# Patient Record
Sex: FEMALE | Race: WHITE | NOT HISPANIC OR LATINO | Employment: PART TIME | URBAN - METROPOLITAN AREA
[De-identification: names, ages, dates, MRNs, and addresses within clinical notes are randomized per-mention and may not be internally consistent; named-entity substitution may affect disease eponyms.]

---

## 2019-01-03 ENCOUNTER — HOSPITAL ENCOUNTER (OUTPATIENT)
Dept: ORTHOPEDIC SURGERY | Facility: CLINIC | Age: 74
Discharge: HOME | End: 2019-01-03
Attending: ORTHOPAEDIC SURGERY | Admitting: ORTHOPAEDIC SURGERY

## 2019-02-06 ENCOUNTER — HOSPITAL ENCOUNTER (OUTPATIENT)
Dept: PREADMISSION TESTING | Facility: HOSPITAL | Age: 74
Discharge: HOME | End: 2019-02-06
Attending: ORTHOPAEDIC SURGERY | Admitting: ORTHOPAEDIC SURGERY

## 2019-02-06 LAB
ANION GAP SERPL CALC-SCNC: 15.1 MMOL/L (ref 10–20)
APTT BLD: 24.5 SEC (ref 24–31)
BACTERIA SPEC AEROBE CULT: NORMAL
BASOPHILS # BLD AUTO: 0.1 10*3/UL (ref 0–0.2)
BASOPHILS NFR BLD AUTO: 1 % (ref 0–2)
BUN SERPL-MCNC: 22 MG/DL (ref 8–20)
BUN/CREAT SERPL: 22 (ref 5.4–26.2)
CALCIUM SERPL-MCNC: 9.5 MG/DL (ref 8.9–10.3)
CHLORIDE SERPL-SCNC: 101 MMOL/L (ref 101–111)
CONV CO2: 26 MMOL/L (ref 22–32)
CREAT UR-MCNC: 1 MG/DL (ref 0.4–1)
DIFFERENTIAL METHOD BLD: (no result)
EOSINOPHIL # BLD AUTO: 0.6 10*3/UL (ref 0–0.3)
EOSINOPHIL # BLD AUTO: 5 % (ref 0–3)
ERYTHROCYTE [DISTWIDTH] IN BLOOD BY AUTOMATED COUNT: 15.3 % (ref 11.5–14.5)
GLUCOSE SERPL-MCNC: 88 MG/DL (ref 65–99)
HCT VFR BLD AUTO: 42.1 % (ref 35–49)
HGB BLD-MCNC: 13.8 G/DL (ref 12–15)
INR PPP: 0.9
LYMPHOCYTES # BLD AUTO: 3.3 10*3/UL (ref 0.8–4.8)
LYMPHOCYTES NFR BLD AUTO: 27 % (ref 18–42)
Lab: NORMAL
MCH RBC QN AUTO: 28.1 PG (ref 26–32)
MCHC RBC AUTO-ENTMCNC: 32.8 G/DL (ref 32–36)
MCV RBC AUTO: 85.9 FL (ref 80–94)
MICRO REPORT STATUS: NORMAL
MONOCYTES # BLD AUTO: 0.8 10*3/UL (ref 0.1–1.3)
MONOCYTES NFR BLD AUTO: 6 % (ref 2–11)
NEUTROPHILS # BLD AUTO: 7.3 10*3/UL (ref 2.3–8.6)
NEUTROPHILS NFR BLD AUTO: 61 % (ref 50–75)
NRBC BLD AUTO-RTO: 0 /100{WBCS}
NRBC/RBC NFR BLD MANUAL: 0 10*3/UL
PLATELET # BLD AUTO: 337 10*3/UL (ref 150–450)
PMV BLD AUTO: 9.3 FL (ref 7.4–10.4)
POTASSIUM SERPL-SCNC: 4.1 MMOL/L (ref 3.6–5.1)
PROTHROMBIN TIME: 9.6 SEC (ref 9.6–11.7)
RBC # BLD AUTO: 4.9 10*6/UL (ref 4–5.4)
SODIUM SERPL-SCNC: 138 MMOL/L (ref 136–144)
SPECIMEN SOURCE: NORMAL
WBC # BLD AUTO: 12.2 10*3/UL (ref 4.5–11.5)

## 2019-02-19 ENCOUNTER — HOSPITAL ENCOUNTER (OUTPATIENT)
Dept: PREOP | Facility: HOSPITAL | Age: 74
Setting detail: HOSPITAL OUTPATIENT SURGERY
Discharge: HOME | End: 2019-02-19
Attending: ORTHOPAEDIC SURGERY | Admitting: ORTHOPAEDIC SURGERY

## 2019-03-08 ENCOUNTER — HOSPITAL ENCOUNTER (OUTPATIENT)
Dept: PHYSICAL THERAPY | Facility: HOSPITAL | Age: 74
Setting detail: RECURRING SERIES
Discharge: HOME | End: 2019-06-14
Attending: ORTHOPAEDIC SURGERY | Admitting: ORTHOPAEDIC SURGERY

## 2019-05-20 ENCOUNTER — CONVERSION ENCOUNTER (OUTPATIENT)
Dept: ORTHOPEDIC SURGERY | Facility: CLINIC | Age: 74
End: 2019-05-20

## 2019-06-04 VITALS
HEART RATE: 82 BPM | WEIGHT: 131 LBS | SYSTOLIC BLOOD PRESSURE: 104 MMHG | DIASTOLIC BLOOD PRESSURE: 69 MMHG | HEIGHT: 61 IN | BODY MASS INDEX: 24.73 KG/M2

## 2019-06-17 ENCOUNTER — OFFICE VISIT (OUTPATIENT)
Dept: ORTHOPEDIC SURGERY | Facility: CLINIC | Age: 74
End: 2019-06-17

## 2019-06-17 VITALS
DIASTOLIC BLOOD PRESSURE: 76 MMHG | WEIGHT: 133 LBS | HEIGHT: 60 IN | HEART RATE: 72 BPM | SYSTOLIC BLOOD PRESSURE: 155 MMHG | BODY MASS INDEX: 26.11 KG/M2

## 2019-06-17 DIAGNOSIS — M75.121 COMPLETE TEAR OF RIGHT ROTATOR CUFF: Primary | ICD-10-CM

## 2019-06-17 PROCEDURE — 99213 OFFICE O/P EST LOW 20 MIN: CPT | Performed by: ORTHOPAEDIC SURGERY

## 2019-06-17 RX ORDER — ESTRADIOL 1 MG/1
1 TABLET ORAL DAILY
COMMUNITY
Start: 2019-05-06

## 2019-06-17 RX ORDER — ESOMEPRAZOLE MAGNESIUM 10 MG/1
10 GRANULE, FOR SUSPENSION, EXTENDED RELEASE ORAL DAILY
COMMUNITY
Start: 2019-01-03

## 2019-06-17 RX ORDER — TRIAMTERENE AND HYDROCHLOROTHIAZIDE 37.5; 25 MG/1; MG/1
TABLET ORAL
COMMUNITY
Start: 2019-01-03 | End: 2022-07-07

## 2019-06-17 RX ORDER — AZELASTINE 1 MG/ML
1 SPRAY, METERED NASAL
COMMUNITY

## 2019-06-17 RX ORDER — BIOTIN 5 MG
TABLET ORAL DAILY
COMMUNITY
Start: 2019-01-03 | End: 2022-08-30

## 2019-06-17 NOTE — PROGRESS NOTES
"     Patient ID: Pam Vidal is a 74 y.o. female.    Right shoulder pain  2/19/19 right shoulder arthroscopy with rotator cuff repair with soft bone  Pain mild, not at goal      Review of Systems:  Right shoulder pain  Denies chest pain      Objective:    /76   Pulse 72   Ht 152.4 cm (60\")   Wt 60.3 kg (133 lb)   BMI 25.97 kg/m²     Physical Examination:  She is a pleasant female in no distress. She is alert and oriented x3 and appears her stated age.  Right shoulder demonstrates healed incisions.  Active elevation is 160 degrees, abduction 130 degrees external rotation 40 degrees with intact repair.  Sensory and motor exam are intact all distributions. Radial pulse is palpable and capillary refill is less than two seconds to all digits          Imaging:           Assessment:    Doing well after cuff repair    Plan: Finish formal therapy, gradual activity as tolerated and see me as needed  "

## 2019-06-18 ENCOUNTER — TRANSCRIBE ORDERS (OUTPATIENT)
Dept: PHYSICAL THERAPY | Facility: CLINIC | Age: 74
End: 2019-06-18

## 2019-06-18 ENCOUNTER — OFFICE VISIT (OUTPATIENT)
Dept: PHYSICAL THERAPY | Facility: CLINIC | Age: 74
End: 2019-06-18

## 2019-06-18 DIAGNOSIS — M75.121 COMPLETE ROTATOR CUFF TEAR OR RUPTURE OF RIGHT SHOULDER, NOT SPECIFIED AS TRAUMATIC: Primary | ICD-10-CM

## 2019-06-18 DIAGNOSIS — M25.511 RIGHT SHOULDER PAIN, UNSPECIFIED CHRONICITY: ICD-10-CM

## 2019-06-18 PROCEDURE — 97140 MANUAL THERAPY 1/> REGIONS: CPT | Performed by: PHYSICAL THERAPIST

## 2019-06-18 PROCEDURE — 97110 THERAPEUTIC EXERCISES: CPT | Performed by: PHYSICAL THERAPIST

## 2019-06-18 NOTE — PROGRESS NOTES
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  Saw MD yesterday, released from his care, instructed pt. to continue current course of PT.Pain  Current pain ratin           Objective   See Exercise, Manual, and Modality Logs for complete treatment.       Assessment & Plan     Assessment  Impairments: impaired physical strength  Assessment details: Fatigued quickly with resisted exercise, frequent rest breaks needed.     Plan  Therapy options: will be seen for skilled physical therapy services  Planned modality interventions: cryotherapy  Planned therapy interventions: strengthening, stretching and therapeutic activities                     Manual Therapy:   12    mins  71620;  Therapeutic Exercise: 42  mins  84730;     Neuromuscular Rubina:        mins  98829;    Therapeutic Activity:          mins  48823;     Gait Training:           mins  55644;     Ultrasound:          mins  60345;    Work Hardening                 mins 79032  Iontophoresis                  mins 10856    Timed Treatment:   54   mins   Total Treatment:     54   mins    Delonte Alexander, PTA

## 2019-06-21 ENCOUNTER — OFFICE VISIT (OUTPATIENT)
Dept: PHYSICAL THERAPY | Facility: CLINIC | Age: 74
End: 2019-06-21

## 2019-06-21 DIAGNOSIS — M75.121 COMPLETE ROTATOR CUFF TEAR OR RUPTURE OF RIGHT SHOULDER, NOT SPECIFIED AS TRAUMATIC: Primary | ICD-10-CM

## 2019-06-21 DIAGNOSIS — M25.511 RIGHT SHOULDER PAIN, UNSPECIFIED CHRONICITY: ICD-10-CM

## 2019-06-21 PROCEDURE — 97110 THERAPEUTIC EXERCISES: CPT | Performed by: PHYSICAL THERAPIST

## 2019-06-21 PROCEDURE — 97140 MANUAL THERAPY 1/> REGIONS: CPT | Performed by: PHYSICAL THERAPIST

## 2019-06-21 NOTE — PROGRESS NOTES
Physical Therapy Daily Progress Note      Visit # 2      Subjective Evaluation    History of Present Illness    Subjective comment: Doing well at work, nothing significant to report since last visit.  Pain  Current pain ratin           Objective     See Exercise, Manual, and Modality Logs for complete treatment.       Assessment & Plan     Assessment  Impairments: abnormal or restricted ROM and impaired physical strength  Assessment details:   Fatigued quickly with resisted exercise but able to tolerate progression of reps and/or resistance with several exercises.     Plan  Therapy options: will be seen for skilled physical therapy services  Planned modality interventions: cryotherapy  Planned therapy interventions: strengthening, stretching and therapeutic activities  Frequency: 1x week  Duration in visits: 6                     Manual Therapy:   10    mins  15759;  Therapeutic Exercise: 34  mins  68816;     Neuromuscular Rubina:        mins  07903;    Therapeutic Activity:          mins  55376;     Gait Training:           mins  73707;     Ultrasound:          mins  19905;    Work Hardening                 mins 02842  Iontophoresis                  mins 64499    Timed Treatment:   44   mins   Total Treatment:     44   mins    Delonte Alexander PTA

## 2019-06-28 ENCOUNTER — OFFICE VISIT (OUTPATIENT)
Dept: PHYSICAL THERAPY | Facility: CLINIC | Age: 74
End: 2019-06-28

## 2019-06-28 DIAGNOSIS — M25.511 RIGHT SHOULDER PAIN, UNSPECIFIED CHRONICITY: ICD-10-CM

## 2019-06-28 DIAGNOSIS — M75.121 COMPLETE ROTATOR CUFF TEAR OR RUPTURE OF RIGHT SHOULDER, NOT SPECIFIED AS TRAUMATIC: Primary | ICD-10-CM

## 2019-06-28 PROCEDURE — 97140 MANUAL THERAPY 1/> REGIONS: CPT | Performed by: PHYSICAL THERAPIST

## 2019-06-28 PROCEDURE — 97110 THERAPEUTIC EXERCISES: CPT | Performed by: PHYSICAL THERAPIST

## 2019-06-28 NOTE — PROGRESS NOTES
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  Notes some mild tightness in R UT, otherwise no c/o.      Objective     See Exercise, Manual, and Modality Logs for complete treatment.     Progressed with additional resisted exercise using cable column resistance.       Assessment & Plan     Assessment  Impairments: abnormal or restricted ROM and impaired physical strength  Assessment details:   Improved tolerance to resisted exercise overall, strength and endurance of RUE improving.      Plan  Therapy options: will be seen for skilled physical therapy services  Planned modality interventions: cryotherapy  Planned therapy interventions: strengthening, stretching and therapeutic activities  Frequency: 1x week  Duration in visits: 6  Plan details:   Will follow up with evaluating PT in approx. 10 days, discuss continue PT vs DC at that time.                   Manual Therapy:  8     mins  58553;  Therapeutic Exercise: 32 mins  32203;     Neuromuscular Rubina:        mins  12357;    Therapeutic Activity:          mins  01034;     Gait Training:           mins  58803;     Ultrasound:          mins  24391;    Work Hardening                 mins 05135  Iontophoresis                  mins 56932    Timed Treatment:   40   mins   Total Treatment:     40   mins    Delonte Alexander PTA

## 2019-10-09 ENCOUNTER — DOCUMENTATION (OUTPATIENT)
Dept: PHYSICAL THERAPY | Facility: CLINIC | Age: 74
End: 2019-10-09

## 2019-10-09 NOTE — PROGRESS NOTES
Discharge Summary  Discharge Summary from Physical Therapy Report        Number of Visits: 25     Discharge Status of Patient: Pam made excellent progress with physical therapy. She was scheduled to be seen for final assessment and discharge but she cancelled last 2 appointments and did not return for PT.    Goals: Partially Met    Discharge Plan: Continue with current home exercise program as instructed  Patient to return to referring/providing physician as needed.      Date of Discharge 6/28/19        Izzy Mccarthy, PT  Physical Therapist

## 2019-11-05 ENCOUNTER — ON CAMPUS - OUTPATIENT (AMBULATORY)
Dept: URBAN - METROPOLITAN AREA HOSPITAL 2 | Facility: HOSPITAL | Age: 74
End: 2019-11-05
Payer: COMMERCIAL

## 2019-11-05 ENCOUNTER — LAB REQUISITION (OUTPATIENT)
Dept: LAB | Facility: HOSPITAL | Age: 74
End: 2019-11-05

## 2019-11-05 ENCOUNTER — OFFICE (AMBULATORY)
Dept: URBAN - METROPOLITAN AREA PATHOLOGY 4 | Facility: PATHOLOGY | Age: 74
End: 2019-11-05
Payer: COMMERCIAL

## 2019-11-05 VITALS
SYSTOLIC BLOOD PRESSURE: 146 MMHG | SYSTOLIC BLOOD PRESSURE: 128 MMHG | HEART RATE: 63 BPM | HEART RATE: 60 BPM | RESPIRATION RATE: 14 BRPM | DIASTOLIC BLOOD PRESSURE: 55 MMHG | DIASTOLIC BLOOD PRESSURE: 59 MMHG | OXYGEN SATURATION: 99 % | SYSTOLIC BLOOD PRESSURE: 119 MMHG | TEMPERATURE: 96.7 F | OXYGEN SATURATION: 100 % | SYSTOLIC BLOOD PRESSURE: 105 MMHG | HEART RATE: 71 BPM | HEART RATE: 74 BPM | DIASTOLIC BLOOD PRESSURE: 90 MMHG | SYSTOLIC BLOOD PRESSURE: 100 MMHG | WEIGHT: 131 LBS | OXYGEN SATURATION: 97 % | OXYGEN SATURATION: 98 % | SYSTOLIC BLOOD PRESSURE: 130 MMHG | RESPIRATION RATE: 16 BRPM | DIASTOLIC BLOOD PRESSURE: 65 MMHG | DIASTOLIC BLOOD PRESSURE: 64 MMHG | HEART RATE: 77 BPM | HEART RATE: 67 BPM | DIASTOLIC BLOOD PRESSURE: 60 MMHG | SYSTOLIC BLOOD PRESSURE: 135 MMHG | HEART RATE: 72 BPM | SYSTOLIC BLOOD PRESSURE: 134 MMHG | OXYGEN SATURATION: 96 % | HEART RATE: 73 BPM | RESPIRATION RATE: 18 BRPM | DIASTOLIC BLOOD PRESSURE: 63 MMHG | DIASTOLIC BLOOD PRESSURE: 68 MMHG | DIASTOLIC BLOOD PRESSURE: 86 MMHG

## 2019-11-05 DIAGNOSIS — D12.5 BENIGN NEOPLASM OF SIGMOID COLON: ICD-10-CM

## 2019-11-05 DIAGNOSIS — Z12.11 ENCOUNTER FOR SCREENING FOR MALIGNANT NEOPLASM OF COLON: ICD-10-CM

## 2019-11-05 DIAGNOSIS — K57.30 DIVERTICULOSIS OF LARGE INTESTINE WITHOUT PERFORATION OR ABS: ICD-10-CM

## 2019-11-05 DIAGNOSIS — D12.3 BENIGN NEOPLASM OF TRANSVERSE COLON: ICD-10-CM

## 2019-11-05 DIAGNOSIS — K63.5 POLYP OF COLON: ICD-10-CM

## 2019-11-05 DIAGNOSIS — K57.30 DIVERTICULOSIS OF LARGE INTESTINE WITHOUT PERFORATION OR ABSCESS WITHOUT BLEEDING: ICD-10-CM

## 2019-11-05 LAB
GI HISTOLOGY: A. UNSPECIFIED: (no result)
GI HISTOLOGY: B. UNSPECIFIED: (no result)
GI HISTOLOGY: PDF REPORT: (no result)

## 2019-11-05 PROCEDURE — 45385 COLONOSCOPY W/LESION REMOVAL: CPT | Performed by: INTERNAL MEDICINE

## 2019-11-05 PROCEDURE — 88305 TISSUE EXAM BY PATHOLOGIST: CPT | Mod: 26 | Performed by: INTERNAL MEDICINE

## 2019-11-05 PROCEDURE — 88305 TISSUE EXAM BY PATHOLOGIST: CPT | Performed by: INTERNAL MEDICINE

## 2019-11-06 LAB
LAB AP CASE REPORT: NORMAL
PATH REPORT.FINAL DX SPEC: NORMAL
PATH REPORT.GROSS SPEC: NORMAL

## 2022-02-09 ENCOUNTER — LAB (OUTPATIENT)
Dept: LAB | Facility: HOSPITAL | Age: 77
End: 2022-02-09

## 2022-02-09 ENCOUNTER — TRANSCRIBE ORDERS (OUTPATIENT)
Dept: ADMINISTRATIVE | Facility: HOSPITAL | Age: 77
End: 2022-02-09

## 2022-02-09 DIAGNOSIS — Z11.52 ENCOUNTER FOR SCREENING FOR SEVERE ACUTE RESPIRATORY SYNDROME CORONAVIRUS 2 (SARS-COV-2) INFECTION: ICD-10-CM

## 2022-02-09 DIAGNOSIS — Z11.52 ENCOUNTER FOR SCREENING FOR SEVERE ACUTE RESPIRATORY SYNDROME CORONAVIRUS 2 (SARS-COV-2) INFECTION: Primary | ICD-10-CM

## 2022-02-09 LAB — SARS-COV-2 ORF1AB RESP QL NAA+PROBE: DETECTED

## 2022-02-09 PROCEDURE — U0004 COV-19 TEST NON-CDC HGH THRU: HCPCS

## 2022-02-09 PROCEDURE — U0005 INFEC AGEN DETEC AMPLI PROBE: HCPCS

## 2022-02-09 PROCEDURE — C9803 HOPD COVID-19 SPEC COLLECT: HCPCS

## 2022-07-07 ENCOUNTER — OFFICE VISIT (OUTPATIENT)
Dept: SURGERY | Facility: CLINIC | Age: 77
End: 2022-07-07

## 2022-07-07 ENCOUNTER — NURSE NAVIGATOR (OUTPATIENT)
Dept: ONCOLOGY | Facility: CLINIC | Age: 77
End: 2022-07-07

## 2022-07-07 VITALS
TEMPERATURE: 98.4 F | DIASTOLIC BLOOD PRESSURE: 67 MMHG | WEIGHT: 127 LBS | BODY MASS INDEX: 24.94 KG/M2 | SYSTOLIC BLOOD PRESSURE: 181 MMHG | OXYGEN SATURATION: 96 % | HEIGHT: 60 IN | HEART RATE: 71 BPM

## 2022-07-07 DIAGNOSIS — Z01.818 PRE-OP TESTING: ICD-10-CM

## 2022-07-07 DIAGNOSIS — R91.8 LUNG MASS: Primary | ICD-10-CM

## 2022-07-07 PROCEDURE — 99204 OFFICE O/P NEW MOD 45 MIN: CPT | Performed by: THORACIC SURGERY (CARDIOTHORACIC VASCULAR SURGERY)

## 2022-07-07 RX ORDER — ASPIRIN 81 MG/1
TABLET ORAL
COMMUNITY
Start: 2019-01-03 | End: 2022-08-30

## 2022-07-08 ENCOUNTER — TELEPHONE (OUTPATIENT)
Dept: SURGERY | Facility: CLINIC | Age: 77
End: 2022-07-08

## 2022-07-08 NOTE — TELEPHONE ENCOUNTER
"I received a call from Muriel with [facility] # [phone]. She stated she was calling on the consult of patient pam matias. I was unsure of what she was needing so I asked if she wanted to know when she was being seen by us or if she wanted the office note. Muriel mocked me with a \" ugh and Oh my God\". I asked for her Doctors name and she didn't give me one just said she was with [facility]. I took down the fax # [phone]. I then contacted the patient. Pam said she is not going to see them and she did not want her records sent there that her appt had been cancelled. I called Muriel back to inform her of that and she hung up on me.  "

## 2022-07-08 NOTE — PROGRESS NOTES
Chief Complaint  Lung Nodule (New Patient referral Dr. Mckay  Right lung nodule hemoptysis)    Subjective        Pam KRISTIE Vidal presents to [facility]  History of Present Illness    Ms. Vidal is a pleasant 77-year-old lady with a right upper lobe lung mass. She is accompanied by an adult female.    The patient reports that she has been coughing up blood mixed with mucus for the past 4 weeks, which is gradually worsening and increasing in frequency to almost every time she cough. She denies loss of weight, but she has been trying to lose 10 pounds per her doctor's recommendation. Also, at times, she states that she has episodes of slight pain in her head lasting seconds.     The patient reports that she receives injections in her eyes to treat macular degenerations. She has rheumatoid arthritis, and irritable bowel due to gallbladder removal. Also, she had situational hypertension due to stress from her mother, , and son being involved in a car wreck, but she has not been on blood pressure medication for almost 2 years.     The patient reports that she is a former smoker. She smoked for 50 years, less than half a pack a day and around 3 puffs per cigarette, but she quit 5 years ago.     The patient has had surgery on her carotid, appendectomy, and spinal surgery. She has never had chest surgery. She denies any trouble with anesthesia.     The patient's mother  of liver cancer, and her father  of stomach cancer.     She has concerns that the mass in her right upper lobe may be due to a blood clot from a fall that she sustained in 2021. She states that she wants all testing, treatment, and surgeries to be done at [facility], [place].     The patient reports that she is due for a mammogram and colon cancer screening. Her last mammogram showed abnormalities.    Objective   Vital Signs:  BP (!) 181/67 (BP Location: Right arm, Patient Position: Sitting, Cuff Size: Adult)    "Pulse 71   Temp 98.4 °F (36.9 °C) (Infrared)   Ht 152.4 cm (60\")   Wt 57.6 kg (127 lb)   SpO2 96%   BMI 24.80 kg/m²   Estimated body mass index is 24.8 kg/m² as calculated from the following:    Height as of this encounter: 152.4 cm (60\").    Weight as of this encounter: 57.6 kg (127 lb).    BMI is within normal parameters. No other follow-up for BMI required.      Physical Exam  Vitals and nursing note reviewed.   Constitutional:       Appearance: She is well-developed.   HENT:      Head: Normocephalic and atraumatic.      Nose: Nose normal.   Eyes:      Conjunctiva/sclera: Conjunctivae normal.      Pupils: Pupils are equal, round, and reactive to light.   Cardiovascular:      Rate and Rhythm: Normal rate and regular rhythm.      Heart sounds: Normal heart sounds.   Pulmonary:      Effort: Pulmonary effort is normal.      Breath sounds: Normal breath sounds.   Abdominal:      General: Bowel sounds are normal.      Palpations: Abdomen is soft.   Musculoskeletal:         General: Normal range of motion.      Cervical back: Normal range of motion and neck supple.   Skin:     General: Skin is warm and dry.      Capillary Refill: Capillary refill takes less than 2 seconds.   Neurological:      Mental Status: She is alert and oriented to person, place, and time.   Psychiatric:         Behavior: Behavior normal.         Thought Content: Thought content normal.         Judgment: Judgment normal.        Result Review :             I have independently reviewed the CT of the chest performed on 06/27/2022 which demonstrates a 4 cm right upper lobe mass along the major fissure. There is an enlarged hilar lymph node as well as an enlarged pre-prandial lymph node concerning for metastatic disease. There is a right lower lobe 1 cm nodule and a 5 mm right upper lobe nodule.     Assessment and Plan   Diagnoses and all orders for this visit:    1. Lung mass (Primary)  -     CT Biopsy Lung Mediastinum Right; Future  -     NM " PET/CT Skull Base to Mid Thigh; Future  -     Full Pulmonary Function Test With Bronchodilator; Future  -     MRI Brain With & Without Contrast; Future    2. Pre-op testing  -     COVID PRE-OP / PRE-PROCEDURE SCREENING ORDER (NO ISOLATION) - Swab, Nasopharynx; Future    Ms. Vidal is a very pleasant 77-year-old lady with a newly diagnosed 4 cm lung mass with mediastinal lymphadenopathy concerning for bronchogenic malignancy. She will need a CT-guided lung biopsy, a PET CT scan, and pulmonary function studies, and a return visit with me to discuss the results and treatment strategies. We would also like for her to see radiation oncology and medical oncology for a multidisciplinary approach to her cancer care as I suspect she will have stage III disease based on her imaging studies.     I spent 45 minutes caring for Pam on this date of service. This time includes time spent by me in the following activities:preparing for the visit, reviewing tests, obtaining and/or reviewing a separately obtained history, performing a medically appropriate examination and/or evaluation , counseling and educating the patient/family/caregiver, ordering medications, tests, or procedures, referring and communicating with other health care professionals , documenting information in the medical record and independently interpreting results and communicating that information with the patient/family/caregiver  Follow Up   No follow-ups on file.  Patient was given instructions and counseling regarding her condition or for health maintenance advice. Please see specific information pulled into the AVS if appropriate.     Transcribed from ambient dictation for Remedios Rice MD by Quyen Taylor.  07/08/22   05:51 EDT    Patient verbalized consent to the visit recording.  I have personally performed the services described in this document as transcribed by the above individual, and it is both accurate and complete.  Remedios Rice MD   7/26/2022  16:14 EDT

## 2022-07-08 NOTE — PROGRESS NOTES
I accompanied patient to Dr. Rice's office visit.     Dr. Rice reviewed scans and plan for workup. Patient requested all of her appointments be scheduled in [place] at this point. Patient has an appointment again with Dr. Rice 7/28. Orders have been placed for CT biopsy, MRI brain, and PET before follow up appointments. All questions have been answered and patient has my information if needed.

## 2022-07-11 ENCOUNTER — PATIENT ROUNDING (BHMG ONLY) (OUTPATIENT)
Dept: SURGERY | Facility: CLINIC | Age: 77
End: 2022-07-11

## 2022-07-11 NOTE — PROGRESS NOTES
07/22/22 0001   Pre-Procedure Phone Call   Procedure Time Verified Yes   Arrival Time 0830   Procedure Location Verified Yes   Medical History Reviewed No   NPO Status Reinforced Yes   Ride and Caregiver Arranged Yes   Patient Knows to Bring Current Medications No   Bring Outside Films Requested No

## 2022-07-11 NOTE — PROGRESS NOTES
July 11, 2022    Hello, may I speak with Pam Vidal?    My name is Yasmin     I am  with [facility]  [address] [place] [address].    Before we get started may I verify your date of birth? 1945    I am calling to officially welcome you to our practice and ask about your recent visit. Is this a good time to talk? yes    Tell me about your visit with us. What things went well?  pt stated everything went great        We're always looking for ways to make our patients' experiences even better. Do you have recommendations on ways we may improve?  no    Overall were you satisfied with your first visit to our practice? yes       I appreciate you taking the time to speak with me today. Is there anything else I can do for you? no      Thank you, and have a great day.

## 2022-07-11 NOTE — PROGRESS NOTES
07/22/22 0001   Pre-Procedure Phone Call   Procedure Time Verified Yes   Arrival Time 0830   Procedure Location Verified Yes   Medical History Reviewed No   NPO Status Reinforced Yes   Ride and Caregiver Arranged Yes   Phone Number for Ride/Caregiver instructed pt to hold her aspirin x 5 days prior to procedure. Last dose to be 7/16/22. Pt voiced understanding.   Patient Knows to Bring Current Medications No   Bring Outside Films Requested No

## 2022-07-20 ENCOUNTER — LAB (OUTPATIENT)
Dept: LAB | Facility: HOSPITAL | Age: 77
End: 2022-07-20

## 2022-07-20 DIAGNOSIS — Z01.818 PRE-OP TESTING: ICD-10-CM

## 2022-07-20 LAB — SARS-COV-2 ORF1AB RESP QL NAA+PROBE: NOT DETECTED

## 2022-07-20 PROCEDURE — C9803 HOPD COVID-19 SPEC COLLECT: HCPCS

## 2022-07-20 PROCEDURE — U0005 INFEC AGEN DETEC AMPLI PROBE: HCPCS

## 2022-07-20 PROCEDURE — U0004 COV-19 TEST NON-CDC HGH THRU: HCPCS

## 2022-07-22 ENCOUNTER — HOSPITAL ENCOUNTER (OUTPATIENT)
Dept: GENERAL RADIOLOGY | Facility: HOSPITAL | Age: 77
Discharge: HOME | End: 2022-07-22

## 2022-07-22 ENCOUNTER — HOSPITAL ENCOUNTER (OUTPATIENT)
Dept: CT IMAGING | Facility: HOSPITAL | Age: 77
Discharge: HOME | End: 2022-07-22

## 2022-07-22 VITALS
HEART RATE: 70 BPM | TEMPERATURE: 98.2 F | SYSTOLIC BLOOD PRESSURE: 144 MMHG | RESPIRATION RATE: 16 BRPM | WEIGHT: 128 LBS | OXYGEN SATURATION: 96 % | BODY MASS INDEX: 25.13 KG/M2 | HEIGHT: 60 IN | DIASTOLIC BLOOD PRESSURE: 68 MMHG

## 2022-07-22 DIAGNOSIS — R91.8 LUNG MASS: ICD-10-CM

## 2022-07-22 LAB
INR PPP: 1.1 (ref 0.8–1.2)
PLATELET # BLD AUTO: 470 10*3/MM3 (ref 140–450)
PROTHROMBIN TIME: 13.4 SECONDS (ref 12.8–15.2)

## 2022-07-22 PROCEDURE — 85610 PROTHROMBIN TIME: CPT

## 2022-07-22 PROCEDURE — 99152 MOD SED SAME PHYS/QHP 5/>YRS: CPT

## 2022-07-22 PROCEDURE — 71045 X-RAY EXAM CHEST 1 VIEW: CPT

## 2022-07-22 PROCEDURE — 85049 AUTOMATED PLATELET COUNT: CPT | Performed by: RADIOLOGY

## 2022-07-22 PROCEDURE — 25010000002 MIDAZOLAM PER 1 MG: Performed by: RADIOLOGY

## 2022-07-22 PROCEDURE — 0 LIDOCAINE 1 % SOLUTION: Performed by: THORACIC SURGERY (CARDIOTHORACIC VASCULAR SURGERY)

## 2022-07-22 PROCEDURE — 88305 TISSUE EXAM BY PATHOLOGIST: CPT | Performed by: THORACIC SURGERY (CARDIOTHORACIC VASCULAR SURGERY)

## 2022-07-22 PROCEDURE — 25010000002 FENTANYL CITRATE (PF) 50 MCG/ML SOLUTION: Performed by: RADIOLOGY

## 2022-07-22 RX ORDER — SODIUM CHLORIDE 0.9 % (FLUSH) 0.9 %
3 SYRINGE (ML) INJECTION EVERY 12 HOURS SCHEDULED
Status: DISCONTINUED | OUTPATIENT
Start: 2022-07-22 | End: 2022-07-23 | Stop reason: HOSPADM

## 2022-07-22 RX ORDER — SODIUM CHLORIDE 0.9 % (FLUSH) 0.9 %
10 SYRINGE (ML) INJECTION AS NEEDED
Status: DISCONTINUED | OUTPATIENT
Start: 2022-07-22 | End: 2022-07-23 | Stop reason: HOSPADM

## 2022-07-22 RX ORDER — LIDOCAINE HYDROCHLORIDE 10 MG/ML
20 INJECTION, SOLUTION INFILTRATION; PERINEURAL ONCE
Status: COMPLETED | OUTPATIENT
Start: 2022-07-22 | End: 2022-07-22

## 2022-07-22 RX ORDER — MIDAZOLAM HYDROCHLORIDE 1 MG/ML
INJECTION INTRAMUSCULAR; INTRAVENOUS
Status: COMPLETED | OUTPATIENT
Start: 2022-07-22 | End: 2022-07-22

## 2022-07-22 RX ORDER — SODIUM CHLORIDE 9 MG/ML
25 INJECTION, SOLUTION INTRAVENOUS ONCE
Status: COMPLETED | OUTPATIENT
Start: 2022-07-22 | End: 2022-07-22

## 2022-07-22 RX ORDER — FENTANYL CITRATE 50 UG/ML
INJECTION, SOLUTION INTRAMUSCULAR; INTRAVENOUS
Status: COMPLETED | OUTPATIENT
Start: 2022-07-22 | End: 2022-07-22

## 2022-07-22 RX ADMIN — LIDOCAINE HYDROCHLORIDE 20 ML: 10 INJECTION, SOLUTION INFILTRATION; PERINEURAL at 10:12

## 2022-07-22 RX ADMIN — Medication 3 ML: at 10:00

## 2022-07-22 RX ADMIN — MIDAZOLAM 1 MG: 1 INJECTION INTRAMUSCULAR; INTRAVENOUS at 10:11

## 2022-07-22 RX ADMIN — FENTANYL CITRATE 50 MCG: 0.05 INJECTION, SOLUTION INTRAMUSCULAR; INTRAVENOUS at 10:11

## 2022-07-22 RX ADMIN — SODIUM CHLORIDE 25 ML/HR: 9 INJECTION, SOLUTION INTRAVENOUS at 10:00

## 2022-07-22 NOTE — POST-PROCEDURE NOTE
POST PROCEDURE NOTE    Procedure: right lung biopsy    Pre-Procedure Diagnosis: right lung mass    Post-procedure Diagnosis: same    Findings: technically successful ct guided right lung biopsy    Complications: no immediate    Blood loss: none    Specimen Removed: two 20 gauge cores    Disposition:   Expected discharge home

## 2022-07-22 NOTE — DISCHARGE INSTRUCTIONS
Discharge Instructions after receiving Moderate Sedation  These instructions provide you with information about caring for yourself after your procedure. Your health care provider may also give you more specific instructions. Your treatment has been planned according to current medical practices, but problems sometimes occur. Call your physican or the Radiology Nurses ([phone]) if you have any problems or questions after your procedure.    What can I expect after the procedure?  After your procedure, you may:  Feel sleepy or light headed for several hours.  Feel clumsy, dizzy or have poor balance for several hours.  Feel forgetful about what happened after the procedure.  Have poor judgment for several hours.  Feel nauseous or vomit.    For at least 24 hours after the procedure:  Have a responsible adult stay with you or frequently check on you until you are awake and alert.   Rest as needed.    Do not:  Participate in activities in which you could fall or become injured.  Drive or operate heavy machinery  Take sleeping pills or medicines that cause drowsiness.  Make important decisions or sign legal documents.  Take care of children on your own.    Eating and drinking: Clear liquids then progress slowly to a normal diet.  If you vomit, drink water, juice, or soup when you can drink without vomiting.  Make sure you have little or no nausea before eating solid foods.    General instructions: Take over-the-counter and prescription medicines only as told by your health care provider .If you have sleep apnea, surgery and certain medicines can increase your risk for breathing problems. Follow instructions from your health care provider about wearing your sleep device: If you smoke, do not smoke without supervision.  Keep all follow-up visits as told by your health care provider. This is important.    Contact your physician if:  You continue to keep feeling nauseous or you keep vomiting. You continue to feel  light-headed, develop a fever or a rash.  Get help right away if you have trouble breathing    I acknowledge the above instructions:    Patient/ Responsible person _________________________________Date ____________Time ____________    Witnessed by____________________________________________ Date_____________ Time____________       EDUCATION /DISCHARGE INSTRUCTIONS  CT/US guided biopsy:  A biopsy is a procedure done to remove tissue for further analysis.  Before images are taken to locate the target area.  Images can be obtained using ultrasound, CT or MRI.  A physician will clean your skin with antiseptic soap, place a sterile towel around the site and administer a local anesthetic to numb the area.  The physician will then insert a special needle.  Sometimes images are taken of the needle after it is inserted to ensure the needle is in the correct area to be biopsied.   A sample is obtained and sent to the laboratory for study.  Occasionally the laboratory is unable to make a diagnosis from the sample and the procedure may need to be repeated.  Within a week the radiologist will send a report to your physician.  A pathologist will also examine the tissue and send a report.    Risks of the procedure include but are not limited to:   *  Bleeding    *  Infection   *  Puncture of surrounding organs *  Death     *  Lung collapse if the biopsy is near the chest which may require insertion of a      chest tube to re-inflate the lung if severe.    Benefits of the procedure:  Using x-ray helps to locate the area that requires a biopsy. The procedure is less invasive than a surgical procedure, there are no large incisions and it does not require anesthesia.    Alternatives to the procedure:  A biopsy can be performed surgically.  Risks of a surgical biopsy include exposure to anesthesia, infection, excessive bleeding and injury to abdominal organs.  A benefit of surgical biopsy is the ability to see the area to be biopsied  and remove of a larger piece of tissue.    THIS EDUCATION INFORMATION WAS REVIEWED PRIOR TO PROCEDURE AND CONSENT. Patient initials__________________Time___________________    Post Procedure:    *  Expect the biopsy site may be tender up to one week.    *  Rest today (no pushing pulling or straining).   *  Slowly increase activity tomorrow.    *  If you received sedation do not drive for 24 hours.   *  Keep dressing clean and dry.   *  Leave dressing on puncture site for 24 hours.    *  You may shower when dressing removed.  Call your doctor if experiencing:   *  Signs of infection such as redness, swelling, excessive pain and / or foul        smelling drainage from the puncture site.   *  Chills or fever over 101 degrees (by mouth).   *  Unrelieved pain.   *  Any new or severe symptoms.   *  If experiencing sudden / severe shortness of breath or chest pain go to the       nearest emergency room.   Following the procedure:     Follow-up with the ordering physician as directed.    Continue to take other medications as directed by your physician unless    otherwise instructed.   If applicable, resume taking your blood thinners or Aspirin on __07/23/2022_______.    If you have any concerns please call the Radiology Nurses Desk at [phone].  You are the most important factor in your recovery.  Follow the above instructions carefully.

## 2022-07-22 NOTE — NURSING NOTE
Patient arrived to radiology triage for lung biopsy.    Protective goggles and mask in place with all patient interactions today

## 2022-07-22 NOTE — H&P
Name: Pam Vidal ADMIT: 2022   : 1945  PCP: Nik Mckay MD    MRN: [id number] LOS: 0 days   AGE/SEX: 77 y.o. female  ROOM: Room/bed info not found       Chief complaint   Patient is a 77 y.o. female presents with lung mass.     Past Surgical History:  Past Surgical History:   Procedure Laterality Date   • APPENDECTOMY      appendix removed   • CAROTID ENDARTERECTOMY Right    • CAROTID ENDARTERECTOMY Left    • CATARACT EXTRACTION     • CERVICAL FUSION     • CHOLECYSTECTOMY     • HYSTERECTOMY     • SHOULDER ARTHROSCOPY Right 2019    right should scope/cuff repair        Past Medical History:  Past Medical History:   Diagnosis Date   • COPD (chronic obstructive pulmonary disease) (HCC)    • Hyperlipidemia    • IBS (irritable bowel syndrome)    • Rheumatoid arthritis (HCC)        Home Medications:  (Not in a hospital admission)      Allergies:  Metronidazole, Ofloxacin, Adhesive tape, Aspirin, Codeine, Del-mycin [erythromycin], Gentamicin, Ibuprofen, Latex, Penicillins, and Tetracycline    Family History:  Family History   Problem Relation Age of Onset   • Cancer Mother    • Cancer Father        Social History:  Social History     Tobacco Use   • Smoking status: Former Smoker   • Smokeless tobacco: Never Used   Substance Use Topics   • Alcohol use: Yes     Alcohol/week: 1.0 standard drink     Types: 1 Glasses of wine per week     Comment: occasionally        Objective     Physical Exam:   R/r/r, ctab    Vital Signs  Temp:  [98.2 °F (36.8 °C)] 98.2 °F (36.8 °C)  Heart Rate:  [69-76] 69  Resp:  [16] 16  BP: (142-152)/(57-65) 142/57    Anticipated Surgical Procedure:  Right lung biopsy    The risks, benefits and alternatives of this procedure have been discussed with the patient or responsible party: Yes        Neo Kitchen MD  22  09:46 EDT

## 2022-07-25 LAB
LAB AP CASE REPORT: NORMAL
LAB AP CLINICAL INFORMATION: NORMAL
LAB AP DIAGNOSIS COMMENT: NORMAL
PATH REPORT.FINAL DX SPEC: NORMAL
PATH REPORT.GROSS SPEC: NORMAL

## 2022-08-06 ENCOUNTER — TRANSCRIBE ORDERS (OUTPATIENT)
Dept: ADMINISTRATIVE | Facility: HOSPITAL | Age: 77
End: 2022-08-06

## 2022-08-06 DIAGNOSIS — Z01.818 OTHER SPECIFIED PRE-OPERATIVE EXAMINATION: Primary | ICD-10-CM

## 2022-08-12 ENCOUNTER — HOSPITAL ENCOUNTER (OUTPATIENT)
Dept: PET IMAGING | Facility: HOSPITAL | Age: 77
Discharge: HOME | End: 2022-08-12

## 2022-08-12 ENCOUNTER — LAB (OUTPATIENT)
Dept: LAB | Facility: HOSPITAL | Age: 77
End: 2022-08-12

## 2022-08-12 DIAGNOSIS — R91.8 LUNG MASS: ICD-10-CM

## 2022-08-12 DIAGNOSIS — Z01.818 OTHER SPECIFIED PRE-OPERATIVE EXAMINATION: ICD-10-CM

## 2022-08-12 LAB
GLUCOSE BLDC GLUCOMTR-MCNC: 95 MG/DL (ref 70–130)
SARS-COV-2 ORF1AB RESP QL NAA+PROBE: NOT DETECTED

## 2022-08-12 PROCEDURE — 82962 GLUCOSE BLOOD TEST: CPT

## 2022-08-12 PROCEDURE — A9552 F18 FDG: HCPCS | Performed by: THORACIC SURGERY (CARDIOTHORACIC VASCULAR SURGERY)

## 2022-08-12 PROCEDURE — U0005 INFEC AGEN DETEC AMPLI PROBE: HCPCS

## 2022-08-12 PROCEDURE — U0004 COV-19 TEST NON-CDC HGH THRU: HCPCS

## 2022-08-12 PROCEDURE — 0 FLUDEOXYGLUCOSE F18 SOLUTION: Performed by: THORACIC SURGERY (CARDIOTHORACIC VASCULAR SURGERY)

## 2022-08-12 PROCEDURE — 78815 PET IMAGE W/CT SKULL-THIGH: CPT

## 2022-08-12 RX ADMIN — FLUDEOXYGLUCOSE F18 1 DOSE: 300 INJECTION INTRAVENOUS at 09:54

## 2022-08-15 ENCOUNTER — HOSPITAL ENCOUNTER (OUTPATIENT)
Dept: RESPIRATORY THERAPY | Facility: HOSPITAL | Age: 77
Discharge: HOME | End: 2022-08-15

## 2022-08-15 ENCOUNTER — APPOINTMENT (OUTPATIENT)
Dept: PET IMAGING | Facility: HOSPITAL | Age: 77
End: 2022-08-15

## 2022-08-15 ENCOUNTER — HOSPITAL ENCOUNTER (OUTPATIENT)
Dept: MRI IMAGING | Facility: HOSPITAL | Age: 77
Discharge: HOME | End: 2022-08-15

## 2022-08-15 DIAGNOSIS — R91.8 LUNG MASS: ICD-10-CM

## 2022-08-15 PROCEDURE — 70553 MRI BRAIN STEM W/O & W/DYE: CPT

## 2022-08-15 PROCEDURE — 94060 EVALUATION OF WHEEZING: CPT

## 2022-08-15 PROCEDURE — 94664 DEMO&/EVAL PT USE INHALER: CPT

## 2022-08-15 PROCEDURE — 82565 ASSAY OF CREATININE: CPT

## 2022-08-15 PROCEDURE — 94640 AIRWAY INHALATION TREATMENT: CPT

## 2022-08-15 PROCEDURE — 94060 EVALUATION OF WHEEZING: CPT | Performed by: INTERNAL MEDICINE

## 2022-08-15 PROCEDURE — 0 GADOBENATE DIMEGLUMINE 529 MG/ML SOLUTION: Performed by: THORACIC SURGERY (CARDIOTHORACIC VASCULAR SURGERY)

## 2022-08-15 PROCEDURE — A9577 INJ MULTIHANCE: HCPCS | Performed by: THORACIC SURGERY (CARDIOTHORACIC VASCULAR SURGERY)

## 2022-08-15 RX ORDER — ALBUTEROL SULFATE 2.5 MG/3ML
2.5 SOLUTION RESPIRATORY (INHALATION) ONCE
Status: COMPLETED | OUTPATIENT
Start: 2022-08-15 | End: 2022-08-15

## 2022-08-15 RX ADMIN — ALBUTEROL SULFATE 2.5 MG: 2.5 SOLUTION RESPIRATORY (INHALATION) at 09:19

## 2022-08-15 RX ADMIN — GADOBENATE DIMEGLUMINE 12 ML: 529 INJECTION, SOLUTION INTRAVENOUS at 08:07

## 2022-08-16 LAB — CREAT BLDA-MCNC: 1.1 MG/DL (ref 0.6–1.3)

## 2022-08-18 ENCOUNTER — OFFICE VISIT (OUTPATIENT)
Dept: SURGERY | Facility: CLINIC | Age: 77
End: 2022-08-18

## 2022-08-18 ENCOUNTER — NURSE NAVIGATOR (OUTPATIENT)
Dept: ONCOLOGY | Facility: CLINIC | Age: 77
End: 2022-08-18

## 2022-08-18 ENCOUNTER — TRANSCRIBE ORDERS (OUTPATIENT)
Dept: ADMINISTRATIVE | Facility: HOSPITAL | Age: 77
End: 2022-08-18

## 2022-08-18 VITALS
DIASTOLIC BLOOD PRESSURE: 69 MMHG | HEART RATE: 80 BPM | BODY MASS INDEX: 24.54 KG/M2 | OXYGEN SATURATION: 95 % | SYSTOLIC BLOOD PRESSURE: 164 MMHG | HEIGHT: 60 IN | WEIGHT: 125 LBS | TEMPERATURE: 99.1 F

## 2022-08-18 DIAGNOSIS — Z01.818 OTHER SPECIFIED PRE-OPERATIVE EXAMINATION: Primary | ICD-10-CM

## 2022-08-18 DIAGNOSIS — R91.8 LUNG MASS: Primary | ICD-10-CM

## 2022-08-18 PROCEDURE — 99215 OFFICE O/P EST HI 40 MIN: CPT | Performed by: THORACIC SURGERY (CARDIOTHORACIC VASCULAR SURGERY)

## 2022-08-18 NOTE — PROGRESS NOTES
"Chief Complaint  Lung Nodule (3 week follow up Pet scan, PFT, MRI Brain, and CT guided lung biopsy)    Subjective          Pam Vidal presents to [facility] THORACIC SURGERY  History of Present Illness    The patient is a pleasant 77-year-old female with a newly discovered, 4 cm right upper lobe lung mass abutting the mediastinum with mediastinal lymphadenopathy. She is accompanied today by an adult female    The patient reports that the right lung mass biopsy went well and that she underwent the skull base to mid-thigh PET CT scan and pulmonary function studies. She states that she is feeling well.    The patient's primary concern currently is that she has been experiencing hemoptysis. She states that the hemoptysis is not worsening but is worrisome. She reports that a bout of hemoptysis typically brings up 1 teaspoon of bloody sputum, and she denies hemoptysis bringing up blood only. The patient reports that when she initially began experiencing symptoms related to her right upper lobe lung mass, she sought treatment because she thought she was suffering an upper respiratory infection, but the antibiotics and steroids she was prescribed did not alleviate her symptoms. Her co-workers at that time then suggested she undergo a chest x-ray.    She reports that she experiences bladder leakage, which has worsened.    The adult female notes that the patient's blood pressure was elevated today, which she believes was caused by nervousness. She feels that overall the patient is coping well with her diagnosis.    The patient confirms that surgical intervention has been performed on her neck in the past.    Objective   Vital Signs:   /69 (BP Location: Left arm, Patient Position: Sitting, Cuff Size: Adult)   Pulse 80   Temp 99.1 °F (37.3 °C) (Infrared)   Ht 152.4 cm (60\")   Wt 56.7 kg (125 lb)   SpO2 95%   BMI 24.41 kg/m²     Physical Exam  Vitals and nursing note reviewed. "   Constitutional:       Appearance: She is well-developed.   HENT:      Head: Normocephalic and atraumatic.      Nose: Nose normal.   Eyes:      Conjunctiva/sclera: Conjunctivae normal.   Cardiovascular:      Rate and Rhythm: Normal rate.   Pulmonary:      Effort: Pulmonary effort is normal.   Abdominal:      Palpations: Abdomen is soft.   Musculoskeletal:      Cervical back: Neck supple.   Skin:     General: Skin is warm and dry.   Neurological:      Mental Status: She is alert and oriented to person, place, and time.   Psychiatric:         Behavior: Behavior normal.         Thought Content: Thought content normal.         Judgment: Judgment normal.          Result Review :                I have independently reviewed the skull base to mid-thigh PET CT scan performed on 08/12/2022 which demonstrates hypermetabolism in the known right upper lobe lung mass. There is also hypermetabolic right hilar and mediastinal lymphadenopathy consistent with malignancy. There are a few right upper lobe lung nodules as well as right lower lobe lung nodules that are less than 9 mm, that are not hypermetabolic. There is a minimally hypermetabolic portohepatic lymph node and a intermediate-density, 4.4 cm right renal mass.     I have reviewed the brain MRI performed on 08/15/2022 and read by Dr. Anders, which demonstrates no evidence of metastatic disease.    Pulmonary function studies demonstrate an FEV1 of 1.28 L or 70 percent of predicted and a DLCO of 9.8 or 59 percent of predicted.    CT-guided biopsy of the right lung mass was non-diagnostic.     Assessment and Plan    Diagnoses and all orders for this visit:    1. Lung mass (Primary)  -     Ambulatory Referral to Radiation Oncology  -     Ambulatory Referral to Oncology  -     Ambulatory Referral to Urology  -     Ambulatory Referral to Pulmonology    The patient is a very pleasant 77-year-old female with a T3N2 lesion in the mediastinum without tissue diagnosis at this time.  She will need a repeat right lung mass biopsy, and we will plan a bronchoscopy with navigation and endobronchial ultrasound with Dr. Gregor Vee at [facility]. She will also need referral to medical oncology, likely Dr. Cosme, and radiation oncology, Dr. Rodriguez, to discuss treatment, as given her lymphadenopathy, she is not a candidate for surgical resection, as she is likely a stage 3. The patient will be referred to a urologist for further evaluation and imaging of her intermediate-density, 4.4 cm right renal mass. The patient was informed that radiation treatment should alleviate her hemoptysis, and she was warned to go to the hospital if a bout of hemoptysis brings up more than 1 tablespoon of blood. The patient was informed that she may follow up with me if she requires port placement.    1. Newly discovered, 4 cm right upper lobe lung mass abutting the mediastinum with mediastinal lymphadenopathy  - She will be referred to Dr. Gregor Vee for repeat right lung mass biopsy via bronchoscopy with navigation and endobronchial ultrasound.  - She will be referred to medical oncology and radiation oncology.  - The patient will be referred to a urologist.  - The patient will follow up with me if she requires port placement.    I spent 40 minutes caring for Pam on this date of service. This time includes time spent by me in the following activities:preparing for the visit, reviewing tests, obtaining and/or reviewing a separately obtained history, performing a medically appropriate examination and/or evaluation , counseling and educating the patient/family/caregiver, ordering medications, tests, or procedures, documenting information in the medical record and independently interpreting results and communicating that information with the patient/family/caregiver  Follow Up   No follow-ups on file.  Patient was given instructions and counseling regarding her condition or for health maintenance advice. Please  see specific information pulled into the AVS if appropriate.       Transcribed from ambient dictation for Remedios Rice MD by DONNIE PENNY.  08/18/22   12:18 EDT    Patient verbalized consent to the visit recording.  I have personally performed the services described in this document as transcribed by the above individual, and it is both accurate and complete.  Remedios Rice MD  8/31/2022  11:37 EDT

## 2022-08-18 NOTE — PROGRESS NOTES
I accompanied patient and family to Dr. Rice's office visit.     Dr. Rice reviewed PET scan, PFT, MRI of brain and biopsy. Dr. Rice informed patient that she not a surgical candidate. But that another biopsy needs to be obtained for a treatment plan to be determined. Patient agrees to pulmonologist to complete biopsy. Scheduled for 8/23. Referrals placed for Dr. Rodriguez and Dr Cosme at [place] office per patient request. Referral also placed for urologist. Dr. Rice discussed with patient that she will place a port of needed. All questions answered. Informed Jannet ([place] [facility] navigator) of patient.

## 2022-08-20 ENCOUNTER — LAB (OUTPATIENT)
Dept: LAB | Facility: HOSPITAL | Age: 77
End: 2022-08-20

## 2022-08-20 DIAGNOSIS — Z01.818 OTHER SPECIFIED PRE-OPERATIVE EXAMINATION: ICD-10-CM

## 2022-08-20 LAB — SARS-COV-2 ORF1AB RESP QL NAA+PROBE: NOT DETECTED

## 2022-08-20 PROCEDURE — U0004 COV-19 TEST NON-CDC HGH THRU: HCPCS

## 2022-08-20 PROCEDURE — C9803 HOPD COVID-19 SPEC COLLECT: HCPCS

## 2022-08-20 PROCEDURE — U0005 INFEC AGEN DETEC AMPLI PROBE: HCPCS

## 2022-08-22 ENCOUNTER — NURSE NAVIGATOR (OUTPATIENT)
Dept: ONCOLOGY | Facility: CLINIC | Age: 77
End: 2022-08-22

## 2022-08-22 DIAGNOSIS — R91.8 LUNG MASS: Primary | ICD-10-CM

## 2022-08-23 ENCOUNTER — HOSPITAL ENCOUNTER (OUTPATIENT)
Facility: HOSPITAL | Age: 77
Setting detail: HOSPITAL OUTPATIENT SURGERY
Discharge: HOME | End: 2022-08-23
Attending: INTERNAL MEDICINE | Admitting: INTERNAL MEDICINE

## 2022-08-23 ENCOUNTER — ANESTHESIA (OUTPATIENT)
Dept: GASTROENTEROLOGY | Facility: HOSPITAL | Age: 77
End: 2022-08-23

## 2022-08-23 ENCOUNTER — ANESTHESIA EVENT (OUTPATIENT)
Dept: GASTROENTEROLOGY | Facility: HOSPITAL | Age: 77
End: 2022-08-23

## 2022-08-23 VITALS
HEIGHT: 60 IN | TEMPERATURE: 97.9 F | DIASTOLIC BLOOD PRESSURE: 50 MMHG | HEART RATE: 85 BPM | WEIGHT: 127 LBS | RESPIRATION RATE: 18 BRPM | SYSTOLIC BLOOD PRESSURE: 111 MMHG | OXYGEN SATURATION: 96 % | BODY MASS INDEX: 24.94 KG/M2

## 2022-08-23 DIAGNOSIS — R91.8 HILAR MASS: ICD-10-CM

## 2022-08-23 LAB — GIE STN SPEC: NORMAL

## 2022-08-23 PROCEDURE — 88341 IMHCHEM/IMCYTCHM EA ADD ANTB: CPT | Performed by: INTERNAL MEDICINE

## 2022-08-23 PROCEDURE — 88173 CYTOPATH EVAL FNA REPORT: CPT | Performed by: INTERNAL MEDICINE

## 2022-08-23 PROCEDURE — 88305 TISSUE EXAM BY PATHOLOGIST: CPT | Performed by: INTERNAL MEDICINE

## 2022-08-23 PROCEDURE — 88342 IMHCHEM/IMCYTCHM 1ST ANTB: CPT | Performed by: INTERNAL MEDICINE

## 2022-08-23 PROCEDURE — 87102 FUNGUS ISOLATION CULTURE: CPT | Performed by: INTERNAL MEDICINE

## 2022-08-23 PROCEDURE — 88312 SPECIAL STAINS GROUP 1: CPT | Performed by: INTERNAL MEDICINE

## 2022-08-23 PROCEDURE — 25010000002 PROPOFOL 10 MG/ML EMULSION: Performed by: NURSE ANESTHETIST, CERTIFIED REGISTERED

## 2022-08-23 PROCEDURE — 88112 CYTOPATH CELL ENHANCE TECH: CPT | Performed by: INTERNAL MEDICINE

## 2022-08-23 PROCEDURE — 87071 CULTURE AEROBIC QUANT OTHER: CPT | Performed by: INTERNAL MEDICINE

## 2022-08-23 PROCEDURE — C1726 CATH, BAL DIL, NON-VASCULAR: HCPCS | Performed by: INTERNAL MEDICINE

## 2022-08-23 PROCEDURE — 87206 SMEAR FLUORESCENT/ACID STAI: CPT | Performed by: INTERNAL MEDICINE

## 2022-08-23 PROCEDURE — 87205 SMEAR GRAM STAIN: CPT | Performed by: INTERNAL MEDICINE

## 2022-08-23 PROCEDURE — 87116 MYCOBACTERIA CULTURE: CPT | Performed by: INTERNAL MEDICINE

## 2022-08-23 PROCEDURE — 88360 TUMOR IMMUNOHISTOCHEM/MANUAL: CPT

## 2022-08-23 RX ORDER — SODIUM CHLORIDE 0.9 % (FLUSH) 0.9 %
10 SYRINGE (ML) INJECTION AS NEEDED
Status: DISCONTINUED | OUTPATIENT
Start: 2022-08-23 | End: 2022-08-23 | Stop reason: HOSPADM

## 2022-08-23 RX ORDER — LIDOCAINE HYDROCHLORIDE 20 MG/ML
INJECTION, SOLUTION INFILTRATION; PERINEURAL AS NEEDED
Status: DISCONTINUED | OUTPATIENT
Start: 2022-08-23 | End: 2022-08-23 | Stop reason: SURG

## 2022-08-23 RX ORDER — GLYCOPYRROLATE 0.2 MG/ML
INJECTION INTRAMUSCULAR; INTRAVENOUS AS NEEDED
Status: DISCONTINUED | OUTPATIENT
Start: 2022-08-23 | End: 2022-08-23 | Stop reason: SURG

## 2022-08-23 RX ORDER — SODIUM CHLORIDE, SODIUM LACTATE, POTASSIUM CHLORIDE, CALCIUM CHLORIDE 600; 310; 30; 20 MG/100ML; MG/100ML; MG/100ML; MG/100ML
1000 INJECTION, SOLUTION INTRAVENOUS CONTINUOUS
Status: DISCONTINUED | OUTPATIENT
Start: 2022-08-23 | End: 2022-08-23 | Stop reason: HOSPADM

## 2022-08-23 RX ORDER — LIDOCAINE HYDROCHLORIDE 10 MG/ML
0.5 INJECTION, SOLUTION INFILTRATION; PERINEURAL ONCE AS NEEDED
Status: DISCONTINUED | OUTPATIENT
Start: 2022-08-23 | End: 2022-08-23 | Stop reason: HOSPADM

## 2022-08-23 RX ORDER — LIDOCAINE HYDROCHLORIDE 10 MG/ML
INJECTION, SOLUTION EPIDURAL; INFILTRATION; INTRACAUDAL; PERINEURAL AS NEEDED
Status: DISCONTINUED | OUTPATIENT
Start: 2022-08-23 | End: 2022-08-23 | Stop reason: HOSPADM

## 2022-08-23 RX ORDER — PROPOFOL 10 MG/ML
VIAL (ML) INTRAVENOUS AS NEEDED
Status: DISCONTINUED | OUTPATIENT
Start: 2022-08-23 | End: 2022-08-23 | Stop reason: SURG

## 2022-08-23 RX ORDER — LIDOCAINE HYDROCHLORIDE 20 MG/ML
INJECTION, SOLUTION EPIDURAL; INFILTRATION; INTRACAUDAL; PERINEURAL AS NEEDED
Status: DISCONTINUED | OUTPATIENT
Start: 2022-08-23 | End: 2022-08-23 | Stop reason: HOSPADM

## 2022-08-23 RX ADMIN — PROPOFOL 200 MG: 10 INJECTION, EMULSION INTRAVENOUS at 08:04

## 2022-08-23 RX ADMIN — GLYCOPYRROLATE 0.2 MG: 0.2 INJECTION INTRAMUSCULAR; INTRAVENOUS at 08:02

## 2022-08-23 RX ADMIN — PROPOFOL 150 MCG/KG/MIN: 10 INJECTION, EMULSION INTRAVENOUS at 08:07

## 2022-08-23 RX ADMIN — SODIUM CHLORIDE, POTASSIUM CHLORIDE, SODIUM LACTATE AND CALCIUM CHLORIDE 1000 ML: 600; 310; 30; 20 INJECTION, SOLUTION INTRAVENOUS at 07:49

## 2022-08-23 RX ADMIN — LIDOCAINE HYDROCHLORIDE 100 MG: 20 INJECTION, SOLUTION INFILTRATION; PERINEURAL at 08:04

## 2022-08-23 NOTE — H&P
CC: lung  Mass    HPI:  Mrs. Vidal is a 78yo female with lung mass and lymphadenopathy    Past Medical History:   Diagnosis Date   • COPD (chronic obstructive pulmonary disease) (HCC)    • Coughing up blood     X2 MONTHS   • History of COVID-19 2022   • Hyperlipidemia    • IBS (irritable bowel syndrome)    • Rheumatoid arthritis (HCC)      Social History     Socioeconomic History   • Marital status:    Tobacco Use   • Smoking status: Former Smoker     Types: Cigarettes     Quit date:      Years since quittin.6   • Smokeless tobacco: Never Used   • Tobacco comment: ONLY ABOUT 5 CIGS PER DAY   Vaping Use   • Vaping Use: Never used   Substance and Sexual Activity   • Alcohol use: Not Currently     Alcohol/week: 1.0 standard drink     Types: 1 Glasses of wine per week     Comment: occasionally   • Drug use: Never   • Sexual activity: Defer     Family History   Problem Relation Age of Onset   • Cancer Mother    • Cancer Father    • Malig Hyperthermia Neg Hx      MEDS: reviewed as per chart  ALL: list of 11, reviewed as per chart  ROS: some cough    Vital Sign Min/Max for last 24 hours  Temp  Min: 98.6 °F (37 °C)  Max: 98.6 °F (37 °C)   BP  Min: 112/50  Max: 112/50   Pulse  Min: 73  Max: 73   Resp  Min: 16  Max: 16   SpO2  Min: 97 %  Max: 97 %   No data recorded   Weight  Min: 57.6 kg (127 lb)  Max: 57.6 kg (127 lb)     Nad, axox3,   perrl, eomi, no icterus,  mmm, no jvd, trachea midline, neck supple,  chest cta bilaterally, no crackles, no wheezes,   rrr,   soft, nt, nd +bs,  no c/c/ e    Ct chest: reviewed    A/P:  1. Lung mass -- bronchoscopy  2. Lad -- ebus

## 2022-08-23 NOTE — DISCHARGE INSTRUCTIONS
For the next 24 hours patient needs to be with a responsible adult.    For 24 hours DO NOT drive, operate machinery, appliances, drink alcohol, make important decisions or sign legal documents.    Start with a light or bland diet if you are feeling sick to your stomach otherwise advance to regular diet as tolerated.    Follow recommendations on procedure report if provided by your doctor.    Call Dr. Gregor Vee for problems [phone]    Problems may include but not limited to: large amounts of bleeding, trouble breathing, repeated vomiting, severe unrelieved pain, fever or chills.

## 2022-08-23 NOTE — ANESTHESIA POSTPROCEDURE EVALUATION
Patient: Pam Vidal    Procedure Summary     Date: 08/23/22 [facility]    Anesthesia Start: 0758 Anesthesia Stop: 0848    Procedure: BRONCHOSCOPY WITH BAL AND BIOPSIES WITH ENDOBRONCHIAL ULTRASOUND WITH FNA (N/A Bronchus) Diagnosis:     Surgeons: Gregor Vee MD Provider: Chacorta Harding MD    Anesthesia Type: general ASA Status: 3          Anesthesia Type: general    Vitals  Vitals Value Taken Time   /50 08/23/22 0849   Temp     Pulse 101 08/23/22 0849   Resp 18 08/23/22 0849   SpO2 100 % 08/23/22 0849           Post Anesthesia Care and Evaluation    Patient location during evaluation: PHASE II  Patient participation: complete - patient participated  Level of consciousness: awake and alert  Pain management: adequate    Airway patency: patent  Anesthetic complications: No anesthetic complications  PONV Status: none  Cardiovascular status: acceptable and hemodynamically stable  Respiratory status: acceptable, nonlabored ventilation and spontaneous ventilation  Hydration status: acceptable

## 2022-08-23 NOTE — ADDENDUM NOTE
Addendum  created 08/23/22 0913 by Chacorta Harding MD    Attestation recorded in Intraprocedure, Intraprocedure Attestations filed

## 2022-08-23 NOTE — ANESTHESIA PREPROCEDURE EVALUATION
Anesthesia Evaluation     Patient summary reviewed and Nursing notes reviewed   NPO Solid Status: > 8 hours  NPO Liquid Status: > 4 hours           Airway   Mallampati: II  TM distance: >3 FB  Neck ROM: limited  No difficulty expected  Comment: Slight limitation of neck extension after fusion  Dental    (+) edentulous, upper dentures and lower dentures    Pulmonary - normal exam   (+) a smoker Former, COPD,     ROS comment: Hx hemoptysis X 2 months  Cardiovascular - normal exam    Rhythm: regular  Rate: normal    (+) hyperlipidemia,  carotid artery disease      Neuro/Psych  GI/Hepatic/Renal/Endo      Musculoskeletal         ROS comment: Hx cervical fusion  Abdominal  - normal exam   Substance History      OB/GYN          Other   arthritis, autoimmune disease rheumatoid arthritis,                      Anesthesia Plan    ASA 3     general     (LMA)  intravenous induction     Anesthetic plan, risks, benefits, and alternatives have been provided, discussed and informed consent has been obtained with: patient.    Plan discussed with CRNA.        CODE STATUS:

## 2022-08-23 NOTE — ANESTHESIA PROCEDURE NOTES
Airway  Urgency: elective    Date/Time: 8/23/2022 8:06 AM  Airway not difficult    General Information and Staff    Patient location during procedure: OR  Anesthesiologist: Chacorta Harding MD  CRNA/CAA: Neo Prater CRNA    Indications and Patient Condition  Indications for airway management: airway protection    Preoxygenated: yes  Mask difficulty assessment: 1 - vent by mask    Final Airway Details  Final airway type: supraglottic airway      Successful airway: bronch  Size 3    Number of attempts at approach: 1  Assessment: lips, teeth, and gum same as pre-op and atraumatic intubation    Additional Comments  Pre 02 100%, SIVI,, atraumatic intubation, BLBS, Positive ETC02.

## 2022-08-24 ENCOUNTER — NURSE NAVIGATOR (OUTPATIENT)
Dept: ONCOLOGY | Facility: CLINIC | Age: 77
End: 2022-08-24

## 2022-08-24 LAB
CYTO UR: NORMAL
CYTO UR: NORMAL
LAB AP CASE REPORT: NORMAL
LAB AP CASE REPORT: NORMAL
LAB AP CLINICAL INFORMATION: NORMAL
LAB AP DIAGNOSIS COMMENT: NORMAL
LAB AP NON-GYN SPECIMEN ADEQUACY: NORMAL
LAB AP SPECIAL STAINS: NORMAL
PATH REPORT.FINAL DX SPEC: NORMAL
PATH REPORT.FINAL DX SPEC: NORMAL
PATH REPORT.GROSS SPEC: NORMAL
PATH REPORT.GROSS SPEC: NORMAL

## 2022-08-24 NOTE — PROGRESS NOTES
"Referral received from Dr. Rice.  I called Ms. Vidal and introduced myself and explained navigational services. She stated that the consult with Dr. Rice went well. She will only see Dr. Rice again if she needs a port placed.     Ms. Vidal had her bronchoscopy yesterday which she stated went well. She just had a sore throat, which has improved and a cough, which is not new and mainly bothers her at night.  She is back to work today and says she should kym her bronchoscopy results this Friday. I encouraged her to discuss her continued cough with pulmonology when she receives her bronch results, which she plans to do.  Ms. Vidal states the first biopsy \"didn't show anything\".  Ms. Vidal is aware that she has been referred to both medical and radiation oncology, although has not heard from either office.  I explained that I had trouble reaching her earlier in the week and was not able to send a voicemail.  She reported that she wasn't aware of any issues with her phone although her home phone forwards to her cell phone. If she doesn't answer, there isn't always the ability to leave a voicemail. I informed her that I see both referrals to medical and radiation oncology in the system and I will contact them regarding scheduling her appointments.  (I contacted both medical and radiation oncology after this call; both will be calling the patient to schedule appt).    Ms. Vidal reports that she lives with her niece and has an adequate support system. She is independent with activities of daily living and does not require oxygen.   Ms. Vidal states she has no financial or transportation concerns at this time. She has Medicare A, B and D as well as [employer].  She has no resource needs or ongoing concerns at this time although stated she may need assistance at some point if she requires high dollar medication as part of her treatment plan.  We discussed possible financial resources, which will be pursued as needed.  She " quit smoking 5 years ago.    Ms. Vidal denies anxiety or depression and is optimistic that her disease will be able to be treated.  She stated that she has no emotional or support needs at this time.    I asked if I could meet her at her medical or radiation oncology appointment and provide a navigator folder with my contact information as well as integrative therapies and other services at the [facility]; she agreed. Ms. Vidal verbalized appreciation for navigational services and she has my contact information and will call with any questions that arise. I will follow up via phone or in person if one of her oncology appointments is scheduled next week.    Acuity Level TBD (until cancer is confirmed, staging rec'd and treatment plan is identified)

## 2022-08-25 LAB
BACTERIA SPEC AEROBE CULT: NORMAL
GRAM STN SPEC: NORMAL

## 2022-08-26 LAB
LAB AP CASE REPORT: NORMAL
LAB AP DIAGNOSIS COMMENT: NORMAL
LAB AP SPECIAL STAINS: NORMAL
PATH REPORT.ADDENDUM SPEC: NORMAL
PATH REPORT.FINAL DX SPEC: NORMAL
PATH REPORT.GROSS SPEC: NORMAL

## 2022-08-30 ENCOUNTER — LAB (OUTPATIENT)
Dept: OTHER | Facility: HOSPITAL | Age: 77
End: 2022-08-30

## 2022-08-30 ENCOUNTER — TELEPHONE (OUTPATIENT)
Dept: SURGERY | Facility: CLINIC | Age: 77
End: 2022-08-30

## 2022-08-30 ENCOUNTER — CONSULT (OUTPATIENT)
Dept: ONCOLOGY | Facility: CLINIC | Age: 77
End: 2022-08-30

## 2022-08-30 VITALS
OXYGEN SATURATION: 94 % | HEIGHT: 60 IN | DIASTOLIC BLOOD PRESSURE: 69 MMHG | HEART RATE: 72 BPM | BODY MASS INDEX: 24.66 KG/M2 | WEIGHT: 125.6 LBS | SYSTOLIC BLOOD PRESSURE: 147 MMHG | RESPIRATION RATE: 16 BRPM | TEMPERATURE: 97.3 F

## 2022-08-30 DIAGNOSIS — R91.8 LUNG MASS: Primary | ICD-10-CM

## 2022-08-30 DIAGNOSIS — C34.91 PRIMARY LUNG ADENOCARCINOMA, RIGHT: Primary | ICD-10-CM

## 2022-08-30 DIAGNOSIS — R04.2 COUGH WITH HEMOPTYSIS: ICD-10-CM

## 2022-08-30 DIAGNOSIS — C34.91 PRIMARY LUNG ADENOCARCINOMA, RIGHT: ICD-10-CM

## 2022-08-30 LAB
BASOPHILS # BLD AUTO: 0.08 10*3/MM3 (ref 0–0.2)
BASOPHILS NFR BLD AUTO: 0.5 % (ref 0–1.5)
DEPRECATED RDW RBC AUTO: 45.9 FL (ref 37–54)
EOSINOPHIL # BLD AUTO: 1.05 10*3/MM3 (ref 0–0.4)
EOSINOPHIL NFR BLD AUTO: 7.1 % (ref 0.3–6.2)
ERYTHROCYTE [DISTWIDTH] IN BLOOD BY AUTOMATED COUNT: 16.3 % (ref 12.3–15.4)
HCT VFR BLD AUTO: 34.1 % (ref 34–46.6)
HGB BLD-MCNC: 11.1 G/DL (ref 12–15.9)
IMM GRANULOCYTES # BLD AUTO: 0.08 10*3/MM3 (ref 0–0.05)
IMM GRANULOCYTES NFR BLD AUTO: 0.5 % (ref 0–0.5)
LYMPHOCYTES # BLD AUTO: 2.28 10*3/MM3 (ref 0.7–3.1)
LYMPHOCYTES NFR BLD AUTO: 15.5 % (ref 19.6–45.3)
MCH RBC QN AUTO: 25.3 PG (ref 26.6–33)
MCHC RBC AUTO-ENTMCNC: 32.6 G/DL (ref 31.5–35.7)
MCV RBC AUTO: 77.9 FL (ref 79–97)
MONOCYTES # BLD AUTO: 1.12 10*3/MM3 (ref 0.1–0.9)
MONOCYTES NFR BLD AUTO: 7.6 % (ref 5–12)
NEUTROPHILS NFR BLD AUTO: 10.1 10*3/MM3 (ref 1.7–7)
NEUTROPHILS NFR BLD AUTO: 68.8 % (ref 42.7–76)
NRBC BLD AUTO-RTO: 0 /100 WBC (ref 0–0.2)
PLATELET # BLD AUTO: 437 10*3/MM3 (ref 140–450)
PMV BLD AUTO: 11.2 FL (ref 6–12)
RBC # BLD AUTO: 4.38 10*6/MM3 (ref 3.77–5.28)
WBC NRBC COR # BLD: 14.71 10*3/MM3 (ref 3.4–10.8)

## 2022-08-30 PROCEDURE — 85025 COMPLETE CBC W/AUTO DIFF WBC: CPT | Performed by: INTERNAL MEDICINE

## 2022-08-30 PROCEDURE — 99205 OFFICE O/P NEW HI 60 MIN: CPT | Performed by: INTERNAL MEDICINE

## 2022-08-30 PROCEDURE — 36415 COLL VENOUS BLD VENIPUNCTURE: CPT

## 2022-08-30 RX ORDER — PREDNISONE 10 MG/1
20 TABLET ORAL DAILY
Qty: 50 TABLET | Refills: 1 | Status: SHIPPED | OUTPATIENT
Start: 2022-08-30 | End: 2022-11-07

## 2022-08-30 NOTE — ADDENDUM NOTE
Addended by: TETO BURNS on: 8/30/2022 11:12 AM     Modules accepted: Orders, Level of Service, SmartSet

## 2022-08-30 NOTE — PROGRESS NOTES
Subjective     REASON FOR CONSULTATION:   1.  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  PD-L1 positive greater than 95%  3.  Hemoptysis at presentation  Provide an opinion on any further workup or treatment                             REQUESTING PHYSICIAN: Remedios Rice MD    RECORDS OBTAINED:  Records of the patients history including those obtained from the referring provider were reviewed and summarized in detail.    HISTORY OF PRESENT ILLNESS:  The patient is a 77 y.o. year old female who is here for an opinion about the above issue.  She is a former smoker referred to us now from thoracic surgery (Dr. Remedios Rice) for evaluation and treatment of clinical stage III adenocarcinoma of the lung.  She was having persistent cough and underwent chest x-ray initially in late June which showed possible right upper lobe mass.  This was followed by CT scan of the chest confirming the presence of a right upper lobe mass.  She initially underwent a CT-guided needle biopsy on 7/22/2022 which was nondiagnostic.    She was referred to Dr. Glass for bronchoscopy and biopsy which was performed on 8/23/2022 with pathology returning as poorly differentiated adenocarcinoma.  PD-L1 stain on the tissue was positive at greater than 95%.    She underwent further staging with PET scan and MRI of the brain.  PET scan was performed on 8/12/2022 showing hypermetabolic activity in the large mass in the right upper lobe as well as mediastinal lymph nodes.  She was noted to have a mass on the kidney as well but this was not hypermetabolic.  There was no obvious distant metastatic disease.    MRI of the brain from 8/15/2022 likewise showed no evidence of metastatic disease.  She is scheduled also to see Dr. Hector Rodriguez and radiation oncology on 9/7/2022.    She tells me that she is very adamant about taking a vacation with her family to [place] prior to starting treatment and she will be returning on Monday, 9/19/2022 and can start her  first cycle of treatment on 9/20/2022.    We have recommended weekly carboplatin and Taxol concurrent with radiation therapy.  Following completion of treatment she may be a candidate for maintenance immunotherapy and possibly 2 cycles of full dose carboplatin and Taxol.    She does have a diagnosis of rheumatoid arthritis and is currently taking only Celebrex.  This could become an issue regarding her ability to tolerate immunotherapy in the future.    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    History of Present Illness     Past Medical History:   Diagnosis Date   • COPD (chronic obstructive pulmonary disease) (HCC)    • Coughing up blood     X2 MONTHS   • History of COVID-19 02/2022   • Hyperlipidemia    • IBS (irritable bowel syndrome)    • Rheumatoid arthritis (HCC)         Past Surgical History:   Procedure Laterality Date   • APPENDECTOMY      appendix removed   • BRONCHOSCOPY N/A 8/23/2022    Procedure: BRONCHOSCOPY WITH BAL,  BIOPSIES, AND BRUSHINGS WITH ENDOBRONCHIAL ULTRASOUND WITH FNA;  Surgeon: Gregor Vee MD;  Location: [facility] ENDOSCOPY;  Service: Pulmonary;  Laterality: N/A;  PRE- HILAR MASS  POST- SAME   • CAROTID ENDARTERECTOMY Right    • CAROTID ENDARTERECTOMY Left    • CATARACT EXTRACTION     • CERVICAL FUSION     • CHOLECYSTECTOMY     • HYSTERECTOMY     • SHOULDER ARTHROSCOPY Right 02/19/2019    right should scope/cuff repair         Current Outpatient Medications on File Prior to Visit   Medication Sig Dispense Refill   • azelastine (ASTELIN) 0.1 % nasal spray 1 spray into the nostril(s) as directed by provider.     • B COMPLEX VITAMINS ER PO Take  by mouth Daily.     • Celecoxib (CELEBREX PO) CELEBREX CAPS     • Cholecalciferol (VITAMIN D3) 5000 units capsule capsule Take 5,000 Units by mouth Daily.     • Colestipol HCl (COLESTID PO) Daily.     • esomeprazole (NexIUM) 10 MG packet Take 10 mg by mouth Daily.     • estradiol (ESTRACE) 1 MG tablet Take 1 mg by mouth  Daily.     • Krill Oil 1000 MG capsule Daily.     • MULTIPLE VITAMINS PO Take 1 tablet by mouth Daily.     • Multiple Vitamins-Minerals (PRESERVISION AREDS 2+MULTI VIT PO) Take  by mouth.     • aspirin 81 MG EC tablet ASPIRIN 81 MG TBEC       No current facility-administered medications on file prior to visit.        ALLERGIES:    Allergies   Allergen Reactions   • Metronidazole Hives   • Ofloxacin Hives and Nausea Only   • Del-Mycin [Erythromycin] Hives   • Gentamicin Hives   • Ibuprofen Nausea And Vomiting   • Latex Dermatitis     GLOVES   • Penicillins Hives   • Tetracycline Hives   • Adhesive Tape Dermatitis     BANDAIDS   • Aspirin GI Intolerance     Vomiting can take EC   • Codeine Nausea And Vomiting        Social History     Socioeconomic History   • Marital status:    Tobacco Use   • Smoking status: Former Smoker     Types: Cigarettes     Quit date:      Years since quittin.6   • Smokeless tobacco: Never Used   • Tobacco comment: ONLY ABOUT 5 CIGS PER DAY   Vaping Use   • Vaping Use: Never used   Substance and Sexual Activity   • Alcohol use: Not Currently     Alcohol/week: 1.0 standard drink     Types: 1 Glasses of wine per week     Comment: occasionally   • Drug use: Never   • Sexual activity: Defer        Family History   Problem Relation Age of Onset   • Cancer Mother    • Cancer Father    • Malig Hyperthermia Neg Hx         Review of Systems   Constitutional: Negative for activity change, chills, fatigue and fever.   HENT: Negative for mouth sores, trouble swallowing and voice change.    Eyes: Negative for pain and visual disturbance.   Respiratory: Positive for cough and shortness of breath. Negative for wheezing.         Hemoptysis of bright red blood.   Cardiovascular: Negative for chest pain and palpitations.   Gastrointestinal: Negative for abdominal pain, constipation, diarrhea, nausea and vomiting.   Genitourinary: Negative for difficulty urinating, frequency and urgency.  "  Musculoskeletal: Negative for arthralgias and joint swelling.   Skin: Negative for rash.   Neurological: Negative for dizziness, seizures, weakness and headaches.   Hematological: Negative for adenopathy. Does not bruise/bleed easily.   Psychiatric/Behavioral: Negative for behavioral problems and confusion. The patient is not nervous/anxious.         Objective     Vitals:    08/30/22 0803   BP: 147/69   Pulse: 72   Resp: 16   Temp: 97.3 °F (36.3 °C)   TempSrc: Temporal   SpO2: 94%   Weight: 57 kg (125 lb 9.6 oz)   Height: 152 cm (59.84\")   PainSc: 0-No pain     Current Status 8/30/2022   ECOG score 0       Physical Exam  Constitutional:       General: She is not in acute distress.     Appearance: She is well-developed.   HENT:      Head: Normocephalic.   Eyes:      General: No scleral icterus.     Conjunctiva/sclera: Conjunctivae normal.      Pupils: Pupils are equal, round, and reactive to light.   Neck:      Thyroid: No thyromegaly.      Vascular: No JVD.      Comments: Bilateral scars from carotid endarterectomy surgeries  Cardiovascular:      Rate and Rhythm: Normal rate and regular rhythm.      Heart sounds: No murmur heard.    No friction rub. No gallop.   Pulmonary:      Effort: Pulmonary effort is normal.      Breath sounds: Wheezing and rhonchi present.      Comments: Decreased breath sounds on the right with occasional wheezes and rhonchi.  Abdominal:      General: There is no distension.      Palpations: Abdomen is soft. There is no mass.      Tenderness: There is no abdominal tenderness.   Musculoskeletal:         General: No deformity. Normal range of motion.      Cervical back: Normal range of motion and neck supple.   Lymphadenopathy:      Cervical: No cervical adenopathy.   Skin:     General: Skin is warm and dry.      Findings: No erythema or rash.   Neurological:      Mental Status: She is alert and oriented to person, place, and time.      Cranial Nerves: No cranial nerve deficit.      Deep " Tendon Reflexes: Reflexes are normal and symmetric.   Psychiatric:         Behavior: Behavior normal.         Judgment: Judgment normal.           RECENT LABS:  Hematology WBC   Date Value Ref Range Status   08/30/2022 14.71 (H) 3.40 - 10.80 10*3/mm3 Final     RBC   Date Value Ref Range Status   08/30/2022 4.38 3.77 - 5.28 10*6/mm3 Final     Hemoglobin   Date Value Ref Range Status   08/30/2022 11.1 (L) 12.0 - 15.9 g/dL Final     Hematocrit   Date Value Ref Range Status   08/30/2022 34.1 34.0 - 46.6 % Final     Platelets   Date Value Ref Range Status   08/30/2022 437 140 - 450 10*3/mm3 Final        BRONCHOSCOPY PATHOLOGY 8/23/2022  Final Diagnosis   1. Lung, Right Upper Lobe, Endobronchial Biopsies: INVASIVE POORLY DIFFERENTIATED ADENOCARCINOMA OF PULMONARY ORIGIN.   PDL-1 POSITIVE >95%    F-18 FDG PET FROM SKULL BASE TO MID THIGH WITH PET/CT FUSION 8/12/2022  IMPRESSION:  1.  Large mass centered within the right upper lobe representing  patient's known malignancy. Interlobular septal thickening and ground  glass opacification projecting from the periphery of the mass throughout  the right upper lobe is highly concerning for lymphangitic spread. Of  note, the mass abuts the pleura and mediastinum over a long segment and  underlying invasion cannot be excluded.  2.  FDG avid hilar and mediastinal adenopathy concerning for metastatic  disease.  3.  A few irregular subcentimeter pulmonary nodules are present within  the right lower and left upper lobes measuring up to 0.9 cm which while  nonspecific raises concern for metastatic disease. At least close  attention on followup is recommended.   4.  Minimally hypermetabolic arnoldo hepatic node and bilateral sub-6 mm  pulmonary nodules are indeterminate. Continued followup with chest and  abdominal CT in 3 months is recommended.  5.  Indeterminate density 4.4 cm mass arises off the right kidney.  Further evaluation with multiphase CT or MRI of the abdomen with and  without  contrast is recommended to exclude neoplasm.  6.  Focal uptake over the right shoulder in the area of prior rotator  cuff repair is favored be postsurgical with metastatic disease less  likely.  7.  Other findings as above.    MRI OF THE BRAIN WITH AND WITHOUT CONTRAST 08/15/2022   IMPRESSION:  1. There is mild-to-moderate small vessel disease in cerebral and  central pontine white matter. Otherwise, the MRI of the brain is normal  with no evidence of metastatic disease to the brain.   2. There are some nonspecific signal abnormality in the C4 cervical  vertebra with T1 low signal throughout the visualized portions C4  cervical vertebrae on the sagittal images. The patient has had prior  cervical spine surgery as demonstrated on prior PET scan and this argues  in favor that the T1 low signal and C4 vertebrae is related to sclerosis  from degenerative disc changes at C4-5 although an osseous metastatic  lesion in the C4 vertebra cannot be excluded on the basis of this exam.  Apparently the C4 vertebra was not hot on the recent PET scan which is  further evidence that this is degenerative in origin and correlation  with recent PET scan is suggested.      Assessment & Plan   1.  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  Patient is not felt to be a surgical candidate and would best be treated with combined chemotherapy and radiation.  She is to see Dr. Rodriguez of radiation oncology on 9/7/2022.  We discussed with her today the chemotherapy options which would be weekly CarboTaxol or carbo Alimta concurrent with radiation.  We have recommended weekly carboplatin and Taxol.  2.  Tumor is strongly PD-L1 positive greater than 95% although the patient may not be a great candidate for immunotherapy in the future due to her rheumatoid arthritis.  3.  Other comorbidities including vascular disease with previous carotid endarterectomy surgeries.  She has no known history of stroke or myocardial infarction.  Overall her  performance status is very good.  4.  Hemoptysis related to her central tumor.  She has stopped taking aspirin.  We recommended she also try to stop Celebrex and her krill oil supplement.    Recommendations  1.  We had a lengthy discussion in the office today regarding the diagnosis, stage, prognosis, and treatment options available for clinical stage III adenocarcinoma of the lung presenting as a large right upper lobe mass with hemoptysis.  2.  We have recommended combined radiation and chemotherapy as her initial treatment.  We have recommended weekly CarboTaxol as the chemotherapy portion of her treatment and she will be meeting with Dr. Rodriguez of radiation oncology on 9/7/2022 to discuss the radiation therapy.  3.  She will be referred back to Dr. Rice for port placement.  4.  We will schedule formal chemotherapy education with one of our nurse practitioners to discuss concurrent carboplatin and Taxol weekly during radiation  5.  We prescribed prednisone 20 mg p.o. daily to try to manage her rheumatoid arthritis off Celebrex.  6.  She was very adamant about going on vacation with her family in mid September and very much wants to start treatment after she returns.  We discussed starting her first chemotherapy treatment on Tuesday, 9/20/2022 with possible plan to move to treatment each Monday thereafter.  7.  We will request molecular profiling on the tumor tissue with Caris if possible.    8.  We also will draw mary 360 assay with her labs in the office today due to our concern that the remaining tissue from the bronchoscopic biopsy may not be adequate for full molecular profiling.  9.  We discussed reassessing her response to therapy after completion of combined chemotherapy and radiation and depending on her tolerance and response she may be a candidate for 2 cycles of full dose carboplatin and Taxol treatment followed by maintenance immunotherapy.    Thanks very much for allowing us to see this nice  patient in consultation.

## 2022-08-30 NOTE — TELEPHONE ENCOUNTER
Caller: Shana Vidal    Relationship to patient: Self    Best call back number: [phone]    Type of visit: PORT PLACEMENT    Requested date: NEXT WEEK    Additional notes: DR. EDOUARD MISTRY OFFICE CALLED TO REQUEST THAT SHANA VIDAL BE SCHEDULED FOR PORT PLACEMENT NEXT WEEK. PATIENT WOULD LIKE A CALL TO SCHEDULE.

## 2022-08-31 ENCOUNTER — PREP FOR SURGERY (OUTPATIENT)
Dept: OTHER | Facility: HOSPITAL | Age: 77
End: 2022-08-31

## 2022-08-31 DIAGNOSIS — R91.8 LUNG MASS: Primary | ICD-10-CM

## 2022-08-31 DIAGNOSIS — R79.1 ABNORMAL COAGULATION PROFILE: ICD-10-CM

## 2022-08-31 DIAGNOSIS — C34.91 PRIMARY LUNG ADENOCARCINOMA, RIGHT: ICD-10-CM

## 2022-08-31 RX ORDER — CLINDAMYCIN PHOSPHATE 900 MG/50ML
900 INJECTION INTRAVENOUS ONCE
Status: CANCELLED | OUTPATIENT
Start: 2022-09-06 | End: 2022-08-31

## 2022-08-31 RX ORDER — SODIUM CHLORIDE 0.9 % (FLUSH) 0.9 %
3 SYRINGE (ML) INJECTION EVERY 12 HOURS SCHEDULED
Status: CANCELLED | OUTPATIENT
Start: 2022-09-06

## 2022-08-31 RX ORDER — HEPARIN SODIUM 5000 [USP'U]/ML
5000 INJECTION, SOLUTION INTRAVENOUS; SUBCUTANEOUS ONCE
Status: CANCELLED | OUTPATIENT
Start: 2022-09-06 | End: 2022-08-31

## 2022-08-31 RX ORDER — SODIUM CHLORIDE 0.9 % (FLUSH) 0.9 %
3-10 SYRINGE (ML) INJECTION AS NEEDED
Status: CANCELLED | OUTPATIENT
Start: 2022-09-06

## 2022-09-02 ENCOUNTER — NURSE NAVIGATOR (OUTPATIENT)
Dept: ONCOLOGY | Facility: CLINIC | Age: 77
End: 2022-09-02

## 2022-09-02 NOTE — PROGRESS NOTES
Called patient. She is traveling to [place] with family right now and can't talk long. Says she will be back Monday and is having a port placed Tuesday morning. She says she is doing fine and has no questions right now. She agreed that I can call her next week to go over her treatment plan and address any questions she may have

## 2022-09-06 ENCOUNTER — HOSPITAL ENCOUNTER (OUTPATIENT)
Facility: HOSPITAL | Age: 77
Setting detail: HOSPITAL OUTPATIENT SURGERY
Discharge: HOME | End: 2022-09-06
Attending: THORACIC SURGERY (CARDIOTHORACIC VASCULAR SURGERY) | Admitting: THORACIC SURGERY (CARDIOTHORACIC VASCULAR SURGERY)

## 2022-09-06 ENCOUNTER — ANESTHESIA EVENT (OUTPATIENT)
Dept: PERIOP | Facility: HOSPITAL | Age: 77
End: 2022-09-06

## 2022-09-06 ENCOUNTER — APPOINTMENT (OUTPATIENT)
Dept: GENERAL RADIOLOGY | Facility: HOSPITAL | Age: 77
End: 2022-09-06

## 2022-09-06 ENCOUNTER — ANESTHESIA (OUTPATIENT)
Dept: PERIOP | Facility: HOSPITAL | Age: 77
End: 2022-09-06

## 2022-09-06 VITALS
TEMPERATURE: 98.3 F | OXYGEN SATURATION: 96 % | RESPIRATION RATE: 16 BRPM | BODY MASS INDEX: 24.67 KG/M2 | HEART RATE: 58 BPM | DIASTOLIC BLOOD PRESSURE: 54 MMHG | WEIGHT: 125.66 LBS | SYSTOLIC BLOOD PRESSURE: 145 MMHG | HEIGHT: 60 IN

## 2022-09-06 DIAGNOSIS — R91.8 LUNG MASS: ICD-10-CM

## 2022-09-06 DIAGNOSIS — C34.91 PRIMARY LUNG ADENOCARCINOMA, RIGHT: ICD-10-CM

## 2022-09-06 PROCEDURE — 77001 FLUOROGUIDE FOR VEIN DEVICE: CPT | Performed by: THORACIC SURGERY (CARDIOTHORACIC VASCULAR SURGERY)

## 2022-09-06 PROCEDURE — 25010000002 PROPOFOL 10 MG/ML EMULSION: Performed by: NURSE ANESTHETIST, CERTIFIED REGISTERED

## 2022-09-06 PROCEDURE — 0 LIDOCAINE 1 % SOLUTION 20 ML VIAL: Performed by: THORACIC SURGERY (CARDIOTHORACIC VASCULAR SURGERY)

## 2022-09-06 PROCEDURE — C1788 PORT, INDWELLING, IMP: HCPCS | Performed by: THORACIC SURGERY (CARDIOTHORACIC VASCULAR SURGERY)

## 2022-09-06 PROCEDURE — 25010000002 ONDANSETRON PER 1 MG: Performed by: NURSE ANESTHETIST, CERTIFIED REGISTERED

## 2022-09-06 PROCEDURE — 76000 FLUOROSCOPY <1 HR PHYS/QHP: CPT | Performed by: THORACIC SURGERY (CARDIOTHORACIC VASCULAR SURGERY)

## 2022-09-06 PROCEDURE — 25010000002 MIDAZOLAM PER 1 MG: Performed by: ANESTHESIOLOGY

## 2022-09-06 PROCEDURE — 25010000002 HEPARIN (PORCINE) PER 1000 UNITS: Performed by: THORACIC SURGERY (CARDIOTHORACIC VASCULAR SURGERY)

## 2022-09-06 PROCEDURE — 25010000002 FENTANYL CITRATE (PF) 50 MCG/ML SOLUTION: Performed by: NURSE ANESTHETIST, CERTIFIED REGISTERED

## 2022-09-06 PROCEDURE — 25010000002 HEPARIN (PORCINE) PER 1000 UNITS: Performed by: NURSE PRACTITIONER

## 2022-09-06 PROCEDURE — 25010000002 DEXAMETHASONE PER 1 MG: Performed by: NURSE ANESTHETIST, CERTIFIED REGISTERED

## 2022-09-06 PROCEDURE — 36561 INSERT TUNNELED CV CATH: CPT | Performed by: THORACIC SURGERY (CARDIOTHORACIC VASCULAR SURGERY)

## 2022-09-06 DEVICE — PRT INTRO VASC/INTERV VORTEX FILL/HL DETACH/POLYURET/CATH 6F: Type: IMPLANTABLE DEVICE | Site: CHEST | Status: FUNCTIONAL

## 2022-09-06 RX ORDER — HEPARIN SODIUM 5000 [USP'U]/ML
5000 INJECTION, SOLUTION INTRAVENOUS; SUBCUTANEOUS ONCE
Status: COMPLETED | OUTPATIENT
Start: 2022-09-06 | End: 2022-09-06

## 2022-09-06 RX ORDER — OXYCODONE HYDROCHLORIDE 5 MG/1
5 TABLET ORAL EVERY 4 HOURS PRN
Qty: 5 TABLET | Refills: 0 | Status: SHIPPED | OUTPATIENT
Start: 2022-09-06 | End: 2022-09-20

## 2022-09-06 RX ORDER — SODIUM CHLORIDE 0.9 % (FLUSH) 0.9 %
3-10 SYRINGE (ML) INJECTION AS NEEDED
Status: DISCONTINUED | OUTPATIENT
Start: 2022-09-06 | End: 2022-09-06 | Stop reason: HOSPADM

## 2022-09-06 RX ORDER — GLYCOPYRROLATE 0.2 MG/ML
INJECTION INTRAMUSCULAR; INTRAVENOUS AS NEEDED
Status: DISCONTINUED | OUTPATIENT
Start: 2022-09-06 | End: 2022-09-06 | Stop reason: SURG

## 2022-09-06 RX ORDER — DEXAMETHASONE SODIUM PHOSPHATE 10 MG/ML
INJECTION INTRAMUSCULAR; INTRAVENOUS AS NEEDED
Status: DISCONTINUED | OUTPATIENT
Start: 2022-09-06 | End: 2022-09-06 | Stop reason: SURG

## 2022-09-06 RX ORDER — PROMETHAZINE HYDROCHLORIDE 25 MG/1
25 SUPPOSITORY RECTAL ONCE AS NEEDED
Status: DISCONTINUED | OUTPATIENT
Start: 2022-09-06 | End: 2022-09-06 | Stop reason: HOSPADM

## 2022-09-06 RX ORDER — EPHEDRINE SULFATE 50 MG/ML
INJECTION, SOLUTION INTRAVENOUS AS NEEDED
Status: DISCONTINUED | OUTPATIENT
Start: 2022-09-06 | End: 2022-09-06 | Stop reason: SURG

## 2022-09-06 RX ORDER — NALOXONE HCL 0.4 MG/ML
0.2 VIAL (ML) INJECTION AS NEEDED
Status: DISCONTINUED | OUTPATIENT
Start: 2022-09-06 | End: 2022-09-06 | Stop reason: HOSPADM

## 2022-09-06 RX ORDER — PROPOFOL 10 MG/ML
VIAL (ML) INTRAVENOUS AS NEEDED
Status: DISCONTINUED | OUTPATIENT
Start: 2022-09-06 | End: 2022-09-06 | Stop reason: SURG

## 2022-09-06 RX ORDER — ONDANSETRON 2 MG/ML
INJECTION INTRAMUSCULAR; INTRAVENOUS AS NEEDED
Status: DISCONTINUED | OUTPATIENT
Start: 2022-09-06 | End: 2022-09-06 | Stop reason: SURG

## 2022-09-06 RX ORDER — CLINDAMYCIN PHOSPHATE 900 MG/50ML
900 INJECTION INTRAVENOUS ONCE
Status: COMPLETED | OUTPATIENT
Start: 2022-09-06 | End: 2022-09-06

## 2022-09-06 RX ORDER — SODIUM CHLORIDE, SODIUM LACTATE, POTASSIUM CHLORIDE, CALCIUM CHLORIDE 600; 310; 30; 20 MG/100ML; MG/100ML; MG/100ML; MG/100ML
9 INJECTION, SOLUTION INTRAVENOUS CONTINUOUS
Status: DISCONTINUED | OUTPATIENT
Start: 2022-09-06 | End: 2022-09-06 | Stop reason: HOSPADM

## 2022-09-06 RX ORDER — SODIUM CHLORIDE 0.9 % (FLUSH) 0.9 %
3 SYRINGE (ML) INJECTION EVERY 12 HOURS SCHEDULED
Status: DISCONTINUED | OUTPATIENT
Start: 2022-09-06 | End: 2022-09-06 | Stop reason: HOSPADM

## 2022-09-06 RX ORDER — LIDOCAINE HYDROCHLORIDE 10 MG/ML
0.5 INJECTION, SOLUTION EPIDURAL; INFILTRATION; INTRACAUDAL; PERINEURAL ONCE AS NEEDED
Status: DISCONTINUED | OUTPATIENT
Start: 2022-09-06 | End: 2022-09-06 | Stop reason: HOSPADM

## 2022-09-06 RX ORDER — ONDANSETRON 2 MG/ML
4 INJECTION INTRAMUSCULAR; INTRAVENOUS ONCE AS NEEDED
Status: DISCONTINUED | OUTPATIENT
Start: 2022-09-06 | End: 2022-09-06 | Stop reason: HOSPADM

## 2022-09-06 RX ORDER — HYDROMORPHONE HYDROCHLORIDE 1 MG/ML
0.25 INJECTION, SOLUTION INTRAMUSCULAR; INTRAVENOUS; SUBCUTANEOUS
Status: DISCONTINUED | OUTPATIENT
Start: 2022-09-06 | End: 2022-09-06 | Stop reason: HOSPADM

## 2022-09-06 RX ORDER — MAGNESIUM HYDROXIDE 1200 MG/15ML
LIQUID ORAL AS NEEDED
Status: DISCONTINUED | OUTPATIENT
Start: 2022-09-06 | End: 2022-09-06 | Stop reason: HOSPADM

## 2022-09-06 RX ORDER — MIDAZOLAM HYDROCHLORIDE 1 MG/ML
0.5 INJECTION INTRAMUSCULAR; INTRAVENOUS ONCE
Status: COMPLETED | OUTPATIENT
Start: 2022-09-06 | End: 2022-09-06

## 2022-09-06 RX ORDER — FENTANYL CITRATE 50 UG/ML
INJECTION, SOLUTION INTRAMUSCULAR; INTRAVENOUS AS NEEDED
Status: DISCONTINUED | OUTPATIENT
Start: 2022-09-06 | End: 2022-09-06 | Stop reason: SURG

## 2022-09-06 RX ORDER — LABETALOL HYDROCHLORIDE 5 MG/ML
5 INJECTION, SOLUTION INTRAVENOUS
Status: DISCONTINUED | OUTPATIENT
Start: 2022-09-06 | End: 2022-09-06 | Stop reason: HOSPADM

## 2022-09-06 RX ORDER — FAMOTIDINE 10 MG/ML
20 INJECTION, SOLUTION INTRAVENOUS ONCE
Status: COMPLETED | OUTPATIENT
Start: 2022-09-06 | End: 2022-09-06

## 2022-09-06 RX ORDER — HYDRALAZINE HYDROCHLORIDE 20 MG/ML
5 INJECTION INTRAMUSCULAR; INTRAVENOUS
Status: DISCONTINUED | OUTPATIENT
Start: 2022-09-06 | End: 2022-09-06 | Stop reason: HOSPADM

## 2022-09-06 RX ORDER — LIDOCAINE HYDROCHLORIDE 20 MG/ML
INJECTION, SOLUTION INFILTRATION; PERINEURAL AS NEEDED
Status: DISCONTINUED | OUTPATIENT
Start: 2022-09-06 | End: 2022-09-06 | Stop reason: SURG

## 2022-09-06 RX ORDER — EPHEDRINE SULFATE 50 MG/ML
5 INJECTION, SOLUTION INTRAVENOUS ONCE AS NEEDED
Status: DISCONTINUED | OUTPATIENT
Start: 2022-09-06 | End: 2022-09-06 | Stop reason: HOSPADM

## 2022-09-06 RX ORDER — FAMOTIDINE 10 MG/ML
20 INJECTION, SOLUTION INTRAVENOUS ONCE
Status: DISCONTINUED | OUTPATIENT
Start: 2022-09-06 | End: 2022-09-06 | Stop reason: HOSPADM

## 2022-09-06 RX ORDER — PROMETHAZINE HYDROCHLORIDE 25 MG/1
25 TABLET ORAL ONCE AS NEEDED
Status: DISCONTINUED | OUTPATIENT
Start: 2022-09-06 | End: 2022-09-06 | Stop reason: HOSPADM

## 2022-09-06 RX ORDER — DIPHENHYDRAMINE HCL 25 MG
25 CAPSULE ORAL
Status: DISCONTINUED | OUTPATIENT
Start: 2022-09-06 | End: 2022-09-06 | Stop reason: HOSPADM

## 2022-09-06 RX ORDER — DIPHENHYDRAMINE HYDROCHLORIDE 50 MG/ML
12.5 INJECTION INTRAMUSCULAR; INTRAVENOUS
Status: DISCONTINUED | OUTPATIENT
Start: 2022-09-06 | End: 2022-09-06 | Stop reason: HOSPADM

## 2022-09-06 RX ORDER — FLUMAZENIL 0.1 MG/ML
0.2 INJECTION INTRAVENOUS AS NEEDED
Status: DISCONTINUED | OUTPATIENT
Start: 2022-09-06 | End: 2022-09-06 | Stop reason: HOSPADM

## 2022-09-06 RX ORDER — FENTANYL CITRATE 50 UG/ML
25 INJECTION, SOLUTION INTRAMUSCULAR; INTRAVENOUS
Status: DISCONTINUED | OUTPATIENT
Start: 2022-09-06 | End: 2022-09-06 | Stop reason: HOSPADM

## 2022-09-06 RX ADMIN — FENTANYL CITRATE 25 MCG: 50 INJECTION INTRAMUSCULAR; INTRAVENOUS at 13:41

## 2022-09-06 RX ADMIN — HEPARIN SODIUM 5000 UNITS: 5000 INJECTION INTRAVENOUS; SUBCUTANEOUS at 12:48

## 2022-09-06 RX ADMIN — GLYCOPYRROLATE 0.1 MG: 0.2 INJECTION INTRAMUSCULAR; INTRAVENOUS at 13:41

## 2022-09-06 RX ADMIN — SODIUM CHLORIDE, POTASSIUM CHLORIDE, SODIUM LACTATE AND CALCIUM CHLORIDE: 600; 310; 30; 20 INJECTION, SOLUTION INTRAVENOUS at 13:41

## 2022-09-06 RX ADMIN — MIDAZOLAM HYDROCHLORIDE 0.5 MG: 1 INJECTION, SOLUTION INTRAMUSCULAR; INTRAVENOUS at 12:48

## 2022-09-06 RX ADMIN — CLINDAMYCIN PHOSPHATE 900 MG: 900 INJECTION, SOLUTION INTRAVENOUS at 13:30

## 2022-09-06 RX ADMIN — EPHEDRINE SULFATE 10 MG: 50 INJECTION INTRAVENOUS at 14:00

## 2022-09-06 RX ADMIN — FAMOTIDINE 20 MG: 10 INJECTION, SOLUTION INTRAVENOUS at 12:48

## 2022-09-06 RX ADMIN — DEXAMETHASONE SODIUM PHOSPHATE 4 MG: 10 INJECTION INTRAMUSCULAR; INTRAVENOUS at 13:41

## 2022-09-06 RX ADMIN — PROPOFOL 120 MG: 10 INJECTION, EMULSION INTRAVENOUS at 13:41

## 2022-09-06 RX ADMIN — PROPOFOL 50 MG: 10 INJECTION, EMULSION INTRAVENOUS at 14:12

## 2022-09-06 RX ADMIN — LIDOCAINE HYDROCHLORIDE 60 MG: 20 INJECTION, SOLUTION INFILTRATION; PERINEURAL at 13:41

## 2022-09-06 RX ADMIN — FENTANYL CITRATE 25 MCG: 50 INJECTION INTRAMUSCULAR; INTRAVENOUS at 13:47

## 2022-09-06 RX ADMIN — ONDANSETRON 4 MG: 2 INJECTION INTRAMUSCULAR; INTRAVENOUS at 14:09

## 2022-09-06 NOTE — OP NOTE
INSERTION VENOUS ACCESS DEVICE  Procedure Report    Patient Name:  Pam Vidal  YOB: 1945    Date of Surgery:  9/6/2022     Indications:  Stage IIIA NSCLC    Pre-op Diagnosis:   Lung mass [R91.8]  Primary lung adenocarcinoma, right (HCC) [C34.91]       Post-Op Diagnosis Codes:     * Lung mass [R91.8]     * Primary lung adenocarcinoma, right (HCC) [C34.91]    Procedure/CPT® Codes:      Procedure(s):  POWERPORT INSERTION    Staff:  Surgeon(s):  Remedios Rice MD         Anesthesia: General    Estimated Blood Loss: minimal    Implants:    Implant Name Type Inv. Item Serial No.  Lot No. LRB No. Used Action   PRT INTRO VASC/INTERV VORTEX FILL/HL DETACH/POLYURET/CATH 6F - [id number] Implant PRT INTRO VASC/INTERV VORTEX FILL/HL DETACH/POLYURET/CATH 6F  [employer] [id number] Right 1 Implanted       Specimen:          None      Findings: Normal venous anatomy    Complications: None apparent    Description of Procedure: Pam Vidal was identified in the preoperative holding area and again her consent for the procedure was verified.  She was transported to the operating room and placed on the operating room table in supine position.  A general anesthetic was successfully administered and an LMA was placed without difficulty.  A timeout was performed to verify correct patient, correct procedure, and correct administration of IV antibiotics per [facility] protocol.  The patient was prepped and draped in standard sterile fashion.  An ultrasound probe was utilized to access the right IJ vein with an 18-gauge needle and a wire was placed into the vein.  Fluoroscopy was used to confirm placement.  Lidocaine was instilled into the incision site.  An approximately 2 cm incision was made 2 finger breaths below the right clavicle and dissection was carried down to the fascia.  A pocket was constructed using blunt dissection to be big enough for the PowerPort.  A small puncture was  made at theIJ insertion site.  The tunneler was used to connect the infraclavicular port site to the right internal jugular puncture site.  The catheter and port were connected and flushed.  The port was secured to the underlying fascia with a silk suture.  The catheter was measured using fluoroscopy.  The introducer trocar was inserted over the wire via Seldinger technique.  The catheter was placed into the trocar under fluoroscopic guidance and the outer sheath of the trocar was removed.  The positioning was confirmed with fluoroscopy and the catheter tip was at the SVC/right atrial junction.  The port was flushed with heparinized saline and the incisions were closed with Vicryl and Monocryl in standard fashion.  Dermabond was applied as dressing and the counts were correct at the end of the case.  The patient tolerated this procedure well, was extubated and transported to the recovery room in stable condition.      Remedios Rice MD

## 2022-09-06 NOTE — ANESTHESIA PREPROCEDURE EVALUATION
Anesthesia Evaluation     Patient summary reviewed and Nursing notes reviewed   NPO Solid Status: > 8 hours             Airway   Mallampati: II  TM distance: >3 FB  Neck ROM: full  no difficulty expected  Dental - normal exam     Pulmonary - normal exam   (+) a smoker Former, lung cancer, COPD,   Cardiovascular - normal exam    (+) hyperlipidemia,       Neuro/Psych  GI/Hepatic/Renal/Endo      Musculoskeletal     Abdominal  - normal exam   Substance History      OB/GYN          Other   arthritis,    history of cancer active                    Anesthesia Plan    ASA 3     general     intravenous induction     Anesthetic plan, risks, benefits, and alternatives have been provided, discussed and informed consent has been obtained with: patient.        CODE STATUS:

## 2022-09-06 NOTE — PERIOPERATIVE NURSING NOTE
Patient states she does not want script for oxycodone and will take tylenol she currently has at home and declined call to physician for any other PO pain medicine. Called pharmacy and advised of patient decision so pharmacy tech will  script and credit card that was charged.

## 2022-09-06 NOTE — ANESTHESIA POSTPROCEDURE EVALUATION
Patient: Pam Vidal    Procedure Summary     Date: 09/06/22 Room / Location: [facility] OR 02 / [facility] MAIN OR    Anesthesia Start: 1335 Anesthesia Stop: 1431    Procedure: POWERPORT INSERTION (Right ) Diagnosis:       Lung mass      Primary lung adenocarcinoma, right (HCC)      (Lung mass [R91.8])      (Primary lung adenocarcinoma, right (HCC) [C34.91])    Surgeons: Remedios Rice MD Provider: Tahir Botello MD    Anesthesia Type: general ASA Status: 3          Anesthesia Type: general    Vitals  Vitals Value Taken Time   /58 09/06/22 1516   Temp 36.8 °C (98.3 °F) 09/06/22 1430   Pulse 62 09/06/22 1518   Resp 14 09/06/22 1500   SpO2 95 % 09/06/22 1519   Vitals shown include unvalidated device data.        Anesthesia Post Evaluation

## 2022-09-06 NOTE — ANESTHESIA PROCEDURE NOTES
Airway  Urgency: elective    Date/Time: 9/6/2022 1:43 PM  Airway not difficult    General Information and Staff    Patient location during procedure: OR  Anesthesiologist: Tahir Botello MD  CRNA/CAA: Kristen Puentes CRNA    Indications and Patient Condition  Indications for airway management: airway protection    Preoxygenated: yes  Mask difficulty assessment: 0 - not attempted    Final Airway Details  Final airway type: supraglottic airway      Successful airway: unique  Size 4    Number of attempts at approach: 1  Assessment: lips, teeth, and gum same as pre-op

## 2022-09-07 ENCOUNTER — TELEPHONE (OUTPATIENT)
Dept: SURGERY | Facility: CLINIC | Age: 77
End: 2022-09-07

## 2022-09-07 ENCOUNTER — OFFICE VISIT (OUTPATIENT)
Dept: RADIATION ONCOLOGY | Facility: HOSPITAL | Age: 77
End: 2022-09-07

## 2022-09-07 ENCOUNTER — CONSULT (OUTPATIENT)
Dept: RADIATION ONCOLOGY | Facility: HOSPITAL | Age: 77
End: 2022-09-07

## 2022-09-07 VITALS
HEART RATE: 75 BPM | SYSTOLIC BLOOD PRESSURE: 170 MMHG | DIASTOLIC BLOOD PRESSURE: 65 MMHG | OXYGEN SATURATION: 96 % | RESPIRATION RATE: 20 BRPM | BODY MASS INDEX: 24.76 KG/M2 | WEIGHT: 126.8 LBS

## 2022-09-07 DIAGNOSIS — C34.91 PRIMARY LUNG ADENOCARCINOMA, RIGHT: Primary | ICD-10-CM

## 2022-09-07 DIAGNOSIS — R91.8 LUNG MASS: ICD-10-CM

## 2022-09-07 LAB — REF LAB TEST METHOD: NORMAL

## 2022-09-07 PROCEDURE — 99205 OFFICE O/P NEW HI 60 MIN: CPT | Performed by: RADIOLOGY

## 2022-09-07 PROCEDURE — G0463 HOSPITAL OUTPT CLINIC VISIT: HCPCS | Performed by: RADIOLOGY

## 2022-09-07 NOTE — PROGRESS NOTES
"Radiation Oncology Consult Note    Name: Pam Vidal  YOB: 1945  MRN #: [id number]  Date of Service: 9/7/2022  Referring Provider: Remedios Rice MD  [address] [place] [address]  Primary Care Provider: Nik Mckay MD    DIAGNOSIS: Stage IIIA [id number] Primary lung adenocarcinoma  Encounter Diagnoses   Name Primary?   • Lung mass    • Primary lung adenocarcinoma, right (HCC) Yes       REASON FOR CONSULTATION/CHIEF COMPLAINT:  \"R lung cancer\"  I was asked to see the patient at the request of the referring provider noted below for advice and recommendations regarding this diagnosis and the role of radiation therapy.                              REQUESTING PHYSICIAN:  Remedios Rice Md  [address] [place] [address]    RECORDS OBTAINED:  Records of the patients history including those obtained from the referring provider were reviewed and summarized in detail.    HISTORY OF PRESENT ILLNESS:  Pam Vidal is a 77 y.o. female who presented to medical attention with new onset lung symptoms and bleeding; Clinically, she thought she had bronchitis as she was having heavy cough and then progressed to hemoptysis.  She had a CXR and work-up with PCP.  She states that the cough is less frequent since starting steroids.  States more clear/mucous and will still have an occasional blood tinge.  She denies any chest pain.    She denies any unintentional weight loss or bone pain.  She has been having a recent diet.    CXR 6/21/22--Pneumonia vs RUL mass present; CT needed.    CT chest 6/27/22--Mass in the right upper lobe, posterior/medially, in contact with the mediastinum and major fissure; this measures 3.4 x 4.0 cm.  The mass also contacts the right hilum.  Hilar lymph node measures 1.4 x 0.9 cm.  An enlarged precarinal lymph node measuring 1.8 x 1.2 cm.    CT guided RUL mass biopsy, 07/22/22: Predominantly necrosis with surrounding fibrosis and atypical " pneumocyte hyperplasia.  No viable tumor present.    Right upper lobe, endobronchial biopsies: Invasive poorly differentiated adenocarcinoma of pulmonary origin.    The following portions of the patient's history were reviewed and updated as appropriate: allergies, current medications, past family history, past medical history, past social history, past surgical history and problem list. Reviewed with the patient and remain unchanged.    PAST MEDICAL HISTORY:  she  has a past medical history of COPD (chronic obstructive pulmonary disease) ([facility]), Coughing up blood, History of COVID-19 (02/2022), Hyperlipidemia, IBS (irritable bowel syndrome), Primary lung adenocarcinoma, right (HCC), and Rheumatoid arthritis (HCC).  MEDICATIONS:   Current Outpatient Medications:   •  azelastine (ASTELIN) 0.1 % nasal spray, 1 spray into the nostril(s) as directed by provider., Disp: , Rfl:   •  B COMPLEX VITAMINS ER PO, Take  by mouth Daily., Disp: , Rfl:   •  Cholecalciferol (VITAMIN D3) 5000 units capsule capsule, Take 5,000 Units by mouth Daily., Disp: , Rfl:   •  Colestipol HCl (COLESTID PO), Take 1 tablet by mouth Daily., Disp: , Rfl:   •  esomeprazole (NexIUM) 10 MG packet, Take 10 mg by mouth Daily., Disp: , Rfl:   •  estradiol (ESTRACE) 1 MG tablet, Take 1 mg by mouth Daily., Disp: , Rfl:   •  Multiple Vitamins-Minerals (PRESERVISION AREDS 2+MULTI VIT PO), Take  by mouth., Disp: , Rfl:   •  oxyCODONE (Roxicodone) 5 MG immediate release tablet, Take 1 tablet by mouth Every 4 (Four) Hours As Needed for Moderate Pain., Disp: 5 tablet, Rfl: 0  •  predniSONE (DELTASONE) 10 MG tablet, Take 2 tablets by mouth Daily., Disp: 50 tablet, Rfl: 1  •  lidocaine-prilocaine (EMLA) 2.5-2.5 % cream, Apply 1 application topically to the appropriate area as directed Every 2 (Two) Hours As Needed for Mild Pain., Disp: 5 g, Rfl: 3  •  ondansetron (ZOFRAN) 8 MG tablet, Take 1 tablet by mouth 3 (Three) Times a Day As Needed for Nausea or Vomiting.,  Disp: 30 tablet, Rfl: 5  ALLERGIES:   Allergies   Allergen Reactions   • Metronidazole Hives   • Ofloxacin Hives and Nausea Only   • Del-Mycin [Erythromycin] Hives   • Gentamicin Hives   • Ibuprofen Nausea And Vomiting   • Latex Dermatitis     GLOVES   • Penicillins Hives   • Tetracycline Hives   • Adhesive Tape Dermatitis     BANDAIDS   • Aspirin GI Intolerance     Vomiting can take EC   • Codeine Nausea And Vomiting     PAST SURGICAL HISTORY: she has a past surgical history that includes Cataract extraction; Cholecystectomy; Carotid endarterectomy (Right); Carotid endarterectomy (Left); Cervical fusion; Hysterectomy; Appendectomy; Shoulder arthroscopy (Right, 2019); Bronchoscopy (N/A, 2022); and Venous Access Device (Port) (Right, 2022).  PREVIOUS RADIOTHERAPY OR CHEMOTHERAPY: No  FAMILY HISTORY: Father  of stomach cancer (age 84); mother  (age 83) from thyroid/liver cancer.  SOCIAL HISTORY: she  reports that she quit smoking about 7 years ago. Her smoking use included cigarettes. She has never used smokeless tobacco. She reports current alcohol use of about 1.0 standard drink of alcohol per week. She reports that she does not use drugs.   50 years smoking 1/4 to 1/2 ppd.      PAIN AND PAIN MANAGEMENT: no pain noted today  Vitals:    22 1108   BP: 170/65   Pulse: 75   Resp: 20   SpO2: 96%   Weight: 57.5 kg (126 lb 12.8 oz)   PainSc: 0-No pain     NUTRITIONAL STATUS:  no issues  KPS: 90  PHQ-9 Total Score: distress tool completed.    Review of Systems:   General: No fevers, chills, weight change, or drenching night sweats. Skin: No rashes or jaundice.  HEENT: No change in vision or hearing, no headaches.  Neck: No dysphagia or masses.  Heme/Lymph: No easy bruising or bleeding.  Respiratory System: Improving, no recent hemoptysis.  Cardiovascular: No chest pain, palpitations, or dyspnea on exertion.  - Pacemaker. GI: No nausea, vomiting, diarrhea, melena, or hematochezia.  : No dysuria  or hematuria.  Endocrine: No heat or cold intolerance. Musculoskeletal: No myalgias or arthralgias.  Neuro: No weakness, numbness, syncope, or seizures. Psych: No mood changes or depression. Ext: Denies swelling.        Objective     Vitals:  Vitals:    09/07/22 1108   BP: 170/65   Pulse: 75   Resp: 20   SpO2: 96%   Weight: 57.5 kg (126 lb 12.8 oz)   PainSc: 0-No pain         PHYSICAL EXAM:  GENERAL: in no apparent distress, sitting comfortably in room.    HEENT: normocephalic, atraumatic. Pupils are equal, round, reactive to light. Sclera anicteric. Conjunctiva not injected. Oropharynx without erythema, ulcerations or thrush.   NECK: Supple with no masses.  LYMPHATIC: no cervical, supraclavicular or axillary adenopathy appreciated bilaterally.   CARDIOVASCULAR: S1 & S2 detected; no murmurs, rubs or gallops.  CHEST: clear to auscultation on L; decreased RUL; no wheezes, crackles or rubs. Work of breathing normal.  ABDOMEN: bowel sounds present. Abdomen is soft, nontender, nondistended.   MUSCULOSKELETAL: no tenderness to palpation along the spine or scapulae. Normal range of motion.  EXTREMITIES: no clubbing, cyanosis, edema.  SKIN: no erythema, rashes, ulcerations noted.   NEUROLOGIC: cranial nerves II-XII grossly intact bilaterally. No focal neurologic deficits.  PSYCHIATRIC:  alert, aware, and appropriate.      PERTINENT IMAGING/PATHOLOGY/LABS (Medical Decision Making):     COORDINATION OF CARE: A copy of this note is sent to the referring provider.    PATHOLOGY (Reviewed): as noted above    IMAGING (Reviewed): as noted above    LABS (Reviewed):  Hematology WBC   Date Value Ref Range Status   08/30/2022 14.71 (H) 3.40 - 10.80 10*3/mm3 Final     RBC   Date Value Ref Range Status   08/30/2022 4.38 3.77 - 5.28 10*6/mm3 Final     Hemoglobin   Date Value Ref Range Status   08/30/2022 11.1 (L) 12.0 - 15.9 g/dL Final     Hematocrit   Date Value Ref Range Status   08/30/2022 34.1 34.0 - 46.6 % Final     Platelets   Date  Value Ref Range Status   08/30/2022 437 140 - 450 10*3/mm3 Final          Assessment & Plan     ASSESSMENT AND PLAN:  1. Primary lung adenocarcinoma, right (HCC)    2. Lung mass     [id number], Adenocarcinoma of the lung  Candidate for definitive--not surgical candidate  Recommending chemoXRT to be followed by immunotherapy.    Discussed opportunity to start CT sim and treat asap but patient has [place], [place] trip 9/14/2022-9/19/2022 and is wanting to start therapy on 09/20/22    Has previously had discussion with Dr. Lagunas discussing timeline.     I will order CT simulation and start working on a IMRT/IGRT plan for curative intent.  Dosing will be 60 Gy in 30 fractions.      This assessment comes from my review of the imaging, pathology, physician notes and other pertinent information as mentioned.    DISPOSITION: 4D CT sim needed.      TIME SPENT WITH PATIENT:   I spent 60 minutes caring for Pam on this date of service. This time includes time spent by me in the following activities: preparing for the visit, reviewing tests, obtaining and/or reviewing a separately obtained history, performing a medically appropriate examination and/or evaluation, counseling and educating the patient/family/caregiver, documenting information in the medical record and care coordination           CC: Remedios Rice MD Nale, Stephen W, MD Charles Webb, MD      Approved by:     Hector Rodriguez MD

## 2022-09-07 NOTE — TELEPHONE ENCOUNTER
I called Pam Vidal to check on them post operatively.she is doing well with no questions or complaints. She will call when she is ready to have it removed

## 2022-09-08 PROCEDURE — 77263 THER RADIOLOGY TX PLNG CPLX: CPT | Performed by: RADIOLOGY

## 2022-09-08 NOTE — PROGRESS NOTES
[facility] Hematology/Oncology Treatment Plan Summary    Name: Pam Vidal  [facility]# [id number]  MD: Dr. Lagunas    Diagnosis:     ICD-10-CM ICD-9-CM   1. Primary lung adenocarcinoma, right (HCC)  C34.91 162.9     Stage: III      Goal of treatment: adjuvant    Treatment Medication(s):   1. Carboplatin  2. Taxol    Frequency: weekly    Number of cycles: 7    Starting on: 9/20/2022    Repeat after completion of radiation: CT Scan and PET Scan    Items for home use: Senokot-S (for constipation), Imodium AD (for diarrhea) and Thermometer    Rx written for: [x] Nausea    [x] Pre-Treatment   EMLA cream to port site 30 minutes prior to access and ondansetron 4 mg by mouth every 8 hours as needed for nausea    Notes:     Next Steps: PORT and Obtain Hand and Feet Cooling Mitts and Gloves     Completing Provider: BARBARA Coronado           Date/time: 09/09/2022      Please note: You will be seen by a provider frequently with your treatment plan. This plan may change depending on many factors, if so, this will be discussed with you by your physician.  Last update 03/2022.

## 2022-09-09 ENCOUNTER — APPOINTMENT (OUTPATIENT)
Dept: LAB | Facility: HOSPITAL | Age: 77
End: 2022-09-09

## 2022-09-09 ENCOUNTER — LAB (OUTPATIENT)
Dept: LAB | Facility: HOSPITAL | Age: 77
End: 2022-09-09

## 2022-09-09 ENCOUNTER — NURSE NAVIGATOR (OUTPATIENT)
Dept: ONCOLOGY | Facility: CLINIC | Age: 77
End: 2022-09-09

## 2022-09-09 ENCOUNTER — OFFICE VISIT (OUTPATIENT)
Dept: ONCOLOGY | Facility: CLINIC | Age: 77
End: 2022-09-09

## 2022-09-09 VITALS
SYSTOLIC BLOOD PRESSURE: 152 MMHG | OXYGEN SATURATION: 97 % | WEIGHT: 128.7 LBS | DIASTOLIC BLOOD PRESSURE: 58 MMHG | HEART RATE: 70 BPM | TEMPERATURE: 97.5 F | RESPIRATION RATE: 16 BRPM | HEIGHT: 60 IN | BODY MASS INDEX: 25.27 KG/M2

## 2022-09-09 DIAGNOSIS — R04.2 COUGH WITH HEMOPTYSIS: ICD-10-CM

## 2022-09-09 DIAGNOSIS — C34.91 PRIMARY LUNG ADENOCARCINOMA, RIGHT: Primary | ICD-10-CM

## 2022-09-09 DIAGNOSIS — C34.91 PRIMARY LUNG ADENOCARCINOMA, RIGHT: ICD-10-CM

## 2022-09-09 LAB
ALBUMIN SERPL-MCNC: 3.7 G/DL (ref 3.5–5.2)
ALBUMIN/GLOB SERPL: 1 G/DL (ref 1.1–2.4)
ALP SERPL-CCNC: 102 U/L (ref 38–116)
ALT SERPL W P-5'-P-CCNC: 28 U/L (ref 0–33)
ANION GAP SERPL CALCULATED.3IONS-SCNC: 11.7 MMOL/L (ref 5–15)
AST SERPL-CCNC: 17 U/L (ref 0–32)
BILIRUB SERPL-MCNC: 0.2 MG/DL (ref 0.2–1.2)
BUN SERPL-MCNC: 29 MG/DL (ref 6–20)
BUN/CREAT SERPL: 29.6 (ref 7.3–30)
CALCIUM SPEC-SCNC: 9.8 MG/DL (ref 8.5–10.2)
CHLORIDE SERPL-SCNC: 101 MMOL/L (ref 98–107)
CO2 SERPL-SCNC: 24.3 MMOL/L (ref 22–29)
CREAT SERPL-MCNC: 0.98 MG/DL (ref 0.6–1.1)
EGFRCR SERPLBLD CKD-EPI 2021: 60.3 ML/MIN/1.73
FERRITIN SERPL-MCNC: 147.1 NG/ML (ref 13–150)
GLOBULIN UR ELPH-MCNC: 3.6 GM/DL (ref 1.8–3.5)
GLUCOSE SERPL-MCNC: 138 MG/DL (ref 74–124)
IRON 24H UR-MRATE: 19 MCG/DL (ref 37–145)
IRON SATN MFR SERPL: 7 % (ref 14–48)
POTASSIUM SERPL-SCNC: 4.2 MMOL/L (ref 3.5–4.7)
PROT SERPL-MCNC: 7.3 G/DL (ref 6.3–8)
SODIUM SERPL-SCNC: 137 MMOL/L (ref 134–145)
TIBC SERPL-MCNC: 266 MCG/DL (ref 249–505)
TRANSFERRIN SERPL-MCNC: 190 MG/DL (ref 200–360)

## 2022-09-09 PROCEDURE — 84466 ASSAY OF TRANSFERRIN: CPT | Performed by: INTERNAL MEDICINE

## 2022-09-09 PROCEDURE — 77334 RADIATION TREATMENT AID(S): CPT | Performed by: RADIOLOGY

## 2022-09-09 PROCEDURE — 99215 OFFICE O/P EST HI 40 MIN: CPT | Performed by: NURSE PRACTITIONER

## 2022-09-09 PROCEDURE — 77470 SPECIAL RADIATION TREATMENT: CPT | Performed by: RADIOLOGY

## 2022-09-09 PROCEDURE — 80053 COMPREHEN METABOLIC PANEL: CPT | Performed by: INTERNAL MEDICINE

## 2022-09-09 PROCEDURE — 82728 ASSAY OF FERRITIN: CPT | Performed by: INTERNAL MEDICINE

## 2022-09-09 PROCEDURE — 83540 ASSAY OF IRON: CPT | Performed by: INTERNAL MEDICINE

## 2022-09-09 PROCEDURE — 36415 COLL VENOUS BLD VENIPUNCTURE: CPT | Performed by: INTERNAL MEDICINE

## 2022-09-09 RX ORDER — LIDOCAINE AND PRILOCAINE 25; 25 MG/G; MG/G
1 CREAM TOPICAL
Qty: 5 G | Refills: 3 | Status: SHIPPED | OUTPATIENT
Start: 2022-09-09

## 2022-09-09 RX ORDER — ONDANSETRON HYDROCHLORIDE 8 MG/1
8 TABLET, FILM COATED ORAL 3 TIMES DAILY PRN
Qty: 30 TABLET | Refills: 5 | Status: SHIPPED | OUTPATIENT
Start: 2022-09-09 | End: 2022-10-31

## 2022-09-09 NOTE — PROGRESS NOTES
Called Ms. Vidal. She is doing well. She had no issues with her port placement and had chemotherapy teaching with the NP today. She has no questions about her chemo regime or treatment plan. Ms. Vidal denies any resource or supportive care needs.  She will be going out of town with her family and will start treatment on 9/20.    I will follow up in 1-2 months. She call with any questions or concerns that arise

## 2022-09-09 NOTE — PROGRESS NOTES
TREATMENT  PREPARATION    Pam Vidal  [id number]  1945    Chief Complaint: Treatment preparation and needs assessment    History of present illness:  Pam Vidal is a 77 y.o. year old female who is here today for treatment preparation and needs assessment.  The patient has been diagnosed with   Encounter Diagnosis   Name Primary?   • Primary lung adenocarcinoma, right (HCC) Yes    and is scheduled to begin treatment with:     Oncology History:    Oncology/Hematology History   Primary lung adenocarcinoma, right (HCC)   8/30/2022 Initial Diagnosis    Primary lung adenocarcinoma, right (HCC)     8/30/2022 Cancer Staged    Staging form: Lung, AJCC 8th Edition  - Clinical stage from 8/30/2022: Stage IIIA (cT2b, cN2, cM0) - Signed by Cruzito Lagunas MD on 8/30/2022 9/20/2022 -  Chemotherapy    OP LUNG PACLitaxel / CARBOplatin AUC=2 (Weekly) + XRT          The current medication list and allergy list were reviewed and reconciled.     Past Medical History, Past Surgical History, Social History, Family History have been reviewed and are without significant changes except as mentioned.    Physical Exam:    Vitals:    09/09/22 1225   BP: 152/58   Pulse: 70   Resp: 16   Temp: 97.5 °F (36.4 °C)   SpO2: 97%     Vitals:    09/09/22 1225   PainSc: 0-No pain        ECOG score: 0         Physical Exam  Vitals reviewed.   HENT:      Head: Normocephalic.      Nose: Nose normal.      Mouth/Throat:      Mouth: Mucous membranes are moist.      Pharynx: Oropharynx is clear.   Eyes:      Conjunctiva/sclera: Conjunctivae normal.      Pupils: Pupils are equal, round, and reactive to light.   Cardiovascular:      Rate and Rhythm: Normal rate.      Heart sounds: Normal heart sounds.   Pulmonary:      Effort: Pulmonary effort is normal.   Abdominal:      General: Bowel sounds are normal.   Musculoskeletal:         General: Normal range of motion.      Cervical back: Normal range of motion.   Skin:     General: Skin is warm  and dry.      Findings: No rash.   Neurological:      General: No focal deficit present.      Mental Status: She is alert and oriented to person, place, and time. Mental status is at baseline.      Motor: No weakness.   Psychiatric:         Mood and Affect: Mood normal.         Behavior: Behavior normal.       NEEDS ASSESSMENTS    Genetics  The patient's new diagnosis and family history have been reviewed for genetic counseling needs. A genetic referral is not recommended.     Psychosocial and Barriers to care  The patient has completed a PHQ-9 Depression Screening and the Distress Thermometer (DT) today.  PHQ-9 results show PHQ-2 Total Score: 0 PHQ-9 Total Score: PHQ-9 Total Score: 0     The patient scored their distress today as Distress Level: 0-No distress on a scale of 0-10 with 0 being no distress and 10 being extreme distress. Problems marked by the patient as being an issue for them within the last week include   .      Results were reviewed along with psychosocial resources offered by our cancer center.  Our Supportive Oncology team will be flagged for a score of 4 or above, and a same day call will be made for a score of 9 or 10.  A mental health referral is offered at that time. Patients who score less than 4 have been educated on our support services and can be referred to our  upon request.  The patient will not be referred to our .       Nutrition  The patient has completed the malnutrition screening today. They scored Malnutrition Screening Tool  Have you recently lost weight without trying?  If yes, how much weight have you lost?: 0--> No  Have you been eating poorly because of a decreased appetite?: 0--> No  MST score: 0   with a score of 0-1 meaning not at risk in a score of 2 or greater meaning at risk.  Patients with a score of 3 or higher will be referred to our oncology dietitian for support. Patients beginning at risk treatment regimens or who have dietary concerns  will also be referred to our oncology dietitian. The patient will be referred.    Functional Assessment  Persons who are age 70 or greater will be screened for qualification of a comprehensive geriatric assessment by our survivorship nurse practitioner.  Older adults with cancer face unique challenges. These may include an increased risk of drug reactions, financial burdens, and caregiver stress. The patient scored G8 Questionnaire  Has food intake declined over the past 3 months due to loss of appetite, digestive problems, chewing or swallowing difficulties?: No decrease in food intake  Weight loss during the last 3 months: No weight loss  Mobility: Goes out  Neuropsychological Problems: No psychological problems  Body Mass Index (BMI (weight in kg) / (height in m2)): BMI 23 and > 23  Takes more than 3 medications a day: Yes, takes more than 3 prescription drugs per day  In comparison with other people of the same age, how does the patient consider his/her health status?: Better  Age: > 85  Total Score: 14 . Patients scoring 14 or lower will referred for an older adult functional assessment with the survivorship advanced practice registered nurse to ensure all needed support is provided as patients plan for their treatments. The patient will be referred.    Intravenous Access Assessment  The patient and I discussed planned intravenous chemo/biotherapy as well as other IV treatments that are often needed throughout the course of treatment. These may include, but are not limited to blood transfusions, antibiotics, and IV hydration. Discussed that depending on selected treatment and vein assessment, patient may require venous access device (VAD) which could include but not limited to a Mediport or PICC line. Risks and benefits of VADs reviewed. The patient will be treated via Port.    Reproductive/Sexual Activity   People should avoid becoming pregnant and should not get a partner pregnant while undergoing  "chemo/biotherapy.  People of childbearing age should use effective contraception during active therapy. The best recommendation for all people is to use a barrier method for a minimum of 1 week after the last infusion of chemo/biotherapy to prevent your partner being exposed to byproducts from treatment medications in bodily fluids. Effective contraception should be discussed with your oncology team to make sure it is safe to take based on your diagnosis. Possible options include oral contraceptives, barrier methods. Chemo/biotherapy can change your ability to reproduce children in the future.  There are options for fertility preservation. The patient will not be referred.    Advanced Care Planning  Advance Care Planning   The patient and I discussed advanced care planning, \"Conversations that Matter\".   This service is offered for development of advance directives with a certified ACP facilitator.  The patient does not have an up-to-date advanced directive. This document is not on file with our office. The patient is not interested in an appointment with one of our facilitators to create or update their advanced directives.     Smoking cessation  Tobacco Use: Medium Risk   • Smoking Tobacco Use: Former Smoker   • Smokeless Tobacco Use: Never Used       Patient and I discussed their tobacco use history. Referral will not be made for smoking cessation.      Palliative Care  When appropriate, the patient and I discussed the availability palliative care services and when appropriate Hospice care. Palliative care is not the same as Hospice care which was explained to the patient.  The patient is not interested in additional information from our  on these services.     Survivorship   When appropriate, we discussed that we will refer the patient to survivorship clinic to discuss next steps following completion of planned treatment.  Reviewed this visit will include assessment of your physical, psychological, " functional, and spiritual needs as a survivor and the need at attend this visit when scheduled.    TREATMENT EDUCATION    Today I met with the patient to discuss the chemo/biotherapy regimen recommended for treatment of Primary lung adenocarcinoma, right (HCC)  .  The patient was given explanation of treatment premed side effects including office policy that prohibits patients to drive if sedating medications are administered, MD explanation given regarding benefits, side effects, toxicities and goals of treatment.  The patient received a Chemotherapy/Biotherapy Plan Summary including diagnosis and explanation of specific treatment plan.    SIDE EFFECTS:  Common side effects were discussed with the patient and/or significant other.  Discussion included where applicable hair loss/discoloration, anemia/fatigue, infection/chills/fever, appetite, bleeding risk/precautions, constipation, diarrhea, mouth sores, taste alteration, loss of appetite, nausea/vomiting, peripheral neuropathy, skin/nail changes, rash, muscle aches/weakness, photosensitivity, weight gain/loss, hearing loss, dizziness, menopausal symptoms, menstrual irregularity, sterility, high blood pressure, heart damage, liver damage, lung damage, kidney damage, DVT/PE risk, fluid retention, pleural/pericardial effusion, somnolence, electrolyte/LFT imbalance, vein exercises and/or the possible need for vascular access/port placement.  The patient was advised that although uncommon, leakage of an infused medication from the vein or venous access device may lead to skin breakdown and/or other tissue damage.  The patient was advised that he/she may have pain, bleeding, and/or bruising from the insertion of a needle in their vein or venous access device (port).  The patient was further advised that, in spite of proper technique, infection with redness and irritation may rarely occur at the site where the needle was inserted.  The patient was advised that if  complications occur, additional medical treatment is available.  Finally, where applicable we have reviewed rare but potential immune mediated side effects including shortness of breath, cough, chest pain (pneumonitis), abdominal pain, diarrhea (colitis), thyroiditis (hypothyroid or hyperthyroid), hepatitis and liver dysfunction, nephritis and renal dysfunction.    Discussion also included side effects specific to drugs in the treatment plan, specifically:    Treatment Plans     Name Type Hold Status Plan Dates Plan Provider       Active    OP LUNG PACLitaxel / CARBOplatin AUC=2 (Weekly) + XRT  ONCOLOGY TREATMENT On Automatic Hold  9/19/2022 - Present Cruzito Lagunas MD                   Questions answered and additional information discussed on topics including:  Anemia, Thrombocytopenia, Neutropenia, Nutrition and appetite changes, Constipation, Diarrhea, Nausea & vomiting, Mouth sores, Alopecia, Skin & nail changes and Hand/Foot Cooling       Assessment and Plan:    Diagnoses and all orders for this visit:    1. Primary lung adenocarcinoma, right (HCC) (Primary)      No orders of the defined types were placed in this encounter.        1. The patient and I have reviewed their diagnosis and scheduled treatment plan. Needs assessment was completed where applicable including genetics, psychosocial needs, barriers to care, VAD evaluation, advanced care planning, survivorship, and palliative care services where indicated. Referrals have been ordered as appropriate based upon evaluation today and patient desires.   2. Chemo/biotherapy teaching was completed today and consent obtained. See separate documentation for further details.  3. Adequate time was given to answer questions.  Patient made aware of their care team members and contact information if they have questions or problems throughout the treatment course.  4. Discussion held and written information provided describing frequency of office visits and ongoing  monitoring throughout the treatment plan.     5. Reviewed with patient any prescribed medication sent to pharmacy.  Education provided regarding proper storage, safe handling, and proper disposal of unused medication.  6. Proper handling of body fluids and waste discussed and written information provided.  7. If appropriate, patient had pretreatment labs drawn today.    Learning assessment completed at initial patient encounter. See separate flowsheet. Chemo/biotherapy education comprehension assessed at today's visit.    I spent 55 minutes caring for Pam on this date of service. This time includes time spent by me in the following activities: preparing for the visit, reviewing tests, obtaining and/or reviewing a separately obtained history, performing a medically appropriate examination and/or evaluation, counseling and educating the patient/family/caregiver, referring and communicating with other health care professionals, documenting information in the medical record and care coordination.     Anay France, BARBARA   09/09/22

## 2022-09-14 PROCEDURE — 77338 DESIGN MLC DEVICE FOR IMRT: CPT | Performed by: RADIOLOGY

## 2022-09-14 PROCEDURE — 77300 RADIATION THERAPY DOSE PLAN: CPT | Performed by: RADIOLOGY

## 2022-09-14 PROCEDURE — 77301 RADIOTHERAPY DOSE PLAN IMRT: CPT | Performed by: RADIOLOGY

## 2022-09-14 PROCEDURE — 77293 RESPIRATOR MOTION MGMT SIMUL: CPT | Performed by: RADIOLOGY

## 2022-09-14 PROCEDURE — 77334 RADIATION TREATMENT AID(S): CPT | Performed by: RADIOLOGY

## 2022-09-15 ENCOUNTER — TELEPHONE (OUTPATIENT)
Dept: ONCOLOGY | Facility: CLINIC | Age: 77
End: 2022-09-15

## 2022-09-15 PROCEDURE — 77300 RADIATION THERAPY DOSE PLAN: CPT | Performed by: RADIOLOGY

## 2022-09-15 PROCEDURE — 77338 DESIGN MLC DEVICE FOR IMRT: CPT | Performed by: RADIOLOGY

## 2022-09-15 PROCEDURE — 77293 RESPIRATOR MOTION MGMT SIMUL: CPT | Performed by: RADIOLOGY

## 2022-09-15 PROCEDURE — 77301 RADIOTHERAPY DOSE PLAN IMRT: CPT | Performed by: RADIOLOGY

## 2022-09-16 NOTE — TELEPHONE ENCOUNTER
Patient made aware regarding changes in her infusion schedule needing to be on a Tuesday 27th, after the 20th (first infusion) as per pre-cert patient risks getting billed if second infusion falls on a Monday (1 day early). Patient v/u. Patient informed that she will see in her Epic chart her appointments. Patient asked if it would be okay to have her infusion on the same day she is receiving her Eylea eye injection for her macular degeneration. Will coordinate with [facility]. per Jacindamp, there are no issues or drug-drug interactions. Patient made aware and v/u.

## 2022-09-19 NOTE — PROGRESS NOTES
Subjective     REASON FOR CONSULTATION:   1.  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  PD-L1 positive greater than 95%  3.  Hemoptysis at presentation  Provide an opinion on any further workup or treatment                             REQUESTING PHYSICIAN: Remedios Rice MD    RECORDS OBTAINED:  Records of the patients history including those obtained from the referring provider were reviewed and summarized in detail.    HISTORY OF PRESENT ILLNESS:  The patient is a 77 y.o. year old female who is here for an opinion about the above issue.  She is a former smoker referred to us now from thoracic surgery (Dr. Remedios Rice) for evaluation and treatment of clinical stage III adenocarcinoma of the lung.  She was having persistent cough and underwent chest x-ray initially in late June which showed possible right upper lobe mass.  This was followed by CT scan of the chest confirming the presence of a right upper lobe mass.  She initially underwent a CT-guided needle biopsy on 7/22/2022 which was nondiagnostic.    She was referred to Dr. Glass for bronchoscopy and biopsy which was performed on 8/23/2022 with pathology returning as poorly differentiated adenocarcinoma.  PD-L1 stain on the tissue was positive at greater than 95%.    She underwent further staging with PET scan and MRI of the brain.  PET scan was performed on 8/12/2022 showing hypermetabolic activity in the large mass in the right upper lobe as well as mediastinal lymph nodes.  She was noted to have a mass on the kidney as well but this was not hypermetabolic.  There was no obvious distant metastatic disease.    MRI of the brain from 8/15/2022 likewise showed no evidence of metastatic disease.  She is scheduled also to see Dr. Hector Rodriguez and radiation oncology on 9/7/2022.    She tells me that she is very adamant about taking a vacation with her family to [place] prior to starting treatment and she will be returning on Monday, 9/19/2022 and can start her  first cycle of treatment on 9/20/2022.    We have recommended weekly carboplatin and Taxol concurrent with radiation therapy.  Following completion of treatment she may be a candidate for maintenance immunotherapy and possibly 2 cycles of full dose carboplatin and Taxol.    She does have a diagnosis of rheumatoid arthritis and is currently taking only Celebrex.  This could become an issue regarding her ability to tolerate immunotherapy in the future.    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    Patient returns today 9/20/2022 to initiate treatment with weekly carboplatin and Taxol with concurrent radiation.  Her blood pressure is little bit elevated today initially and she reports that she is nervous to start treatment.  She continues on prednisone 20 mg daily to help with her rheumatoid arthritis and reports that it is helping.  Her appetite has been increased but she is trying to watch what she eats.  She is sleeping okay.  She has noticed her hemoptysis has been less lately.    History of Present Illness     Past Medical History:   Diagnosis Date   • COPD (chronic obstructive pulmonary disease) (HCC)    • Coughing up blood     X2 MONTHS   • History of COVID-19 02/2022   • Hyperlipidemia    • IBS (irritable bowel syndrome)    • Primary lung adenocarcinoma, right (HCC)    • Rheumatoid arthritis (HCC)         Past Surgical History:   Procedure Laterality Date   • APPENDECTOMY      appendix removed   • BRONCHOSCOPY N/A 8/23/2022    Procedure: BRONCHOSCOPY WITH BAL,  BIOPSIES, AND BRUSHINGS WITH ENDOBRONCHIAL ULTRASOUND WITH FNA;  Surgeon: Gregor Vee MD;  Location: [facility] ENDOSCOPY;  Service: Pulmonary;  Laterality: N/A;  PRE- HILAR MASS  POST- SAME   • CAROTID ENDARTERECTOMY Right    • CAROTID ENDARTERECTOMY Left    • CATARACT EXTRACTION     • CERVICAL FUSION     • CHOLECYSTECTOMY     • HYSTERECTOMY     • SHOULDER ARTHROSCOPY Right 02/19/2019    right should scope/cuff repair    •  VENOUS ACCESS DEVICE (PORT) INSERTION Right 9/6/2022    Procedure: POWERPORT INSERTION;  Surgeon: Remedios Rice MD;  Location: [facility] OR;  Service: Thoracic;  Laterality: Right;        Current Outpatient Medications on File Prior to Visit   Medication Sig Dispense Refill   • azelastine (ASTELIN) 0.1 % nasal spray 1 spray into the nostril(s) as directed by provider.     • B COMPLEX VITAMINS ER PO Take  by mouth Daily.     • Cholecalciferol (VITAMIN D3) 5000 units capsule capsule Take 5,000 Units by mouth Daily.     • Colestipol HCl (COLESTID PO) Take 1 tablet by mouth Daily.     • esomeprazole (NexIUM) 10 MG packet Take 10 mg by mouth Daily.     • estradiol (ESTRACE) 1 MG tablet Take 1 mg by mouth Daily.     • lidocaine-prilocaine (EMLA) 2.5-2.5 % cream Apply 1 application topically to the appropriate area as directed Every 2 (Two) Hours As Needed for Mild Pain. 5 g 3   • Multiple Vitamins-Minerals (PRESERVISION AREDS 2+MULTI VIT PO) Take  by mouth.     • ondansetron (ZOFRAN) 8 MG tablet Take 1 tablet by mouth 3 (Three) Times a Day As Needed for Nausea or Vomiting. 30 tablet 5   • predniSONE (DELTASONE) 10 MG tablet Take 2 tablets by mouth Daily. 50 tablet 1   • oxyCODONE (Roxicodone) 5 MG immediate release tablet Take 1 tablet by mouth Every 4 (Four) Hours As Needed for Moderate Pain. 5 tablet 0     Current Facility-Administered Medications on File Prior to Visit   Medication Dose Route Frequency Provider Last Rate Last Admin   • CARBOplatin (PARAPLATIN) 120 mg in sodium chloride 0.9 % 112 mL chemo IVPB  120 mg Intravenous Once Cruzito Lagunas MD       • [COMPLETED] dexamethasone (DECADRON) IVPB 12 mg  12 mg Intravenous Once Che Khan APRN   Stopped at 09/20/22 1029   • [COMPLETED] diphenhydrAMINE (BENADRYL) IVPB 50 mg  50 mg Intravenous Once Che Khan APRN   Stopped at 09/20/22 1013   • [COMPLETED] famotidine (PEPCID) injection 20 mg  20 mg Intravenous Once Che Khan APRN    20 mg at 22 0953   • PACLitaxel (TAXOL) 75 mg in sodium chloride 0.9 % 112.5 mL chemo IVPB  50 mg/m2 (Treatment Plan Recorded) Intravenous Once Cruzito Lagunas MD       • [COMPLETED] palonosetron (ALOXI) injection 0.25 mg  0.25 mg Intravenous Once Che Khan APRN   0.25 mg at 22 0955   • [COMPLETED] sodium chloride 0.9 % infusion 250 mL  250 mL Intravenous Once Che Khan APRN 20 mL/hr at 22 0956 250 mL at 22 0956   • [COMPLETED] sodium chloride 0.9 % infusion  500 mL/hr Intravenous Once Che Khan APRN 500 mL/hr at 22 1042 500 mL/hr at 22 1042        ALLERGIES:    Allergies   Allergen Reactions   • Metronidazole Hives   • Ofloxacin Hives and Nausea Only   • Del-Mycin [Erythromycin] Hives   • Gentamicin Hives   • Ibuprofen Nausea And Vomiting   • Latex Dermatitis     GLOVES   • Penicillins Hives   • Tetracycline Hives   • Adhesive Tape Dermatitis     BANDAIDS   • Aspirin GI Intolerance     Vomiting can take EC   • Codeine Nausea And Vomiting        Social History     Socioeconomic History   • Marital status:    Tobacco Use   • Smoking status: Former Smoker     Types: Cigarettes     Quit date:      Years since quittin.7   • Smokeless tobacco: Never Used   • Tobacco comment: ONLY ABOUT 5 CIGS PER DAY   Vaping Use   • Vaping Use: Never used   Substance and Sexual Activity   • Alcohol use: Yes     Alcohol/week: 1.0 standard drink     Types: 1 Glasses of wine per week     Comment: occasionally   • Drug use: Never   • Sexual activity: Defer        Family History   Problem Relation Age of Onset   • Cancer Mother    • Cancer Father    • Malig Hyperthermia Neg Hx         Review of Systems   Constitutional: Negative for activity change, chills, fatigue and fever.   HENT: Negative for mouth sores, trouble swallowing and voice change.    Eyes: Negative for pain and visual disturbance.   Respiratory: Positive for cough and shortness of breath.  "Negative for wheezing.         Hemoptysis of bright red blood.   Cardiovascular: Negative for chest pain and palpitations.   Gastrointestinal: Negative for abdominal pain, constipation, diarrhea, nausea and vomiting.   Genitourinary: Negative for difficulty urinating, frequency and urgency.   Musculoskeletal: Negative for arthralgias and joint swelling.   Skin: Negative for rash.   Neurological: Negative for dizziness, seizures, weakness and headaches.   Hematological: Negative for adenopathy. Does not bruise/bleed easily.   Psychiatric/Behavioral: Negative for behavioral problems and confusion. The patient is not nervous/anxious.     09/20/2022 unchanged from above     Objective     Vitals:    09/20/22 0900   BP: (!) 184/67   Pulse: 76   Resp: 16   Temp: 96.9 °F (36.1 °C)   TempSrc: Temporal   SpO2: 97%   Weight: 56.6 kg (124 lb 11.2 oz)   Height: 152.4 cm (60\")   PainSc: 0-No pain     Current Status 9/20/2022   ECOG score 0       Physical Exam  Vitals reviewed.   Constitutional:       General: She is not in acute distress.     Appearance: Normal appearance. She is well-developed.   HENT:      Head: Normocephalic and atraumatic.      Mouth/Throat:      Pharynx: No oropharyngeal exudate.   Eyes:      Pupils: Pupils are equal, round, and reactive to light.   Cardiovascular:      Rate and Rhythm: Normal rate and regular rhythm.      Heart sounds: Normal heart sounds. No murmur heard.  Pulmonary:      Effort: Pulmonary effort is normal. No respiratory distress.      Breath sounds: Decreased breath sounds present. No wheezing, rhonchi or rales.   Abdominal:      General: Bowel sounds are normal. There is no distension.      Palpations: Abdomen is soft.   Musculoskeletal:         General: Normal range of motion.      Cervical back: Normal range of motion.   Skin:     General: Skin is warm and dry.      Findings: No rash.   Neurological:      Mental Status: She is alert and oriented to person, place, and time.       "     RECENT LABS:  Hematology WBC   Date Value Ref Range Status   09/20/2022 23.06 (H) 3.40 - 10.80 10*3/mm3 Final     RBC   Date Value Ref Range Status   09/20/2022 4.17 3.77 - 5.28 10*6/mm3 Final     Hemoglobin   Date Value Ref Range Status   09/20/2022 10.5 (L) 12.0 - 15.9 g/dL Final     Hematocrit   Date Value Ref Range Status   09/20/2022 33.3 (L) 34.0 - 46.6 % Final     Platelets   Date Value Ref Range Status   09/20/2022 422 140 - 450 10*3/mm3 Final        BRONCHOSCOPY PATHOLOGY 8/23/2022  Final Diagnosis   1. Lung, Right Upper Lobe, Endobronchial Biopsies: INVASIVE POORLY DIFFERENTIATED ADENOCARCINOMA OF PULMONARY ORIGIN.   PDL-1 POSITIVE >95%    F-18 FDG PET FROM SKULL BASE TO MID THIGH WITH PET/CT FUSION 8/12/2022  IMPRESSION:  1.  Large mass centered within the right upper lobe representing  patient's known malignancy. Interlobular septal thickening and ground  glass opacification projecting from the periphery of the mass throughout  the right upper lobe is highly concerning for lymphangitic spread. Of  note, the mass abuts the pleura and mediastinum over a long segment and  underlying invasion cannot be excluded.  2.  FDG avid hilar and mediastinal adenopathy concerning for metastatic  disease.  3.  A few irregular subcentimeter pulmonary nodules are present within  the right lower and left upper lobes measuring up to 0.9 cm which while  nonspecific raises concern for metastatic disease. At least close  attention on followup is recommended.   4.  Minimally hypermetabolic arnoldo hepatic node and bilateral sub-6 mm  pulmonary nodules are indeterminate. Continued followup with chest and  abdominal CT in 3 months is recommended.  5.  Indeterminate density 4.4 cm mass arises off the right kidney.  Further evaluation with multiphase CT or MRI of the abdomen with and  without contrast is recommended to exclude neoplasm.  6.  Focal uptake over the right shoulder in the area of prior rotator  cuff repair is favored  be postsurgical with metastatic disease less  likely.  7.  Other findings as above.    MRI OF THE BRAIN WITH AND WITHOUT CONTRAST 08/15/2022   IMPRESSION:  1. There is mild-to-moderate small vessel disease in cerebral and  central pontine white matter. Otherwise, the MRI of the brain is normal  with no evidence of metastatic disease to the brain.   2. There are some nonspecific signal abnormality in the C4 cervical  vertebra with T1 low signal throughout the visualized portions C4  cervical vertebrae on the sagittal images. The patient has had prior  cervical spine surgery as demonstrated on prior PET scan and this argues  in favor that the T1 low signal and C4 vertebrae is related to sclerosis  from degenerative disc changes at C4-5 although an osseous metastatic  lesion in the C4 vertebra cannot be excluded on the basis of this exam.  Apparently the C4 vertebra was not hot on the recent PET scan which is  further evidence that this is degenerative in origin and correlation  with recent PET scan is suggested.      Assessment & Plan   1.  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  Patient is not felt to be a surgical candidate and would best be treated with combined chemotherapy and radiation.  She is to see Dr. Rodriguez of radiation oncology on 9/7/2022.  We discussed with her the chemotherapy options which would be weekly CarboTaxol or carbo Alimta concurrent with radiation.  We have recommended weekly carboplatin and Taxol.  · Will request molecular profiling on the tumor tissue with Carjennie if possible.  Will also draw guardant 360 assay with her labs due to our concern that the remaining tissue from the bronchoscopic biopsy may not be adequate for full molecular profiling  · 9/20/2022 1st dose of weekly carboplatin/taxol  2.  Tumor is strongly PD-L1 positive greater than 95% although the patient may not be a great candidate for immunotherapy in the future due to her rheumatoid arthritis.  3.  Other  comorbidities including vascular disease with previous carotid endarterectomy surgeries.  She has no known history of stroke or myocardial infarction.  Overall her performance status is very good.  4.  Hemoptysis related to her central tumor.  She has stopped taking aspirin.  We recommended she also try to stop Celebrex and her krill oil supplement.  5.  Rheumatoid arthritis: Patient previously on Celebrex, but discontinued due to hemoptysis.  Patient started on prednisone 20 mg daily prescribed to manage her arthritis pain.  6.  Elevated creatinine: 9/20/2022 creatinine  1.32, BUN 31- patient was advised to make sure she is drinking plenty of fluids.  We give her 500 cc IV normal saline today.  We will continue to monitor.    Recommendations  1. Proceed with cycle 1 weekly carboplatin/Taxol.  2. Continue radiation therapy under the care of Dr. Rodriguez.  3. Return in 1 week for follow-up with Dr. Lagunas with repeat labs, reevaluation due for week 2 carboplatin/Taxol.  4. Continue prednisone 20 mg daily for rheumatoid arthritis.    High risk medication requiring close monitoring for toxicity.     Che Khan, APRN   09/20/2022

## 2022-09-20 ENCOUNTER — OFFICE VISIT (OUTPATIENT)
Dept: ONCOLOGY | Facility: CLINIC | Age: 77
End: 2022-09-20

## 2022-09-20 ENCOUNTER — INFUSION (OUTPATIENT)
Dept: ONCOLOGY | Facility: HOSPITAL | Age: 77
End: 2022-09-20

## 2022-09-20 ENCOUNTER — TREATMENT (OUTPATIENT)
Dept: RADIATION ONCOLOGY | Facility: HOSPITAL | Age: 77
End: 2022-09-20

## 2022-09-20 VITALS — HEART RATE: 72 BPM | SYSTOLIC BLOOD PRESSURE: 129 MMHG | DIASTOLIC BLOOD PRESSURE: 55 MMHG

## 2022-09-20 VITALS
BODY MASS INDEX: 24.48 KG/M2 | TEMPERATURE: 96.9 F | DIASTOLIC BLOOD PRESSURE: 67 MMHG | HEIGHT: 60 IN | SYSTOLIC BLOOD PRESSURE: 184 MMHG | HEART RATE: 76 BPM | WEIGHT: 124.7 LBS | OXYGEN SATURATION: 97 % | RESPIRATION RATE: 16 BRPM

## 2022-09-20 DIAGNOSIS — C34.91 PRIMARY LUNG ADENOCARCINOMA, RIGHT: Primary | ICD-10-CM

## 2022-09-20 DIAGNOSIS — R79.89 ELEVATED SERUM CREATININE: ICD-10-CM

## 2022-09-20 LAB
ALBUMIN SERPL-MCNC: 3.6 G/DL (ref 3.5–5.2)
ALBUMIN/GLOB SERPL: 1.1 G/DL (ref 1.1–2.4)
ALP SERPL-CCNC: 79 U/L (ref 38–116)
ALT SERPL W P-5'-P-CCNC: 18 U/L (ref 0–33)
ANION GAP SERPL CALCULATED.3IONS-SCNC: 11.4 MMOL/L (ref 5–15)
AST SERPL-CCNC: 17 U/L (ref 0–32)
BASOPHILS # BLD AUTO: 0.1 10*3/MM3 (ref 0–0.2)
BASOPHILS NFR BLD AUTO: 0.4 % (ref 0–1.5)
BILIRUB SERPL-MCNC: 0.3 MG/DL (ref 0.2–1.2)
BUN SERPL-MCNC: 31 MG/DL (ref 6–20)
BUN/CREAT SERPL: 23.5 (ref 7.3–30)
CALCIUM SPEC-SCNC: 10 MG/DL (ref 8.5–10.2)
CHLORIDE SERPL-SCNC: 101 MMOL/L (ref 98–107)
CO2 SERPL-SCNC: 25.6 MMOL/L (ref 22–29)
CREAT SERPL-MCNC: 1.32 MG/DL (ref 0.6–1.1)
DEPRECATED RDW RBC AUTO: 50 FL (ref 37–54)
EGFRCR SERPLBLD CKD-EPI 2021: 42.2 ML/MIN/1.73
EOSINOPHIL # BLD AUTO: 1.84 10*3/MM3 (ref 0–0.4)
EOSINOPHIL NFR BLD AUTO: 8 % (ref 0.3–6.2)
ERYTHROCYTE [DISTWIDTH] IN BLOOD BY AUTOMATED COUNT: 17.3 % (ref 12.3–15.4)
FUNGUS WND CULT: NORMAL
GLOBULIN UR ELPH-MCNC: 3.4 GM/DL (ref 1.8–3.5)
GLUCOSE SERPL-MCNC: 98 MG/DL (ref 74–124)
HCT VFR BLD AUTO: 33.3 % (ref 34–46.6)
HGB BLD-MCNC: 10.5 G/DL (ref 12–15.9)
IMM GRANULOCYTES # BLD AUTO: 0.16 10*3/MM3 (ref 0–0.05)
IMM GRANULOCYTES NFR BLD AUTO: 0.7 % (ref 0–0.5)
LYMPHOCYTES # BLD AUTO: 2.31 10*3/MM3 (ref 0.7–3.1)
LYMPHOCYTES NFR BLD AUTO: 10 % (ref 19.6–45.3)
MCH RBC QN AUTO: 25.2 PG (ref 26.6–33)
MCHC RBC AUTO-ENTMCNC: 31.5 G/DL (ref 31.5–35.7)
MCV RBC AUTO: 79.9 FL (ref 79–97)
MONOCYTES # BLD AUTO: 1.71 10*3/MM3 (ref 0.1–0.9)
MONOCYTES NFR BLD AUTO: 7.4 % (ref 5–12)
NEUTROPHILS NFR BLD AUTO: 16.94 10*3/MM3 (ref 1.7–7)
NEUTROPHILS NFR BLD AUTO: 73.5 % (ref 42.7–76)
NRBC BLD AUTO-RTO: 0 /100 WBC (ref 0–0.2)
PLATELET # BLD AUTO: 422 10*3/MM3 (ref 140–450)
PMV BLD AUTO: 9.9 FL (ref 6–12)
POTASSIUM SERPL-SCNC: 4.1 MMOL/L (ref 3.5–4.7)
PROT SERPL-MCNC: 7 G/DL (ref 6.3–8)
RAD ONC ARIA COURSE ID: NORMAL
RAD ONC ARIA COURSE INTENT: NORMAL
RAD ONC ARIA COURSE LAST TREATMENT DATE: NORMAL
RAD ONC ARIA COURSE START DATE: NORMAL
RAD ONC ARIA COURSE TREATMENT ELAPSED DAYS: 0
RAD ONC ARIA FIRST TREATMENT DATE: NORMAL
RAD ONC ARIA PLAN FRACTIONS TREATED TO DATE: 1
RAD ONC ARIA PLAN ID: NORMAL
RAD ONC ARIA PLAN PRESCRIBED DOSE PER FRACTION: 2 GY
RAD ONC ARIA PLAN PRIMARY REFERENCE POINT: NORMAL
RAD ONC ARIA PLAN TOTAL FRACTIONS PRESCRIBED: 30
RAD ONC ARIA PLAN TOTAL PRESCRIBED DOSE: 6000 CGY
RAD ONC ARIA REFERENCE POINT DOSAGE GIVEN TO DATE: 2 GY
RAD ONC ARIA REFERENCE POINT DOSAGE GIVEN TO DATE: 2.07 GY
RAD ONC ARIA REFERENCE POINT ID: NORMAL
RAD ONC ARIA REFERENCE POINT ID: NORMAL
RAD ONC ARIA REFERENCE POINT SESSION DOSAGE GIVEN: 2 GY
RAD ONC ARIA REFERENCE POINT SESSION DOSAGE GIVEN: 2.07 GY
RBC # BLD AUTO: 4.17 10*6/MM3 (ref 3.77–5.28)
SODIUM SERPL-SCNC: 138 MMOL/L (ref 134–145)
WBC NRBC COR # BLD: 23.06 10*3/MM3 (ref 3.4–10.8)

## 2022-09-20 PROCEDURE — 96367 TX/PROPH/DG ADDL SEQ IV INF: CPT

## 2022-09-20 PROCEDURE — 77386 CHG INTENSITY MODULATED RADIATION TX DLVR COMPLEX: CPT | Performed by: STUDENT IN AN ORGANIZED HEALTH CARE EDUCATION/TRAINING PROGRAM

## 2022-09-20 PROCEDURE — 25010000002 PALONOSETRON PER 25 MCG: Performed by: NURSE PRACTITIONER

## 2022-09-20 PROCEDURE — 96375 TX/PRO/DX INJ NEW DRUG ADDON: CPT

## 2022-09-20 PROCEDURE — 99214 OFFICE O/P EST MOD 30 MIN: CPT | Performed by: NURSE PRACTITIONER

## 2022-09-20 PROCEDURE — 80053 COMPREHEN METABOLIC PANEL: CPT

## 2022-09-20 PROCEDURE — 96413 CHEMO IV INFUSION 1 HR: CPT

## 2022-09-20 PROCEDURE — 85025 COMPLETE CBC W/AUTO DIFF WBC: CPT

## 2022-09-20 PROCEDURE — 25010000002 PACLITAXEL PER 1 MG: Performed by: INTERNAL MEDICINE

## 2022-09-20 PROCEDURE — 77427 RADIATION TX MANAGEMENT X5: CPT | Performed by: STUDENT IN AN ORGANIZED HEALTH CARE EDUCATION/TRAINING PROGRAM

## 2022-09-20 PROCEDURE — 96361 HYDRATE IV INFUSION ADD-ON: CPT

## 2022-09-20 PROCEDURE — 25010000002 DIPHENHYDRAMINE PER 50 MG: Performed by: NURSE PRACTITIONER

## 2022-09-20 PROCEDURE — 96417 CHEMO IV INFUS EACH ADDL SEQ: CPT

## 2022-09-20 PROCEDURE — 77386: CPT | Performed by: STUDENT IN AN ORGANIZED HEALTH CARE EDUCATION/TRAINING PROGRAM

## 2022-09-20 PROCEDURE — 25010000002 CARBOPLATIN PER 50 MG: Performed by: INTERNAL MEDICINE

## 2022-09-20 PROCEDURE — 25010000002 DEXAMETHASONE SODIUM PHOSPHATE 100 MG/10ML SOLUTION: Performed by: NURSE PRACTITIONER

## 2022-09-20 PROCEDURE — 77014 CHG CT GUIDANCE RADIATION THERAPY FLDS PLACEMENT: CPT | Performed by: RADIOLOGY

## 2022-09-20 RX ORDER — PALONOSETRON 0.05 MG/ML
0.25 INJECTION, SOLUTION INTRAVENOUS ONCE
Status: CANCELLED | OUTPATIENT
Start: 2022-09-27

## 2022-09-20 RX ORDER — FAMOTIDINE 10 MG/ML
20 INJECTION, SOLUTION INTRAVENOUS ONCE
Status: CANCELLED | OUTPATIENT
Start: 2022-09-20

## 2022-09-20 RX ORDER — DIPHENHYDRAMINE HYDROCHLORIDE 50 MG/ML
50 INJECTION INTRAMUSCULAR; INTRAVENOUS AS NEEDED
Status: CANCELLED | OUTPATIENT
Start: 2022-09-27

## 2022-09-20 RX ORDER — PALONOSETRON 0.05 MG/ML
0.25 INJECTION, SOLUTION INTRAVENOUS ONCE
Status: CANCELLED | OUTPATIENT
Start: 2022-10-04

## 2022-09-20 RX ORDER — SODIUM CHLORIDE 9 MG/ML
250 INJECTION, SOLUTION INTRAVENOUS ONCE
Status: CANCELLED | OUTPATIENT
Start: 2022-10-04

## 2022-09-20 RX ORDER — FAMOTIDINE 10 MG/ML
20 INJECTION, SOLUTION INTRAVENOUS AS NEEDED
Status: CANCELLED | OUTPATIENT
Start: 2022-10-04

## 2022-09-20 RX ORDER — DIPHENHYDRAMINE HYDROCHLORIDE 50 MG/ML
50 INJECTION INTRAMUSCULAR; INTRAVENOUS AS NEEDED
Status: CANCELLED | OUTPATIENT
Start: 2022-10-04

## 2022-09-20 RX ORDER — SODIUM CHLORIDE 9 MG/ML
500 INJECTION, SOLUTION INTRAVENOUS ONCE
Status: COMPLETED | OUTPATIENT
Start: 2022-09-20 | End: 2022-09-20

## 2022-09-20 RX ORDER — DIPHENHYDRAMINE HYDROCHLORIDE 50 MG/ML
50 INJECTION INTRAMUSCULAR; INTRAVENOUS AS NEEDED
Status: CANCELLED | OUTPATIENT
Start: 2022-09-20

## 2022-09-20 RX ORDER — FAMOTIDINE 10 MG/ML
20 INJECTION, SOLUTION INTRAVENOUS ONCE
Status: COMPLETED | OUTPATIENT
Start: 2022-09-20 | End: 2022-09-20

## 2022-09-20 RX ORDER — FAMOTIDINE 10 MG/ML
20 INJECTION, SOLUTION INTRAVENOUS ONCE
Status: CANCELLED | OUTPATIENT
Start: 2022-10-04

## 2022-09-20 RX ORDER — FAMOTIDINE 10 MG/ML
20 INJECTION, SOLUTION INTRAVENOUS ONCE
Status: CANCELLED | OUTPATIENT
Start: 2022-09-27

## 2022-09-20 RX ORDER — SODIUM CHLORIDE 9 MG/ML
250 INJECTION, SOLUTION INTRAVENOUS ONCE
Status: COMPLETED | OUTPATIENT
Start: 2022-09-20 | End: 2022-09-20

## 2022-09-20 RX ORDER — FAMOTIDINE 10 MG/ML
20 INJECTION, SOLUTION INTRAVENOUS AS NEEDED
Status: CANCELLED | OUTPATIENT
Start: 2022-09-20

## 2022-09-20 RX ORDER — SODIUM CHLORIDE 9 MG/ML
250 INJECTION, SOLUTION INTRAVENOUS ONCE
Status: CANCELLED | OUTPATIENT
Start: 2022-09-20

## 2022-09-20 RX ORDER — SODIUM CHLORIDE 9 MG/ML
250 INJECTION, SOLUTION INTRAVENOUS ONCE
Status: CANCELLED | OUTPATIENT
Start: 2022-09-27

## 2022-09-20 RX ORDER — PALONOSETRON 0.05 MG/ML
0.25 INJECTION, SOLUTION INTRAVENOUS ONCE
Status: COMPLETED | OUTPATIENT
Start: 2022-09-20 | End: 2022-09-20

## 2022-09-20 RX ORDER — FAMOTIDINE 10 MG/ML
20 INJECTION, SOLUTION INTRAVENOUS AS NEEDED
Status: CANCELLED | OUTPATIENT
Start: 2022-09-27

## 2022-09-20 RX ORDER — PALONOSETRON 0.05 MG/ML
0.25 INJECTION, SOLUTION INTRAVENOUS ONCE
Status: CANCELLED | OUTPATIENT
Start: 2022-09-20

## 2022-09-20 RX ADMIN — PALONOSETRON 0.25 MG: 0.25 INJECTION, SOLUTION INTRAVENOUS at 09:55

## 2022-09-20 RX ADMIN — PACLITAXEL 75 MG: 6 INJECTION, SOLUTION INTRAVENOUS at 11:05

## 2022-09-20 RX ADMIN — DIPHENHYDRAMINE HYDROCHLORIDE 50 MG: 50 INJECTION, SOLUTION INTRAMUSCULAR; INTRAVENOUS at 09:57

## 2022-09-20 RX ADMIN — SODIUM CHLORIDE 250 ML: 9 INJECTION, SOLUTION INTRAVENOUS at 09:56

## 2022-09-20 RX ADMIN — FAMOTIDINE 20 MG: 10 INJECTION, SOLUTION INTRAVENOUS at 09:53

## 2022-09-20 RX ADMIN — SODIUM CHLORIDE 500 ML/HR: 9 INJECTION, SOLUTION INTRAVENOUS at 10:42

## 2022-09-20 RX ADMIN — CARBOPLATIN 120 MG: 10 INJECTION INTRAVENOUS at 12:08

## 2022-09-20 RX ADMIN — DEXAMETHASONE SODIUM PHOSPHATE 12 MG: 10 INJECTION, SOLUTION INTRAMUSCULAR; INTRAVENOUS at 10:14

## 2022-09-20 NOTE — NURSING NOTE
Pt here for C1D1 Carboplatin + Taxol tx. Consent signed. Labs reviewed with Che JIMENEZ. Per APRN give additional 500cc NS IVFs with tx r/t elevated creatinine.   Pt's blood pressure initially elevated. Rechecked BP before fluids: 129/75. HR 72.   Okay to treat per APRN.   Pt tolerated tx without complications/complaints. Pt wore cold mitts to bilateral hands and feet during Taxol administration.

## 2022-09-21 ENCOUNTER — TREATMENT (OUTPATIENT)
Dept: RADIATION ONCOLOGY | Facility: HOSPITAL | Age: 77
End: 2022-09-21

## 2022-09-21 LAB
RAD ONC ARIA COURSE ID: NORMAL
RAD ONC ARIA COURSE INTENT: NORMAL
RAD ONC ARIA COURSE LAST TREATMENT DATE: NORMAL
RAD ONC ARIA COURSE START DATE: NORMAL
RAD ONC ARIA COURSE TREATMENT ELAPSED DAYS: 1
RAD ONC ARIA FIRST TREATMENT DATE: NORMAL
RAD ONC ARIA PLAN FRACTIONS TREATED TO DATE: 2
RAD ONC ARIA PLAN ID: NORMAL
RAD ONC ARIA PLAN PRESCRIBED DOSE PER FRACTION: 2 GY
RAD ONC ARIA PLAN PRIMARY REFERENCE POINT: NORMAL
RAD ONC ARIA PLAN TOTAL FRACTIONS PRESCRIBED: 30
RAD ONC ARIA PLAN TOTAL PRESCRIBED DOSE: 6000 CGY
RAD ONC ARIA REFERENCE POINT DOSAGE GIVEN TO DATE: 4 GY
RAD ONC ARIA REFERENCE POINT DOSAGE GIVEN TO DATE: 4.15 GY
RAD ONC ARIA REFERENCE POINT ID: NORMAL
RAD ONC ARIA REFERENCE POINT ID: NORMAL
RAD ONC ARIA REFERENCE POINT SESSION DOSAGE GIVEN: 2 GY
RAD ONC ARIA REFERENCE POINT SESSION DOSAGE GIVEN: 2.07 GY

## 2022-09-21 PROCEDURE — 77386: CPT | Performed by: RADIOLOGY

## 2022-09-21 PROCEDURE — 77014 CHG CT GUIDANCE RADIATION THERAPY FLDS PLACEMENT: CPT | Performed by: RADIOLOGY

## 2022-09-21 PROCEDURE — 77386 CHG INTENSITY MODULATED RADIATION TX DLVR COMPLEX: CPT | Performed by: RADIOLOGY

## 2022-09-22 ENCOUNTER — TREATMENT (OUTPATIENT)
Dept: RADIATION ONCOLOGY | Facility: HOSPITAL | Age: 77
End: 2022-09-22

## 2022-09-22 ENCOUNTER — RADIATION ONCOLOGY WEEKLY ASSESSMENT (OUTPATIENT)
Dept: RADIATION ONCOLOGY | Facility: HOSPITAL | Age: 77
End: 2022-09-22

## 2022-09-22 VITALS
SYSTOLIC BLOOD PRESSURE: 149 MMHG | OXYGEN SATURATION: 99 % | HEART RATE: 82 BPM | BODY MASS INDEX: 24.72 KG/M2 | DIASTOLIC BLOOD PRESSURE: 64 MMHG | WEIGHT: 126.6 LBS

## 2022-09-22 DIAGNOSIS — C34.91 PRIMARY LUNG ADENOCARCINOMA, RIGHT: Primary | ICD-10-CM

## 2022-09-22 LAB
RAD ONC ARIA COURSE ID: NORMAL
RAD ONC ARIA COURSE INTENT: NORMAL
RAD ONC ARIA COURSE LAST TREATMENT DATE: NORMAL
RAD ONC ARIA COURSE START DATE: NORMAL
RAD ONC ARIA COURSE TREATMENT ELAPSED DAYS: 2
RAD ONC ARIA FIRST TREATMENT DATE: NORMAL
RAD ONC ARIA PLAN FRACTIONS TREATED TO DATE: 3
RAD ONC ARIA PLAN ID: NORMAL
RAD ONC ARIA PLAN PRESCRIBED DOSE PER FRACTION: 2 GY
RAD ONC ARIA PLAN PRIMARY REFERENCE POINT: NORMAL
RAD ONC ARIA PLAN TOTAL FRACTIONS PRESCRIBED: 30
RAD ONC ARIA PLAN TOTAL PRESCRIBED DOSE: 6000 CGY
RAD ONC ARIA REFERENCE POINT DOSAGE GIVEN TO DATE: 6 GY
RAD ONC ARIA REFERENCE POINT DOSAGE GIVEN TO DATE: 6.22 GY
RAD ONC ARIA REFERENCE POINT ID: NORMAL
RAD ONC ARIA REFERENCE POINT ID: NORMAL
RAD ONC ARIA REFERENCE POINT SESSION DOSAGE GIVEN: 2 GY
RAD ONC ARIA REFERENCE POINT SESSION DOSAGE GIVEN: 2.07 GY

## 2022-09-22 PROCEDURE — 77336 RADIATION PHYSICS CONSULT: CPT | Performed by: RADIOLOGY

## 2022-09-22 PROCEDURE — 77014 CHG CT GUIDANCE RADIATION THERAPY FLDS PLACEMENT: CPT | Performed by: RADIOLOGY

## 2022-09-22 PROCEDURE — 77386: CPT | Performed by: RADIOLOGY

## 2022-09-22 PROCEDURE — FACE2FACE: Performed by: RADIOLOGY

## 2022-09-22 PROCEDURE — 77386 CHG INTENSITY MODULATED RADIATION TX DLVR COMPLEX: CPT | Performed by: RADIOLOGY

## 2022-09-22 NOTE — PROGRESS NOTES
Patient Name: Pam Vidal Date: 2022       : 1945        MRN #: [id number] Diagnosis:   Encounter Diagnosis   Name Primary?   • Primary lung adenocarcinoma, right (HCC) Yes                     RADIATION ON TREATMENT VISIT NOTE - CHEST    Treatment Summary    [x] Concurrent Chemo   Regimen: carbo/taxol      Treatment Site Ref. ID Energy Dose/Fx (cGy) #Fx Dose Correction (cGy) Total Dose (cGy) Start Date End Date Elapsed Days   RA [id number] 6X 200 3 / 30 0 600 2022 2     Intent:curative  General:           Review of Systems    [x] No new complaints [] Fever/chills  Night sweats  [] Decreased energy level [] Decreased appetite [] Oxygen   [] Dry cough [] Productive cough [] SOB  [] Hemoptysis [] Dysphagia      [x] Fatigue,  severity: 0 None [x] Pain,  severity: 0, location:     Pain medication regimen: NONE      Esophagitis regimen: NONE      Skin regimen: Aquaphor     Comments/Notes:  She states breathing and cough are better  Supportive meds/cough meds have helped.    KPS: 90%       Vital Signs: /64   Pulse 82   Wt 57.4 kg (126 lb 9.6 oz)   SpO2 99%   BMI 24.72 kg/m²     Weight:   Wt Readings from Last 3 Encounters:   22 57.4 kg (126 lb 9.6 oz)   22 56.6 kg (124 lb 11.2 oz)   22 58.4 kg (128 lb 11.2 oz)       Medication:   Current Outpatient Medications:   •  azelastine (ASTELIN) 0.1 % nasal spray, 1 spray into the nostril(s) as directed by provider., Disp: , Rfl:   •  B COMPLEX VITAMINS ER PO, Take  by mouth Daily., Disp: , Rfl:   •  Cholecalciferol (VITAMIN D3) 5000 units capsule capsule, Take 5,000 Units by mouth Daily., Disp: , Rfl:   •  Colestipol HCl (COLESTID PO), Take 1 tablet by mouth Daily., Disp: , Rfl:   •  esomeprazole (NexIUM) 10 MG packet, Take 10 mg by mouth Daily., Disp: , Rfl:   •  estradiol (ESTRACE) 1 MG tablet, Take 1 mg by mouth Daily., Disp: , Rfl:   •  lidocaine-prilocaine (EMLA) 2.5-2.5 % cream, Apply 1 application  topically to the appropriate area as directed Every 2 (Two) Hours As Needed for Mild Pain., Disp: 5 g, Rfl: 3  •  Multiple Vitamins-Minerals (PRESERVISION AREDS 2+MULTI VIT PO), Take  by mouth., Disp: , Rfl:   •  ondansetron (ZOFRAN) 8 MG tablet, Take 1 tablet by mouth 3 (Three) Times a Day As Needed for Nausea or Vomiting., Disp: 30 tablet, Rfl: 5  •  predniSONE (DELTASONE) 10 MG tablet, Take 2 tablets by mouth Daily., Disp: 50 tablet, Rfl: 1       LABS (Reviewed):  Hematology WBC   Date Value Ref Range Status   09/20/2022 23.06 (H) 3.40 - 10.80 10*3/mm3 Final     RBC   Date Value Ref Range Status   09/20/2022 4.17 3.77 - 5.28 10*6/mm3 Final     Hemoglobin   Date Value Ref Range Status   09/20/2022 10.5 (L) 12.0 - 15.9 g/dL Final     Hematocrit   Date Value Ref Range Status   09/20/2022 33.3 (L) 34.0 - 46.6 % Final     Platelets   Date Value Ref Range Status   09/20/2022 422 140 - 450 10*3/mm3 Final      Chemistry   Lab Results   Component Value Date    GLUCOSE 98 09/20/2022    BUN 31 (H) 09/20/2022    CREATININE 1.32 (H) 09/20/2022    BCR 23.5 09/20/2022    K 4.1 09/20/2022    CO2 25.6 09/20/2022    CALCIUM 10.0 09/20/2022    ALBUMIN 3.60 09/20/2022    AST 17 09/20/2022    ALT 18 09/20/2022         Physical Exam:         Pulmonary: [] Cough:     [] Dyspnea:  Neck:  [] Lymphadenopathy  Lungs:  [x] Clear to auscultation  CVS:  [x] Regular rate & rhythm  Skin:  stGstrstastdstest:st st1st GI:  [] Dysphagia:     [x] Esophagitis:None; discussed these symptoms to keep us updated to help here    Comments/Notes:     [x] Review of labs, images, dosimetry, dose delivered, & treatment parameters.    Comments:     [x] Patient treatment setup reviewed.    Comments:     Recommendations: continue XRT and supportive measures  Patient education today.    [x] Continue RT  [] Change RT Plan [] Hold RT, length:        Approved Electronically By:  Hector Rodriguez MD, 9/22/2022, 13:55 EDT          Confidentiality of this medical record shall be maintained  except when use or disclosure is required or permitted by law, regulation or written authorization by the patient.

## 2022-09-23 ENCOUNTER — TREATMENT (OUTPATIENT)
Dept: RADIATION ONCOLOGY | Facility: HOSPITAL | Age: 77
End: 2022-09-23

## 2022-09-23 LAB
RAD ONC ARIA COURSE ID: NORMAL
RAD ONC ARIA COURSE INTENT: NORMAL
RAD ONC ARIA COURSE LAST TREATMENT DATE: NORMAL
RAD ONC ARIA COURSE START DATE: NORMAL
RAD ONC ARIA COURSE TREATMENT ELAPSED DAYS: 3
RAD ONC ARIA FIRST TREATMENT DATE: NORMAL
RAD ONC ARIA PLAN FRACTIONS TREATED TO DATE: 4
RAD ONC ARIA PLAN ID: NORMAL
RAD ONC ARIA PLAN PRESCRIBED DOSE PER FRACTION: 2 GY
RAD ONC ARIA PLAN PRIMARY REFERENCE POINT: NORMAL
RAD ONC ARIA PLAN TOTAL FRACTIONS PRESCRIBED: 30
RAD ONC ARIA PLAN TOTAL PRESCRIBED DOSE: 6000 CGY
RAD ONC ARIA REFERENCE POINT DOSAGE GIVEN TO DATE: 8 GY
RAD ONC ARIA REFERENCE POINT DOSAGE GIVEN TO DATE: 8.29 GY
RAD ONC ARIA REFERENCE POINT ID: NORMAL
RAD ONC ARIA REFERENCE POINT ID: NORMAL
RAD ONC ARIA REFERENCE POINT SESSION DOSAGE GIVEN: 2 GY
RAD ONC ARIA REFERENCE POINT SESSION DOSAGE GIVEN: 2.07 GY

## 2022-09-23 PROCEDURE — 77386 CHG INTENSITY MODULATED RADIATION TX DLVR COMPLEX: CPT | Performed by: STUDENT IN AN ORGANIZED HEALTH CARE EDUCATION/TRAINING PROGRAM

## 2022-09-23 PROCEDURE — 77014 CHG CT GUIDANCE RADIATION THERAPY FLDS PLACEMENT: CPT | Performed by: RADIOLOGY

## 2022-09-23 PROCEDURE — 77386: CPT | Performed by: STUDENT IN AN ORGANIZED HEALTH CARE EDUCATION/TRAINING PROGRAM

## 2022-09-26 ENCOUNTER — OFFICE VISIT (OUTPATIENT)
Dept: ONCOLOGY | Facility: CLINIC | Age: 77
End: 2022-09-26

## 2022-09-26 ENCOUNTER — APPOINTMENT (OUTPATIENT)
Dept: ONCOLOGY | Facility: HOSPITAL | Age: 77
End: 2022-09-26

## 2022-09-26 ENCOUNTER — INFUSION (OUTPATIENT)
Dept: ONCOLOGY | Facility: HOSPITAL | Age: 77
End: 2022-09-26

## 2022-09-26 ENCOUNTER — APPOINTMENT (OUTPATIENT)
Dept: OTHER | Facility: HOSPITAL | Age: 77
End: 2022-09-26

## 2022-09-26 ENCOUNTER — TREATMENT (OUTPATIENT)
Dept: RADIATION ONCOLOGY | Facility: HOSPITAL | Age: 77
End: 2022-09-26

## 2022-09-26 VITALS
HEART RATE: 87 BPM | OXYGEN SATURATION: 95 % | RESPIRATION RATE: 24 BRPM | SYSTOLIC BLOOD PRESSURE: 162 MMHG | HEIGHT: 60 IN | WEIGHT: 126 LBS | DIASTOLIC BLOOD PRESSURE: 61 MMHG | TEMPERATURE: 97.1 F | BODY MASS INDEX: 24.74 KG/M2

## 2022-09-26 DIAGNOSIS — C34.91 PRIMARY LUNG ADENOCARCINOMA, RIGHT: Primary | ICD-10-CM

## 2022-09-26 DIAGNOSIS — C34.91 PRIMARY LUNG ADENOCARCINOMA, RIGHT: ICD-10-CM

## 2022-09-26 DIAGNOSIS — D50.9 IRON DEFICIENCY ANEMIA, UNSPECIFIED IRON DEFICIENCY ANEMIA TYPE: ICD-10-CM

## 2022-09-26 LAB
ALBUMIN SERPL-MCNC: 3.5 G/DL (ref 3.5–5.2)
ALBUMIN/GLOB SERPL: 1 G/DL (ref 1.1–2.4)
ALP SERPL-CCNC: 83 U/L (ref 38–116)
ALT SERPL W P-5'-P-CCNC: 21 U/L (ref 0–33)
ANION GAP SERPL CALCULATED.3IONS-SCNC: 11 MMOL/L (ref 5–15)
AST SERPL-CCNC: 16 U/L (ref 0–32)
BASOPHILS # BLD AUTO: 0.1 10*3/MM3 (ref 0–0.2)
BASOPHILS NFR BLD AUTO: 0.6 % (ref 0–1.5)
BILIRUB SERPL-MCNC: 0.3 MG/DL (ref 0.2–1.2)
BUN SERPL-MCNC: 23 MG/DL (ref 6–20)
BUN/CREAT SERPL: 24.5 (ref 7.3–30)
CALCIUM SPEC-SCNC: 10 MG/DL (ref 8.5–10.2)
CHLORIDE SERPL-SCNC: 99 MMOL/L (ref 98–107)
CO2 SERPL-SCNC: 25 MMOL/L (ref 22–29)
CREAT SERPL-MCNC: 0.94 MG/DL (ref 0.6–1.1)
DEPRECATED RDW RBC AUTO: 50.3 FL (ref 37–54)
EGFRCR SERPLBLD CKD-EPI 2021: 63.4 ML/MIN/1.73
EOSINOPHIL # BLD AUTO: 1 10*3/MM3 (ref 0–0.4)
EOSINOPHIL NFR BLD AUTO: 6.2 % (ref 0.3–6.2)
ERYTHROCYTE [DISTWIDTH] IN BLOOD BY AUTOMATED COUNT: 17.4 % (ref 12.3–15.4)
GLOBULIN UR ELPH-MCNC: 3.4 GM/DL (ref 1.8–3.5)
GLUCOSE SERPL-MCNC: 113 MG/DL (ref 74–124)
HCT VFR BLD AUTO: 32.8 % (ref 34–46.6)
HGB BLD-MCNC: 10.4 G/DL (ref 12–15.9)
IMM GRANULOCYTES # BLD AUTO: 0.24 10*3/MM3 (ref 0–0.05)
IMM GRANULOCYTES NFR BLD AUTO: 1.5 % (ref 0–0.5)
LYMPHOCYTES # BLD AUTO: 0.72 10*3/MM3 (ref 0.7–3.1)
LYMPHOCYTES NFR BLD AUTO: 4.4 % (ref 19.6–45.3)
MCH RBC QN AUTO: 25.6 PG (ref 26.6–33)
MCHC RBC AUTO-ENTMCNC: 31.7 G/DL (ref 31.5–35.7)
MCV RBC AUTO: 80.6 FL (ref 79–97)
MONOCYTES # BLD AUTO: 0.81 10*3/MM3 (ref 0.1–0.9)
MONOCYTES NFR BLD AUTO: 5 % (ref 5–12)
NEUTROPHILS NFR BLD AUTO: 13.33 10*3/MM3 (ref 1.7–7)
NEUTROPHILS NFR BLD AUTO: 82.3 % (ref 42.7–76)
NRBC BLD AUTO-RTO: 0 /100 WBC (ref 0–0.2)
PLATELET # BLD AUTO: 406 10*3/MM3 (ref 140–450)
PMV BLD AUTO: 9.9 FL (ref 6–12)
POTASSIUM SERPL-SCNC: 4.2 MMOL/L (ref 3.5–4.7)
PROT SERPL-MCNC: 6.9 G/DL (ref 6.3–8)
RAD ONC ARIA COURSE ID: NORMAL
RAD ONC ARIA COURSE INTENT: NORMAL
RAD ONC ARIA COURSE LAST TREATMENT DATE: NORMAL
RAD ONC ARIA COURSE START DATE: NORMAL
RAD ONC ARIA COURSE TREATMENT ELAPSED DAYS: 6
RAD ONC ARIA FIRST TREATMENT DATE: NORMAL
RAD ONC ARIA PLAN FRACTIONS TREATED TO DATE: 5
RAD ONC ARIA PLAN ID: NORMAL
RAD ONC ARIA PLAN PRESCRIBED DOSE PER FRACTION: 2 GY
RAD ONC ARIA PLAN PRIMARY REFERENCE POINT: NORMAL
RAD ONC ARIA PLAN TOTAL FRACTIONS PRESCRIBED: 30
RAD ONC ARIA PLAN TOTAL PRESCRIBED DOSE: 6000 CGY
RAD ONC ARIA REFERENCE POINT DOSAGE GIVEN TO DATE: 10 GY
RAD ONC ARIA REFERENCE POINT DOSAGE GIVEN TO DATE: 10.37 GY
RAD ONC ARIA REFERENCE POINT ID: NORMAL
RAD ONC ARIA REFERENCE POINT ID: NORMAL
RAD ONC ARIA REFERENCE POINT SESSION DOSAGE GIVEN: 2 GY
RAD ONC ARIA REFERENCE POINT SESSION DOSAGE GIVEN: 2.07 GY
RBC # BLD AUTO: 4.07 10*6/MM3 (ref 3.77–5.28)
SODIUM SERPL-SCNC: 135 MMOL/L (ref 134–145)
WBC NRBC COR # BLD: 16.2 10*3/MM3 (ref 3.4–10.8)

## 2022-09-26 PROCEDURE — 77386 CHG INTENSITY MODULATED RADIATION TX DLVR COMPLEX: CPT | Performed by: RADIOLOGY

## 2022-09-26 PROCEDURE — 99214 OFFICE O/P EST MOD 30 MIN: CPT | Performed by: INTERNAL MEDICINE

## 2022-09-26 PROCEDURE — 77386: CPT | Performed by: RADIOLOGY

## 2022-09-26 PROCEDURE — 36591 DRAW BLOOD OFF VENOUS DEVICE: CPT

## 2022-09-26 PROCEDURE — 77014 CHG CT GUIDANCE RADIATION THERAPY FLDS PLACEMENT: CPT | Performed by: RADIOLOGY

## 2022-09-26 PROCEDURE — 85025 COMPLETE CBC W/AUTO DIFF WBC: CPT

## 2022-09-26 PROCEDURE — 80053 COMPREHEN METABOLIC PANEL: CPT

## 2022-09-26 RX ORDER — HYDROCODONE BITARTRATE AND HOMATROPINE METHYLBROMIDE ORAL SOLUTION 5; 1.5 MG/5ML; MG/5ML
5 LIQUID ORAL EVERY 6 HOURS PRN
Qty: 473 ML | Refills: 0 | Status: SHIPPED | OUTPATIENT
Start: 2022-09-26 | End: 2022-10-12 | Stop reason: SDUPTHER

## 2022-09-26 RX ORDER — FAMOTIDINE 10 MG/ML
20 INJECTION, SOLUTION INTRAVENOUS AS NEEDED
Status: CANCELLED | OUTPATIENT
Start: 2022-10-11

## 2022-09-26 RX ORDER — PALONOSETRON 0.05 MG/ML
0.25 INJECTION, SOLUTION INTRAVENOUS ONCE
Status: CANCELLED | OUTPATIENT
Start: 2022-10-11

## 2022-09-26 RX ORDER — DIPHENHYDRAMINE HYDROCHLORIDE 50 MG/ML
50 INJECTION INTRAMUSCULAR; INTRAVENOUS AS NEEDED
Status: CANCELLED | OUTPATIENT
Start: 2022-10-11

## 2022-09-26 RX ORDER — SODIUM CHLORIDE 9 MG/ML
250 INJECTION, SOLUTION INTRAVENOUS ONCE
Status: CANCELLED | OUTPATIENT
Start: 2022-10-11

## 2022-09-26 RX ORDER — FAMOTIDINE 10 MG/ML
20 INJECTION, SOLUTION INTRAVENOUS ONCE
Status: CANCELLED | OUTPATIENT
Start: 2022-10-11

## 2022-09-26 RX ORDER — FERROUS SULFATE 325(65) MG
325 TABLET ORAL
Qty: 30 TABLET | Refills: 5 | Status: SHIPPED | OUTPATIENT
Start: 2022-09-26 | End: 2023-04-03

## 2022-09-26 NOTE — PROGRESS NOTES
Subjective     REASON FOR FOLLOW UP:   1.  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  PD-L1 positive greater than 95%  3.  Plan for primary chemoradiation with weekly carboplatin and Taxol to be followed by maintenance immunotherapy for 1 year.    HISTORY OF PRESENT ILLNESS:  The patient is a 77 y.o. year old female who is here for an opinion about the above issue.  She is a former smoker referred to us now from thoracic surgery (Dr. Remedios Rice) for evaluation and treatment of clinical stage III adenocarcinoma of the lung.  She was having persistent cough and underwent chest x-ray initially in late June which showed possible right upper lobe mass.  This was followed by CT scan of the chest confirming the presence of a right upper lobe mass.  She initially underwent a CT-guided needle biopsy on 7/22/2022 which was nondiagnostic.    She was referred to Dr. Glass for bronchoscopy and biopsy which was performed on 8/23/2022 with pathology returning as poorly differentiated adenocarcinoma.  PD-L1 stain on the tissue was positive at greater than 95%.    She underwent further staging with PET scan and MRI of the brain.  PET scan was performed on 8/12/2022 showing hypermetabolic activity in the large mass in the right upper lobe as well as mediastinal lymph nodes.  She was noted to have a mass on the kidney as well but this was not hypermetabolic.  There was no obvious distant metastatic disease.    MRI of the brain from 8/15/2022 likewise showed no evidence of metastatic disease.  She is scheduled also to see Dr. Hector Rodriguez and radiation oncology on 9/7/2022.    We recommended weekly carboplatin and Taxol concurrent with radiation therapy.  Following completion of treatment she will receive immunotherapy for maintenance   (She does have a diagnosis of rheumatoid arthritis and is currently taking only Celebrex.  This could become an issue regarding her ability to tolerate immunotherapy in the future).    INTERVAL  HISTORY:  She returns today for week #2 carboplatin and Taxol with concurrent radiation delivered by Dr. Rodriguez.  She is mildly anemic today with a hemoglobin of 10.4.  Her platelets remain normal at 406,000.    Her previous iron studies from 9/9/2022 are somewhat ambiguous.  She does have a very low iron saturation with low TIBC.  Her ferritin is 147 therefore it is suspected this is primarily anemia of chronic disease with a component of iron deficiency and we will try to place her on some iron supplementation throughout treatment.    History of Present Illness     Past Medical History:   Diagnosis Date   • COPD (chronic obstructive pulmonary disease) (HCC)    • Coughing up blood     X2 MONTHS   • History of COVID-19 02/2022   • Hyperlipidemia    • IBS (irritable bowel syndrome)    • Primary lung adenocarcinoma, right (HCC)    • Rheumatoid arthritis (HCC)         Past Surgical History:   Procedure Laterality Date   • APPENDECTOMY      appendix removed   • BRONCHOSCOPY N/A 8/23/2022    Procedure: BRONCHOSCOPY WITH BAL,  BIOPSIES, AND BRUSHINGS WITH ENDOBRONCHIAL ULTRASOUND WITH FNA;  Surgeon: Gregor Vee MD;  Location: [facility] ENDOSCOPY;  Service: Pulmonary;  Laterality: N/A;  PRE- HILAR MASS  POST- SAME   • CAROTID ENDARTERECTOMY Right    • CAROTID ENDARTERECTOMY Left    • CATARACT EXTRACTION     • CERVICAL FUSION     • CHOLECYSTECTOMY     • HYSTERECTOMY     • SHOULDER ARTHROSCOPY Right 02/19/2019    right should scope/cuff repair    • VENOUS ACCESS DEVICE (PORT) INSERTION Right 9/6/2022    Procedure: POWERPORT INSERTION;  Surgeon: Remedios Rice MD;  Location: [facility] OR;  Service: Thoracic;  Laterality: Right;        Current Outpatient Medications on File Prior to Visit   Medication Sig Dispense Refill   • azelastine (ASTELIN) 0.1 % nasal spray 1 spray into the nostril(s) as directed by provider.     • B COMPLEX VITAMINS ER PO Take  by mouth Daily.     • Cholecalciferol (VITAMIN D3) 5000 units capsule  capsule Take 5,000 Units by mouth Daily.     • Colestipol HCl (COLESTID PO) Take 1 tablet by mouth Daily.     • esomeprazole (NexIUM) 10 MG packet Take 10 mg by mouth Daily.     • estradiol (ESTRACE) 1 MG tablet Take 1 mg by mouth Daily.     • lidocaine-prilocaine (EMLA) 2.5-2.5 % cream Apply 1 application topically to the appropriate area as directed Every 2 (Two) Hours As Needed for Mild Pain. 5 g 3   • Multiple Vitamins-Minerals (PRESERVISION AREDS 2+MULTI VIT PO) Take  by mouth.     • ondansetron (ZOFRAN) 8 MG tablet Take 1 tablet by mouth 3 (Three) Times a Day As Needed for Nausea or Vomiting. 30 tablet 5   • predniSONE (DELTASONE) 10 MG tablet Take 2 tablets by mouth Daily. 50 tablet 1     No current facility-administered medications on file prior to visit.        ALLERGIES:    Allergies   Allergen Reactions   • Metronidazole Hives   • Ofloxacin Hives and Nausea Only   • Del-Mycin [Erythromycin] Hives   • Gentamicin Hives   • Ibuprofen Nausea And Vomiting   • Latex Dermatitis     GLOVES   • Penicillins Hives   • Tetracycline Hives   • Adhesive Tape Dermatitis     BANDAIDS   • Aspirin GI Intolerance     Vomiting can take EC   • Codeine Nausea And Vomiting        Social History     Socioeconomic History   • Marital status:    Tobacco Use   • Smoking status: Former Smoker     Types: Cigarettes     Quit date:      Years since quittin.7   • Smokeless tobacco: Never Used   • Tobacco comment: ONLY ABOUT 5 CIGS PER DAY   Vaping Use   • Vaping Use: Never used   Substance and Sexual Activity   • Alcohol use: Yes     Alcohol/week: 1.0 standard drink     Types: 1 Glasses of wine per week     Comment: occasionally   • Drug use: Never   • Sexual activity: Defer        Family History   Problem Relation Age of Onset   • Cancer Mother    • Cancer Father    • Malig Hyperthermia Neg Hx         Review of Systems   Constitutional: Negative for activity change, chills, fatigue and fever.   HENT: Negative for mouth  "sores, trouble swallowing and voice change.    Eyes: Negative for pain and visual disturbance.   Respiratory: Positive for cough and shortness of breath. Negative for wheezing.         Hemoptysis of bright red blood.   Cardiovascular: Negative for chest pain and palpitations.   Gastrointestinal: Negative for abdominal pain, constipation, diarrhea, nausea and vomiting.   Genitourinary: Negative for difficulty urinating, frequency and urgency.   Musculoskeletal: Negative for arthralgias and joint swelling.   Skin: Negative for rash.   Neurological: Negative for dizziness, seizures, weakness and headaches.   Hematological: Negative for adenopathy. Does not bruise/bleed easily.   Psychiatric/Behavioral: Negative for behavioral problems and confusion. The patient is not nervous/anxious.     09/26/2022 unchanged from above     Objective     Vitals:    09/26/22 0857   BP: 162/61   Pulse: 87   Resp: 24   Temp: 97.1 °F (36.2 °C)   SpO2: 95%   Weight: 57.2 kg (126 lb)   Height: 152.4 cm (60\")   PainSc: 0-No pain     Current Status 9/26/2022   ECOG score 0       Physical Exam  Vitals reviewed.   Constitutional:       General: She is not in acute distress.     Appearance: Normal appearance. She is well-developed.   HENT:      Head: Normocephalic and atraumatic.      Mouth/Throat:      Pharynx: No oropharyngeal exudate.   Eyes:      Pupils: Pupils are equal, round, and reactive to light.   Cardiovascular:      Rate and Rhythm: Normal rate and regular rhythm.      Heart sounds: Normal heart sounds. No murmur heard.  Pulmonary:      Effort: Pulmonary effort is normal. No respiratory distress.      Breath sounds: Decreased breath sounds present. No wheezing, rhonchi or rales.   Abdominal:      General: Bowel sounds are normal. There is no distension.      Palpations: Abdomen is soft.   Musculoskeletal:         General: Normal range of motion.      Cervical back: Normal range of motion.   Skin:     General: Skin is warm and dry.     "  Findings: No rash.   Neurological:      Mental Status: She is alert and oriented to person, place, and time.      I have reexamined the patient and the results are consistent with the previously documented exam. Cruzito Lagunas MD        RECENT LABS:  Hematology WBC   Date Value Ref Range Status   09/26/2022 16.20 (H) 3.40 - 10.80 10*3/mm3 Final     RBC   Date Value Ref Range Status   09/26/2022 4.07 3.77 - 5.28 10*6/mm3 Final     Hemoglobin   Date Value Ref Range Status   09/26/2022 10.4 (L) 12.0 - 15.9 g/dL Final     Hematocrit   Date Value Ref Range Status   09/26/2022 32.8 (L) 34.0 - 46.6 % Final     Platelets   Date Value Ref Range Status   09/26/2022 406 140 - 450 10*3/mm3 Final        Lab Results   Component Value Date    GLUCOSE 98 09/20/2022    BUN 31 (H) 09/20/2022    CREATININE 1.32 (H) 09/20/2022    BCR 23.5 09/20/2022    K 4.1 09/20/2022    CO2 25.6 09/20/2022    CALCIUM 10.0 09/20/2022    ALBUMIN 3.60 09/20/2022    AST 17 09/20/2022    ALT 18 09/20/2022     Lab Results   Component Value Date    IRON 19 (L) 09/09/2022    TIBC 266 09/09/2022    FERRITIN 147.10 09/09/2022   SAT 7%      BRONCHOSCOPY PATHOLOGY 8/23/2022  Final Diagnosis   1. Lung, Right Upper Lobe, Endobronchial Biopsies: INVASIVE POORLY DIFFERENTIATED ADENOCARCINOMA OF PULMONARY ORIGIN.   PDL-1 POSITIVE >95%    F-18 FDG PET FROM SKULL BASE TO MID THIGH WITH PET/CT FUSION 8/12/2022  IMPRESSION:  1.  Large mass centered within the right upper lobe representing  patient's known malignancy. Interlobular septal thickening and ground  glass opacification projecting from the periphery of the mass throughout  the right upper lobe is highly concerning for lymphangitic spread. Of  note, the mass abuts the pleura and mediastinum over a long segment and  underlying invasion cannot be excluded.  2.  FDG avid hilar and mediastinal adenopathy concerning for metastatic  disease.  3.  A few irregular subcentimeter pulmonary nodules are present  within  the right lower and left upper lobes measuring up to 0.9 cm which while  nonspecific raises concern for metastatic disease. At least close  attention on followup is recommended.   4.  Minimally hypermetabolic arnoldo hepatic node and bilateral sub-6 mm  pulmonary nodules are indeterminate. Continued followup with chest and  abdominal CT in 3 months is recommended.  5.  Indeterminate density 4.4 cm mass arises off the right kidney.  Further evaluation with multiphase CT or MRI of the abdomen with and  without contrast is recommended to exclude neoplasm.  6.  Focal uptake over the right shoulder in the area of prior rotator  cuff repair is favored be postsurgical with metastatic disease less  likely.  7.  Other findings as above.    MRI OF THE BRAIN WITH AND WITHOUT CONTRAST 08/15/2022   IMPRESSION:  1. There is mild-to-moderate small vessel disease in cerebral and  central pontine white matter. Otherwise, the MRI of the brain is normal  with no evidence of metastatic disease to the brain.   2. There are some nonspecific signal abnormality in the C4 cervical  vertebra with T1 low signal throughout the visualized portions C4  cervical vertebrae on the sagittal images. The patient has had prior  cervical spine surgery as demonstrated on prior PET scan and this argues  in favor that the T1 low signal and C4 vertebrae is related to sclerosis  from degenerative disc changes at C4-5 although an osseous metastatic  lesion in the C4 vertebra cannot be excluded on the basis of this exam.  Apparently the C4 vertebra was not hot on the recent PET scan which is  further evidence that this is degenerative in origin and correlation  with recent PET scan is suggested.      Assessment & Plan   · 1.  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  Patient is not felt to be a surgical candidate and would best be treated with combined chemotherapy and radiation.  She is to see Dr. Rodriguez of radiation oncology on 9/7/2022.  We  discussed with her the chemotherapy with weekly CarboTaxol concurrent with radiation.    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  · 9/20/2022 1st dose of weekly carboplatin/taxol  2.  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  3.  Other comorbidities including vascular disease with previous carotid endarterectomy surgeries.  She has no known history of stroke or myocardial infarction.  Overall her performance status is very good.  4.  Hemoptysis related to her central tumor.  Improved with initiation of therapy.  5.  Rheumatoid arthritis: Patient previously on Celebrex, but discontinued due to hemoptysis.  Patient started on prednisone 20 mg daily prescribed to manage her arthritis pain.      Recommendations  1. Proceed with cycle 2 weekly carboplatin/Taxol tomorrow on 9/27/2022.  2. Continue radiation therapy under the care of Dr. Rodriguez.  3. Return in 1 week and 2 weeks for cycles 3 and 4  4. Decrease prednisone to 10 mg daily for rheumatoid arthritis.  5. MD follow-up in 3 weeks in conjunction with her fifth week of carboplatin and Taxol treatment.  6. We have started her on iron supplementation with ferrous sulfate 325 mg p.o. once daily with food.  7. We will refill her cough medication prescription.           Cruzito Lagunas MD   09/26/2022

## 2022-09-27 ENCOUNTER — INFUSION (OUTPATIENT)
Dept: ONCOLOGY | Facility: HOSPITAL | Age: 77
End: 2022-09-27

## 2022-09-27 ENCOUNTER — TREATMENT (OUTPATIENT)
Dept: RADIATION ONCOLOGY | Facility: HOSPITAL | Age: 77
End: 2022-09-27

## 2022-09-27 VITALS
HEART RATE: 78 BPM | TEMPERATURE: 96.8 F | DIASTOLIC BLOOD PRESSURE: 64 MMHG | WEIGHT: 128 LBS | OXYGEN SATURATION: 95 % | RESPIRATION RATE: 16 BRPM | BODY MASS INDEX: 25 KG/M2 | SYSTOLIC BLOOD PRESSURE: 153 MMHG

## 2022-09-27 DIAGNOSIS — C34.91 PRIMARY LUNG ADENOCARCINOMA, RIGHT: Primary | ICD-10-CM

## 2022-09-27 LAB
RAD ONC ARIA COURSE ID: NORMAL
RAD ONC ARIA COURSE INTENT: NORMAL
RAD ONC ARIA COURSE LAST TREATMENT DATE: NORMAL
RAD ONC ARIA COURSE START DATE: NORMAL
RAD ONC ARIA COURSE TREATMENT ELAPSED DAYS: 7
RAD ONC ARIA FIRST TREATMENT DATE: NORMAL
RAD ONC ARIA PLAN FRACTIONS TREATED TO DATE: 6
RAD ONC ARIA PLAN ID: NORMAL
RAD ONC ARIA PLAN PRESCRIBED DOSE PER FRACTION: 2 GY
RAD ONC ARIA PLAN PRIMARY REFERENCE POINT: NORMAL
RAD ONC ARIA PLAN TOTAL FRACTIONS PRESCRIBED: 30
RAD ONC ARIA PLAN TOTAL PRESCRIBED DOSE: 6000 CGY
RAD ONC ARIA REFERENCE POINT DOSAGE GIVEN TO DATE: 12 GY
RAD ONC ARIA REFERENCE POINT DOSAGE GIVEN TO DATE: 12.44 GY
RAD ONC ARIA REFERENCE POINT ID: NORMAL
RAD ONC ARIA REFERENCE POINT ID: NORMAL
RAD ONC ARIA REFERENCE POINT SESSION DOSAGE GIVEN: 2 GY
RAD ONC ARIA REFERENCE POINT SESSION DOSAGE GIVEN: 2.07 GY

## 2022-09-27 PROCEDURE — 25010000002 PALONOSETRON PER 25 MCG: Performed by: INTERNAL MEDICINE

## 2022-09-27 PROCEDURE — 77386 CHG INTENSITY MODULATED RADIATION TX DLVR COMPLEX: CPT | Performed by: STUDENT IN AN ORGANIZED HEALTH CARE EDUCATION/TRAINING PROGRAM

## 2022-09-27 PROCEDURE — 77386: CPT | Performed by: STUDENT IN AN ORGANIZED HEALTH CARE EDUCATION/TRAINING PROGRAM

## 2022-09-27 PROCEDURE — 25010000002 CARBOPLATIN PER 50 MG: Performed by: INTERNAL MEDICINE

## 2022-09-27 PROCEDURE — 96417 CHEMO IV INFUS EACH ADDL SEQ: CPT

## 2022-09-27 PROCEDURE — 25010000002 DEXAMETHASONE SODIUM PHOSPHATE 100 MG/10ML SOLUTION: Performed by: INTERNAL MEDICINE

## 2022-09-27 PROCEDURE — 77427 RADIATION TX MANAGEMENT X5: CPT | Performed by: STUDENT IN AN ORGANIZED HEALTH CARE EDUCATION/TRAINING PROGRAM

## 2022-09-27 PROCEDURE — 96375 TX/PRO/DX INJ NEW DRUG ADDON: CPT

## 2022-09-27 PROCEDURE — 77014 CHG CT GUIDANCE RADIATION THERAPY FLDS PLACEMENT: CPT | Performed by: RADIOLOGY

## 2022-09-27 PROCEDURE — 96413 CHEMO IV INFUSION 1 HR: CPT

## 2022-09-27 PROCEDURE — 25010000002 PACLITAXEL PER 1 MG: Performed by: INTERNAL MEDICINE

## 2022-09-27 PROCEDURE — 25010000002 DIPHENHYDRAMINE PER 50 MG: Performed by: INTERNAL MEDICINE

## 2022-09-27 RX ORDER — SODIUM CHLORIDE 0.9 % (FLUSH) 0.9 %
10 SYRINGE (ML) INJECTION AS NEEDED
Status: CANCELLED | OUTPATIENT
Start: 2022-09-27

## 2022-09-27 RX ORDER — FAMOTIDINE 10 MG/ML
20 INJECTION, SOLUTION INTRAVENOUS ONCE
Status: COMPLETED | OUTPATIENT
Start: 2022-09-27 | End: 2022-09-27

## 2022-09-27 RX ORDER — HEPARIN SODIUM (PORCINE) LOCK FLUSH IV SOLN 100 UNIT/ML 100 UNIT/ML
500 SOLUTION INTRAVENOUS AS NEEDED
Status: CANCELLED | OUTPATIENT
Start: 2022-09-27

## 2022-09-27 RX ORDER — SODIUM CHLORIDE 9 MG/ML
250 INJECTION, SOLUTION INTRAVENOUS ONCE
Status: COMPLETED | OUTPATIENT
Start: 2022-09-27 | End: 2022-09-27

## 2022-09-27 RX ORDER — PALONOSETRON 0.05 MG/ML
0.25 INJECTION, SOLUTION INTRAVENOUS ONCE
Status: COMPLETED | OUTPATIENT
Start: 2022-09-27 | End: 2022-09-27

## 2022-09-27 RX ADMIN — DEXAMETHASONE SODIUM PHOSPHATE 12 MG: 10 INJECTION, SOLUTION INTRAMUSCULAR; INTRAVENOUS at 09:46

## 2022-09-27 RX ADMIN — SODIUM CHLORIDE 250 ML: 9 INJECTION, SOLUTION INTRAVENOUS at 09:24

## 2022-09-27 RX ADMIN — DIPHENHYDRAMINE HYDROCHLORIDE 25 MG: 50 INJECTION, SOLUTION INTRAMUSCULAR; INTRAVENOUS at 09:30

## 2022-09-27 RX ADMIN — PACLITAXEL 75 MG: 6 INJECTION, SOLUTION INTRAVENOUS at 10:29

## 2022-09-27 RX ADMIN — PALONOSETRON 0.25 MG: 0.05 INJECTION, SOLUTION INTRAVENOUS at 09:29

## 2022-09-27 RX ADMIN — CARBOPLATIN 140 MG: 10 INJECTION INTRAVENOUS at 11:35

## 2022-09-27 RX ADMIN — FAMOTIDINE 20 MG: 10 INJECTION, SOLUTION INTRAVENOUS at 09:24

## 2022-09-27 NOTE — PATIENT INSTRUCTIONS
Call this office at [phone] for any fever of 100.5 or any signs of infection, or any other problems or concerns.

## 2022-09-28 ENCOUNTER — TREATMENT (OUTPATIENT)
Dept: RADIATION ONCOLOGY | Facility: HOSPITAL | Age: 77
End: 2022-09-28

## 2022-09-28 LAB
RAD ONC ARIA COURSE ID: NORMAL
RAD ONC ARIA COURSE INTENT: NORMAL
RAD ONC ARIA COURSE LAST TREATMENT DATE: NORMAL
RAD ONC ARIA COURSE START DATE: NORMAL
RAD ONC ARIA COURSE TREATMENT ELAPSED DAYS: 8
RAD ONC ARIA FIRST TREATMENT DATE: NORMAL
RAD ONC ARIA PLAN FRACTIONS TREATED TO DATE: 7
RAD ONC ARIA PLAN ID: NORMAL
RAD ONC ARIA PLAN PRESCRIBED DOSE PER FRACTION: 2 GY
RAD ONC ARIA PLAN PRIMARY REFERENCE POINT: NORMAL
RAD ONC ARIA PLAN TOTAL FRACTIONS PRESCRIBED: 30
RAD ONC ARIA PLAN TOTAL PRESCRIBED DOSE: 6000 CGY
RAD ONC ARIA REFERENCE POINT DOSAGE GIVEN TO DATE: 14 GY
RAD ONC ARIA REFERENCE POINT DOSAGE GIVEN TO DATE: 14.51 GY
RAD ONC ARIA REFERENCE POINT ID: NORMAL
RAD ONC ARIA REFERENCE POINT ID: NORMAL
RAD ONC ARIA REFERENCE POINT SESSION DOSAGE GIVEN: 2 GY
RAD ONC ARIA REFERENCE POINT SESSION DOSAGE GIVEN: 2.07 GY

## 2022-09-28 PROCEDURE — 77386: CPT | Performed by: RADIOLOGY

## 2022-09-28 PROCEDURE — 77386 CHG INTENSITY MODULATED RADIATION TX DLVR COMPLEX: CPT | Performed by: RADIOLOGY

## 2022-09-28 PROCEDURE — 77014 CHG CT GUIDANCE RADIATION THERAPY FLDS PLACEMENT: CPT | Performed by: RADIOLOGY

## 2022-09-29 ENCOUNTER — RADIATION ONCOLOGY WEEKLY ASSESSMENT (OUTPATIENT)
Dept: RADIATION ONCOLOGY | Facility: HOSPITAL | Age: 77
End: 2022-09-29

## 2022-09-29 ENCOUNTER — TREATMENT (OUTPATIENT)
Dept: RADIATION ONCOLOGY | Facility: HOSPITAL | Age: 77
End: 2022-09-29

## 2022-09-29 VITALS
RESPIRATION RATE: 20 BRPM | OXYGEN SATURATION: 98 % | BODY MASS INDEX: 24.69 KG/M2 | SYSTOLIC BLOOD PRESSURE: 158 MMHG | WEIGHT: 126.4 LBS | HEART RATE: 89 BPM | DIASTOLIC BLOOD PRESSURE: 66 MMHG

## 2022-09-29 DIAGNOSIS — C34.91 PRIMARY LUNG ADENOCARCINOMA, RIGHT: Primary | ICD-10-CM

## 2022-09-29 LAB
RAD ONC ARIA COURSE ID: NORMAL
RAD ONC ARIA COURSE INTENT: NORMAL
RAD ONC ARIA COURSE LAST TREATMENT DATE: NORMAL
RAD ONC ARIA COURSE START DATE: NORMAL
RAD ONC ARIA COURSE TREATMENT ELAPSED DAYS: 9
RAD ONC ARIA FIRST TREATMENT DATE: NORMAL
RAD ONC ARIA PLAN FRACTIONS TREATED TO DATE: 8
RAD ONC ARIA PLAN ID: NORMAL
RAD ONC ARIA PLAN PRESCRIBED DOSE PER FRACTION: 2 GY
RAD ONC ARIA PLAN PRIMARY REFERENCE POINT: NORMAL
RAD ONC ARIA PLAN TOTAL FRACTIONS PRESCRIBED: 30
RAD ONC ARIA PLAN TOTAL PRESCRIBED DOSE: 6000 CGY
RAD ONC ARIA REFERENCE POINT DOSAGE GIVEN TO DATE: 16 GY
RAD ONC ARIA REFERENCE POINT DOSAGE GIVEN TO DATE: 16.59 GY
RAD ONC ARIA REFERENCE POINT ID: NORMAL
RAD ONC ARIA REFERENCE POINT ID: NORMAL
RAD ONC ARIA REFERENCE POINT SESSION DOSAGE GIVEN: 2 GY
RAD ONC ARIA REFERENCE POINT SESSION DOSAGE GIVEN: 2.07 GY

## 2022-09-29 PROCEDURE — 77336 RADIATION PHYSICS CONSULT: CPT | Performed by: RADIOLOGY

## 2022-09-29 PROCEDURE — 77014 CHG CT GUIDANCE RADIATION THERAPY FLDS PLACEMENT: CPT | Performed by: RADIOLOGY

## 2022-09-29 PROCEDURE — 77386 CHG INTENSITY MODULATED RADIATION TX DLVR COMPLEX: CPT | Performed by: STUDENT IN AN ORGANIZED HEALTH CARE EDUCATION/TRAINING PROGRAM

## 2022-09-29 PROCEDURE — 77386: CPT | Performed by: STUDENT IN AN ORGANIZED HEALTH CARE EDUCATION/TRAINING PROGRAM

## 2022-09-29 NOTE — PROGRESS NOTES
Radiation Oncology  On-Treatment Note      Patient: Pam Vidal    MRN: [id number]    Attending Physician: Hector Rodriguez MD     Diagnosis:     ICD-10-CM ICD-9-CM   1. Primary lung adenocarcinoma, right (HCC)  C34.91 162.9       Radiation Therapy Visit:  Continue radiation therapy, Dosimetry plan remains acceptable, Films reviewed and remains acceptable, Pain assessed, Pain management planned, Radiation dose schedule reviewed and remains acceptable, Radiation technique remains acceptable and Symptoms within expected range    Radiation Treatments     Active   Plans   RA [id number]   Most recent treatment: Dose planned: 200 cGy (fraction 8 on 9/29/2022)   Total: Dose planned: 6,000 cGy (30 fractions)   Elapsed Days: 9      Reference Points   [id number]   Most recent treatment: Dose given: 200 cGy (on 9/29/2022)   Total: Dose given: 1,600 cGy   Elapsed Days: 9      RtLung calc   Most recent treatment: Dose given: 207 cGy (on 9/29/2022)   Total: Dose given: 1,659 cGy   Elapsed Days: 9                    Physical Examination:  Vitals: Blood pressure 158/66, pulse 89, resp. rate 20, weight 57.3 kg (126 lb 6.4 oz), SpO2 98 %.  Vitals:    09/29/22 0957   BP: 158/66   Pulse: 89   Resp: 20   SpO2: 98%   Weight: 57.3 kg (126 lb 6.4 oz)     Pain Score    09/29/22 0957   PainSc: 0-No pain       Fully active, able to carry on all pre-disease performance without restriction = 0    We examined the relevant areas: yes  Findings are within the expected range for this stage of treatment: yes  -------------------------------------------------------------------------------------------------------------------    ACTION ITEMS:  Patient tolerating treatment well and as expected for this stage in their treatment and Continue radiation therapy as planned    Estimated Completion Date: 10/31/2022      Christo Fitzgerald MD  Radiation Oncology

## 2022-09-30 ENCOUNTER — TREATMENT (OUTPATIENT)
Dept: RADIATION ONCOLOGY | Facility: HOSPITAL | Age: 77
End: 2022-09-30

## 2022-09-30 LAB
RAD ONC ARIA COURSE ID: NORMAL
RAD ONC ARIA COURSE INTENT: NORMAL
RAD ONC ARIA COURSE LAST TREATMENT DATE: NORMAL
RAD ONC ARIA COURSE START DATE: NORMAL
RAD ONC ARIA COURSE TREATMENT ELAPSED DAYS: 10
RAD ONC ARIA FIRST TREATMENT DATE: NORMAL
RAD ONC ARIA PLAN FRACTIONS TREATED TO DATE: 9
RAD ONC ARIA PLAN ID: NORMAL
RAD ONC ARIA PLAN PRESCRIBED DOSE PER FRACTION: 2 GY
RAD ONC ARIA PLAN PRIMARY REFERENCE POINT: NORMAL
RAD ONC ARIA PLAN TOTAL FRACTIONS PRESCRIBED: 30
RAD ONC ARIA PLAN TOTAL PRESCRIBED DOSE: 6000 CGY
RAD ONC ARIA REFERENCE POINT DOSAGE GIVEN TO DATE: 18 GY
RAD ONC ARIA REFERENCE POINT DOSAGE GIVEN TO DATE: 18.66 GY
RAD ONC ARIA REFERENCE POINT ID: NORMAL
RAD ONC ARIA REFERENCE POINT ID: NORMAL
RAD ONC ARIA REFERENCE POINT SESSION DOSAGE GIVEN: 2 GY
RAD ONC ARIA REFERENCE POINT SESSION DOSAGE GIVEN: 2.07 GY

## 2022-09-30 PROCEDURE — 77386 CHG INTENSITY MODULATED RADIATION TX DLVR COMPLEX: CPT | Performed by: STUDENT IN AN ORGANIZED HEALTH CARE EDUCATION/TRAINING PROGRAM

## 2022-09-30 PROCEDURE — 77014 CHG CT GUIDANCE RADIATION THERAPY FLDS PLACEMENT: CPT | Performed by: RADIOLOGY

## 2022-09-30 PROCEDURE — 77386: CPT | Performed by: STUDENT IN AN ORGANIZED HEALTH CARE EDUCATION/TRAINING PROGRAM

## 2022-10-01 ENCOUNTER — APPOINTMENT (OUTPATIENT)
Dept: RADIATION ONCOLOGY | Facility: HOSPITAL | Age: 77
End: 2022-10-01

## 2022-10-03 ENCOUNTER — TREATMENT (OUTPATIENT)
Dept: RADIATION ONCOLOGY | Facility: HOSPITAL | Age: 77
End: 2022-10-03

## 2022-10-03 ENCOUNTER — OFFICE VISIT (OUTPATIENT)
Dept: OTHER | Facility: HOSPITAL | Age: 77
End: 2022-10-03

## 2022-10-03 VITALS
HEART RATE: 91 BPM | SYSTOLIC BLOOD PRESSURE: 138 MMHG | BODY MASS INDEX: 25.02 KG/M2 | WEIGHT: 128.1 LBS | OXYGEN SATURATION: 97 % | RESPIRATION RATE: 20 BRPM | DIASTOLIC BLOOD PRESSURE: 62 MMHG | TEMPERATURE: 98.4 F

## 2022-10-03 DIAGNOSIS — C34.91 PRIMARY LUNG ADENOCARCINOMA, RIGHT: Primary | ICD-10-CM

## 2022-10-03 DIAGNOSIS — Z71.89 ADVANCED CARE PLANNING/COUNSELING DISCUSSION: ICD-10-CM

## 2022-10-03 LAB
RAD ONC ARIA COURSE ID: NORMAL
RAD ONC ARIA COURSE INTENT: NORMAL
RAD ONC ARIA COURSE LAST TREATMENT DATE: NORMAL
RAD ONC ARIA COURSE START DATE: NORMAL
RAD ONC ARIA COURSE TREATMENT ELAPSED DAYS: 13
RAD ONC ARIA FIRST TREATMENT DATE: NORMAL
RAD ONC ARIA PLAN FRACTIONS TREATED TO DATE: 10
RAD ONC ARIA PLAN ID: NORMAL
RAD ONC ARIA PLAN PRESCRIBED DOSE PER FRACTION: 2 GY
RAD ONC ARIA PLAN PRIMARY REFERENCE POINT: NORMAL
RAD ONC ARIA PLAN TOTAL FRACTIONS PRESCRIBED: 30
RAD ONC ARIA PLAN TOTAL PRESCRIBED DOSE: 6000 CGY
RAD ONC ARIA REFERENCE POINT DOSAGE GIVEN TO DATE: 20 GY
RAD ONC ARIA REFERENCE POINT DOSAGE GIVEN TO DATE: 20.73 GY
RAD ONC ARIA REFERENCE POINT ID: NORMAL
RAD ONC ARIA REFERENCE POINT ID: NORMAL
RAD ONC ARIA REFERENCE POINT SESSION DOSAGE GIVEN: 2 GY
RAD ONC ARIA REFERENCE POINT SESSION DOSAGE GIVEN: 2.07 GY

## 2022-10-03 PROCEDURE — 77386 CHG INTENSITY MODULATED RADIATION TX DLVR COMPLEX: CPT | Performed by: RADIOLOGY

## 2022-10-03 PROCEDURE — 99215 OFFICE O/P EST HI 40 MIN: CPT | Performed by: NURSE PRACTITIONER

## 2022-10-03 PROCEDURE — 77386: CPT | Performed by: RADIOLOGY

## 2022-10-03 PROCEDURE — 77014 CHG CT GUIDANCE RADIATION THERAPY FLDS PLACEMENT: CPT | Performed by: RADIOLOGY

## 2022-10-03 PROCEDURE — 99497 ADVNCD CARE PLAN 30 MIN: CPT | Performed by: NURSE PRACTITIONER

## 2022-10-03 NOTE — PROGRESS NOTES
[facility] MULTIDISCIPLINARY CLINIC  SURVIVORSHIP VISIT: IN CLINIC  Older Adult Functional Assessment Initial Visit        Pam Vidal is a pleasant 77 y.o. 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 Reviewed today in Multidisciplinary Clinic, for initial older adult functional assessment visit.     HPI  This is a 77-year-old with an minimal prior health history inclusive of rheumatoid arthritis affecting the low back chronically, who was recently seen by Dr. Glass for bronchoscopy and biopsy performed 8/23/2022 with pathology returning as poorly differentiated adenocarcinoma with a PD-L1 stain on the tissue positive at greater than 95%.  She has had further staging with a PET and MRI of the brain.  The PET on 8/12/2022 showed hypermetabolic activity in the large mass in the right upper lobe as well as mediastinal lymph nodes.  She was also noted to have a mass on her kidney as well that was not hypermetabolic and this is currently being followed by Dr. Carlos Lin at [facility] urology.  MRI of the brain 8/15/2022 showed no evidence of metastatic disease.  She has begun weekly carboplatin and Taxol concurrent with radiation therapy with plans for maintenance immunotherapy after completion.    She does have rheumatoid arthritis and is currently only taking Celebrex for this and this will be considered moving forward with immunotherapy as it may affect her ability to tolerate.    She is tolerating treatments extremely well.  She has absolutely no complaints today.  She gets a little short of breath with exertion but it resolves quickly with rest.  As noted above she does follow with Dr. Lin and will see him in follow-up this month regarding monitoring of the renal cyst as well as ongoing issues with urge incontinence.  The patient states they may be considering a sling down the road.    The patient lives with her dear friend and niece Rosio and they  have shared a home for the last 4 years.  Rosio is here with her today and very involved in the patient's care.  The patient has 5 children all adults, 3 live in town and the other 2 are located in [place].  She has 21 grandchildren and 36 great-grandchildren.    Older Adult Functional Assessment:  Patient presents today with her friend Rosio whom she has given consent to participate in the conversation today    The patient's G8 score is 16 today, indicating LOW risk of functional decline related to cancer treatment.     Self-reported symptoms per ESAS listed below    Patient is able to perform all instrumental activities of daily living independently.     The patient reports NO falls within the last 6 months.       How many hospitalizations have you had in the last year? NONE   Because of a health problem, do you have difficulty pushing or pulling objects like a living room chair? no   Do you use any vision aids? Yes, glasses   Do you use any hearing aids? no   Do you use any mobility aids? no   Do you feel unsteady when standing or walking? no   Do you worry about falling? no   Have you had any falls in the last 6 months? no       History of autoimmune disorders?   Rheumatoid arthritis   History of organ/stem cell transplant? no     Detailed Symptom Assessment  Symptom Distress  Pain Score: no pain   ESAS Tiredness Score: No tiredness  ESAS Nausea Score: No nausea  ESAS Depression Score: No depression  ESAS Anxiety Score: No anxiety  ESAS Drowsiness Score: No drowsiness  ESAS Lack of Appetite Score: No lack of appetite  ESAS Wellbeing Score: Best wellbeing  ESAS Dyspnea Score: No shortness of breath  ESAS Other Problem Score: Best possible response  ESAS Source of Information: patient  Palliative Performance Scale Score: 100%  Last Bowel Movement: 10/03/22  Bowel movement in last 24-48 hrs?: yes      TREATMENT TOXICITY PREDICTIVE ASSESSMENT:    RAW SCORE REPORTING:  ECOG 0   G8 Questionnaire 16/17   Mini  Nutritional Assessment (MNA) Screening score: 14/14  Assessment (if screen <11): 15/16  Total assessment: 29/30   Mini-Mental State Examination (MMS) 28/30   Timed Up and Go (TUG) 3 meters (Goal <12 seconds): 8.3 seconds       PRETREATMENT HISTORY:     Oncology/Hematology History   Primary lung adenocarcinoma, right (HCC)   8/30/2022 Initial Diagnosis    Primary lung adenocarcinoma, right (HCC)     8/30/2022 Cancer Staged    Staging form: Lung, AJCC 8th Edition  - Clinical stage from 8/30/2022: Stage IIIA (cT2b, cN2, cM0) - Signed by Cruzito Lagunas MD on 8/30/2022 9/20/2022 -  Chemotherapy    OP LUNG PACLitaxel / CARBOplatin AUC=2 (Weekly) + XRT      9/22/2022 -  Chemotherapy    OP CENTRAL VENOUS ACCESS DEVICE ACCESS, CARE, AND MAINTENANCE (CVAD)         Past Medical History:   Diagnosis Date   • COPD (chronic obstructive pulmonary disease) (HCC)    • Coughing up blood     X2 MONTHS   • History of COVID-19 02/2022   • Hyperlipidemia    • IBS (irritable bowel syndrome)    • Primary lung adenocarcinoma, right (HCC)    • Rheumatoid arthritis (HCC)        Past Surgical History:   Procedure Laterality Date   • APPENDECTOMY      appendix removed   • BRONCHOSCOPY N/A 8/23/2022    Procedure: BRONCHOSCOPY WITH BAL,  BIOPSIES, AND BRUSHINGS WITH ENDOBRONCHIAL ULTRASOUND WITH FNA;  Surgeon: Gregor Vee MD;  Location: [facility] END[facility];  Service: Pulmonary;  Laterality: N/A;  PRE- HILAR MASS  POST- SAME   • CAROTID ENDARTERECTOMY Right    • CAROTID ENDARTERECTOMY Left    • CATARACT EXTRACTION     • CERVICAL FUSION     • CHOLECYSTECTOMY     • HYSTERECTOMY     • SHOULDER ARTHROSCOPY Right 02/19/2019    right should scope/cuff repair    • VENOUS ACCESS DEVICE (PORT) INSERTION Right 9/6/2022    Procedure: POWERPORT INSERTION;  Surgeon: Remedios Rice MD;  Location: [facility] MAIN OR;  Service: Thoracic;  Laterality: Right;       Social History     Socioeconomic History   • Marital status:    Tobacco Use   • Smoking  status: Former Smoker     Types: Cigarettes     Quit date: 2022     Years since quittin.4   • Smokeless tobacco: Never Used   • Tobacco comment: Occasionally smoking 1-2 cigs   Vaping Use   • Vaping Use: Never used   Substance and Sexual Activity   • Alcohol use: Yes     Alcohol/week: 1.0 standard drink     Types: 1 Glasses of wine per week     Comment: occasionally   • Drug use: Never   • Sexual activity: Defer         No results found for: LDH, URICACID    Lab Results   Component Value Date    GLUCOSE 113 2022    BUN 23 (H) 2022    CREATININE 0.94 2022    BCR 24.5 2022    K 4.2 2022    CO2 25.0 2022    CALCIUM 10.0 2022    ALBUMIN 3.50 2022    AST 16 2022    ALT 21 2022       CBC w/diff    CBC w/Diff 22   WBC 14.71 (A) 23.06 (A) 16.20 (A)   RBC 4.38 4.17 4.07   Hemoglobin 11.1 (A) 10.5 (A) 10.4 (A)   Hematocrit 34.1 33.3 (A) 32.8 (A)   MCV 77.9 (A) 79.9 80.6   MCH 25.3 (A) 25.2 (A) 25.6 (A)   MCHC 32.6 31.5 31.7   RDW 16.3 (A) 17.3 (A) 17.4 (A)   Platelets 437 422 406   Neutrophil Rel % 68.8 73.5 82.3 (A)   Immature Granulocyte Rel % 0.5 0.7 (A) 1.5 (A)   Lymphocyte Rel % 15.5 (A) 10.0 (A) 4.4 (A)   Monocyte Rel % 7.6 7.4 5.0   Eosinophil Rel % 7.1 (A) 8.0 (A) 6.2   Basophil Rel % 0.5 0.4 0.6   (A) Abnormal value              Allergies as of 10/03/2022 - Reviewed 10/03/2022   Allergen Reaction Noted   • Metronidazole Hives 11/10/2016   • Ofloxacin Hives and Nausea Only 2016   • Del-mycin [erythromycin] Hives 2022   • Gentamicin Hives 2016   • Ibuprofen Nausea And Vomiting 2016   • Latex Dermatitis 2016   • Penicillins Hives 2016   • Tetracycline Hives 2016   • Adhesive tape Dermatitis 2016   • Aspirin GI Intolerance 2022   • Codeine Nausea And Vomiting 2016       MEDICATIONS:  Medication list reviewed today and Most recent H&P dated 22 on record from Dr Lagunas  reviewed today    Review of Systems   Constitutional: Negative for activity change, appetite change, fatigue (minimal) and unexpected weight change.   Respiratory: Positive for cough (coughing up blood - unchanged) and shortness of breath (with exertion, resolves with rest). Negative for chest tightness.    Cardiovascular: Negative for chest pain and leg swelling.   Gastrointestinal: Negative for blood in stool, constipation and diarrhea.   Genitourinary: Positive for urgency (urge incontinence). Negative for dysuria and hematuria.   Musculoskeletal: Positive for back pain (chronic low backk pain).   Skin: Negative for rash and wound.   Neurological: Negative for weakness and numbness.   Psychiatric/Behavioral: Negative for decreased concentration, dysphoric mood, self-injury, sleep disturbance and suicidal ideas. The patient is not nervous/anxious.        /62   Pulse 91   Temp 98.4 °F (36.9 °C) (Oral)   Resp 20   Wt 58.1 kg (128 lb 1.6 oz)   SpO2 97%   BMI 25.02 kg/m²     Wt Readings from Last 3 Encounters:   10/03/22 58.1 kg (128 lb 1.6 oz)   09/29/22 57.3 kg (126 lb 6.4 oz)   09/27/22 58.1 kg (128 lb)       Pain Score    10/03/22 1131   PainSc: 0-No pain       PHQ-9 Total Score: 0   GAD7 Total Score: 0  Distress Score: N/A  Fatigue Severity Scale: N/A      Physical Exam  Constitutional:       Appearance: Normal appearance. She is well-groomed.   HENT:      Head: Normocephalic and atraumatic.   Cardiovascular:      Rate and Rhythm: Normal rate and regular rhythm.   Pulmonary:      Effort: Pulmonary effort is normal.   Abdominal:      General: Abdomen is flat.   Musculoskeletal:      Right lower leg: No edema.      Left lower leg: No edema.   Skin:     General: Skin is warm and dry.      Nails: There is no clubbing.   Neurological:      Mental Status: She is alert and oriented to person, place, and time.      Gait: Gait is intact.   Psychiatric:         Attention and Perception: Attention and perception  normal.         Mood and Affect: Mood and affect normal.         Speech: Speech normal.         Behavior: Behavior normal. Behavior is cooperative.         Thought Content: Thought content normal.         Cognition and Memory: Cognition and memory normal.         Judgment: Judgment normal.           Advance Care Planning     Patient's friend Rosio Garrett is here and participating in the conversation today.    Patient does not have advance care planning complete, we do not have a copy on file    Patient has not designated a healthcare surrogate: Upon consideration, Pam would consider her friend Rosio Garrett as her primary health care surrogate and one of her grandchildren as her secondary healthcare surrogate. I've encouraged Pam to discuss her wishes for life prolonging treatments with her proposed healthcare surrogates as well as the rest of her family.    She has five adult children, 21 grandchildren and 36 great grandchildren.    Wishes for life prolonging treatment explored today. Pam continues to consider her wishes, although she does feel if there is no reasonable chance of her recovering the ability to know who she is and who she is with, her preference is towards avoiding life prolonging treatments.    We reviewed each section of a blank [place] living will form today. Explained the document can be witnesses by two persons unrelated to her or it can be notarized. At this point she is encouraged to share a copy of her completed document with her health care providers and family.    Provided Conversations that Matter booklet today.    Living will not completed during this visit. She would like to review the materials and discuss further with family.    Information provided on upcoming Advance Care Planning classes offered virtually each month through [facility]. Advised arrangements can be made to schedule an appointment with myself or another advance care planning facilitator at their  "convenience.          DISCUSSION HELD TODAY:   GOALS OF CARE:  1. Cure the cancer    Problems identified:  1. Mobility: Timed up and go within normal limits. No history of falls, no subjective concerns about falling, does not feel unsteady when ambulating.  Discussed importance of regular physical activity throughout treatment to maintain current level of function.  2. Nutrition: Normal nutrition status on MNA today. Has picked up a few pounds since starting treatment.  She tries to eat a varied diet with lots of fruits and vegetables.  Currently managing any troubles with constipation with increasing dietary fiber with prunes, apricots, and she also includes apple juice.  She does have a new patient visit scheduled with the dietitian during her next chemoinfusion.  3. Cognition: MMSE 28/30, missing one point for taking paper in left hand instead of right, last calculation in serial sevens (\"64\" instead of \"65\").  Reviewed strategies for cognitive preservation and including optimization of nutrition, hydration, sleep, physical activity, maintaining cognitively stimulating activities and avoiding alcoholic beverages.  Currently minimal alcohol intake of maybe 1 or 2 drinks a month.  4. Mood: Patient screens negative for symptoms of depression or anxiety and self-reports both of these 0/10.  Describes herself as very positive.  No prior history of depression.  Excellent family support with large extended family.  Did provide some information today regarding peer based support including the [employer] foundation, friend for life and [employer]  5. Rheumatoid arthritis: The patient reports she has been asked to hold the Celebrex and pain has been controlled with steroids administered around treatments.  Currently steroids are being tapered and she is noticing an increase in pain and discomfort.  This makes it very difficult for her to sit for long periods of time.  She will follow-up with the APRN at [facility] on 11/17/22 and I " have asked her to check in and discuss her needs further at that time, as I can see this is an issue that is on Dr. Lagunas's radar already    Plan and recommendations:  1. Overall picture of a very fit 77-year-old with no history of falls, normal nutrition status, baseline cognitive function within normal limits with no subjective complaints and no symptoms of depression or anxiety receiving curative chemoradiation for stage III non-small cell lung cancer who I expect to do extremely well through the course of her treatment and recovery  2. Continue to communicate needs related to rheumatoid arthritis pain with the treatment team  3. [employer] NSCLC and lung cancer stigma guides provided  4. Peer based support - [employer], FFL, [employer]  5. Continues follow up with Urology   6. Nutrition is scheduled to see  7. Massage/Reiki therapy scheduling info provided.  8. Follow up 6 months, will plan treatment summary visit at that time  9. Call my office as needed at [phone] for additional information, resources or support.        ICD-10-CM ICD-9-CM   1. Primary lung adenocarcinoma, right (HCC)  C34.91 162.9   2. Advanced care planning/counseling discussion  Z71.89 V65.49       No orders of the defined types were placed in this encounter.      I spent 60 minutes caring for this patient on this date of service by face-to-face counseling. This time includes time spent by me in the following activities: preparing for the visit, reviewing tests, obtaining and/or reviewing a separately obtained history, performing a medically appropriate examination and/or evaluation, counseling and educating the patient/family/caregiver, ordering medications, tests, or procedures, documenting information in the medical record and care coordination     I spent 16 minutes on the separately reported service of advance care planning including identification of a healthcare surrogate, discussion of what matters most, exploring wishes for  life-prolonging treatment in the event of a sudden illness or accident, review of each section of [facility] will document, goals of care. This time is not included in the time used to support the E/M service also reported today.

## 2022-10-04 ENCOUNTER — INFUSION (OUTPATIENT)
Dept: ONCOLOGY | Facility: HOSPITAL | Age: 77
End: 2022-10-04

## 2022-10-04 ENCOUNTER — CLINICAL SUPPORT (OUTPATIENT)
Dept: OTHER | Facility: HOSPITAL | Age: 77
End: 2022-10-04

## 2022-10-04 ENCOUNTER — TREATMENT (OUTPATIENT)
Dept: RADIATION ONCOLOGY | Facility: HOSPITAL | Age: 77
End: 2022-10-04

## 2022-10-04 VITALS
HEART RATE: 96 BPM | SYSTOLIC BLOOD PRESSURE: 149 MMHG | OXYGEN SATURATION: 96 % | RESPIRATION RATE: 16 BRPM | DIASTOLIC BLOOD PRESSURE: 65 MMHG | WEIGHT: 129.4 LBS | BODY MASS INDEX: 25.27 KG/M2 | TEMPERATURE: 98.6 F

## 2022-10-04 VITALS — HEIGHT: 60 IN | BODY MASS INDEX: 25.27 KG/M2

## 2022-10-04 DIAGNOSIS — C34.91 PRIMARY LUNG ADENOCARCINOMA, RIGHT: Primary | ICD-10-CM

## 2022-10-04 LAB
ALBUMIN SERPL-MCNC: 3.4 G/DL (ref 3.5–5.2)
ALBUMIN/GLOB SERPL: 1.1 G/DL (ref 1.1–2.4)
ALP SERPL-CCNC: 82 U/L (ref 38–116)
ALT SERPL W P-5'-P-CCNC: 16 U/L (ref 0–33)
ANION GAP SERPL CALCULATED.3IONS-SCNC: 11 MMOL/L (ref 5–15)
AST SERPL-CCNC: 12 U/L (ref 0–32)
BASOPHILS # BLD AUTO: 0.05 10*3/MM3 (ref 0–0.2)
BASOPHILS NFR BLD AUTO: 0.6 % (ref 0–1.5)
BILIRUB SERPL-MCNC: 0.2 MG/DL (ref 0.2–1.2)
BUN SERPL-MCNC: 23 MG/DL (ref 6–20)
BUN/CREAT SERPL: 24.2 (ref 7.3–30)
CALCIUM SPEC-SCNC: 10.1 MG/DL (ref 8.5–10.2)
CHLORIDE SERPL-SCNC: 98 MMOL/L (ref 98–107)
CO2 SERPL-SCNC: 24 MMOL/L (ref 22–29)
CREAT SERPL-MCNC: 0.95 MG/DL (ref 0.6–1.1)
DEPRECATED RDW RBC AUTO: 51.7 FL (ref 37–54)
EGFRCR SERPLBLD CKD-EPI 2021: 62.6 ML/MIN/1.73
EOSINOPHIL # BLD AUTO: 0.27 10*3/MM3 (ref 0–0.4)
EOSINOPHIL NFR BLD AUTO: 3.1 % (ref 0.3–6.2)
ERYTHROCYTE [DISTWIDTH] IN BLOOD BY AUTOMATED COUNT: 18.4 % (ref 12.3–15.4)
GLOBULIN UR ELPH-MCNC: 3.2 GM/DL (ref 1.8–3.5)
GLUCOSE SERPL-MCNC: 110 MG/DL (ref 74–124)
HCT VFR BLD AUTO: 31 % (ref 34–46.6)
HGB BLD-MCNC: 9.6 G/DL (ref 12–15.9)
IMM GRANULOCYTES # BLD AUTO: 0.09 10*3/MM3 (ref 0–0.05)
IMM GRANULOCYTES NFR BLD AUTO: 1 % (ref 0–0.5)
LYMPHOCYTES # BLD AUTO: 0.56 10*3/MM3 (ref 0.7–3.1)
LYMPHOCYTES NFR BLD AUTO: 6.4 % (ref 19.6–45.3)
MCH RBC QN AUTO: 25.1 PG (ref 26.6–33)
MCHC RBC AUTO-ENTMCNC: 31 G/DL (ref 31.5–35.7)
MCV RBC AUTO: 80.9 FL (ref 79–97)
MONOCYTES # BLD AUTO: 0.78 10*3/MM3 (ref 0.1–0.9)
MONOCYTES NFR BLD AUTO: 8.9 % (ref 5–12)
MYCOBACTERIUM SPEC CULT: NORMAL
NEUTROPHILS NFR BLD AUTO: 7.05 10*3/MM3 (ref 1.7–7)
NEUTROPHILS NFR BLD AUTO: 80 % (ref 42.7–76)
NIGHT BLUE STAIN TISS: NORMAL
NRBC BLD AUTO-RTO: 0 /100 WBC (ref 0–0.2)
PLATELET # BLD AUTO: 338 10*3/MM3 (ref 140–450)
PMV BLD AUTO: 9.2 FL (ref 6–12)
POTASSIUM SERPL-SCNC: 4.3 MMOL/L (ref 3.5–4.7)
PROT SERPL-MCNC: 6.6 G/DL (ref 6.3–8)
RAD ONC ARIA COURSE ID: NORMAL
RAD ONC ARIA COURSE INTENT: NORMAL
RAD ONC ARIA COURSE LAST TREATMENT DATE: NORMAL
RAD ONC ARIA COURSE START DATE: NORMAL
RAD ONC ARIA COURSE TREATMENT ELAPSED DAYS: 14
RAD ONC ARIA FIRST TREATMENT DATE: NORMAL
RAD ONC ARIA PLAN FRACTIONS TREATED TO DATE: 11
RAD ONC ARIA PLAN ID: NORMAL
RAD ONC ARIA PLAN PRESCRIBED DOSE PER FRACTION: 2 GY
RAD ONC ARIA PLAN PRIMARY REFERENCE POINT: NORMAL
RAD ONC ARIA PLAN TOTAL FRACTIONS PRESCRIBED: 30
RAD ONC ARIA PLAN TOTAL PRESCRIBED DOSE: 6000 CGY
RAD ONC ARIA REFERENCE POINT DOSAGE GIVEN TO DATE: 22 GY
RAD ONC ARIA REFERENCE POINT DOSAGE GIVEN TO DATE: 22.81 GY
RAD ONC ARIA REFERENCE POINT ID: NORMAL
RAD ONC ARIA REFERENCE POINT ID: NORMAL
RAD ONC ARIA REFERENCE POINT SESSION DOSAGE GIVEN: 2 GY
RAD ONC ARIA REFERENCE POINT SESSION DOSAGE GIVEN: 2.07 GY
RBC # BLD AUTO: 3.83 10*6/MM3 (ref 3.77–5.28)
SODIUM SERPL-SCNC: 133 MMOL/L (ref 134–145)
WBC NRBC COR # BLD: 8.8 10*3/MM3 (ref 3.4–10.8)

## 2022-10-04 PROCEDURE — 85025 COMPLETE CBC W/AUTO DIFF WBC: CPT

## 2022-10-04 PROCEDURE — 80053 COMPREHEN METABOLIC PANEL: CPT

## 2022-10-04 PROCEDURE — 25010000002 CARBOPLATIN PER 50 MG: Performed by: INTERNAL MEDICINE

## 2022-10-04 PROCEDURE — 77014 CHG CT GUIDANCE RADIATION THERAPY FLDS PLACEMENT: CPT | Performed by: RADIOLOGY

## 2022-10-04 PROCEDURE — 77386: CPT | Performed by: STUDENT IN AN ORGANIZED HEALTH CARE EDUCATION/TRAINING PROGRAM

## 2022-10-04 PROCEDURE — 25010000002 PALONOSETRON PER 25 MCG: Performed by: INTERNAL MEDICINE

## 2022-10-04 PROCEDURE — 25010000002 DIPHENHYDRAMINE PER 50 MG: Performed by: INTERNAL MEDICINE

## 2022-10-04 PROCEDURE — 25010000002 DEXAMETHASONE SODIUM PHOSPHATE 100 MG/10ML SOLUTION: Performed by: INTERNAL MEDICINE

## 2022-10-04 PROCEDURE — 96413 CHEMO IV INFUSION 1 HR: CPT

## 2022-10-04 PROCEDURE — 96417 CHEMO IV INFUS EACH ADDL SEQ: CPT

## 2022-10-04 PROCEDURE — 96375 TX/PRO/DX INJ NEW DRUG ADDON: CPT

## 2022-10-04 PROCEDURE — 25010000002 PACLITAXEL PER 1 MG: Performed by: NURSE PRACTITIONER

## 2022-10-04 PROCEDURE — 25010000002 HEPARIN LOCK FLUSH PER 10 UNITS: Performed by: INTERNAL MEDICINE

## 2022-10-04 PROCEDURE — 77427 RADIATION TX MANAGEMENT X5: CPT | Performed by: STUDENT IN AN ORGANIZED HEALTH CARE EDUCATION/TRAINING PROGRAM

## 2022-10-04 PROCEDURE — 77386 CHG INTENSITY MODULATED RADIATION TX DLVR COMPLEX: CPT | Performed by: STUDENT IN AN ORGANIZED HEALTH CARE EDUCATION/TRAINING PROGRAM

## 2022-10-04 RX ORDER — SODIUM CHLORIDE 9 MG/ML
250 INJECTION, SOLUTION INTRAVENOUS ONCE
Status: COMPLETED | OUTPATIENT
Start: 2022-10-04 | End: 2022-10-04

## 2022-10-04 RX ORDER — HEPARIN SODIUM (PORCINE) LOCK FLUSH IV SOLN 100 UNIT/ML 100 UNIT/ML
500 SOLUTION INTRAVENOUS AS NEEDED
Status: DISCONTINUED | OUTPATIENT
Start: 2022-10-04 | End: 2022-10-04 | Stop reason: HOSPADM

## 2022-10-04 RX ORDER — HEPARIN SODIUM (PORCINE) LOCK FLUSH IV SOLN 100 UNIT/ML 100 UNIT/ML
500 SOLUTION INTRAVENOUS AS NEEDED
Status: CANCELLED | OUTPATIENT
Start: 2022-10-04

## 2022-10-04 RX ORDER — SODIUM CHLORIDE 0.9 % (FLUSH) 0.9 %
10 SYRINGE (ML) INJECTION AS NEEDED
Status: CANCELLED | OUTPATIENT
Start: 2022-10-04

## 2022-10-04 RX ORDER — FAMOTIDINE 10 MG/ML
20 INJECTION, SOLUTION INTRAVENOUS ONCE
Status: COMPLETED | OUTPATIENT
Start: 2022-10-04 | End: 2022-10-04

## 2022-10-04 RX ORDER — PALONOSETRON 0.05 MG/ML
0.25 INJECTION, SOLUTION INTRAVENOUS ONCE
Status: COMPLETED | OUTPATIENT
Start: 2022-10-04 | End: 2022-10-04

## 2022-10-04 RX ORDER — SODIUM CHLORIDE 0.9 % (FLUSH) 0.9 %
10 SYRINGE (ML) INJECTION AS NEEDED
Status: DISCONTINUED | OUTPATIENT
Start: 2022-10-04 | End: 2022-10-04 | Stop reason: HOSPADM

## 2022-10-04 RX ADMIN — Medication 10 ML: at 14:12

## 2022-10-04 RX ADMIN — PALONOSETRON 0.25 MG: 0.05 INJECTION, SOLUTION INTRAVENOUS at 11:31

## 2022-10-04 RX ADMIN — DEXAMETHASONE SODIUM PHOSPHATE 12 MG: 100 INJECTION INTRAMUSCULAR; INTRAVENOUS at 11:32

## 2022-10-04 RX ADMIN — DIPHENHYDRAMINE HYDROCHLORIDE 25 MG: 50 INJECTION, SOLUTION INTRAMUSCULAR; INTRAVENOUS at 11:50

## 2022-10-04 RX ADMIN — FAMOTIDINE 20 MG: 10 INJECTION, SOLUTION INTRAVENOUS at 11:31

## 2022-10-04 RX ADMIN — CARBOPLATIN 140 MG: 10 INJECTION INTRAVENOUS at 13:39

## 2022-10-04 RX ADMIN — SODIUM CHLORIDE 250 ML: 9 INJECTION, SOLUTION INTRAVENOUS at 11:31

## 2022-10-04 RX ADMIN — PACLITAXEL 75 MG: 6 INJECTION, SOLUTION INTRAVENOUS at 12:42

## 2022-10-04 RX ADMIN — Medication 500 UNITS: at 14:12

## 2022-10-04 NOTE — PROGRESS NOTES
"Outpatient Oncology Nutrition  Assessment/PES    Patient Name:  Pam Vidal  YOB: 1945  MRN: [id number]    Assessment Date:  10/4/2022    Comments: Spoke with family/friend in infusion while patient slept during treatment today.   Reports patient is eating well, appetite is increased with prednisone.   Having a little bit of reflux pain and has learned what foods to avoid. Will continue to be available as needed.        General Info     Row Name 10/04/22 1614       Today's Session    Person(s) attending today's session Patient;Family       General Information    Oncology patient? yes  stage III adenocarcinoma RUL, rheumatoid arthritis       Oncology Specific Assessment    Type of treatment Chemotherapy;Radiation    Frequency of treatment carbotaxol and radiation                     Estimated/Assessed Needs - Anthropometrics     Row Name 10/04/22 1614          Anthropometrics    Height 152.4 cm (60\")     Weight for Calculation 58.5 kg (129 lb)            Estimated/Assessed Needs    Additional Documentation KCAL/KG (Group);Protein Requirements (Group);Fluid Requirements (Group)            KCAL/KG    KCAL/KG 30 Kcal/Kg (kcal)     30 Kcal/Kg (kcal) 1755.42            Protein Requirements    Weight Used For Protein Calculations 58.5 kg (129 lb)     Est Protein Requirement Amount (gms/kg) 1.2 gm protein     Estimated Protein Requirements (gms/day) 70.22            Fluid Requirements    Fluid Requirements (mL/day) 1800     Estimated Fluid Requirement Method RDA Method     RDA Method (mL) 1800                Labs/Tests/Procedures/Meds     Row Name 10/04/22 1615          Labs/Procedures/Meds    Lab Results Reviewed reviewed            Diagnostic Tests/Procedures    Diagnostic Test/Procedure Reviewed reviewed            Medications    Pertinent Medications Reviewed reviewed     Pertinent Medications Comments b complex, vit D, nexium, MVI, zofran, prednisone                   Problem/Interventions:   " Problem 1     Row Name 10/04/22 1616          Nutrition Diagnoses Problem 1    Problem 1 Predicted Suboptimal Intake     Etiology (related to) Medical Diagnosis     Oncology Lung cancer     Signs/Symptoms (evidenced by) Report/Observation     Reported/Observed By Family     Appetite Fair;Good                        Intervention Goal     Row Name 10/04/22 1616          Intervention Goal    General Maintain nutrition;Disease management/therapy;Meet nutritional needs for age/condition     PO Tolerate PO;Meet estimated needs     Weight No significant weight loss                    Education/Evaluation     Row Name 10/04/22 1616          Education    Education Education topics;Provided education regarding     Education Topics --  nutrition during cancer treatment            Monitor/Evaluation    Education Follow-up Reinforce PRN                 Electronically signed by:  Daisha Mcnamara RD, LD  10/04/22 16:17 EDT

## 2022-10-05 ENCOUNTER — TREATMENT (OUTPATIENT)
Dept: RADIATION ONCOLOGY | Facility: HOSPITAL | Age: 77
End: 2022-10-05

## 2022-10-05 LAB
RAD ONC ARIA COURSE ID: NORMAL
RAD ONC ARIA COURSE INTENT: NORMAL
RAD ONC ARIA COURSE LAST TREATMENT DATE: NORMAL
RAD ONC ARIA COURSE START DATE: NORMAL
RAD ONC ARIA COURSE TREATMENT ELAPSED DAYS: 15
RAD ONC ARIA FIRST TREATMENT DATE: NORMAL
RAD ONC ARIA PLAN FRACTIONS TREATED TO DATE: 12
RAD ONC ARIA PLAN ID: NORMAL
RAD ONC ARIA PLAN PRESCRIBED DOSE PER FRACTION: 2 GY
RAD ONC ARIA PLAN PRIMARY REFERENCE POINT: NORMAL
RAD ONC ARIA PLAN TOTAL FRACTIONS PRESCRIBED: 30
RAD ONC ARIA PLAN TOTAL PRESCRIBED DOSE: 6000 CGY
RAD ONC ARIA REFERENCE POINT DOSAGE GIVEN TO DATE: 24 GY
RAD ONC ARIA REFERENCE POINT DOSAGE GIVEN TO DATE: 24.88 GY
RAD ONC ARIA REFERENCE POINT ID: NORMAL
RAD ONC ARIA REFERENCE POINT ID: NORMAL
RAD ONC ARIA REFERENCE POINT SESSION DOSAGE GIVEN: 2 GY
RAD ONC ARIA REFERENCE POINT SESSION DOSAGE GIVEN: 2.07 GY

## 2022-10-05 PROCEDURE — 77386: CPT | Performed by: RADIOLOGY

## 2022-10-05 PROCEDURE — 77014 CHG CT GUIDANCE RADIATION THERAPY FLDS PLACEMENT: CPT | Performed by: RADIOLOGY

## 2022-10-05 PROCEDURE — 77386 CHG INTENSITY MODULATED RADIATION TX DLVR COMPLEX: CPT | Performed by: RADIOLOGY

## 2022-10-06 ENCOUNTER — TREATMENT (OUTPATIENT)
Dept: RADIATION ONCOLOGY | Facility: HOSPITAL | Age: 77
End: 2022-10-06

## 2022-10-06 ENCOUNTER — RADIATION ONCOLOGY WEEKLY ASSESSMENT (OUTPATIENT)
Dept: RADIATION ONCOLOGY | Facility: HOSPITAL | Age: 77
End: 2022-10-06

## 2022-10-06 VITALS
WEIGHT: 127.6 LBS | SYSTOLIC BLOOD PRESSURE: 132 MMHG | RESPIRATION RATE: 20 BRPM | OXYGEN SATURATION: 97 % | DIASTOLIC BLOOD PRESSURE: 67 MMHG | HEART RATE: 67 BPM | BODY MASS INDEX: 24.92 KG/M2

## 2022-10-06 DIAGNOSIS — C34.91 PRIMARY LUNG ADENOCARCINOMA, RIGHT: Primary | ICD-10-CM

## 2022-10-06 LAB
RAD ONC ARIA COURSE ID: NORMAL
RAD ONC ARIA COURSE INTENT: NORMAL
RAD ONC ARIA COURSE LAST TREATMENT DATE: NORMAL
RAD ONC ARIA COURSE START DATE: NORMAL
RAD ONC ARIA COURSE TREATMENT ELAPSED DAYS: 16
RAD ONC ARIA FIRST TREATMENT DATE: NORMAL
RAD ONC ARIA PLAN FRACTIONS TREATED TO DATE: 13
RAD ONC ARIA PLAN ID: NORMAL
RAD ONC ARIA PLAN PRESCRIBED DOSE PER FRACTION: 2 GY
RAD ONC ARIA PLAN PRIMARY REFERENCE POINT: NORMAL
RAD ONC ARIA PLAN TOTAL FRACTIONS PRESCRIBED: 30
RAD ONC ARIA PLAN TOTAL PRESCRIBED DOSE: 6000 CGY
RAD ONC ARIA REFERENCE POINT DOSAGE GIVEN TO DATE: 26 GY
RAD ONC ARIA REFERENCE POINT DOSAGE GIVEN TO DATE: 26.95 GY
RAD ONC ARIA REFERENCE POINT ID: NORMAL
RAD ONC ARIA REFERENCE POINT ID: NORMAL
RAD ONC ARIA REFERENCE POINT SESSION DOSAGE GIVEN: 2 GY
RAD ONC ARIA REFERENCE POINT SESSION DOSAGE GIVEN: 2.07 GY

## 2022-10-06 PROCEDURE — 77014 CHG CT GUIDANCE RADIATION THERAPY FLDS PLACEMENT: CPT | Performed by: RADIOLOGY

## 2022-10-06 PROCEDURE — 77386: CPT | Performed by: RADIOLOGY

## 2022-10-06 PROCEDURE — 77386 CHG INTENSITY MODULATED RADIATION TX DLVR COMPLEX: CPT | Performed by: RADIOLOGY

## 2022-10-06 PROCEDURE — FACE2FACE: Performed by: RADIOLOGY

## 2022-10-06 PROCEDURE — 77336 RADIATION PHYSICS CONSULT: CPT | Performed by: RADIOLOGY

## 2022-10-06 NOTE — PROGRESS NOTES
Patient Name: Pam Vidal Date: 10/6/2022       : 1945        MRN #: [id number] Diagnosis: Right lung NSCLC, adeno, stage III                  RADIATION ON TREATMENT VISIT NOTE - CHEST    Treatment Summary    [x] Concurrent Chemo   Regimen: carbo/taxol      Treatment Site Ref. ID Energy Dose/Fx (cGy) #Fx Dose Correction (cGy) Total Dose (cGy) Start Date End Date Elapsed Days   RA [id number] 6X 200  0 2,600 2022         General:           Review of Systems    [x] No new complaints [] Fever/chills  Night sweats  [] Decreased energy level [] Decreased appetite [] Oxygen   [x] Dry cough [] Productive cough [] SOB  [] Hemoptysis [] Dysphagia      [x] Fatigue,  severity: 0 None [] Pain,  severity: ----------------, location:     Pain medication regimen: NONE      Esophagitis regimen: NONE      Skin regimen: NONE     Comments/Notes:  Tolerating therapy  No new complaints  Weight stable    KPS: 90%       Vital Signs: /67   Pulse 67   Resp 20   Wt 57.9 kg (127 lb 9.6 oz)   SpO2 97%   BMI 24.92 kg/m²     Weight:   Wt Readings from Last 3 Encounters:   10/06/22 57.9 kg (127 lb 9.6 oz)   10/04/22 58.7 kg (129 lb 6.4 oz)   10/03/22 58.1 kg (128 lb 1.6 oz)       Medication:   Current Outpatient Medications:   •  azelastine (ASTELIN) 0.1 % nasal spray, 1 spray into the nostril(s) as directed by provider., Disp: , Rfl:   •  B COMPLEX VITAMINS ER PO, Take  by mouth Daily., Disp: , Rfl:   •  Cholecalciferol (VITAMIN D3) 5000 units capsule capsule, Take 5,000 Units by mouth Daily., Disp: , Rfl:   •  Colestipol HCl (COLESTID PO), Take 1 tablet by mouth Daily., Disp: , Rfl:   •  esomeprazole (NexIUM) 10 MG packet, Take 10 mg by mouth Daily., Disp: , Rfl:   •  estradiol (ESTRACE) 1 MG tablet, Take 1 mg by mouth Daily., Disp: , Rfl:   •  ferrous sulfate 325 (65 FE) MG tablet, Take 1 tablet by mouth Daily With Breakfast., Disp: 30 tablet, Rfl: 5  •  HYDROcodone Bit-Homatrop Petra (HYCODAN)  5-1.5 MG/5ML solution, Take 5 mL by mouth Every 6 (Six) Hours As Needed for Cough., Disp: 473 mL, Rfl: 0  •  lidocaine-prilocaine (EMLA) 2.5-2.5 % cream, Apply 1 application topically to the appropriate area as directed Every 2 (Two) Hours As Needed for Mild Pain., Disp: 5 g, Rfl: 3  •  Multiple Vitamins-Minerals (PRESERVISION AREDS 2+MULTI VIT PO), Take  by mouth., Disp: , Rfl:   •  ondansetron (ZOFRAN) 8 MG tablet, Take 1 tablet by mouth 3 (Three) Times a Day As Needed for Nausea or Vomiting., Disp: 30 tablet, Rfl: 5  •  predniSONE (DELTASONE) 10 MG tablet, Take 2 tablets by mouth Daily., Disp: 50 tablet, Rfl: 1       LABS (Reviewed):  Hematology WBC   Date Value Ref Range Status   10/04/2022 8.80 3.40 - 10.80 10*3/mm3 Final     RBC   Date Value Ref Range Status   10/04/2022 3.83 3.77 - 5.28 10*6/mm3 Final     Hemoglobin   Date Value Ref Range Status   10/04/2022 9.6 (L) 12.0 - 15.9 g/dL Final     Hematocrit   Date Value Ref Range Status   10/04/2022 31.0 (L) 34.0 - 46.6 % Final     Platelets   Date Value Ref Range Status   10/04/2022 338 140 - 450 10*3/mm3 Final      Chemistry   Lab Results   Component Value Date    GLUCOSE 110 10/04/2022    BUN 23 (H) 10/04/2022    CREATININE 0.95 10/04/2022    BCR 24.2 10/04/2022    K 4.3 10/04/2022    CO2 24.0 10/04/2022    CALCIUM 10.1 10/04/2022    ALBUMIN 3.40 (L) 10/04/2022    AST 12 10/04/2022    ALT 16 10/04/2022         Physical Exam:         Pulmonary: [] Cough:     [] Dyspnea:  Neck:  [] Lymphadenopathy  Lungs:  [x] Clear to auscultation  CVS:  [x] Regular rate & rhythm  Skin:  stGstrstastdstest:st st1st GI:  [] Dysphagia:     [x] Esophagitis: denies  Comments/Notes:     [x] Review of labs, images, dosimetry, dose delivered, & treatment parameters.    Comments:     [x] Patient treatment setup reviewed.    Comments:     Recommendations: continue XRT and supportive measures    [x] Continue RT  [] Change RT Plan [] Hold RT, length:        Approved Electronically By:  Hector Rodriguez MD,  10/6/2022, 11:14 EDT          Confidentiality of this medical record shall be maintained except when use or disclosure is required or permitted by law, regulation or written authorization by the patient.

## 2022-10-07 ENCOUNTER — TREATMENT (OUTPATIENT)
Dept: RADIATION ONCOLOGY | Facility: HOSPITAL | Age: 77
End: 2022-10-07

## 2022-10-07 LAB
RAD ONC ARIA COURSE ID: NORMAL
RAD ONC ARIA COURSE INTENT: NORMAL
RAD ONC ARIA COURSE LAST TREATMENT DATE: NORMAL
RAD ONC ARIA COURSE START DATE: NORMAL
RAD ONC ARIA COURSE TREATMENT ELAPSED DAYS: 17
RAD ONC ARIA FIRST TREATMENT DATE: NORMAL
RAD ONC ARIA PLAN FRACTIONS TREATED TO DATE: 14
RAD ONC ARIA PLAN ID: NORMAL
RAD ONC ARIA PLAN PRESCRIBED DOSE PER FRACTION: 2 GY
RAD ONC ARIA PLAN PRIMARY REFERENCE POINT: NORMAL
RAD ONC ARIA PLAN TOTAL FRACTIONS PRESCRIBED: 30
RAD ONC ARIA PLAN TOTAL PRESCRIBED DOSE: 6000 CGY
RAD ONC ARIA REFERENCE POINT DOSAGE GIVEN TO DATE: 28 GY
RAD ONC ARIA REFERENCE POINT DOSAGE GIVEN TO DATE: 29.03 GY
RAD ONC ARIA REFERENCE POINT ID: NORMAL
RAD ONC ARIA REFERENCE POINT ID: NORMAL
RAD ONC ARIA REFERENCE POINT SESSION DOSAGE GIVEN: 2 GY
RAD ONC ARIA REFERENCE POINT SESSION DOSAGE GIVEN: 2.07 GY

## 2022-10-07 PROCEDURE — 77014 CHG CT GUIDANCE RADIATION THERAPY FLDS PLACEMENT: CPT | Performed by: RADIOLOGY

## 2022-10-07 PROCEDURE — 77386 CHG INTENSITY MODULATED RADIATION TX DLVR COMPLEX: CPT | Performed by: RADIOLOGY

## 2022-10-07 PROCEDURE — 77386: CPT | Performed by: RADIOLOGY

## 2022-10-10 ENCOUNTER — TREATMENT (OUTPATIENT)
Dept: RADIATION ONCOLOGY | Facility: HOSPITAL | Age: 77
End: 2022-10-10

## 2022-10-10 DIAGNOSIS — R04.2 COUGH WITH HEMOPTYSIS: ICD-10-CM

## 2022-10-10 DIAGNOSIS — C34.91 PRIMARY LUNG ADENOCARCINOMA, RIGHT: Primary | ICD-10-CM

## 2022-10-10 LAB
RAD ONC ARIA COURSE ID: NORMAL
RAD ONC ARIA COURSE INTENT: NORMAL
RAD ONC ARIA COURSE LAST TREATMENT DATE: NORMAL
RAD ONC ARIA COURSE START DATE: NORMAL
RAD ONC ARIA COURSE TREATMENT ELAPSED DAYS: 20
RAD ONC ARIA FIRST TREATMENT DATE: NORMAL
RAD ONC ARIA PLAN FRACTIONS TREATED TO DATE: 15
RAD ONC ARIA PLAN ID: NORMAL
RAD ONC ARIA PLAN PRESCRIBED DOSE PER FRACTION: 2 GY
RAD ONC ARIA PLAN PRIMARY REFERENCE POINT: NORMAL
RAD ONC ARIA PLAN TOTAL FRACTIONS PRESCRIBED: 30
RAD ONC ARIA PLAN TOTAL PRESCRIBED DOSE: 6000 CGY
RAD ONC ARIA REFERENCE POINT DOSAGE GIVEN TO DATE: 30 GY
RAD ONC ARIA REFERENCE POINT DOSAGE GIVEN TO DATE: 31.1 GY
RAD ONC ARIA REFERENCE POINT ID: NORMAL
RAD ONC ARIA REFERENCE POINT ID: NORMAL
RAD ONC ARIA REFERENCE POINT SESSION DOSAGE GIVEN: 2 GY
RAD ONC ARIA REFERENCE POINT SESSION DOSAGE GIVEN: 2.07 GY

## 2022-10-10 PROCEDURE — 77014 CHG CT GUIDANCE RADIATION THERAPY FLDS PLACEMENT: CPT | Performed by: RADIOLOGY

## 2022-10-10 PROCEDURE — 77386: CPT | Performed by: RADIOLOGY

## 2022-10-10 PROCEDURE — 77386 CHG INTENSITY MODULATED RADIATION TX DLVR COMPLEX: CPT | Performed by: RADIOLOGY

## 2022-10-10 NOTE — TELEPHONE ENCOUNTER
Caller: Pam Vidal    Relationship: Self    Best call back number: [phone]    Requested Prescriptions:   COUGH MEDICINE PRESCRIBED LAST WEEK  PATIENT UNSURE OF NAME    Pharmacy where request should be sent:    Pharmacy    [facility] [id number] - [facility] [place] - [address] - [phone]  - [phone] FX   [address], [place] [address]   Phone:  [phone]  Fax:  [phone]       Additional details provided by patient:  PATIENT WAS PRESCRIBED A LARGER BOTTLE LAST WEEK.    PATIENT WAS UNABLE TO COVER OUT OF POCKET COST AND PORESCRIBED A SMALLER BOTTLE    Does the patient have less than a 3 day supply:  [x] Yes  [] No    Lizzette Florence Rep   10/10/22 16:41 EDT

## 2022-10-11 ENCOUNTER — TREATMENT (OUTPATIENT)
Dept: RADIATION ONCOLOGY | Facility: HOSPITAL | Age: 77
End: 2022-10-11

## 2022-10-11 ENCOUNTER — INFUSION (OUTPATIENT)
Dept: ONCOLOGY | Facility: HOSPITAL | Age: 77
End: 2022-10-11

## 2022-10-11 VITALS
RESPIRATION RATE: 16 BRPM | TEMPERATURE: 98.4 F | OXYGEN SATURATION: 95 % | BODY MASS INDEX: 24.88 KG/M2 | WEIGHT: 127.4 LBS | DIASTOLIC BLOOD PRESSURE: 63 MMHG | SYSTOLIC BLOOD PRESSURE: 136 MMHG | HEART RATE: 95 BPM

## 2022-10-11 DIAGNOSIS — C34.91 PRIMARY LUNG ADENOCARCINOMA, RIGHT: Primary | ICD-10-CM

## 2022-10-11 LAB
ALBUMIN SERPL-MCNC: 3.3 G/DL (ref 3.5–5.2)
ALBUMIN/GLOB SERPL: 1 G/DL (ref 1.1–2.4)
ALP SERPL-CCNC: 88 U/L (ref 38–116)
ALT SERPL W P-5'-P-CCNC: 19 U/L (ref 0–33)
ANION GAP SERPL CALCULATED.3IONS-SCNC: 12.1 MMOL/L (ref 5–15)
AST SERPL-CCNC: 16 U/L (ref 0–32)
BASOPHILS # BLD AUTO: 0.05 10*3/MM3 (ref 0–0.2)
BASOPHILS NFR BLD AUTO: 0.6 % (ref 0–1.5)
BILIRUB SERPL-MCNC: 0.2 MG/DL (ref 0.2–1.2)
BUN SERPL-MCNC: 19 MG/DL (ref 6–20)
BUN/CREAT SERPL: 21.8 (ref 7.3–30)
CALCIUM SPEC-SCNC: 9.6 MG/DL (ref 8.5–10.2)
CHLORIDE SERPL-SCNC: 100 MMOL/L (ref 98–107)
CO2 SERPL-SCNC: 23.9 MMOL/L (ref 22–29)
CREAT SERPL-MCNC: 0.87 MG/DL (ref 0.6–1.1)
DEPRECATED RDW RBC AUTO: 53.1 FL (ref 37–54)
EGFRCR SERPLBLD CKD-EPI 2021: 69.6 ML/MIN/1.73
EOSINOPHIL # BLD AUTO: 0.11 10*3/MM3 (ref 0–0.4)
EOSINOPHIL NFR BLD AUTO: 1.4 % (ref 0.3–6.2)
ERYTHROCYTE [DISTWIDTH] IN BLOOD BY AUTOMATED COUNT: 19.4 % (ref 12.3–15.4)
GLOBULIN UR ELPH-MCNC: 3.2 GM/DL (ref 1.8–3.5)
GLUCOSE SERPL-MCNC: 108 MG/DL (ref 74–124)
HCT VFR BLD AUTO: 30.1 % (ref 34–46.6)
HGB BLD-MCNC: 9.5 G/DL (ref 12–15.9)
IMM GRANULOCYTES # BLD AUTO: 0.07 10*3/MM3 (ref 0–0.05)
IMM GRANULOCYTES NFR BLD AUTO: 0.9 % (ref 0–0.5)
LYMPHOCYTES # BLD AUTO: 0.32 10*3/MM3 (ref 0.7–3.1)
LYMPHOCYTES NFR BLD AUTO: 4 % (ref 19.6–45.3)
MCH RBC QN AUTO: 25.6 PG (ref 26.6–33)
MCHC RBC AUTO-ENTMCNC: 31.6 G/DL (ref 31.5–35.7)
MCV RBC AUTO: 81.1 FL (ref 79–97)
MONOCYTES # BLD AUTO: 0.51 10*3/MM3 (ref 0.1–0.9)
MONOCYTES NFR BLD AUTO: 6.3 % (ref 5–12)
NEUTROPHILS NFR BLD AUTO: 6.99 10*3/MM3 (ref 1.7–7)
NEUTROPHILS NFR BLD AUTO: 86.8 % (ref 42.7–76)
NRBC BLD AUTO-RTO: 0 /100 WBC (ref 0–0.2)
PLATELET # BLD AUTO: 266 10*3/MM3 (ref 140–450)
PMV BLD AUTO: 8.7 FL (ref 6–12)
POTASSIUM SERPL-SCNC: 4.2 MMOL/L (ref 3.5–4.7)
PROT SERPL-MCNC: 6.5 G/DL (ref 6.3–8)
RAD ONC ARIA COURSE ID: NORMAL
RAD ONC ARIA COURSE INTENT: NORMAL
RAD ONC ARIA COURSE LAST TREATMENT DATE: NORMAL
RAD ONC ARIA COURSE START DATE: NORMAL
RAD ONC ARIA COURSE TREATMENT ELAPSED DAYS: 21
RAD ONC ARIA FIRST TREATMENT DATE: NORMAL
RAD ONC ARIA PLAN FRACTIONS TREATED TO DATE: 16
RAD ONC ARIA PLAN ID: NORMAL
RAD ONC ARIA PLAN PRESCRIBED DOSE PER FRACTION: 2 GY
RAD ONC ARIA PLAN PRIMARY REFERENCE POINT: NORMAL
RAD ONC ARIA PLAN TOTAL FRACTIONS PRESCRIBED: 30
RAD ONC ARIA PLAN TOTAL PRESCRIBED DOSE: 6000 CGY
RAD ONC ARIA REFERENCE POINT DOSAGE GIVEN TO DATE: 32 GY
RAD ONC ARIA REFERENCE POINT DOSAGE GIVEN TO DATE: 33.17 GY
RAD ONC ARIA REFERENCE POINT ID: NORMAL
RAD ONC ARIA REFERENCE POINT ID: NORMAL
RAD ONC ARIA REFERENCE POINT SESSION DOSAGE GIVEN: 2 GY
RAD ONC ARIA REFERENCE POINT SESSION DOSAGE GIVEN: 2.07 GY
RBC # BLD AUTO: 3.71 10*6/MM3 (ref 3.77–5.28)
SODIUM SERPL-SCNC: 136 MMOL/L (ref 134–145)
WBC NRBC COR # BLD: 8.05 10*3/MM3 (ref 3.4–10.8)

## 2022-10-11 PROCEDURE — 96417 CHEMO IV INFUS EACH ADDL SEQ: CPT

## 2022-10-11 PROCEDURE — 96375 TX/PRO/DX INJ NEW DRUG ADDON: CPT

## 2022-10-11 PROCEDURE — 80053 COMPREHEN METABOLIC PANEL: CPT

## 2022-10-11 PROCEDURE — 25010000002 PALONOSETRON PER 25 MCG: Performed by: INTERNAL MEDICINE

## 2022-10-11 PROCEDURE — 96413 CHEMO IV INFUSION 1 HR: CPT

## 2022-10-11 PROCEDURE — 77427 RADIATION TX MANAGEMENT X5: CPT | Performed by: STUDENT IN AN ORGANIZED HEALTH CARE EDUCATION/TRAINING PROGRAM

## 2022-10-11 PROCEDURE — 85025 COMPLETE CBC W/AUTO DIFF WBC: CPT

## 2022-10-11 PROCEDURE — 25010000002 DIPHENHYDRAMINE PER 50 MG: Performed by: INTERNAL MEDICINE

## 2022-10-11 PROCEDURE — 25010000002 HEPARIN LOCK FLUSH PER 10 UNITS: Performed by: INTERNAL MEDICINE

## 2022-10-11 PROCEDURE — 25010000002 PACLITAXEL PER 1 MG: Performed by: NURSE PRACTITIONER

## 2022-10-11 PROCEDURE — 77386 CHG INTENSITY MODULATED RADIATION TX DLVR COMPLEX: CPT | Performed by: STUDENT IN AN ORGANIZED HEALTH CARE EDUCATION/TRAINING PROGRAM

## 2022-10-11 PROCEDURE — 77386: CPT | Performed by: STUDENT IN AN ORGANIZED HEALTH CARE EDUCATION/TRAINING PROGRAM

## 2022-10-11 PROCEDURE — 25010000002 DEXAMETHASONE SODIUM PHOSPHATE 100 MG/10ML SOLUTION: Performed by: INTERNAL MEDICINE

## 2022-10-11 PROCEDURE — 77014 CHG CT GUIDANCE RADIATION THERAPY FLDS PLACEMENT: CPT | Performed by: RADIOLOGY

## 2022-10-11 PROCEDURE — 25010000002 CARBOPLATIN PER 50 MG: Performed by: INTERNAL MEDICINE

## 2022-10-11 RX ORDER — HEPARIN SODIUM (PORCINE) LOCK FLUSH IV SOLN 100 UNIT/ML 100 UNIT/ML
500 SOLUTION INTRAVENOUS AS NEEDED
Status: CANCELLED | OUTPATIENT
Start: 2022-10-11

## 2022-10-11 RX ORDER — HEPARIN SODIUM (PORCINE) LOCK FLUSH IV SOLN 100 UNIT/ML 100 UNIT/ML
500 SOLUTION INTRAVENOUS AS NEEDED
Status: DISCONTINUED | OUTPATIENT
Start: 2022-10-11 | End: 2022-10-11 | Stop reason: HOSPADM

## 2022-10-11 RX ORDER — SODIUM CHLORIDE 0.9 % (FLUSH) 0.9 %
10 SYRINGE (ML) INJECTION AS NEEDED
Status: CANCELLED | OUTPATIENT
Start: 2022-10-11

## 2022-10-11 RX ORDER — SODIUM CHLORIDE 9 MG/ML
250 INJECTION, SOLUTION INTRAVENOUS ONCE
Status: COMPLETED | OUTPATIENT
Start: 2022-10-11 | End: 2022-10-11

## 2022-10-11 RX ORDER — FAMOTIDINE 10 MG/ML
20 INJECTION, SOLUTION INTRAVENOUS ONCE
Status: COMPLETED | OUTPATIENT
Start: 2022-10-11 | End: 2022-10-11

## 2022-10-11 RX ORDER — HYDROCODONE BITARTRATE AND HOMATROPINE METHYLBROMIDE ORAL SOLUTION 5; 1.5 MG/5ML; MG/5ML
5 LIQUID ORAL EVERY 6 HOURS PRN
Qty: 240 ML | Refills: 0 | Status: CANCELLED | OUTPATIENT
Start: 2022-10-11

## 2022-10-11 RX ORDER — PALONOSETRON 0.05 MG/ML
0.25 INJECTION, SOLUTION INTRAVENOUS ONCE
Status: COMPLETED | OUTPATIENT
Start: 2022-10-11 | End: 2022-10-11

## 2022-10-11 RX ORDER — SODIUM CHLORIDE 0.9 % (FLUSH) 0.9 %
10 SYRINGE (ML) INJECTION AS NEEDED
Status: DISCONTINUED | OUTPATIENT
Start: 2022-10-11 | End: 2022-10-11 | Stop reason: HOSPADM

## 2022-10-11 RX ADMIN — FAMOTIDINE 20 MG: 10 INJECTION, SOLUTION INTRAVENOUS at 11:48

## 2022-10-11 RX ADMIN — Medication 10 ML: at 14:10

## 2022-10-11 RX ADMIN — PALONOSETRON 0.25 MG: 0.05 INJECTION, SOLUTION INTRAVENOUS at 11:48

## 2022-10-11 RX ADMIN — CARBOPLATIN 150 MG: 10 INJECTION INTRAVENOUS at 13:38

## 2022-10-11 RX ADMIN — SODIUM CHLORIDE 250 ML: 9 INJECTION, SOLUTION INTRAVENOUS at 11:31

## 2022-10-11 RX ADMIN — DEXAMETHASONE SODIUM PHOSPHATE 12 MG: 100 INJECTION INTRAMUSCULAR; INTRAVENOUS at 11:47

## 2022-10-11 RX ADMIN — DIPHENHYDRAMINE HYDROCHLORIDE 25 MG: 50 INJECTION, SOLUTION INTRAMUSCULAR; INTRAVENOUS at 11:31

## 2022-10-11 RX ADMIN — Medication 500 UNITS: at 14:11

## 2022-10-11 RX ADMIN — PACLITAXEL 75 MG: 6 INJECTION, SOLUTION INTRAVENOUS at 12:44

## 2022-10-11 NOTE — TELEPHONE ENCOUNTER
Called back patient, no answer, cannot LVM. Patient was prescribed for HYCODAN last 9/26  Dose: 5 mL Route: Oral Frequency: Every 6 Hours PRN for Cough   Dispense Quantity: 473 mL Refills: 0          Sig: Take 5 mL by mouth Every 6 (Six) Hours As Needed for Cough.     Called [facility], spoke with Dorys, she confirmed that patient only had 150ml due to out of pocket cost. She picked it up 9/27. Will try to call patient again to confirm if she would need more.    5321 Patient confirmed needing more of this medication, if we can send a little bigger than 150ml. She confirmed not having allergies to this medicine and can take it and would need more since she didn't took the whole 473ml that was previously dispensed/ordered.

## 2022-10-12 ENCOUNTER — TREATMENT (OUTPATIENT)
Dept: RADIATION ONCOLOGY | Facility: HOSPITAL | Age: 77
End: 2022-10-12

## 2022-10-12 DIAGNOSIS — R04.2 COUGH WITH HEMOPTYSIS: ICD-10-CM

## 2022-10-12 DIAGNOSIS — C34.91 PRIMARY LUNG ADENOCARCINOMA, RIGHT: Primary | ICD-10-CM

## 2022-10-12 LAB
RAD ONC ARIA COURSE ID: NORMAL
RAD ONC ARIA COURSE INTENT: NORMAL
RAD ONC ARIA COURSE LAST TREATMENT DATE: NORMAL
RAD ONC ARIA COURSE START DATE: NORMAL
RAD ONC ARIA COURSE TREATMENT ELAPSED DAYS: 22
RAD ONC ARIA FIRST TREATMENT DATE: NORMAL
RAD ONC ARIA PLAN FRACTIONS TREATED TO DATE: 17
RAD ONC ARIA PLAN ID: NORMAL
RAD ONC ARIA PLAN PRESCRIBED DOSE PER FRACTION: 2 GY
RAD ONC ARIA PLAN PRIMARY REFERENCE POINT: NORMAL
RAD ONC ARIA PLAN TOTAL FRACTIONS PRESCRIBED: 30
RAD ONC ARIA PLAN TOTAL PRESCRIBED DOSE: 6000 CGY
RAD ONC ARIA REFERENCE POINT DOSAGE GIVEN TO DATE: 34 GY
RAD ONC ARIA REFERENCE POINT DOSAGE GIVEN TO DATE: 35.25 GY
RAD ONC ARIA REFERENCE POINT ID: NORMAL
RAD ONC ARIA REFERENCE POINT ID: NORMAL
RAD ONC ARIA REFERENCE POINT SESSION DOSAGE GIVEN: 2 GY
RAD ONC ARIA REFERENCE POINT SESSION DOSAGE GIVEN: 2.07 GY

## 2022-10-12 PROCEDURE — 77386: CPT | Performed by: RADIOLOGY

## 2022-10-12 PROCEDURE — 77014 CHG CT GUIDANCE RADIATION THERAPY FLDS PLACEMENT: CPT | Performed by: RADIOLOGY

## 2022-10-12 PROCEDURE — 77386 CHG INTENSITY MODULATED RADIATION TX DLVR COMPLEX: CPT | Performed by: RADIOLOGY

## 2022-10-12 RX ORDER — HYDROCODONE BITARTRATE AND HOMATROPINE METHYLBROMIDE ORAL SOLUTION 5; 1.5 MG/5ML; MG/5ML
5 LIQUID ORAL EVERY 6 HOURS PRN
Qty: 473 ML | Refills: 0 | Status: SHIPPED | OUTPATIENT
Start: 2022-10-12 | End: 2022-11-18 | Stop reason: SDUPTHER

## 2022-10-12 RX ORDER — HYDROCODONE BITARTRATE AND HOMATROPINE METHYLBROMIDE ORAL SOLUTION 5; 1.5 MG/5ML; MG/5ML
5 LIQUID ORAL EVERY 6 HOURS PRN
Qty: 240 ML | Refills: 0 | Status: CANCELLED | OUTPATIENT
Start: 2022-10-12

## 2022-10-12 NOTE — TELEPHONE ENCOUNTER
Caller: SHANA ZAZUETA    Relationship: SELF    Best call back number: [phone]    What is the best time to reach you: ANYTIME    Who are you requesting to speak with (clinical staff, provider,  specific staff member): TETO BURNS RN     What was the call regarding: PT CALLING AGAIN REGARDING HER COUGH MEDICINE. SHE HAS BEEN OUT FOR SEVERAL DAYS NOW AND NEEDS THIS FOR HER COUGH.    PLEASE CALL ASAP TO ADVISE.     Do you require a callback: YES

## 2022-10-13 ENCOUNTER — RADIATION ONCOLOGY WEEKLY ASSESSMENT (OUTPATIENT)
Dept: RADIATION ONCOLOGY | Facility: HOSPITAL | Age: 77
End: 2022-10-13

## 2022-10-13 ENCOUNTER — TREATMENT (OUTPATIENT)
Dept: RADIATION ONCOLOGY | Facility: HOSPITAL | Age: 77
End: 2022-10-13

## 2022-10-13 VITALS
BODY MASS INDEX: 24.45 KG/M2 | OXYGEN SATURATION: 98 % | SYSTOLIC BLOOD PRESSURE: 136 MMHG | HEART RATE: 93 BPM | WEIGHT: 125.2 LBS | DIASTOLIC BLOOD PRESSURE: 73 MMHG

## 2022-10-13 DIAGNOSIS — C34.91 PRIMARY LUNG ADENOCARCINOMA, RIGHT: Primary | ICD-10-CM

## 2022-10-13 DIAGNOSIS — C34.91 PRIMARY LUNG ADENOCARCINOMA, RIGHT: ICD-10-CM

## 2022-10-13 LAB
RAD ONC ARIA COURSE ID: NORMAL
RAD ONC ARIA COURSE INTENT: NORMAL
RAD ONC ARIA COURSE LAST TREATMENT DATE: NORMAL
RAD ONC ARIA COURSE START DATE: NORMAL
RAD ONC ARIA COURSE TREATMENT ELAPSED DAYS: 23
RAD ONC ARIA FIRST TREATMENT DATE: NORMAL
RAD ONC ARIA PLAN FRACTIONS TREATED TO DATE: 18
RAD ONC ARIA PLAN ID: NORMAL
RAD ONC ARIA PLAN PRESCRIBED DOSE PER FRACTION: 2 GY
RAD ONC ARIA PLAN PRIMARY REFERENCE POINT: NORMAL
RAD ONC ARIA PLAN TOTAL FRACTIONS PRESCRIBED: 30
RAD ONC ARIA PLAN TOTAL PRESCRIBED DOSE: 6000 CGY
RAD ONC ARIA REFERENCE POINT DOSAGE GIVEN TO DATE: 36 GY
RAD ONC ARIA REFERENCE POINT DOSAGE GIVEN TO DATE: 37.32 GY
RAD ONC ARIA REFERENCE POINT ID: NORMAL
RAD ONC ARIA REFERENCE POINT ID: NORMAL
RAD ONC ARIA REFERENCE POINT SESSION DOSAGE GIVEN: 2 GY
RAD ONC ARIA REFERENCE POINT SESSION DOSAGE GIVEN: 2.07 GY

## 2022-10-13 PROCEDURE — 77014 CHG CT GUIDANCE RADIATION THERAPY FLDS PLACEMENT: CPT | Performed by: RADIOLOGY

## 2022-10-13 PROCEDURE — FACE2FACE: Performed by: RADIOLOGY

## 2022-10-13 PROCEDURE — 77386: CPT | Performed by: RADIOLOGY

## 2022-10-13 PROCEDURE — 77386 CHG INTENSITY MODULATED RADIATION TX DLVR COMPLEX: CPT | Performed by: RADIOLOGY

## 2022-10-13 PROCEDURE — 77336 RADIATION PHYSICS CONSULT: CPT | Performed by: RADIOLOGY

## 2022-10-13 RX ORDER — HYDROCODONE BITARTRATE AND HOMATROPINE METHYLBROMIDE ORAL SOLUTION 5; 1.5 MG/5ML; MG/5ML
5 LIQUID ORAL EVERY 6 HOURS PRN
Qty: 473 ML | Refills: 0 | Status: CANCELLED | OUTPATIENT
Start: 2022-10-13

## 2022-10-13 NOTE — PROGRESS NOTES
Patient Name: Pam Vidal Date: 10/13/2022       : 1945        MRN #: [id number] Diagnosis:   Encounter Diagnosis   Name Primary?   • Primary lung adenocarcinoma, right (HCC) Yes                  RADIATION ON TREATMENT VISIT NOTE - CHEST    Treatment Summary    [x] Concurrent Chemo         Treatment Site Ref. ID Energy Dose/Fx (cGy) #Fx Dose Correction (cGy) Total Dose (cGy) Start Date End Date Elapsed Days   RA [id number] 6X 200  0 3,600 2022 10/13/2022 23     Intent: palliative  Hycodan for cough  General:           Review of Systems    [x] No new complaints [] Fever/chills  Night sweats  [] Decreased energy level [] Decreased appetite [] Oxygen   [] Dry cough [] Productive cough [] SOB  [] Hemoptysis [] Dysphagia      [x] Fatigue,  severity: 1 Relief w/ Rest [x] Pain,  severity: 0, location:     Pain medication regimen: NONE      Esophagitis regimen: NONE      Skin regimen: Aquaphor     Comments/Notes:  No pain or issues  Cough--hycodan discussed  No esophagitis    KPS: 90%       Vital Signs: /73   Pulse 93   Wt 56.8 kg (125 lb 3.2 oz)   SpO2 98%   BMI 24.45 kg/m²     Weight:   Wt Readings from Last 3 Encounters:   10/13/22 56.8 kg (125 lb 3.2 oz)   10/11/22 57.8 kg (127 lb 6.4 oz)   10/06/22 57.9 kg (127 lb 9.6 oz)       Medication:   Current Outpatient Medications:   •  azelastine (ASTELIN) 0.1 % nasal spray, 1 spray into the nostril(s) as directed by provider., Disp: , Rfl:   •  B COMPLEX VITAMINS ER PO, Take  by mouth Daily., Disp: , Rfl:   •  Cholecalciferol (VITAMIN D3) 5000 units capsule capsule, Take 5,000 Units by mouth Daily., Disp: , Rfl:   •  Colestipol HCl (COLESTID PO), Take 1 tablet by mouth Daily., Disp: , Rfl:   •  esomeprazole (NexIUM) 10 MG packet, Take 10 mg by mouth Daily., Disp: , Rfl:   •  estradiol (ESTRACE) 1 MG tablet, Take 1 mg by mouth Daily., Disp: , Rfl:   •  ferrous sulfate 325 (65 FE) MG tablet, Take 1 tablet by mouth Daily With  Breakfast., Disp: 30 tablet, Rfl: 5  •  HYDROcodone Bit-Homatrop MBr (HYCODAN) 5-1.5 MG/5ML solution, Take 5 mL by mouth Every 6 (Six) Hours As Needed for Cough., Disp: 473 mL, Rfl: 0  •  lidocaine-prilocaine (EMLA) 2.5-2.5 % cream, Apply 1 application topically to the appropriate area as directed Every 2 (Two) Hours As Needed for Mild Pain., Disp: 5 g, Rfl: 3  •  Multiple Vitamins-Minerals (PRESERVISION AREDS 2+MULTI VIT PO), Take  by mouth., Disp: , Rfl:   •  ondansetron (ZOFRAN) 8 MG tablet, Take 1 tablet by mouth 3 (Three) Times a Day As Needed for Nausea or Vomiting., Disp: 30 tablet, Rfl: 5  •  predniSONE (DELTASONE) 10 MG tablet, Take 2 tablets by mouth Daily., Disp: 50 tablet, Rfl: 1       LABS (Reviewed):  Hematology WBC   Date Value Ref Range Status   10/11/2022 8.05 3.40 - 10.80 10*3/mm3 Final     RBC   Date Value Ref Range Status   10/11/2022 3.71 (L) 3.77 - 5.28 10*6/mm3 Final     Hemoglobin   Date Value Ref Range Status   10/11/2022 9.5 (L) 12.0 - 15.9 g/dL Final     Hematocrit   Date Value Ref Range Status   10/11/2022 30.1 (L) 34.0 - 46.6 % Final     Platelets   Date Value Ref Range Status   10/11/2022 266 140 - 450 10*3/mm3 Final      Chemistry   Lab Results   Component Value Date    GLUCOSE 108 10/11/2022    BUN 19 10/11/2022    CREATININE 0.87 10/11/2022    BCR 21.8 10/11/2022    K 4.2 10/11/2022    CO2 23.9 10/11/2022    CALCIUM 9.6 10/11/2022    ALBUMIN 3.30 (L) 10/11/2022    AST 16 10/11/2022    ALT 19 10/11/2022         Physical Exam:         Pulmonary: [] Cough:     [] Dyspnea:  Neck:  [] Lymphadenopathy  Lungs:  [x] Clear to auscultation  CVS:  [x] Regular rate & rhythm  Skin:  stGstrstastdstest:st st1st GI:  [] Dysphagia:     [x] Esophagitis: none    Comments/Notes:     [x] Review of labs, images, dosimetry, dose delivered, & treatment parameters.    Comments:     [x] Patient treatment setup reviewed.    Comments:     Recommendations: continue XRT and supportive measures  Doing well.    [x] Continue RT  []  Change RT Plan [] Hold RT, length:        Approved Electronically By:  Hector Rodriguez MD, 10/13/2022, 11:22 EDT          Confidentiality of this medical record shall be maintained except when use or disclosure is required or permitted by law, regulation or written authorization by the patient.

## 2022-10-13 NOTE — TELEPHONE ENCOUNTER
Caller: Pam Vidal    Relationship: Self    Best call back number: [phone]    Requested Prescriptions:   Requested Prescriptions     Pending Prescriptions Disp Refills   • HYDROcodone Bit-Homatrop MBr (HYCODAN) 5-1.5 MG/5ML solution 473 mL 0     Sig: Take 5 mL by mouth Every 6 (Six) Hours As Needed for Cough.        Pharmacy where request should be sent: [facility] [id number] [facility], [place] - [address] [place] [address] - [phone] [facility] [phone] FX     PLEASE CALL PT TO LET HER KNOW ITS BEEN SENT.      Does the patient have less than a 3 day supply:  [x] Yes  [] No

## 2022-10-13 NOTE — TELEPHONE ENCOUNTER
Patient made aware that her medication script has been received by pharmacy and it would cost her $35, she doesn't need a lesser dose, advised to call the pharmacy to know the time for  of the Hycodan.

## 2022-10-13 NOTE — TELEPHONE ENCOUNTER
Called Patient's Pharmacy, per Fahad (Pharmacist) they have received script and they will be filling the medication for 240ml $35, if patient would need lesser dose patient has to call her pharmacy. Tried to call patient but no answer and cannot LVM.

## 2022-10-14 ENCOUNTER — TREATMENT (OUTPATIENT)
Dept: RADIATION ONCOLOGY | Facility: HOSPITAL | Age: 77
End: 2022-10-14

## 2022-10-14 LAB
RAD ONC ARIA COURSE ID: NORMAL
RAD ONC ARIA COURSE INTENT: NORMAL
RAD ONC ARIA COURSE LAST TREATMENT DATE: NORMAL
RAD ONC ARIA COURSE START DATE: NORMAL
RAD ONC ARIA COURSE TREATMENT ELAPSED DAYS: 24
RAD ONC ARIA FIRST TREATMENT DATE: NORMAL
RAD ONC ARIA PLAN FRACTIONS TREATED TO DATE: 19
RAD ONC ARIA PLAN ID: NORMAL
RAD ONC ARIA PLAN PRESCRIBED DOSE PER FRACTION: 2 GY
RAD ONC ARIA PLAN PRIMARY REFERENCE POINT: NORMAL
RAD ONC ARIA PLAN TOTAL FRACTIONS PRESCRIBED: 30
RAD ONC ARIA PLAN TOTAL PRESCRIBED DOSE: 6000 CGY
RAD ONC ARIA REFERENCE POINT DOSAGE GIVEN TO DATE: 38 GY
RAD ONC ARIA REFERENCE POINT DOSAGE GIVEN TO DATE: 39.39 GY
RAD ONC ARIA REFERENCE POINT ID: NORMAL
RAD ONC ARIA REFERENCE POINT ID: NORMAL
RAD ONC ARIA REFERENCE POINT SESSION DOSAGE GIVEN: 2 GY
RAD ONC ARIA REFERENCE POINT SESSION DOSAGE GIVEN: 2.07 GY

## 2022-10-14 PROCEDURE — 77014 CHG CT GUIDANCE RADIATION THERAPY FLDS PLACEMENT: CPT | Performed by: RADIOLOGY

## 2022-10-14 PROCEDURE — 77386: CPT | Performed by: STUDENT IN AN ORGANIZED HEALTH CARE EDUCATION/TRAINING PROGRAM

## 2022-10-14 PROCEDURE — 77386 CHG INTENSITY MODULATED RADIATION TX DLVR COMPLEX: CPT | Performed by: STUDENT IN AN ORGANIZED HEALTH CARE EDUCATION/TRAINING PROGRAM

## 2022-10-17 ENCOUNTER — LAB (OUTPATIENT)
Dept: LAB | Facility: HOSPITAL | Age: 77
End: 2022-10-17

## 2022-10-17 ENCOUNTER — TREATMENT (OUTPATIENT)
Dept: RADIATION ONCOLOGY | Facility: HOSPITAL | Age: 77
End: 2022-10-17

## 2022-10-17 ENCOUNTER — OFFICE VISIT (OUTPATIENT)
Dept: ONCOLOGY | Facility: CLINIC | Age: 77
End: 2022-10-17

## 2022-10-17 VITALS
SYSTOLIC BLOOD PRESSURE: 150 MMHG | BODY MASS INDEX: 24.84 KG/M2 | HEART RATE: 98 BPM | OXYGEN SATURATION: 99 % | DIASTOLIC BLOOD PRESSURE: 70 MMHG | TEMPERATURE: 97.1 F | WEIGHT: 126.5 LBS | HEIGHT: 60 IN

## 2022-10-17 DIAGNOSIS — C34.91 PRIMARY LUNG ADENOCARCINOMA, RIGHT: ICD-10-CM

## 2022-10-17 DIAGNOSIS — C34.91 PRIMARY LUNG ADENOCARCINOMA, RIGHT: Primary | ICD-10-CM

## 2022-10-17 LAB
ALBUMIN SERPL-MCNC: 3.5 G/DL (ref 3.5–5.2)
ALBUMIN/GLOB SERPL: 1.1 G/DL (ref 1.1–2.4)
ALP SERPL-CCNC: 92 U/L (ref 38–116)
ALT SERPL W P-5'-P-CCNC: 29 U/L (ref 0–33)
ANION GAP SERPL CALCULATED.3IONS-SCNC: 12.6 MMOL/L (ref 5–15)
AST SERPL-CCNC: 16 U/L (ref 0–32)
BASOPHILS # BLD AUTO: 0.04 10*3/MM3 (ref 0–0.2)
BASOPHILS NFR BLD AUTO: 0.5 % (ref 0–1.5)
BILIRUB SERPL-MCNC: 0.2 MG/DL (ref 0.2–1.2)
BUN SERPL-MCNC: 18 MG/DL (ref 6–20)
BUN/CREAT SERPL: 16.2 (ref 7.3–30)
CALCIUM SPEC-SCNC: 9.9 MG/DL (ref 8.5–10.2)
CHLORIDE SERPL-SCNC: 100 MMOL/L (ref 98–107)
CO2 SERPL-SCNC: 25.4 MMOL/L (ref 22–29)
CREAT SERPL-MCNC: 1.11 MG/DL (ref 0.6–1.1)
DEPRECATED RDW RBC AUTO: 55.8 FL (ref 37–54)
EGFRCR SERPLBLD CKD-EPI 2021: 51.9 ML/MIN/1.73
EOSINOPHIL # BLD AUTO: 0.11 10*3/MM3 (ref 0–0.4)
EOSINOPHIL NFR BLD AUTO: 1.4 % (ref 0.3–6.2)
ERYTHROCYTE [DISTWIDTH] IN BLOOD BY AUTOMATED COUNT: 20.6 % (ref 12.3–15.4)
GLOBULIN UR ELPH-MCNC: 3.3 GM/DL (ref 1.8–3.5)
GLUCOSE SERPL-MCNC: 113 MG/DL (ref 74–124)
HCT VFR BLD AUTO: 31.1 % (ref 34–46.6)
HGB BLD-MCNC: 9.8 G/DL (ref 12–15.9)
IMM GRANULOCYTES # BLD AUTO: 0.06 10*3/MM3 (ref 0–0.05)
IMM GRANULOCYTES NFR BLD AUTO: 0.8 % (ref 0–0.5)
LYMPHOCYTES # BLD AUTO: 0.27 10*3/MM3 (ref 0.7–3.1)
LYMPHOCYTES NFR BLD AUTO: 3.4 % (ref 19.6–45.3)
MCH RBC QN AUTO: 25.9 PG (ref 26.6–33)
MCHC RBC AUTO-ENTMCNC: 31.5 G/DL (ref 31.5–35.7)
MCV RBC AUTO: 82.3 FL (ref 79–97)
MONOCYTES # BLD AUTO: 0.42 10*3/MM3 (ref 0.1–0.9)
MONOCYTES NFR BLD AUTO: 5.3 % (ref 5–12)
NEUTROPHILS NFR BLD AUTO: 7.08 10*3/MM3 (ref 1.7–7)
NEUTROPHILS NFR BLD AUTO: 88.6 % (ref 42.7–76)
NRBC BLD AUTO-RTO: 0 /100 WBC (ref 0–0.2)
PLATELET # BLD AUTO: 227 10*3/MM3 (ref 140–450)
PMV BLD AUTO: 8.8 FL (ref 6–12)
POTASSIUM SERPL-SCNC: 4.4 MMOL/L (ref 3.5–4.7)
PROT SERPL-MCNC: 6.8 G/DL (ref 6.3–8)
RAD ONC ARIA COURSE ID: NORMAL
RAD ONC ARIA COURSE INTENT: NORMAL
RAD ONC ARIA COURSE LAST TREATMENT DATE: NORMAL
RAD ONC ARIA COURSE START DATE: NORMAL
RAD ONC ARIA COURSE TREATMENT ELAPSED DAYS: 27
RAD ONC ARIA FIRST TREATMENT DATE: NORMAL
RAD ONC ARIA PLAN FRACTIONS TREATED TO DATE: 20
RAD ONC ARIA PLAN ID: NORMAL
RAD ONC ARIA PLAN PRESCRIBED DOSE PER FRACTION: 2 GY
RAD ONC ARIA PLAN PRIMARY REFERENCE POINT: NORMAL
RAD ONC ARIA PLAN TOTAL FRACTIONS PRESCRIBED: 30
RAD ONC ARIA PLAN TOTAL PRESCRIBED DOSE: 6000 CGY
RAD ONC ARIA REFERENCE POINT DOSAGE GIVEN TO DATE: 40 GY
RAD ONC ARIA REFERENCE POINT DOSAGE GIVEN TO DATE: 41.47 GY
RAD ONC ARIA REFERENCE POINT ID: NORMAL
RAD ONC ARIA REFERENCE POINT ID: NORMAL
RAD ONC ARIA REFERENCE POINT SESSION DOSAGE GIVEN: 2 GY
RAD ONC ARIA REFERENCE POINT SESSION DOSAGE GIVEN: 2.07 GY
RBC # BLD AUTO: 3.78 10*6/MM3 (ref 3.77–5.28)
SODIUM SERPL-SCNC: 138 MMOL/L (ref 134–145)
WBC NRBC COR # BLD: 7.98 10*3/MM3 (ref 3.4–10.8)

## 2022-10-17 PROCEDURE — 99214 OFFICE O/P EST MOD 30 MIN: CPT | Performed by: INTERNAL MEDICINE

## 2022-10-17 PROCEDURE — 77014 CHG CT GUIDANCE RADIATION THERAPY FLDS PLACEMENT: CPT | Performed by: RADIOLOGY

## 2022-10-17 PROCEDURE — 85025 COMPLETE CBC W/AUTO DIFF WBC: CPT

## 2022-10-17 PROCEDURE — 77386: CPT | Performed by: RADIOLOGY

## 2022-10-17 PROCEDURE — 36415 COLL VENOUS BLD VENIPUNCTURE: CPT

## 2022-10-17 PROCEDURE — 77386 CHG INTENSITY MODULATED RADIATION TX DLVR COMPLEX: CPT | Performed by: RADIOLOGY

## 2022-10-17 PROCEDURE — 80053 COMPREHEN METABOLIC PANEL: CPT

## 2022-10-17 RX ORDER — DIPHENHYDRAMINE HYDROCHLORIDE 50 MG/ML
50 INJECTION INTRAMUSCULAR; INTRAVENOUS AS NEEDED
Status: CANCELLED | OUTPATIENT
Start: 2022-10-18

## 2022-10-17 RX ORDER — DIPHENHYDRAMINE HYDROCHLORIDE 50 MG/ML
50 INJECTION INTRAMUSCULAR; INTRAVENOUS AS NEEDED
Status: CANCELLED | OUTPATIENT
Start: 2022-10-25

## 2022-10-17 RX ORDER — SODIUM CHLORIDE 9 MG/ML
250 INJECTION, SOLUTION INTRAVENOUS ONCE
Status: CANCELLED | OUTPATIENT
Start: 2022-10-25

## 2022-10-17 RX ORDER — FAMOTIDINE 10 MG/ML
20 INJECTION, SOLUTION INTRAVENOUS AS NEEDED
Status: CANCELLED | OUTPATIENT
Start: 2022-10-25

## 2022-10-17 RX ORDER — FAMOTIDINE 10 MG/ML
20 INJECTION, SOLUTION INTRAVENOUS ONCE
Status: CANCELLED | OUTPATIENT
Start: 2022-10-18

## 2022-10-17 RX ORDER — FAMOTIDINE 10 MG/ML
20 INJECTION, SOLUTION INTRAVENOUS ONCE
Status: CANCELLED | OUTPATIENT
Start: 2022-10-25

## 2022-10-17 RX ORDER — PALONOSETRON 0.05 MG/ML
0.25 INJECTION, SOLUTION INTRAVENOUS ONCE
Status: CANCELLED | OUTPATIENT
Start: 2022-10-25

## 2022-10-17 RX ORDER — SODIUM CHLORIDE 9 MG/ML
250 INJECTION, SOLUTION INTRAVENOUS ONCE
Status: CANCELLED | OUTPATIENT
Start: 2022-10-18

## 2022-10-17 RX ORDER — PALONOSETRON 0.05 MG/ML
0.25 INJECTION, SOLUTION INTRAVENOUS ONCE
Status: CANCELLED | OUTPATIENT
Start: 2022-10-18

## 2022-10-17 RX ORDER — FAMOTIDINE 10 MG/ML
20 INJECTION, SOLUTION INTRAVENOUS AS NEEDED
Status: CANCELLED | OUTPATIENT
Start: 2022-10-18

## 2022-10-17 NOTE — PROGRESS NOTES
Subjective     REASON FOR FOLLOW UP:   1.  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  PD-L1 positive greater than 95%  3.  Plan for primary chemoradiation with weekly carboplatin and Taxol to be followed by maintenance immunotherapy for 1 year.    HISTORY OF PRESENT ILLNESS:  The patient is a 77 y.o. year old female who is here for an opinion about the above issue.  She is a former smoker referred to us now from thoracic surgery (Dr. Remedios Rice) for evaluation and treatment of clinical stage III adenocarcinoma of the lung.  She was having persistent cough and underwent chest x-ray initially in late June which showed possible right upper lobe mass.  This was followed by CT scan of the chest confirming the presence of a right upper lobe mass.  She initially underwent a CT-guided needle biopsy on 7/22/2022 which was nondiagnostic.    She was referred to Dr. Glass for bronchoscopy and biopsy which was performed on 8/23/2022 with pathology returning as poorly differentiated adenocarcinoma.  PD-L1 stain on the tissue was positive at greater than 95%.    She underwent further staging with PET scan and MRI of the brain.  PET scan was performed on 8/12/2022 showing hypermetabolic activity in the large mass in the right upper lobe as well as mediastinal lymph nodes.  She was noted to have a mass on the kidney as well but this was not hypermetabolic.  There was no obvious distant metastatic disease.    MRI of the brain from 8/15/2022 likewise showed no evidence of metastatic disease.  She is scheduled also to see Dr. Hector Rodriguez and radiation oncology on 9/7/2022.    We recommended weekly carboplatin and Taxol concurrent with radiation therapy.  Following completion of treatment she will receive immunotherapy for maintenance   (She does have a diagnosis of rheumatoid arthritis and is currently taking only Celebrex.  This could become an issue regarding her ability to tolerate immunotherapy in the future).    INTERVAL  HISTORY:  She returns today scheduled for week #5 carboplatin and Taxol with concurrent radiation delivered by Dr. Rodriguez.  She has received 20 of a planned 30 radiation fractions thus far.  On her last visit to the office she was started on some oral iron to see if her anemia could improve.  Her hemoglobin today was essentially stable at 9.8 g/dL.  Her white count and platelet count are within normal limits.    History of Present Illness     Past Medical History:   Diagnosis Date   • COPD (chronic obstructive pulmonary disease) (HCC)    • Coughing up blood     X2 MONTHS   • History of COVID-19 02/2022   • Hyperlipidemia    • IBS (irritable bowel syndrome)    • Primary lung adenocarcinoma, right (HCC)    • Rheumatoid arthritis (HCC)         Past Surgical History:   Procedure Laterality Date   • APPENDECTOMY      appendix removed   • BRONCHOSCOPY N/A 8/23/2022    Procedure: BRONCHOSCOPY WITH BAL,  BIOPSIES, AND BRUSHINGS WITH ENDOBRONCHIAL ULTRASOUND WITH FNA;  Surgeon: Gregor Vee MD;  Location: [facility] ENDOSCOPY;  Service: Pulmonary;  Laterality: N/A;  PRE- HILAR MASS  POST- SAME   • CAROTID ENDARTERECTOMY Right    • CAROTID ENDARTERECTOMY Left    • CATARACT EXTRACTION     • CERVICAL FUSION     • CHOLECYSTECTOMY     • HYSTERECTOMY     • SHOULDER ARTHROSCOPY Right 02/19/2019    right should scope/cuff repair    • VENOUS ACCESS DEVICE (PORT) INSERTION Right 9/6/2022    Procedure: POWERPORT INSERTION;  Surgeon: Remedios Rice MD;  Location: [facility] OR;  Service: Thoracic;  Laterality: Right;        Current Outpatient Medications on File Prior to Visit   Medication Sig Dispense Refill   • azelastine (ASTELIN) 0.1 % nasal spray 1 spray into the nostril(s) as directed by provider.     • B COMPLEX VITAMINS ER PO Take  by mouth Daily.     • Cholecalciferol (VITAMIN D3) 5000 units capsule capsule Take 5,000 Units by mouth Daily.     • Colestipol HCl (COLESTID PO) Take 1 tablet by mouth Daily.     • esomeprazole  (NexIUM) 10 MG packet Take 10 mg by mouth Daily.     • estradiol (ESTRACE) 1 MG tablet Take 1 mg by mouth Daily.     • ferrous sulfate 325 (65 FE) MG tablet Take 1 tablet by mouth Daily With Breakfast. 30 tablet 5   • HYDROcodone Bit-Homatrop MBr (HYCODAN) 5-1.5 MG/5ML solution Take 5 mL by mouth Every 6 (Six) Hours As Needed for Cough. 473 mL 0   • lidocaine-prilocaine (EMLA) 2.5-2.5 % cream Apply 1 application topically to the appropriate area as directed Every 2 (Two) Hours As Needed for Mild Pain. 5 g 3   • Multiple Vitamins-Minerals (PRESERVISION AREDS 2+MULTI VIT PO) Take  by mouth.     • ondansetron (ZOFRAN) 8 MG tablet Take 1 tablet by mouth 3 (Three) Times a Day As Needed for Nausea or Vomiting. 30 tablet 5   • predniSONE (DELTASONE) 10 MG tablet Take 2 tablets by mouth Daily. 50 tablet 1     No current facility-administered medications on file prior to visit.        ALLERGIES:    Allergies   Allergen Reactions   • Metronidazole Hives   • Ofloxacin Hives and Nausea Only   • Del-Mycin [Erythromycin] Hives   • Gentamicin Hives   • Ibuprofen Nausea And Vomiting   • Latex Dermatitis     GLOVES   • Penicillins Hives   • Tetracycline Hives   • Adhesive Tape Dermatitis     BANDAIDS   • Aspirin GI Intolerance     Vomiting can take EC   • Codeine Nausea And Vomiting        Social History     Socioeconomic History   • Marital status:    Tobacco Use   • Smoking status: Former     Types: Cigarettes     Quit date: 2022     Years since quittin.4   • Smokeless tobacco: Never   • Tobacco comments:     Occasionally smoking 1-2 cigs   Vaping Use   • Vaping Use: Never used   Substance and Sexual Activity   • Alcohol use: Yes     Alcohol/week: 1.0 standard drink     Types: 1 Glasses of wine per week     Comment: occasionally   • Drug use: Never   • Sexual activity: Defer        Family History   Problem Relation Age of Onset   • Cancer Mother    • Cancer Father    • Malig Hyperthermia Neg Hx         Review of  "Systems   Constitutional: Negative for activity change, chills, fatigue and fever.   HENT: Negative for mouth sores, trouble swallowing and voice change.    Eyes: Negative for pain and visual disturbance.   Respiratory: Positive for cough and shortness of breath. Negative for wheezing.         Hemoptysis of bright red blood.   Cardiovascular: Negative for chest pain and palpitations.   Gastrointestinal: Negative for abdominal pain, constipation, diarrhea, nausea and vomiting.   Genitourinary: Negative for difficulty urinating, frequency and urgency.   Musculoskeletal: Negative for arthralgias and joint swelling.   Skin: Negative for rash.   Neurological: Negative for dizziness, seizures, weakness and headaches.   Hematological: Negative for adenopathy. Does not bruise/bleed easily.   Psychiatric/Behavioral: Negative for behavioral problems and confusion. The patient is not nervous/anxious.     10/17/2022 unchanged from above     Objective     Vitals:    10/17/22 0808   Temp: 97.1 °F (36.2 °C)   TempSrc: Temporal   Weight: 57.4 kg (126 lb 8 oz)   Height: 152.4 cm (60\")     Current Status 10/11/2022   ECOG score 0       Physical Exam  Vitals reviewed.   Constitutional:       General: She is not in acute distress.     Appearance: Normal appearance. She is well-developed.   HENT:      Head: Normocephalic and atraumatic.      Mouth/Throat:      Pharynx: No oropharyngeal exudate.   Eyes:      Pupils: Pupils are equal, round, and reactive to light.   Cardiovascular:      Rate and Rhythm: Normal rate and regular rhythm.      Heart sounds: Normal heart sounds. No murmur heard.  Pulmonary:      Effort: Pulmonary effort is normal. No respiratory distress.      Breath sounds: Decreased breath sounds present. No wheezing, rhonchi or rales.   Abdominal:      General: Bowel sounds are normal. There is no distension.      Palpations: Abdomen is soft.   Musculoskeletal:         General: Normal range of motion.      Cervical back: " Normal range of motion.   Skin:     General: Skin is warm and dry.      Findings: No rash.   Neurological:      Mental Status: She is alert and oriented to person, place, and time.      I have reexamined the patient and the results are consistent with the previously documented exam. Cruzito Lagunas MD        RECENT LABS:  Hematology WBC   Date Value Ref Range Status   10/17/2022 7.98 3.40 - 10.80 10*3/mm3 Final     RBC   Date Value Ref Range Status   10/17/2022 3.78 3.77 - 5.28 10*6/mm3 Final     Hemoglobin   Date Value Ref Range Status   10/17/2022 9.8 (L) 12.0 - 15.9 g/dL Final     Hematocrit   Date Value Ref Range Status   10/17/2022 31.1 (L) 34.0 - 46.6 % Final     Platelets   Date Value Ref Range Status   10/17/2022 227 140 - 450 10*3/mm3 Final        Lab Results   Component Value Date    GLUCOSE 108 10/11/2022    BUN 19 10/11/2022    CREATININE 0.87 10/11/2022    BCR 21.8 10/11/2022    K 4.2 10/11/2022    CO2 23.9 10/11/2022    CALCIUM 9.6 10/11/2022    ALBUMIN 3.30 (L) 10/11/2022    AST 16 10/11/2022    ALT 19 10/11/2022     Lab Results   Component Value Date    IRON 19 (L) 09/09/2022    TIBC 266 09/09/2022    FERRITIN 147.10 09/09/2022   SAT 7%      BRONCHOSCOPY PATHOLOGY 8/23/2022  Final Diagnosis   1. Lung, Right Upper Lobe, Endobronchial Biopsies: INVASIVE POORLY DIFFERENTIATED ADENOCARCINOMA OF PULMONARY ORIGIN.   PDL-1 POSITIVE >95%    F-18 FDG PET FROM SKULL BASE TO MID THIGH WITH PET/CT FUSION 8/12/2022  IMPRESSION:  1.  Large mass centered within the right upper lobe representing  patient's known malignancy. Interlobular septal thickening and ground  glass opacification projecting from the periphery of the mass throughout  the right upper lobe is highly concerning for lymphangitic spread. Of  note, the mass abuts the pleura and mediastinum over a long segment and  underlying invasion cannot be excluded.  2.  FDG avid hilar and mediastinal adenopathy concerning for metastatic  disease.  3.  A few  irregular subcentimeter pulmonary nodules are present within  the right lower and left upper lobes measuring up to 0.9 cm which while  nonspecific raises concern for metastatic disease. At least close  attention on followup is recommended.   4.  Minimally hypermetabolic arnoldo hepatic node and bilateral sub-6 mm  pulmonary nodules are indeterminate. Continued followup with chest and  abdominal CT in 3 months is recommended.  5.  Indeterminate density 4.4 cm mass arises off the right kidney.  Further evaluation with multiphase CT or MRI of the abdomen with and  without contrast is recommended to exclude neoplasm.  6.  Focal uptake over the right shoulder in the area of prior rotator  cuff repair is favored be postsurgical with metastatic disease less  likely.  7.  Other findings as above.    MRI OF THE BRAIN WITH AND WITHOUT CONTRAST 08/15/2022   IMPRESSION:  1. There is mild-to-moderate small vessel disease in cerebral and  central pontine white matter. Otherwise, the MRI of the brain is normal  with no evidence of metastatic disease to the brain.   2. There are some nonspecific signal abnormality in the C4 cervical  vertebra with T1 low signal throughout the visualized portions C4  cervical vertebrae on the sagittal images. The patient has had prior  cervical spine surgery as demonstrated on prior PET scan and this argues  in favor that the T1 low signal and C4 vertebrae is related to sclerosis  from degenerative disc changes at C4-5 although an osseous metastatic  lesion in the C4 vertebra cannot be excluded on the basis of this exam.  Apparently the C4 vertebra was not hot on the recent PET scan which is  further evidence that this is degenerative in origin and correlation  with recent PET scan is suggested.      Assessment & Plan   · 1.  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  Patient is not felt to be a surgical candidate and would best be treated with combined chemotherapy and radiation.  She is to  see Dr. Rodriguez of radiation oncology on 9/7/2022.  We discussed with her the chemotherapy with weekly CarboTaxol concurrent with radiation.  She received her first cycle of treatment in the office on 9/20/2022.  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  · 9/20/2022 1st dose of weekly carboplatin/taxol  2.  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  3.  Other comorbidities including vascular disease with previous carotid endarterectomy surgeries.  She has no known history of stroke or myocardial infarction.  Overall her performance status is very good.  4.  Hemoptysis related to her central tumor.  Improved with initiation of therapy.  5.  Rheumatoid arthritis: Patient previously on Celebrex, but discontinued due to hemoptysis.  Patient started on prednisone 20 mg daily prescribed to manage her arthritis pain.      Recommendations  1. Proceed with cycle 5 weekly carboplatin/Taxol tomorrow on 10/18/2022.  2. Continue radiation therapy under the care of Dr. Rodriguez.  3. Return in 1 week and 2 weeks for cycles 6 and possibly cycle #7  4. Continue prednisone to 10 mg daily for rheumatoid arthritis.  5. Nurse practitioner appointment with her lab and treatment in 1 week.  6. MD appointment with her labs and possible treatment in 2 weeks.  We will decide at that point going to pursue posttreatment imaging.  7. For now we will continue iron supplementation with ferrous sulfate 325 mg p.o. once daily with food.             Cruzito Lagunas MD   10/17/2022

## 2022-10-18 ENCOUNTER — TREATMENT (OUTPATIENT)
Dept: RADIATION ONCOLOGY | Facility: HOSPITAL | Age: 77
End: 2022-10-18

## 2022-10-18 ENCOUNTER — INFUSION (OUTPATIENT)
Dept: ONCOLOGY | Facility: HOSPITAL | Age: 77
End: 2022-10-18

## 2022-10-18 ENCOUNTER — APPOINTMENT (OUTPATIENT)
Dept: ONCOLOGY | Facility: HOSPITAL | Age: 77
End: 2022-10-18

## 2022-10-18 VITALS
OXYGEN SATURATION: 96 % | WEIGHT: 125.8 LBS | SYSTOLIC BLOOD PRESSURE: 133 MMHG | TEMPERATURE: 98.4 F | DIASTOLIC BLOOD PRESSURE: 81 MMHG | BODY MASS INDEX: 24.57 KG/M2 | RESPIRATION RATE: 16 BRPM | HEART RATE: 101 BPM

## 2022-10-18 DIAGNOSIS — C34.91 PRIMARY LUNG ADENOCARCINOMA, RIGHT: Primary | ICD-10-CM

## 2022-10-18 LAB
RAD ONC ARIA COURSE ID: NORMAL
RAD ONC ARIA COURSE INTENT: NORMAL
RAD ONC ARIA COURSE LAST TREATMENT DATE: NORMAL
RAD ONC ARIA COURSE START DATE: NORMAL
RAD ONC ARIA COURSE TREATMENT ELAPSED DAYS: 28
RAD ONC ARIA FIRST TREATMENT DATE: NORMAL
RAD ONC ARIA PLAN FRACTIONS TREATED TO DATE: 21
RAD ONC ARIA PLAN ID: NORMAL
RAD ONC ARIA PLAN PRESCRIBED DOSE PER FRACTION: 2 GY
RAD ONC ARIA PLAN PRIMARY REFERENCE POINT: NORMAL
RAD ONC ARIA PLAN TOTAL FRACTIONS PRESCRIBED: 30
RAD ONC ARIA PLAN TOTAL PRESCRIBED DOSE: 6000 CGY
RAD ONC ARIA REFERENCE POINT DOSAGE GIVEN TO DATE: 42 GY
RAD ONC ARIA REFERENCE POINT DOSAGE GIVEN TO DATE: 43.54 GY
RAD ONC ARIA REFERENCE POINT ID: NORMAL
RAD ONC ARIA REFERENCE POINT ID: NORMAL
RAD ONC ARIA REFERENCE POINT SESSION DOSAGE GIVEN: 2 GY
RAD ONC ARIA REFERENCE POINT SESSION DOSAGE GIVEN: 2.07 GY

## 2022-10-18 PROCEDURE — 25010000002 DIPHENHYDRAMINE PER 50 MG: Performed by: INTERNAL MEDICINE

## 2022-10-18 PROCEDURE — 77427 RADIATION TX MANAGEMENT X5: CPT | Performed by: STUDENT IN AN ORGANIZED HEALTH CARE EDUCATION/TRAINING PROGRAM

## 2022-10-18 PROCEDURE — 96417 CHEMO IV INFUS EACH ADDL SEQ: CPT

## 2022-10-18 PROCEDURE — 96375 TX/PRO/DX INJ NEW DRUG ADDON: CPT

## 2022-10-18 PROCEDURE — 77014 CHG CT GUIDANCE RADIATION THERAPY FLDS PLACEMENT: CPT | Performed by: RADIOLOGY

## 2022-10-18 PROCEDURE — 25010000002 DEXAMETHASONE SODIUM PHOSPHATE 100 MG/10ML SOLUTION: Performed by: INTERNAL MEDICINE

## 2022-10-18 PROCEDURE — 96413 CHEMO IV INFUSION 1 HR: CPT

## 2022-10-18 PROCEDURE — 25010000002 HEPARIN LOCK FLUSH PER 10 UNITS: Performed by: INTERNAL MEDICINE

## 2022-10-18 PROCEDURE — 77386 CHG INTENSITY MODULATED RADIATION TX DLVR COMPLEX: CPT | Performed by: STUDENT IN AN ORGANIZED HEALTH CARE EDUCATION/TRAINING PROGRAM

## 2022-10-18 PROCEDURE — 25010000002 CARBOPLATIN PER 50 MG: Performed by: INTERNAL MEDICINE

## 2022-10-18 PROCEDURE — 77014 CHG CT GUIDANCE RADIATION THERAPY FLDS PLACEMENT: CPT | Performed by: STUDENT IN AN ORGANIZED HEALTH CARE EDUCATION/TRAINING PROGRAM

## 2022-10-18 PROCEDURE — 25010000002 PACLITAXEL PER 1 MG: Performed by: INTERNAL MEDICINE

## 2022-10-18 PROCEDURE — 25010000002 PALONOSETRON PER 25 MCG: Performed by: INTERNAL MEDICINE

## 2022-10-18 PROCEDURE — 77386: CPT | Performed by: STUDENT IN AN ORGANIZED HEALTH CARE EDUCATION/TRAINING PROGRAM

## 2022-10-18 RX ORDER — SODIUM CHLORIDE 0.9 % (FLUSH) 0.9 %
10 SYRINGE (ML) INJECTION AS NEEDED
Status: CANCELLED | OUTPATIENT
Start: 2022-10-18

## 2022-10-18 RX ORDER — SODIUM CHLORIDE 9 MG/ML
250 INJECTION, SOLUTION INTRAVENOUS ONCE
Status: COMPLETED | OUTPATIENT
Start: 2022-10-18 | End: 2022-10-18

## 2022-10-18 RX ORDER — HEPARIN SODIUM (PORCINE) LOCK FLUSH IV SOLN 100 UNIT/ML 100 UNIT/ML
500 SOLUTION INTRAVENOUS AS NEEDED
Status: DISCONTINUED | OUTPATIENT
Start: 2022-10-18 | End: 2022-10-18 | Stop reason: HOSPADM

## 2022-10-18 RX ORDER — FAMOTIDINE 10 MG/ML
20 INJECTION, SOLUTION INTRAVENOUS ONCE
Status: COMPLETED | OUTPATIENT
Start: 2022-10-18 | End: 2022-10-18

## 2022-10-18 RX ORDER — PALONOSETRON 0.05 MG/ML
0.25 INJECTION, SOLUTION INTRAVENOUS ONCE
Status: COMPLETED | OUTPATIENT
Start: 2022-10-18 | End: 2022-10-18

## 2022-10-18 RX ORDER — HEPARIN SODIUM (PORCINE) LOCK FLUSH IV SOLN 100 UNIT/ML 100 UNIT/ML
500 SOLUTION INTRAVENOUS AS NEEDED
Status: CANCELLED | OUTPATIENT
Start: 2022-10-18

## 2022-10-18 RX ORDER — SODIUM CHLORIDE 0.9 % (FLUSH) 0.9 %
10 SYRINGE (ML) INJECTION AS NEEDED
Status: DISCONTINUED | OUTPATIENT
Start: 2022-10-18 | End: 2022-10-18 | Stop reason: HOSPADM

## 2022-10-18 RX ADMIN — FAMOTIDINE 20 MG: 10 INJECTION INTRAVENOUS at 11:24

## 2022-10-18 RX ADMIN — PACLITAXEL 75 MG: 6 INJECTION, SOLUTION INTRAVENOUS at 12:27

## 2022-10-18 RX ADMIN — CARBOPLATIN 130 MG: 10 INJECTION INTRAVENOUS at 13:34

## 2022-10-18 RX ADMIN — DIPHENHYDRAMINE HYDROCHLORIDE 25 MG: 50 INJECTION, SOLUTION INTRAMUSCULAR; INTRAVENOUS at 11:39

## 2022-10-18 RX ADMIN — Medication 500 UNITS: at 14:06

## 2022-10-18 RX ADMIN — PALONOSETRON 0.25 MG: 0.05 INJECTION, SOLUTION INTRAVENOUS at 11:23

## 2022-10-18 RX ADMIN — DEXAMETHASONE SODIUM PHOSPHATE 12 MG: 10 INJECTION, SOLUTION INTRAMUSCULAR; INTRAVENOUS at 11:23

## 2022-10-18 RX ADMIN — SODIUM CHLORIDE 250 ML: 9 INJECTION, SOLUTION INTRAVENOUS at 11:23

## 2022-10-18 RX ADMIN — Medication 10 ML: at 14:06

## 2022-10-19 ENCOUNTER — TREATMENT (OUTPATIENT)
Dept: RADIATION ONCOLOGY | Facility: HOSPITAL | Age: 77
End: 2022-10-19

## 2022-10-19 LAB
RAD ONC ARIA COURSE ID: NORMAL
RAD ONC ARIA COURSE INTENT: NORMAL
RAD ONC ARIA COURSE LAST TREATMENT DATE: NORMAL
RAD ONC ARIA COURSE START DATE: NORMAL
RAD ONC ARIA COURSE TREATMENT ELAPSED DAYS: 29
RAD ONC ARIA FIRST TREATMENT DATE: NORMAL
RAD ONC ARIA PLAN FRACTIONS TREATED TO DATE: 22
RAD ONC ARIA PLAN ID: NORMAL
RAD ONC ARIA PLAN PRESCRIBED DOSE PER FRACTION: 2 GY
RAD ONC ARIA PLAN PRIMARY REFERENCE POINT: NORMAL
RAD ONC ARIA PLAN TOTAL FRACTIONS PRESCRIBED: 30
RAD ONC ARIA PLAN TOTAL PRESCRIBED DOSE: 6000 CGY
RAD ONC ARIA REFERENCE POINT DOSAGE GIVEN TO DATE: 44 GY
RAD ONC ARIA REFERENCE POINT DOSAGE GIVEN TO DATE: 45.61 GY
RAD ONC ARIA REFERENCE POINT ID: NORMAL
RAD ONC ARIA REFERENCE POINT ID: NORMAL
RAD ONC ARIA REFERENCE POINT SESSION DOSAGE GIVEN: 2 GY
RAD ONC ARIA REFERENCE POINT SESSION DOSAGE GIVEN: 2.07 GY

## 2022-10-19 PROCEDURE — 77386 CHG INTENSITY MODULATED RADIATION TX DLVR COMPLEX: CPT | Performed by: RADIOLOGY

## 2022-10-19 PROCEDURE — 77014 CHG CT GUIDANCE RADIATION THERAPY FLDS PLACEMENT: CPT | Performed by: RADIOLOGY

## 2022-10-19 PROCEDURE — 77386: CPT | Performed by: RADIOLOGY

## 2022-10-20 ENCOUNTER — TREATMENT (OUTPATIENT)
Dept: RADIATION ONCOLOGY | Facility: HOSPITAL | Age: 77
End: 2022-10-20

## 2022-10-20 ENCOUNTER — RADIATION ONCOLOGY WEEKLY ASSESSMENT (OUTPATIENT)
Dept: RADIATION ONCOLOGY | Facility: HOSPITAL | Age: 77
End: 2022-10-20

## 2022-10-20 VITALS
BODY MASS INDEX: 24.72 KG/M2 | SYSTOLIC BLOOD PRESSURE: 147 MMHG | OXYGEN SATURATION: 98 % | HEART RATE: 98 BPM | WEIGHT: 126.6 LBS | DIASTOLIC BLOOD PRESSURE: 53 MMHG

## 2022-10-20 DIAGNOSIS — C34.91 PRIMARY LUNG ADENOCARCINOMA, RIGHT: Primary | ICD-10-CM

## 2022-10-20 LAB
RAD ONC ARIA COURSE ID: NORMAL
RAD ONC ARIA COURSE INTENT: NORMAL
RAD ONC ARIA COURSE LAST TREATMENT DATE: NORMAL
RAD ONC ARIA COURSE START DATE: NORMAL
RAD ONC ARIA COURSE TREATMENT ELAPSED DAYS: 30
RAD ONC ARIA FIRST TREATMENT DATE: NORMAL
RAD ONC ARIA PLAN FRACTIONS TREATED TO DATE: 23
RAD ONC ARIA PLAN ID: NORMAL
RAD ONC ARIA PLAN PRESCRIBED DOSE PER FRACTION: 2 GY
RAD ONC ARIA PLAN PRIMARY REFERENCE POINT: NORMAL
RAD ONC ARIA PLAN TOTAL FRACTIONS PRESCRIBED: 30
RAD ONC ARIA PLAN TOTAL PRESCRIBED DOSE: 6000 CGY
RAD ONC ARIA REFERENCE POINT DOSAGE GIVEN TO DATE: 46 GY
RAD ONC ARIA REFERENCE POINT DOSAGE GIVEN TO DATE: 47.69 GY
RAD ONC ARIA REFERENCE POINT ID: NORMAL
RAD ONC ARIA REFERENCE POINT ID: NORMAL
RAD ONC ARIA REFERENCE POINT SESSION DOSAGE GIVEN: 2 GY
RAD ONC ARIA REFERENCE POINT SESSION DOSAGE GIVEN: 2.07 GY

## 2022-10-20 PROCEDURE — 77014 CHG CT GUIDANCE RADIATION THERAPY FLDS PLACEMENT: CPT | Performed by: RADIOLOGY

## 2022-10-20 PROCEDURE — 77386: CPT | Performed by: RADIOLOGY

## 2022-10-20 PROCEDURE — 77386 CHG INTENSITY MODULATED RADIATION TX DLVR COMPLEX: CPT | Performed by: RADIOLOGY

## 2022-10-20 PROCEDURE — FACE2FACE: Performed by: RADIOLOGY

## 2022-10-21 ENCOUNTER — TREATMENT (OUTPATIENT)
Dept: RADIATION ONCOLOGY | Facility: HOSPITAL | Age: 77
End: 2022-10-21

## 2022-10-21 ENCOUNTER — NURSE NAVIGATOR (OUTPATIENT)
Dept: OTHER | Facility: HOSPITAL | Age: 77
End: 2022-10-21

## 2022-10-21 LAB
RAD ONC ARIA COURSE ID: NORMAL
RAD ONC ARIA COURSE INTENT: NORMAL
RAD ONC ARIA COURSE LAST TREATMENT DATE: NORMAL
RAD ONC ARIA COURSE START DATE: NORMAL
RAD ONC ARIA COURSE TREATMENT ELAPSED DAYS: 31
RAD ONC ARIA FIRST TREATMENT DATE: NORMAL
RAD ONC ARIA PLAN FRACTIONS TREATED TO DATE: 24
RAD ONC ARIA PLAN ID: NORMAL
RAD ONC ARIA PLAN PRESCRIBED DOSE PER FRACTION: 2 GY
RAD ONC ARIA PLAN PRIMARY REFERENCE POINT: NORMAL
RAD ONC ARIA PLAN TOTAL FRACTIONS PRESCRIBED: 30
RAD ONC ARIA PLAN TOTAL PRESCRIBED DOSE: 6000 CGY
RAD ONC ARIA REFERENCE POINT DOSAGE GIVEN TO DATE: 48 GY
RAD ONC ARIA REFERENCE POINT DOSAGE GIVEN TO DATE: 49.76 GY
RAD ONC ARIA REFERENCE POINT ID: NORMAL
RAD ONC ARIA REFERENCE POINT ID: NORMAL
RAD ONC ARIA REFERENCE POINT SESSION DOSAGE GIVEN: 2 GY
RAD ONC ARIA REFERENCE POINT SESSION DOSAGE GIVEN: 2.07 GY

## 2022-10-21 PROCEDURE — 77386: CPT | Performed by: RADIOLOGY

## 2022-10-21 PROCEDURE — 77386 CHG INTENSITY MODULATED RADIATION TX DLVR COMPLEX: CPT | Performed by: RADIOLOGY

## 2022-10-21 PROCEDURE — 77014 CHG CT GUIDANCE RADIATION THERAPY FLDS PLACEMENT: CPT | Performed by: RADIOLOGY

## 2022-10-21 PROCEDURE — 77336 RADIATION PHYSICS CONSULT: CPT | Performed by: RADIOLOGY

## 2022-10-21 NOTE — PROGRESS NOTES
"Called patient. She is doing well. Stated that her treatment is \"going good\" and that Dr. Rodriguez said \"the mass is shrinking\". She has her last chemo on Tuesday and will finish radiation 10/31.  She complains of fatigue. She reported that she has always been an early riser and just gets things done in the morning before she has any appointments. She states her weight is stable between 125-129 lbs. She has met with Daisha and tried Boost but it was constipating. Stated she eats healthy and has been eating prunes, apricots and has been drinking apple or white grape juice and her bowels have been regular. She denies any other side effects to radiation or chemotherapy. She will be attending her son and daughter in law's Halloween party. She discussed with Dr. Rodriguez and will be wearing a mask. She is going as a physician.     Discussed supportive care services and resources through the cancer center again. She denies any transportation, financial or supportive care needs. She has been driving, although her family prefers to take her to/from her appointments.  She may schedule her massages through the cancer center after she completes treatment.  She plans to return to work soon.      I will f/u in 3-4 weeks and encouraged her to call as needed    Acuity 1  "

## 2022-10-24 ENCOUNTER — TREATMENT (OUTPATIENT)
Dept: RADIATION ONCOLOGY | Facility: HOSPITAL | Age: 77
End: 2022-10-24

## 2022-10-24 LAB
RAD ONC ARIA COURSE ID: NORMAL
RAD ONC ARIA COURSE INTENT: NORMAL
RAD ONC ARIA COURSE LAST TREATMENT DATE: NORMAL
RAD ONC ARIA COURSE START DATE: NORMAL
RAD ONC ARIA COURSE TREATMENT ELAPSED DAYS: 34
RAD ONC ARIA FIRST TREATMENT DATE: NORMAL
RAD ONC ARIA PLAN FRACTIONS TREATED TO DATE: 25
RAD ONC ARIA PLAN ID: NORMAL
RAD ONC ARIA PLAN PRESCRIBED DOSE PER FRACTION: 2 GY
RAD ONC ARIA PLAN PRIMARY REFERENCE POINT: NORMAL
RAD ONC ARIA PLAN TOTAL FRACTIONS PRESCRIBED: 30
RAD ONC ARIA PLAN TOTAL PRESCRIBED DOSE: 6000 CGY
RAD ONC ARIA REFERENCE POINT DOSAGE GIVEN TO DATE: 50 GY
RAD ONC ARIA REFERENCE POINT DOSAGE GIVEN TO DATE: 51.83 GY
RAD ONC ARIA REFERENCE POINT ID: NORMAL
RAD ONC ARIA REFERENCE POINT ID: NORMAL
RAD ONC ARIA REFERENCE POINT SESSION DOSAGE GIVEN: 2 GY
RAD ONC ARIA REFERENCE POINT SESSION DOSAGE GIVEN: 2.07 GY

## 2022-10-24 PROCEDURE — 77386 CHG INTENSITY MODULATED RADIATION TX DLVR COMPLEX: CPT | Performed by: RADIOLOGY

## 2022-10-24 PROCEDURE — 77386: CPT | Performed by: RADIOLOGY

## 2022-10-24 PROCEDURE — 77014 CHG CT GUIDANCE RADIATION THERAPY FLDS PLACEMENT: CPT | Performed by: RADIOLOGY

## 2022-10-24 NOTE — PROGRESS NOTES
Subjective     REASON FOR FOLLOW UP:   1.  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  PD-L1 positive greater than 95%  3.  Plan for primary chemoradiation with weekly carboplatin and Taxol to be followed by maintenance immunotherapy for 1 year.    HISTORY OF PRESENT ILLNESS:  The patient is a 77 y.o. year old female who is here for an opinion about the above issue.  She is a former smoker referred to us now from thoracic surgery (Dr. Remedios Rice) for evaluation and treatment of clinical stage III adenocarcinoma of the lung.  She was having persistent cough and underwent chest x-ray initially in late June which showed possible right upper lobe mass.  This was followed by CT scan of the chest confirming the presence of a right upper lobe mass.  She initially underwent a CT-guided needle biopsy on 7/22/2022 which was nondiagnostic.    She was referred to Dr. Glass for bronchoscopy and biopsy which was performed on 8/23/2022 with pathology returning as poorly differentiated adenocarcinoma.  PD-L1 stain on the tissue was positive at greater than 95%.    She underwent further staging with PET scan and MRI of the brain.  PET scan was performed on 8/12/2022 showing hypermetabolic activity in the large mass in the right upper lobe as well as mediastinal lymph nodes.  She was noted to have a mass on the kidney as well but this was not hypermetabolic.  There was no obvious distant metastatic disease.    MRI of the brain from 8/15/2022 likewise showed no evidence of metastatic disease.  She is scheduled also to see Dr. Hector Rodriguez and radiation oncology on 9/7/2022.    We recommended weekly carboplatin and Taxol concurrent with radiation therapy.  Following completion of treatment she will receive immunotherapy for maintenance   (She does have a diagnosis of rheumatoid arthritis and is currently taking only Celebrex.  This could become an issue regarding her ability to tolerate immunotherapy in the future).    INTERVAL  HISTORY:  She is seen back today for week 6 of carboplatin/Taxol which she is receiving concurrent with radiation therapy.  She is on day 25 of 30 radiation fractions planned as of today.  Thankfully she continues to tolerate treatment very well overall.  She denies any pain.  Denies any difficulty swallowing.  Cough is better overall. Denies any neuropathy symptoms.    She was recently started on oral iron for worsening anemia with iron sat of 7%.  States she is tolerating the iron well without constipation.  Hemoglobin today is only stable at 9.6.    She denies other concerns this time.      History of Present Illness     Past Medical History:   Diagnosis Date   • COPD (chronic obstructive pulmonary disease) (HCC)    • Coughing up blood     X2 MONTHS   • History of COVID-19 02/2022   • Hyperlipidemia    • IBS (irritable bowel syndrome)    • Primary lung adenocarcinoma, right (HCC)    • Rheumatoid arthritis (HCC)         Past Surgical History:   Procedure Laterality Date   • APPENDECTOMY      appendix removed   • BRONCHOSCOPY N/A 8/23/2022    Procedure: BRONCHOSCOPY WITH BAL,  BIOPSIES, AND BRUSHINGS WITH ENDOBRONCHIAL ULTRASOUND WITH FNA;  Surgeon: Gregor Vee MD;  Location: [facility] ENDOSCOPY;  Service: Pulmonary;  Laterality: N/A;  PRE- HILAR MASS  POST- SAME   • CAROTID ENDARTERECTOMY Right    • CAROTID ENDARTERECTOMY Left    • CATARACT EXTRACTION     • CERVICAL FUSION     • CHOLECYSTECTOMY     • HYSTERECTOMY     • SHOULDER ARTHROSCOPY Right 02/19/2019    right should scope/cuff repair    • VENOUS ACCESS DEVICE (PORT) INSERTION Right 9/6/2022    Procedure: POWERPORT INSERTION;  Surgeon: Remedios Rice MD;  Location: [facility] OR;  Service: Thoracic;  Laterality: Right;        Current Outpatient Medications on File Prior to Visit   Medication Sig Dispense Refill   • azelastine (ASTELIN) 0.1 % nasal spray 1 spray into the nostril(s) as directed by provider.     • B COMPLEX VITAMINS ER PO Take  by mouth  Daily.     • Cholecalciferol (VITAMIN D3) 5000 units capsule capsule Take 5,000 Units by mouth Daily.     • Colestipol HCl (COLESTID PO) Take 1 tablet by mouth Daily.     • esomeprazole (NexIUM) 10 MG packet Take 10 mg by mouth Daily.     • estradiol (ESTRACE) 1 MG tablet Take 1 mg by mouth Daily.     • ferrous sulfate 325 (65 FE) MG tablet Take 1 tablet by mouth Daily With Breakfast. 30 tablet 5   • HYDROcodone Bit-Homatrop MBr (HYCODAN) 5-1.5 MG/5ML solution Take 5 mL by mouth Every 6 (Six) Hours As Needed for Cough. 473 mL 0   • lidocaine-prilocaine (EMLA) 2.5-2.5 % cream Apply 1 application topically to the appropriate area as directed Every 2 (Two) Hours As Needed for Mild Pain. 5 g 3   • Multiple Vitamins-Minerals (PRESERVISION AREDS 2+MULTI VIT PO) Take  by mouth.     • ondansetron (ZOFRAN) 8 MG tablet Take 1 tablet by mouth 3 (Three) Times a Day As Needed for Nausea or Vomiting. 30 tablet 5   • predniSONE (DELTASONE) 10 MG tablet Take 2 tablets by mouth Daily. 50 tablet 1     No current facility-administered medications on file prior to visit.        ALLERGIES:    Allergies   Allergen Reactions   • Metronidazole Hives   • Ofloxacin Hives and Nausea Only   • Del-Mycin [Erythromycin] Hives   • Gentamicin Hives   • Ibuprofen Nausea And Vomiting   • Latex Dermatitis     GLOVES   • Penicillins Hives   • Tetracycline Hives   • Adhesive Tape Dermatitis     BANDAIDS   • Aspirin GI Intolerance     Vomiting can take EC   • Codeine Nausea And Vomiting        Social History     Socioeconomic History   • Marital status:    Tobacco Use   • Smoking status: Former     Types: Cigarettes     Quit date: 2022     Years since quittin.4   • Smokeless tobacco: Never   • Tobacco comments:     Occasionally smoking 1-2 cigs   Vaping Use   • Vaping Use: Never used   Substance and Sexual Activity   • Alcohol use: Yes     Alcohol/week: 1.0 standard drink     Types: 1 Glasses of wine per week     Comment: occasionally   •  "Drug use: Never   • Sexual activity: Defer        Family History   Problem Relation Age of Onset   • Cancer Mother    • Cancer Father    • Malig Hyperthermia Neg Hx         Review of Systems   Constitutional: Negative for activity change, chills, fatigue and fever.   HENT: Negative for mouth sores, trouble swallowing and voice change.    Eyes: Negative for pain and visual disturbance.   Respiratory: Positive for cough. Negative for wheezing.    Cardiovascular: Negative for chest pain and palpitations.   Gastrointestinal: Negative for abdominal pain, constipation, diarrhea, nausea and vomiting.   Genitourinary: Negative for difficulty urinating, frequency and urgency.   Musculoskeletal: Negative for arthralgias and joint swelling.   Skin: Negative for rash.   Neurological: Negative for dizziness, seizures, weakness and headaches.   Hematological: Negative for adenopathy. Does not bruise/bleed easily.   Psychiatric/Behavioral: Negative for behavioral problems and confusion. The patient is not nervous/anxious.     10/25/2022 ROS updated     Objective     Vitals:    10/25/22 0915   BP: 150/73   Pulse: 92   Resp: 18   Temp: 97.9 °F (36.6 °C)   TempSrc: Temporal   SpO2: 96%   Weight: 57 kg (125 lb 9.6 oz)   Height: 152.4 cm (60\")   PainSc: 0-No pain     Current Status 10/25/2022   ECOG score 0       Physical Exam  Vitals reviewed.   Constitutional:       General: She is not in acute distress.     Appearance: Normal appearance. She is well-developed.   HENT:      Head: Normocephalic and atraumatic.      Mouth/Throat:      Pharynx: No oropharyngeal exudate.   Eyes:      Pupils: Pupils are equal, round, and reactive to light.   Cardiovascular:      Rate and Rhythm: Normal rate and regular rhythm.      Heart sounds: Normal heart sounds. No murmur heard.  Pulmonary:      Effort: Pulmonary effort is normal. No respiratory distress.      Breath sounds: Decreased breath sounds present. No wheezing, rhonchi or rales.   Abdominal: "      General: Bowel sounds are normal. There is no distension.      Palpations: Abdomen is soft.   Musculoskeletal:         General: Normal range of motion.      Cervical back: Normal range of motion.   Skin:     General: Skin is warm and dry.      Findings: No rash.   Neurological:      Mental Status: She is alert and oriented to person, place, and time.      I have reexamined the patient and the results are consistent with the previously documented exam. Rowena Aragon, BARBARA        RECENT LABS:  Results from last 7 days   Lab Units 10/25/22  0906   WBC 10*3/mm3 6.04   NEUTROS ABS 10*3/mm3 5.22   HEMOGLOBIN g/dL 9.6*   HEMATOCRIT % 31.6*   PLATELETS 10*3/mm3 195                   BRONCHOSCOPY PATHOLOGY 8/23/2022  Final Diagnosis   1. Lung, Right Upper Lobe, Endobronchial Biopsies: INVASIVE POORLY DIFFERENTIATED ADENOCARCINOMA OF PULMONARY ORIGIN.   PDL-1 POSITIVE >95%    F-18 FDG PET FROM SKULL BASE TO MID THIGH WITH PET/CT FUSION 8/12/2022  IMPRESSION:  1.  Large mass centered within the right upper lobe representing  patient's known malignancy. Interlobular septal thickening and ground  glass opacification projecting from the periphery of the mass throughout  the right upper lobe is highly concerning for lymphangitic spread. Of  note, the mass abuts the pleura and mediastinum over a long segment and  underlying invasion cannot be excluded.  2.  FDG avid hilar and mediastinal adenopathy concerning for metastatic  disease.  3.  A few irregular subcentimeter pulmonary nodules are present within  the right lower and left upper lobes measuring up to 0.9 cm which while  nonspecific raises concern for metastatic disease. At least close  attention on followup is recommended.   4.  Minimally hypermetabolic arnoldo hepatic node and bilateral sub-6 mm  pulmonary nodules are indeterminate. Continued followup with chest and  abdominal CT in 3 months is recommended.  5.  Indeterminate density 4.4 cm mass arises off the  right kidney.  Further evaluation with multiphase CT or MRI of the abdomen with and  without contrast is recommended to exclude neoplasm.  6.  Focal uptake over the right shoulder in the area of prior rotator  cuff repair is favored be postsurgical with metastatic disease less  likely.  7.  Other findings as above.    MRI OF THE BRAIN WITH AND WITHOUT CONTRAST 08/15/2022   IMPRESSION:  1. There is mild-to-moderate small vessel disease in cerebral and  central pontine white matter. Otherwise, the MRI of the brain is normal  with no evidence of metastatic disease to the brain.   2. There are some nonspecific signal abnormality in the C4 cervical  vertebra with T1 low signal throughout the visualized portions C4  cervical vertebrae on the sagittal images. The patient has had prior  cervical spine surgery as demonstrated on prior PET scan and this argues  in favor that the T1 low signal and C4 vertebrae is related to sclerosis  from degenerative disc changes at C4-5 although an osseous metastatic  lesion in the C4 vertebra cannot be excluded on the basis of this exam.  Apparently the C4 vertebra was not hot on the recent PET scan which is  further evidence that this is degenerative in origin and correlation  with recent PET scan is suggested.      Assessment & Plan   · 1.  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  Patient is not felt to be a surgical candidate and would best be treated with combined chemotherapy and radiation.  She is to see Dr. Rodriguez of radiation oncology on 9/7/2022.  We discussed with her the chemotherapy with weekly CarboTaxol concurrent with radiation.  She received her first cycle of treatment in the office on 9/20/2022.  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  · 9/20/2022 1st dose of weekly carboplatin/taxol    2.  Tumor is strongly PD-L1 positive greater than 95% (although the patient may not be a great candidate for immunotherapy in the future due to her  rheumatoid arthritis).  3.  Other comorbidities including vascular disease with previous carotid endarterectomy surgeries.  She has no known history of stroke or myocardial infarction.  Overall her performance status is very good.  4.  Hemoptysis related to her central tumor.  Improved with initiation of therapy.  5.  Rheumatoid arthritis: Patient previously on Celebrex, but discontinued due to hemoptysis.  Patient started on prednisone 20 mg daily prescribed to manage her arthritis pain.      Recommendations  1. Proceed with cycle 6 weekly carboplatin/Taxol tomorrow on 10/18/2022.  2. Continue radiation therapy under the care of Dr. Rodriguez.  She is on track to complete radiation as of Tuesday, 11/1/2022.  3. Return in 1 week for follow-up with Dr. Lagunas and possible cycle 7 of therapy.  We will decide at that visit timing of posttreatment imaging.  4. Continue prednisone to 10 mg daily for rheumatoid arthritis.  5. For now we will continue iron supplementation with ferrous sulfate 325 mg p.o. once daily with food. Tolerating fine.    This patient is on high risk drug therapy requiring intensive monitoring for toxicity.             Rowena Aragon, APRN   10/25/2022

## 2022-10-25 ENCOUNTER — INFUSION (OUTPATIENT)
Dept: ONCOLOGY | Facility: HOSPITAL | Age: 77
End: 2022-10-25

## 2022-10-25 ENCOUNTER — OFFICE VISIT (OUTPATIENT)
Dept: ONCOLOGY | Facility: CLINIC | Age: 77
End: 2022-10-25

## 2022-10-25 ENCOUNTER — TREATMENT (OUTPATIENT)
Dept: RADIATION ONCOLOGY | Facility: HOSPITAL | Age: 77
End: 2022-10-25

## 2022-10-25 VITALS
BODY MASS INDEX: 24.66 KG/M2 | HEART RATE: 92 BPM | WEIGHT: 125.6 LBS | DIASTOLIC BLOOD PRESSURE: 73 MMHG | HEIGHT: 60 IN | SYSTOLIC BLOOD PRESSURE: 150 MMHG | RESPIRATION RATE: 18 BRPM | OXYGEN SATURATION: 96 % | TEMPERATURE: 97.9 F

## 2022-10-25 VITALS — DIASTOLIC BLOOD PRESSURE: 68 MMHG | HEART RATE: 92 BPM | SYSTOLIC BLOOD PRESSURE: 118 MMHG

## 2022-10-25 DIAGNOSIS — Z79.899 HIGH RISK MEDICATION USE: ICD-10-CM

## 2022-10-25 DIAGNOSIS — C34.91 PRIMARY LUNG ADENOCARCINOMA, RIGHT: ICD-10-CM

## 2022-10-25 DIAGNOSIS — C34.91 PRIMARY LUNG ADENOCARCINOMA, RIGHT: Primary | ICD-10-CM

## 2022-10-25 LAB
ALBUMIN SERPL-MCNC: 3.6 G/DL (ref 3.5–5.2)
ALBUMIN/GLOB SERPL: 1.1 G/DL (ref 1.1–2.4)
ALP SERPL-CCNC: 88 U/L (ref 38–116)
ALT SERPL W P-5'-P-CCNC: 20 U/L (ref 0–33)
ANION GAP SERPL CALCULATED.3IONS-SCNC: 12.7 MMOL/L (ref 5–15)
AST SERPL-CCNC: 18 U/L (ref 0–32)
BASOPHILS # BLD AUTO: 0.02 10*3/MM3 (ref 0–0.2)
BASOPHILS NFR BLD AUTO: 0.3 % (ref 0–1.5)
BILIRUB SERPL-MCNC: 0.2 MG/DL (ref 0.2–1.2)
BUN SERPL-MCNC: 17 MG/DL (ref 6–20)
BUN/CREAT SERPL: 17.5 (ref 7.3–30)
CALCIUM SPEC-SCNC: 9.9 MG/DL (ref 8.5–10.2)
CHLORIDE SERPL-SCNC: 98 MMOL/L (ref 98–107)
CO2 SERPL-SCNC: 25.3 MMOL/L (ref 22–29)
CREAT SERPL-MCNC: 0.97 MG/DL (ref 0.6–1.1)
DEPRECATED RDW RBC AUTO: 61.2 FL (ref 37–54)
EGFRCR SERPLBLD CKD-EPI 2021: 61.1 ML/MIN/1.73
EOSINOPHIL # BLD AUTO: 0.08 10*3/MM3 (ref 0–0.4)
EOSINOPHIL NFR BLD AUTO: 1.3 % (ref 0.3–6.2)
ERYTHROCYTE [DISTWIDTH] IN BLOOD BY AUTOMATED COUNT: 22.6 % (ref 12.3–15.4)
GLOBULIN UR ELPH-MCNC: 3.2 GM/DL (ref 1.8–3.5)
GLUCOSE SERPL-MCNC: 109 MG/DL (ref 74–124)
HCT VFR BLD AUTO: 31.6 % (ref 34–46.6)
HGB BLD-MCNC: 9.6 G/DL (ref 12–15.9)
IMM GRANULOCYTES # BLD AUTO: 0.07 10*3/MM3 (ref 0–0.05)
IMM GRANULOCYTES NFR BLD AUTO: 1.2 % (ref 0–0.5)
LYMPHOCYTES # BLD AUTO: 0.36 10*3/MM3 (ref 0.7–3.1)
LYMPHOCYTES NFR BLD AUTO: 6 % (ref 19.6–45.3)
MCH RBC QN AUTO: 25.5 PG (ref 26.6–33)
MCHC RBC AUTO-ENTMCNC: 30.4 G/DL (ref 31.5–35.7)
MCV RBC AUTO: 84 FL (ref 79–97)
MONOCYTES # BLD AUTO: 0.29 10*3/MM3 (ref 0.1–0.9)
MONOCYTES NFR BLD AUTO: 4.8 % (ref 5–12)
NEUTROPHILS NFR BLD AUTO: 5.22 10*3/MM3 (ref 1.7–7)
NEUTROPHILS NFR BLD AUTO: 86.4 % (ref 42.7–76)
NRBC BLD AUTO-RTO: 0 /100 WBC (ref 0–0.2)
PLATELET # BLD AUTO: 195 10*3/MM3 (ref 140–450)
PMV BLD AUTO: 9.1 FL (ref 6–12)
POTASSIUM SERPL-SCNC: 4 MMOL/L (ref 3.5–4.7)
PROT SERPL-MCNC: 6.8 G/DL (ref 6.3–8)
RAD ONC ARIA COURSE ID: NORMAL
RAD ONC ARIA COURSE INTENT: NORMAL
RAD ONC ARIA COURSE LAST TREATMENT DATE: NORMAL
RAD ONC ARIA COURSE START DATE: NORMAL
RAD ONC ARIA COURSE TREATMENT ELAPSED DAYS: 35
RAD ONC ARIA FIRST TREATMENT DATE: NORMAL
RAD ONC ARIA PLAN FRACTIONS TREATED TO DATE: 26
RAD ONC ARIA PLAN ID: NORMAL
RAD ONC ARIA PLAN PRESCRIBED DOSE PER FRACTION: 2 GY
RAD ONC ARIA PLAN PRIMARY REFERENCE POINT: NORMAL
RAD ONC ARIA PLAN TOTAL FRACTIONS PRESCRIBED: 30
RAD ONC ARIA PLAN TOTAL PRESCRIBED DOSE: 6000 CGY
RAD ONC ARIA REFERENCE POINT DOSAGE GIVEN TO DATE: 52 GY
RAD ONC ARIA REFERENCE POINT DOSAGE GIVEN TO DATE: 53.91 GY
RAD ONC ARIA REFERENCE POINT ID: NORMAL
RAD ONC ARIA REFERENCE POINT ID: NORMAL
RAD ONC ARIA REFERENCE POINT SESSION DOSAGE GIVEN: 2 GY
RAD ONC ARIA REFERENCE POINT SESSION DOSAGE GIVEN: 2.07 GY
RBC # BLD AUTO: 3.76 10*6/MM3 (ref 3.77–5.28)
SODIUM SERPL-SCNC: 136 MMOL/L (ref 134–145)
WBC NRBC COR # BLD: 6.04 10*3/MM3 (ref 3.4–10.8)

## 2022-10-25 PROCEDURE — 25010000002 PALONOSETRON PER 25 MCG: Performed by: INTERNAL MEDICINE

## 2022-10-25 PROCEDURE — 85025 COMPLETE CBC W/AUTO DIFF WBC: CPT

## 2022-10-25 PROCEDURE — 77386 CHG INTENSITY MODULATED RADIATION TX DLVR COMPLEX: CPT | Performed by: STUDENT IN AN ORGANIZED HEALTH CARE EDUCATION/TRAINING PROGRAM

## 2022-10-25 PROCEDURE — 25010000002 CARBOPLATIN PER 50 MG: Performed by: INTERNAL MEDICINE

## 2022-10-25 PROCEDURE — 99214 OFFICE O/P EST MOD 30 MIN: CPT | Performed by: NURSE PRACTITIONER

## 2022-10-25 PROCEDURE — 25010000002 PACLITAXEL PER 1 MG: Performed by: INTERNAL MEDICINE

## 2022-10-25 PROCEDURE — 77386: CPT | Performed by: STUDENT IN AN ORGANIZED HEALTH CARE EDUCATION/TRAINING PROGRAM

## 2022-10-25 PROCEDURE — 80053 COMPREHEN METABOLIC PANEL: CPT

## 2022-10-25 PROCEDURE — 25010000002 DIPHENHYDRAMINE PER 50 MG: Performed by: INTERNAL MEDICINE

## 2022-10-25 PROCEDURE — 77014 CHG CT GUIDANCE RADIATION THERAPY FLDS PLACEMENT: CPT | Performed by: STUDENT IN AN ORGANIZED HEALTH CARE EDUCATION/TRAINING PROGRAM

## 2022-10-25 PROCEDURE — 96375 TX/PRO/DX INJ NEW DRUG ADDON: CPT

## 2022-10-25 PROCEDURE — 96413 CHEMO IV INFUSION 1 HR: CPT

## 2022-10-25 PROCEDURE — 25010000002 DEXAMETHASONE SODIUM PHOSPHATE 100 MG/10ML SOLUTION: Performed by: INTERNAL MEDICINE

## 2022-10-25 PROCEDURE — 96417 CHEMO IV INFUS EACH ADDL SEQ: CPT

## 2022-10-25 PROCEDURE — 77427 RADIATION TX MANAGEMENT X5: CPT | Performed by: STUDENT IN AN ORGANIZED HEALTH CARE EDUCATION/TRAINING PROGRAM

## 2022-10-25 PROCEDURE — 25010000002 HEPARIN LOCK FLUSH PER 10 UNITS: Performed by: INTERNAL MEDICINE

## 2022-10-25 RX ORDER — PALONOSETRON 0.05 MG/ML
0.25 INJECTION, SOLUTION INTRAVENOUS ONCE
Status: COMPLETED | OUTPATIENT
Start: 2022-10-25 | End: 2022-10-25

## 2022-10-25 RX ORDER — SODIUM CHLORIDE 9 MG/ML
250 INJECTION, SOLUTION INTRAVENOUS ONCE
Status: COMPLETED | OUTPATIENT
Start: 2022-10-25 | End: 2022-10-25

## 2022-10-25 RX ORDER — HEPARIN SODIUM (PORCINE) LOCK FLUSH IV SOLN 100 UNIT/ML 100 UNIT/ML
500 SOLUTION INTRAVENOUS AS NEEDED
Status: CANCELLED | OUTPATIENT
Start: 2022-10-25

## 2022-10-25 RX ORDER — SODIUM CHLORIDE 0.9 % (FLUSH) 0.9 %
10 SYRINGE (ML) INJECTION AS NEEDED
Status: DISCONTINUED | OUTPATIENT
Start: 2022-10-25 | End: 2022-10-25 | Stop reason: HOSPADM

## 2022-10-25 RX ORDER — SODIUM CHLORIDE 0.9 % (FLUSH) 0.9 %
10 SYRINGE (ML) INJECTION AS NEEDED
Status: CANCELLED | OUTPATIENT
Start: 2022-10-25

## 2022-10-25 RX ORDER — HEPARIN SODIUM (PORCINE) LOCK FLUSH IV SOLN 100 UNIT/ML 100 UNIT/ML
500 SOLUTION INTRAVENOUS AS NEEDED
Status: DISCONTINUED | OUTPATIENT
Start: 2022-10-25 | End: 2022-10-25 | Stop reason: HOSPADM

## 2022-10-25 RX ORDER — FAMOTIDINE 10 MG/ML
20 INJECTION, SOLUTION INTRAVENOUS ONCE
Status: COMPLETED | OUTPATIENT
Start: 2022-10-25 | End: 2022-10-25

## 2022-10-25 RX ADMIN — Medication 10 ML: at 13:04

## 2022-10-25 RX ADMIN — Medication 500 UNITS: at 13:04

## 2022-10-25 RX ADMIN — PALONOSETRON 0.25 MG: 0.05 INJECTION, SOLUTION INTRAVENOUS at 09:56

## 2022-10-25 RX ADMIN — PACLITAXEL 75 MG: 6 INJECTION, SOLUTION INTRAVENOUS at 11:02

## 2022-10-25 RX ADMIN — CARBOPLATIN 140 MG: 10 INJECTION INTRAVENOUS at 11:59

## 2022-10-25 RX ADMIN — SODIUM CHLORIDE 250 ML: 9 INJECTION, SOLUTION INTRAVENOUS at 09:52

## 2022-10-25 RX ADMIN — DIPHENHYDRAMINE HYDROCHLORIDE 25 MG: 50 INJECTION, SOLUTION INTRAMUSCULAR; INTRAVENOUS at 09:56

## 2022-10-25 RX ADMIN — FAMOTIDINE 20 MG: 10 INJECTION INTRAVENOUS at 09:52

## 2022-10-25 RX ADMIN — DEXAMETHASONE SODIUM PHOSPHATE 12 MG: 10 INJECTION, SOLUTION INTRAMUSCULAR; INTRAVENOUS at 10:13

## 2022-10-26 ENCOUNTER — TREATMENT (OUTPATIENT)
Dept: RADIATION ONCOLOGY | Facility: HOSPITAL | Age: 77
End: 2022-10-26

## 2022-10-26 LAB
RAD ONC ARIA COURSE ID: NORMAL
RAD ONC ARIA COURSE INTENT: NORMAL
RAD ONC ARIA COURSE LAST TREATMENT DATE: NORMAL
RAD ONC ARIA COURSE START DATE: NORMAL
RAD ONC ARIA COURSE TREATMENT ELAPSED DAYS: 36
RAD ONC ARIA FIRST TREATMENT DATE: NORMAL
RAD ONC ARIA PLAN FRACTIONS TREATED TO DATE: 27
RAD ONC ARIA PLAN ID: NORMAL
RAD ONC ARIA PLAN PRESCRIBED DOSE PER FRACTION: 2 GY
RAD ONC ARIA PLAN PRIMARY REFERENCE POINT: NORMAL
RAD ONC ARIA PLAN TOTAL FRACTIONS PRESCRIBED: 30
RAD ONC ARIA PLAN TOTAL PRESCRIBED DOSE: 6000 CGY
RAD ONC ARIA REFERENCE POINT DOSAGE GIVEN TO DATE: 54 GY
RAD ONC ARIA REFERENCE POINT DOSAGE GIVEN TO DATE: 55.98 GY
RAD ONC ARIA REFERENCE POINT ID: NORMAL
RAD ONC ARIA REFERENCE POINT ID: NORMAL
RAD ONC ARIA REFERENCE POINT SESSION DOSAGE GIVEN: 2 GY
RAD ONC ARIA REFERENCE POINT SESSION DOSAGE GIVEN: 2.07 GY

## 2022-10-26 PROCEDURE — 77386 CHG INTENSITY MODULATED RADIATION TX DLVR COMPLEX: CPT | Performed by: RADIOLOGY

## 2022-10-26 PROCEDURE — 77014 CHG CT GUIDANCE RADIATION THERAPY FLDS PLACEMENT: CPT | Performed by: RADIOLOGY

## 2022-10-26 PROCEDURE — 77386: CPT | Performed by: RADIOLOGY

## 2022-10-27 ENCOUNTER — TREATMENT (OUTPATIENT)
Dept: RADIATION ONCOLOGY | Facility: HOSPITAL | Age: 77
End: 2022-10-27

## 2022-10-27 ENCOUNTER — RADIATION ONCOLOGY WEEKLY ASSESSMENT (OUTPATIENT)
Dept: RADIATION ONCOLOGY | Facility: HOSPITAL | Age: 77
End: 2022-10-27

## 2022-10-27 VITALS
WEIGHT: 126.4 LBS | DIASTOLIC BLOOD PRESSURE: 66 MMHG | BODY MASS INDEX: 24.69 KG/M2 | SYSTOLIC BLOOD PRESSURE: 142 MMHG | HEART RATE: 92 BPM | OXYGEN SATURATION: 98 %

## 2022-10-27 DIAGNOSIS — C34.91 PRIMARY LUNG ADENOCARCINOMA, RIGHT: Primary | ICD-10-CM

## 2022-10-27 LAB
RAD ONC ARIA COURSE ID: NORMAL
RAD ONC ARIA COURSE INTENT: NORMAL
RAD ONC ARIA COURSE LAST TREATMENT DATE: NORMAL
RAD ONC ARIA COURSE START DATE: NORMAL
RAD ONC ARIA COURSE TREATMENT ELAPSED DAYS: 37
RAD ONC ARIA FIRST TREATMENT DATE: NORMAL
RAD ONC ARIA PLAN FRACTIONS TREATED TO DATE: 28
RAD ONC ARIA PLAN ID: NORMAL
RAD ONC ARIA PLAN PRESCRIBED DOSE PER FRACTION: 2 GY
RAD ONC ARIA PLAN PRIMARY REFERENCE POINT: NORMAL
RAD ONC ARIA PLAN TOTAL FRACTIONS PRESCRIBED: 30
RAD ONC ARIA PLAN TOTAL PRESCRIBED DOSE: 6000 CGY
RAD ONC ARIA REFERENCE POINT DOSAGE GIVEN TO DATE: 56 GY
RAD ONC ARIA REFERENCE POINT DOSAGE GIVEN TO DATE: 58.05 GY
RAD ONC ARIA REFERENCE POINT ID: NORMAL
RAD ONC ARIA REFERENCE POINT ID: NORMAL
RAD ONC ARIA REFERENCE POINT SESSION DOSAGE GIVEN: 2 GY
RAD ONC ARIA REFERENCE POINT SESSION DOSAGE GIVEN: 2.07 GY

## 2022-10-27 PROCEDURE — 77386 CHG INTENSITY MODULATED RADIATION TX DLVR COMPLEX: CPT | Performed by: RADIOLOGY

## 2022-10-27 PROCEDURE — FACE2FACE: Performed by: RADIOLOGY

## 2022-10-27 PROCEDURE — 77014 CHG CT GUIDANCE RADIATION THERAPY FLDS PLACEMENT: CPT | Performed by: RADIOLOGY

## 2022-10-27 PROCEDURE — 77386: CPT | Performed by: RADIOLOGY

## 2022-10-27 NOTE — PROGRESS NOTES
Patient Name: Pam Vidal Date: 10/27/2022       : 1945        MRN #: [id number] Diagnosis:   Encounter Diagnosis   Name Primary?   • Primary lung adenocarcinoma, right (HCC) Yes                     RADIATION ON TREATMENT VISIT NOTE - CHEST    Treatment Summary    [x] Concurrent Chemo   Regimen:  Paclitaxel/carbo      Treatment Site Ref. ID Energy Dose/Fx (cGy) #Fx Dose Correction (cGy) Total Dose (cGy) Start Date End Date Elapsed Days   RA [id number] 6X 200  0 5,600 2022 10/27/2022 37       General:           Review of Systems    [] No new complaints [] Fever/chills  Night sweats  [] Decreased energy level [] Decreased appetite [] Oxygen   [x] Dry cough [] Productive cough [] SOB  [] Hemoptysis [] Dysphagia      [x] Fatigue,  severity: 1 Relief w/ Rest [] Pain,  severity: ----------------, location:     Pain medication regimen: NONE      Esophagitis regimen: NONE      Skin regimen: Aquaphor     Comments/Notes:  No cp or increasing SOB    KPS: 90%       Vital Signs: /66   Pulse 92   Wt 57.3 kg (126 lb 6.4 oz)   SpO2 98%   BMI 24.69 kg/m²     Weight:   Wt Readings from Last 3 Encounters:   10/27/22 57.3 kg (126 lb 6.4 oz)   10/25/22 57 kg (125 lb 9.6 oz)   10/20/22 57.4 kg (126 lb 9.6 oz)       Medication:   Current Outpatient Medications:   •  azelastine (ASTELIN) 0.1 % nasal spray, 1 spray into the nostril(s) as directed by provider., Disp: , Rfl:   •  B COMPLEX VITAMINS ER PO, Take  by mouth Daily., Disp: , Rfl:   •  Cholecalciferol (VITAMIN D3) 5000 units capsule capsule, Take 5,000 Units by mouth Daily., Disp: , Rfl:   •  Colestipol HCl (COLESTID PO), Take 1 tablet by mouth Daily., Disp: , Rfl:   •  esomeprazole (NexIUM) 10 MG packet, Take 10 mg by mouth Daily., Disp: , Rfl:   •  estradiol (ESTRACE) 1 MG tablet, Take 1 mg by mouth Daily., Disp: , Rfl:   •  ferrous sulfate 325 (65 FE) MG tablet, Take 1 tablet by mouth Daily With Breakfast., Disp: 30 tablet, Rfl:  5  •  HYDROcodone Bit-Homatrop MBr (HYCODAN) 5-1.5 MG/5ML solution, Take 5 mL by mouth Every 6 (Six) Hours As Needed for Cough., Disp: 473 mL, Rfl: 0  •  lidocaine-prilocaine (EMLA) 2.5-2.5 % cream, Apply 1 application topically to the appropriate area as directed Every 2 (Two) Hours As Needed for Mild Pain., Disp: 5 g, Rfl: 3  •  Multiple Vitamins-Minerals (PRESERVISION AREDS 2+MULTI VIT PO), Take  by mouth., Disp: , Rfl:   •  ondansetron (ZOFRAN) 8 MG tablet, Take 1 tablet by mouth 3 (Three) Times a Day As Needed for Nausea or Vomiting., Disp: 30 tablet, Rfl: 5  •  predniSONE (DELTASONE) 10 MG tablet, Take 2 tablets by mouth Daily., Disp: 50 tablet, Rfl: 1       LABS (Reviewed):  Hematology WBC   Date Value Ref Range Status   10/25/2022 6.04 3.40 - 10.80 10*3/mm3 Final     RBC   Date Value Ref Range Status   10/25/2022 3.76 (L) 3.77 - 5.28 10*6/mm3 Final     Hemoglobin   Date Value Ref Range Status   10/25/2022 9.6 (L) 12.0 - 15.9 g/dL Final     Hematocrit   Date Value Ref Range Status   10/25/2022 31.6 (L) 34.0 - 46.6 % Final     Platelets   Date Value Ref Range Status   10/25/2022 195 140 - 450 10*3/mm3 Final      Chemistry   Lab Results   Component Value Date    GLUCOSE 109 10/25/2022    BUN 17 10/25/2022    CREATININE 0.97 10/25/2022    BCR 17.5 10/25/2022    K 4.0 10/25/2022    CO2 25.3 10/25/2022    CALCIUM 9.9 10/25/2022    ALBUMIN 3.60 10/25/2022    AST 18 10/25/2022    ALT 20 10/25/2022       Physical Exam:         Pulmonary: [] Cough:     [] Dyspnea:  Neck:  [] Lymphadenopathy  Lungs:  [x] Clear to auscultation  CVS:  [x] Regular rate & rhythm  Skin:  ndGndrndanddndend:nd nd2nd GI:  [] Dysphagia:     [] Esophagitis:     Comments/Notes:     [x] Review of labs, images, dosimetry, dose delivered, & treatment parameters.    Comments:     [x] Patient treatment setup reviewed.    Comments:     Recommendations: continue XRT and supportive measures   meeting with med/onc next Monday-- will discuss if Immunotherapy as  noted.    [x] Continue RT  [] Change RT Plan [] Hold RT, length:        Approved Electronically By:  Hector Rodriguez MD, 10/27/2022, 17:13 EDT          Confidentiality of this medical record shall be maintained except when use or disclosure is required or permitted by law, regulation or written authorization by the patient.

## 2022-10-27 NOTE — PROGRESS NOTES
Patient Name: Pam Vidal Date: 10/20/2022       : 1945        MRN #: [id number] Diagnosis: Rt NSCLC, [id number]               RADIATION ON TREATMENT VISIT NOTE - CHEST    Treatment Summary    [x] Concurrent Chemo         Treatment Site Ref. ID Energy Dose/Fx (cGy) #Fx Dose Correction (cGy) Total Dose (cGy) Start Date End Date Elapsed Days   RA [id number] 6X 200  /  0 4,600 2022 10/20/2022      Addendum: -Stage IIIA, [id number] primary lung adenocarcinoma. Intent of therapy is curative.  General:           Review of Systems    [x] No new complaints [] Fever/chills  Night sweats  [] Decreased energy level [] Decreased appetite [] Oxygen   [] Dry cough [] Productive cough [] SOB  [] Hemoptysis [] Dysphagia      [x] Fatigue,  severity: 0 None [x] Pain,  severity: 0, location:     Pain medication regimen: NONE      Esophagitis regimen: NONE      Skin regimen: NONE     Comments/Notes:  Cough is stable    KPS: 90%       Vital Signs: /53   Pulse 98   Wt 57.4 kg (126 lb 9.6 oz)   SpO2 98%   BMI 24.72 kg/m²     Weight: 126 lbs    Medication:   Current Outpatient Medications:   •  azelastine (ASTELIN) 0.1 % nasal spray, 1 spray into the nostril(s) as directed by provider., Disp: , Rfl:   •  B COMPLEX VITAMINS ER PO, Take  by mouth Daily., Disp: , Rfl:   •  Cholecalciferol (VITAMIN D3) 5000 units capsule capsule, Take 5,000 Units by mouth Daily., Disp: , Rfl:   •  Colestipol HCl (COLESTID PO), Take 1 tablet by mouth Daily., Disp: , Rfl:   •  esomeprazole (NexIUM) 10 MG packet, Take 10 mg by mouth Daily., Disp: , Rfl:   •  estradiol (ESTRACE) 1 MG tablet, Take 1 mg by mouth Daily., Disp: , Rfl:   •  ferrous sulfate 325 (65 FE) MG tablet, Take 1 tablet by mouth Daily With Breakfast., Disp: 30 tablet, Rfl: 5  •  HYDROcodone Bit-Homatrop MBr (HYCODAN) 5-1.5 MG/5ML solution, Take 5 mL by mouth Every 6 (Six) Hours As Needed for Cough., Disp: 473 mL, Rfl: 0  •  lidocaine-prilocaine (EMLA)  2.5-2.5 % cream, Apply 1 application topically to the appropriate area as directed Every 2 (Two) Hours As Needed for Mild Pain., Disp: 5 g, Rfl: 3  •  Multiple Vitamins-Minerals (PRESERVISION AREDS 2+MULTI VIT PO), Take  by mouth., Disp: , Rfl:   •  ondansetron (ZOFRAN) 8 MG tablet, Take 1 tablet by mouth 3 (Three) Times a Day As Needed for Nausea or Vomiting., Disp: 30 tablet, Rfl: 5  •  predniSONE (DELTASONE) 10 MG tablet, Take 2 tablets by mouth Daily., Disp: 50 tablet, Rfl: 1       LABS (Reviewed):  CBC 10/17/2022-- CBC 7.98, Hgb 9.8, PPlt 227  CMP--Cr 0.97      Physical Exam:         Pulmonary: [] Cough:     [] Dyspnea:  Neck:  [] Lymphadenopathy  Lungs:  [x] Clear to auscultation  CVS:  [x] Regular rate & rhythm  Skin:  ndGndrndanddndend:nd nd2nd GI:  [] Dysphagia:     [x] Esophagitis: None    Comments/Notes:     [x] Review of labs, images, dosimetry, dose delivered, & treatment parameters.    Comments:     [x] Patient treatment setup reviewed.    Comments:     Recommendations: Continue XRT  Showed CBCTs to her with response; supportive care to continue    [x] Continue RT  [] Change RT Plan [] Hold RT, length:        Approved by:     Hector Rodriguez MD

## 2022-10-28 ENCOUNTER — TREATMENT (OUTPATIENT)
Dept: RADIATION ONCOLOGY | Facility: HOSPITAL | Age: 77
End: 2022-10-28

## 2022-10-28 LAB
RAD ONC ARIA COURSE ID: NORMAL
RAD ONC ARIA COURSE INTENT: NORMAL
RAD ONC ARIA COURSE LAST TREATMENT DATE: NORMAL
RAD ONC ARIA COURSE START DATE: NORMAL
RAD ONC ARIA COURSE TREATMENT ELAPSED DAYS: 38
RAD ONC ARIA FIRST TREATMENT DATE: NORMAL
RAD ONC ARIA PLAN FRACTIONS TREATED TO DATE: 29
RAD ONC ARIA PLAN ID: NORMAL
RAD ONC ARIA PLAN PRESCRIBED DOSE PER FRACTION: 2 GY
RAD ONC ARIA PLAN PRIMARY REFERENCE POINT: NORMAL
RAD ONC ARIA PLAN TOTAL FRACTIONS PRESCRIBED: 30
RAD ONC ARIA PLAN TOTAL PRESCRIBED DOSE: 6000 CGY
RAD ONC ARIA REFERENCE POINT DOSAGE GIVEN TO DATE: 58 GY
RAD ONC ARIA REFERENCE POINT DOSAGE GIVEN TO DATE: 60.13 GY
RAD ONC ARIA REFERENCE POINT ID: NORMAL
RAD ONC ARIA REFERENCE POINT ID: NORMAL
RAD ONC ARIA REFERENCE POINT SESSION DOSAGE GIVEN: 2 GY
RAD ONC ARIA REFERENCE POINT SESSION DOSAGE GIVEN: 2.07 GY

## 2022-10-28 PROCEDURE — 77336 RADIATION PHYSICS CONSULT: CPT | Performed by: RADIOLOGY

## 2022-10-28 PROCEDURE — 77386 CHG INTENSITY MODULATED RADIATION TX DLVR COMPLEX: CPT | Performed by: RADIOLOGY

## 2022-10-28 PROCEDURE — 77386: CPT | Performed by: RADIOLOGY

## 2022-10-28 PROCEDURE — 77014 CHG CT GUIDANCE RADIATION THERAPY FLDS PLACEMENT: CPT | Performed by: RADIOLOGY

## 2022-10-31 ENCOUNTER — TREATMENT (OUTPATIENT)
Dept: RADIATION ONCOLOGY | Facility: HOSPITAL | Age: 77
End: 2022-10-31

## 2022-10-31 ENCOUNTER — OFFICE VISIT (OUTPATIENT)
Dept: ONCOLOGY | Facility: CLINIC | Age: 77
End: 2022-10-31

## 2022-10-31 ENCOUNTER — LAB (OUTPATIENT)
Dept: LAB | Facility: HOSPITAL | Age: 77
End: 2022-10-31

## 2022-10-31 VITALS
TEMPERATURE: 97.3 F | SYSTOLIC BLOOD PRESSURE: 157 MMHG | DIASTOLIC BLOOD PRESSURE: 73 MMHG | OXYGEN SATURATION: 98 % | HEIGHT: 60 IN | BODY MASS INDEX: 24.66 KG/M2 | WEIGHT: 125.6 LBS | RESPIRATION RATE: 16 BRPM | HEART RATE: 105 BPM

## 2022-10-31 DIAGNOSIS — C34.91 PRIMARY LUNG ADENOCARCINOMA, RIGHT: Primary | ICD-10-CM

## 2022-10-31 PROBLEM — Z79.899 HIGH RISK MEDICATION USE: Status: ACTIVE | Noted: 2022-10-31

## 2022-10-31 LAB
ALBUMIN SERPL-MCNC: 3.6 G/DL (ref 3.5–5.2)
ALBUMIN/GLOB SERPL: 1.1 G/DL (ref 1.1–2.4)
ALP SERPL-CCNC: 97 U/L (ref 38–116)
ALT SERPL W P-5'-P-CCNC: 17 U/L (ref 0–33)
ANION GAP SERPL CALCULATED.3IONS-SCNC: 12.8 MMOL/L (ref 5–15)
AST SERPL-CCNC: 18 U/L (ref 0–32)
BASOPHILS # BLD AUTO: 0.02 10*3/MM3 (ref 0–0.2)
BASOPHILS NFR BLD AUTO: 0.6 % (ref 0–1.5)
BILIRUB SERPL-MCNC: 0.3 MG/DL (ref 0.2–1.2)
BUN SERPL-MCNC: 22 MG/DL (ref 6–20)
BUN/CREAT SERPL: 21 (ref 7.3–30)
CALCIUM SPEC-SCNC: 9.7 MG/DL (ref 8.5–10.2)
CHLORIDE SERPL-SCNC: 103 MMOL/L (ref 98–107)
CO2 SERPL-SCNC: 25.2 MMOL/L (ref 22–29)
CREAT SERPL-MCNC: 1.05 MG/DL (ref 0.6–1.1)
DEPRECATED RDW RBC AUTO: 69 FL (ref 37–54)
EGFRCR SERPLBLD CKD-EPI 2021: 55.5 ML/MIN/1.73
EOSINOPHIL # BLD AUTO: 0.1 10*3/MM3 (ref 0–0.4)
EOSINOPHIL NFR BLD AUTO: 2.8 % (ref 0.3–6.2)
ERYTHROCYTE [DISTWIDTH] IN BLOOD BY AUTOMATED COUNT: 23.8 % (ref 12.3–15.4)
GLOBULIN UR ELPH-MCNC: 3.3 GM/DL (ref 1.8–3.5)
GLUCOSE SERPL-MCNC: 128 MG/DL (ref 74–124)
HCT VFR BLD AUTO: 30 % (ref 34–46.6)
HGB BLD-MCNC: 9.5 G/DL (ref 12–15.9)
IMM GRANULOCYTES # BLD AUTO: 0.04 10*3/MM3 (ref 0–0.05)
IMM GRANULOCYTES NFR BLD AUTO: 1.1 % (ref 0–0.5)
LYMPHOCYTES # BLD AUTO: 0.51 10*3/MM3 (ref 0.7–3.1)
LYMPHOCYTES NFR BLD AUTO: 14.3 % (ref 19.6–45.3)
MCH RBC QN AUTO: 27.1 PG (ref 26.6–33)
MCHC RBC AUTO-ENTMCNC: 31.7 G/DL (ref 31.5–35.7)
MCV RBC AUTO: 85.5 FL (ref 79–97)
MONOCYTES # BLD AUTO: 0.23 10*3/MM3 (ref 0.1–0.9)
MONOCYTES NFR BLD AUTO: 6.5 % (ref 5–12)
NEUTROPHILS NFR BLD AUTO: 2.66 10*3/MM3 (ref 1.7–7)
NEUTROPHILS NFR BLD AUTO: 74.7 % (ref 42.7–76)
NRBC BLD AUTO-RTO: 0 /100 WBC (ref 0–0.2)
PLATELET # BLD AUTO: 221 10*3/MM3 (ref 140–450)
PMV BLD AUTO: 9 FL (ref 6–12)
POTASSIUM SERPL-SCNC: 4.5 MMOL/L (ref 3.5–4.7)
PROT SERPL-MCNC: 6.9 G/DL (ref 6.3–8)
RAD ONC ARIA COURSE ID: NORMAL
RAD ONC ARIA COURSE INTENT: NORMAL
RAD ONC ARIA COURSE LAST TREATMENT DATE: NORMAL
RAD ONC ARIA COURSE START DATE: NORMAL
RAD ONC ARIA COURSE TREATMENT ELAPSED DAYS: 41
RAD ONC ARIA FIRST TREATMENT DATE: NORMAL
RAD ONC ARIA PLAN FRACTIONS TREATED TO DATE: 30
RAD ONC ARIA PLAN ID: NORMAL
RAD ONC ARIA PLAN PRESCRIBED DOSE PER FRACTION: 2 GY
RAD ONC ARIA PLAN PRIMARY REFERENCE POINT: NORMAL
RAD ONC ARIA PLAN TOTAL FRACTIONS PRESCRIBED: 30
RAD ONC ARIA PLAN TOTAL PRESCRIBED DOSE: 6000 CGY
RAD ONC ARIA REFERENCE POINT DOSAGE GIVEN TO DATE: 60 GY
RAD ONC ARIA REFERENCE POINT DOSAGE GIVEN TO DATE: 62.2 GY
RAD ONC ARIA REFERENCE POINT ID: NORMAL
RAD ONC ARIA REFERENCE POINT ID: NORMAL
RAD ONC ARIA REFERENCE POINT SESSION DOSAGE GIVEN: 2 GY
RAD ONC ARIA REFERENCE POINT SESSION DOSAGE GIVEN: 2.07 GY
RBC # BLD AUTO: 3.51 10*6/MM3 (ref 3.77–5.28)
SODIUM SERPL-SCNC: 141 MMOL/L (ref 134–145)
WBC NRBC COR # BLD: 3.56 10*3/MM3 (ref 3.4–10.8)

## 2022-10-31 PROCEDURE — 36415 COLL VENOUS BLD VENIPUNCTURE: CPT

## 2022-10-31 PROCEDURE — 80053 COMPREHEN METABOLIC PANEL: CPT

## 2022-10-31 PROCEDURE — 99214 OFFICE O/P EST MOD 30 MIN: CPT | Performed by: INTERNAL MEDICINE

## 2022-10-31 PROCEDURE — 77014 CHG CT GUIDANCE RADIATION THERAPY FLDS PLACEMENT: CPT | Performed by: RADIOLOGY

## 2022-10-31 PROCEDURE — 77386 CHG INTENSITY MODULATED RADIATION TX DLVR COMPLEX: CPT | Performed by: RADIOLOGY

## 2022-10-31 PROCEDURE — 85025 COMPLETE CBC W/AUTO DIFF WBC: CPT

## 2022-10-31 PROCEDURE — 77386: CPT | Performed by: RADIOLOGY

## 2022-10-31 NOTE — PROGRESS NOTES
Subjective     REASON FOR FOLLOW UP:   1.  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  PD-L1 positive greater than 95%  3.  Plan for primary chemoradiation with weekly carboplatin and Taxol to be followed by maintenance immunotherapy for 1 year.    HISTORY OF PRESENT ILLNESS:  The patient is a 77 y.o. year old female who is here for an opinion about the above issue.  She is a former smoker referred to us now from thoracic surgery (Dr. Remedios Rice) for evaluation and treatment of clinical stage III adenocarcinoma of the lung.  She was having persistent cough and underwent chest x-ray initially in late June which showed possible right upper lobe mass.  This was followed by CT scan of the chest confirming the presence of a right upper lobe mass.  She initially underwent a CT-guided needle biopsy on 7/22/2022 which was nondiagnostic.    She was referred to Dr. Glass for bronchoscopy and biopsy which was performed on 8/23/2022 with pathology returning as poorly differentiated adenocarcinoma.  PD-L1 stain on the tissue was positive at greater than 95%.    She underwent further staging with PET scan and MRI of the brain.  PET scan was performed on 8/12/2022 showing hypermetabolic activity in the large mass in the right upper lobe as well as mediastinal lymph nodes.  She was noted to have a mass on the kidney as well but this was not hypermetabolic.  There was no obvious distant metastatic disease.    MRI of the brain from 8/15/2022 likewise showed no evidence of metastatic disease.  She is scheduled also to see Dr. Hector Rodriguez and radiation oncology on 9/7/2022.    We recommended weekly carboplatin and Taxol concurrent with radiation therapy.  Following completion of treatment she will receive immunotherapy for maintenance   (She does have a diagnosis of rheumatoid arthritis and is currently taking only Celebrex.  This could become an issue regarding her ability to tolerate immunotherapy in the future).    INTERVAL  HISTORY:  She is due for her final fraction of radiation today.  She has completed 6 weeks of carboplatin and Taxol and tolerated it well.  At this point we plan to discontinue chemotherapy and review scans.    If she has good response on scans with no evidence of metastatic disease we likely will initiate 12 months of immunotherapy with durvalumab.    She denies other concerns this time.      History of Present Illness     Past Medical History:   Diagnosis Date   • COPD (chronic obstructive pulmonary disease) (HCC)    • Coughing up blood     X2 MONTHS   • History of COVID-19 02/2022   • Hyperlipidemia    • IBS (irritable bowel syndrome)    • Primary lung adenocarcinoma, right (HCC)    • Rheumatoid arthritis (HCC)         Past Surgical History:   Procedure Laterality Date   • APPENDECTOMY      appendix removed   • BRONCHOSCOPY N/A 8/23/2022    Procedure: BRONCHOSCOPY WITH BAL,  BIOPSIES, AND BRUSHINGS WITH ENDOBRONCHIAL ULTRASOUND WITH FNA;  Surgeon: Gregor Vee MD;  Location: [facility] ENDOSCOPY;  Service: Pulmonary;  Laterality: N/A;  PRE- HILAR MASS  POST- SAME   • CAROTID ENDARTERECTOMY Right    • CAROTID ENDARTERECTOMY Left    • CATARACT EXTRACTION     • CERVICAL FUSION     • CHOLECYSTECTOMY     • HYSTERECTOMY     • SHOULDER ARTHROSCOPY Right 02/19/2019    right should scope/cuff repair    • VENOUS ACCESS DEVICE (PORT) INSERTION Right 9/6/2022    Procedure: POWERPORT INSERTION;  Surgeon: Remedios Rice MD;  Location: [facility] OR;  Service: Thoracic;  Laterality: Right;        Current Outpatient Medications on File Prior to Visit   Medication Sig Dispense Refill   • azelastine (ASTELIN) 0.1 % nasal spray 1 spray into the nostril(s) as directed by provider.     • B COMPLEX VITAMINS ER PO Take  by mouth Daily.     • Cholecalciferol (VITAMIN D3) 5000 units capsule capsule Take 2,000 Units by mouth Daily.     • Colestipol HCl (COLESTID PO) Take 1 tablet by mouth Daily.     • esomeprazole (NexIUM) 10 MG packet  Take 10 mg by mouth Daily.     • estradiol (ESTRACE) 1 MG tablet Take 1 mg by mouth Daily.     • ferrous sulfate 325 (65 FE) MG tablet Take 1 tablet by mouth Daily With Breakfast. 30 tablet 5   • HYDROcodone Bit-Homatrop MBr (HYCODAN) 5-1.5 MG/5ML solution Take 5 mL by mouth Every 6 (Six) Hours As Needed for Cough. 473 mL 0   • lidocaine-prilocaine (EMLA) 2.5-2.5 % cream Apply 1 application topically to the appropriate area as directed Every 2 (Two) Hours As Needed for Mild Pain. 5 g 3   • Multiple Vitamins-Minerals (PRESERVISION AREDS 2+MULTI VIT PO) Take  by mouth.     • ondansetron (ZOFRAN) 8 MG tablet Take 1 tablet by mouth 3 (Three) Times a Day As Needed for Nausea or Vomiting. 30 tablet 5   • predniSONE (DELTASONE) 10 MG tablet Take 2 tablets by mouth Daily. (Patient taking differently: Take 1 tablet by mouth Daily.) 50 tablet 1     No current facility-administered medications on file prior to visit.        ALLERGIES:    Allergies   Allergen Reactions   • Metronidazole Hives   • Ofloxacin Hives and Nausea Only   • Del-Mycin [Erythromycin] Hives   • Gentamicin Hives   • Ibuprofen Nausea And Vomiting   • Latex Dermatitis     GLOVES   • Penicillins Hives   • Tetracycline Hives   • Adhesive Tape Dermatitis     BANDAIDS   • Aspirin GI Intolerance     Vomiting can take EC   • Codeine Nausea And Vomiting        Social History     Socioeconomic History   • Marital status:    Tobacco Use   • Smoking status: Former     Types: Cigarettes     Quit date: 2022     Years since quittin.5   • Smokeless tobacco: Never   • Tobacco comments:     Occasionally smoking 1-2 cigs   Vaping Use   • Vaping Use: Never used   Substance and Sexual Activity   • Alcohol use: Yes     Alcohol/week: 1.0 standard drink     Types: 1 Glasses of wine per week     Comment: occasionally   • Drug use: Never   • Sexual activity: Defer        Family History   Problem Relation Age of Onset   • Cancer Mother    • Cancer Father    • Sepideh  "Hyperthermia Neg Hx         Review of Systems   Constitutional: Negative for activity change, chills, fatigue and fever.   HENT: Negative for mouth sores, trouble swallowing and voice change.    Eyes: Negative for pain and visual disturbance.   Respiratory: Positive for cough. Negative for wheezing.    Cardiovascular: Negative for chest pain and palpitations.   Gastrointestinal: Negative for abdominal pain, constipation, diarrhea, nausea and vomiting.   Genitourinary: Negative for difficulty urinating, frequency and urgency.   Musculoskeletal: Negative for arthralgias and joint swelling.   Skin: Negative for rash.   Neurological: Negative for dizziness, seizures, weakness and headaches.   Hematological: Negative for adenopathy. Does not bruise/bleed easily.   Psychiatric/Behavioral: Negative for behavioral problems and confusion. The patient is not nervous/anxious.     10/31/2022 ROS updated     Objective     Vitals:    10/31/22 0906   BP: 157/73   Pulse: 105   Resp: 16   Temp: 97.3 °F (36.3 °C)   TempSrc: Temporal   SpO2: 98%   Weight: 57 kg (125 lb 9.6 oz)   Height: 152.4 cm (60\")   PainSc: 0-No pain     Current Status 10/31/2022   ECOG score 0       Physical Exam  Vitals reviewed.   Constitutional:       General: She is not in acute distress.     Appearance: Normal appearance. She is well-developed.   HENT:      Head: Normocephalic and atraumatic.      Mouth/Throat:      Pharynx: No oropharyngeal exudate.   Eyes:      Pupils: Pupils are equal, round, and reactive to light.   Cardiovascular:      Rate and Rhythm: Normal rate and regular rhythm.      Heart sounds: Normal heart sounds. No murmur heard.  Pulmonary:      Effort: Pulmonary effort is normal. No respiratory distress.      Breath sounds: Decreased breath sounds present. No wheezing, rhonchi or rales.   Abdominal:      General: Bowel sounds are normal. There is no distension.      Palpations: Abdomen is soft.   Musculoskeletal:         General: Normal " range of motion.      Cervical back: Normal range of motion.   Skin:     General: Skin is warm and dry.      Findings: No rash.   Neurological:      Mental Status: She is alert and oriented to person, place, and time.      I have reexamined the patient and the results are consistent with the previously documented exam. Cruzito Lagunas MD        RECENT LABS:  Results from last 7 days   Lab Units 10/31/22  0855 10/25/22  0906   WBC 10*3/mm3 3.56 6.04   NEUTROS ABS 10*3/mm3 2.66 5.22   HEMOGLOBIN g/dL 9.5* 9.6*   HEMATOCRIT % 30.0* 31.6*   PLATELETS 10*3/mm3 221 195     Results from last 7 days   Lab Units 10/25/22  0906   SODIUM mmol/L 136   POTASSIUM mmol/L 4.0   CHLORIDE mmol/L 98   CO2 mmol/L 25.3   BUN mg/dL 17   CREATININE mg/dL 0.97   CALCIUM mg/dL 9.9   ALBUMIN g/dL 3.60   BILIRUBIN mg/dL 0.2   ALK PHOS U/L 88   ALT (SGPT) U/L 20   AST (SGOT) U/L 18   GLUCOSE mg/dL 109               BRONCHOSCOPY PATHOLOGY 8/23/2022  Final Diagnosis   1. Lung, Right Upper Lobe, Endobronchial Biopsies: INVASIVE POORLY DIFFERENTIATED ADENOCARCINOMA OF PULMONARY ORIGIN.   PDL-1 POSITIVE >95%    F-18 FDG PET FROM SKULL BASE TO MID THIGH WITH PET/CT FUSION 8/12/2022  IMPRESSION:  1.  Large mass centered within the right upper lobe representing  patient's known malignancy. Interlobular septal thickening and ground  glass opacification projecting from the periphery of the mass throughout  the right upper lobe is highly concerning for lymphangitic spread. Of  note, the mass abuts the pleura and mediastinum over a long segment and  underlying invasion cannot be excluded.  2.  FDG avid hilar and mediastinal adenopathy concerning for metastatic  disease.  3.  A few irregular subcentimeter pulmonary nodules are present within  the right lower and left upper lobes measuring up to 0.9 cm which while  nonspecific raises concern for metastatic disease. At least close  attention on followup is recommended.   4.  Minimally hypermetabolic arnoldo  hepatic node and bilateral sub-6 mm  pulmonary nodules are indeterminate. Continued followup with chest and  abdominal CT in 3 months is recommended.  5.  Indeterminate density 4.4 cm mass arises off the right kidney.  Further evaluation with multiphase CT or MRI of the abdomen with and  without contrast is recommended to exclude neoplasm.  6.  Focal uptake over the right shoulder in the area of prior rotator  cuff repair is favored be postsurgical with metastatic disease less  likely.  7.  Other findings as above.    MRI OF THE BRAIN WITH AND WITHOUT CONTRAST 08/15/2022   IMPRESSION:  1. There is mild-to-moderate small vessel disease in cerebral and  central pontine white matter. Otherwise, the MRI of the brain is normal  with no evidence of metastatic disease to the brain.   2. There are some nonspecific signal abnormality in the C4 cervical  vertebra with T1 low signal throughout the visualized portions C4  cervical vertebrae on the sagittal images. The patient has had prior  cervical spine surgery as demonstrated on prior PET scan and this argues  in favor that the T1 low signal and C4 vertebrae is related to sclerosis  from degenerative disc changes at C4-5 although an osseous metastatic  lesion in the C4 vertebra cannot be excluded on the basis of this exam.  Apparently the C4 vertebra was not hot on the recent PET scan which is  further evidence that this is degenerative in origin and correlation  with recent PET scan is suggested.      Assessment & Plan   · 1.  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  Patient is not felt to be a surgical candidate and would best be treated with combined chemotherapy and radiation.   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  · 9/20/2022 1st dose of weekly carboplatin/taxol.   · She completed 6 cycles of weekly CarboTaxol 10/25/2022.    2.  Tumor is strongly PD-L1 positive greater than 95% (although the patient may not be a great candidate  for immunotherapy in the future due to her rheumatoid arthritis).  3.  Other comorbidities including vascular disease with previous carotid endarterectomy surgeries.  She has no known history of stroke or myocardial infarction.  Overall her performance status is very good.  4.  Hemoptysis related to her central tumor.  Improved with initiation of therapy.  5.  Rheumatoid arthritis: Patient previously on Celebrex, but discontinued due to hemoptysis.  Patient started on prednisone 20 mg daily prescribed to manage her arthritis pain.  Prednisone is since been decreased to 10 mg daily.      Recommendations  1. Patient will be receiving her final dose of radiation therapy today.  2. She had an appointment scheduled for week #7 of carbo Taxol tomorrow but we will cancel this and plan to proceed with scans to assess response with CT scan of the chest abdomen pelvis in 3 weeks.  3. Return in 4 week for follow-up with Dr. Lagunas and review the scan results.  4. Continue prednisone to 10 mg daily for rheumatoid arthritis.  5. For now we will continue iron supplementation with ferrous sulfate 325 mg p.o. once daily with food.  6. We will plan to initiate durvalumab therapy on the next visit assuming she is recovered from the ill effects of the radiation and chemotherapy and her scans do not show evidence of disease progression.    This patient is on high risk drug therapy requiring intensive monitoring for toxicity.             Cruzito Lagunas MD   10/31/2022

## 2022-11-01 ENCOUNTER — TELEPHONE (OUTPATIENT)
Dept: ONCOLOGY | Facility: CLINIC | Age: 77
End: 2022-11-01

## 2022-11-01 ENCOUNTER — APPOINTMENT (OUTPATIENT)
Dept: ONCOLOGY | Facility: HOSPITAL | Age: 77
End: 2022-11-01

## 2022-11-01 NOTE — TELEPHONE ENCOUNTER
Returned call to patient after reviewing her lab work and chart.  Approval given to have her flu vaccine.  She v/u.

## 2022-11-01 NOTE — TELEPHONE ENCOUNTER
Caller: SHANA    Relationship to patient: SELF    Best call back number: [phone]    Patient is needing: TO KNOW WHEN SHE CAN GET HER FLU INJECTION.

## 2022-11-04 LAB
RAD ONC ARIA COURSE END DATE: NORMAL
RAD ONC ARIA COURSE ID: NORMAL
RAD ONC ARIA COURSE INTENT: NORMAL
RAD ONC ARIA COURSE LAST TREATMENT DATE: NORMAL
RAD ONC ARIA COURSE START DATE: NORMAL
RAD ONC ARIA COURSE TREATMENT ELAPSED DAYS: 41
RAD ONC ARIA FIRST TREATMENT DATE: NORMAL
RAD ONC ARIA PLAN FRACTIONS TREATED TO DATE: 30
RAD ONC ARIA PLAN ID: NORMAL
RAD ONC ARIA PLAN NAME: NORMAL
RAD ONC ARIA PLAN PRESCRIBED DOSE PER FRACTION: 2 GY
RAD ONC ARIA PLAN PRIMARY REFERENCE POINT: NORMAL
RAD ONC ARIA PLAN TOTAL FRACTIONS PRESCRIBED: 30
RAD ONC ARIA PLAN TOTAL PRESCRIBED DOSE: 6000 CGY
RAD ONC ARIA REFERENCE POINT DOSAGE GIVEN TO DATE: 60 GY
RAD ONC ARIA REFERENCE POINT DOSAGE GIVEN TO DATE: 62.2 GY
RAD ONC ARIA REFERENCE POINT ID: NORMAL
RAD ONC ARIA REFERENCE POINT ID: NORMAL

## 2022-11-06 DIAGNOSIS — C34.91 PRIMARY LUNG ADENOCARCINOMA, RIGHT: ICD-10-CM

## 2022-11-06 DIAGNOSIS — R04.2 COUGH WITH HEMOPTYSIS: ICD-10-CM

## 2022-11-07 RX ORDER — PREDNISONE 10 MG/1
10 TABLET ORAL DAILY
Qty: 30 TABLET | Refills: 1 | Status: SHIPPED | OUTPATIENT
Start: 2022-11-07 | End: 2023-01-09

## 2022-11-18 ENCOUNTER — PATIENT OUTREACH (OUTPATIENT)
Dept: OTHER | Facility: HOSPITAL | Age: 77
End: 2022-11-18

## 2022-11-18 DIAGNOSIS — C34.91 PRIMARY LUNG ADENOCARCINOMA, RIGHT: ICD-10-CM

## 2022-11-18 RX ORDER — HYDROCODONE BITARTRATE AND HOMATROPINE METHYLBROMIDE ORAL SOLUTION 5; 1.5 MG/5ML; MG/5ML
5 LIQUID ORAL EVERY 6 HOURS PRN
Qty: 250 ML | Refills: 0 | Status: SHIPPED | OUTPATIENT
Start: 2022-11-18 | End: 2022-12-12 | Stop reason: SDUPTHER

## 2022-11-18 NOTE — PROGRESS NOTES
"Called patient. She is doing ok. A few days after radiation was completed, she got her flu shot and has been sick since then. She continues to complain of a cough, which has improved. She stated she discussed it with Dr. Lagunas and he was going to refill her cough medication but she hasn't heard back. I will call his nurse about it. She meets with her PCP on Monday and will discuss with him if she doesn't hear back from Dr. Lagunas's office.     The patient complains of being \"exhausted\" after radiation therapy although denies other treatment related side effects. She has been off work since treatment was started and hopes to return soon.    She is scheduled for a CT scan on Monday and has immunotherapy teaching with the APRN.  She is scheduled to meet with Dr. Lagunas and start Imfinzi on 11/28.    She stated that she plans to schedule a massage in the cancer center soon. She denied any additional supportive care needs at this time.       I will call her in several weeks; she will call me as needed.    Called Dr. Bebo Lemus's nurse. Asked about cough syrup. She thought there was a refill. She will investigate and call patient.   "

## 2022-11-21 ENCOUNTER — HOSPITAL ENCOUNTER (OUTPATIENT)
Dept: CT IMAGING | Facility: HOSPITAL | Age: 77
Discharge: HOME | End: 2022-11-21
Admitting: INTERNAL MEDICINE

## 2022-11-21 ENCOUNTER — INFUSION (OUTPATIENT)
Dept: ONCOLOGY | Facility: HOSPITAL | Age: 77
End: 2022-11-21

## 2022-11-21 ENCOUNTER — OFFICE VISIT (OUTPATIENT)
Dept: ONCOLOGY | Facility: CLINIC | Age: 77
End: 2022-11-21

## 2022-11-21 VITALS
HEART RATE: 97 BPM | RESPIRATION RATE: 18 BRPM | TEMPERATURE: 97.5 F | HEIGHT: 59 IN | BODY MASS INDEX: 24.76 KG/M2 | OXYGEN SATURATION: 96 % | DIASTOLIC BLOOD PRESSURE: 76 MMHG | WEIGHT: 122.8 LBS | SYSTOLIC BLOOD PRESSURE: 160 MMHG

## 2022-11-21 DIAGNOSIS — C34.91 PRIMARY LUNG ADENOCARCINOMA, RIGHT: ICD-10-CM

## 2022-11-21 DIAGNOSIS — C34.91 PRIMARY LUNG ADENOCARCINOMA, RIGHT: Primary | ICD-10-CM

## 2022-11-21 PROCEDURE — 25010000002 IOPAMIDOL 61 % SOLUTION: Performed by: INTERNAL MEDICINE

## 2022-11-21 PROCEDURE — 74177 CT ABD & PELVIS W/CONTRAST: CPT

## 2022-11-21 PROCEDURE — 0 DIATRIZOATE MEGLUMINE & SODIUM PER 1 ML: Performed by: INTERNAL MEDICINE

## 2022-11-21 PROCEDURE — 71260 CT THORAX DX C+: CPT

## 2022-11-21 PROCEDURE — 99213 OFFICE O/P EST LOW 20 MIN: CPT | Performed by: NURSE PRACTITIONER

## 2022-11-21 RX ADMIN — IOPAMIDOL 100 ML: 612 INJECTION, SOLUTION INTRAVENOUS at 12:44

## 2022-11-21 RX ADMIN — DIATRIZOATE MEGLUMINE AND DIATRIZOATE SODIUM 30 ML: 660; 100 LIQUID ORAL; RECTAL at 11:30

## 2022-11-21 NOTE — PROGRESS NOTES
TREATMENT  PREPARATION    Pam Vidal  [id number]  1945    Chief Complaint: Treatment preparation and needs assessment    History of present illness:  Pam Vidal is a 77 y.o. year old female who is here today for treatment preparation and needs assessment.  The patient has been diagnosed with   Encounter Diagnosis   Name Primary?   • Primary lung adenocarcinoma, right (HCC) Yes    and is scheduled to begin treatment with:     Oncology History:    Oncology/Hematology History   Primary lung adenocarcinoma, right (HCC)   8/30/2022 Initial Diagnosis    Primary lung adenocarcinoma, right (HCC)     8/30/2022 Cancer Staged    Staging form: Lung, AJCC 8th Edition  - Clinical stage from 8/30/2022: Stage IIIA (cT2b, cN2, cM0) - Signed by Cruzito Lagunas MD on 8/30/2022 9/20/2022 - 10/25/2022 Chemotherapy    OP LUNG PACLitaxel / CARBOplatin AUC=2 (Weekly) + XRT      10/4/2022 -  Chemotherapy    OP CENTRAL VENOUS ACCESS DEVICE ACCESS, CARE, AND MAINTENANCE (CVAD)     11/28/2022 -  Chemotherapy    OP LUNG Durvalumab         The current medication list and allergy list were reviewed and reconciled.     Past Medical History, Past Surgical History, Social History, Family History have been reviewed and are without significant changes except as mentioned.    Physical Exam:    Vitals:    11/21/22 1257   BP: 160/76   Pulse: 97   Resp: 18   Temp: 97.5 °F (36.4 °C)   SpO2: 96%     Vitals:    11/21/22 1257   PainSc: 0-No pain        ECOG score: 0             Physical Exam  HENT:      Head: Normocephalic and atraumatic.   Eyes:      Extraocular Movements: Extraocular movements intact.      Conjunctiva/sclera: Conjunctivae normal.   Pulmonary:      Effort: Pulmonary effort is normal. No respiratory distress.   Neurological:      General: No focal deficit present.      Mental Status: She is alert and oriented to person, place, and time.   Psychiatric:         Mood and Affect: Mood normal.         Behavior: Behavior  normal.           NEEDS ASSESSMENTS    Genetics  The patient's new diagnosis and family history have been reviewed for genetic counseling needs. A genetic referral is not recommended.     Psychosocial and Barriers to care  The patient has completed a PHQ-9 Depression Screening and the Distress Thermometer (DT) today.  PHQ-9 results show PHQ-2 Total Score: 0 PHQ-9 Total Score: PHQ-9 Total Score: 0     The patient scored their distress today as Distress Level: 0-No distress on a scale of 0-10 with 0 being no distress and 10 being extreme distress. Problems marked by the patient as being an issue for them within the last week include   .      Results were reviewed along with psychosocial resources offered by our cancer center.  Our Supportive Oncology team will be flagged for a score of 4 or above, and a same day call will be made for a score of 9 or 10.  A mental health referral is offered at that time. Patients who score less than 4 have been educated on our support services and can be referred to our  upon request.  The patient will not be referred to our .       Nutrition  The patient has completed the malnutrition screening today. They scored Malnutrition Screening Tool  Have you recently lost weight without trying?  If yes, how much weight have you lost?: 0--> No  Have you been eating poorly because of a decreased appetite?: 0--> No  MST score: 0   with a score of 0-1 meaning not at risk in a score of 2 or greater meaning at risk.  Patients with a score of 3 or higher will be referred to our oncology dietitian for support. Patients beginning at risk treatment regimens or who have dietary concerns will also be referred to our oncology dietitian. NOT APPLICABLE    Functional Assessment  Persons who are age 70 or greater will be screened for qualification of a comprehensive geriatric assessment by our survivorship nurse practitioner.  Older adults with cancer face unique challenges. These  may include an increased risk of drug reactions, financial burdens, and caregiver stress. The patient scored G8 Questionnaire  Has food intake declined over the past 3 months due to loss of appetite, digestive problems, chewing or swallowing difficulties?: No decrease in food intake  Weight loss during the last 3 months: Weight loss between 1 kg and 3 kg / 2.2 lbs and 6.6 lbs  Mobility: Goes out  Neuropsychological Problems: No psychological problems  Body Mass Index (BMI (weight in kg) / (height in m2)): BMI 23 and > 23  Takes more than 3 medications a day: Yes, takes more than 3 prescription drugs per day  In comparison with other people of the same age, how does the patient consider his/her health status?: Better  Age: < 80  Total Score: 15 . Patients scoring 14 or lower will referred for an older adult functional assessment with the [facility] advanced practice registered nurse to ensure all needed support is provided as patients plan for their treatments. NOT APPLICABLE    Intravenous Access Assessment  The patient and I discussed planned intravenous chemo/biotherapy as well as other IV treatments that are often needed throughout the course of treatment. These may include, but are not limited to blood transfusions, antibiotics, and IV hydration. Discussed that depending on selected treatment and vein assessment, patient may require venous access device (VAD) which could include but not limited to a Mediport or PICC line. Risks and benefits of VADs reviewed. The patient will be treated via Port.    Reproductive/Sexual Activity   People should avoid becoming pregnant and should not get a partner pregnant while undergoing chemo/biotherapy.  People of childbearing age should use effective contraception during active therapy. The best recommendation for all people is to use a barrier method for a minimum of 1 week after the last infusion of chemo/biotherapy to prevent your partner being exposed to byproducts from  "treatment medications in bodily fluids. Effective contraception should be discussed with your oncology team to make sure it is safe to take based on your diagnosis. Possible options include oral contraceptives, barrier methods. Chemo/biotherapy can change your ability to reproduce children in the future.  There are options for fertility preservation. NOT APPLICABLE    Advanced Care Planning  Advance Care Planning   The patient and I discussed advanced care planning, \"Conversations that Matter\".   This service is offered for development of advance directives with a certified ACP facilitator.  The patient does not have an up-to-date advanced directive. This document is not on file with our office. The patient is not interested in an appointment with one of our facilitators to create or update their advanced directives.     Smoking cessation  Tobacco Use: Medium Risk   • Smoking Tobacco Use: Former   • Smokeless Tobacco Use: Never   • Passive Exposure: Not on file       Patient and I discussed their tobacco use history. Referral will not be made for smoking cessation.      Palliative Care  When appropriate, the patient and I discussed the availability palliative care services and when appropriate Hospice care. Palliative care is not the same as Hospice care which was explained to the patient.  The patient is not interested in additional information from our  on these services.     Survivorship   When appropriate, we discussed that we will refer the patient to survivorship clinic to discuss next steps following completion of planned treatment.  Reviewed this visit will include assessment of your physical, psychological, functional, and spiritual needs as a survivor and the need at attend this visit when scheduled.    TREATMENT EDUCATION    Today I met with the patient to discuss the chemo/biotherapy regimen recommended for treatment of Primary lung adenocarcinoma, right (HCC)  .  The patient was given " explanation of treatment premed side effects including office policy that prohibits patients to drive if sedating medications are administered, MD explanation given regarding benefits, side effects, toxicities and goals of treatment.  The patient received a Chemotherapy/Biotherapy Plan Summary including diagnosis and explanation of specific treatment plan.    SIDE EFFECTS:  Common side effects were discussed with the patient and/or significant other.  Discussion included where applicable hair loss/discoloration, anemia/fatigue, infection/chills/fever, appetite, bleeding risk/precautions, constipation, diarrhea, mouth sores, taste alteration, loss of appetite, nausea/vomiting, peripheral neuropathy, skin/nail changes, rash, muscle aches/weakness, photosensitivity, weight gain/loss, hearing loss, dizziness, menopausal symptoms, menstrual irregularity, sterility, high blood pressure, heart damage, liver damage, lung damage, kidney damage, DVT/PE risk, fluid retention, pleural/pericardial effusion, somnolence, electrolyte/LFT imbalance, vein exercises and/or the possible need for vascular access/port placement.  The patient was advised that although uncommon, leakage of an infused medication from the vein or venous access device may lead to skin breakdown and/or other tissue damage.  The patient was advised that he/she may have pain, bleeding, and/or bruising from the insertion of a needle in their vein or venous access device (port).  The patient was further advised that, in spite of proper technique, infection with redness and irritation may rarely occur at the site where the needle was inserted.  The patient was advised that if complications occur, additional medical treatment is available.  Finally, where applicable we have reviewed rare but potential immune mediated side effects including shortness of breath, cough, chest pain (pneumonitis), abdominal pain, diarrhea (colitis), thyroiditis (hypothyroid or  hyperthyroid), hepatitis and liver dysfunction, nephritis and renal dysfunction.    Discussion also included side effects specific to drugs in the treatment plan, specifically:    Treatment Plans     Name Type Plan Dates Plan Provider         Active    OP LUNG Durvalumab ONCOLOGY TREATMENT  11/27/2022 - Present Cruzito Lagunas MD                    Questions answered and additional information discussed on topics including:  Diarrhea, Nervous system changes, Organ toxicities and Home care       Assessment and Plan:    Diagnoses and all orders for this visit:    1. Primary lung adenocarcinoma, right (HCC) (Primary)      No orders of the defined types were placed in this encounter.        1. The patient and I have reviewed their diagnosis and scheduled treatment plan. Needs assessment was completed where applicable including genetics, psychosocial needs, barriers to care, VAD evaluation, advanced care planning, survivorship, and palliative care services where indicated. Referrals have been ordered as appropriate based upon evaluation today and patient desires.   2. Chemo/biotherapy teaching was completed today and consent obtained. See separate documentation for further details.  3. Adequate time was given to answer questions.  Patient made aware of their care team members and contact information if they have questions or problems throughout the treatment course.  4. Discussion held and written information provided describing frequency of office visits and ongoing monitoring throughout the treatment plan.     5. Reviewed with patient any prescribed medication sent to pharmacy.  Education provided regarding proper storage, safe handling, and proper disposal of unused medication.  6. Proper handling of body fluids and waste discussed and written information provided.  7. If appropriate, patient had pretreatment labs drawn today.    Learning assessment completed at initial patient encounter. See separate flowsheet.  Chemo/biotherapy education comprehension assessed at today's visit.    I spent 25 minutes caring for Pam on this date of service. This time includes time spent by me in the following activities: preparing for the visit, reviewing tests, obtaining and/or reviewing a separately obtained history, counseling and educating the patient/family/caregiver, documenting information in the medical record and care coordination.     Rowena Aragon, APRN   11/21/22

## 2022-11-22 ENCOUNTER — PATIENT OUTREACH (OUTPATIENT)
Dept: OTHER | Facility: HOSPITAL | Age: 77
End: 2022-11-22

## 2022-11-28 ENCOUNTER — OFFICE VISIT (OUTPATIENT)
Dept: ONCOLOGY | Facility: CLINIC | Age: 77
End: 2022-11-28

## 2022-11-28 ENCOUNTER — INFUSION (OUTPATIENT)
Dept: ONCOLOGY | Facility: HOSPITAL | Age: 77
End: 2022-11-28

## 2022-11-28 ENCOUNTER — DOCUMENTATION (OUTPATIENT)
Dept: ONCOLOGY | Facility: CLINIC | Age: 77
End: 2022-11-28

## 2022-11-28 VITALS
WEIGHT: 125.6 LBS | DIASTOLIC BLOOD PRESSURE: 69 MMHG | HEART RATE: 84 BPM | OXYGEN SATURATION: 97 % | HEIGHT: 59 IN | SYSTOLIC BLOOD PRESSURE: 135 MMHG | RESPIRATION RATE: 18 BRPM | BODY MASS INDEX: 25.32 KG/M2 | TEMPERATURE: 97.3 F

## 2022-11-28 DIAGNOSIS — Z79.899 HIGH RISK MEDICATION USE: Primary | ICD-10-CM

## 2022-11-28 DIAGNOSIS — Z79.899 HIGH RISK MEDICATION USE: ICD-10-CM

## 2022-11-28 DIAGNOSIS — C34.91 PRIMARY LUNG ADENOCARCINOMA, RIGHT: ICD-10-CM

## 2022-11-28 LAB
ALBUMIN SERPL-MCNC: 3.7 G/DL (ref 3.5–5.2)
ALBUMIN/GLOB SERPL: 1.2 G/DL (ref 1.1–2.4)
ALP SERPL-CCNC: 82 U/L (ref 38–116)
ALT SERPL W P-5'-P-CCNC: 14 U/L (ref 0–33)
ANION GAP SERPL CALCULATED.3IONS-SCNC: 13.5 MMOL/L (ref 5–15)
AST SERPL-CCNC: 19 U/L (ref 0–32)
BASOPHILS # BLD AUTO: 0.06 10*3/MM3 (ref 0–0.2)
BASOPHILS NFR BLD AUTO: 0.7 % (ref 0–1.5)
BILIRUB SERPL-MCNC: 0.3 MG/DL (ref 0.2–1.2)
BUN SERPL-MCNC: 20 MG/DL (ref 6–20)
BUN/CREAT SERPL: 20 (ref 7.3–30)
CALCIUM SPEC-SCNC: 9.4 MG/DL (ref 8.5–10.2)
CHLORIDE SERPL-SCNC: 102 MMOL/L (ref 98–107)
CO2 SERPL-SCNC: 23.5 MMOL/L (ref 22–29)
CREAT SERPL-MCNC: 1 MG/DL (ref 0.6–1.1)
DEPRECATED RDW RBC AUTO: 80.3 FL (ref 37–54)
EGFRCR SERPLBLD CKD-EPI 2021: 58.9 ML/MIN/1.73
EOSINOPHIL # BLD AUTO: 0.14 10*3/MM3 (ref 0–0.4)
EOSINOPHIL NFR BLD AUTO: 1.6 % (ref 0.3–6.2)
ERYTHROCYTE [DISTWIDTH] IN BLOOD BY AUTOMATED COUNT: 24.1 % (ref 12.3–15.4)
GLOBULIN UR ELPH-MCNC: 3.1 GM/DL (ref 1.8–3.5)
GLUCOSE SERPL-MCNC: 89 MG/DL (ref 74–124)
HCT VFR BLD AUTO: 33.6 % (ref 34–46.6)
HGB BLD-MCNC: 10.7 G/DL (ref 12–15.9)
IMM GRANULOCYTES # BLD AUTO: 0.07 10*3/MM3 (ref 0–0.05)
IMM GRANULOCYTES NFR BLD AUTO: 0.8 % (ref 0–0.5)
LYMPHOCYTES # BLD AUTO: 0.94 10*3/MM3 (ref 0.7–3.1)
LYMPHOCYTES NFR BLD AUTO: 10.8 % (ref 19.6–45.3)
MCH RBC QN AUTO: 29.3 PG (ref 26.6–33)
MCHC RBC AUTO-ENTMCNC: 31.8 G/DL (ref 31.5–35.7)
MCV RBC AUTO: 92.1 FL (ref 79–97)
MONOCYTES # BLD AUTO: 0.95 10*3/MM3 (ref 0.1–0.9)
MONOCYTES NFR BLD AUTO: 10.9 % (ref 5–12)
NEUTROPHILS NFR BLD AUTO: 6.54 10*3/MM3 (ref 1.7–7)
NEUTROPHILS NFR BLD AUTO: 75.2 % (ref 42.7–76)
NRBC BLD AUTO-RTO: 0 /100 WBC (ref 0–0.2)
PLATELET # BLD AUTO: 295 10*3/MM3 (ref 140–450)
PMV BLD AUTO: 9.8 FL (ref 6–12)
POTASSIUM SERPL-SCNC: 3.5 MMOL/L (ref 3.5–4.7)
PROT SERPL-MCNC: 6.8 G/DL (ref 6.3–8)
RBC # BLD AUTO: 3.65 10*6/MM3 (ref 3.77–5.28)
SODIUM SERPL-SCNC: 139 MMOL/L (ref 134–145)
T4 FREE SERPL-MCNC: 1.17 NG/DL (ref 0.93–1.7)
TSH SERPL DL<=0.05 MIU/L-ACNC: 2.75 UIU/ML (ref 0.27–4.2)
WBC NRBC COR # BLD: 8.7 10*3/MM3 (ref 3.4–10.8)

## 2022-11-28 PROCEDURE — 25010000002 DURVALUMAB 50 MG/ML SOLUTION 2.4 ML VIAL: Performed by: INTERNAL MEDICINE

## 2022-11-28 PROCEDURE — 80053 COMPREHEN METABOLIC PANEL: CPT

## 2022-11-28 PROCEDURE — 25010000002 DURVALUMAB 50 MG/ML SOLUTION 10 ML VIAL: Performed by: INTERNAL MEDICINE

## 2022-11-28 PROCEDURE — 85025 COMPLETE CBC W/AUTO DIFF WBC: CPT

## 2022-11-28 PROCEDURE — 84439 ASSAY OF FREE THYROXINE: CPT | Performed by: INTERNAL MEDICINE

## 2022-11-28 PROCEDURE — 84443 ASSAY THYROID STIM HORMONE: CPT | Performed by: INTERNAL MEDICINE

## 2022-11-28 PROCEDURE — 96413 CHEMO IV INFUSION 1 HR: CPT

## 2022-11-28 PROCEDURE — 99214 OFFICE O/P EST MOD 30 MIN: CPT | Performed by: INTERNAL MEDICINE

## 2022-11-28 RX ORDER — ONDANSETRON HYDROCHLORIDE 8 MG/1
8 TABLET, FILM COATED ORAL 3 TIMES DAILY PRN
Qty: 30 TABLET | Refills: 5 | Status: SHIPPED | OUTPATIENT
Start: 2022-11-28

## 2022-11-28 RX ORDER — LIDOCAINE AND PRILOCAINE 25; 25 MG/G; MG/G
CREAM TOPICAL
Qty: 5 G | Refills: 2 | Status: SHIPPED | OUTPATIENT
Start: 2022-11-28 | End: 2023-03-06

## 2022-11-28 RX ORDER — SODIUM CHLORIDE 9 MG/ML
250 INJECTION, SOLUTION INTRAVENOUS ONCE
Status: CANCELLED | OUTPATIENT
Start: 2022-11-28

## 2022-11-28 RX ORDER — SODIUM CHLORIDE 9 MG/ML
250 INJECTION, SOLUTION INTRAVENOUS ONCE
Status: COMPLETED | OUTPATIENT
Start: 2022-11-28 | End: 2022-11-28

## 2022-11-28 RX ORDER — SODIUM CHLORIDE 9 MG/ML
250 INJECTION, SOLUTION INTRAVENOUS ONCE
Status: CANCELLED | OUTPATIENT
Start: 2022-12-12

## 2022-11-28 RX ADMIN — SODIUM CHLORIDE 250 ML: 9 INJECTION, SOLUTION INTRAVENOUS at 10:43

## 2022-11-28 RX ADMIN — SODIUM CHLORIDE 570 MG: 9 INJECTION, SOLUTION INTRAVENOUS at 10:43

## 2022-11-28 NOTE — PROGRESS NOTES
Subjective     REASON FOR FOLLOW UP:   1.  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  PD-L1 positive greater than 95%  3.  Primary chemoradiation with weekly carboplatin and Taxol completed 10/27/2022  4.  Imfinzi immunotherapy every 2 weeks for 12 months total.  First treatment 11/28/2022    HISTORY OF PRESENT ILLNESS:  The patient is a 77 y.o. year old female who is a former smoker referred to us from thoracic surgery (Dr. Remedios Rice) for evaluation and treatment of clinical stage III adenocarcinoma of the lung.  She was having persistent cough and underwent chest x-ray initially in late June which showed possible right upper lobe mass.  This was followed by CT scan of the chest confirming the presence of a right upper lobe mass.  She initially underwent a CT-guided needle biopsy on 7/22/2022 which was nondiagnostic.    She was referred to Dr. Glass for bronchoscopy and biopsy which was performed on 8/23/2022 with pathology returning as poorly differentiated adenocarcinoma.  PD-L1 stain on the tissue was positive at greater than 95%.    She underwent further staging with PET scan and MRI of the brain.  PET scan was performed on 8/12/2022 showing hypermetabolic activity in the large mass in the right upper lobe as well as mediastinal lymph nodes.  She was noted to have a mass on the kidney as well but this was not hypermetabolic.  There was no obvious distant metastatic disease.    MRI of the brain from 8/15/2022 likewise showed no evidence of metastatic disease.  She is scheduled also to see Dr. Hector Rodriguez and radiation oncology on 9/7/2022.    We recommended weekly carboplatin and Taxol concurrent with radiation therapy.  Following completion of treatment she will receive immunotherapy for maintenance   (She does have a diagnosis of rheumatoid arthritis and is currently taking only Celebrex.  This could become an issue regarding her ability to tolerate immunotherapy in the future).    INTERVAL HISTORY:  She  received her final fraction of radiation 10/27/2022.  She also completed 6 weeks of carboplatin and Taxol and tolerated it well.     She returns today with posttreatment CT scans performed 11/21/2022.  The right upper lobe mass appears stable in size.  Her mediastinal lymphadenopathy has decreased.  There are no new sites of disease identified.    We reviewed the scans with the patient and plan to initiate the first dose of Imfinzi immunotherapy today.  We will need to watch closely for signs of toxicity due to her diagnosis of rheumatoid arthritis.    She denies other concerns this time.      History of Present Illness     Past Medical History:   Diagnosis Date   • COPD (chronic obstructive pulmonary disease) (HCC)    • Coughing up blood     X2 MONTHS   • History of COVID-19 02/2022   • Hyperlipidemia    • IBS (irritable bowel syndrome)    • Primary lung adenocarcinoma, right (HCC)    • Rheumatoid arthritis (HCC)         Past Surgical History:   Procedure Laterality Date   • APPENDECTOMY      appendix removed   • BRONCHOSCOPY N/A 8/23/2022    Procedure: BRONCHOSCOPY WITH BAL,  BIOPSIES, AND BRUSHINGS WITH ENDOBRONCHIAL ULTRASOUND WITH FNA;  Surgeon: Gregor Vee MD;  Location: [facility] ENDOSCOPY;  Service: Pulmonary;  Laterality: N/A;  PRE- HILAR MASS  POST- SAME   • CAROTID ENDARTERECTOMY Right    • CAROTID ENDARTERECTOMY Left    • CATARACT EXTRACTION     • CERVICAL FUSION     • CHOLECYSTECTOMY     • HYSTERECTOMY     • SHOULDER ARTHROSCOPY Right 02/19/2019    right should scope/cuff repair    • VENOUS ACCESS DEVICE (PORT) INSERTION Right 9/6/2022    Procedure: POWERPORT INSERTION;  Surgeon: Remedios Rice MD;  Location: [facility] OR;  Service: Thoracic;  Laterality: Right;        Current Outpatient Medications on File Prior to Visit   Medication Sig Dispense Refill   • azelastine (ASTELIN) 0.1 % nasal spray 1 spray into the nostril(s) as directed by provider.     • B COMPLEX VITAMINS ER PO Take  by mouth  Daily.     • Cholecalciferol (VITAMIN D3) 5000 units capsule capsule Take 2,000 Units by mouth Daily.     • Colestipol HCl (COLESTID PO) Take 1 tablet by mouth Daily.     • esomeprazole (NexIUM) 10 MG packet Take 10 mg by mouth Daily.     • estradiol (ESTRACE) 1 MG tablet Take 1 mg by mouth Daily.     • ferrous sulfate 325 (65 FE) MG tablet Take 1 tablet by mouth Daily With Breakfast. 30 tablet 5   • HYDROcodone Bit-Homatrop MBr (HYCODAN) 5-1.5 MG/5ML solution Take 5 mL by mouth Every 6 (Six) Hours As Needed for Cough. 250 mL 0   • lidocaine-prilocaine (EMLA) 2.5-2.5 % cream Apply 1 application topically to the appropriate area as directed Every 2 (Two) Hours As Needed for Mild Pain. 5 g 3   • Multiple Vitamins-Minerals (PRESERVISION AREDS 2+MULTI VIT PO) Take  by mouth.     • predniSONE (DELTASONE) 10 MG tablet Take 1 tablet by mouth Daily. 30 tablet 1     No current facility-administered medications on file prior to visit.        ALLERGIES:    Allergies   Allergen Reactions   • Metronidazole Hives   • Ofloxacin Hives and Nausea Only   • Del-Mycin [Erythromycin] Hives   • Gentamicin Hives   • Ibuprofen Nausea And Vomiting   • Latex Dermatitis     GLOVES   • Penicillins Hives   • Tetracycline Hives   • Adhesive Tape Dermatitis     BANDAIDS   • Aspirin GI Intolerance     Vomiting can take EC   • Codeine Nausea And Vomiting        Social History     Socioeconomic History   • Marital status:    Tobacco Use   • Smoking status: Former     Types: Cigarettes     Quit date: 2022     Years since quittin.5   • Smokeless tobacco: Never   • Tobacco comments:     Occasionally smoking 1-2 cigs   Vaping Use   • Vaping Use: Never used   Substance and Sexual Activity   • Alcohol use: Yes     Alcohol/week: 1.0 standard drink     Types: 1 Glasses of wine per week     Comment: occasionally   • Drug use: Never   • Sexual activity: Defer        Family History   Problem Relation Age of Onset   • Cancer Mother    • Cancer  "Father    • Malig Hyperthermia Neg Hx         Review of Systems   Constitutional: Negative for activity change, chills, fatigue and fever.   HENT: Negative for mouth sores, trouble swallowing and voice change.    Eyes: Negative for pain and visual disturbance.   Respiratory: Positive for cough. Negative for wheezing.    Cardiovascular: Negative for chest pain and palpitations.   Gastrointestinal: Negative for abdominal pain, constipation, diarrhea, nausea and vomiting.   Genitourinary: Negative for difficulty urinating, frequency and urgency.   Musculoskeletal: Negative for arthralgias and joint swelling.   Skin: Negative for rash.   Neurological: Negative for dizziness, seizures, weakness and headaches.   Hematological: Negative for adenopathy. Does not bruise/bleed easily.   Psychiatric/Behavioral: Negative for behavioral problems and confusion. The patient is not nervous/anxious.     11/28/2022 ROS updated     Objective     Vitals:    11/28/22 0940   BP: 135/69   Pulse: 84   Resp: 18   Temp: 97.3 °F (36.3 °C)   TempSrc: Temporal   SpO2: 97%   Weight: 57 kg (125 lb 9.6 oz)   Height: 149.9 cm (59.02\")   PainSc: 0-No pain     Current Status 11/28/2022   ECOG score 0       Physical Exam  Vitals reviewed.   Constitutional:       General: She is not in acute distress.     Appearance: Normal appearance. She is well-developed.   HENT:      Head: Normocephalic and atraumatic.      Mouth/Throat:      Pharynx: No oropharyngeal exudate.   Eyes:      Pupils: Pupils are equal, round, and reactive to light.   Cardiovascular:      Rate and Rhythm: Normal rate and regular rhythm.      Heart sounds: Normal heart sounds. No murmur heard.  Pulmonary:      Effort: Pulmonary effort is normal. No respiratory distress.      Breath sounds: Decreased breath sounds present. No wheezing, rhonchi or rales.   Abdominal:      General: Bowel sounds are normal. There is no distension.      Palpations: Abdomen is soft.   Musculoskeletal:         " General: Normal range of motion.      Cervical back: Normal range of motion.   Skin:     General: Skin is warm and dry.      Findings: No rash.   Neurological:      Mental Status: She is alert and oriented to person, place, and time.      I have reexamined the patient and the results are consistent with the previously documented exam. Cruzito Lagunas MD        RECENT LABS:  Results from last 7 days   Lab Units 11/28/22  0908   WBC 10*3/mm3 8.70   NEUTROS ABS 10*3/mm3 6.54   HEMOGLOBIN g/dL 10.7*   HEMATOCRIT % 33.6*   PLATELETS 10*3/mm3 295                   BRONCHOSCOPY PATHOLOGY 8/23/2022  Final Diagnosis   1. Lung, Right Upper Lobe, Endobronchial Biopsies: INVASIVE POORLY DIFFERENTIATED ADENOCARCINOMA OF PULMONARY ORIGIN.   PDL-1 POSITIVE >95%    IMAGING:  CT CHEST, ABDOMEN, AND PELVIS WITH IV CONTRAST 11/21/2022  IMPRESSION:  Impression:  1.  Pulmonary mass centered within the right upper lobe with discrete  extension into the superior aspect of the right lower lobe is present  and grossly unchanged in overall size since 08/12/2022. Findings  concerning for lymphangitic spread of malignancy ascending from the  periphery of the mass are present, as before.  2.  Previously hypermetabolic mediastinal and hilar adenopathy have  decreased in size.  3.  Scattered bilateral subcentimeter pulmonary nodules are grossly  unchanged. Continued close attention on follow-up is recommended to  exclude metastatic disease.  4.  5.1 cm exophytic lesion arising off the inferior aspect of the right  kidney is indeterminate. Further evaluation multiphase CT or MRI of the  abdomen with and without contrast is recommended to exclude neoplasm.  5.  Other findings as above.    F-18 FDG PET FROM SKULL BASE TO MID THIGH WITH PET/CT FUSION 8/12/2022  IMPRESSION:  1.  Large mass centered within the right upper lobe representing  patient's known malignancy. Interlobular septal thickening and ground  glass opacification projecting from the  periphery of the mass throughout  the right upper lobe is highly concerning for lymphangitic spread. Of  note, the mass abuts the pleura and mediastinum over a long segment and  underlying invasion cannot be excluded.  2.  FDG avid hilar and mediastinal adenopathy concerning for metastatic  disease.  3.  A few irregular subcentimeter pulmonary nodules are present within  the right lower and left upper lobes measuring up to 0.9 cm which while  nonspecific raises concern for metastatic disease. At least close  attention on followup is recommended.   4.  Minimally hypermetabolic arnoldo hepatic node and bilateral sub-6 mm  pulmonary nodules are indeterminate. Continued followup with chest and  abdominal CT in 3 months is recommended.  5.  Indeterminate density 4.4 cm mass arises off the right kidney.  Further evaluation with multiphase CT or MRI of the abdomen with and  without contrast is recommended to exclude neoplasm.  6.  Focal uptake over the right shoulder in the area of prior rotator  cuff repair is favored be postsurgical with metastatic disease less  likely.  7.  Other findings as above.    MRI OF THE BRAIN WITH AND WITHOUT CONTRAST 08/15/2022   IMPRESSION:  1. There is mild-to-moderate small vessel disease in cerebral and  central pontine white matter. Otherwise, the MRI of the brain is normal  with no evidence of metastatic disease to the brain.   2. There are some nonspecific signal abnormality in the C4 cervical  vertebra with T1 low signal throughout the visualized portions C4  cervical vertebrae on the sagittal images. The patient has had prior  cervical spine surgery as demonstrated on prior PET scan and this argues  in favor that the T1 low signal and C4 vertebrae is related to sclerosis  from degenerative disc changes at C4-5 although an osseous metastatic  lesion in the C4 vertebra cannot be excluded on the basis of this exam.  Apparently the C4 vertebra was not hot on the recent PET scan which  is  further evidence that this is degenerative in origin and correlation  with recent PET scan is suggested.      Assessment & Plan   · 1.  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  Patient is not felt to be a surgical candidate and combined chemotherapy and radiation.   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  · 9/20/2022 1st dose of weekly carboplatin/taxol.   · She completed 6 cycles of weekly CarboTaxol 10/25/2022.  · Radiation therapy completed 10/27/2022  · First dose durvalumab delivered 11/28/2022    2.  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  3.  Other comorbidities including vascular disease with previous carotid endarterectomy surgeries.  She has no known history of stroke or myocardial infarction.  Overall her performance status is very good.  4.  Hemoptysis related to her central tumor.  Improved with initiation of therapy.  She reports she still has some darker blood coming up when she coughs but this is usually now associated more with some yellow foul-smelling sputum.  5.  Rheumatoid arthritis: Patient previously on Celebrex, but discontinued due to hemoptysis.  Patient started on prednisone 20 mg daily prescribed to manage her arthritis pain.  Prednisone has since been decreased to 10 mg daily.      Recommendations  1. We reviewed the results of the CT scan with the patient and her sister in the office today.  The right upper lobe tumor measures about the same but does appear to be centrally necrotic.  Her lymphadenopathy has decreased and we are not seeing any new sites of disease therefore we plan to proceed with immunotherapy as planned.  2. Patient will be receiving her first dose of Imfinzi immunotherapy in the office today.  3. She will be following up with Dr. Rodriguez at the radiation center later this week.  4. Return in 2 weeks for  MD follow-up and possible second dose of Imfinzi.  5. Continue prednisone to 10 mg daily for rheumatoid arthritis.  6. For now we will continue iron supplementation with ferrous sulfate 325 mg p.o. once daily with food.    This patient is on high risk drug therapy requiring intensive monitoring for toxicity.             Cruzito Lagunas MD   11/28/2022

## 2022-11-28 NOTE — PROGRESS NOTES
Former[facility] has started a PA for pts EMLA Cream through [employer]. I have completed the request and submitted to [employer].     Currently waiting for a decision.     Chen Elizondo  Specialty Pharmacy Technician

## 2022-11-28 NOTE — PROGRESS NOTES
Lidocaine-Prilocaine Cream approved from 1/1/2022-2/26/2023-[employer]    Chen Elizondo  Specialty Pharmacy Technician

## 2022-12-01 ENCOUNTER — OFFICE VISIT (OUTPATIENT)
Dept: RADIATION ONCOLOGY | Facility: HOSPITAL | Age: 77
End: 2022-12-01
Payer: MEDICARE

## 2022-12-01 ENCOUNTER — APPOINTMENT (OUTPATIENT)
Dept: RADIATION ONCOLOGY | Facility: HOSPITAL | Age: 77
End: 2022-12-01
Payer: MEDICARE

## 2022-12-01 VITALS
OXYGEN SATURATION: 97 % | WEIGHT: 126 LBS | BODY MASS INDEX: 25.44 KG/M2 | HEART RATE: 94 BPM | SYSTOLIC BLOOD PRESSURE: 130 MMHG | DIASTOLIC BLOOD PRESSURE: 69 MMHG

## 2022-12-01 DIAGNOSIS — C34.91 PRIMARY LUNG ADENOCARCINOMA, RIGHT: Primary | ICD-10-CM

## 2022-12-01 PROCEDURE — G0463 HOSPITAL OUTPT CLINIC VISIT: HCPCS

## 2022-12-01 PROCEDURE — 99024 POSTOP FOLLOW-UP VISIT: CPT | Performed by: RADIOLOGY

## 2022-12-01 NOTE — PROGRESS NOTES
FOLLOW-UP NOTE    Name: Pam Vidal  YOB: 1945  MRN #: [id number]  Date of Service: 12/1/2022  Referring Provider: Hector Rodriguez MD  [address] [id number]  Primary Care Provider: Nik Mckay MD    DIAGNOSIS: Stage IIIA [id number] Primary lung adenocarcinoma  1. Primary lung adenocarcinoma, right (HCC)      REASON FOR VISIT: Lung cancer f/u post-chemoXRT and now on adjuvant durvalumab    RADIATION TREATMENT COURSE: 60 Gy in 30 fractions, completed 10/31/2022.    HISTORY OF PRESENT ILLNESS: The patient is a 77 y.o. year old female who completed XRT one month ago and largely tolerated well.    Restaging CT CAP on 11/21/22 showed a pulmonary mass centered within the right upper lobe with discrete extension into the superior aspect of the right lower lobe--not progressing.  By my review it appears more necrotic.  The previously hypermetabolic mediastinal and hilar adenopathy have decreased in size.  No new findings or elsewhere disease.    She started Durvalumab and is tolerating it well.    The following portions of the patient's history were reviewed and updated as appropriate: allergies, current medications, past family history, past medical history, past social history, past surgical history and problem list. Reviewed with the patient and remain unchanged.    PAST MEDICAL HISTORY:  she  has a past medical history of COPD (chronic obstructive pulmonary disease) (HCC), Coughing up blood, History of COVID-19 (02/2022), Hyperlipidemia, IBS (irritable bowel syndrome), Primary lung adenocarcinoma, right (HCC), and Rheumatoid arthritis (HCC).  MEDICATIONS:   Current Outpatient Medications:   •  azelastine (ASTELIN) 0.1 % nasal spray, 1 spray into the nostril(s) as directed by provider., Disp: , Rfl:   •  B COMPLEX VITAMINS ER PO, Take  by mouth Daily., Disp: , Rfl:   •  Cholecalciferol (VITAMIN D3) 5000 units capsule capsule, Take 2,000 Units by mouth Daily., Disp: , Rfl:   •   Colestipol HCl (COLESTID PO), Take 1 tablet by mouth Daily., Disp: , Rfl:   •  esomeprazole (NexIUM) 10 MG packet, Take 10 mg by mouth Daily., Disp: , Rfl:   •  estradiol (ESTRACE) 1 MG tablet, Take 1 mg by mouth Daily., Disp: , Rfl:   •  ferrous sulfate 325 (65 FE) MG tablet, Take 1 tablet by mouth Daily With Breakfast., Disp: 30 tablet, Rfl: 5  •  HYDROcodone Bit-Homatrop MBr (HYCODAN) 5-1.5 MG/5ML solution, Take 5 mL by mouth Every 6 (Six) Hours As Needed for Cough., Disp: 250 mL, Rfl: 0  •  lidocaine-prilocaine (EMLA) 2.5-2.5 % cream, Apply 1 application topically to the appropriate area as directed Every 2 (Two) Hours As Needed for Mild Pain., Disp: 5 g, Rfl: 3  •  lidocaine-prilocaine (EMLA) 2.5-2.5 % cream, Apply nickel size amount to port site 30 min before appt time do not rub in cover with plastic wrap, Disp: 5 g, Rfl: 2  •  Multiple Vitamins-Minerals (PRESERVISION AREDS 2+MULTI VIT PO), Take  by mouth., Disp: , Rfl:   •  ondansetron (ZOFRAN) 8 MG tablet, Take 1 tablet by mouth 3 (Three) Times a Day As Needed for Nausea or Vomiting., Disp: 30 tablet, Rfl: 5  •  predniSONE (DELTASONE) 10 MG tablet, Take 1 tablet by mouth Daily., Disp: 30 tablet, Rfl: 1  ALLERGIES:   Allergies   Allergen Reactions   • Metronidazole Hives   • Ofloxacin Hives and Nausea Only   • Del-Mycin [Erythromycin] Hives   • Gentamicin Hives   • Ibuprofen Nausea And Vomiting   • Latex Dermatitis     GLOVES   • Penicillins Hives   • Tetracycline Hives   • Adhesive Tape Dermatitis     BANDAIDS   • Aspirin GI Intolerance     Vomiting can take EC   • Codeine Nausea And Vomiting     PAST SURGICAL HISTORY: she has a past surgical history that includes Cataract extraction; Cholecystectomy; Carotid endarterectomy (Right); Carotid endarterectomy (Left); Cervical fusion; Hysterectomy; Appendectomy; Shoulder arthroscopy (Right, 02/19/2019); Bronchoscopy (N/A, 8/23/2022); and Venous Access Device (Port) (Right, 9/6/2022).  PREVIOUS RADIOTHERAPY OR  CHEMOTHERAPY: yes; now immunotherapy.  FAMILY HISTORY: her family history includes Cancer in her father and mother.  SOCIAL HISTORY: she  reports that she quit smoking about 7 months ago. Her smoking use included cigarettes. She has never used smokeless tobacco. She reports current alcohol use of about 1.0 standard drink per week. She reports that she does not use drugs.  PAIN AND PAIN MANAGEMENT: no pain.  Vitals:    12/01/22 1458   BP: 130/69   Pulse: 94   SpO2: 97%   Weight: 57.2 kg (126 lb)   PainSc:   5     NUTRITIONAL STATUS:  no issues  KPS: 90      Review of Systems:   General: No fevers, chills, weight change, or drenching night sweats. Skin: No rashes or jaundice.  HEENT: No change in vision or hearing, no headaches.  Neck: No dysphagia or masses.  Heme/Lymph: No easy bruising or bleeding.  Respiratory System: Stable; no hemoptysis--will follow with pulm.  Cardiovascular: No chest pain, palpitations, or dyspnea on exertion.  - Pacemaker. GI: No nausea, vomiting, diarrhea, melena, or hematochezia.  : No dysuria or hematuria.  Endocrine: No heat or cold intolerance. Musculoskeletal: No myalgias or arthralgias.  Neuro: No weakness, numbness, syncope, or seizures. Psych: No mood changes or depression. Ext: Denies swelling.        Objective     Vitals:  Vitals:    12/01/22 1458   BP: 130/69   Pulse: 94   SpO2: 97%   Weight: 57.2 kg (126 lb)   PainSc:   5       PHYSICAL EXAM:  GENERAL: in no apparent distress, sitting comfortably in room.    HEENT: normocephalic, atraumatic. Pupils are equal, round, reactive to light. Sclera anicteric. Conjunctiva not injected. Oropharynx without erythema, ulcerations or thrush.   NECK: Supple with no masses.  LYMPHATIC: no cervical, supraclavicular or axillary adenopathy appreciated bilaterally.   CARDIOVASCULAR: S1 & S2 detected; no murmurs, rubs or gallops.  CHEST: clear to auscultation bilaterally; no wheezes, crackles or rubs. Work of breathing normal.  ABDOMEN: bowel  sounds present. Abdomen is soft, nontender, nondistended.   MUSCULOSKELETAL: no tenderness to palpation along the spine or scapulae. Normal range of motion.  EXTREMITIES: no clubbing, cyanosis, edema.  SKIN: no erythema, rashes, ulcerations noted.   NEUROLOGIC: cranial nerves II-XII grossly intact bilaterally. No focal neurologic deficits.  PSYCHIATRIC:  alert, aware, and appropriate.      PERTINENT IMAGING/PATHOLOGY/LABS (Medical Decision Making):     COORDINATION OF CARE: A copy of this note is sent to the referring provider.    PATHOLOGY (Reviewed):     IMAGING (Reviewed): as noted above    LABS (Reviewed):  Hematology WBC   Date Value Ref Range Status   11/28/2022 8.70 3.40 - 10.80 10*3/mm3 Final     RBC   Date Value Ref Range Status   11/28/2022 3.65 (L) 3.77 - 5.28 10*6/mm3 Final     Hemoglobin   Date Value Ref Range Status   11/28/2022 10.7 (L) 12.0 - 15.9 g/dL Final     Hematocrit   Date Value Ref Range Status   11/28/2022 33.6 (L) 34.0 - 46.6 % Final     Platelets   Date Value Ref Range Status   11/28/2022 295 140 - 450 10*3/mm3 Final      Chemistry   Lab Results   Component Value Date    GLUCOSE 89 11/28/2022    BUN 20 11/28/2022    CREATININE 1.00 11/28/2022    BCR 20.0 11/28/2022    K 3.5 11/28/2022    CO2 23.5 11/28/2022    CALCIUM 9.4 11/28/2022    ALBUMIN 3.70 11/28/2022    AST 19 11/28/2022    ALT 14 11/28/2022         Assessment & Plan     ASSESSMENT AND PLAN:    1. Primary lung adenocarcinoma, right (HCC)       No progression of disease; more necrotic primary and response in nodes noted.  Able to start Durvalumab.  Will follow closely with Med/onc.  FU with Pulm recommended to discuss bronch needs. No recent bleeding but was having still late in chemoXRT.    FU here in 6 months or earlier as needed.    This assessment comes from my review of the imaging, pathology, physician notes and other pertinent information as mentioned.    DISPOSITION: 6 month f/u      CC: MD Nik Yeager,  MD Cruzito Lagunas MD        Approved by:     Hector Rodriguez MD  12/07/22  09:42 EST

## 2022-12-12 ENCOUNTER — OFFICE VISIT (OUTPATIENT)
Dept: ONCOLOGY | Facility: CLINIC | Age: 77
End: 2022-12-12

## 2022-12-12 ENCOUNTER — INFUSION (OUTPATIENT)
Dept: ONCOLOGY | Facility: HOSPITAL | Age: 77
End: 2022-12-12

## 2022-12-12 VITALS
BODY MASS INDEX: 25.32 KG/M2 | OXYGEN SATURATION: 94 % | TEMPERATURE: 97.3 F | WEIGHT: 125.6 LBS | SYSTOLIC BLOOD PRESSURE: 124 MMHG | HEIGHT: 59 IN | HEART RATE: 90 BPM | RESPIRATION RATE: 18 BRPM | DIASTOLIC BLOOD PRESSURE: 67 MMHG

## 2022-12-12 DIAGNOSIS — Z79.899 HIGH RISK MEDICATION USE: ICD-10-CM

## 2022-12-12 DIAGNOSIS — C34.91 PRIMARY LUNG ADENOCARCINOMA, RIGHT: ICD-10-CM

## 2022-12-12 DIAGNOSIS — Z79.899 HIGH RISK MEDICATION USE: Primary | ICD-10-CM

## 2022-12-12 DIAGNOSIS — C34.91 PRIMARY LUNG ADENOCARCINOMA, RIGHT: Primary | ICD-10-CM

## 2022-12-12 LAB
ALBUMIN SERPL-MCNC: 3.8 G/DL (ref 3.5–5.2)
ALBUMIN/GLOB SERPL: 1 G/DL (ref 1.1–2.4)
ALP SERPL-CCNC: 91 U/L (ref 38–116)
ALT SERPL W P-5'-P-CCNC: 14 U/L (ref 0–33)
ANION GAP SERPL CALCULATED.3IONS-SCNC: 12.9 MMOL/L (ref 5–15)
AST SERPL-CCNC: 21 U/L (ref 0–32)
BASOPHILS # BLD AUTO: 0.07 10*3/MM3 (ref 0–0.2)
BASOPHILS NFR BLD AUTO: 0.6 % (ref 0–1.5)
BILIRUB SERPL-MCNC: 0.3 MG/DL (ref 0.2–1.2)
BUN SERPL-MCNC: 21 MG/DL (ref 6–20)
BUN/CREAT SERPL: 18.9 (ref 7.3–30)
CALCIUM SPEC-SCNC: 9.9 MG/DL (ref 8.5–10.2)
CHLORIDE SERPL-SCNC: 97 MMOL/L (ref 98–107)
CO2 SERPL-SCNC: 26.1 MMOL/L (ref 22–29)
CREAT SERPL-MCNC: 1.11 MG/DL (ref 0.6–1.1)
DEPRECATED RDW RBC AUTO: 69 FL (ref 37–54)
EGFRCR SERPLBLD CKD-EPI 2021: 51.9 ML/MIN/1.73
EOSINOPHIL # BLD AUTO: 0.25 10*3/MM3 (ref 0–0.4)
EOSINOPHIL NFR BLD AUTO: 2.1 % (ref 0.3–6.2)
ERYTHROCYTE [DISTWIDTH] IN BLOOD BY AUTOMATED COUNT: 20.8 % (ref 12.3–15.4)
FERRITIN SERPL-MCNC: 115.2 NG/ML (ref 13–150)
GLOBULIN UR ELPH-MCNC: 3.7 GM/DL (ref 1.8–3.5)
GLUCOSE SERPL-MCNC: 119 MG/DL (ref 74–124)
HCT VFR BLD AUTO: 37.4 % (ref 34–46.6)
HGB BLD-MCNC: 12 G/DL (ref 12–15.9)
IMM GRANULOCYTES # BLD AUTO: 0.08 10*3/MM3 (ref 0–0.05)
IMM GRANULOCYTES NFR BLD AUTO: 0.7 % (ref 0–0.5)
IRON 24H UR-MRATE: 40 MCG/DL (ref 37–145)
IRON SATN MFR SERPL: 14 % (ref 14–48)
LYMPHOCYTES # BLD AUTO: 1.42 10*3/MM3 (ref 0.7–3.1)
LYMPHOCYTES NFR BLD AUTO: 11.7 % (ref 19.6–45.3)
MCH RBC QN AUTO: 29.1 PG (ref 26.6–33)
MCHC RBC AUTO-ENTMCNC: 32.1 G/DL (ref 31.5–35.7)
MCV RBC AUTO: 90.6 FL (ref 79–97)
MONOCYTES # BLD AUTO: 1.18 10*3/MM3 (ref 0.1–0.9)
MONOCYTES NFR BLD AUTO: 9.7 % (ref 5–12)
NEUTROPHILS NFR BLD AUTO: 75.2 % (ref 42.7–76)
NEUTROPHILS NFR BLD AUTO: 9.11 10*3/MM3 (ref 1.7–7)
NRBC BLD AUTO-RTO: 0 /100 WBC (ref 0–0.2)
PLATELET # BLD AUTO: 333 10*3/MM3 (ref 140–450)
PMV BLD AUTO: 9.4 FL (ref 6–12)
POTASSIUM SERPL-SCNC: 3.6 MMOL/L (ref 3.5–4.7)
PROT SERPL-MCNC: 7.5 G/DL (ref 6.3–8)
RBC # BLD AUTO: 4.13 10*6/MM3 (ref 3.77–5.28)
SODIUM SERPL-SCNC: 136 MMOL/L (ref 134–145)
TIBC SERPL-MCNC: 284 MCG/DL (ref 249–505)
TRANSFERRIN SERPL-MCNC: 203 MG/DL (ref 200–360)
WBC NRBC COR # BLD: 12.11 10*3/MM3 (ref 3.4–10.8)

## 2022-12-12 PROCEDURE — 25010000002 HEPARIN LOCK FLUSH PER 10 UNITS: Performed by: INTERNAL MEDICINE

## 2022-12-12 PROCEDURE — 84466 ASSAY OF TRANSFERRIN: CPT | Performed by: INTERNAL MEDICINE

## 2022-12-12 PROCEDURE — 85025 COMPLETE CBC W/AUTO DIFF WBC: CPT

## 2022-12-12 PROCEDURE — 96413 CHEMO IV INFUSION 1 HR: CPT

## 2022-12-12 PROCEDURE — 25010000002 DURVALUMAB 50 MG/ML SOLUTION 2.4 ML VIAL: Performed by: INTERNAL MEDICINE

## 2022-12-12 PROCEDURE — 99214 OFFICE O/P EST MOD 30 MIN: CPT | Performed by: INTERNAL MEDICINE

## 2022-12-12 PROCEDURE — 25010000002 DURVALUMAB 50 MG/ML SOLUTION 10 ML VIAL: Performed by: INTERNAL MEDICINE

## 2022-12-12 PROCEDURE — 82728 ASSAY OF FERRITIN: CPT | Performed by: INTERNAL MEDICINE

## 2022-12-12 PROCEDURE — 83540 ASSAY OF IRON: CPT | Performed by: INTERNAL MEDICINE

## 2022-12-12 PROCEDURE — 80053 COMPREHEN METABOLIC PANEL: CPT

## 2022-12-12 RX ORDER — HEPARIN SODIUM (PORCINE) LOCK FLUSH IV SOLN 100 UNIT/ML 100 UNIT/ML
500 SOLUTION INTRAVENOUS AS NEEDED
Status: DISCONTINUED | OUTPATIENT
Start: 2022-12-12 | End: 2022-12-12 | Stop reason: HOSPADM

## 2022-12-12 RX ORDER — SODIUM CHLORIDE 0.9 % (FLUSH) 0.9 %
10 SYRINGE (ML) INJECTION AS NEEDED
Status: DISCONTINUED | OUTPATIENT
Start: 2022-12-12 | End: 2022-12-12 | Stop reason: HOSPADM

## 2022-12-12 RX ORDER — SODIUM CHLORIDE 9 MG/ML
250 INJECTION, SOLUTION INTRAVENOUS ONCE
Status: CANCELLED | OUTPATIENT
Start: 2023-01-23

## 2022-12-12 RX ORDER — SODIUM CHLORIDE 9 MG/ML
250 INJECTION, SOLUTION INTRAVENOUS ONCE
Status: CANCELLED | OUTPATIENT
Start: 2022-12-27

## 2022-12-12 RX ORDER — SODIUM CHLORIDE 9 MG/ML
250 INJECTION, SOLUTION INTRAVENOUS ONCE
Status: COMPLETED | OUTPATIENT
Start: 2022-12-12 | End: 2022-12-12

## 2022-12-12 RX ORDER — SODIUM CHLORIDE 9 MG/ML
250 INJECTION, SOLUTION INTRAVENOUS ONCE
Status: CANCELLED | OUTPATIENT
Start: 2023-01-09

## 2022-12-12 RX ORDER — SODIUM CHLORIDE 0.9 % (FLUSH) 0.9 %
10 SYRINGE (ML) INJECTION AS NEEDED
Status: CANCELLED | OUTPATIENT
Start: 2022-12-12

## 2022-12-12 RX ORDER — HEPARIN SODIUM (PORCINE) LOCK FLUSH IV SOLN 100 UNIT/ML 100 UNIT/ML
500 SOLUTION INTRAVENOUS AS NEEDED
Status: CANCELLED | OUTPATIENT
Start: 2022-12-12

## 2022-12-12 RX ORDER — HYDROCODONE BITARTRATE AND HOMATROPINE METHYLBROMIDE ORAL SOLUTION 5; 1.5 MG/5ML; MG/5ML
5 LIQUID ORAL EVERY 6 HOURS PRN
Qty: 250 ML | Refills: 0 | Status: SHIPPED | OUTPATIENT
Start: 2022-12-12 | End: 2023-01-23 | Stop reason: SDUPTHER

## 2022-12-12 RX ADMIN — Medication 10 ML: at 15:36

## 2022-12-12 RX ADMIN — Medication 500 UNITS: at 15:36

## 2022-12-12 RX ADMIN — SODIUM CHLORIDE 570 MG: 9 INJECTION, SOLUTION INTRAVENOUS at 14:35

## 2022-12-12 RX ADMIN — SODIUM CHLORIDE 250 ML: 9 INJECTION, SOLUTION INTRAVENOUS at 14:35

## 2022-12-12 NOTE — PROGRESS NOTES
Subjective     REASON FOR FOLLOW UP:   1.  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  PD-L1 positive greater than 95%  3.  Primary chemoradiation with weekly carboplatin and Taxol completed 10/27/2022  4.  Imfinzi immunotherapy every 2 weeks for 12 months total.  First treatment 11/28/2022    HISTORY OF PRESENT ILLNESS:  The patient is a 77 y.o. year old female who is a former smoker referred to us from thoracic surgery (Dr. Remedios Rice) for evaluation and treatment of clinical stage III adenocarcinoma of the lung.  She was having persistent cough and underwent chest x-ray initially in late June which showed possible right upper lobe mass.  This was followed by CT scan of the chest confirming the presence of a right upper lobe mass.  She initially underwent a CT-guided needle biopsy on 7/22/2022 which was nondiagnostic.    She was referred to Dr. Glass for bronchoscopy and biopsy which was performed on 8/23/2022 with pathology returning as poorly differentiated adenocarcinoma.  PD-L1 stain on the tissue was positive at greater than 95%.    She underwent further staging with PET scan and MRI of the brain.  PET scan was performed on 8/12/2022 showing hypermetabolic activity in the large mass in the right upper lobe as well as mediastinal lymph nodes.  She was noted to have a mass on the kidney as well but this was not hypermetabolic.  There was no obvious distant metastatic disease.    MRI of the brain from 8/15/2022 likewise showed no evidence of metastatic disease.  She is scheduled also to see Dr. Hector Rodriguez and radiation oncology on 9/7/2022.    We recommended weekly carboplatin and Taxol concurrent with radiation therapy.  Following completion of treatment she will receive immunotherapy for maintenance   (She does have a diagnosis of rheumatoid arthritis and is currently taking only Celebrex.  This could become an issue regarding her ability to tolerate immunotherapy in the future).    She received her final  fraction of radiation 10/27/2022.  She also completed 6 weeks of carboplatin and Taxol and tolerated it well. CT scans performed 11/21/2022 showed right upper lobe mass appeared stable in size.  Her mediastinal lymphadenopathy had decreased.  There were no new sites of disease identified.      On her last visit we initiated immunotherapy with Imfinzi every 2 weeks with plans to continue for up to 1 year.  She is here today for her second dose of Imfinzi and reports that she tolerated the first dose well.  She reports that she is still having some intermittent hemoptysis and she will be following up with her pulmonologist Dr. Vee to see if she may need further bronchoscopy.  History of Present Illness     Past Medical History:   Diagnosis Date   • COPD (chronic obstructive pulmonary disease) (HCC)    • Coughing up blood     X2 MONTHS   • History of COVID-19 02/2022   • Hyperlipidemia    • IBS (irritable bowel syndrome)    • Primary lung adenocarcinoma, right (HCC)    • Rheumatoid arthritis (HCC)         Past Surgical History:   Procedure Laterality Date   • APPENDECTOMY      appendix removed   • BRONCHOSCOPY N/A 8/23/2022    Procedure: BRONCHOSCOPY WITH BAL,  BIOPSIES, AND BRUSHINGS WITH ENDOBRONCHIAL ULTRASOUND WITH FNA;  Surgeon: Gregor Vee MD;  Location: [facility] END[facility];  Service: Pulmonary;  Laterality: N/A;  PRE- HILAR MASS  POST- SAME   • CAROTID ENDARTERECTOMY Right    • CAROTID ENDARTERECTOMY Left    • CATARACT EXTRACTION     • CERVICAL FUSION     • CHOLECYSTECTOMY     • HYSTERECTOMY     • SHOULDER ARTHROSCOPY Right 02/19/2019    right should scope/cuff repair    • VENOUS ACCESS DEVICE (PORT) INSERTION Right 9/6/2022    Procedure: POWERPORT INSERTION;  Surgeon: Remedios Rice MD;  Location: [facility] MAIN OR;  Service: Thoracic;  Laterality: Right;        Current Outpatient Medications on File Prior to Visit   Medication Sig Dispense Refill   • azelastine (ASTELIN) 0.1 % nasal spray 1 spray into the  nostril(s) as directed by provider.     • B COMPLEX VITAMINS ER PO Take  by mouth Daily.     • Cholecalciferol (VITAMIN D3) 5000 units capsule capsule Take 2,000 Units by mouth Daily.     • Colestipol HCl (COLESTID PO) Take 1 tablet by mouth Daily.     • esomeprazole (NexIUM) 10 MG packet Take 10 mg by mouth Daily.     • estradiol (ESTRACE) 1 MG tablet Take 1 mg by mouth Daily.     • ferrous sulfate 325 (65 FE) MG tablet Take 1 tablet by mouth Daily With Breakfast. 30 tablet 5   • HYDROcodone Bit-Homatrop MBr (HYCODAN) 5-1.5 MG/5ML solution Take 5 mL by mouth Every 6 (Six) Hours As Needed for Cough. 250 mL 0   • lidocaine-prilocaine (EMLA) 2.5-2.5 % cream Apply 1 application topically to the appropriate area as directed Every 2 (Two) Hours As Needed for Mild Pain. 5 g 3   • lidocaine-prilocaine (EMLA) 2.5-2.5 % cream Apply nickel size amount to port site 30 min before appt time do not rub in cover with plastic wrap 5 g 2   • Multiple Vitamins-Minerals (PRESERVISION AREDS 2+MULTI VIT PO) Take  by mouth.     • ondansetron (ZOFRAN) 8 MG tablet Take 1 tablet by mouth 3 (Three) Times a Day As Needed for Nausea or Vomiting. 30 tablet 5   • predniSONE (DELTASONE) 10 MG tablet Take 1 tablet by mouth Daily. 30 tablet 1     No current facility-administered medications on file prior to visit.        ALLERGIES:    Allergies   Allergen Reactions   • Metronidazole Hives   • Ofloxacin Hives and Nausea Only   • Del-Mycin [Erythromycin] Hives   • Gentamicin Hives   • Ibuprofen Nausea And Vomiting   • Latex Dermatitis     GLOVES   • Penicillins Hives   • Tetracycline Hives   • Adhesive Tape Dermatitis     BANDAIDS   • Aspirin GI Intolerance     Vomiting can take EC   • Codeine Nausea And Vomiting        Social History     Socioeconomic History   • Marital status:    Tobacco Use   • Smoking status: Former     Types: Cigarettes     Quit date: 2022     Years since quittin.6   • Smokeless tobacco: Never   • Tobacco  "comments:     Occasionally smoking 1-2 cigs   Vaping Use   • Vaping Use: Never used   Substance and Sexual Activity   • Alcohol use: Yes     Alcohol/week: 1.0 standard drink     Types: 1 Glasses of wine per week     Comment: occasionally   • Drug use: Never   • Sexual activity: Defer        Family History   Problem Relation Age of Onset   • Cancer Mother    • Cancer Father    • Malig Hyperthermia Neg Hx         Review of Systems   Constitutional: Negative for activity change, chills, fatigue and fever.   HENT: Negative for mouth sores, trouble swallowing and voice change.    Eyes: Negative for pain and visual disturbance.   Respiratory: Positive for cough. Negative for wheezing.    Cardiovascular: Negative for chest pain and palpitations.   Gastrointestinal: Negative for abdominal pain, constipation, diarrhea, nausea and vomiting.   Genitourinary: Negative for difficulty urinating, frequency and urgency.   Musculoskeletal: Negative for arthralgias and joint swelling.   Skin: Negative for rash.   Neurological: Negative for dizziness, seizures, weakness and headaches.   Hematological: Negative for adenopathy. Does not bruise/bleed easily.   Psychiatric/Behavioral: Negative for behavioral problems and confusion. The patient is not nervous/anxious.     12/12/2022 ROS updated     Objective     Vitals:    12/12/22 1321   BP: 124/67   Pulse: 90   Resp: 18   Temp: 97.3 °F (36.3 °C)   TempSrc: Temporal   SpO2: 94%   Weight: 57 kg (125 lb 9.6 oz)   Height: 149.9 cm (59.02\")   PainSc: 0-No pain     Current Status 12/12/2022   ECOG score 0       Physical Exam  Vitals reviewed.   Constitutional:       General: She is not in acute distress.     Appearance: Normal appearance. She is well-developed.   HENT:      Head: Normocephalic and atraumatic.      Mouth/Throat:      Pharynx: No oropharyngeal exudate.   Eyes:      Pupils: Pupils are equal, round, and reactive to light.   Cardiovascular:      Rate and Rhythm: Normal rate and " regular rhythm.      Heart sounds: Normal heart sounds. No murmur heard.  Pulmonary:      Effort: Pulmonary effort is normal. No respiratory distress.      Breath sounds: Decreased breath sounds present. No wheezing, rhonchi or rales.   Abdominal:      General: Bowel sounds are normal. There is no distension.      Palpations: Abdomen is soft.   Musculoskeletal:         General: Normal range of motion.      Cervical back: Normal range of motion.   Skin:     General: Skin is warm and dry.      Findings: No rash.   Neurological:      Mental Status: She is alert and oriented to person, place, and time.      I have reexamined the patient and the results are consistent with the previously documented exam. Cruzito Lagunas MD        RECENT LABS:  Results from last 7 days   Lab Units 12/12/22  1303   WBC 10*3/mm3 12.11*   NEUTROS ABS 10*3/mm3 9.11*   HEMOGLOBIN g/dL 12.0   HEMATOCRIT % 37.4   PLATELETS 10*3/mm3 333               BRONCHOSCOPY PATHOLOGY 8/23/2022  Final Diagnosis   1. Lung, Right Upper Lobe, Endobronchial Biopsies: INVASIVE POORLY DIFFERENTIATED ADENOCARCINOMA OF PULMONARY ORIGIN.   PDL-1 POSITIVE >95%    IMAGING:  CT CHEST, ABDOMEN, AND PELVIS WITH IV CONTRAST 11/21/2022  IMPRESSION:  Impression:  1.  Pulmonary mass centered within the right upper lobe with discrete  extension into the superior aspect of the right lower lobe is present  and grossly unchanged in overall size since 08/12/2022. Findings  concerning for lymphangitic spread of malignancy ascending from the  periphery of the mass are present, as before.  2.  Previously hypermetabolic mediastinal and hilar adenopathy have  decreased in size.  3.  Scattered bilateral subcentimeter pulmonary nodules are grossly  unchanged. Continued close attention on follow-up is recommended to  exclude metastatic disease.  4.  5.1 cm exophytic lesion arising off the inferior aspect of the right  kidney is indeterminate. Further evaluation multiphase CT or MRI of  the  abdomen with and without contrast is recommended to exclude neoplasm.  5.  Other findings as above.    F-18 FDG PET FROM SKULL BASE TO MID THIGH WITH PET/CT FUSION 8/12/2022  IMPRESSION:  1.  Large mass centered within the right upper lobe representing  patient's known malignancy. Interlobular septal thickening and ground  glass opacification projecting from the periphery of the mass throughout  the right upper lobe is highly concerning for lymphangitic spread. Of  note, the mass abuts the pleura and mediastinum over a long segment and  underlying invasion cannot be excluded.  2.  FDG avid hilar and mediastinal adenopathy concerning for metastatic  disease.  3.  A few irregular subcentimeter pulmonary nodules are present within  the right lower and left upper lobes measuring up to 0.9 cm which while  nonspecific raises concern for metastatic disease. At least close  attention on followup is recommended.   4.  Minimally hypermetabolic arnoldo hepatic node and bilateral sub-6 mm  pulmonary nodules are indeterminate. Continued followup with chest and  abdominal CT in 3 months is recommended.  5.  Indeterminate density 4.4 cm mass arises off the right kidney.  Further evaluation with multiphase CT or MRI of the abdomen with and  without contrast is recommended to exclude neoplasm.  6.  Focal uptake over the right shoulder in the area of prior rotator  cuff repair is favored be postsurgical with metastatic disease less  likely.  7.  Other findings as above.    MRI OF THE BRAIN WITH AND WITHOUT CONTRAST 08/15/2022   IMPRESSION:  1. There is mild-to-moderate small vessel disease in cerebral and  central pontine white matter. Otherwise, the MRI of the brain is normal  with no evidence of metastatic disease to the brain.   2. There are some nonspecific signal abnormality in the C4 cervical  vertebra with T1 low signal throughout the visualized portions C4  cervical vertebrae on the sagittal images. The patient has had  prior  cervical spine surgery as demonstrated on prior PET scan and this argues  in favor that the T1 low signal and C4 vertebrae is related to sclerosis  from degenerative disc changes at C4-5 although an osseous metastatic  lesion in the C4 vertebra cannot be excluded on the basis of this exam.  Apparently the C4 vertebra was not hot on the recent PET scan which is  further evidence that this is degenerative in origin and correlation  with recent PET scan is suggested.      Assessment & Plan   · 1.  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  Patient is not felt to be a surgical candidate and combined chemotherapy and radiation.   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  · 9/20/2022 1st dose of weekly carboplatin/taxol.   · She completed 6 cycles of weekly CarboTaxol 10/25/2022.  · Radiation therapy completed 10/27/2022  · First dose durvalumab delivered 11/28/2022    2.  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  3.  Other comorbidities including vascular disease with previous carotid endarterectomy surgeries.  She has no known history of stroke or myocardial infarction.  Overall her performance status is very good.  4.  Hemoptysis related to her central tumor.  Improved with initiation of therapy.  She reports she still has some darker blood coming up when she coughs but this is usually now associated more with some yellow foul-smelling sputum.  5.  Rheumatoid arthritis: Patient previously on Celebrex, but discontinued due to hemoptysis.  Patient started on prednisone 20 mg daily prescribed to manage her arthritis pain.  Prednisone has since been decreased to 10 mg daily.      PLAN:  1. We we will proceed with her second dose of Imfinzi immunotherapy in the office today.  2. She will be following up with Dr. Vee her pulmonologist regarding her continued  intermittent hemoptysis..  3. Return in 2 weeks in 4 weeks for lab and further cycles of Imfinzi.  4. Continue prednisone to 10 mg daily for rheumatoid arthritis.  5. For now we will continue iron supplementation with ferrous sulfate 325 mg p.o. once daily with food.  6. MD follow-up in 6 weeks with lab and Imfinzi treatment.  On that visit we will decide when to reorder her next surveillance scans.  7. We reordered her narcotic cough medication today as well.    This patient is on high risk drug therapy requiring intensive monitoring for toxicity.             Cruzito Lagunas MD   12/12/2022

## 2022-12-15 ENCOUNTER — PATIENT OUTREACH (OUTPATIENT)
Dept: OTHER | Facility: HOSPITAL | Age: 77
End: 2022-12-15

## 2022-12-27 ENCOUNTER — INFUSION (OUTPATIENT)
Dept: ONCOLOGY | Facility: HOSPITAL | Age: 77
End: 2022-12-27

## 2022-12-27 VITALS
TEMPERATURE: 97.5 F | WEIGHT: 126.8 LBS | OXYGEN SATURATION: 96 % | DIASTOLIC BLOOD PRESSURE: 67 MMHG | RESPIRATION RATE: 16 BRPM | SYSTOLIC BLOOD PRESSURE: 157 MMHG | BODY MASS INDEX: 25.6 KG/M2 | HEART RATE: 102 BPM

## 2022-12-27 DIAGNOSIS — C34.91 PRIMARY LUNG ADENOCARCINOMA, RIGHT: ICD-10-CM

## 2022-12-27 DIAGNOSIS — Z79.899 HIGH RISK MEDICATION USE: Primary | ICD-10-CM

## 2022-12-27 LAB
ALBUMIN SERPL-MCNC: 3.8 G/DL (ref 3.5–5.2)
ALBUMIN/GLOB SERPL: 1.1 G/DL (ref 1.1–2.4)
ALP SERPL-CCNC: 92 U/L (ref 38–116)
ALT SERPL W P-5'-P-CCNC: 14 U/L (ref 0–33)
ANION GAP SERPL CALCULATED.3IONS-SCNC: 12.8 MMOL/L (ref 5–15)
AST SERPL-CCNC: 19 U/L (ref 0–32)
BASOPHILS # BLD AUTO: 0.03 10*3/MM3 (ref 0–0.2)
BASOPHILS NFR BLD AUTO: 0.2 % (ref 0–1.5)
BILIRUB SERPL-MCNC: 0.3 MG/DL (ref 0.2–1.2)
BUN SERPL-MCNC: 14 MG/DL (ref 6–20)
BUN/CREAT SERPL: 14.9 (ref 7.3–30)
CALCIUM SPEC-SCNC: 10 MG/DL (ref 8.5–10.2)
CHLORIDE SERPL-SCNC: 101 MMOL/L (ref 98–107)
CO2 SERPL-SCNC: 26.2 MMOL/L (ref 22–29)
CREAT SERPL-MCNC: 0.94 MG/DL (ref 0.6–1.1)
DEPRECATED RDW RBC AUTO: 60.4 FL (ref 37–54)
EGFRCR SERPLBLD CKD-EPI 2021: 63.4 ML/MIN/1.73
EOSINOPHIL # BLD AUTO: 0.14 10*3/MM3 (ref 0–0.4)
EOSINOPHIL NFR BLD AUTO: 0.9 % (ref 0.3–6.2)
ERYTHROCYTE [DISTWIDTH] IN BLOOD BY AUTOMATED COUNT: 17.9 % (ref 12.3–15.4)
GLOBULIN UR ELPH-MCNC: 3.4 GM/DL (ref 1.8–3.5)
GLUCOSE SERPL-MCNC: 107 MG/DL (ref 74–124)
HCT VFR BLD AUTO: 38.5 % (ref 34–46.6)
HGB BLD-MCNC: 11.8 G/DL (ref 12–15.9)
IMM GRANULOCYTES # BLD AUTO: 0.09 10*3/MM3 (ref 0–0.05)
IMM GRANULOCYTES NFR BLD AUTO: 0.6 % (ref 0–0.5)
LYMPHOCYTES # BLD AUTO: 0.78 10*3/MM3 (ref 0.7–3.1)
LYMPHOCYTES NFR BLD AUTO: 5.1 % (ref 19.6–45.3)
MCH RBC QN AUTO: 27.9 PG (ref 26.6–33)
MCHC RBC AUTO-ENTMCNC: 30.6 G/DL (ref 31.5–35.7)
MCV RBC AUTO: 91 FL (ref 79–97)
MONOCYTES # BLD AUTO: 0.66 10*3/MM3 (ref 0.1–0.9)
MONOCYTES NFR BLD AUTO: 4.3 % (ref 5–12)
NEUTROPHILS NFR BLD AUTO: 13.53 10*3/MM3 (ref 1.7–7)
NEUTROPHILS NFR BLD AUTO: 88.9 % (ref 42.7–76)
NRBC BLD AUTO-RTO: 0 /100 WBC (ref 0–0.2)
PLATELET # BLD AUTO: 318 10*3/MM3 (ref 140–450)
PMV BLD AUTO: 9.4 FL (ref 6–12)
POTASSIUM SERPL-SCNC: 4 MMOL/L (ref 3.5–4.7)
PROT SERPL-MCNC: 7.2 G/DL (ref 6.3–8)
RBC # BLD AUTO: 4.23 10*6/MM3 (ref 3.77–5.28)
SODIUM SERPL-SCNC: 140 MMOL/L (ref 134–145)
WBC NRBC COR # BLD: 15.23 10*3/MM3 (ref 3.4–10.8)

## 2022-12-27 PROCEDURE — 85025 COMPLETE CBC W/AUTO DIFF WBC: CPT

## 2022-12-27 PROCEDURE — 96413 CHEMO IV INFUSION 1 HR: CPT

## 2022-12-27 PROCEDURE — 25010000002 DURVALUMAB 50 MG/ML SOLUTION 2.4 ML VIAL: Performed by: INTERNAL MEDICINE

## 2022-12-27 PROCEDURE — 25010000002 DURVALUMAB 50 MG/ML SOLUTION 10 ML VIAL: Performed by: INTERNAL MEDICINE

## 2022-12-27 PROCEDURE — 25010000002 HEPARIN LOCK FLUSH PER 10 UNITS: Performed by: INTERNAL MEDICINE

## 2022-12-27 PROCEDURE — 80053 COMPREHEN METABOLIC PANEL: CPT

## 2022-12-27 RX ORDER — HEPARIN SODIUM (PORCINE) LOCK FLUSH IV SOLN 100 UNIT/ML 100 UNIT/ML
500 SOLUTION INTRAVENOUS AS NEEDED
Status: CANCELLED | OUTPATIENT
Start: 2022-12-27

## 2022-12-27 RX ORDER — SODIUM CHLORIDE 0.9 % (FLUSH) 0.9 %
10 SYRINGE (ML) INJECTION AS NEEDED
Status: DISCONTINUED | OUTPATIENT
Start: 2022-12-27 | End: 2022-12-27 | Stop reason: HOSPADM

## 2022-12-27 RX ORDER — HEPARIN SODIUM (PORCINE) LOCK FLUSH IV SOLN 100 UNIT/ML 100 UNIT/ML
500 SOLUTION INTRAVENOUS AS NEEDED
Status: DISCONTINUED | OUTPATIENT
Start: 2022-12-27 | End: 2022-12-27 | Stop reason: HOSPADM

## 2022-12-27 RX ORDER — SODIUM CHLORIDE 9 MG/ML
250 INJECTION, SOLUTION INTRAVENOUS ONCE
Status: COMPLETED | OUTPATIENT
Start: 2022-12-27 | End: 2022-12-27

## 2022-12-27 RX ORDER — SODIUM CHLORIDE 0.9 % (FLUSH) 0.9 %
10 SYRINGE (ML) INJECTION AS NEEDED
Status: CANCELLED | OUTPATIENT
Start: 2022-12-27

## 2022-12-27 RX ADMIN — Medication 500 UNITS: at 14:04

## 2022-12-27 RX ADMIN — Medication 10 ML: at 14:04

## 2022-12-27 RX ADMIN — SODIUM CHLORIDE 570 MG: 9 INJECTION, SOLUTION INTRAVENOUS at 13:04

## 2022-12-27 RX ADMIN — SODIUM CHLORIDE 250 ML: 9 INJECTION, SOLUTION INTRAVENOUS at 13:01

## 2022-12-28 ENCOUNTER — PATIENT OUTREACH (OUTPATIENT)
Dept: OTHER | Facility: HOSPITAL | Age: 77
End: 2022-12-28

## 2023-01-04 ENCOUNTER — ANESTHESIA EVENT (OUTPATIENT)
Dept: GASTROENTEROLOGY | Facility: HOSPITAL | Age: 78
End: 2023-01-04
Payer: MEDICARE

## 2023-01-04 ENCOUNTER — HOSPITAL ENCOUNTER (OUTPATIENT)
Facility: HOSPITAL | Age: 78
Setting detail: HOSPITAL OUTPATIENT SURGERY
Discharge: HOME | End: 2023-01-04
Attending: INTERNAL MEDICINE | Admitting: INTERNAL MEDICINE
Payer: MEDICARE

## 2023-01-04 ENCOUNTER — ANESTHESIA (OUTPATIENT)
Dept: GASTROENTEROLOGY | Facility: HOSPITAL | Age: 78
End: 2023-01-04
Payer: MEDICARE

## 2023-01-04 VITALS
RESPIRATION RATE: 20 BRPM | HEART RATE: 85 BPM | SYSTOLIC BLOOD PRESSURE: 115 MMHG | DIASTOLIC BLOOD PRESSURE: 43 MMHG | OXYGEN SATURATION: 95 %

## 2023-01-04 DIAGNOSIS — R04.2 HEMOPTYSIS: ICD-10-CM

## 2023-01-04 LAB — GIE STN SPEC: NORMAL

## 2023-01-04 PROCEDURE — 87071 CULTURE AEROBIC QUANT OTHER: CPT | Performed by: INTERNAL MEDICINE

## 2023-01-04 PROCEDURE — 87206 SMEAR FLUORESCENT/ACID STAI: CPT | Performed by: INTERNAL MEDICINE

## 2023-01-04 PROCEDURE — 87205 SMEAR GRAM STAIN: CPT | Performed by: INTERNAL MEDICINE

## 2023-01-04 PROCEDURE — 25010000002 PROPOFOL 10 MG/ML EMULSION: Performed by: ANESTHESIOLOGY

## 2023-01-04 PROCEDURE — 25010000002 EPINEPHRINE PER 0.1 MG: Performed by: INTERNAL MEDICINE

## 2023-01-04 PROCEDURE — 88112 CYTOPATH CELL ENHANCE TECH: CPT | Performed by: INTERNAL MEDICINE

## 2023-01-04 PROCEDURE — 88305 TISSUE EXAM BY PATHOLOGIST: CPT | Performed by: INTERNAL MEDICINE

## 2023-01-04 PROCEDURE — 88312 SPECIAL STAINS GROUP 1: CPT | Performed by: INTERNAL MEDICINE

## 2023-01-04 PROCEDURE — 87102 FUNGUS ISOLATION CULTURE: CPT | Performed by: INTERNAL MEDICINE

## 2023-01-04 PROCEDURE — 87116 MYCOBACTERIA CULTURE: CPT | Performed by: INTERNAL MEDICINE

## 2023-01-04 RX ORDER — PROPOFOL 10 MG/ML
VIAL (ML) INTRAVENOUS AS NEEDED
Status: DISCONTINUED | OUTPATIENT
Start: 2023-01-04 | End: 2023-01-04 | Stop reason: SURG

## 2023-01-04 RX ORDER — SODIUM CHLORIDE, SODIUM LACTATE, POTASSIUM CHLORIDE, CALCIUM CHLORIDE 600; 310; 30; 20 MG/100ML; MG/100ML; MG/100ML; MG/100ML
30 INJECTION, SOLUTION INTRAVENOUS CONTINUOUS
Status: DISCONTINUED | OUTPATIENT
Start: 2023-01-04 | End: 2023-01-04 | Stop reason: HOSPADM

## 2023-01-04 RX ORDER — LIDOCAINE HYDROCHLORIDE 20 MG/ML
INJECTION, SOLUTION INFILTRATION; PERINEURAL AS NEEDED
Status: DISCONTINUED | OUTPATIENT
Start: 2023-01-04 | End: 2023-01-04 | Stop reason: SURG

## 2023-01-04 RX ORDER — LIDOCAINE HYDROCHLORIDE 20 MG/ML
SOLUTION OROPHARYNGEAL AS NEEDED
Status: DISCONTINUED | OUTPATIENT
Start: 2023-01-04 | End: 2023-01-04 | Stop reason: HOSPADM

## 2023-01-04 RX ORDER — LIDOCAINE HYDROCHLORIDE 10 MG/ML
INJECTION, SOLUTION EPIDURAL; INFILTRATION; INTRACAUDAL; PERINEURAL AS NEEDED
Status: DISCONTINUED | OUTPATIENT
Start: 2023-01-04 | End: 2023-01-04 | Stop reason: HOSPADM

## 2023-01-04 RX ORDER — LIDOCAINE HYDROCHLORIDE 20 MG/ML
INJECTION, SOLUTION EPIDURAL; INFILTRATION; INTRACAUDAL; PERINEURAL AS NEEDED
Status: DISCONTINUED | OUTPATIENT
Start: 2023-01-04 | End: 2023-01-04 | Stop reason: HOSPADM

## 2023-01-04 RX ADMIN — PROPOFOL 180 MG: 10 INJECTION, EMULSION INTRAVENOUS at 08:07

## 2023-01-04 RX ADMIN — SODIUM CHLORIDE, POTASSIUM CHLORIDE, SODIUM LACTATE AND CALCIUM CHLORIDE 30 ML/HR: 600; 310; 30; 20 INJECTION, SOLUTION INTRAVENOUS at 07:35

## 2023-01-04 RX ADMIN — PROPOFOL 150 MCG/KG/MIN: 10 INJECTION, EMULSION INTRAVENOUS at 08:08

## 2023-01-04 RX ADMIN — LIDOCAINE HYDROCHLORIDE 30 MG: 20 INJECTION, SOLUTION INFILTRATION; PERINEURAL at 08:04

## 2023-01-04 NOTE — ANESTHESIA PREPROCEDURE EVALUATION
Anesthesia Evaluation     Patient summary reviewed and Nursing notes reviewed   no history of anesthetic complications:  NPO Solid Status: > 8 hours  NPO Liquid Status: > 8 hours           Airway   Mallampati: II  TM distance: >3 FB  Neck ROM: full  No difficulty expected  Dental    (+) edentulous    Pulmonary    (+) a smoker Former, lung cancer, COPD,   (-) rhonchi, decreased breath sounds, wheezes    ROS comment: hemoptysis  Cardiovascular   Exercise tolerance: good (4-7 METS)    Rhythm: regular  Rate: normal    (+) hyperlipidemia,   (-) hypertension, CAD, angina, FIERRO, murmur      Neuro/Psych  (-) seizures, CVA  GI/Hepatic/Renal/Endo    (+)   renal disease (renal mass),   (-) diabetes    Musculoskeletal     Abdominal     Abdomen: soft.   Substance History      OB/GYN          Other   arthritis,    history of cancer (RUL mass on immunotherapy ) active                    Anesthesia Plan    ASA 3     general   total IV anesthesia  intravenous induction     Anesthetic plan, risks, benefits, and alternatives have been provided, discussed and informed consent has been obtained with: patient.        CODE STATUS:

## 2023-01-04 NOTE — DISCHARGE INSTRUCTIONS
For the next 24 hours patient needs to be with a responsible adult.    For 24 hours DO NOT drive, operate machinery, appliances, drink alcohol, make important decisions or sign legal documents.    Start with a light or bland diet if you are feeling sick to your stomach otherwise advance to regular diet as tolerated.    Follow recommendations on procedure report if provided by your doctor.    Call Dr. Vee [phone]    Problems may include but not limited to: large amounts of bleeding, trouble breathing, repeated vomiting, severe unrelieved pain, fever or chills.        Nothing to eat or drink until 10:12 AM 1/4/2023

## 2023-01-04 NOTE — ANESTHESIA POSTPROCEDURE EVALUATION
Patient: Pam Vidal    Procedure Summary     Date: 01/04/23 Room / Location:  [facility]    Anesthesia Start: 0757 Anesthesia Stop: 0842    Procedure: BRONCHOSCOPY with biopsy, lavage, brushing (Bronchus) Diagnosis:     Surgeons: Gregor Vee MD Provider: Hector Koehler MD    Anesthesia Type: general ASA Status: 3          Anesthesia Type: general    Vitals  Vitals Value Taken Time   /43 01/04/23 0901   Temp     Pulse 85 01/04/23 0901   Resp 20 01/04/23 0901   SpO2 95 % 01/04/23 0901           Post Anesthesia Care and Evaluation    Patient location during evaluation: bedside  Patient participation: complete - patient participated  Level of consciousness: awake and alert  Pain management: adequate    Airway patency: patent  Anesthetic complications: No anesthetic complications  PONV Status: none  Cardiovascular status: acceptable  Respiratory status: acceptable  Hydration status: acceptable    Comments: /43 (BP Location: Left arm, Patient Position: Lying)   Pulse 85   Resp 20   SpO2 95%

## 2023-01-04 NOTE — ANESTHESIA PROCEDURE NOTES
Airway  Urgency: elective    Date/Time: 1/4/2023 8:08 AM  Airway not difficult    General Information and Staff    Patient location during procedure: OR  Anesthesiologist: Hector Koehler MD    Indications and Patient Condition  Indications for airway management: airway protection    Preoxygenated: yes  Mask difficulty assessment: 1 - vent by mask    Final Airway Details  Final airway type: supraglottic airway      Successful airway: LMA  Size 4     Number of attempts at approach: 1  Assessment: lips, teeth, and gum same as pre-op

## 2023-01-05 ENCOUNTER — TELEPHONE (OUTPATIENT)
Dept: ONCOLOGY | Facility: CLINIC | Age: 78
End: 2023-01-05
Payer: MEDICARE

## 2023-01-05 LAB
CYTO UR: NORMAL
LAB AP CASE REPORT: NORMAL
LAB AP CASE REPORT: NORMAL
LAB AP CLINICAL INFORMATION: NORMAL
LAB AP DIAGNOSIS COMMENT: NORMAL
LAB AP INTRADEPARTMENTAL CONSULT: NORMAL
PATH REPORT.FINAL DX SPEC: NORMAL
PATH REPORT.FINAL DX SPEC: NORMAL
PATH REPORT.GROSS SPEC: NORMAL
PATH REPORT.GROSS SPEC: NORMAL

## 2023-01-05 NOTE — TELEPHONE ENCOUNTER
----- Message from Lizzette Dangelo sent at 1/5/2023  9:19 AM EST -----  Regarding: FW: Reschedule Jan 9th appt    ----- Message -----  From: Amanda Bass  Sent: 1/5/2023   9:16 AM EST  To: [facility]  Subject: Reschedule Jan 9th appt                          PT appt for Jan 9 has been cancelled but she needs to get it rescheduled.  She's supposed to get the infusion every other week and she can't wait till 1-23. [phone]

## 2023-01-06 LAB
BACTERIA SPEC AEROBE CULT: NORMAL
GRAM STN SPEC: NORMAL

## 2023-01-09 ENCOUNTER — APPOINTMENT (OUTPATIENT)
Dept: ONCOLOGY | Facility: HOSPITAL | Age: 78
End: 2023-01-09
Payer: MEDICARE

## 2023-01-09 ENCOUNTER — INFUSION (OUTPATIENT)
Dept: ONCOLOGY | Facility: HOSPITAL | Age: 78
End: 2023-01-09
Payer: MEDICARE

## 2023-01-09 VITALS
OXYGEN SATURATION: 95 % | SYSTOLIC BLOOD PRESSURE: 150 MMHG | WEIGHT: 126.4 LBS | RESPIRATION RATE: 16 BRPM | DIASTOLIC BLOOD PRESSURE: 70 MMHG | BODY MASS INDEX: 25.52 KG/M2 | TEMPERATURE: 96.8 F | HEART RATE: 75 BPM

## 2023-01-09 DIAGNOSIS — C34.91 PRIMARY LUNG ADENOCARCINOMA, RIGHT: ICD-10-CM

## 2023-01-09 DIAGNOSIS — R04.2 COUGH WITH HEMOPTYSIS: ICD-10-CM

## 2023-01-09 DIAGNOSIS — Z79.899 HIGH RISK MEDICATION USE: Primary | ICD-10-CM

## 2023-01-09 LAB
ALBUMIN SERPL-MCNC: 3.7 G/DL (ref 3.5–5.2)
ALBUMIN/GLOB SERPL: 1.2 G/DL (ref 1.1–2.4)
ALP SERPL-CCNC: 79 U/L (ref 38–116)
ALT SERPL W P-5'-P-CCNC: 12 U/L (ref 0–33)
ANION GAP SERPL CALCULATED.3IONS-SCNC: 10 MMOL/L (ref 5–15)
AST SERPL-CCNC: 18 U/L (ref 0–32)
BASOPHILS # BLD AUTO: 0.05 10*3/MM3 (ref 0–0.2)
BASOPHILS NFR BLD AUTO: 0.3 % (ref 0–1.5)
BILIRUB SERPL-MCNC: 0.3 MG/DL (ref 0.2–1.2)
BUN SERPL-MCNC: 20 MG/DL (ref 6–20)
BUN/CREAT SERPL: 20.6 (ref 7.3–30)
CALCIUM SPEC-SCNC: 9.8 MG/DL (ref 8.5–10.2)
CHLORIDE SERPL-SCNC: 101 MMOL/L (ref 98–107)
CO2 SERPL-SCNC: 27 MMOL/L (ref 22–29)
CREAT SERPL-MCNC: 0.97 MG/DL (ref 0.6–1.1)
DEPRECATED RDW RBC AUTO: 56.8 FL (ref 37–54)
EGFRCR SERPLBLD CKD-EPI 2021: 61.1 ML/MIN/1.73
EOSINOPHIL # BLD AUTO: 0.2 10*3/MM3 (ref 0–0.4)
EOSINOPHIL NFR BLD AUTO: 1.3 % (ref 0.3–6.2)
ERYTHROCYTE [DISTWIDTH] IN BLOOD BY AUTOMATED COUNT: 17.1 % (ref 12.3–15.4)
GLOBULIN UR ELPH-MCNC: 3 GM/DL (ref 1.8–3.5)
GLUCOSE SERPL-MCNC: 97 MG/DL (ref 74–124)
HCT VFR BLD AUTO: 38.3 % (ref 34–46.6)
HGB BLD-MCNC: 12 G/DL (ref 12–15.9)
IMM GRANULOCYTES # BLD AUTO: 0.15 10*3/MM3 (ref 0–0.05)
IMM GRANULOCYTES NFR BLD AUTO: 1 % (ref 0–0.5)
LYMPHOCYTES # BLD AUTO: 0.84 10*3/MM3 (ref 0.7–3.1)
LYMPHOCYTES NFR BLD AUTO: 5.4 % (ref 19.6–45.3)
MCH RBC QN AUTO: 28.2 PG (ref 26.6–33)
MCHC RBC AUTO-ENTMCNC: 31.3 G/DL (ref 31.5–35.7)
MCV RBC AUTO: 90.1 FL (ref 79–97)
MONOCYTES # BLD AUTO: 0.69 10*3/MM3 (ref 0.1–0.9)
MONOCYTES NFR BLD AUTO: 4.4 % (ref 5–12)
NEUTROPHILS NFR BLD AUTO: 13.69 10*3/MM3 (ref 1.7–7)
NEUTROPHILS NFR BLD AUTO: 87.6 % (ref 42.7–76)
NRBC BLD AUTO-RTO: 0 /100 WBC (ref 0–0.2)
PLATELET # BLD AUTO: 273 10*3/MM3 (ref 140–450)
PMV BLD AUTO: 9.7 FL (ref 6–12)
POTASSIUM SERPL-SCNC: 3.7 MMOL/L (ref 3.5–4.7)
PROT SERPL-MCNC: 6.7 G/DL (ref 6.3–8)
RBC # BLD AUTO: 4.25 10*6/MM3 (ref 3.77–5.28)
SODIUM SERPL-SCNC: 138 MMOL/L (ref 134–145)
T4 FREE SERPL-MCNC: 1.14 NG/DL (ref 0.93–1.7)
TSH SERPL DL<=0.05 MIU/L-ACNC: 1.8 UIU/ML (ref 0.27–4.2)
WBC NRBC COR # BLD: 15.62 10*3/MM3 (ref 3.4–10.8)

## 2023-01-09 PROCEDURE — 96413 CHEMO IV INFUSION 1 HR: CPT

## 2023-01-09 PROCEDURE — 25010000002 DURVALUMAB 50 MG/ML SOLUTION 10 ML VIAL: Performed by: INTERNAL MEDICINE

## 2023-01-09 PROCEDURE — 84439 ASSAY OF FREE THYROXINE: CPT | Performed by: INTERNAL MEDICINE

## 2023-01-09 PROCEDURE — 84443 ASSAY THYROID STIM HORMONE: CPT | Performed by: INTERNAL MEDICINE

## 2023-01-09 PROCEDURE — 25010000002 HEPARIN LOCK FLUSH PER 10 UNITS: Performed by: INTERNAL MEDICINE

## 2023-01-09 PROCEDURE — 25010000002 DURVALUMAB 50 MG/ML SOLUTION 2.4 ML VIAL: Performed by: INTERNAL MEDICINE

## 2023-01-09 PROCEDURE — 80053 COMPREHEN METABOLIC PANEL: CPT

## 2023-01-09 PROCEDURE — 85025 COMPLETE CBC W/AUTO DIFF WBC: CPT

## 2023-01-09 RX ORDER — HEPARIN SODIUM (PORCINE) LOCK FLUSH IV SOLN 100 UNIT/ML 100 UNIT/ML
500 SOLUTION INTRAVENOUS AS NEEDED
Status: DISCONTINUED | OUTPATIENT
Start: 2023-01-09 | End: 2023-01-09 | Stop reason: HOSPADM

## 2023-01-09 RX ORDER — SODIUM CHLORIDE 9 MG/ML
250 INJECTION, SOLUTION INTRAVENOUS ONCE
Status: COMPLETED | OUTPATIENT
Start: 2023-01-09 | End: 2023-01-09

## 2023-01-09 RX ORDER — SODIUM CHLORIDE 0.9 % (FLUSH) 0.9 %
10 SYRINGE (ML) INJECTION AS NEEDED
Status: DISCONTINUED | OUTPATIENT
Start: 2023-01-09 | End: 2023-01-09 | Stop reason: HOSPADM

## 2023-01-09 RX ORDER — PREDNISONE 10 MG/1
TABLET ORAL
Qty: 30 TABLET | Refills: 1 | Status: SHIPPED | OUTPATIENT
Start: 2023-01-09 | End: 2023-03-17

## 2023-01-09 RX ORDER — HEPARIN SODIUM (PORCINE) LOCK FLUSH IV SOLN 100 UNIT/ML 100 UNIT/ML
500 SOLUTION INTRAVENOUS AS NEEDED
Status: CANCELLED | OUTPATIENT
Start: 2023-01-09

## 2023-01-09 RX ORDER — SODIUM CHLORIDE 0.9 % (FLUSH) 0.9 %
10 SYRINGE (ML) INJECTION AS NEEDED
Status: CANCELLED | OUTPATIENT
Start: 2023-01-09

## 2023-01-09 RX ADMIN — SODIUM CHLORIDE 250 ML: 9 INJECTION, SOLUTION INTRAVENOUS at 12:54

## 2023-01-09 RX ADMIN — Medication 500 UNITS: at 14:00

## 2023-01-09 RX ADMIN — Medication 10 ML: at 14:00

## 2023-01-09 RX ADMIN — SODIUM CHLORIDE 570 MG: 9 INJECTION, SOLUTION INTRAVENOUS at 12:55

## 2023-01-11 ENCOUNTER — PATIENT OUTREACH (OUTPATIENT)
Dept: OTHER | Facility: HOSPITAL | Age: 78
End: 2023-01-11
Payer: MEDICARE

## 2023-01-11 NOTE — PROGRESS NOTES
"Called patient. She was still coughing up blood and had a repeat bronch by Dr. Vee on 1/4/23. She hasn't heard from him about the results and has no follow up scheduled. She meets with Dr. Lagunas again 1/213 and may ask him about the biopsy results if she hasn't heard back from Dr. Vee.    She had immunotherapy Monday. She worked a little yesterday although was too tired to go in today. She receives treatment every 2 weeks for 1 year. She had diarrhea this morning for a few hours, although takes meds for IBS which helped.  She denies any other side effects to treatment.    She is driving herself and denies transportation or other supportive care needs at this time.  She has not received any E bills and sttaes she has a \"good supplement\".     I will call in 1-2 months; she will call as needed  "

## 2023-01-23 ENCOUNTER — INFUSION (OUTPATIENT)
Dept: ONCOLOGY | Facility: HOSPITAL | Age: 78
End: 2023-01-23
Payer: MEDICARE

## 2023-01-23 ENCOUNTER — OFFICE VISIT (OUTPATIENT)
Dept: ONCOLOGY | Facility: CLINIC | Age: 78
End: 2023-01-23
Payer: MEDICARE

## 2023-01-23 VITALS
TEMPERATURE: 97.1 F | RESPIRATION RATE: 18 BRPM | BODY MASS INDEX: 25.26 KG/M2 | DIASTOLIC BLOOD PRESSURE: 72 MMHG | HEIGHT: 59 IN | SYSTOLIC BLOOD PRESSURE: 158 MMHG | HEART RATE: 95 BPM | OXYGEN SATURATION: 96 % | WEIGHT: 125.3 LBS

## 2023-01-23 DIAGNOSIS — R04.2 COUGH WITH HEMOPTYSIS: ICD-10-CM

## 2023-01-23 DIAGNOSIS — Z79.899 HIGH RISK MEDICATION USE: ICD-10-CM

## 2023-01-23 DIAGNOSIS — Z79.899 HIGH RISK MEDICATION USE: Primary | ICD-10-CM

## 2023-01-23 DIAGNOSIS — C34.91 PRIMARY LUNG ADENOCARCINOMA, RIGHT: Primary | ICD-10-CM

## 2023-01-23 DIAGNOSIS — C34.91 PRIMARY LUNG ADENOCARCINOMA, RIGHT: ICD-10-CM

## 2023-01-23 LAB
ALBUMIN SERPL-MCNC: 3.8 G/DL (ref 3.5–5.2)
ALBUMIN/GLOB SERPL: 1.2 G/DL (ref 1.1–2.4)
ALP SERPL-CCNC: 87 U/L (ref 38–116)
ALT SERPL W P-5'-P-CCNC: 15 U/L (ref 0–33)
ANION GAP SERPL CALCULATED.3IONS-SCNC: 10.8 MMOL/L (ref 5–15)
AST SERPL-CCNC: 20 U/L (ref 0–32)
BASOPHILS # BLD AUTO: 0.05 10*3/MM3 (ref 0–0.2)
BASOPHILS NFR BLD AUTO: 0.4 % (ref 0–1.5)
BILIRUB SERPL-MCNC: 0.3 MG/DL (ref 0.2–1.2)
BUN SERPL-MCNC: 16 MG/DL (ref 6–20)
BUN/CREAT SERPL: 16.5 (ref 7.3–30)
CALCIUM SPEC-SCNC: 9.6 MG/DL (ref 8.5–10.2)
CHLORIDE SERPL-SCNC: 103 MMOL/L (ref 98–107)
CO2 SERPL-SCNC: 25.2 MMOL/L (ref 22–29)
CREAT SERPL-MCNC: 0.97 MG/DL (ref 0.6–1.1)
DEPRECATED RDW RBC AUTO: 53.8 FL (ref 37–54)
EGFRCR SERPLBLD CKD-EPI 2021: 61.1 ML/MIN/1.73
EOSINOPHIL # BLD AUTO: 0.15 10*3/MM3 (ref 0–0.4)
EOSINOPHIL NFR BLD AUTO: 1.3 % (ref 0.3–6.2)
ERYTHROCYTE [DISTWIDTH] IN BLOOD BY AUTOMATED COUNT: 16.4 % (ref 12.3–15.4)
GLOBULIN UR ELPH-MCNC: 3.1 GM/DL (ref 1.8–3.5)
GLUCOSE SERPL-MCNC: 107 MG/DL (ref 74–124)
HCT VFR BLD AUTO: 40.5 % (ref 34–46.6)
HGB BLD-MCNC: 12.7 G/DL (ref 12–15.9)
IMM GRANULOCYTES # BLD AUTO: 0.05 10*3/MM3 (ref 0–0.05)
IMM GRANULOCYTES NFR BLD AUTO: 0.4 % (ref 0–0.5)
LYMPHOCYTES # BLD AUTO: 1.31 10*3/MM3 (ref 0.7–3.1)
LYMPHOCYTES NFR BLD AUTO: 10.9 % (ref 19.6–45.3)
MAGNESIUM SERPL-MCNC: 2.1 MG/DL (ref 1.8–2.5)
MCH RBC QN AUTO: 28.1 PG (ref 26.6–33)
MCHC RBC AUTO-ENTMCNC: 31.4 G/DL (ref 31.5–35.7)
MCV RBC AUTO: 89.6 FL (ref 79–97)
MONOCYTES # BLD AUTO: 0.85 10*3/MM3 (ref 0.1–0.9)
MONOCYTES NFR BLD AUTO: 7.1 % (ref 5–12)
NEUTROPHILS NFR BLD AUTO: 79.9 % (ref 42.7–76)
NEUTROPHILS NFR BLD AUTO: 9.57 10*3/MM3 (ref 1.7–7)
NRBC BLD AUTO-RTO: 0 /100 WBC (ref 0–0.2)
PLATELET # BLD AUTO: 292 10*3/MM3 (ref 140–450)
PMV BLD AUTO: 9.7 FL (ref 6–12)
POTASSIUM SERPL-SCNC: 3.6 MMOL/L (ref 3.5–4.7)
PROT SERPL-MCNC: 6.9 G/DL (ref 6.3–8)
RBC # BLD AUTO: 4.52 10*6/MM3 (ref 3.77–5.28)
SODIUM SERPL-SCNC: 139 MMOL/L (ref 134–145)
WBC NRBC COR # BLD: 11.98 10*3/MM3 (ref 3.4–10.8)

## 2023-01-23 PROCEDURE — 99214 OFFICE O/P EST MOD 30 MIN: CPT | Performed by: INTERNAL MEDICINE

## 2023-01-23 PROCEDURE — 83735 ASSAY OF MAGNESIUM: CPT | Performed by: INTERNAL MEDICINE

## 2023-01-23 PROCEDURE — 25010000002 DURVALUMAB 50 MG/ML SOLUTION 10 ML VIAL: Performed by: INTERNAL MEDICINE

## 2023-01-23 PROCEDURE — 25010000002 DURVALUMAB 50 MG/ML SOLUTION 2.4 ML VIAL: Performed by: INTERNAL MEDICINE

## 2023-01-23 PROCEDURE — 96413 CHEMO IV INFUSION 1 HR: CPT

## 2023-01-23 PROCEDURE — 85025 COMPLETE CBC W/AUTO DIFF WBC: CPT

## 2023-01-23 PROCEDURE — 80053 COMPREHEN METABOLIC PANEL: CPT

## 2023-01-23 RX ORDER — SODIUM CHLORIDE 9 MG/ML
250 INJECTION, SOLUTION INTRAVENOUS ONCE
Status: COMPLETED | OUTPATIENT
Start: 2023-01-23 | End: 2023-01-23

## 2023-01-23 RX ORDER — SODIUM CHLORIDE 9 MG/ML
250 INJECTION, SOLUTION INTRAVENOUS ONCE
Status: CANCELLED | OUTPATIENT
Start: 2023-02-20

## 2023-01-23 RX ORDER — HYDROCODONE BITARTRATE AND HOMATROPINE METHYLBROMIDE ORAL SOLUTION 5; 1.5 MG/5ML; MG/5ML
5 LIQUID ORAL EVERY 6 HOURS PRN
Qty: 250 ML | Refills: 0 | Status: SHIPPED | OUTPATIENT
Start: 2023-01-23 | End: 2023-03-06 | Stop reason: SDUPTHER

## 2023-01-23 RX ORDER — SODIUM CHLORIDE 9 MG/ML
250 INJECTION, SOLUTION INTRAVENOUS ONCE
Status: CANCELLED | OUTPATIENT
Start: 2023-02-06

## 2023-01-23 RX ADMIN — SODIUM CHLORIDE 250 ML: 9 INJECTION, SOLUTION INTRAVENOUS at 11:16

## 2023-01-23 RX ADMIN — SODIUM CHLORIDE 570 MG: 9 INJECTION, SOLUTION INTRAVENOUS at 11:16

## 2023-01-23 NOTE — PROGRESS NOTES
Subjective     REASON FOR FOLLOW UP:   1.  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  PD-L1 positive greater than 95%  3.  Primary chemoradiation with weekly carboplatin and Taxol completed 10/27/2022  4.  Imfinzi immunotherapy every 2 weeks for 12 months total.  First treatment 11/28/2022  5.  Repeat bronchoscopy 1/4/2023 due to persistent hemoptysis.  Pathology revealed no malignancy.  6.  Exophytic lesion of the kidney noted on surveillance CT scans.    HISTORY OF PRESENT ILLNESS:  The patient is a 77 y.o. year old female who is a former smoker referred to us from thoracic surgery (Dr. Remedios Rice) for evaluation and treatment of clinical stage III adenocarcinoma of the lung.  She was having persistent cough and underwent chest x-ray initially in late June which showed possible right upper lobe mass.  This was followed by CT scan of the chest confirming the presence of a right upper lobe mass.  She initially underwent a CT-guided needle biopsy on 7/22/2022 which was nondiagnostic.    She was referred to Dr. Glass for bronchoscopy and biopsy which was performed on 8/23/2022 with pathology returning as poorly differentiated adenocarcinoma.  PD-L1 stain on the tissue was positive at greater than 95%.    She underwent further staging with PET scan and MRI of the brain.  PET scan was performed on 8/12/2022 showing hypermetabolic activity in the large mass in the right upper lobe as well as mediastinal lymph nodes.  She was noted to have a mass on the kidney as well but this was not hypermetabolic.  There was no obvious distant metastatic disease.    MRI of the brain from 8/15/2022 likewise showed no evidence of metastatic disease.  She is scheduled also to see Dr. Hector Rodriguez and radiation oncology on 9/7/2022.    We recommended weekly carboplatin and Taxol concurrent with radiation therapy.  Following completion of treatment she will receive immunotherapy for maintenance   (She does have a diagnosis of rheumatoid  arthritis and is currently taking only Celebrex.  This could become an issue regarding her ability to tolerate immunotherapy in the future).    She received her final fraction of radiation 10/27/2022.  She also completed 6 weeks of carboplatin and Taxol and tolerated it well. CT scans performed 11/21/2022 showed right upper lobe mass appeared stable in size.  Her mediastinal lymphadenopathy had decreased.  There were no new sites of disease identified.    INTERVAL HISTORY:  She has been receiving immunotherapy with Imfinzi with plans to continue immunotherapy for 1 year until December 2023.  She is here today for additional Imfinzi treatment (cycle #5).  She seems to be doing well.    Since her last visit she also underwent a repeat bronchoscopy performed by Dr. Gregor Glass on 1/4/2023.  She had some samples taken which were negative for malignancy.  She reports she still has significant cough and some purulent sputum but hemoptysis has been very infrequent.  She needs a new refill for her Hycodan cough syrup.    History of Present Illness     Past Medical History:   Diagnosis Date   • COPD (chronic obstructive pulmonary disease) (HCC)    • Coughing up blood     X2 MONTHS   • History of COVID-19 02/2022   • Hyperlipidemia    • IBS (irritable bowel syndrome)    • Primary lung adenocarcinoma, right (HCC)    • Rheumatoid arthritis (HCC)         Past Surgical History:   Procedure Laterality Date   • APPENDECTOMY      appendix removed   • BRONCHOSCOPY N/A 8/23/2022    Procedure: BRONCHOSCOPY WITH BAL,  BIOPSIES, AND BRUSHINGS WITH ENDOBRONCHIAL ULTRASOUND WITH FNA;  Surgeon: Gregor Vee MD;  Location: [facility] ENDOSCOPY;  Service: Pulmonary;  Laterality: N/A;  PRE- HILAR MASS  POST- SAME   • BRONCHOSCOPY N/A 1/4/2023    Procedure: BRONCHOSCOPY with biopsy, lavage, brushing;  Surgeon: Gregor Vee MD;  Location: [facility] ENDOSCOPY;  Service: Pulmonary;  Laterality: N/A;   • CAROTID ENDARTERECTOMY Right    • CAROTID  ENDARTERECTOMY Left    • CATARACT EXTRACTION     • CERVICAL FUSION     • CHOLECYSTECTOMY     • HYSTERECTOMY     • SHOULDER ARTHROSCOPY Right 02/19/2019    right should scope/cuff repair    • VENOUS ACCESS DEVICE (PORT) INSERTION Right 9/6/2022    Procedure: POWERPORT INSERTION;  Surgeon: Remedios Rice MD;  Location: [facility] OR;  Service: Thoracic;  Laterality: Right;        Current Outpatient Medications on File Prior to Visit   Medication Sig Dispense Refill   • azelastine (ASTELIN) 0.1 % nasal spray 1 spray into the nostril(s) as directed by provider.     • B COMPLEX VITAMINS ER PO Take  by mouth Daily.     • Cholecalciferol (VITAMIN D3) 5000 units capsule capsule Take 2,000 Units by mouth Daily.     • Colestipol HCl (COLESTID PO) Take 1 tablet by mouth Daily.     • esomeprazole (NexIUM) 10 MG packet Take 10 mg by mouth Daily.     • estradiol (ESTRACE) 1 MG tablet Take 1 mg by mouth Daily.     • ferrous sulfate 325 (65 FE) MG tablet Take 1 tablet by mouth Daily With Breakfast. 30 tablet 5   • HYDROcodone Bit-Homatrop MBr (HYCODAN) 5-1.5 MG/5ML solution Take 5 mL by mouth Every 6 (Six) Hours As Needed for Cough. 250 mL 0   • lidocaine-prilocaine (EMLA) 2.5-2.5 % cream Apply 1 application topically to the appropriate area as directed Every 2 (Two) Hours As Needed for Mild Pain. 5 g 3   • lidocaine-prilocaine (EMLA) 2.5-2.5 % cream Apply nickel size amount to port site 30 min before appt time do not rub in cover with plastic wrap 5 g 2   • Multiple Vitamins-Minerals (PRESERVISION AREDS 2+MULTI VIT PO) Take  by mouth.     • ondansetron (ZOFRAN) 8 MG tablet Take 1 tablet by mouth 3 (Three) Times a Day As Needed for Nausea or Vomiting. 30 tablet 5   • predniSONE (DELTASONE) 10 MG tablet TAKE ONE TABLET BY MOUTH DAILY 30 tablet 1     No current facility-administered medications on file prior to visit.        ALLERGIES:    Allergies   Allergen Reactions   • Del-Mycin [Erythromycin] Hives   • Gentamicin Hives   •  Ibuprofen Nausea And Vomiting   • Latex Dermatitis     GLOVES   • Metronidazole Hives   • Ofloxacin Hives and Nausea Only   • Penicillins Hives   • Tetracycline Hives   • Adhesive Tape Dermatitis     BANDAIDS   • Aspirin GI Intolerance     Vomiting can take EC   • Codeine Nausea And Vomiting        Social History     Socioeconomic History   • Marital status:    Tobacco Use   • Smoking status: Former     Types: Cigarettes     Quit date: 2022     Years since quittin.7   • Smokeless tobacco: Never   • Tobacco comments:     Occasionally smoking 1-2 cigs   Vaping Use   • Vaping Use: Never used   Substance and Sexual Activity   • Alcohol use: Yes     Alcohol/week: 1.0 standard drink     Types: 1 Glasses of wine per week     Comment: occasionally   • Drug use: Never   • Sexual activity: Defer        Family History   Problem Relation Age of Onset   • Cancer Mother    • Cancer Father    • Malig Hyperthermia Neg Hx         Review of Systems   Constitutional: Negative for activity change, chills, fatigue and fever.   HENT: Negative for mouth sores, trouble swallowing and voice change.    Eyes: Negative for pain and visual disturbance.   Respiratory: Positive for cough. Negative for wheezing.    Cardiovascular: Negative for chest pain and palpitations.   Gastrointestinal: Negative for abdominal pain, constipation, diarrhea, nausea and vomiting.   Genitourinary: Negative for difficulty urinating, frequency and urgency.   Musculoskeletal: Negative for arthralgias and joint swelling.   Skin: Negative for rash.   Neurological: Negative for dizziness, seizures, weakness and headaches.   Hematological: Negative for adenopathy. Does not bruise/bleed easily.   Psychiatric/Behavioral: Negative for behavioral problems and confusion. The patient is not nervous/anxious.     2023 ROS updated     Objective     Vitals:    23 0955   BP: 158/72   Pulse: 95   Resp: 18   Temp: 97.1 °F (36.2 °C)   TempSrc: Temporal  "  SpO2: 96%   Weight: 56.8 kg (125 lb 4.8 oz)   Height: 149.9 cm (59.02\")   PainSc:   8   PainLoc: Generalized     Current Status 1/23/2023   ECOG score 0       Physical Exam  Vitals reviewed.   Constitutional:       General: She is not in acute distress.     Appearance: Normal appearance. She is well-developed.   HENT:      Head: Normocephalic and atraumatic.      Mouth/Throat:      Pharynx: No oropharyngeal exudate.   Eyes:      Pupils: Pupils are equal, round, and reactive to light.   Cardiovascular:      Rate and Rhythm: Normal rate and regular rhythm.      Heart sounds: Normal heart sounds. No murmur heard.  Pulmonary:      Effort: Pulmonary effort is normal. No respiratory distress.      Breath sounds: Decreased breath sounds present. No wheezing, rhonchi or rales.   Abdominal:      General: Bowel sounds are normal. There is no distension.      Palpations: Abdomen is soft.   Musculoskeletal:         General: Normal range of motion.      Cervical back: Normal range of motion.   Skin:     General: Skin is warm and dry.      Findings: No rash.   Neurological:      Mental Status: She is alert and oriented to person, place, and time.      I have reexamined the patient and the results are consistent with the previously documented exam. Cruzito Lagunas MD        RECENT LABS:  Results from last 7 days   Lab Units 01/23/23  0939   WBC 10*3/mm3 11.98*   NEUTROS ABS 10*3/mm3 9.57*   HEMOGLOBIN g/dL 12.7   HEMATOCRIT % 40.5   PLATELETS 10*3/mm3 292             BRONCHOSCOPY PATHOLOGY 1/4/2023  Final Diagnosis   1. Fluid, Lung, Right Upper Lobe, Bronchoalveolar Lavage (BAL):  A. Negative for malignant cells- reactive atypia.  B. Reactive bronchial cells, macrophages, inflammation (mostly acute) and mucin.  C. Negative for definitive fungal organisms by GMS staining. See comment.     2. Fluid, Lung, Right Upper Lobe, Brushing:               A. Negative for malignant cells.               B. Bronchial cells, scattered " inflammation and mucinous debris.      Final Diagnosis   1. Lung, Right Upper Lobe, Biopsy:  Endobronchial mucosa and submucosa with                A. Squamous metaplasia, mild to moderate chronic active inflammation, fibrinous and necrotic debris and reactive      epithelial changes.               B. No definitive dysplasia nor malignancy identified.               C. No granulomata, diagnostic viral inclusions nor fungal organisms identified.             BRONCHOSCOPY PATHOLOGY 8/23/2022  Final Diagnosis   1. Lung, Right Upper Lobe, Endobronchial Biopsies: INVASIVE POORLY DIFFERENTIATED ADENOCARCINOMA OF PULMONARY ORIGIN.   PDL-1 POSITIVE >95%    IMAGING:  CT CHEST, ABDOMEN, AND PELVIS WITH IV CONTRAST 11/21/2022  IMPRESSION:  Impression:  1.  Pulmonary mass centered within the right upper lobe with discrete  extension into the superior aspect of the right lower lobe is present  and grossly unchanged in overall size since 08/12/2022. Findings  concerning for lymphangitic spread of malignancy ascending from the  periphery of the mass are present, as before.  2.  Previously hypermetabolic mediastinal and hilar adenopathy have  decreased in size.  3.  Scattered bilateral subcentimeter pulmonary nodules are grossly  unchanged. Continued close attention on follow-up is recommended to  exclude metastatic disease.  4.  5.1 cm exophytic lesion arising off the inferior aspect of the right  kidney is indeterminate. Further evaluation multiphase CT or MRI of the  abdomen with and without contrast is recommended to exclude neoplasm.  5.  Other findings as above.    F-18 FDG PET FROM SKULL BASE TO MID THIGH WITH PET/CT FUSION 8/12/2022  IMPRESSION:  1.  Large mass centered within the right upper lobe representing  patient's known malignancy. Interlobular septal thickening and ground  glass opacification projecting from the periphery of the mass throughout  the right upper lobe is highly concerning for lymphangitic spread.  Of  note, the mass abuts the pleura and mediastinum over a long segment and  underlying invasion cannot be excluded.  2.  FDG avid hilar and mediastinal adenopathy concerning for metastatic  disease.  3.  A few irregular subcentimeter pulmonary nodules are present within  the right lower and left upper lobes measuring up to 0.9 cm which while  nonspecific raises concern for metastatic disease. At least close  attention on followup is recommended.   4.  Minimally hypermetabolic arnoldo hepatic node and bilateral sub-6 mm  pulmonary nodules are indeterminate. Continued followup with chest and  abdominal CT in 3 months is recommended.  5.  Indeterminate density 4.4 cm mass arises off the right kidney.  Further evaluation with multiphase CT or MRI of the abdomen with and  without contrast is recommended to exclude neoplasm.  6.  Focal uptake over the right shoulder in the area of prior rotator  cuff repair is favored be postsurgical with metastatic disease less  likely.  7.  Other findings as above.    MRI OF THE BRAIN WITH AND WITHOUT CONTRAST 08/15/2022   IMPRESSION:  1. There is mild-to-moderate small vessel disease in cerebral and  central pontine white matter. Otherwise, the MRI of the brain is normal  with no evidence of metastatic disease to the brain.   2. There are some nonspecific signal abnormality in the C4 cervical  vertebra with T1 low signal throughout the visualized portions C4  cervical vertebrae on the sagittal images. The patient has had prior  cervical spine surgery as demonstrated on prior PET scan and this argues  in favor that the T1 low signal and C4 vertebrae is related to sclerosis  from degenerative disc changes at C4-5 although an osseous metastatic  lesion in the C4 vertebra cannot be excluded on the basis of this exam.  Apparently the C4 vertebra was not hot on the recent PET scan which is  further evidence that this is degenerative in origin and correlation  with recent PET scan is  suggested.      Assessment & Plan   · 1.  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  Patient is not felt to be a surgical candidate and combined chemotherapy and radiation.   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  · 9/20/2022 1st dose of weekly carboplatin/taxol.   · She completed 6 cycles of weekly CarboTaxol 10/25/2022.  · Radiation therapy completed 10/27/2022  · First dose durvalumab delivered 11/28/2022    2.  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  3.  Other comorbidities including vascular disease with previous carotid endarterectomy surgeries.  She has no known history of stroke or myocardial infarction.  Overall her performance status is very good.  4.  Hemoptysis related to her central tumor.  Improved with initiation of therapy.  She reports she still has some darker blood coming up when she coughs but this is usually now associated more with some yellow foul-smelling sputum.  5.  Rheumatoid arthritis: Patient previously on Celebrex, but discontinued due to hemoptysis.  Patient started on prednisone 20 mg daily prescribed to manage her arthritis pain.  Prednisone has since been decreased to 10 mg daily.  6.  Recent follow-up bronchoscopy performed 1/4/2023 with no evidence of malignancy identified  7.  Lesion on the right kidney which will require further follow-up on next scans.      PLAN:  1. We we will proceed with her 5th dose of Imfinzi immunotherapy in the office today.  2. Return in 2 weeks and 4 weeks for lab and further cycles of Imfinzi.  3. Continue prednisone to 10 mg daily for rheumatoid arthritis.  4. For now we will continue iron supplementation with ferrous sulfate 325 mg p.o. once daily with food.  5. MD follow-up in 6 weeks with lab and Imfinzi treatment.  CT scans of the chest abdomen and pelvis will be ordered 1 week prior  to that visit and reviewed when she returns.  6. We reordered her narcotic cough medication today as well.    This patient is on high risk drug therapy requiring intensive monitoring for toxicity.             Cruzito Lagunas MD   01/23/2023

## 2023-02-01 LAB — FUNGUS WND CULT: NORMAL

## 2023-02-06 ENCOUNTER — INFUSION (OUTPATIENT)
Dept: ONCOLOGY | Facility: HOSPITAL | Age: 78
End: 2023-02-06
Payer: MEDICARE

## 2023-02-06 VITALS
DIASTOLIC BLOOD PRESSURE: 57 MMHG | WEIGHT: 127.2 LBS | OXYGEN SATURATION: 95 % | TEMPERATURE: 97.5 F | SYSTOLIC BLOOD PRESSURE: 135 MMHG | RESPIRATION RATE: 16 BRPM | HEART RATE: 82 BPM | BODY MASS INDEX: 25.68 KG/M2

## 2023-02-06 DIAGNOSIS — C34.91 PRIMARY LUNG ADENOCARCINOMA, RIGHT: ICD-10-CM

## 2023-02-06 DIAGNOSIS — Z79.899 HIGH RISK MEDICATION USE: Primary | ICD-10-CM

## 2023-02-06 LAB
ALBUMIN SERPL-MCNC: 3.9 G/DL (ref 3.5–5.2)
ALBUMIN/GLOB SERPL: 1.4 G/DL (ref 1.1–2.4)
ALP SERPL-CCNC: 80 U/L (ref 38–116)
ALT SERPL W P-5'-P-CCNC: 11 U/L (ref 0–33)
ANION GAP SERPL CALCULATED.3IONS-SCNC: 10 MMOL/L (ref 5–15)
AST SERPL-CCNC: 18 U/L (ref 0–32)
BASOPHILS # BLD AUTO: 0.06 10*3/MM3 (ref 0–0.2)
BASOPHILS NFR BLD AUTO: 0.5 % (ref 0–1.5)
BILIRUB SERPL-MCNC: 0.2 MG/DL (ref 0.2–1.2)
BUN SERPL-MCNC: 21 MG/DL (ref 6–20)
BUN/CREAT SERPL: 21.4 (ref 7.3–30)
CALCIUM SPEC-SCNC: 9.9 MG/DL (ref 8.5–10.2)
CHLORIDE SERPL-SCNC: 102 MMOL/L (ref 98–107)
CO2 SERPL-SCNC: 26 MMOL/L (ref 22–29)
CREAT SERPL-MCNC: 0.98 MG/DL (ref 0.6–1.1)
DEPRECATED RDW RBC AUTO: 51.8 FL (ref 37–54)
EGFRCR SERPLBLD CKD-EPI 2021: 60.3 ML/MIN/1.73
EOSINOPHIL # BLD AUTO: 0.11 10*3/MM3 (ref 0–0.4)
EOSINOPHIL NFR BLD AUTO: 0.9 % (ref 0.3–6.2)
ERYTHROCYTE [DISTWIDTH] IN BLOOD BY AUTOMATED COUNT: 15.9 % (ref 12.3–15.4)
GLOBULIN UR ELPH-MCNC: 2.8 GM/DL (ref 1.8–3.5)
GLUCOSE SERPL-MCNC: 99 MG/DL (ref 74–124)
HCT VFR BLD AUTO: 39.7 % (ref 34–46.6)
HGB BLD-MCNC: 12.8 G/DL (ref 12–15.9)
IMM GRANULOCYTES # BLD AUTO: 0.06 10*3/MM3 (ref 0–0.05)
IMM GRANULOCYTES NFR BLD AUTO: 0.5 % (ref 0–0.5)
LYMPHOCYTES # BLD AUTO: 0.87 10*3/MM3 (ref 0.7–3.1)
LYMPHOCYTES NFR BLD AUTO: 7 % (ref 19.6–45.3)
MCH RBC QN AUTO: 28.3 PG (ref 26.6–33)
MCHC RBC AUTO-ENTMCNC: 32.2 G/DL (ref 31.5–35.7)
MCV RBC AUTO: 87.6 FL (ref 79–97)
MONOCYTES # BLD AUTO: 0.78 10*3/MM3 (ref 0.1–0.9)
MONOCYTES NFR BLD AUTO: 6.3 % (ref 5–12)
NEUTROPHILS NFR BLD AUTO: 10.58 10*3/MM3 (ref 1.7–7)
NEUTROPHILS NFR BLD AUTO: 84.8 % (ref 42.7–76)
NRBC BLD AUTO-RTO: 0 /100 WBC (ref 0–0.2)
PLATELET # BLD AUTO: 306 10*3/MM3 (ref 140–450)
PMV BLD AUTO: 10 FL (ref 6–12)
POTASSIUM SERPL-SCNC: 4 MMOL/L (ref 3.5–4.7)
PROT SERPL-MCNC: 6.7 G/DL (ref 6.3–8)
RBC # BLD AUTO: 4.53 10*6/MM3 (ref 3.77–5.28)
SODIUM SERPL-SCNC: 138 MMOL/L (ref 134–145)
WBC NRBC COR # BLD: 12.46 10*3/MM3 (ref 3.4–10.8)

## 2023-02-06 PROCEDURE — 25010000002 HEPARIN LOCK FLUSH PER 10 UNITS: Performed by: INTERNAL MEDICINE

## 2023-02-06 PROCEDURE — 85025 COMPLETE CBC W/AUTO DIFF WBC: CPT

## 2023-02-06 PROCEDURE — 80053 COMPREHEN METABOLIC PANEL: CPT

## 2023-02-06 PROCEDURE — 96413 CHEMO IV INFUSION 1 HR: CPT

## 2023-02-06 PROCEDURE — 25010000002 DURVALUMAB 50 MG/ML SOLUTION 2.4 ML VIAL: Performed by: INTERNAL MEDICINE

## 2023-02-06 PROCEDURE — 25010000002 DURVALUMAB 50 MG/ML SOLUTION 10 ML VIAL: Performed by: INTERNAL MEDICINE

## 2023-02-06 RX ORDER — SODIUM CHLORIDE 9 MG/ML
250 INJECTION, SOLUTION INTRAVENOUS ONCE
Status: COMPLETED | OUTPATIENT
Start: 2023-02-06 | End: 2023-02-06

## 2023-02-06 RX ORDER — HEPARIN SODIUM (PORCINE) LOCK FLUSH IV SOLN 100 UNIT/ML 100 UNIT/ML
500 SOLUTION INTRAVENOUS AS NEEDED
Status: CANCELLED | OUTPATIENT
Start: 2023-02-06

## 2023-02-06 RX ORDER — SODIUM CHLORIDE 0.9 % (FLUSH) 0.9 %
10 SYRINGE (ML) INJECTION AS NEEDED
Status: DISCONTINUED | OUTPATIENT
Start: 2023-02-06 | End: 2023-02-06 | Stop reason: HOSPADM

## 2023-02-06 RX ORDER — HEPARIN SODIUM (PORCINE) LOCK FLUSH IV SOLN 100 UNIT/ML 100 UNIT/ML
500 SOLUTION INTRAVENOUS AS NEEDED
Status: DISCONTINUED | OUTPATIENT
Start: 2023-02-06 | End: 2023-02-06 | Stop reason: HOSPADM

## 2023-02-06 RX ORDER — SODIUM CHLORIDE 0.9 % (FLUSH) 0.9 %
10 SYRINGE (ML) INJECTION AS NEEDED
Status: CANCELLED | OUTPATIENT
Start: 2023-02-06

## 2023-02-06 RX ADMIN — Medication 500 UNITS: at 13:31

## 2023-02-06 RX ADMIN — SODIUM CHLORIDE 570 MG: 9 INJECTION, SOLUTION INTRAVENOUS at 12:25

## 2023-02-06 RX ADMIN — SODIUM CHLORIDE 250 ML: 9 INJECTION, SOLUTION INTRAVENOUS at 12:25

## 2023-02-06 RX ADMIN — Medication 10 ML: at 13:31

## 2023-02-15 LAB
MYCOBACTERIUM SPEC CULT: NORMAL
NIGHT BLUE STAIN TISS: NORMAL

## 2023-02-20 ENCOUNTER — INFUSION (OUTPATIENT)
Dept: ONCOLOGY | Facility: HOSPITAL | Age: 78
End: 2023-02-20
Payer: MEDICARE

## 2023-02-20 VITALS
RESPIRATION RATE: 16 BRPM | BODY MASS INDEX: 24.79 KG/M2 | DIASTOLIC BLOOD PRESSURE: 54 MMHG | TEMPERATURE: 98 F | HEART RATE: 82 BPM | SYSTOLIC BLOOD PRESSURE: 116 MMHG | WEIGHT: 122.8 LBS | OXYGEN SATURATION: 93 %

## 2023-02-20 DIAGNOSIS — Z79.899 HIGH RISK MEDICATION USE: Primary | ICD-10-CM

## 2023-02-20 DIAGNOSIS — C34.91 PRIMARY LUNG ADENOCARCINOMA, RIGHT: ICD-10-CM

## 2023-02-20 LAB
ALBUMIN SERPL-MCNC: 4 G/DL (ref 3.5–5.2)
ALBUMIN/GLOB SERPL: 1.2 G/DL (ref 1.1–2.4)
ALP SERPL-CCNC: 83 U/L (ref 38–116)
ALT SERPL W P-5'-P-CCNC: 19 U/L (ref 0–33)
ANION GAP SERPL CALCULATED.3IONS-SCNC: 13.5 MMOL/L (ref 5–15)
AST SERPL-CCNC: 23 U/L (ref 0–32)
BASOPHILS # BLD AUTO: 0.06 10*3/MM3 (ref 0–0.2)
BASOPHILS NFR BLD AUTO: 0.4 % (ref 0–1.5)
BILIRUB SERPL-MCNC: 0.2 MG/DL (ref 0.2–1.2)
BUN SERPL-MCNC: 18 MG/DL (ref 6–20)
BUN/CREAT SERPL: 11.5 (ref 7.3–30)
CALCIUM SPEC-SCNC: 10.2 MG/DL (ref 8.5–10.2)
CHLORIDE SERPL-SCNC: 99 MMOL/L (ref 98–107)
CO2 SERPL-SCNC: 23.5 MMOL/L (ref 22–29)
CREAT SERPL-MCNC: 1.57 MG/DL (ref 0.6–1.1)
DEPRECATED RDW RBC AUTO: 49.6 FL (ref 37–54)
EGFRCR SERPLBLD CKD-EPI 2021: 34.1 ML/MIN/1.73
EOSINOPHIL # BLD AUTO: 0.15 10*3/MM3 (ref 0–0.4)
EOSINOPHIL NFR BLD AUTO: 1 % (ref 0.3–6.2)
ERYTHROCYTE [DISTWIDTH] IN BLOOD BY AUTOMATED COUNT: 16.1 % (ref 12.3–15.4)
GLOBULIN UR ELPH-MCNC: 3.3 GM/DL (ref 1.8–3.5)
GLUCOSE SERPL-MCNC: 100 MG/DL (ref 74–124)
HCT VFR BLD AUTO: 41.6 % (ref 34–46.6)
HGB BLD-MCNC: 13.4 G/DL (ref 12–15.9)
IMM GRANULOCYTES # BLD AUTO: 0.13 10*3/MM3 (ref 0–0.05)
IMM GRANULOCYTES NFR BLD AUTO: 0.9 % (ref 0–0.5)
LYMPHOCYTES # BLD AUTO: 1.71 10*3/MM3 (ref 0.7–3.1)
LYMPHOCYTES NFR BLD AUTO: 11.4 % (ref 19.6–45.3)
MCH RBC QN AUTO: 27.2 PG (ref 26.6–33)
MCHC RBC AUTO-ENTMCNC: 32.2 G/DL (ref 31.5–35.7)
MCV RBC AUTO: 84.4 FL (ref 79–97)
MONOCYTES # BLD AUTO: 1.09 10*3/MM3 (ref 0.1–0.9)
MONOCYTES NFR BLD AUTO: 7.3 % (ref 5–12)
NEUTROPHILS NFR BLD AUTO: 11.81 10*3/MM3 (ref 1.7–7)
NEUTROPHILS NFR BLD AUTO: 79 % (ref 42.7–76)
NRBC BLD AUTO-RTO: 0 /100 WBC (ref 0–0.2)
PLATELET # BLD AUTO: 383 10*3/MM3 (ref 140–450)
PMV BLD AUTO: 10 FL (ref 6–12)
POTASSIUM SERPL-SCNC: 4.1 MMOL/L (ref 3.5–4.7)
PROT SERPL-MCNC: 7.3 G/DL (ref 6.3–8)
RBC # BLD AUTO: 4.93 10*6/MM3 (ref 3.77–5.28)
SODIUM SERPL-SCNC: 136 MMOL/L (ref 134–145)
T4 FREE SERPL-MCNC: 1.14 NG/DL (ref 0.93–1.7)
TSH SERPL DL<=0.05 MIU/L-ACNC: 2.03 UIU/ML (ref 0.27–4.2)
WBC NRBC COR # BLD: 14.95 10*3/MM3 (ref 3.4–10.8)

## 2023-02-20 PROCEDURE — 36591 DRAW BLOOD OFF VENOUS DEVICE: CPT

## 2023-02-20 PROCEDURE — 25010000002 HEPARIN LOCK FLUSH PER 10 UNITS: Performed by: INTERNAL MEDICINE

## 2023-02-20 PROCEDURE — 84439 ASSAY OF FREE THYROXINE: CPT | Performed by: INTERNAL MEDICINE

## 2023-02-20 PROCEDURE — 84443 ASSAY THYROID STIM HORMONE: CPT | Performed by: INTERNAL MEDICINE

## 2023-02-20 PROCEDURE — 85025 COMPLETE CBC W/AUTO DIFF WBC: CPT

## 2023-02-20 PROCEDURE — 80053 COMPREHEN METABOLIC PANEL: CPT

## 2023-02-20 RX ORDER — SODIUM CHLORIDE 0.9 % (FLUSH) 0.9 %
10 SYRINGE (ML) INJECTION AS NEEDED
Status: DISCONTINUED | OUTPATIENT
Start: 2023-02-20 | End: 2023-02-20 | Stop reason: HOSPADM

## 2023-02-20 RX ORDER — HEPARIN SODIUM (PORCINE) LOCK FLUSH IV SOLN 100 UNIT/ML 100 UNIT/ML
500 SOLUTION INTRAVENOUS AS NEEDED
Status: DISCONTINUED | OUTPATIENT
Start: 2023-02-20 | End: 2023-02-20 | Stop reason: HOSPADM

## 2023-02-20 RX ORDER — HEPARIN SODIUM (PORCINE) LOCK FLUSH IV SOLN 100 UNIT/ML 100 UNIT/ML
500 SOLUTION INTRAVENOUS AS NEEDED
Status: CANCELLED | OUTPATIENT
Start: 2023-02-20

## 2023-02-20 RX ORDER — SODIUM CHLORIDE 9 MG/ML
250 INJECTION, SOLUTION INTRAVENOUS ONCE
Status: DISCONTINUED | OUTPATIENT
Start: 2023-02-20 | End: 2023-02-20 | Stop reason: HOSPADM

## 2023-02-20 RX ORDER — SODIUM CHLORIDE 0.9 % (FLUSH) 0.9 %
10 SYRINGE (ML) INJECTION AS NEEDED
Status: CANCELLED | OUTPATIENT
Start: 2023-02-20

## 2023-02-20 RX ADMIN — Medication 10 ML: at 12:00

## 2023-02-20 RX ADMIN — Medication 500 UNITS: at 12:01

## 2023-02-20 NOTE — NURSING NOTE
CMP reviewed with Dr aLgunas, Cr 1.57  Will hold treatment today, pt to return in 2 weeks to see Dr Lagunas for possible treatment that day

## 2023-03-01 ENCOUNTER — HOSPITAL ENCOUNTER (OUTPATIENT)
Dept: CT IMAGING | Facility: HOSPITAL | Age: 78
Discharge: HOME | End: 2023-03-01
Admitting: INTERNAL MEDICINE
Payer: MEDICARE

## 2023-03-01 DIAGNOSIS — C34.91 PRIMARY LUNG ADENOCARCINOMA, RIGHT: Primary | ICD-10-CM

## 2023-03-01 DIAGNOSIS — R04.2 COUGH WITH HEMOPTYSIS: ICD-10-CM

## 2023-03-01 DIAGNOSIS — Z79.899 HIGH RISK MEDICATION USE: ICD-10-CM

## 2023-03-01 LAB — CREAT BLDA-MCNC: 1.4 MG/DL (ref 0.6–1.3)

## 2023-03-01 PROCEDURE — 74177 CT ABD & PELVIS W/CONTRAST: CPT

## 2023-03-01 PROCEDURE — 82565 ASSAY OF CREATININE: CPT

## 2023-03-01 PROCEDURE — 25010000002 HEPARIN LOCK FLUSH PER 10 UNITS: Performed by: INTERNAL MEDICINE

## 2023-03-01 PROCEDURE — 25510000001 IOPAMIDOL 61 % SOLUTION: Performed by: INTERNAL MEDICINE

## 2023-03-01 PROCEDURE — 71260 CT THORAX DX C+: CPT

## 2023-03-01 RX ORDER — SODIUM CHLORIDE 0.9 % (FLUSH) 0.9 %
10 SYRINGE (ML) INJECTION AS NEEDED
Status: CANCELLED | OUTPATIENT
Start: 2023-03-01

## 2023-03-01 RX ORDER — SODIUM CHLORIDE 0.9 % (FLUSH) 0.9 %
10 SYRINGE (ML) INJECTION AS NEEDED
Status: DISCONTINUED | OUTPATIENT
Start: 2023-03-01 | End: 2023-03-02 | Stop reason: HOSPADM

## 2023-03-01 RX ORDER — HEPARIN SODIUM (PORCINE) LOCK FLUSH IV SOLN 100 UNIT/ML 100 UNIT/ML
500 SOLUTION INTRAVENOUS AS NEEDED
Status: DISCONTINUED | OUTPATIENT
Start: 2023-03-01 | End: 2023-03-02 | Stop reason: HOSPADM

## 2023-03-01 RX ORDER — HEPARIN SODIUM (PORCINE) LOCK FLUSH IV SOLN 100 UNIT/ML 100 UNIT/ML
500 SOLUTION INTRAVENOUS AS NEEDED
Status: CANCELLED | OUTPATIENT
Start: 2023-03-01

## 2023-03-01 RX ADMIN — HEPARIN 500 UNITS: 100 SYRINGE at 14:50

## 2023-03-01 RX ADMIN — Medication 10 ML: at 14:22

## 2023-03-01 RX ADMIN — IOPAMIDOL 85 ML: 612 INJECTION, SOLUTION INTRAVENOUS at 14:46

## 2023-03-03 ENCOUNTER — PATIENT OUTREACH (OUTPATIENT)
Dept: OTHER | Facility: HOSPITAL | Age: 78
End: 2023-03-03
Payer: MEDICARE

## 2023-03-03 NOTE — PROGRESS NOTES
Called patient. She is doing well. She had a scan last week and sees Dr. Lagunas on Monday. Her last treatment of Imfinzi was held 2 weeks ago because her creatinine was too high. She has been drinking 62 oz of water a day. She is hopeful her labs will be stable tomorrow so she can resume treatment.  The patient states that her Imfinzi treatment will run every 2 weeks through December.     She is hoping to schedule a massage appt soon. She is looking forward to Spring because she wants to travel a bit.    She deneis any other supportive care or resource needs at this time.  I will call in a few months; she will call as needed

## 2023-03-06 ENCOUNTER — OFFICE VISIT (OUTPATIENT)
Dept: ONCOLOGY | Facility: CLINIC | Age: 78
End: 2023-03-06
Payer: MEDICARE

## 2023-03-06 ENCOUNTER — INFUSION (OUTPATIENT)
Dept: ONCOLOGY | Facility: HOSPITAL | Age: 78
End: 2023-03-06
Payer: MEDICARE

## 2023-03-06 VITALS
HEIGHT: 59 IN | OXYGEN SATURATION: 96 % | BODY MASS INDEX: 24.39 KG/M2 | DIASTOLIC BLOOD PRESSURE: 77 MMHG | SYSTOLIC BLOOD PRESSURE: 130 MMHG | TEMPERATURE: 98.4 F | WEIGHT: 121 LBS | HEART RATE: 94 BPM | RESPIRATION RATE: 16 BRPM

## 2023-03-06 DIAGNOSIS — Z79.899 HIGH RISK MEDICATION USE: ICD-10-CM

## 2023-03-06 DIAGNOSIS — C34.91 PRIMARY LUNG ADENOCARCINOMA, RIGHT: ICD-10-CM

## 2023-03-06 DIAGNOSIS — C34.91 PRIMARY LUNG ADENOCARCINOMA, RIGHT: Primary | ICD-10-CM

## 2023-03-06 DIAGNOSIS — Z79.899 HIGH RISK MEDICATION USE: Primary | ICD-10-CM

## 2023-03-06 LAB
ALBUMIN SERPL-MCNC: 4.2 G/DL (ref 3.5–5.2)
ALBUMIN/GLOB SERPL: 1.4 G/DL (ref 1.1–2.4)
ALP SERPL-CCNC: 70 U/L (ref 38–116)
ALT SERPL W P-5'-P-CCNC: 14 U/L (ref 0–33)
ANION GAP SERPL CALCULATED.3IONS-SCNC: 15.9 MMOL/L (ref 5–15)
AST SERPL-CCNC: 23 U/L (ref 0–32)
BASOPHILS # BLD AUTO: 0.04 10*3/MM3 (ref 0–0.2)
BASOPHILS NFR BLD AUTO: 0.4 % (ref 0–1.5)
BILIRUB SERPL-MCNC: 0.4 MG/DL (ref 0.2–1.2)
BUN SERPL-MCNC: 18 MG/DL (ref 6–20)
BUN/CREAT SERPL: 17.3 (ref 7.3–30)
CALCIUM SPEC-SCNC: 10 MG/DL (ref 8.5–10.2)
CHLORIDE SERPL-SCNC: 100 MMOL/L (ref 98–107)
CO2 SERPL-SCNC: 23.1 MMOL/L (ref 22–29)
CREAT SERPL-MCNC: 1.04 MG/DL (ref 0.6–1.1)
DEPRECATED RDW RBC AUTO: 53.1 FL (ref 37–54)
EGFRCR SERPLBLD CKD-EPI 2021: 55.8 ML/MIN/1.73
EOSINOPHIL # BLD AUTO: 0.12 10*3/MM3 (ref 0–0.4)
EOSINOPHIL NFR BLD AUTO: 1.2 % (ref 0.3–6.2)
ERYTHROCYTE [DISTWIDTH] IN BLOOD BY AUTOMATED COUNT: 17.3 % (ref 12.3–15.4)
GLOBULIN UR ELPH-MCNC: 3 GM/DL (ref 1.8–3.5)
GLUCOSE SERPL-MCNC: 103 MG/DL (ref 74–124)
HCT VFR BLD AUTO: 43.6 % (ref 34–46.6)
HGB BLD-MCNC: 13.5 G/DL (ref 12–15.9)
IMM GRANULOCYTES # BLD AUTO: 0.04 10*3/MM3 (ref 0–0.05)
IMM GRANULOCYTES NFR BLD AUTO: 0.4 % (ref 0–0.5)
LYMPHOCYTES # BLD AUTO: 1.02 10*3/MM3 (ref 0.7–3.1)
LYMPHOCYTES NFR BLD AUTO: 9.9 % (ref 19.6–45.3)
MCH RBC QN AUTO: 26.3 PG (ref 26.6–33)
MCHC RBC AUTO-ENTMCNC: 31 G/DL (ref 31.5–35.7)
MCV RBC AUTO: 84.8 FL (ref 79–97)
MONOCYTES # BLD AUTO: 0.9 10*3/MM3 (ref 0.1–0.9)
MONOCYTES NFR BLD AUTO: 8.7 % (ref 5–12)
NEUTROPHILS NFR BLD AUTO: 79.4 % (ref 42.7–76)
NEUTROPHILS NFR BLD AUTO: 8.2 10*3/MM3 (ref 1.7–7)
NRBC BLD AUTO-RTO: 0 /100 WBC (ref 0–0.2)
PLATELET # BLD AUTO: 310 10*3/MM3 (ref 140–450)
PMV BLD AUTO: 9.9 FL (ref 6–12)
POTASSIUM SERPL-SCNC: 3.6 MMOL/L (ref 3.5–4.7)
PROT SERPL-MCNC: 7.2 G/DL (ref 6.3–8)
RBC # BLD AUTO: 5.14 10*6/MM3 (ref 3.77–5.28)
SODIUM SERPL-SCNC: 139 MMOL/L (ref 134–145)
WBC NRBC COR # BLD: 10.32 10*3/MM3 (ref 3.4–10.8)

## 2023-03-06 PROCEDURE — 85025 COMPLETE CBC W/AUTO DIFF WBC: CPT

## 2023-03-06 PROCEDURE — 25010000002 DURVALUMAB 50 MG/ML SOLUTION 10 ML VIAL: Performed by: INTERNAL MEDICINE

## 2023-03-06 PROCEDURE — 25010000002 HEPARIN LOCK FLUSH PER 10 UNITS: Performed by: INTERNAL MEDICINE

## 2023-03-06 PROCEDURE — 80053 COMPREHEN METABOLIC PANEL: CPT

## 2023-03-06 PROCEDURE — 96413 CHEMO IV INFUSION 1 HR: CPT

## 2023-03-06 PROCEDURE — 25010000002 DURVALUMAB 50 MG/ML SOLUTION 2.4 ML VIAL: Performed by: INTERNAL MEDICINE

## 2023-03-06 RX ORDER — SODIUM CHLORIDE 0.9 % (FLUSH) 0.9 %
10 SYRINGE (ML) INJECTION AS NEEDED
Status: DISCONTINUED | OUTPATIENT
Start: 2023-03-06 | End: 2023-03-06 | Stop reason: HOSPADM

## 2023-03-06 RX ORDER — SODIUM CHLORIDE 9 MG/ML
250 INJECTION, SOLUTION INTRAVENOUS ONCE
Status: COMPLETED | OUTPATIENT
Start: 2023-03-06 | End: 2023-03-06

## 2023-03-06 RX ORDER — SODIUM CHLORIDE 9 MG/ML
250 INJECTION, SOLUTION INTRAVENOUS ONCE
OUTPATIENT
Start: 2023-04-17

## 2023-03-06 RX ORDER — HEPARIN SODIUM (PORCINE) LOCK FLUSH IV SOLN 100 UNIT/ML 100 UNIT/ML
500 SOLUTION INTRAVENOUS AS NEEDED
Status: DISCONTINUED | OUTPATIENT
Start: 2023-03-06 | End: 2023-03-06 | Stop reason: HOSPADM

## 2023-03-06 RX ORDER — SODIUM CHLORIDE 9 MG/ML
250 INJECTION, SOLUTION INTRAVENOUS ONCE
Status: CANCELLED | OUTPATIENT
Start: 2023-04-03

## 2023-03-06 RX ORDER — HYDROCODONE BITARTRATE AND HOMATROPINE METHYLBROMIDE ORAL SOLUTION 5; 1.5 MG/5ML; MG/5ML
5 LIQUID ORAL EVERY 6 HOURS PRN
Qty: 250 ML | Refills: 0 | Status: SHIPPED | OUTPATIENT
Start: 2023-03-06

## 2023-03-06 RX ORDER — SODIUM CHLORIDE 9 MG/ML
250 INJECTION, SOLUTION INTRAVENOUS ONCE
Status: CANCELLED | OUTPATIENT
Start: 2023-03-20

## 2023-03-06 RX ORDER — HEPARIN SODIUM (PORCINE) LOCK FLUSH IV SOLN 100 UNIT/ML 100 UNIT/ML
500 SOLUTION INTRAVENOUS AS NEEDED
Status: CANCELLED | OUTPATIENT
Start: 2023-03-06

## 2023-03-06 RX ORDER — OFLOXACIN 3 MG/ML
SOLUTION/ DROPS OPHTHALMIC
COMMUNITY
Start: 2022-11-30

## 2023-03-06 RX ORDER — SODIUM CHLORIDE 0.9 % (FLUSH) 0.9 %
10 SYRINGE (ML) INJECTION AS NEEDED
Status: CANCELLED | OUTPATIENT
Start: 2023-03-06

## 2023-03-06 RX ORDER — SODIUM CHLORIDE 9 MG/ML
250 INJECTION, SOLUTION INTRAVENOUS ONCE
Status: CANCELLED | OUTPATIENT
Start: 2023-03-06

## 2023-03-06 RX ADMIN — SODIUM CHLORIDE 250 ML: 9 INJECTION, SOLUTION INTRAVENOUS at 11:52

## 2023-03-06 RX ADMIN — Medication 10 ML: at 13:17

## 2023-03-06 RX ADMIN — Medication 500 UNITS: at 13:18

## 2023-03-06 RX ADMIN — SODIUM CHLORIDE 570 MG: 9 INJECTION, SOLUTION INTRAVENOUS at 12:16

## 2023-03-06 NOTE — PROGRESS NOTES
Subjective     REASON FOR FOLLOW UP:   1.  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  PD-L1 positive greater than 95%  3.  Primary chemoradiation with weekly carboplatin and Taxol completed 10/27/2022  4.  Imfinzi immunotherapy every 2 weeks for 12 months total.  First treatment 11/28/2022  5.  Repeat bronchoscopy 1/4/2023 due to persistent hemoptysis.  Pathology revealed no malignancy.  6.  Exophytic lesion of the kidney noted on surveillance CT scans.    HISTORY OF PRESENT ILLNESS:  The patient is a 78 y.o. year old female who is a former smoker referred to us from thoracic surgery (Dr. Remedios Rice) for evaluation and treatment of clinical stage III adenocarcinoma of the lung.  She was having persistent cough and underwent chest x-ray initially in late June which showed possible right upper lobe mass.  This was followed by CT scan of the chest confirming the presence of a right upper lobe mass.  She initially underwent a CT-guided needle biopsy on 7/22/2022 which was nondiagnostic.    She was referred to Dr. Glass for bronchoscopy and biopsy which was performed on 8/23/2022 with pathology returning as poorly differentiated adenocarcinoma.  PD-L1 stain on the tissue was positive at greater than 95%.    She underwent further staging with PET scan and MRI of the brain.  PET scan was performed on 8/12/2022 showing hypermetabolic activity in the large mass in the right upper lobe as well as mediastinal lymph nodes.  She was noted to have a mass on the kidney as well but this was not hypermetabolic.  There was no obvious distant metastatic disease.    MRI of the brain from 8/15/2022 likewise showed no evidence of metastatic disease.  She is scheduled also to see Dr. Hector Rodriguez and radiation oncology on 9/7/2022.    We recommended weekly carboplatin and Taxol concurrent with radiation therapy.  Following completion of treatment she will receive immunotherapy for maintenance   (She does have a diagnosis of rheumatoid  arthritis and is currently taking only Celebrex.  This could become an issue regarding her ability to tolerate immunotherapy in the future).    She received her final fraction of radiation 10/27/2022.  She also completed 6 weeks of carboplatin and Taxol and tolerated it well. CT scans performed 11/21/2022 showed right upper lobe mass appeared stable in size.  Her mediastinal lymphadenopathy had decreased.  There were no new sites of disease identified.    INTERVAL HISTORY:  She has been receiving immunotherapy with Imfinzi with plans to continue immunotherapy for 1 year until December 2023.  She is here today for additional Imfinzi treatment (cycle #5).  She seems to be doing well.    Since her last visit she also underwent a repeat bronchoscopy performed by Dr. Gregor Glass on 1/4/2023.  She had some samples taken which were negative for malignancy.  She reports she still has significant cough and some purulent sputum but hemoptysis has been very infrequent.  She needs a new refill for her Hycodan cough syrup.    History of Present Illness     Past Medical History:   Diagnosis Date   • COPD (chronic obstructive pulmonary disease) (HCC)    • Coughing up blood     X2 MONTHS   • History of COVID-19 02/2022   • Hyperlipidemia    • IBS (irritable bowel syndrome)    • Primary lung adenocarcinoma, right (HCC)    • Rheumatoid arthritis (HCC)         Past Surgical History:   Procedure Laterality Date   • APPENDECTOMY      appendix removed   • BRONCHOSCOPY N/A 8/23/2022    Procedure: BRONCHOSCOPY WITH BAL,  BIOPSIES, AND BRUSHINGS WITH ENDOBRONCHIAL ULTRASOUND WITH FNA;  Surgeon: Gregor Vee MD;  [place] [facility] [place] [facility];  Service: Pulmonary;  Laterality: N/A;  PRE- HILAR MASS  POST- SAME   • BRONCHOSCOPY N/A 1/4/2023    Procedure: BRONCHOSCOPY with biopsy, lavage, brushing;  Surgeon: Gregor Vee MD;  [place] [facility] [place] [facility];  Service: Pulmonary;  Laterality: N/A;   • CAROTID ENDARTERECTOMY Right    • CAROTID  ENDARTERECTOMY Left    • CATARACT EXTRACTION     • CERVICAL FUSION     • CHOLECYSTECTOMY     • HYSTERECTOMY     • SHOULDER ARTHROSCOPY Right 02/19/2019    right should scope/cuff repair    • VENOUS ACCESS DEVICE (PORT) INSERTION Right 9/6/2022    Procedure: POWERPORT INSERTION;  Surgeon: Remedios Rice MD;  Location: [facility] MAIN OR;  Service: Thoracic;  Laterality: Right;        Current Outpatient Medications on File Prior to Visit   Medication Sig Dispense Refill   • azelastine (ASTELIN) 0.1 % nasal spray 1 spray into the nostril(s) as directed by provider.     • B COMPLEX VITAMINS ER PO Take  by mouth Daily.     • Cholecalciferol (VITAMIN D3) 5000 units capsule capsule Take 2,000 Units by mouth Daily.     • Colestipol HCl (COLESTID PO) Take 1 tablet by mouth Daily.     • esomeprazole (NexIUM) 10 MG packet Take 10 mg by mouth Daily.     • estradiol (ESTRACE) 1 MG tablet Take 1 tablet by mouth Daily.     • ferrous sulfate 325 (65 FE) MG tablet Take 1 tablet by mouth Daily With Breakfast. 30 tablet 5   • HYDROcodone Bit-Homatrop MBr (HYCODAN) 5-1.5 MG/5ML solution Take 5 mL by mouth Every 6 (Six) Hours As Needed for Cough. 250 mL 0   • lidocaine-prilocaine (EMLA) 2.5-2.5 % cream Apply 1 application topically to the appropriate area as directed Every 2 (Two) Hours As Needed for Mild Pain. 5 g 3   • Multiple Vitamins-Minerals (PRESERVISION AREDS 2+MULTI VIT PO) Take  by mouth.     • ofloxacin (OCUFLOX) 0.3 % ophthalmic solution      • ondansetron (ZOFRAN) 8 MG tablet Take 1 tablet by mouth 3 (Three) Times a Day As Needed for Nausea or Vomiting. 30 tablet 5   • predniSONE (DELTASONE) 10 MG tablet TAKE ONE TABLET BY MOUTH DAILY 30 tablet 1   • lidocaine-prilocaine (EMLA) 2.5-2.5 % cream Apply nickel size amount to port site 30 min before appt time do not rub in cover with plastic wrap 5 g 2     No current facility-administered medications on file prior to visit.        ALLERGIES:    Allergies   Allergen Reactions   •  Del-Mycin [Erythromycin] Hives   • Gentamicin Hives   • Ibuprofen Nausea And Vomiting   • Latex Dermatitis     GLOVES   • Metronidazole Hives   • Ofloxacin Hives and Nausea Only   • Penicillins Hives   • Tetracycline Hives   • Adhesive Tape Dermatitis     BANDAIDS   • Aspirin GI Intolerance     Vomiting can take EC   • Codeine Nausea And Vomiting        Social History     Socioeconomic History   • Marital status:    Tobacco Use   • Smoking status: Former     Types: Cigarettes     Quit date: 2022     Years since quittin.8   • Smokeless tobacco: Never   • Tobacco comments:     Occasionally smoking 1-2 cigs   Vaping Use   • Vaping Use: Never used   Substance and Sexual Activity   • Alcohol use: Yes     Alcohol/week: 1.0 standard drink     Types: 1 Glasses of wine per week     Comment: occasionally   • Drug use: Never   • Sexual activity: Defer        Family History   Problem Relation Age of Onset   • Cancer Mother    • Cancer Father    • Malig Hyperthermia Neg Hx         Review of Systems   Constitutional: Negative for activity change, chills, fatigue and fever.   HENT: Negative for mouth sores, trouble swallowing and voice change.    Eyes: Negative for pain and visual disturbance.   Respiratory: Positive for cough. Negative for wheezing.    Cardiovascular: Negative for chest pain and palpitations.   Gastrointestinal: Negative for abdominal pain, constipation, diarrhea, nausea and vomiting.   Genitourinary: Negative for difficulty urinating, frequency and urgency.   Musculoskeletal: Negative for arthralgias and joint swelling.   Skin: Negative for rash.   Neurological: Negative for dizziness, seizures, weakness and headaches.   Hematological: Negative for adenopathy. Does not bruise/bleed easily.   Psychiatric/Behavioral: Negative for behavioral problems and confusion. The patient is not nervous/anxious.     2023 ROS updated     Objective     Vitals:    23 1015   BP: 130/77   Pulse: 94   Resp:  "16   Temp: 98.4 °F (36.9 °C)   TempSrc: Temporal   SpO2: 96%   Weight: 54.9 kg (121 lb)   Height: 149.9 cm (59.02\")   PainSc: 0-No pain     Current Status 3/6/2023   ECOG score 0       Physical Exam  Vitals reviewed.   Constitutional:       General: She is not in acute distress.     Appearance: Normal appearance. She is well-developed.   HENT:      Head: Normocephalic and atraumatic.      Mouth/Throat:      Pharynx: No oropharyngeal exudate.   Eyes:      Pupils: Pupils are equal, round, and reactive to light.   Cardiovascular:      Rate and Rhythm: Normal rate and regular rhythm.      Heart sounds: Normal heart sounds. No murmur heard.  Pulmonary:      Effort: Pulmonary effort is normal. No respiratory distress.      Breath sounds: Decreased breath sounds present. No wheezing, rhonchi or rales.   Abdominal:      General: Bowel sounds are normal. There is no distension.      Palpations: Abdomen is soft.   Musculoskeletal:         General: Normal range of motion.      Cervical back: Normal range of motion.   Skin:     General: Skin is warm and dry.      Findings: No rash.   Neurological:      Mental Status: She is alert and oriented to person, place, and time.      I have reexamined the patient and the results are consistent with the previously documented exam. Cruzito Lagunas MD        RECENT LABS:  Results from last 7 days   Lab Units 03/06/23  1004   WBC 10*3/mm3 10.32   NEUTROS ABS 10*3/mm3 8.20*   HEMOGLOBIN g/dL 13.5   HEMATOCRIT % 43.6   PLATELETS 10*3/mm3 310     Results from last 7 days   Lab Units 03/01/23  1424   CREATININE mg/dL 1.40*         BRONCHOSCOPY PATHOLOGY 1/4/2023  Final Diagnosis   1. Fluid, Lung, Right Upper Lobe, Bronchoalveolar Lavage (BAL):  A. Negative for malignant cells- reactive atypia.  B. Reactive bronchial cells, macrophages, inflammation (mostly acute) and mucin.  C. Negative for definitive fungal organisms by GMS staining. See comment.     2. Fluid, Lung, Right Upper Lobe, " Brushing:               A. Negative for malignant cells.               B. Bronchial cells, scattered inflammation and mucinous debris.      Final Diagnosis   1. Lung, Right Upper Lobe, Biopsy:  Endobronchial mucosa and submucosa with                A. Squamous metaplasia, mild to moderate chronic active inflammation, fibrinous and necrotic debris and reactive      epithelial changes.               B. No definitive dysplasia nor malignancy identified.               C. No granulomata, diagnostic viral inclusions nor fungal organisms identified.             BRONCHOSCOPY PATHOLOGY 8/23/2022  Final Diagnosis   1. Lung, Right Upper Lobe, Endobronchial Biopsies: INVASIVE POORLY DIFFERENTIATED ADENOCARCINOMA OF PULMONARY ORIGIN.   PDL-1 POSITIVE >95%    IMAGING:  CT CHEST, ABDOMEN, AND PELVIS WITH IV CONTRAST 3/1/2023  IMPRESSION:  Decreased size of the right upper lobe mass with stable  surrounding groundglass opacity and decreased size of the right lower  lobe opacity. No adenopathy seen on today's exam. Incidental findings as  Above.      F-18 FDG PET FROM SKULL BASE TO MID THIGH WITH PET/CT FUSION 8/12/2022  IMPRESSION:  1.  Large mass centered within the right upper lobe representing  patient's known malignancy. Interlobular septal thickening and ground  glass opacification projecting from the periphery of the mass throughout  the right upper lobe is highly concerning for lymphangitic spread. Of  note, the mass abuts the pleura and mediastinum over a long segment and  underlying invasion cannot be excluded.  2.  FDG avid hilar and mediastinal adenopathy concerning for metastatic  disease.  3.  A few irregular subcentimeter pulmonary nodules are present within  the right lower and left upper lobes measuring up to 0.9 cm which while  nonspecific raises concern for metastatic disease. At least close  attention on followup is recommended.   4.  Minimally hypermetabolic arnoldo hepatic node and bilateral sub-6 mm  pulmonary  nodules are indeterminate. Continued followup with chest and  abdominal CT in 3 months is recommended.  5.  Indeterminate density 4.4 cm mass arises off the right kidney.  Further evaluation with multiphase CT or MRI of the abdomen with and  without contrast is recommended to exclude neoplasm.  6.  Focal uptake over the right shoulder in the area of prior rotator  cuff repair is favored be postsurgical with metastatic disease less  likely.  7.  Other findings as above.    MRI OF THE BRAIN WITH AND WITHOUT CONTRAST 08/15/2022   IMPRESSION:  1. There is mild-to-moderate small vessel disease in cerebral and  central pontine white matter. Otherwise, the MRI of the brain is normal  with no evidence of metastatic disease to the brain.   2. There are some nonspecific signal abnormality in the C4 cervical  vertebra with T1 low signal throughout the visualized portions C4  cervical vertebrae on the sagittal images. The patient has had prior  cervical spine surgery as demonstrated on prior PET scan and this argues  in favor that the T1 low signal and C4 vertebrae is related to sclerosis  from degenerative disc changes at C4-5 although an osseous metastatic  lesion in the C4 vertebra cannot be excluded on the basis of this exam.  Apparently the C4 vertebra was not hot on the recent PET scan which is  further evidence that this is degenerative in origin and correlation  with recent PET scan is suggested.      Assessment & Plan   · 1.  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  Patient is not felt to be a surgical candidate and combined chemotherapy and radiation.   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  · 9/20/2022 1st dose of weekly carboplatin/taxol.   · She completed 6 cycles of weekly CarboTaxol 10/25/2022.  · Radiation therapy completed 10/27/2022  · First dose durvalumab delivered 11/28/2022    2.  Tumor is strongly PD-L1 positive greater than 95% (although the patient may not be a  great candidate for immunotherapy in the future due to her rheumatoid arthritis).  3.  Other comorbidities including vascular disease with previous carotid endarterectomy surgeries.  She has no known history of stroke or myocardial infarction.  Overall her performance status is very good.  4.  Hemoptysis related to her central tumor.  Improved with initiation of therapy.  She reports she still has some darker blood coming up when she coughs but this is usually now associated more with some yellow foul-smelling sputum.  5.  Rheumatoid arthritis: Patient previously on Celebrex, but discontinued due to hemoptysis.  Patient started on prednisone 20 mg daily prescribed to manage her arthritis pain.  Prednisone has since been decreased to 10 mg daily.  6.  Recent follow-up bronchoscopy performed 1/4/2023 with no evidence of malignancy identified  7.  Lesion on the right kidney which will require further follow-up on next scans.      PLAN:  1. We we will proceed with her 8th dose of Imfinzi immunotherapy in the office today.  2. Return in 2 weeks and 4 weeks for lab and further cycles of Imfinzi.  3. Continue prednisone to 10 mg daily for rheumatoid arthritis.  4. For now we will continue iron supplementation with ferrous sulfate 325 mg p.o. once daily with food.  5. MD follow-up in 6 weeks with lab and Imfinzi treatment.    This patient is on high risk drug therapy requiring intensive monitoring for toxicity.             Cruzito Lagunas MD   03/06/2023

## 2023-03-17 DIAGNOSIS — R04.2 COUGH WITH HEMOPTYSIS: ICD-10-CM

## 2023-03-17 DIAGNOSIS — C34.91 PRIMARY LUNG ADENOCARCINOMA, RIGHT: ICD-10-CM

## 2023-03-17 RX ORDER — PREDNISONE 10 MG/1
TABLET ORAL
Qty: 30 TABLET | Refills: 1 | Status: SHIPPED | OUTPATIENT
Start: 2023-03-17

## 2023-03-20 ENCOUNTER — INFUSION (OUTPATIENT)
Dept: ONCOLOGY | Facility: HOSPITAL | Age: 78
End: 2023-03-20
Payer: MEDICARE

## 2023-03-20 VITALS
DIASTOLIC BLOOD PRESSURE: 65 MMHG | BODY MASS INDEX: 24.59 KG/M2 | TEMPERATURE: 98.5 F | OXYGEN SATURATION: 94 % | SYSTOLIC BLOOD PRESSURE: 119 MMHG | RESPIRATION RATE: 16 BRPM | WEIGHT: 121.8 LBS | HEART RATE: 91 BPM

## 2023-03-20 DIAGNOSIS — C34.91 PRIMARY LUNG ADENOCARCINOMA, RIGHT: ICD-10-CM

## 2023-03-20 DIAGNOSIS — Z79.899 HIGH RISK MEDICATION USE: Primary | ICD-10-CM

## 2023-03-20 LAB
ALBUMIN SERPL-MCNC: 3.9 G/DL (ref 3.5–5.2)
ALBUMIN/GLOB SERPL: 1.4 G/DL (ref 1.1–2.4)
ALP SERPL-CCNC: 74 U/L (ref 38–116)
ALT SERPL W P-5'-P-CCNC: 22 U/L (ref 0–33)
ANION GAP SERPL CALCULATED.3IONS-SCNC: 11.9 MMOL/L (ref 5–15)
AST SERPL-CCNC: 24 U/L (ref 0–32)
BASOPHILS # BLD AUTO: 0.03 10*3/MM3 (ref 0–0.2)
BASOPHILS NFR BLD AUTO: 0.3 % (ref 0–1.5)
BILIRUB SERPL-MCNC: 0.2 MG/DL (ref 0.2–1.2)
BUN SERPL-MCNC: 20 MG/DL (ref 6–20)
BUN/CREAT SERPL: 20 (ref 7.3–30)
CALCIUM SPEC-SCNC: 9.5 MG/DL (ref 8.5–10.2)
CHLORIDE SERPL-SCNC: 100 MMOL/L (ref 98–107)
CO2 SERPL-SCNC: 26.1 MMOL/L (ref 22–29)
CREAT SERPL-MCNC: 1 MG/DL (ref 0.6–1.1)
DEPRECATED RDW RBC AUTO: 57.9 FL (ref 37–54)
EGFRCR SERPLBLD CKD-EPI 2021: 58.5 ML/MIN/1.73
EOSINOPHIL # BLD AUTO: 0.11 10*3/MM3 (ref 0–0.4)
EOSINOPHIL NFR BLD AUTO: 1.1 % (ref 0.3–6.2)
ERYTHROCYTE [DISTWIDTH] IN BLOOD BY AUTOMATED COUNT: 18.6 % (ref 12.3–15.4)
GLOBULIN UR ELPH-MCNC: 2.8 GM/DL (ref 1.8–3.5)
GLUCOSE SERPL-MCNC: 140 MG/DL (ref 74–124)
HCT VFR BLD AUTO: 40.5 % (ref 34–46.6)
HGB BLD-MCNC: 12.5 G/DL (ref 12–15.9)
IMM GRANULOCYTES # BLD AUTO: 0.07 10*3/MM3 (ref 0–0.05)
IMM GRANULOCYTES NFR BLD AUTO: 0.7 % (ref 0–0.5)
LYMPHOCYTES # BLD AUTO: 0.64 10*3/MM3 (ref 0.7–3.1)
LYMPHOCYTES NFR BLD AUTO: 6.2 % (ref 19.6–45.3)
MCH RBC QN AUTO: 26.4 PG (ref 26.6–33)
MCHC RBC AUTO-ENTMCNC: 30.9 G/DL (ref 31.5–35.7)
MCV RBC AUTO: 85.6 FL (ref 79–97)
MONOCYTES # BLD AUTO: 0.77 10*3/MM3 (ref 0.1–0.9)
MONOCYTES NFR BLD AUTO: 7.5 % (ref 5–12)
NEUTROPHILS NFR BLD AUTO: 8.67 10*3/MM3 (ref 1.7–7)
NEUTROPHILS NFR BLD AUTO: 84.2 % (ref 42.7–76)
NRBC BLD AUTO-RTO: 0 /100 WBC (ref 0–0.2)
PLATELET # BLD AUTO: 244 10*3/MM3 (ref 140–450)
PMV BLD AUTO: 9.8 FL (ref 6–12)
POTASSIUM SERPL-SCNC: 3.5 MMOL/L (ref 3.5–4.7)
PROT SERPL-MCNC: 6.7 G/DL (ref 6.3–8)
RBC # BLD AUTO: 4.73 10*6/MM3 (ref 3.77–5.28)
SODIUM SERPL-SCNC: 138 MMOL/L (ref 134–145)
WBC NRBC COR # BLD: 10.29 10*3/MM3 (ref 3.4–10.8)

## 2023-03-20 PROCEDURE — 85025 COMPLETE CBC W/AUTO DIFF WBC: CPT

## 2023-03-20 PROCEDURE — 80053 COMPREHEN METABOLIC PANEL: CPT

## 2023-03-20 PROCEDURE — 25010000002 DURVALUMAB 50 MG/ML SOLUTION 2.4 ML VIAL: Performed by: INTERNAL MEDICINE

## 2023-03-20 PROCEDURE — 25010000002 DURVALUMAB 50 MG/ML SOLUTION 10 ML VIAL: Performed by: INTERNAL MEDICINE

## 2023-03-20 PROCEDURE — 96413 CHEMO IV INFUSION 1 HR: CPT

## 2023-03-20 RX ORDER — SODIUM CHLORIDE 9 MG/ML
250 INJECTION, SOLUTION INTRAVENOUS ONCE
Status: COMPLETED | OUTPATIENT
Start: 2023-03-20 | End: 2023-03-20

## 2023-03-20 RX ADMIN — SODIUM CHLORIDE 250 ML: 9 INJECTION, SOLUTION INTRAVENOUS at 14:12

## 2023-03-20 RX ADMIN — SODIUM CHLORIDE 570 MG: 900 INJECTION, SOLUTION INTRAVENOUS at 14:12

## 2023-04-01 DIAGNOSIS — D50.9 IRON DEFICIENCY ANEMIA, UNSPECIFIED IRON DEFICIENCY ANEMIA TYPE: ICD-10-CM

## 2023-04-01 DIAGNOSIS — C34.91 PRIMARY LUNG ADENOCARCINOMA, RIGHT: ICD-10-CM

## 2023-04-03 ENCOUNTER — OFFICE VISIT (OUTPATIENT)
Dept: OTHER | Facility: HOSPITAL | Age: 78
End: 2023-04-03
Payer: MEDICARE

## 2023-04-03 ENCOUNTER — INFUSION (OUTPATIENT)
Dept: ONCOLOGY | Facility: HOSPITAL | Age: 78
End: 2023-04-03
Payer: MEDICARE

## 2023-04-03 VITALS
DIASTOLIC BLOOD PRESSURE: 80 MMHG | BODY MASS INDEX: 24.12 KG/M2 | WEIGHT: 119.5 LBS | OXYGEN SATURATION: 97 % | HEART RATE: 75 BPM | RESPIRATION RATE: 20 BRPM | TEMPERATURE: 96.5 F | SYSTOLIC BLOOD PRESSURE: 158 MMHG

## 2023-04-03 VITALS
WEIGHT: 120.6 LBS | OXYGEN SATURATION: 94 % | HEART RATE: 84 BPM | TEMPERATURE: 98 F | RESPIRATION RATE: 16 BRPM | SYSTOLIC BLOOD PRESSURE: 174 MMHG | DIASTOLIC BLOOD PRESSURE: 70 MMHG | BODY MASS INDEX: 24.35 KG/M2

## 2023-04-03 DIAGNOSIS — C34.91 PRIMARY LUNG ADENOCARCINOMA, RIGHT: Primary | ICD-10-CM

## 2023-04-03 DIAGNOSIS — R29.898 MUSCULAR DECONDITIONING: ICD-10-CM

## 2023-04-03 DIAGNOSIS — C34.91 PRIMARY LUNG ADENOCARCINOMA, RIGHT: ICD-10-CM

## 2023-04-03 DIAGNOSIS — R53.0 NEOPLASTIC MALIGNANT RELATED FATIGUE: ICD-10-CM

## 2023-04-03 DIAGNOSIS — Z79.899 HIGH RISK MEDICATION USE: Primary | ICD-10-CM

## 2023-04-03 LAB
ALBUMIN SERPL-MCNC: 4.1 G/DL (ref 3.5–5.2)
ALBUMIN/GLOB SERPL: 1.3 G/DL (ref 1.1–2.4)
ALP SERPL-CCNC: 84 U/L (ref 38–116)
ALT SERPL W P-5'-P-CCNC: 19 U/L (ref 0–33)
ANION GAP SERPL CALCULATED.3IONS-SCNC: 12.6 MMOL/L (ref 5–15)
AST SERPL-CCNC: 23 U/L (ref 0–32)
BASOPHILS # BLD AUTO: 0.03 10*3/MM3 (ref 0–0.2)
BASOPHILS NFR BLD AUTO: 0.2 % (ref 0–1.5)
BILIRUB SERPL-MCNC: 0.3 MG/DL (ref 0.2–1.2)
BUN SERPL-MCNC: 25 MG/DL (ref 6–20)
BUN/CREAT SERPL: 23.8 (ref 7.3–30)
CALCIUM SPEC-SCNC: 10.3 MG/DL (ref 8.5–10.2)
CHLORIDE SERPL-SCNC: 100 MMOL/L (ref 98–107)
CO2 SERPL-SCNC: 25.4 MMOL/L (ref 22–29)
CREAT SERPL-MCNC: 1.05 MG/DL (ref 0.6–1.1)
DEPRECATED RDW RBC AUTO: 56.7 FL (ref 37–54)
EGFRCR SERPLBLD CKD-EPI 2021: 55.2 ML/MIN/1.73
EOSINOPHIL # BLD AUTO: 0.05 10*3/MM3 (ref 0–0.4)
EOSINOPHIL NFR BLD AUTO: 0.4 % (ref 0.3–6.2)
ERYTHROCYTE [DISTWIDTH] IN BLOOD BY AUTOMATED COUNT: 18.9 % (ref 12.3–15.4)
GLOBULIN UR ELPH-MCNC: 3.2 GM/DL (ref 1.8–3.5)
GLUCOSE SERPL-MCNC: 120 MG/DL (ref 74–124)
HCT VFR BLD AUTO: 42.6 % (ref 34–46.6)
HGB BLD-MCNC: 13.8 G/DL (ref 12–15.9)
IMM GRANULOCYTES # BLD AUTO: 0.08 10*3/MM3 (ref 0–0.05)
IMM GRANULOCYTES NFR BLD AUTO: 0.6 % (ref 0–0.5)
LYMPHOCYTES # BLD AUTO: 0.69 10*3/MM3 (ref 0.7–3.1)
LYMPHOCYTES NFR BLD AUTO: 5.2 % (ref 19.6–45.3)
MCH RBC QN AUTO: 27.4 PG (ref 26.6–33)
MCHC RBC AUTO-ENTMCNC: 32.4 G/DL (ref 31.5–35.7)
MCV RBC AUTO: 84.7 FL (ref 79–97)
MONOCYTES # BLD AUTO: 0.44 10*3/MM3 (ref 0.1–0.9)
MONOCYTES NFR BLD AUTO: 3.3 % (ref 5–12)
NEUTROPHILS NFR BLD AUTO: 11.89 10*3/MM3 (ref 1.7–7)
NEUTROPHILS NFR BLD AUTO: 90.3 % (ref 42.7–76)
NRBC BLD AUTO-RTO: 0 /100 WBC (ref 0–0.2)
PLATELET # BLD AUTO: 289 10*3/MM3 (ref 140–450)
PMV BLD AUTO: 10.6 FL (ref 6–12)
POTASSIUM SERPL-SCNC: 4.1 MMOL/L (ref 3.5–4.7)
PROT SERPL-MCNC: 7.3 G/DL (ref 6.3–8)
RBC # BLD AUTO: 5.03 10*6/MM3 (ref 3.77–5.28)
SODIUM SERPL-SCNC: 138 MMOL/L (ref 134–145)
T4 FREE SERPL-MCNC: 1.35 NG/DL (ref 0.93–1.7)
TSH SERPL DL<=0.05 MIU/L-ACNC: 1.53 UIU/ML (ref 0.27–4.2)
WBC NRBC COR # BLD: 13.18 10*3/MM3 (ref 3.4–10.8)

## 2023-04-03 PROCEDURE — 1160F RVW MEDS BY RX/DR IN RCRD: CPT | Performed by: NURSE PRACTITIONER

## 2023-04-03 PROCEDURE — G0463 HOSPITAL OUTPT CLINIC VISIT: HCPCS | Performed by: NURSE PRACTITIONER

## 2023-04-03 PROCEDURE — 85025 COMPLETE CBC W/AUTO DIFF WBC: CPT

## 2023-04-03 PROCEDURE — 84439 ASSAY OF FREE THYROXINE: CPT | Performed by: INTERNAL MEDICINE

## 2023-04-03 PROCEDURE — 84443 ASSAY THYROID STIM HORMONE: CPT | Performed by: INTERNAL MEDICINE

## 2023-04-03 PROCEDURE — 25010000002 HEPARIN LOCK FLUSH PER 10 UNITS: Performed by: INTERNAL MEDICINE

## 2023-04-03 PROCEDURE — 80053 COMPREHEN METABOLIC PANEL: CPT

## 2023-04-03 PROCEDURE — 99215 OFFICE O/P EST HI 40 MIN: CPT | Performed by: NURSE PRACTITIONER

## 2023-04-03 PROCEDURE — 96413 CHEMO IV INFUSION 1 HR: CPT

## 2023-04-03 PROCEDURE — 1159F MED LIST DOCD IN RCRD: CPT | Performed by: NURSE PRACTITIONER

## 2023-04-03 PROCEDURE — 25010000002 DURVALUMAB 50 MG/ML SOLUTION 2.4 ML VIAL: Performed by: INTERNAL MEDICINE

## 2023-04-03 PROCEDURE — 1126F AMNT PAIN NOTED NONE PRSNT: CPT | Performed by: NURSE PRACTITIONER

## 2023-04-03 RX ORDER — SODIUM CHLORIDE 0.9 % (FLUSH) 0.9 %
10 SYRINGE (ML) INJECTION AS NEEDED
Status: DISCONTINUED | OUTPATIENT
Start: 2023-04-03 | End: 2023-04-03 | Stop reason: HOSPADM

## 2023-04-03 RX ORDER — HEPARIN SODIUM (PORCINE) LOCK FLUSH IV SOLN 100 UNIT/ML 100 UNIT/ML
500 SOLUTION INTRAVENOUS AS NEEDED
Status: DISCONTINUED | OUTPATIENT
Start: 2023-04-03 | End: 2023-04-03 | Stop reason: HOSPADM

## 2023-04-03 RX ORDER — SODIUM CHLORIDE 9 MG/ML
250 INJECTION, SOLUTION INTRAVENOUS ONCE
Status: COMPLETED | OUTPATIENT
Start: 2023-04-03 | End: 2023-04-03

## 2023-04-03 RX ORDER — SODIUM CHLORIDE 0.9 % (FLUSH) 0.9 %
10 SYRINGE (ML) INJECTION AS NEEDED
OUTPATIENT
Start: 2023-04-03

## 2023-04-03 RX ORDER — FERROUS SULFATE 325(65) MG
TABLET ORAL
Qty: 30 TABLET | Refills: 5 | Status: SHIPPED | OUTPATIENT
Start: 2023-04-03

## 2023-04-03 RX ORDER — HEPARIN SODIUM (PORCINE) LOCK FLUSH IV SOLN 100 UNIT/ML 100 UNIT/ML
500 SOLUTION INTRAVENOUS AS NEEDED
OUTPATIENT
Start: 2023-04-03

## 2023-04-03 RX ADMIN — SODIUM CHLORIDE 570 MG: 900 INJECTION, SOLUTION INTRAVENOUS at 14:02

## 2023-04-03 RX ADMIN — Medication 10 ML: at 15:04

## 2023-04-03 RX ADMIN — SODIUM CHLORIDE 250 ML: 9 INJECTION, SOLUTION INTRAVENOUS at 14:02

## 2023-04-03 RX ADMIN — Medication 500 UNITS: at 15:04

## 2023-04-03 NOTE — LETTER
April 3, 2023     Nik Mckay MD  [id number]  [place] [address]    Patient: Pam Vidal   YOB: 1945   Date of Visit: 4/3/2023       Dear Nik Mckay MD    Pam Vidal was in my office today. Below is a copy of my note.    If you have questions, please do not hesitate to call me. I look forward to following Pam along with you.         Sincerely,        BARBARA Chiu        CC: Cruzito Lagunas MD    [facility]  SURVIVORSHIP VISIT: IN CLINIC  Survivorship Treatment Summary Follow-Up Visit        Pam Vidal is a pleasant 78 y.o. female reviewed today in Multidisciplinary Clinic, for follow-up survivorship treatment summary and older adult functional assessment visit.     HPI  Very nice 78 year old patient who has now completed chemoradiation with carboplatin/taxol.    She is due for imfinzi cycle 6 today, has been tolerating well. Feels tired day of and entire next day but back to herself at the 2nd day. Appetite fair, having a hard time gaining weight back. No neuropathy. No shortness of breath. Bowels and bladder without changes. Will see urology for follow up in the next few weeks with bladder suspension with mesh tentatively planned. Generally feeling deconditioned. Looking forward to travel out of town for [place] with her partner with weekend.     TREATMENT HISTORY:     Oncology/Hematology History   Primary lung adenocarcinoma, right   7/22/2022 Biopsy    Final Diagnosis   1. Lung, Right Upper Lobe, CT-Guided Biopsy for a Mass:               A. Predominantly necrosis with surrounding fibrosis and atypical pneumocyte hyperplasia.               B. No viable tumor present.        8/12/2022 Imaging    F-18 FDG PET FROM SKULL BASE TO MID THIGH WITH PET/CT FUSION     HISTORY: Lung mass, staging.     TECHNIQUE: Radiation dose reduction techniques were utilized, including  automated exposure control and exposure  modulation based on body size.   Blood glucose level at time of injection was 95 mg/dL.  6.8 mCi of F-18  FDG were injected and PET was performed from skull base to mid thigh. CT  was obtained for localization and attenuation correction. Time at  injection 0937. PET start time 1104.      Compared with outside CT report from 06/27/2022; no images are available  for comparison at the time of this dictation.     FINDINGS:      No cervical adenopathy demonstrating uptake significantly above that of  mediastinal blood pool is present. The thyroid, submandibular and  parotid glands demonstrate roughly symmetric FDG uptake.     Hypermetabolic right hilar and mediastinal adenopathy is present with  index nodes as below:  *  Right hilar node measuring 1.1 cm in short axis dimension with a max  SUV of 4.4.  *  Precarinal node measuring 1.3 cm in short axis dimension with max SUV  of 3.6.     No axillary adenopathy demonstrating uptake significantly above that of  mediastinal blood pool is present. There is no significant pericardial  effusion.     There is a large intensely FDG avid mass within the right upper lobe  with surrounding pulmonary opacification. The mass measures  approximately 4.9 x 3.8 cm on pet imaging with a max SUV of 21.9.  Interlobular septal thickening and groundglass opacification extends  from the periphery of this mass throughout the right upper lobe, many  areas of which demonstrate mild to moderate hypermetabolism with a max  SUV of 3.7. The mass abuts the adjacent right hilum over a long segment.  0.9 cm irregular nodule within the right lower lobe is present. There  are a few additional sub-6 mm pulmonary nodules. Please refer to saved  images in PACS for more precise locations.     For the purposes of this dictation, the last well-formed disc space be  referred to as L5-S1. There is mild anterolisthesis of L5 on S1.  Moderate to severe degenerative changes are present at C4-5 without  significant  FDG uptake in this location. Postsurgical changes from right  rotator cuff repair are present with well-corticated ghost tracks seen  throughout the right proximal humerus, a few of which demonstrate  moderate FDG uptake with a max SUV of 4.9. A arnoldo hepatic node  demonstrating FDG uptake mildly above that of mediastinal blood pool  with a max SUV of 2.7 measures 1.1 cm in short axis dimension.     Colonic diverticulosis is present.     The gallbladder is surgically absent.     No focal FDG avid abnormality is present within the liver, pancreas,  spleen, adrenal glands or kidneys. Indeterminate density mass arises off  the inferior aspect of the right kidney measuring up to 4.4 cm. There is  no hydronephrosis.     No free intraperitoneal air or fluid is seen.     IMPRESSION:  1.  Large mass centered within the right upper lobe representing  patient's known malignancy. Interlobular septal thickening and ground  glass opacification projecting from the periphery of the mass throughout  the right upper lobe is highly concerning for lymphangitic spread. Of  note, the mass abuts the pleura and mediastinum over a long segment and  underlying invasion cannot be excluded.  2.  FDG avid hilar and mediastinal adenopathy concerning for metastatic  disease.  3.  A few irregular subcentimeter pulmonary nodules are present within  the right lower and left upper lobes measuring up to 0.9 cm which while  nonspecific raises concern for metastatic disease. At least close  attention on followup is recommended.   4.  Minimally hypermetabolic arnoldo hepatic node and bilateral sub-6 mm  pulmonary nodules are indeterminate. Continued followup with chest and  abdominal CT in 3 months is recommended.  5.  Indeterminate density 4.4 cm mass arises off the right kidney.  Further evaluation with multiphase CT or MRI of the abdomen with and  without contrast is recommended to exclude neoplasm.  6.  Focal uptake over the right shoulder in the area  of prior rotator  cuff repair is favored be postsurgical with metastatic disease less  likely.  7.  Other findings as above.     8/15/2022 Imaging    MRI OF THE BRAIN WITH AND WITHOUT CONTRAST 08/15/2022     CLINICAL HISTORY: New diagnosis of lung cancer in 06/2022 evaluate for  brain metastases.     TECHNIQUE: Axial T1, FLAIR, fat-suppressed T2, axial diffusion sagittal  T1 and postcontrast axial fat-suppressed T1 sagittal and coronal T1 thin  cut 1.5 mm thick axial postcontrast spoiled gradient echo T1-weighted  images were obtained of the entire head.     FINDINGS: There is minimal T2 high signal in the periventricular white  matter scattered tiny patchy nodular foci T2 high signal subcortical  white matter of cerebral hemispheres as well as in the central pontine  white matter consistent with mild-to-moderate small vessel disease. The  remainder of the brain parenchyma is normal in signal intensity. The  ventricles are normal in size. I see no focal mass effect. There is no  midline shift. No extra-axial fluid collections are identified. No  abnormal areas of enhancement are seen in the head. The paranasal  sinuses and the mastoid air cells and middle ear cavities are clear. The  calvarium and skull base are normal in appearance. Good flow voids are  demonstrated in the cerebral vessels and dural venous sinuses. On the  sagittal T1-weighted images there is evidence of T1 low signal  throughout the visualized portion of the C4 cervical vertebra. Patient  has had prior cervical spine surgery. Abnormality in the C4 cervical  vertebrae is nonspecific, could be degenerative in origin but along the  basis of this exam, I cannot exclude metastatic lesion of the C4  vertebra.     IMPRESSION:  1. There is mild-to-moderate small vessel disease in cerebral and  central pontine white matter. Otherwise, the MRI of the brain is normal  with no evidence of metastatic disease to the brain.   2. There are some nonspecific signal  abnormality in the C4 cervical  vertebra with T1 low signal throughout the visualized portions C4  cervical vertebrae on the sagittal images. The patient has had prior  cervical spine surgery as demonstrated on prior PET scan and this argues  in favor that the T1 low signal and C4 vertebrae is related to sclerosis  from degenerative disc changes at C4-5 although an osseous metastatic  lesion in the C4 vertebra cannot be excluded on the basis of this exam.  Apparently the C4 vertebra was not hot on the recent PET scan which is  further evidence that this is degenerative in origin and correlation  with recent PET scan is suggested     8/23/2022 Biopsy    Final Diagnosis   1. Mediastinal Lymph Node, Station 11R, Fine Needle Aspiration (FNA): METASTATIC POORLY DIFFERENTIATED NONSMALL CELL CARCINOMA, FAVOR ADENOCARCINOMA.        8/23/2022 Biopsy    Final Diagnosis   1. Lung, Right Upper Lobe, Endobronchial Biopsies: INVASIVE POORLY DIFFERENTIATED ADENOCARCINOMA OF PULMONARY ORIGIN.     PD-L1: Positive, greater than 95%     8/23/2022 Biopsy    Final Diagnosis   1. Lung, Right Upper Lobe, Brushing:               A. Positive for malignant cells, consistent with NONSMALL CELL CARCINOMA.               B. Please correlate with mediastinal FNA case [id number].     2. Lung, Right Upper Lobe, Bronchoalveolar Lavage (BAL):               A. Negative for malignant cells.  B. Macrophages, benign bronchial cells, metaplastic squamous cells, coccoid bacteria (on cellblock), and acute inflammation.               C. GMS stain is negative for fungus and pneumocystis.        8/30/2022 Initial Diagnosis    Primary lung adenocarcinoma, right (HCC)     8/30/2022 Cancer Staged    Staging form: Lung, AJCC 8th Edition  - Clinical stage from 8/30/2022: Stage IIIA (cT2b, cN2, cM0) - Signed by Cruzito Lagunas MD on 8/30/2022 8/30/2022 Biopsy    [id number]  Detected alterations/biomarkers: KRAS G12V; TP53 I195S; TP53 K132R; TP53 R158L    TMB:  7.45 mut/Mb  MSI-High: Not detected       9/20/2022 - 10/25/2022 Chemotherapy    OP LUNG PACLitaxel / CARBOplatin AUC=2 (Weekly) + XRT      9/20/2022 - 10/31/2022 Radiation    Radiation OncologyTreatment Course:  Pam Vidal received 6000 cGy in 30 fractions to right lung.  Given concurrent with chemotherapy     10/4/2022 -  Chemotherapy    OP CENTRAL VENOUS ACCESS DEVICE ACCESS, CARE, AND MAINTENANCE (CVAD)     11/21/2022 Imaging    CT CHEST, ABDOMEN, AND PELVIS WITH IV CONTRAST     HISTORY: Primary lung adenocarcinoma, follow-up     TECHNIQUE: Radiation dose reduction techniques were utilized, including  automated exposure control and exposure modulation based on body size.   3 mm images were obtained through the chest, abdomen, and pelvis with IV  contrast.      COMPARISON: PET/CT 08/12/2022     FINDINGS:      Chest:      Previously seen mediastinal and hilar adenopathy which was FDG avid on  prior PET has decreased in size, with index nodes as below:  *  Right hilar node measuring 0.5 cm in short axis dimension (previously  1.1 cm on 08/12/2022).  *  Precarinal node measuring 1 cm short axis dimension (previously 1.3  cm on 08/12/2022).     Right Port-A-Cath tip terminates in superior cavoatrial junction. No  significant pericardial effusion is present.     Large right upper lobe pulmonary mass measures 5 x 4.2 cm (previously  4.9 x 3.8 cm on 08/12/2022). There is extensive groundglass and  interlobular septal thickening extending from the periphery of of this  mass, as before. Discrete extension of the mass into the superomedial  aspect of the right lower lobe is present, as before.     Scattered bilateral subcentimeter pulmonary nodules are present, as  before, with index lesions as below:  *  Right lower lobe measuring 0.7 cm, grossly unchanged since  06/27/2022.  *  Sub-6 mm nodule within the left upper lobe (image 21)     Abdomen and pelvis:      There is colonic diverticulosis. Bladder has an  unremarkable  postcontrast CT appearance for its degree of distention. The uterus is  surgically absent.     There are no findings of small bowel obstruction. The appendix and  gallbladder are surgically absent. The liver, pancreas, spleen and  adrenal glands have an unremarkable postcontrast CT appearance.  Subcentimeter renal lesions are too small to characterize. Larger  hypodense lesions demonstrating density less than 15 Hounsfield units  are likely benign per Bosniak 2019 criteria. Indeterminate exophytic  lesion arising off the inferior aspect of the right kidney measuring 5.1  cm is present. There is no hydronephrosis. No abdominopelvic adenopathy  by size criteria. No free intraperitoneal air or fluid is seen.     Bone windows: For the purposes of this dictation, last well-formed disc  space be referred to as L5-S1. There is mild to moderate anterolisthesis  of L5-S1.     IMPRESSION:  Impression:  1.  Pulmonary mass centered within the right upper lobe with discrete  extension into the superior aspect of the right lower lobe is present  and grossly unchanged in overall size since 08/12/2022. Findings  concerning for lymphangitic spread of malignancy ascending from the  periphery of the mass are present, as before.  2.  Previously hypermetabolic mediastinal and hilar adenopathy have  decreased in size.  3.  Scattered bilateral subcentimeter pulmonary nodules are grossly  unchanged. Continued close attention on follow-up is recommended to  exclude metastatic disease.  4.  5.1 cm exophytic lesion arising off the inferior aspect of the right  kidney is indeterminate. Further evaluation multiphase CT or MRI of the  abdomen with and without contrast is recommended to exclude neoplasm.  5.  Other findings as above.     11/28/2022 -  Chemotherapy    OP LUNG Durvalumab     1/4/2023 Biopsy    Final Diagnosis   1. Lung, Right Upper Lobe, Biopsy:  Endobronchial mucosa and submucosa with                A. Squamous  metaplasia, mild to moderate chronic active inflammation, fibrinous and necrotic debris and reactive      epithelial changes.               B. No definitive dysplasia nor malignancy identified.               C. No granulomata, diagnostic viral inclusions nor fungal organisms identified.          1/4/2023 Biopsy    Final Diagnosis   Fluid, Lung, Right Upper Lobe, Bronchoalveolar Lavage (BAL):  A. Negative for malignant cells- reactive atypia.  B. Reactive bronchial cells, macrophages, inflammation (mostly acute) and mucin.  C. Negative for definitive fungal organisms by GMS staining. See comment.     2. Fluid, Lung, Right Upper Lobe, Brushing:               A. Negative for malignant cells.               B. Bronchial cells, scattered inflammation and mucinous debris.         3/1/2023 Imaging    Examination: CT of the chest, abdomen, and pelvis with contrast     TECHNIQUE: CT of the chest, abdomen, and pelvis after the uneventful  administration of nonionic intravenous contrast per protocol. Radiation  dose reduction techniques were utilized, including automated exposure  control and exposure modulation based on body size.     HISTORY:Lung cancer     COMPARISON:11/21/2022     FINDINGS:Chest:     Groundglass opacities in the right upper lobe are stable. The right  upper lobe mass is decreased in size, measuring 4.3 x 3 cm on series 3,  image  72. Areas of atelectasis and/or scarring are seen. No pleural effusion  or pneumothorax are demonstrated. There is a left-sided fat-containing  Bochdalek type hernia. Right-sided paramediastinal opacity is decreased  around series 3, image 36. The central airways are patent.     No enlarged supraclavicular, axillary, mediastinal, or hilar lymph nodes  are seen. The mediastinal vasculature is normal in caliber. A right  internal jugular venous Port-A-Cath terminates in the caudal superior  vena cava. Coronary calcifications are seen. The heart is normal in size  and configuration,  without pericardial effusion.     Abdomen and pelvis:     Cholecystectomy changes are seen. Stable cysts and lesions that are  technically indeterminate, likely cysts, are seen in the right kidney.  Colonic diverticula are demonstrated, without evidence of acute  diverticulitis. The uterus is absent.     The spleen, adrenal glands, left kidney, pancreas, stomach, small bowel,  urinary bladder, and abdominal vasculature are normal. No  intraperitoneal fluid collection or free gas are seen. No enlarged lymph  nodes are demonstrated.     Bone windows demonstrate degenerative changes, without suspicious  osseous lesion seen. There is a cervical cerclage wire.     IMPRESSION:  Decreased size of the right upper lobe mass with stable  surrounding groundglass opacity and decreased size of the right lower  lobe opacity. No adenopathy seen on today's exam. Incidental findings as  above.         Past Medical History:   Diagnosis Date   • COPD (chronic obstructive pulmonary disease)    • Coughing up blood     X2 MONTHS   • History of COVID-19 02/2022   • Hyperlipidemia    • IBS (irritable bowel syndrome)    • Primary lung adenocarcinoma, right    • Rheumatoid arthritis        Past Surgical History:   Procedure Laterality Date   • APPENDECTOMY      appendix removed   • BRONCHOSCOPY N/A 8/23/2022    Procedure: BRONCHOSCOPY WITH BAL,  BIOPSIES, AND BRUSHINGS WITH ENDOBRONCHIAL ULTRASOUND WITH FNA;  Surgeon: Gregor Vee MD;  Location: [facility] ENDOSCOPY;  Service: Pulmonary;  Laterality: N/A;  PRE- HILAR MASS  POST- SAME   • BRONCHOSCOPY N/A 1/4/2023    Procedure: BRONCHOSCOPY with biopsy, lavage, brushing;  Surgeon: Gregor Vee MD;  Location: [facility] ENDOSCOPY;  Service: Pulmonary;  Laterality: N/A;   • CAROTID ENDARTERECTOMY Right    • CAROTID ENDARTERECTOMY Left    • CATARACT EXTRACTION     • CERVICAL FUSION     • CHOLECYSTECTOMY     • HYSTERECTOMY     • SHOULDER ARTHROSCOPY Right 02/19/2019    right should scope/cuff  repair    • VENOUS ACCESS DEVICE (PORT) INSERTION Right 2022    Procedure: POWERPORT INSERTION;  Surgeon: Remedios Rice MD;  Location: [facility];  Service: Thoracic;  Laterality: Right;       Social History     Socioeconomic History   • Marital status:    Tobacco Use   • Smoking status: Former     Packs/day: 0.25     Types: Cigarettes     Quit date: 2022     Years since quittin.9   • Smokeless tobacco: Never   • Tobacco comments:     Former some day smoker of 1-2 cigs   Vaping Use   • Vaping Use: Never used   Substance and Sexual Activity   • Alcohol use: Yes     Alcohol/week: 1.0 standard drink     Types: 1 Glasses of wine per week     Comment: occasionally   • Drug use: Never   • Sexual activity: Defer         No results found for: LDH, URICACID    Lab Results   Component Value Date    GLUCOSE 120 2023    BUN 25 (H) 2023    CREATININE 1.05 2023    BCR 23.8 2023    K 4.1 2023    CO2 25.4 2023    CALCIUM 10.3 (H) 2023    ALBUMIN 4.1 2023    AST 23 2023    ALT 19 2023       CBC w/diff      CBC w/Diff 2/20/23 3/6/23 3/20/23   WBC 14.95 (A) 10.32 10.29   RBC 4.93 5.14 4.73   Hemoglobin 13.4 13.5 12.5   Hematocrit 41.6 43.6 40.5   MCV 84.4 84.8 85.6   MCH 27.2 26.3 (A) 26.4 (A)   MCHC 32.2 31.0 (A) 30.9 (A)   RDW 16.1 (A) 17.3 (A) 18.6 (A)   Platelets 383 310 244   Neutrophil Rel % 79.0 (A) 79.4 (A) 84.2 (A)   Immature Granulocyte Rel % 0.9 (A) 0.4 0.7 (A)   Lymphocyte Rel % 11.4 (A) 9.9 (A) 6.2 (A)   Monocyte Rel % 7.3 8.7 7.5   Eosinophil Rel % 1.0 1.2 1.1   Basophil Rel % 0.4 0.4 0.3   (A) Abnormal value                Allergies as of 2023 - Reviewed 2023   Allergen Reaction Noted   • Del-mycin [erythromycin] Hives 2022   • Gentamicin Hives 2016   • Ibuprofen Nausea And Vomiting 2016   • Latex Dermatitis 2016   • Metronidazole Hives 11/10/2016   • Ofloxacin Hives and Nausea Only 2016   •  Penicillins Hives 01/12/2016   • Tetracycline Hives 01/12/2016   • Adhesive tape Dermatitis 01/12/2016   • Aspirin GI Intolerance 07/07/2022   • Codeine Nausea And Vomiting 01/12/2016       MEDICATIONS:  Medication list reviewed today    Review of Systems   Constitutional: Positive for fatigue (very mild 24 hours after durvalumab infusion). Negative for activity change, appetite change and unexpected weight change.   Respiratory: Negative for chest tightness and shortness of breath.    Cardiovascular: Negative for chest pain and leg swelling.   Gastrointestinal: Negative for blood in stool, constipation and diarrhea.   Genitourinary: Negative for dysuria and hematuria.   Musculoskeletal: Negative for arthralgias and myalgias.   Neurological: Negative for weakness and numbness.   Psychiatric/Behavioral: Negative for dysphoric mood and sleep disturbance. The patient is not nervous/anxious.        /80   Pulse 75   Temp 96.5 °F (35.8 °C) (Temporal)   Resp 20   Wt 54.2 kg (119 lb 8 oz)   SpO2 97% Comment: Room air  BMI 24.12 kg/m²     Wt Readings from Last 3 Encounters:   04/03/23 54.7 kg (120 lb 9.6 oz)   04/03/23 54.2 kg (119 lb 8 oz)   03/20/23 55.2 kg (121 lb 12.8 oz)        Pain Score    04/03/23 1240   PainSc: 0-No pain       PHQ-9 Total Score: 0   GAD7 Total Score: 0   Distress Score: 0   Fatigue Severity Scale: 1; 0/10      Physical Exam  Constitutional:       Appearance: Normal appearance. She is well-groomed.   HENT:      Head: Normocephalic and atraumatic.   Cardiovascular:      Rate and Rhythm: Normal rate and regular rhythm.   Pulmonary:      Effort: No tachypnea or respiratory distress.   Abdominal:      General: Abdomen is flat. There is no distension.   Musculoskeletal:      Right ankle: No swelling. No tenderness. Normal pulse.      Left ankle: No swelling. No tenderness. Normal pulse.   Skin:     General: Skin is warm and dry.   Neurological:      Mental Status: She is alert and oriented to  person, place, and time.   Psychiatric:         Attention and Perception: Attention and perception normal.         Mood and Affect: Mood and affect normal.         Speech: Speech normal.         Behavior: Behavior normal. Behavior is cooperative.         Thought Content: Thought content normal.           Advance Care Planning     Patient does not have advance care planning complete, we do not have a copy on file    Patient has not designated a healthcare surrogate: but she does feel the person she would choose would be her niece Rosio Garrett.     Brief discussion and written information provided regarding advance care planning and appropriateness for all healthy adults, choosing a healthcare surrogate.    Information provided on upcoming Advance Care Planning classes offered virtually through [facility]. Advised arrangements can be made to schedule an appointment with myself or another advance care planning facilitator at their convenience.         DISCUSSION HELD TODAY:   Discussed NCCN recommendations for all cancer survivors of 150 minutes/week moderate intensity exercise, achieve and maintain a healthy weight, plants-based whole-foods diet, avoid tobacco and second hand smoke, avoid alcohol or minimize alcohol intake - no more than 1 drink in a day for adults.     A copy of the Survivorship Treatment Summary & Care Plan for Ms. Vidal was provided to and forwarded to the providers identified on the care team.    Problems identified:  Imfinzi - will have cycle 6 today, tolerating well with some generalized fatigue that peaks 2nd day after treatment.   Bladder incontinence: Sees urology, tentatively planning for bladder suspension with mesh. She has tried PT in the past without much help. Declines further referral to pelvic floor for now but discussed we can always revisit as needed  Physical deconditioning, has lost a total of 10 pounds since our first meeting in October. Generally reporting worsening muscle  mass, has noted worsening pelvic floor muscle control as well. Discussed cancer rehab at [facility]. Will defer pelvic floor referral for. Declines nutrition referral for now  Psychosocial: continues to do extremely well without symptoms of depression or anxiety. Great support. Recently developed a romantic relationship with long time friend, looking forward to travel for [place] this weekend. Reviewed strategies for pacing over the weekend and allowing for time to recover      Plan and recommendations:  She will continue maintenance durvalumab for a total of one year of therapy  Referral to [facility] program for assistance with fatigue, muscle deconditioning and help with home exercise.  Declines pelvic floor and nutrition referrals for now  I am Available for assistance with advance care planning as needed  No further follow up for now but she can call as needed   Call my office as needed at [phone] for additional information, resources or support.      Diagnoses and all orders for this visit:    1. Primary lung adenocarcinoma, right (Primary)  -     Ambulatory Referral to Physical Therapy Evaluate and treat (PT/OT)    2. Muscular deconditioning  -     Ambulatory Referral to Physical Therapy Evaluate and treat (PT/OT)    3. Neoplastic malignant related fatigue  -     Ambulatory Referral to Physical Therapy Evaluate and treat (PT/OT)            I spent 40 minutes caring for this patient on this date of service by face-to-face counseling. This time includes time spent by me in the following activities: preparing for the visit, reviewing tests, obtaining and/or reviewing a separately obtained history, performing a medically appropriate examination and/or evaluation, counseling and educating the patient/family/caregiver, referring and communicating with other health care professionals, documenting information in the medical record and care coordination

## 2023-04-17 ENCOUNTER — INFUSION (OUTPATIENT)
Dept: ONCOLOGY | Facility: HOSPITAL | Age: 78
End: 2023-04-17
Payer: MEDICARE

## 2023-04-17 ENCOUNTER — OFFICE VISIT (OUTPATIENT)
Dept: ONCOLOGY | Facility: CLINIC | Age: 78
End: 2023-04-17
Payer: MEDICARE

## 2023-04-17 ENCOUNTER — TELEPHONE (OUTPATIENT)
Dept: ONCOLOGY | Facility: CLINIC | Age: 78
End: 2023-04-17
Payer: MEDICARE

## 2023-04-17 VITALS
BODY MASS INDEX: 24.37 KG/M2 | TEMPERATURE: 97 F | DIASTOLIC BLOOD PRESSURE: 69 MMHG | WEIGHT: 120.9 LBS | SYSTOLIC BLOOD PRESSURE: 132 MMHG | HEART RATE: 80 BPM | HEIGHT: 59 IN | OXYGEN SATURATION: 96 % | RESPIRATION RATE: 16 BRPM

## 2023-04-17 DIAGNOSIS — Z79.899 HIGH RISK MEDICATION USE: ICD-10-CM

## 2023-04-17 DIAGNOSIS — C34.91 PRIMARY LUNG ADENOCARCINOMA, RIGHT: Primary | ICD-10-CM

## 2023-04-17 DIAGNOSIS — Z79.899 HIGH RISK MEDICATION USE: Primary | ICD-10-CM

## 2023-04-17 DIAGNOSIS — C34.91 PRIMARY LUNG ADENOCARCINOMA, RIGHT: ICD-10-CM

## 2023-04-17 LAB
ALBUMIN SERPL-MCNC: 3.9 G/DL (ref 3.5–5.2)
ALBUMIN/GLOB SERPL: 1.3 G/DL (ref 1.1–2.4)
ALP SERPL-CCNC: 69 U/L (ref 38–116)
ALT SERPL W P-5'-P-CCNC: 13 U/L (ref 0–33)
ANION GAP SERPL CALCULATED.3IONS-SCNC: 9.4 MMOL/L (ref 5–15)
AST SERPL-CCNC: 18 U/L (ref 0–32)
BASOPHILS # BLD AUTO: 0.04 10*3/MM3 (ref 0–0.2)
BASOPHILS NFR BLD AUTO: 0.3 % (ref 0–1.5)
BILIRUB SERPL-MCNC: 0.2 MG/DL (ref 0.2–1.2)
BUN SERPL-MCNC: 18 MG/DL (ref 6–20)
BUN/CREAT SERPL: 18.6 (ref 7.3–30)
CALCIUM SPEC-SCNC: 9.9 MG/DL (ref 8.5–10.2)
CHLORIDE SERPL-SCNC: 102 MMOL/L (ref 98–107)
CO2 SERPL-SCNC: 27.6 MMOL/L (ref 22–29)
CREAT SERPL-MCNC: 0.97 MG/DL (ref 0.6–1.1)
DEPRECATED RDW RBC AUTO: 58.5 FL (ref 37–54)
EGFRCR SERPLBLD CKD-EPI 2021: 60.7 ML/MIN/1.73
EOSINOPHIL # BLD AUTO: 0.12 10*3/MM3 (ref 0–0.4)
EOSINOPHIL NFR BLD AUTO: 0.9 % (ref 0.3–6.2)
ERYTHROCYTE [DISTWIDTH] IN BLOOD BY AUTOMATED COUNT: 18.7 % (ref 12.3–15.4)
FERRITIN SERPL-MCNC: 106.3 NG/ML (ref 13–150)
GLOBULIN UR ELPH-MCNC: 3 GM/DL (ref 1.8–3.5)
GLUCOSE SERPL-MCNC: 145 MG/DL (ref 74–124)
HCT VFR BLD AUTO: 41.5 % (ref 34–46.6)
HGB BLD-MCNC: 13.4 G/DL (ref 12–15.9)
IMM GRANULOCYTES # BLD AUTO: 0.07 10*3/MM3 (ref 0–0.05)
IMM GRANULOCYTES NFR BLD AUTO: 0.5 % (ref 0–0.5)
IRON 24H UR-MRATE: 30 MCG/DL (ref 37–145)
IRON SATN MFR SERPL: 10 % (ref 14–48)
LYMPHOCYTES # BLD AUTO: 0.58 10*3/MM3 (ref 0.7–3.1)
LYMPHOCYTES NFR BLD AUTO: 4.4 % (ref 19.6–45.3)
MCH RBC QN AUTO: 27.9 PG (ref 26.6–33)
MCHC RBC AUTO-ENTMCNC: 32.3 G/DL (ref 31.5–35.7)
MCV RBC AUTO: 86.3 FL (ref 79–97)
MONOCYTES # BLD AUTO: 0.99 10*3/MM3 (ref 0.1–0.9)
MONOCYTES NFR BLD AUTO: 7.5 % (ref 5–12)
NEUTROPHILS NFR BLD AUTO: 11.33 10*3/MM3 (ref 1.7–7)
NEUTROPHILS NFR BLD AUTO: 86.4 % (ref 42.7–76)
NRBC BLD AUTO-RTO: 0 /100 WBC (ref 0–0.2)
PLATELET # BLD AUTO: 286 10*3/MM3 (ref 140–450)
PMV BLD AUTO: 10.3 FL (ref 6–12)
POTASSIUM SERPL-SCNC: 4 MMOL/L (ref 3.5–4.7)
PROT SERPL-MCNC: 6.9 G/DL (ref 6.3–8)
RBC # BLD AUTO: 4.81 10*6/MM3 (ref 3.77–5.28)
SODIUM SERPL-SCNC: 139 MMOL/L (ref 134–145)
TIBC SERPL-MCNC: 293 MCG/DL (ref 249–505)
TRANSFERRIN SERPL-MCNC: 209 MG/DL (ref 200–360)
WBC NRBC COR # BLD: 13.13 10*3/MM3 (ref 3.4–10.8)

## 2023-04-17 PROCEDURE — 96413 CHEMO IV INFUSION 1 HR: CPT

## 2023-04-17 PROCEDURE — 84466 ASSAY OF TRANSFERRIN: CPT | Performed by: INTERNAL MEDICINE

## 2023-04-17 PROCEDURE — 25010000002 DURVALUMAB 50 MG/ML SOLUTION 2.4 ML VIAL: Performed by: INTERNAL MEDICINE

## 2023-04-17 PROCEDURE — 85025 COMPLETE CBC W/AUTO DIFF WBC: CPT

## 2023-04-17 PROCEDURE — 36415 COLL VENOUS BLD VENIPUNCTURE: CPT | Performed by: INTERNAL MEDICINE

## 2023-04-17 PROCEDURE — 80053 COMPREHEN METABOLIC PANEL: CPT

## 2023-04-17 PROCEDURE — 82728 ASSAY OF FERRITIN: CPT | Performed by: INTERNAL MEDICINE

## 2023-04-17 PROCEDURE — 83540 ASSAY OF IRON: CPT | Performed by: INTERNAL MEDICINE

## 2023-04-17 PROCEDURE — 25010000002 DURVALUMAB 50 MG/ML SOLUTION 10 ML VIAL: Performed by: INTERNAL MEDICINE

## 2023-04-17 RX ORDER — SODIUM CHLORIDE 9 MG/ML
250 INJECTION, SOLUTION INTRAVENOUS ONCE
OUTPATIENT
Start: 2023-05-29

## 2023-04-17 RX ORDER — SODIUM CHLORIDE 9 MG/ML
250 INJECTION, SOLUTION INTRAVENOUS ONCE
OUTPATIENT
Start: 2023-05-01

## 2023-04-17 RX ORDER — OXYBUTYNIN CHLORIDE 5 MG/1
5 TABLET ORAL 2 TIMES DAILY
COMMUNITY
Start: 2023-03-31

## 2023-04-17 RX ORDER — SODIUM CHLORIDE 9 MG/ML
250 INJECTION, SOLUTION INTRAVENOUS ONCE
OUTPATIENT
Start: 2023-05-15

## 2023-04-17 RX ORDER — SODIUM CHLORIDE 9 MG/ML
250 INJECTION, SOLUTION INTRAVENOUS ONCE
Status: COMPLETED | OUTPATIENT
Start: 2023-04-17 | End: 2023-04-17

## 2023-04-17 RX ADMIN — SODIUM CHLORIDE 250 ML: 9 INJECTION, SOLUTION INTRAVENOUS at 14:51

## 2023-04-17 RX ADMIN — SODIUM CHLORIDE 570 MG: 900 INJECTION, SOLUTION INTRAVENOUS at 14:51

## 2023-04-17 NOTE — TELEPHONE ENCOUNTER
----- Message from Cruzito Lagunas MD sent at 4/17/2023  4:29 PM EDT -----  Please let this patient know that she does need to continue to take 1 iron tablet daily.  ----- Message -----  From: Lab, Background User  Sent: 4/17/2023   3:31 PM EDT  To: Cruzito Lagunas MD      Patient made aware to continue 1 iron tablet daily and v/u.

## 2023-04-17 NOTE — PROGRESS NOTES
Subjective     REASON FOR FOLLOW UP:   1.  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  PD-L1 positive greater than 95%  3.  Primary chemoradiation with weekly carboplatin and Taxol completed 10/27/2022  4.  Imfinzi immunotherapy every 2 weeks for 12 months total.  First treatment 11/28/2022  5.  Repeat bronchoscopy 1/4/2023 due to persistent hemoptysis.  Pathology revealed no malignancy.  6.  Exophytic lesion of the kidney noted on surveillance CT scans.    HISTORY OF PRESENT ILLNESS:  The patient is a 78 y.o. year old female who is a former smoker referred to us from thoracic surgery (Dr. Remedios Rice) for evaluation and treatment of clinical stage III adenocarcinoma of the lung.  She was having persistent cough and underwent chest x-ray initially in late June which showed possible right upper lobe mass.  This was followed by CT scan of the chest confirming the presence of a right upper lobe mass.  She initially underwent a CT-guided needle biopsy on 7/22/2022 which was nondiagnostic.    She was referred to Dr. Glass for bronchoscopy and biopsy which was performed on 8/23/2022 with pathology returning as poorly differentiated adenocarcinoma.  PD-L1 stain on the tissue was positive at greater than 95%.    She underwent further staging with PET scan and MRI of the brain.  PET scan was performed on 8/12/2022 showing hypermetabolic activity in the large mass in the right upper lobe as well as mediastinal lymph nodes.  She was noted to have a mass on the kidney as well but this was not hypermetabolic.  There was no obvious distant metastatic disease.    MRI of the brain from 8/15/2022 likewise showed no evidence of metastatic disease.  She is scheduled also to see Dr. Hector Rodriguez and radiation oncology on 9/7/2022.    We recommended weekly carboplatin and Taxol concurrent with radiation therapy.  Following completion of treatment she will receive immunotherapy for maintenance   (She does have a diagnosis of rheumatoid  arthritis and is currently taking only Celebrex.  This could become an issue regarding her ability to tolerate immunotherapy in the future).    She received her final fraction of radiation 10/27/2022.  She also completed 6 weeks of carboplatin and Taxol and tolerated it well. CT scans performed 11/21/2022 showed right upper lobe mass appeared stable in size.  Her mediastinal lymphadenopathy had decreased.  There were no new sites of disease identified.    She has been receiving immunotherapy with Imfinzi with plans to continue immunotherapy for 1 year until December 2023.  She is here today for additional Imfinzi treatment (cycle #11).  She seems to be doing well.    She is still taking an iron tablet daily and low-dose prednisone for her rheumatoid arthritis.  Her blood counts look good in the office today we are repeating her iron studies to see if she still needs to take the oral iron.    She tells me that she and her boyfriend may be traveling to [facility] and possibly to [place] in the near future and I assured her that we would try to modify her schedule as needed to allow these trips.    History of Present Illness     Past Medical History:   Diagnosis Date   • COPD (chronic obstructive pulmonary disease)    • Coughing up blood     X2 MONTHS   • History of COVID-19 02/2022   • Hyperlipidemia    • IBS (irritable bowel syndrome)    • Primary lung adenocarcinoma, right    • Rheumatoid arthritis         Past Surgical History:   Procedure Laterality Date   • APPENDECTOMY      appendix removed   • BRONCHOSCOPY N/A 8/23/2022    Procedure: BRONCHOSCOPY WITH BAL,  BIOPSIES, AND BRUSHINGS WITH ENDOBRONCHIAL ULTRASOUND WITH FNA;  Surgeon: Gregor Vee MD;  Location: [facility] ENDOSCOPY;  Service: Pulmonary;  Laterality: N/A;  PRE- HILAR MASS  POST- SAME   • BRONCHOSCOPY N/A 1/4/2023    Procedure: BRONCHOSCOPY with biopsy, lavage, brushing;  Surgeon: Gregor Vee MD;  Location: [facility];  Service: Pulmonary;   Laterality: N/A;   • CAROTID ENDARTERECTOMY Right    • CAROTID ENDARTERECTOMY Left    • CATARACT EXTRACTION     • CERVICAL FUSION     • CHOLECYSTECTOMY     • HYSTERECTOMY     • SHOULDER ARTHROSCOPY Right 02/19/2019    right should scope/cuff repair    • VENOUS ACCESS DEVICE (PORT) INSERTION Right 9/6/2022    Procedure: POWERPORT INSERTION;  Surgeon: Remedios Rice MD;  Location: [facility] MAIN OR;  Service: Thoracic;  Laterality: Right;        Current Outpatient Medications on File Prior to Visit   Medication Sig Dispense Refill   • azelastine (ASTELIN) 0.1 % nasal spray 1 spray into the nostril(s) as directed by provider.     • B COMPLEX VITAMINS ER PO Take  by mouth Daily.     • Cholecalciferol (VITAMIN D3) 5000 units capsule capsule Take 2,000 Units by mouth Daily.     • Colestipol HCl (COLESTID PO) Take 1 tablet by mouth Daily.     • esomeprazole (NexIUM) 10 MG packet Take 10 mg by mouth Daily.     • estradiol (ESTRACE) 1 MG tablet Take 1 tablet by mouth Daily.     • FeroSul 325 (65 Fe) MG tablet TAKE ONE TABLET BY MOUTH DAILY WITH BREAKFAST 30 tablet 5   • HYDROcodone Bit-Homatrop MBr (HYCODAN) 5-1.5 MG/5ML solution Take 5 mL by mouth Every 6 (Six) Hours As Needed for Cough. 250 mL 0   • lidocaine-prilocaine (EMLA) 2.5-2.5 % cream Apply 1 application topically to the appropriate area as directed Every 2 (Two) Hours As Needed for Mild Pain. 5 g 3   • Multiple Vitamins-Minerals (PRESERVISION AREDS 2+MULTI VIT PO) Take  by mouth.     • ofloxacin (OCUFLOX) 0.3 % ophthalmic solution      • ondansetron (ZOFRAN) 8 MG tablet Take 1 tablet by mouth 3 (Three) Times a Day As Needed for Nausea or Vomiting. 30 tablet 5   • oxybutynin (DITROPAN) 5 MG tablet Take 1 tablet by mouth 2 (Two) Times a Day.     • predniSONE (DELTASONE) 10 MG tablet TAKE ONE TABLET BY MOUTH DAILY 30 tablet 1     No current facility-administered medications on file prior to visit.        ALLERGIES:    Allergies   Allergen Reactions   • Del-Mycin  [Erythromycin] Hives   • Gentamicin Hives   • Ibuprofen Nausea And Vomiting   • Latex Dermatitis     GLOVES   • Metronidazole Hives   • Ofloxacin Hives and Nausea Only   • Penicillins Hives   • Tetracycline Hives   • Adhesive Tape Dermatitis     BANDAIDS   • Aspirin GI Intolerance     Vomiting can take EC   • Codeine Nausea And Vomiting        Social History     Socioeconomic History   • Marital status:    Tobacco Use   • Smoking status: Former     Packs/day: 0.25     Types: Cigarettes     Quit date: 2022     Years since quittin.9   • Smokeless tobacco: Never   • Tobacco comments:     Former some day smoker of 1-2 cigs   Vaping Use   • Vaping Use: Never used   Substance and Sexual Activity   • Alcohol use: Yes     Alcohol/week: 1.0 standard drink     Types: 1 Glasses of wine per week     Comment: occasionally   • Drug use: Never   • Sexual activity: Defer        Family History   Problem Relation Age of Onset   • Cancer Mother    • Cancer Father    • Malig Hyperthermia Neg Hx         Review of Systems   Constitutional: Negative for activity change, chills, fatigue and fever.   HENT: Negative for mouth sores, trouble swallowing and voice change.    Eyes: Negative for pain and visual disturbance.   Respiratory: Positive for cough. Negative for wheezing.    Cardiovascular: Negative for chest pain and palpitations.   Gastrointestinal: Negative for abdominal pain, constipation, diarrhea, nausea and vomiting.   Genitourinary: Negative for difficulty urinating, frequency and urgency.   Musculoskeletal: Negative for arthralgias and joint swelling.   Skin: Negative for rash.   Neurological: Negative for dizziness, seizures, weakness and headaches.   Hematological: Negative for adenopathy. Does not bruise/bleed easily.   Psychiatric/Behavioral: Negative for behavioral problems and confusion. The patient is not nervous/anxious.     2023 ROS updated     Objective     Vitals:    23 1345   BP: 132/69  "  Pulse: 80   Resp: 16   Temp: 97 °F (36.1 °C)   TempSrc: Temporal   SpO2: 96%   Weight: 54.8 kg (120 lb 14.4 oz)   Height: 149.9 cm (59.02\")   PainSc:   9   PainLoc: Shoulder  Comment: left         4/3/2023    12:40 PM   Current Status   ECOG score 0       Physical Exam  Vitals reviewed.   Constitutional:       General: She is not in acute distress.     Appearance: Normal appearance. She is well-developed.   HENT:      Head: Normocephalic and atraumatic.      Mouth/Throat:      Pharynx: No oropharyngeal exudate.   Eyes:      Pupils: Pupils are equal, round, and reactive to light.   Cardiovascular:      Rate and Rhythm: Normal rate and regular rhythm.      Heart sounds: Normal heart sounds. No murmur heard.  Pulmonary:      Effort: Pulmonary effort is normal. No respiratory distress.      Breath sounds: Decreased breath sounds present. No wheezing, rhonchi or rales.   Abdominal:      General: Bowel sounds are normal. There is no distension.      Palpations: Abdomen is soft.   Musculoskeletal:         General: Normal range of motion.      Cervical back: Normal range of motion.   Skin:     General: Skin is warm and dry.      Findings: No rash.   Neurological:      Mental Status: She is alert and oriented to person, place, and time.      I have reexamined the patient and the results are consistent with the previously documented exam. Cruzito Lagunas MD        RECENT LABS:  Results from last 7 days   Lab Units 04/17/23  1321   WBC 10*3/mm3 13.13*   NEUTROS ABS 10*3/mm3 11.33*   HEMOGLOBIN g/dL 13.4   HEMATOCRIT % 41.5   PLATELETS 10*3/mm3 286             BRONCHOSCOPY PATHOLOGY 1/4/2023  Final Diagnosis   1. Fluid, Lung, Right Upper Lobe, Bronchoalveolar Lavage (BAL):  A. Negative for malignant cells- reactive atypia.  B. Reactive bronchial cells, macrophages, inflammation (mostly acute) and mucin.  C. Negative for definitive fungal organisms by GMS staining. See comment.     2. Fluid, Lung, Right Upper Lobe, " Brushing:               A. Negative for malignant cells.               B. Bronchial cells, scattered inflammation and mucinous debris.      Final Diagnosis   1. Lung, Right Upper Lobe, Biopsy:  Endobronchial mucosa and submucosa with                A. Squamous metaplasia, mild to moderate chronic active inflammation, fibrinous and necrotic debris and reactive      epithelial changes.               B. No definitive dysplasia nor malignancy identified.               C. No granulomata, diagnostic viral inclusions nor fungal organisms identified.             BRONCHOSCOPY PATHOLOGY 8/23/2022  Final Diagnosis   1. Lung, Right Upper Lobe, Endobronchial Biopsies: INVASIVE POORLY DIFFERENTIATED ADENOCARCINOMA OF PULMONARY ORIGIN.   PDL-1 POSITIVE >95%    IMAGING:  CT CHEST, ABDOMEN, AND PELVIS WITH IV CONTRAST 3/1/2023  IMPRESSION:  Decreased size of the right upper lobe mass with stable  surrounding groundglass opacity and decreased size of the right lower  lobe opacity. No adenopathy seen on today's exam. Incidental findings as  Above.      F-18 FDG PET FROM SKULL BASE TO MID THIGH WITH PET/CT FUSION 8/12/2022  IMPRESSION:  1.  Large mass centered within the right upper lobe representing  patient's known malignancy. Interlobular septal thickening and ground  glass opacification projecting from the periphery of the mass throughout  the right upper lobe is highly concerning for lymphangitic spread. Of  note, the mass abuts the pleura and mediastinum over a long segment and  underlying invasion cannot be excluded.  2.  FDG avid hilar and mediastinal adenopathy concerning for metastatic  disease.  3.  A few irregular subcentimeter pulmonary nodules are present within  the right lower and left upper lobes measuring up to 0.9 cm which while  nonspecific raises concern for metastatic disease. At least close  attention on followup is recommended.   4.  Minimally hypermetabolic arnoldo hepatic node and bilateral sub-6 mm  pulmonary  nodules are indeterminate. Continued followup with chest and  abdominal CT in 3 months is recommended.  5.  Indeterminate density 4.4 cm mass arises off the right kidney.  Further evaluation with multiphase CT or MRI of the abdomen with and  without contrast is recommended to exclude neoplasm.  6.  Focal uptake over the right shoulder in the area of prior rotator  cuff repair is favored be postsurgical with metastatic disease less  likely.  7.  Other findings as above.    MRI OF THE BRAIN WITH AND WITHOUT CONTRAST 08/15/2022   IMPRESSION:  1. There is mild-to-moderate small vessel disease in cerebral and  central pontine white matter. Otherwise, the MRI of the brain is normal  with no evidence of metastatic disease to the brain.   2. There are some nonspecific signal abnormality in the C4 cervical  vertebra with T1 low signal throughout the visualized portions C4  cervical vertebrae on the sagittal images. The patient has had prior  cervical spine surgery as demonstrated on prior PET scan and this argues  in favor that the T1 low signal and C4 vertebrae is related to sclerosis  from degenerative disc changes at C4-5 although an osseous metastatic  lesion in the C4 vertebra cannot be excluded on the basis of this exam.  Apparently the C4 vertebra was not hot on the recent PET scan which is  further evidence that this is degenerative in origin and correlation  with recent PET scan is suggested.      Assessment & Plan   · 1.  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  Patient is not felt to be a surgical candidate and combined chemotherapy and radiation.   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  · 9/20/2022 1st dose of weekly carboplatin/taxol.   · She completed 6 cycles of weekly CarboTaxol 10/25/2022.  · Radiation therapy completed 10/27/2022  · First dose durvalumab delivered 11/28/2022    2.  Tumor is strongly PD-L1 positive greater than 95% (although the patient may not be a  great candidate for immunotherapy in the future due to her rheumatoid arthritis).  3.  Other comorbidities including vascular disease with previous carotid endarterectomy surgeries.  She has no known history of stroke or myocardial infarction.  Overall her performance status is very good.  4.  Hemoptysis related to her central tumor.  Improved with initiation of therapy.  She reports she still has some darker blood coming up when she coughs but this is usually now associated more with some yellow foul-smelling sputum.  5.  Rheumatoid arthritis: Patient previously on Celebrex, but discontinued due to hemoptysis.  Patient started on prednisone 20 mg daily prescribed to manage her arthritis pain.  Prednisone has since been decreased to 10 mg daily.  6.  Recent follow-up bronchoscopy performed 1/4/2023 with no evidence of malignancy identified  7.  Lesion on the right kidney which will require further follow-up on next scans.      PLAN:  1. We we will proceed with her 11th dose of Imfinzi immunotherapy in the office today.  2. Return in 2 weeks for lab and Imfinzi.  3. Nurse practitioner assessment with lab and Imfinzi in 4 weeks  4. Lab and Imfinzi treatment in 6 weeks  5. Continue prednisone to 10 mg daily for rheumatoid arthritis.  6. We will repeat iron studies today and let her know if she no longer needs to take the iron supplement.  7. MD follow-up in 8 weeks weeks with lab and Imfinzi treatment.  We will schedule surveillance CT scans of the chest abdomen and pelvis 1 week prior to that visit.      This patient is on high risk drug therapy requiring intensive monitoring for toxicity.             Cruzito Lagunas MD   04/17/2023

## 2023-04-28 DIAGNOSIS — R04.2 COUGH WITH HEMOPTYSIS: ICD-10-CM

## 2023-04-28 DIAGNOSIS — C34.91 PRIMARY LUNG ADENOCARCINOMA, RIGHT: ICD-10-CM

## 2023-04-28 RX ORDER — PREDNISONE 10 MG/1
TABLET ORAL
Qty: 30 TABLET | Refills: 1 | Status: SHIPPED | OUTPATIENT
Start: 2023-04-28

## 2023-05-01 ENCOUNTER — INFUSION (OUTPATIENT)
Dept: ONCOLOGY | Facility: HOSPITAL | Age: 78
End: 2023-05-01
Payer: MEDICARE

## 2023-05-01 VITALS
DIASTOLIC BLOOD PRESSURE: 57 MMHG | TEMPERATURE: 98 F | HEART RATE: 87 BPM | SYSTOLIC BLOOD PRESSURE: 137 MMHG | WEIGHT: 122.4 LBS | BODY MASS INDEX: 24.71 KG/M2 | OXYGEN SATURATION: 96 % | RESPIRATION RATE: 16 BRPM

## 2023-05-01 DIAGNOSIS — C34.91 PRIMARY LUNG ADENOCARCINOMA, RIGHT: ICD-10-CM

## 2023-05-01 DIAGNOSIS — Z79.899 HIGH RISK MEDICATION USE: Primary | ICD-10-CM

## 2023-05-01 LAB
ALBUMIN SERPL-MCNC: 3.9 G/DL (ref 3.5–5.2)
ALBUMIN/GLOB SERPL: 1.3 G/DL (ref 1.1–2.4)
ALP SERPL-CCNC: 67 U/L (ref 38–116)
ALT SERPL W P-5'-P-CCNC: 12 U/L (ref 0–33)
ANION GAP SERPL CALCULATED.3IONS-SCNC: 12 MMOL/L (ref 5–15)
AST SERPL-CCNC: 19 U/L (ref 0–32)
BASOPHILS # BLD AUTO: 0.07 10*3/MM3 (ref 0–0.2)
BASOPHILS NFR BLD AUTO: 0.6 % (ref 0–1.5)
BILIRUB SERPL-MCNC: 0.3 MG/DL (ref 0.2–1.2)
BUN SERPL-MCNC: 18 MG/DL (ref 6–20)
BUN/CREAT SERPL: 18.4 (ref 7.3–30)
CALCIUM SPEC-SCNC: 9.8 MG/DL (ref 8.5–10.2)
CHLORIDE SERPL-SCNC: 101 MMOL/L (ref 98–107)
CO2 SERPL-SCNC: 25 MMOL/L (ref 22–29)
CREAT SERPL-MCNC: 0.98 MG/DL (ref 0.6–1.1)
DEPRECATED RDW RBC AUTO: 58.4 FL (ref 37–54)
EGFRCR SERPLBLD CKD-EPI 2021: 59.9 ML/MIN/1.73
EOSINOPHIL # BLD AUTO: 0.17 10*3/MM3 (ref 0–0.4)
EOSINOPHIL NFR BLD AUTO: 1.5 % (ref 0.3–6.2)
ERYTHROCYTE [DISTWIDTH] IN BLOOD BY AUTOMATED COUNT: 18.5 % (ref 12.3–15.4)
GLOBULIN UR ELPH-MCNC: 2.9 GM/DL (ref 1.8–3.5)
GLUCOSE SERPL-MCNC: 131 MG/DL (ref 74–124)
HCT VFR BLD AUTO: 42.4 % (ref 34–46.6)
HGB BLD-MCNC: 13.4 G/DL (ref 12–15.9)
IMM GRANULOCYTES # BLD AUTO: 0.05 10*3/MM3 (ref 0–0.05)
IMM GRANULOCYTES NFR BLD AUTO: 0.4 % (ref 0–0.5)
LYMPHOCYTES # BLD AUTO: 0.99 10*3/MM3 (ref 0.7–3.1)
LYMPHOCYTES NFR BLD AUTO: 8.6 % (ref 19.6–45.3)
MCH RBC QN AUTO: 27.7 PG (ref 26.6–33)
MCHC RBC AUTO-ENTMCNC: 31.6 G/DL (ref 31.5–35.7)
MCV RBC AUTO: 87.6 FL (ref 79–97)
MONOCYTES # BLD AUTO: 0.84 10*3/MM3 (ref 0.1–0.9)
MONOCYTES NFR BLD AUTO: 7.3 % (ref 5–12)
NEUTROPHILS NFR BLD AUTO: 81.6 % (ref 42.7–76)
NEUTROPHILS NFR BLD AUTO: 9.44 10*3/MM3 (ref 1.7–7)
NRBC BLD AUTO-RTO: 0 /100 WBC (ref 0–0.2)
PLATELET # BLD AUTO: 291 10*3/MM3 (ref 140–450)
PMV BLD AUTO: 10.5 FL (ref 6–12)
POTASSIUM SERPL-SCNC: 3.6 MMOL/L (ref 3.5–4.7)
PROT SERPL-MCNC: 6.8 G/DL (ref 6.3–8)
RBC # BLD AUTO: 4.84 10*6/MM3 (ref 3.77–5.28)
SODIUM SERPL-SCNC: 138 MMOL/L (ref 134–145)
WBC NRBC COR # BLD: 11.56 10*3/MM3 (ref 3.4–10.8)

## 2023-05-01 PROCEDURE — 25010000002 HEPARIN LOCK FLUSH PER 10 UNITS: Performed by: INTERNAL MEDICINE

## 2023-05-01 PROCEDURE — 85025 COMPLETE CBC W/AUTO DIFF WBC: CPT

## 2023-05-01 PROCEDURE — 25010000002 DURVALUMAB 50 MG/ML SOLUTION 2.4 ML VIAL: Performed by: INTERNAL MEDICINE

## 2023-05-01 PROCEDURE — 80053 COMPREHEN METABOLIC PANEL: CPT

## 2023-05-01 PROCEDURE — 96413 CHEMO IV INFUSION 1 HR: CPT

## 2023-05-01 RX ORDER — HEPARIN SODIUM (PORCINE) LOCK FLUSH IV SOLN 100 UNIT/ML 100 UNIT/ML
500 SOLUTION INTRAVENOUS AS NEEDED
OUTPATIENT
Start: 2023-05-01

## 2023-05-01 RX ORDER — SODIUM CHLORIDE 9 MG/ML
250 INJECTION, SOLUTION INTRAVENOUS ONCE
Status: COMPLETED | OUTPATIENT
Start: 2023-05-01 | End: 2023-05-01

## 2023-05-01 RX ORDER — HEPARIN SODIUM (PORCINE) LOCK FLUSH IV SOLN 100 UNIT/ML 100 UNIT/ML
500 SOLUTION INTRAVENOUS AS NEEDED
Status: DISCONTINUED | OUTPATIENT
Start: 2023-05-01 | End: 2023-05-01 | Stop reason: HOSPADM

## 2023-05-01 RX ORDER — SODIUM CHLORIDE 0.9 % (FLUSH) 0.9 %
10 SYRINGE (ML) INJECTION AS NEEDED
OUTPATIENT
Start: 2023-05-01

## 2023-05-01 RX ORDER — SODIUM CHLORIDE 0.9 % (FLUSH) 0.9 %
10 SYRINGE (ML) INJECTION AS NEEDED
Status: DISCONTINUED | OUTPATIENT
Start: 2023-05-01 | End: 2023-05-01 | Stop reason: HOSPADM

## 2023-05-01 RX ADMIN — Medication 10 ML: at 13:46

## 2023-05-01 RX ADMIN — SODIUM CHLORIDE 250 ML: 9 INJECTION, SOLUTION INTRAVENOUS at 12:42

## 2023-05-01 RX ADMIN — Medication 500 UNITS: at 13:46

## 2023-05-01 RX ADMIN — SODIUM CHLORIDE 570 MG: 900 INJECTION, SOLUTION INTRAVENOUS at 12:44

## 2023-05-03 ENCOUNTER — PATIENT OUTREACH (OUTPATIENT)
Dept: OTHER | Facility: HOSPITAL | Age: 78
End: 2023-05-03
Payer: MEDICARE

## 2023-05-03 NOTE — PROGRESS NOTES
Called patient. She is doing well with treatment. She just received her 12th dose out of 26. The patient complains of fatigue although denies other side effects to treatment.    She has been referred to OT. Her evaluation is next week.     The patient denies any resource or supportive care needs. She met with Roberta for massage therapy recently.      I will follow up in July; she will call as needed.

## 2023-05-12 NOTE — PROGRESS NOTES
Subjective     REASON FOR FOLLOW UP:   1.  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  PD-L1 positive greater than 95%  3.  Primary chemoradiation with weekly carboplatin and Taxol completed 10/27/2022  4.  Imfinzi immunotherapy every 2 weeks for 12 months total.  First treatment 11/28/2022  5.  Repeat bronchoscopy 1/4/2023 due to persistent hemoptysis.  Pathology revealed no malignancy.  6.  Exophytic lesion of the kidney noted on surveillance CT scans.    HISTORY OF PRESENT ILLNESS:  The patient is a 78 y.o. year old female who is a former smoker referred to us from thoracic surgery (Dr. Remedios Rice) for evaluation and treatment of clinical stage III adenocarcinoma of the lung.  She was having persistent cough and underwent chest x-ray initially in late June which showed possible right upper lobe mass.  This was followed by CT scan of the chest confirming the presence of a right upper lobe mass.  She initially underwent a CT-guided needle biopsy on 7/22/2022 which was nondiagnostic.    She was referred to Dr. Glass for bronchoscopy and biopsy which was performed on 8/23/2022 with pathology returning as poorly differentiated adenocarcinoma.  PD-L1 stain on the tissue was positive at greater than 95%.    She underwent further staging with PET scan and MRI of the brain.  PET scan was performed on 8/12/2022 showing hypermetabolic activity in the large mass in the right upper lobe as well as mediastinal lymph nodes.  She was noted to have a mass on the kidney as well but this was not hypermetabolic.  There was no obvious distant metastatic disease.    MRI of the brain from 8/15/2022 likewise showed no evidence of metastatic disease.  She is scheduled also to see Dr. Hector Rodriguez and radiation oncology on 9/7/2022.    We recommended weekly carboplatin and Taxol concurrent with radiation therapy.  Following completion of treatment she will receive immunotherapy for maintenance   (She does have a diagnosis of rheumatoid  arthritis and is currently taking only Celebrex.  This could become an issue regarding her ability to tolerate immunotherapy in the future).    She received her final fraction of radiation 10/27/2022.  She also completed 6 weeks of carboplatin and Taxol and tolerated it well. CT scans performed 11/21/2022 showed right upper lobe mass appeared stable in size.  Her mediastinal lymphadenopathy had decreased.  There were no new sites of disease identified.    She has been receiving immunotherapy with Imfinzi with plans to continue immunotherapy for 1 year until December 2023.  She is here today for additional Imfinzi treatment (cycle #13).  She continues on oral iron daily and low-dose prednisone for her rheumatoid arthritis.  She was previously working with PT to help with muscle weakness.  She is now working with OT to help with this.  She has noted improvement in her right arm with the exercises they have shown her.  She goes for her second appointment with them today.  She continues to struggle with sinus headaches, this has been ongoing since March and has remained stable.  She does work for an ENT, he has been doing Zyrtec, hot showers, hot compresses as they recommended.  She is eating and drinking well.  Bowels moving regularly.  Denies fever or chills.  Denies nausea or vomiting.  Denies shortness of breath or skin rash.    History of Present Illness     Past Medical History:   Diagnosis Date   • COPD (chronic obstructive pulmonary disease)    • Coughing up blood     X2 MONTHS   • History of COVID-19 02/2022   • Hyperlipidemia    • IBS (irritable bowel syndrome)    • Primary lung adenocarcinoma, right    • Rheumatoid arthritis         Past Surgical History:   Procedure Laterality Date   • APPENDECTOMY      appendix removed   • BRONCHOSCOPY N/A 8/23/2022    Procedure: BRONCHOSCOPY WITH BAL,  BIOPSIES, AND BRUSHINGS WITH ENDOBRONCHIAL ULTRASOUND WITH FNA;  Surgeon: Gregor Vee MD;  Location: [facility]  ENDOSCOPY;  Service: Pulmonary;  Laterality: N/A;  PRE- HILAR MASS  POST- SAME   • BRONCHOSCOPY N/A 1/4/2023    Procedure: BRONCHOSCOPY with biopsy, lavage, brushing;  Surgeon: Gregor Vee MD;  Location: [facility] ENDOSCOPY;  Service: Pulmonary;  Laterality: N/A;   • CAROTID ENDARTERECTOMY Right    • CAROTID ENDARTERECTOMY Left    • CATARACT EXTRACTION     • CERVICAL FUSION     • CHOLECYSTECTOMY     • HYSTERECTOMY     • SHOULDER ARTHROSCOPY Right 02/19/2019    right should scope/cuff repair    • VENOUS ACCESS DEVICE (PORT) INSERTION Right 9/6/2022    Procedure: POWERPORT INSERTION;  Surgeon: Remedios Rice MD;  Location: [facility] MAIN OR;  Service: Thoracic;  Laterality: Right;        Current Outpatient Medications on File Prior to Visit   Medication Sig Dispense Refill   • azelastine (ASTELIN) 0.1 % nasal spray 1 spray into the nostril(s) as directed by provider.     • B COMPLEX VITAMINS ER PO Take  by mouth Daily.     • Cholecalciferol (VITAMIN D3) 5000 units capsule capsule Take 2,000 Units by mouth Daily.     • Colestipol HCl (COLESTID PO) Take 1 tablet by mouth Daily.     • esomeprazole (NexIUM) 10 MG packet Take 10 mg by mouth Daily.     • estradiol (ESTRACE) 1 MG tablet Take 1 tablet by mouth Daily.     • FeroSul 325 (65 Fe) MG tablet TAKE ONE TABLET BY MOUTH DAILY WITH BREAKFAST 30 tablet 5   • HYDROcodone Bit-Homatrop MBr (HYCODAN) 5-1.5 MG/5ML solution Take 5 mL by mouth Every 6 (Six) Hours As Needed for Cough. 250 mL 0   • lidocaine-prilocaine (EMLA) 2.5-2.5 % cream Apply 1 application topically to the appropriate area as directed Every 2 (Two) Hours As Needed for Mild Pain. 5 g 3   • Multiple Vitamins-Minerals (PRESERVISION AREDS 2+MULTI VIT PO) Take  by mouth.     • ofloxacin (OCUFLOX) 0.3 % ophthalmic solution      • ondansetron (ZOFRAN) 8 MG tablet Take 1 tablet by mouth 3 (Three) Times a Day As Needed for Nausea or Vomiting. 30 tablet 5   • oxybutynin (DITROPAN) 5 MG tablet Take 1 tablet by mouth 2  (Two) Times a Day.     • predniSONE (DELTASONE) 10 MG tablet TAKE ONE TABLET BY MOUTH DAILY 30 tablet 1     No current facility-administered medications on file prior to visit.        ALLERGIES:    Allergies   Allergen Reactions   • Del-Mycin [Erythromycin] Hives   • Gentamicin Hives   • Ibuprofen Nausea And Vomiting   • Latex Dermatitis     GLOVES   • Metronidazole Hives   • Ofloxacin Hives and Nausea Only   • Penicillins Hives   • Tetracycline Hives   • Adhesive Tape Dermatitis     BANDAIDS   • Aspirin GI Intolerance     Vomiting can take EC   • Codeine Nausea And Vomiting        Social History     Socioeconomic History   • Marital status:    Tobacco Use   • Smoking status: Former     Packs/day: 0.25     Types: Cigarettes     Quit date: 2022     Years since quittin.0   • Smokeless tobacco: Never   • Tobacco comments:     Former some day smoker of 1-2 cigs   Vaping Use   • Vaping Use: Never used   Substance and Sexual Activity   • Alcohol use: Yes     Alcohol/week: 1.0 standard drink     Types: 1 Glasses of wine per week     Comment: occasionally   • Drug use: Never   • Sexual activity: Defer        Family History   Problem Relation Age of Onset   • Cancer Mother    • Cancer Father    • Malig Hyperthermia Neg Hx         Review of Systems   Constitutional: Negative for activity change, chills, fatigue and fever.   HENT: Negative for mouth sores, trouble swallowing and voice change.    Eyes: Negative for pain and visual disturbance.   Respiratory: Positive for cough. Negative for wheezing.    Cardiovascular: Negative for chest pain and palpitations.   Gastrointestinal: Negative for abdominal pain, constipation, diarrhea, nausea and vomiting.   Genitourinary: Negative for difficulty urinating, frequency and urgency.   Musculoskeletal: Negative for arthralgias and joint swelling.   Skin: Negative for rash.   Neurological: Negative for dizziness, seizures, weakness and headaches.   Hematological: Negative  "for adenopathy. Does not bruise/bleed easily.   Psychiatric/Behavioral: Negative for behavioral problems and confusion. The patient is not nervous/anxious.     05/15/2023 ROS updated     Objective     Vitals:    05/15/23 1121 05/15/23 1140   BP: 180/77 148/70  Comment: manual by NP   Pulse: 90    Resp: 16    Temp: 98 °F (36.7 °C)    TempSrc: Infrared    SpO2: 96%    Weight: 53.6 kg (118 lb 3.2 oz)    Height: 149.9 cm (59.02\")    PainSc:   6    PainLoc: Head          5/15/2023    11:26 AM   Current Status   ECOG score 0     Physical Exam  Vitals reviewed.   Constitutional:       General: She is not in acute distress.     Appearance: Normal appearance. She is well-developed.   HENT:      Head: Normocephalic and atraumatic.      Mouth/Throat:      Pharynx: No oropharyngeal exudate.   Eyes:      Pupils: Pupils are equal, round, and reactive to light.   Cardiovascular:      Rate and Rhythm: Normal rate and regular rhythm.      Heart sounds: Normal heart sounds. No murmur heard.  Pulmonary:      Effort: Pulmonary effort is normal. No respiratory distress.      Breath sounds: Decreased breath sounds present. No wheezing, rhonchi or rales.   Abdominal:      General: Bowel sounds are normal. There is no distension.      Palpations: Abdomen is soft.   Musculoskeletal:         General: Normal range of motion.      Cervical back: Normal range of motion.   Skin:     General: Skin is warm and dry.      Findings: No rash.   Neurological:      Mental Status: She is alert and oriented to person, place, and time.      I have reexamined the patient and the results are consistent with the previously documented exam. BARBARA Coronado        RECENT LABS:  Results from last 7 days   Lab Units 05/15/23  1103   WBC 10*3/mm3 13.72*   NEUTROS ABS 10*3/mm3 11.20*   HEMOGLOBIN g/dL 14.1   HEMATOCRIT % 45.0   PLATELETS 10*3/mm3 323             BRONCHOSCOPY PATHOLOGY 1/4/2023  Final Diagnosis   1. Fluid, Lung, Right Upper Lobe, " Bronchoalveolar Lavage (BAL):  A. Negative for malignant cells- reactive atypia.  B. Reactive bronchial cells, macrophages, inflammation (mostly acute) and mucin.  C. Negative for definitive fungal organisms by GMS staining. See comment.     2. Fluid, Lung, Right Upper Lobe, Brushing:               A. Negative for malignant cells.               B. Bronchial cells, scattered inflammation and mucinous debris.      Final Diagnosis   1. Lung, Right Upper Lobe, Biopsy:  Endobronchial mucosa and submucosa with                A. Squamous metaplasia, mild to moderate chronic active inflammation, fibrinous and necrotic debris and reactive      epithelial changes.               B. No definitive dysplasia nor malignancy identified.               C. No granulomata, diagnostic viral inclusions nor fungal organisms identified.             BRONCHOSCOPY PATHOLOGY 8/23/2022  Final Diagnosis   1. Lung, Right Upper Lobe, Endobronchial Biopsies: INVASIVE POORLY DIFFERENTIATED ADENOCARCINOMA OF PULMONARY ORIGIN.   PDL-1 POSITIVE >95%    IMAGING:  CT CHEST, ABDOMEN, AND PELVIS WITH IV CONTRAST 3/1/2023  IMPRESSION:  Decreased size of the right upper lobe mass with stable  surrounding groundglass opacity and decreased size of the right lower  lobe opacity. No adenopathy seen on today's exam. Incidental findings as  Above.      F-18 FDG PET FROM SKULL BASE TO MID THIGH WITH PET/CT FUSION 8/12/2022  IMPRESSION:  1.  Large mass centered within the right upper lobe representing  patient's known malignancy. Interlobular septal thickening and ground  glass opacification projecting from the periphery of the mass throughout  the right upper lobe is highly concerning for lymphangitic spread. Of  note, the mass abuts the pleura and mediastinum over a long segment and  underlying invasion cannot be excluded.  2.  FDG avid hilar and mediastinal adenopathy concerning for metastatic  disease.  3.  A few irregular subcentimeter pulmonary nodules are  present within  the right lower and left upper lobes measuring up to 0.9 cm which while  nonspecific raises concern for metastatic disease. At least close  attention on followup is recommended.   4.  Minimally hypermetabolic arnoldo hepatic node and bilateral sub-6 mm  pulmonary nodules are indeterminate. Continued followup with chest and  abdominal CT in 3 months is recommended.  5.  Indeterminate density 4.4 cm mass arises off the right kidney.  Further evaluation with multiphase CT or MRI of the abdomen with and  without contrast is recommended to exclude neoplasm.  6.  Focal uptake over the right shoulder in the area of prior rotator  cuff repair is favored be postsurgical with metastatic disease less  likely.  7.  Other findings as above.    MRI OF THE BRAIN WITH AND WITHOUT CONTRAST 08/15/2022   IMPRESSION:  1. There is mild-to-moderate small vessel disease in cerebral and  central pontine white matter. Otherwise, the MRI of the brain is normal  with no evidence of metastatic disease to the brain.   2. There are some nonspecific signal abnormality in the C4 cervical  vertebra with T1 low signal throughout the visualized portions C4  cervical vertebrae on the sagittal images. The patient has had prior  cervical spine surgery as demonstrated on prior PET scan and this argues  in favor that the T1 low signal and C4 vertebrae is related to sclerosis  from degenerative disc changes at C4-5 although an osseous metastatic  lesion in the C4 vertebra cannot be excluded on the basis of this exam.  Apparently the C4 vertebra was not hot on the recent PET scan which is  further evidence that this is degenerative in origin and correlation  with recent PET scan is suggested.      Assessment & Plan   · 1.  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  Patient is not felt to be a surgical candidate and combined chemotherapy and radiation.   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  · 9/20/2022 1st  dose of weekly carboplatin/taxol.   · She completed 6 cycles of weekly CarboTaxol 10/25/2022.  · Radiation therapy completed 10/27/2022  · First dose durvalumab delivered 11/28/2022  · 5/15/2023: Cycle #13 Imfinzi today.  Tolerating well.     2.  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  3.  Other comorbidities including vascular disease with previous carotid endarterectomy surgeries.  She has no known history of stroke or myocardial infarction.  Overall her performance status is very good.  4.  Hemoptysis related to her central tumor.  Improved with initiation of therapy.  She reports she still has some darker blood coming up when she coughs but this is usually now associated more with some yellow foul-smelling sputum.  5.  Rheumatoid arthritis: Patient previously on Celebrex, but discontinued due to hemoptysis.  Patient started on prednisone 20 mg daily prescribed to manage her arthritis pain.  Prednisone has since been decreased to 10 mg daily.  6.  Recent follow-up bronchoscopy performed 1/4/2023 with no evidence of malignancy identified  7.  Lesion on the right kidney which will require further follow-up on next scans.      PLAN:   1. We we will proceed with her 13th dose of Imfinzi immunotherapy in the office today.  2. Return in 2 weeks for lab and Imfinzi.  This will be given 1 day late due to the Memorial Day holiday.  3. Return in 4 weeks for MD follow-up and Imfinzi.  Surveillance CT scans will be completed 1 week prior.  4. Continue oral iron 1 tablet daily.  5. Continue prednisone to 10 mg daily for rheumatoid arthritis.  6. Continue working with OT for muscle weakness.    This patient is on high risk drug therapy requiring intensive monitoring for toxicity.      Anay France, APRN   05/15/2023

## 2023-05-15 ENCOUNTER — INFUSION (OUTPATIENT)
Dept: ONCOLOGY | Facility: HOSPITAL | Age: 78
End: 2023-05-15
Payer: MEDICARE

## 2023-05-15 ENCOUNTER — OFFICE VISIT (OUTPATIENT)
Dept: ONCOLOGY | Facility: CLINIC | Age: 78
End: 2023-05-15
Payer: MEDICARE

## 2023-05-15 VITALS
HEIGHT: 59 IN | SYSTOLIC BLOOD PRESSURE: 148 MMHG | RESPIRATION RATE: 16 BRPM | HEART RATE: 90 BPM | BODY MASS INDEX: 23.83 KG/M2 | TEMPERATURE: 98 F | OXYGEN SATURATION: 96 % | WEIGHT: 118.2 LBS | DIASTOLIC BLOOD PRESSURE: 70 MMHG

## 2023-05-15 DIAGNOSIS — C34.91 PRIMARY LUNG ADENOCARCINOMA, RIGHT: ICD-10-CM

## 2023-05-15 DIAGNOSIS — Z79.899 HIGH RISK MEDICATION USE: ICD-10-CM

## 2023-05-15 DIAGNOSIS — Z79.899 HIGH RISK MEDICATION USE: Primary | ICD-10-CM

## 2023-05-15 DIAGNOSIS — C34.91 PRIMARY LUNG ADENOCARCINOMA, RIGHT: Primary | ICD-10-CM

## 2023-05-15 LAB
ALBUMIN SERPL-MCNC: 4 G/DL (ref 3.5–5.2)
ALBUMIN/GLOB SERPL: 1.1 G/DL (ref 1.1–2.4)
ALP SERPL-CCNC: 67 U/L (ref 38–116)
ALT SERPL W P-5'-P-CCNC: 9 U/L (ref 0–33)
ANION GAP SERPL CALCULATED.3IONS-SCNC: 14 MMOL/L (ref 5–15)
AST SERPL-CCNC: 20 U/L (ref 0–32)
BASOPHILS # BLD AUTO: 0.06 10*3/MM3 (ref 0–0.2)
BASOPHILS NFR BLD AUTO: 0.4 % (ref 0–1.5)
BILIRUB SERPL-MCNC: 0.5 MG/DL (ref 0.2–1.2)
BUN SERPL-MCNC: 21 MG/DL (ref 6–20)
BUN/CREAT SERPL: 21.6 (ref 7.3–30)
CALCIUM SPEC-SCNC: 10.3 MG/DL (ref 8.5–10.2)
CHLORIDE SERPL-SCNC: 99 MMOL/L (ref 98–107)
CO2 SERPL-SCNC: 24 MMOL/L (ref 22–29)
CREAT SERPL-MCNC: 0.97 MG/DL (ref 0.6–1.1)
DEPRECATED RDW RBC AUTO: 55.1 FL (ref 37–54)
EGFRCR SERPLBLD CKD-EPI 2021: 60.7 ML/MIN/1.73
EOSINOPHIL # BLD AUTO: 0.21 10*3/MM3 (ref 0–0.4)
EOSINOPHIL NFR BLD AUTO: 1.5 % (ref 0.3–6.2)
ERYTHROCYTE [DISTWIDTH] IN BLOOD BY AUTOMATED COUNT: 17.2 % (ref 12.3–15.4)
GLOBULIN UR ELPH-MCNC: 3.6 GM/DL (ref 1.8–3.5)
GLUCOSE SERPL-MCNC: 96 MG/DL (ref 74–124)
HCT VFR BLD AUTO: 45 % (ref 34–46.6)
HGB BLD-MCNC: 14.1 G/DL (ref 12–15.9)
IMM GRANULOCYTES # BLD AUTO: 0.07 10*3/MM3 (ref 0–0.05)
IMM GRANULOCYTES NFR BLD AUTO: 0.5 % (ref 0–0.5)
LYMPHOCYTES # BLD AUTO: 1.02 10*3/MM3 (ref 0.7–3.1)
LYMPHOCYTES NFR BLD AUTO: 7.4 % (ref 19.6–45.3)
MCH RBC QN AUTO: 27.4 PG (ref 26.6–33)
MCHC RBC AUTO-ENTMCNC: 31.3 G/DL (ref 31.5–35.7)
MCV RBC AUTO: 87.5 FL (ref 79–97)
MONOCYTES # BLD AUTO: 1.16 10*3/MM3 (ref 0.1–0.9)
MONOCYTES NFR BLD AUTO: 8.5 % (ref 5–12)
NEUTROPHILS NFR BLD AUTO: 11.2 10*3/MM3 (ref 1.7–7)
NEUTROPHILS NFR BLD AUTO: 81.7 % (ref 42.7–76)
NRBC BLD AUTO-RTO: 0 /100 WBC (ref 0–0.2)
PLATELET # BLD AUTO: 323 10*3/MM3 (ref 140–450)
PMV BLD AUTO: 10.6 FL (ref 6–12)
POTASSIUM SERPL-SCNC: 3.4 MMOL/L (ref 3.5–4.7)
PROT SERPL-MCNC: 7.6 G/DL (ref 6.3–8)
RBC # BLD AUTO: 5.14 10*6/MM3 (ref 3.77–5.28)
SODIUM SERPL-SCNC: 137 MMOL/L (ref 134–145)
T4 FREE SERPL-MCNC: 1.19 NG/DL (ref 0.93–1.7)
TSH SERPL DL<=0.05 MIU/L-ACNC: 2.1 UIU/ML (ref 0.27–4.2)
WBC NRBC COR # BLD: 13.72 10*3/MM3 (ref 3.4–10.8)

## 2023-05-15 PROCEDURE — 85025 COMPLETE CBC W/AUTO DIFF WBC: CPT

## 2023-05-15 PROCEDURE — 80053 COMPREHEN METABOLIC PANEL: CPT

## 2023-05-15 PROCEDURE — 96413 CHEMO IV INFUSION 1 HR: CPT

## 2023-05-15 PROCEDURE — 25010000002 HEPARIN LOCK FLUSH PER 10 UNITS: Performed by: INTERNAL MEDICINE

## 2023-05-15 PROCEDURE — 25010000002 DURVALUMAB 50 MG/ML SOLUTION 2.4 ML VIAL: Performed by: INTERNAL MEDICINE

## 2023-05-15 PROCEDURE — 84443 ASSAY THYROID STIM HORMONE: CPT | Performed by: INTERNAL MEDICINE

## 2023-05-15 PROCEDURE — 84439 ASSAY OF FREE THYROXINE: CPT | Performed by: INTERNAL MEDICINE

## 2023-05-15 PROCEDURE — 25010000002 DURVALUMAB 50 MG/ML SOLUTION 10 ML VIAL: Performed by: INTERNAL MEDICINE

## 2023-05-15 RX ORDER — HEPARIN SODIUM (PORCINE) LOCK FLUSH IV SOLN 100 UNIT/ML 100 UNIT/ML
500 SOLUTION INTRAVENOUS AS NEEDED
Status: DISCONTINUED | OUTPATIENT
Start: 2023-05-15 | End: 2023-05-15 | Stop reason: HOSPADM

## 2023-05-15 RX ORDER — SODIUM CHLORIDE 9 MG/ML
250 INJECTION, SOLUTION INTRAVENOUS ONCE
Status: COMPLETED | OUTPATIENT
Start: 2023-05-15 | End: 2023-05-15

## 2023-05-15 RX ORDER — HEPARIN SODIUM (PORCINE) LOCK FLUSH IV SOLN 100 UNIT/ML 100 UNIT/ML
500 SOLUTION INTRAVENOUS AS NEEDED
OUTPATIENT
Start: 2023-05-15

## 2023-05-15 RX ORDER — SODIUM CHLORIDE 0.9 % (FLUSH) 0.9 %
10 SYRINGE (ML) INJECTION AS NEEDED
OUTPATIENT
Start: 2023-05-15

## 2023-05-15 RX ORDER — SODIUM CHLORIDE 0.9 % (FLUSH) 0.9 %
10 SYRINGE (ML) INJECTION AS NEEDED
Status: DISCONTINUED | OUTPATIENT
Start: 2023-05-15 | End: 2023-05-15 | Stop reason: HOSPADM

## 2023-05-15 RX ADMIN — Medication 500 UNITS: at 13:37

## 2023-05-15 RX ADMIN — Medication 10 ML: at 13:37

## 2023-05-15 RX ADMIN — SODIUM CHLORIDE 570 MG: 900 INJECTION, SOLUTION INTRAVENOUS at 12:37

## 2023-05-15 RX ADMIN — SODIUM CHLORIDE 250 ML: 9 INJECTION, SOLUTION INTRAVENOUS at 12:25

## 2023-05-22 ENCOUNTER — PATIENT OUTREACH (OUTPATIENT)
Dept: OTHER | Facility: HOSPITAL | Age: 78
End: 2023-05-22
Payer: MEDICARE

## 2023-05-22 NOTE — PROGRESS NOTES
"  Call from patient. She says she is feeling \"off\" over the past few weeks. She complains of sinus headaches and head \"sloshing\". The patient stated she discussed this with the APRN on 5/15.  The patient states she was encouraged to discuss this with her ENT. 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 The patient states she is just not herself. She has been misplacing things and has had trouble concentrating. She also complains of \"extreme leg weakness\". She has been going to PT/OT. The patient complains of bone pain, although denies loss of balance or falls.    The patient is scheduled for her next dose on Imfinzi 5/30, CT scans 6/7 and meets with Dr. Lagunas 6/12.  She reported that she misplaced her schedule and was not aware of the exact date/time of these appts.    We discussed her current symptoms and common side effects to Imfinizi. Her symptoms could also partially be related to her recent sinus infection. Informed I will message Dr. Lagunas about her symptoms and see if he would have any there recommendations.    The patient was appreciative/  I will f/u in several weeks. She will call as needed    "

## 2023-05-23 ENCOUNTER — PATIENT OUTREACH (OUTPATIENT)
Dept: OTHER | Facility: HOSPITAL | Age: 78
End: 2023-05-23
Payer: MEDICARE

## 2023-05-23 NOTE — PROGRESS NOTES
Call from patient. Left msg that she hasn't heard back from Dr. Lagunas.    Sent another message to Dr. Lagunas with cc to his nurse asking them to contact patient.  Called patient back, explained that I messaged Dr. Lagunas's nurse asking her to touch base with him and one of them call her back. Also provided Dr. Lagunas's number for the patient to contact directly. Patient was appreciative

## 2023-05-25 ENCOUNTER — APPOINTMENT (OUTPATIENT)
Dept: CT IMAGING | Facility: HOSPITAL | Age: 78
DRG: 025 | End: 2023-05-25
Payer: MEDICARE

## 2023-05-25 ENCOUNTER — HOSPITAL ENCOUNTER (INPATIENT)
Facility: HOSPITAL | Age: 78
LOS: 8 days | Discharge: HOME HEALTH | DRG: 025 | End: 2023-06-02
Attending: EMERGENCY MEDICINE | Admitting: INTERNAL MEDICINE
Payer: MEDICARE

## 2023-05-25 ENCOUNTER — APPOINTMENT (OUTPATIENT)
Dept: MRI IMAGING | Facility: HOSPITAL | Age: 78
DRG: 025 | End: 2023-05-25
Payer: MEDICARE

## 2023-05-25 ENCOUNTER — TELEPHONE (OUTPATIENT)
Dept: ONCOLOGY | Facility: CLINIC | Age: 78
End: 2023-05-25
Payer: MEDICARE

## 2023-05-25 DIAGNOSIS — J43.9 PULMONARY EMPHYSEMA, UNSPECIFIED EMPHYSEMA TYPE: ICD-10-CM

## 2023-05-25 DIAGNOSIS — R51.9 PRESSURE IN HEAD: Primary | ICD-10-CM

## 2023-05-25 DIAGNOSIS — R04.2 COUGH WITH HEMOPTYSIS: ICD-10-CM

## 2023-05-25 DIAGNOSIS — R29.898 LEG WEAKNESS, BILATERAL: ICD-10-CM

## 2023-05-25 DIAGNOSIS — R04.2 HEMOPTYSIS: ICD-10-CM

## 2023-05-25 DIAGNOSIS — C34.91 PRIMARY LUNG ADENOCARCINOMA, RIGHT: ICD-10-CM

## 2023-05-25 DIAGNOSIS — C79.31 LUNG CANCER METASTATIC TO BRAIN: Primary | ICD-10-CM

## 2023-05-25 DIAGNOSIS — G93.6 VASOGENIC BRAIN EDEMA: ICD-10-CM

## 2023-05-25 DIAGNOSIS — C34.90 LUNG CANCER METASTATIC TO BRAIN: Primary | ICD-10-CM

## 2023-05-25 DIAGNOSIS — R26.81 UNSTEADY GAIT: ICD-10-CM

## 2023-05-25 LAB
ALBUMIN SERPL-MCNC: 4.2 G/DL (ref 3.5–5.2)
ALBUMIN/GLOB SERPL: 1.3 G/DL
ALP SERPL-CCNC: 76 U/L (ref 39–117)
ALT SERPL W P-5'-P-CCNC: 15 U/L (ref 1–33)
ANION GAP SERPL CALCULATED.3IONS-SCNC: 13.8 MMOL/L (ref 5–15)
APTT PPP: 33.7 SECONDS (ref 22.7–35.4)
AST SERPL-CCNC: 20 U/L (ref 1–32)
BASOPHILS # BLD AUTO: 0.03 10*3/MM3 (ref 0–0.2)
BASOPHILS NFR BLD AUTO: 0.3 % (ref 0–1.5)
BILIRUB SERPL-MCNC: 0.4 MG/DL (ref 0–1.2)
BUN SERPL-MCNC: 16 MG/DL (ref 8–23)
BUN/CREAT SERPL: 14.8 (ref 7–25)
CALCIUM SPEC-SCNC: 10 MG/DL (ref 8.6–10.5)
CHLORIDE SERPL-SCNC: 97 MMOL/L (ref 98–107)
CO2 SERPL-SCNC: 25.2 MMOL/L (ref 22–29)
CREAT SERPL-MCNC: 1.08 MG/DL (ref 0.57–1)
DEPRECATED RDW RBC AUTO: 44.3 FL (ref 37–54)
EGFRCR SERPLBLD CKD-EPI 2021: 53.3 ML/MIN/1.73
EOSINOPHIL # BLD AUTO: 0.16 10*3/MM3 (ref 0–0.4)
EOSINOPHIL NFR BLD AUTO: 1.4 % (ref 0.3–6.2)
ERYTHROCYTE [DISTWIDTH] IN BLOOD BY AUTOMATED COUNT: 14.6 % (ref 12.3–15.4)
GEN 5 2HR TROPONIN T REFLEX: 15 NG/L
GLOBULIN UR ELPH-MCNC: 3.2 GM/DL
GLUCOSE BLDC GLUCOMTR-MCNC: 232 MG/DL (ref 70–130)
GLUCOSE BLDC GLUCOMTR-MCNC: 246 MG/DL (ref 70–130)
GLUCOSE SERPL-MCNC: 102 MG/DL (ref 65–99)
HCT VFR BLD AUTO: 42 % (ref 34–46.6)
HGB BLD-MCNC: 14.3 G/DL (ref 12–15.9)
IMM GRANULOCYTES # BLD AUTO: 0.06 10*3/MM3 (ref 0–0.05)
IMM GRANULOCYTES NFR BLD AUTO: 0.5 % (ref 0–0.5)
INR PPP: 1 (ref 0.9–1.1)
LYMPHOCYTES # BLD AUTO: 1.17 10*3/MM3 (ref 0.7–3.1)
LYMPHOCYTES NFR BLD AUTO: 10.3 % (ref 19.6–45.3)
MCH RBC QN AUTO: 28.4 PG (ref 26.6–33)
MCHC RBC AUTO-ENTMCNC: 34 G/DL (ref 31.5–35.7)
MCV RBC AUTO: 83.5 FL (ref 79–97)
MONOCYTES # BLD AUTO: 0.9 10*3/MM3 (ref 0.1–0.9)
MONOCYTES NFR BLD AUTO: 7.9 % (ref 5–12)
NEUTROPHILS NFR BLD AUTO: 79.6 % (ref 42.7–76)
NEUTROPHILS NFR BLD AUTO: 9.02 10*3/MM3 (ref 1.7–7)
NRBC BLD AUTO-RTO: 0 /100 WBC (ref 0–0.2)
NT-PROBNP SERPL-MCNC: 130 PG/ML (ref 0–1800)
PLATELET # BLD AUTO: 359 10*3/MM3 (ref 140–450)
PMV BLD AUTO: 10.5 FL (ref 6–12)
POTASSIUM SERPL-SCNC: 3.6 MMOL/L (ref 3.5–5.2)
PROT SERPL-MCNC: 7.4 G/DL (ref 6–8.5)
PROTHROMBIN TIME: 13.3 SECONDS (ref 11.7–14.2)
RBC # BLD AUTO: 5.03 10*6/MM3 (ref 3.77–5.28)
SODIUM SERPL-SCNC: 136 MMOL/L (ref 136–145)
T4 FREE SERPL-MCNC: 1.34 NG/DL (ref 0.93–1.7)
TROPONIN T DELTA: -1 NG/L
TROPONIN T SERPL HS-MCNC: 16 NG/L
TSH SERPL DL<=0.05 MIU/L-ACNC: 2.15 UIU/ML (ref 0.27–4.2)
WBC NRBC COR # BLD: 11.34 10*3/MM3 (ref 3.4–10.8)

## 2023-05-25 PROCEDURE — 70450 CT HEAD/BRAIN W/O DYE: CPT

## 2023-05-25 PROCEDURE — 80053 COMPREHEN METABOLIC PANEL: CPT | Performed by: PHYSICIAN ASSISTANT

## 2023-05-25 PROCEDURE — 25010000002 DEXAMETHASONE SODIUM PHOSPHATE 20 MG/5ML SOLUTION: Performed by: PHYSICIAN ASSISTANT

## 2023-05-25 PROCEDURE — 93010 ELECTROCARDIOGRAM REPORT: CPT | Performed by: INTERNAL MEDICINE

## 2023-05-25 PROCEDURE — 84439 ASSAY OF FREE THYROXINE: CPT | Performed by: PHYSICIAN ASSISTANT

## 2023-05-25 PROCEDURE — 85025 COMPLETE CBC W/AUTO DIFF WBC: CPT | Performed by: PHYSICIAN ASSISTANT

## 2023-05-25 PROCEDURE — 84484 ASSAY OF TROPONIN QUANT: CPT | Performed by: PHYSICIAN ASSISTANT

## 2023-05-25 PROCEDURE — 84443 ASSAY THYROID STIM HORMONE: CPT | Performed by: PHYSICIAN ASSISTANT

## 2023-05-25 PROCEDURE — A9577 INJ MULTIHANCE: HCPCS | Performed by: INTERNAL MEDICINE

## 2023-05-25 PROCEDURE — 85610 PROTHROMBIN TIME: CPT | Performed by: PHYSICIAN ASSISTANT

## 2023-05-25 PROCEDURE — 25010000002 DEXAMETHASONE SODIUM PHOSPHATE 20 MG/5ML SOLUTION: Performed by: NURSE PRACTITIONER

## 2023-05-25 PROCEDURE — 85730 THROMBOPLASTIN TIME PARTIAL: CPT | Performed by: PHYSICIAN ASSISTANT

## 2023-05-25 PROCEDURE — 93005 ELECTROCARDIOGRAM TRACING: CPT | Performed by: PHYSICIAN ASSISTANT

## 2023-05-25 PROCEDURE — 25010000002 LEVETRIRACETAM PER 10 MG: Performed by: NURSE PRACTITIONER

## 2023-05-25 PROCEDURE — 63710000001 INSULIN REGULAR HUMAN PER 5 UNITS: Performed by: INTERNAL MEDICINE

## 2023-05-25 PROCEDURE — 82948 REAGENT STRIP/BLOOD GLUCOSE: CPT

## 2023-05-25 PROCEDURE — 99285 EMERGENCY DEPT VISIT HI MDM: CPT

## 2023-05-25 PROCEDURE — 71275 CT ANGIOGRAPHY CHEST: CPT

## 2023-05-25 PROCEDURE — 0 GADOBENATE DIMEGLUMINE 529 MG/ML SOLUTION: Performed by: INTERNAL MEDICINE

## 2023-05-25 PROCEDURE — 83880 ASSAY OF NATRIURETIC PEPTIDE: CPT | Performed by: PHYSICIAN ASSISTANT

## 2023-05-25 PROCEDURE — 36415 COLL VENOUS BLD VENIPUNCTURE: CPT

## 2023-05-25 PROCEDURE — 25510000001 IOPAMIDOL PER 1 ML: Performed by: EMERGENCY MEDICINE

## 2023-05-25 PROCEDURE — 70553 MRI BRAIN STEM W/O & W/DYE: CPT

## 2023-05-25 RX ORDER — NICOTINE POLACRILEX 4 MG
15 LOZENGE BUCCAL
Status: DISCONTINUED | OUTPATIENT
Start: 2023-05-25 | End: 2023-05-30

## 2023-05-25 RX ORDER — IBUPROFEN 600 MG/1
1 TABLET ORAL
Status: DISCONTINUED | OUTPATIENT
Start: 2023-05-25 | End: 2023-05-30

## 2023-05-25 RX ORDER — DEXAMETHASONE SODIUM PHOSPHATE 4 MG/ML
4 INJECTION, SOLUTION INTRA-ARTICULAR; INTRALESIONAL; INTRAMUSCULAR; INTRAVENOUS; SOFT TISSUE ONCE
Status: COMPLETED | OUTPATIENT
Start: 2023-05-25 | End: 2023-05-25

## 2023-05-25 RX ORDER — HYDRALAZINE HYDROCHLORIDE 20 MG/ML
10 INJECTION INTRAMUSCULAR; INTRAVENOUS EVERY 4 HOURS PRN
Status: DISCONTINUED | OUTPATIENT
Start: 2023-05-25 | End: 2023-06-02

## 2023-05-25 RX ORDER — SODIUM CHLORIDE 0.9 % (FLUSH) 0.9 %
10 SYRINGE (ML) INJECTION AS NEEDED
Status: DISCONTINUED | OUTPATIENT
Start: 2023-05-25 | End: 2023-05-30

## 2023-05-25 RX ORDER — DEXAMETHASONE SODIUM PHOSPHATE 4 MG/ML
4 INJECTION, SOLUTION INTRA-ARTICULAR; INTRALESIONAL; INTRAMUSCULAR; INTRAVENOUS; SOFT TISSUE EVERY 6 HOURS
Status: DISCONTINUED | OUTPATIENT
Start: 2023-05-26 | End: 2023-05-31

## 2023-05-25 RX ORDER — DEXTROSE MONOHYDRATE 25 G/50ML
25 INJECTION, SOLUTION INTRAVENOUS
Status: DISCONTINUED | OUTPATIENT
Start: 2023-05-25 | End: 2023-05-30

## 2023-05-25 RX ORDER — LEVETIRACETAM 500 MG/5ML
500 INJECTION, SOLUTION, CONCENTRATE INTRAVENOUS EVERY 12 HOURS SCHEDULED
Status: DISCONTINUED | OUTPATIENT
Start: 2023-05-25 | End: 2023-05-31

## 2023-05-25 RX ADMIN — IOPAMIDOL 95 ML: 755 INJECTION, SOLUTION INTRAVENOUS at 15:13

## 2023-05-25 RX ADMIN — INSULIN HUMAN 4 UNITS: 100 INJECTION, SOLUTION PARENTERAL at 23:51

## 2023-05-25 RX ADMIN — GADOBENATE DIMEGLUMINE 10 ML: 529 INJECTION, SOLUTION INTRAVENOUS at 21:57

## 2023-05-25 RX ADMIN — DEXAMETHASONE SODIUM PHOSPHATE 4 MG: 4 INJECTION, SOLUTION INTRAMUSCULAR; INTRAVENOUS at 16:56

## 2023-05-25 RX ADMIN — LEVETIRACETAM 500 MG: 500 INJECTION, SOLUTION, CONCENTRATE INTRAVENOUS at 18:35

## 2023-05-25 RX ADMIN — NICARDIPINE HYDROCHLORIDE 5 MG/HR: 25 INJECTION, SOLUTION INTRAVENOUS at 18:41

## 2023-05-25 RX ADMIN — DEXAMETHASONE SODIUM PHOSPHATE 4 MG: 4 INJECTION, SOLUTION INTRAMUSCULAR; INTRAVENOUS at 23:50

## 2023-05-25 NOTE — TELEPHONE ENCOUNTER
"----- Message from Jannet Chauhan RN sent at 5/23/2023  3:53 PM EDT -----  Hi Mariah. I didn't hear back from Dr. Lagunas. Would you mind to touch base with him about this and get back with the patient please?  Thanks!  ----- Message -----  From: Jannet Chauhan RN  Sent: 5/22/2023  11:55 AM EDT  To: Cruzito Lagunas MD    Good morning. This patient just called. She says she is feeling \"off\" over the past few weeks. She complains of sinus headaches and head \"sloshing\". 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 She kept saying she is just not herself. She has been misplacing things and has had trouble concentrating. She also complains of \"extreme leg weakness\". She has been going to PT/OT.     The patient stated she discussed this with the APRN on 5/15 although wanted me to pass this information on to you because she thinks this may be something other than sinus related. She is scheduled to see you again 6/12.       [id number] Called patient who stated that she has weakness on her legs that has worsens since the last time she saw Anay JIMENEZ. Patient stated that her sinus infection is better. Patient made aware that Dr Lagunas will be back in the office and will discuss with him in the morning. Patient amenable and made aware to proceed to ER for urgent/life threatening symptoms.     [id number] Discussed with Dr Lagunas. Received new order for MRI brain with and without IV contrast. Patient updated. Patient stating that he has a pulsating sensation on her whole head, feels like pressure is building and had a cough a small amount of blood this morning. Dr Lagunas currently in a room at this time. Discussed with Anay JIMENEZ who recommends patient to proceed ER if cannot wait for the MRI scheduled.  "

## 2023-05-25 NOTE — CONSULTS
NEUROSURGERY CONSULT      Pam Vidal  1945  [id number]    Referring Provider: No referring provider defined for this encounter.  Reason for Consultation/Chief Complaint: Right occipital lobe hemorrhagic metastatic lesion.    Patient Care Team:  Nik Mckay MD as PCP - General  Nik Mckay MD as PCP - Family Medicine  Remedios Rice MD as Surgeon (Thoracic Surgery)  Hector Rodriguez MD as Consulting Physician (Radiation Oncology)  Jannet Chauhan RN as Nurse Navigator  Remedios Rice MD as Referring Physician (Thoracic Surgery)  Cruzito Lagunas MD as Consulting Physician (Hematology and Oncology)    Subjective .     History of Present Illness:    Duration: 3 days  Severity: Mild to moderate  Timing: Acute  Associated Symptoms: Generalized weakness and nonspecific feelings of strangeness.  Narrative: Pam describes several days of mildly worsening visual field issues.  She does have a history of macular degeneration but has noted that she has had increasing difficulty with her vision.  She also feels generalized unease and strangeness with some lethargy and malaise.  She talked to her oncology team and was advised to come to the ED due to a previous history of lung adenocarcinoma that is been treated with chemotherapy and radiation.  She was recently restarted on Imfinzi immunotherapy.    While in the room and during my examination of the patient I wore a mask and eye protection.  I washed my hands before and after this patient encounter.  The patient was also wearing a mask.    Review of Systems: All 14 systems are reviewed and are negative except for what is documented above in the HPI  Review of Systems    History: I have reviewed and agree with the following documented PMH, PSH, FH and there no additions or changes.  Past Medical History:   Diagnosis Date   • COPD (chronic obstructive pulmonary disease)    • Coughing up blood     X2 MONTHS   • History of COVID-19 02/2022   •  Hyperlipidemia    • IBS (irritable bowel syndrome)    • Primary lung adenocarcinoma, right    • Rheumatoid arthritis     and   Past Surgical History:   Procedure Laterality Date   • APPENDECTOMY      appendix removed   • BRONCHOSCOPY N/A 8/23/2022    Procedure: BRONCHOSCOPY WITH BAL,  BIOPSIES, AND BRUSHINGS WITH ENDOBRONCHIAL ULTRASOUND WITH FNA;  Surgeon: Gregor Vee MD;  Location: [facility] ENDOSCOPY;  Service: Pulmonary;  Laterality: N/A;  PRE- HILAR MASS  POST- SAME   • BRONCHOSCOPY N/A 1/4/2023    Procedure: BRONCHOSCOPY with biopsy, lavage, brushing;  Surgeon: Gregor Vee MD;  Location: [facility] ENDOSCOPY;  Service: Pulmonary;  Laterality: N/A;   • CAROTID ENDARTERECTOMY Right    • CAROTID ENDARTERECTOMY Left    • CATARACT EXTRACTION     • CERVICAL FUSION     • CHOLECYSTECTOMY     • HYSTERECTOMY     • SHOULDER ARTHROSCOPY Right 02/19/2019    right should scope/cuff repair    • VENOUS ACCESS DEVICE (PORT) INSERTION Right 9/6/2022    Procedure: POWERPORT INSERTION;  Surgeon: Remedios Rice MD;  Location: [facility] MAIN OR;  Service: Thoracic;  Laterality: Right;    and The patient has a family history of    Objective     Physical Exam:  CON:  Appears stated age. No acute distress.  HEENT:  Atraumatic and normocephalic.  PULM:  Breathing nonlabored on room air  CARDIO:  RRR, Pulses 2+  MSK:  Limbs intact. Musculoskeletal pain is absent  PSYCH:  Appears within normal limits.  SKIN:  No noticeable skin lesions or abrasions  VITALS:  Temp:  [97.8 °F (36.6 °C)] 97.8 °F (36.6 °C)  Heart Rate:  [69-80] 69  Resp:  [16-18] 18  BP: (151)/(59) 151/59, Body mass index is 23.82 kg/m².  NEURO: Neurologic Exam     Mental Status   Oriented to person, place, and time.   Attention: normal. Concentration: normal.   Speech: speech is normal   Level of consciousness: alert    Cranial Nerves   Cranial nerves II through XII intact.   Left lower quadrant visual field deficit on confrontation     Motor Exam   Muscle bulk:  normal  Overall muscle tone: normal    Strength   Strength 5/5 except as noted.     Sensory Exam   Light touch normal.     Gait, Coordination, and Reflexes     Coordination   Romberg: negative    Reflexes   Reflexes 2+ except as noted.         Results Review: I reviewed the patient's new clinical results.    CT: CT of the head was reviewed and shows 3 x 4 x 3 cm ovoid heterogeneously hemorrhagic mass in the right occipital lobe with adjacent vasogenic edema causing some localized mass effect but not a significant amount of shift.    I personally reviewed the images from the following radiographic studies.    Lab Results (last 24 hours)     Procedure Component Value Units Date/Time    TSH [[id number]]  (Normal) Collected: 05/25/23 1346    Specimen: Blood Updated: 05/25/23 1426     TSH 2.150 uIU/mL     T4, Free [[id number]]  (Normal) Collected: 05/25/23 1346    Specimen: Blood Updated: 05/25/23 1426     Free T4 1.34 ng/dL     Narrative:      Results may be falsely increased if patient taking Biotin.      CBC & Differential [[id number]]  (Abnormal) Collected: 05/25/23 1346    Specimen: Blood Updated: 05/25/23 1358    Narrative:      The following orders were created for panel order CBC & Differential.  Procedure                               Abnormality         Status                     ---------                               -----------         ------                     CBC Auto Differential[[id number]]        Abnormal            Final result                 Please view results for these tests on the individual orders.    Comprehensive Metabolic Panel [[id number]]  (Abnormal) Collected: 05/25/23 1346    Specimen: Blood Updated: 05/25/23 1422     Glucose 102 mg/dL      BUN 16 mg/dL      Creatinine 1.08 mg/dL      Sodium 136 mmol/L      Potassium 3.6 mmol/L      Chloride 97 mmol/L      CO2 25.2 mmol/L      Calcium 10.0 mg/dL      Total Protein 7.4 g/dL      Albumin 4.2 g/dL      ALT (SGPT) 15 U/L      AST (SGOT) 20 U/L       Alkaline Phosphatase 76 U/L      Total Bilirubin 0.4 mg/dL      Globulin 3.2 gm/dL      A/G Ratio 1.3 g/dL      BUN/Creatinine Ratio 14.8     Anion Gap 13.8 mmol/L      eGFR 53.3 mL/min/1.73     Narrative:      GFR Normal >60  Chronic Kidney Disease <60  Kidney Failure <15    The GFR formula is only valid for adults with stable renal function between ages 18 and 70.    Protime-INR [[id number]]  (Normal) Collected: 05/25/23 1346    Specimen: Blood Updated: 05/25/23 1405     Protime 13.3 Seconds      INR 1.00    aPTT [[id number]]  (Normal) Collected: 05/25/23 1346    Specimen: Blood Updated: 05/25/23 1405     PTT 33.7 seconds     High Sensitivity Troponin T [[id number]]  (Abnormal) Collected: 05/25/23 1346    Specimen: Blood Updated: 05/25/23 1426     HS Troponin T 16 ng/L     Narrative:      High Sensitive Troponin T Reference Range:  <10.0 ng/L- Negative Female for AMI  <15.0 ng/L- Negative Male for AMI  >=10 - Abnormal Female indicating possible myocardial injury.  >=15 - Abnormal Male indicating possible myocardial injury.   Clinicians would have to utilize clinical acumen, EKG, Troponin, and serial changes to determine if it is an Acute Myocardial Infarction or myocardial injury due to an underlying chronic condition.         BNP [[id number]]  (Normal) Collected: 05/25/23 1346    Specimen: Blood Updated: 05/25/23 1426     proBNP 130.0 pg/mL     Narrative:      Among patients with dyspnea, NT-proBNP is highly sensitive for the detection of acute congestive heart failure. In addition NT-proBNP of <300 pg/ml effectively rules out acute congestive heart failure with 99% negative predictive value.    Results may be falsely decreased if patient taking Biotin.      CBC Auto Differential [[id number]]  (Abnormal) Collected: 05/25/23 1346    Specimen: Blood Updated: 05/25/23 1358     WBC 11.34 10*3/mm3      RBC 5.03 10*6/mm3      Hemoglobin 14.3 g/dL      Hematocrit 42.0 %      MCV 83.5 fL      MCH 28.4 pg      MCHC 34.0 g/dL       RDW 14.6 %      RDW-SD 44.3 fl      MPV 10.5 fL      Platelets 359 10*3/mm3      Neutrophil % 79.6 %      Lymphocyte % 10.3 %      Monocyte % 7.9 %      Eosinophil % 1.4 %      Basophil % 0.3 %      Immature Grans % 0.5 %      Neutrophils, Absolute 9.02 10*3/mm3      Lymphocytes, Absolute 1.17 10*3/mm3      Monocytes, Absolute 0.90 10*3/mm3      Eosinophils, Absolute 0.16 10*3/mm3      Basophils, Absolute 0.03 10*3/mm3      Immature Grans, Absolute 0.06 10*3/mm3      nRBC 0.0 /100 WBC           Assessment & Plan :     Assessment: Pam Vidal is a 78 y.o. female who presents with hemorrhagic metastatic lesion in the right occipital lobe  Additional workup: MRI of the brain with and without contrast tonight with follow-up CT head tomorrow morning  Surgery planned: We will discuss with her oncologist.  If he feels that there is a need to obtain tissue diagnosis, surgery could be performed for resection of the hemorrhagic lesion relatively safely.  However, if there is no need for tissue diagnosis, she is relatively asymptomatic and we could plan to follow her with serial MRIs.  Additional plan: We will discuss with oncology about the possibility of Imfinzi placing her at higher risk for hemorrhagic transformation of CNS lesions.  I recommend that she be admitted to the ICU and we will repeat the head CT tomorrow.  Goal systolic blood pressure less than 140.  Okay for diet now and n.p.o. at midnight.      * No active hospital problems. *      I discussed the patient's findings and my recommendations with patient and nursing staff    Clint Ricketts MD  05/25/23  16:43 EDT

## 2023-05-25 NOTE — H&P
History and Physical    Patient Name: Pam Vidal  Age/Sex: 78 y.o. female  : 1945  MRN: [id number]    Date of Admission: 2023  Date of Encounter Visit: 23  Encounter Provider: Disha Ramirez MD  [facility] of [facility]   Patient Care Team:  Nik Mckay MD as PCP - General  Nik Mckay MD as PCP - Family Medicine  Remedios Rice MD as Surgeon (Thoracic Surgery)  Hector Rodriguez MD as Consulting Physician (Radiation Oncology)  Jannet Chauhan RN as Nurse Navigator  Remedios Rice MD as Referring Physician (Thoracic Surgery)  Cruzito Lagunas MD as Consulting Physician (Hematology and Oncology)      Subjective:     Chief Complaint: Intracranial hemorrhage, metastatic brain lesion    History of Present Illness:  Pam Vidal is a 78 y.o. female with known history of adenocarcinoma of the lung on chemotherapy followed by oncology, she usually follows with Dr. Glass in our office who did the diagnostic sampling of the lung tissues.  She presented with hemoptysis at the time.  She has underlying COPD, and she does not have any exacerbation, no cough or wheezing.  The patient noted this morning that she was having some balance issues with some weakness in her legs, no specific focal deficit.  No blurred vision or double vision no altered mental status no seizure activity.  She came into the ER and her head imaging was abnormal with what looks like metastatic lesion to the brain, neuroradiology noted some hemorrhagic transformation in the middle of it and the patient was discussed with the neurosurgery he wanted to admit to the ICU for close observation  No coagulopathy to reverse  Patient is awake and alert and able to answer question  Patient follows with Dr. Lagunas from [facility] group for her cancer treatment    Pulmonary Functions Testing Results:    No results found for: FEV1, FVC, EUD2ATD, TLC, DLCO    Review of Systems:   Review of Systems   HENT: Negative.    Eyes:  Negative.    Respiratory: Negative.    Cardiovascular: Negative.    Gastrointestinal: Negative.    Endocrine: Negative.    Genitourinary: Negative.    Musculoskeletal: Negative.    Skin: Negative.    Allergic/Immunologic: Negative.    Neurological: Positive for weakness. Negative for dizziness, tremors, seizures, syncope, facial asymmetry, speech difficulty, light-headedness, numbness and headaches.        Balance issues/ataxia   Hematological: Negative.    Psychiatric/Behavioral: Negative.        Past Medical History:  Past Medical History:   Diagnosis Date   • COPD (chronic obstructive pulmonary disease)    • Coughing up blood     X2 MONTHS   • History of COVID-19 02/2022   • Hyperlipidemia    • IBS (irritable bowel syndrome)    • Primary lung adenocarcinoma, right    • Rheumatoid arthritis        Past Surgical History:   Procedure Laterality Date   • APPENDECTOMY      appendix removed   • BRONCHOSCOPY N/A 8/23/2022    Procedure: BRONCHOSCOPY WITH BAL,  BIOPSIES, AND BRUSHINGS WITH ENDOBRONCHIAL ULTRASOUND WITH FNA;  Surgeon: Gregor Vee MD;  Location: [facility] ENDOSCOPY;  Service: Pulmonary;  Laterality: N/A;  PRE- HILAR MASS  POST- SAME   • BRONCHOSCOPY N/A 1/4/2023    Procedure: BRONCHOSCOPY with biopsy, lavage, brushing;  Surgeon: Gregor Vee MD;  Location: [facility] ENDOSCOPY;  Service: Pulmonary;  Laterality: N/A;   • CAROTID ENDARTERECTOMY Right    • CAROTID ENDARTERECTOMY Left    • CATARACT EXTRACTION     • CERVICAL FUSION     • CHOLECYSTECTOMY     • HYSTERECTOMY     • SHOULDER ARTHROSCOPY Right 02/19/2019    right should scope/cuff repair    • VENOUS ACCESS DEVICE (PORT) INSERTION Right 9/6/2022    Procedure: POWERPORT INSERTION;  Surgeon: Remedios Rice MD;  Location: [facility] OR;  Service: Thoracic;  Laterality: Right;       Home Medications:   (Not in a hospital admission)      Inpatient Medications:  Scheduled Meds:[START ON 5/26/2023] dexamethasone, 4 mg, Intravenous, Q6H  levETIRAcetam,  500 mg, Intravenous, Q12H      Continuous Infusions:niCARdipine, 5-15 mg/hr      PRN Meds:.•  hydrALAZINE  •  Insert Peripheral IV **AND** sodium chloride    Allergies:  Allergies   Allergen Reactions   • Del-Mycin [Erythromycin] Hives   • Gentamicin Hives   • Ibuprofen Nausea And Vomiting   • Latex Dermatitis     GLOVES   • Metronidazole Hives   • Ofloxacin Hives and Nausea Only   • Penicillins Hives   • Tetracycline Hives   • Adhesive Tape Dermatitis     BANDAIDS   • Aspirin GI Intolerance     Vomiting can take EC   • Codeine Nausea And Vomiting       Past Social History:  Social History     Socioeconomic History   • Marital status:    Tobacco Use   • Smoking status: Former     Packs/day: 0.25     Types: Cigarettes     Quit date: 2022     Years since quittin.0   • Smokeless tobacco: Never   • Tobacco comments:     Former some day smoker of 1-2 cigs   Vaping Use   • Vaping Use: Never used   Substance and Sexual Activity   • Alcohol use: Yes     Alcohol/week: 1.0 standard drink     Types: 1 Glasses of wine per week     Comment: occasionally   • Drug use: Never   • Sexual activity: Defer       Past Family History:  Family History   Problem Relation Age of Onset   • Cancer Mother    • Cancer Father    • Malig Hyperthermia Neg Hx            Objective:   Temp:  [97.8 °F (36.6 °C)] 97.8 °F (36.6 °C)  Heart Rate:  [64-80] 64  Resp:  [16-18] 18  BP: (151-162)/(59-75) 162/75   SpO2:  [93 %-95 %] 93 %  on    Device (Oxygen Therapy): room air  No intake or output data in the 24 hours ending 23 1759  Body mass index is 23.82 kg/m².      23  1325   Weight: 53.5 kg (118 lb)     Weight change:     Physical Exam:   Physical Exam   General:    No acute distress, alert and oriented x4, pleasant                   Head:    Normocephalic, atraumatic.   Eyes:          Conjunctivae and sclerae normal, no icterus, PERRLA   Throat:   No oral lesions, no thrush, oral mucosa moist.    Neck:   Supple, trachea  midline.   Lungs:     Normal chest on inspection, CTAB, no wheezes. No rhonchi. No crackles. Respirations regular, even and unlabored.     Heart:    Regular rhythm and normal rate.  No murmurs, gallops, or rubs noted.   Abdomen:     Soft, non-tender, non-distended, positive bowel sounds.    Extremities:   No clubbing, cyanosis, or edema.     Pulses:   Pulses palpable and equal bilaterally.    Skin:   No bleeding or rash.   Neuro:   Non-focal.  Moves all extremities well.    Psychiatric:   Normal mood and affect.     Lab Review:   Results from last 7 days   Lab Units 05/25/23  1346   SODIUM mmol/L 136   POTASSIUM mmol/L 3.6   CHLORIDE mmol/L 97*   CO2 mmol/L 25.2   BUN mg/dL 16   CREATININE mg/dL 1.08*   GLUCOSE mg/dL 102*   CALCIUM mg/dL 10.0   AST (SGOT) U/L 20   ALT (SGPT) U/L 15   ALBUMIN g/dL 4.2     Results from last 7 days   Lab Units 05/25/23  1346   HSTROP T ng/L 16*     Results from last 7 days   Lab Units 05/25/23  1346   WBC 10*3/mm3 11.34*   HEMOGLOBIN g/dL 14.3   HEMATOCRIT % 42.0   PLATELETS 10*3/mm3 359   MCV fL 83.5   MCH pg 28.4   MCHC g/dL 34.0   RDW % 14.6   RDW-SD fl 44.3   MPV fL 10.5   NEUTROPHIL % % 79.6*   LYMPHOCYTE % % 10.3*   MONOCYTES % % 7.9   EOSINOPHIL % % 1.4   BASOPHIL % % 0.3   IMM GRAN % % 0.5   NEUTROS ABS 10*3/mm3 9.02*   LYMPHS ABS 10*3/mm3 1.17   MONOS ABS 10*3/mm3 0.90   EOS ABS 10*3/mm3 0.16   BASOS ABS 10*3/mm3 0.03   IMMATURE GRANS (ABS) 10*3/mm3 0.06*   NRBC /100 WBC 0.0     Results from last 7 days   Lab Units 05/25/23  1346   INR  1.00   APTT seconds 33.7               Invalid input(s): LDLCALC  Results from last 7 days   Lab Units 05/25/23  1346   PROBNP pg/mL 130.0     Results from last 7 days   Lab Units 05/25/23  1346   TSH uIU/mL 2.150                                       Imaging:  Imaging Results (Most Recent)     Procedure Component Value Units Date/Time    CT Head Without Contrast [[id number]] Collected: 05/25/23 1534     Updated: 05/25/23 1647    Narrative:       CT HEAD WITHOUT CONTRAST     HISTORY: Lung cancer, disoriented.      COMPARISON: MRI brain 08/15/2022.     FINDINGS: There is a heterogeneous and hemorrhagic metastatic lesion  involving the right occipital lobe posteriorly. It measures  approximately 4 cm in maximum transverse dimension and approximately 2.7  cm in the short axis. There is extensive vasogenic edema involving the  right occipital white matter which also tracks superiorly to the right  parietal lobe, posterior aspect of the right frontal lobe and anteriorly  throughout the majority of the right temporal lobe. The mass measures  approximately 3.4 cm in craniocaudal dimension. There is approximately 4  mm of midline shift to the left. There is no evidence of acute  infarction or of transtentorial herniation.       Impression:      There is a 2.7 x 4.0 x 3.4 cm heterogeneous and partially  hemorrhagic mass involving the right occipital lobe most consistent with  a hemorrhagic metastatic lesion with extensive adjacent vasogenic edema.  There is 4 mm of midline shift to the left. Further evaluation with MRI  examination of the brain with and without contrast is recommended.     The above information was called to and discussed with BARBARA Polk, prior to the dictation.           Radiation dose reduction techniques were utilized, including automated  exposure control and exposure modulation based on body size.     This report was finalized on 5/25/2023 4:44 PM by Dr. Nik Sharma M.D.       CT Angiogram Chest Pulmonary Embolism [[id number]] Collected: 05/25/23 1548     Updated: 05/25/23 1635    Narrative:      CT ANGIOGRAM OF THE CHEST WITH CONTRAST INCLUDING RECONSTRUCTION IMAGES  05/25/2023     HISTORY: Lung cancer.     Following the intravenous contrast injection CT angiography was  performed through the chest. Sagittal, coronal and 3-D reconstruction  images were reviewed.     The pulmonary arterial system is well opacified with no evidence  of  pulmonary embolus. There is some aortic and coronary calcification.     There is an approximately 3.9 cm x 2.9 cm right paramediastinal mass in  the right suprahilar region. This appears similar to the appearance on  the 03/01/2023 study. There is some scattered mild groundglass  opacification in the right upper lobe including a somewhat more focal  groundglass nodule in the right upper lobe on image 39 measuring  approximately 7 mm in size. This is also seen on the previous study of  03/01/2023.     Left lung appears relatively clear.     Gallbladder has been removed. One or 2 small splenules are seen in the  left upper quadrant.     At least 2 low-density right renal lesions are seen better seen on the  CT scan of 03/01/2023 and consistent with renal cysts.       Impression:      1. No evidence of pulmonary embolus.  2. 3.9 cm x 2.9 cm soft tissue mass in the medial aspect of the right  lung/right paramediastinal region also seen on the 03/01/2023 study.  3. Scattered groundglass opacities including small groundglass nodule in  the right lung.     Radiation dose reduction techniques were utilized, including automated  exposure control and exposure modulation based on body size.     This report was finalized on 5/25/2023 4:32 PM by Dr. Karan Rodriguez M.D.             I personally viewed and interpreted the patient's imaging studies.    Assessment:     Lung cancer with brain mets  Vasogenic edema with hemorrhagic center for further evaluation by neurosurgery  COPD  Rheumatoid arthritis  Hypertensive emergency    Plan:     Patient is having minimal deficit, she only reported some balance issues while walking but she has normal neurological exam.  The CT of the head was questionable but the reading by the neuroradiologist was definite for some hemorrhagic centers and the patient needs to be admitted to the ICU for further care  No coagulopathy to reverse  We will do frequent neurochecks and we will repeat the  CT of the head in case of any acute changes meanwhile the patient will proceed with MRI of the head as ordered by neurosurgery  Neurosurgery team already consulted and already on board  The patient is hypertensive with brain bleed and will try to control the blood pressure keeping systolic less than 140 at all time  Patient will be on Keppra and steroid for the vasogenic edema and for seizure prophylaxis  We will continue to monitor in the ICU until safe for transfer out by neurosurgery        Disha Ramirez MD  [facility]   05/25/23  17:59 EDT    Dictated utilizing Dragon dictation

## 2023-05-25 NOTE — ED NOTES
.Nursing report ED to floor  Pam Vidal  78 y.o.  female    HPI :   Chief Complaint   Patient presents with    Weakness - Generalized       Admitting doctor:   Disha Ramirez MD    Admitting diagnosis:   The primary encounter diagnosis was Lung cancer metastatic to brain. Diagnoses of Vasogenic brain edema, Hemoptysis, and Unsteady gait were also pertinent to this visit.    Code status:   Current Code Status       Date Active Code Status Order ID Comments User Context       5/25/2023 1758 CPR (Attempt to Resuscitate) [id number]  Disha Ramirez MD ED        Question Answer    Code Status (Patient has no pulse and is not breathing) CPR (Attempt to Resuscitate)    Medical Interventions (Patient has pulse or is breathing) Full Support    Release to patient Routine Release                    Allergies:   Del-mycin [erythromycin], Gentamicin, Ibuprofen, Latex, Metronidazole, Ofloxacin, Penicillins, Tetracycline, Adhesive tape, Aspirin, and Codeine    Isolation:   No active isolations    Intake and Output  No intake or output data in the 24 hours ending 05/25/23 1957    Weight:       05/25/23  1325   Weight: 53.5 kg (118 lb)       Most recent vitals:   Vitals:    05/25/23 1831 05/25/23 1901 05/25/23 1911 05/25/23 1914   BP: 180/80 139/87 135/48 122/49   BP Location:       Patient Position:       Pulse: 72 74 78 75   Resp:       Temp:       TempSrc:       SpO2:  90% 92% 93%   Weight:       Height:           Active LDAs/IV Access:   Lines, Drains & Airways       Active LDAs       Name Placement date Placement time Site Days    Peripheral IV 05/25/23 1327 Right Antecubital 05/25/23  1327  Antecubital  less than 1    Peripheral IV 05/25/23 1723 Anterior;Left Wrist 05/25/23  1723  Wrist  less than 1    Single Lumen Implantable Port 09/06/22 Right Chest 09/06/22  1404  Chest  261                    Labs (abnormal labs have a star):   Labs Reviewed   COMPREHENSIVE METABOLIC PANEL - Abnormal; Notable for the following  components:       Result Value    Glucose 102 (*)     Creatinine 1.08 (*)     Chloride 97 (*)     eGFR 53.3 (*)     All other components within normal limits    Narrative:     GFR Normal >60  Chronic Kidney Disease <60  Kidney Failure <15    The GFR formula is only valid for adults with stable renal function between ages 18 and 70.   TROPONIN - Abnormal; Notable for the following components:    HS Troponin T 16 (*)     All other components within normal limits    Narrative:     High Sensitive Troponin T Reference Range:  <10.0 ng/L- Negative Female for AMI  <15.0 ng/L- Negative Male for AMI  >=10 - Abnormal Female indicating possible myocardial injury.  >=15 - Abnormal Male indicating possible myocardial injury.   Clinicians would have to utilize clinical acumen, EKG, Troponin, and serial changes to determine if it is an Acute Myocardial Infarction or myocardial injury due to an underlying chronic condition.        CBC WITH AUTO DIFFERENTIAL - Abnormal; Notable for the following components:    WBC 11.34 (*)     Neutrophil % 79.6 (*)     Lymphocyte % 10.3 (*)     Neutrophils, Absolute 9.02 (*)     Immature Grans, Absolute 0.06 (*)     All other components within normal limits   HIGH SENSITIVITIY TROPONIN T 2HR - Abnormal; Notable for the following components:    HS Troponin T 15 (*)     All other components within normal limits    Narrative:     High Sensitive Troponin T Reference Range:  <10.0 ng/L- Negative Female for AMI  <15.0 ng/L- Negative Male for AMI  >=10 - Abnormal Female indicating possible myocardial injury.  >=15 - Abnormal Male indicating possible myocardial injury.   Clinicians would have to utilize clinical acumen, EKG, Troponin, and serial changes to determine if it is an Acute Myocardial Infarction or myocardial injury due to an underlying chronic condition.        TSH - Normal   T4, FREE - Normal    Narrative:     Results may be falsely increased if patient taking Biotin.     PROTIME-INR - Normal    APTT - Normal   BNP (IN-HOUSE) - Normal    Narrative:     Among patients with dyspnea, NT-proBNP is highly sensitive for the detection of acute congestive heart failure. In addition NT-proBNP of <300 pg/ml effectively rules out acute congestive heart failure with 99% negative predictive value.    Results may be falsely decreased if patient taking Biotin.     POCT GLUCOSE FINGERSTICK   POCT GLUCOSE FINGERSTICK   POCT GLUCOSE FINGERSTICK   POCT GLUCOSE FINGERSTICK   CBC AND DIFFERENTIAL    Narrative:     The following orders were created for panel order CBC & Differential.  Procedure                               Abnormality         Status                     ---------                               -----------         ------                     CBC Auto Differential[[id number]]        Abnormal            Final result                 Please view results for these tests on the individual orders.       EKG:   ECG 12 Lead Other; SOA, fatigue   Preliminary Result   HEART RATE= 66  bpm   RR Interval= 909  ms   [place] Interval= 155  ms   P Horizontal Axis= -17  deg   P Front Axis= 85  deg   QRSD Interval= 87  ms   QT Interval= 391  ms   QRS Axis= 67  deg   T Wave Axis= 57  deg   - ABNORMAL ECG -   Sinus rhythm   ST elevation, consider anterior injury   Electronically Signed By:    Date and Time of Study: 2023-05-25 14:22:30          Meds given in ED:   Medications   sodium chloride 0.9 % flush 10 mL (has no administration in time range)   dexamethasone sodium phosphate injection 4 mg (has no administration in time range)   levETIRAcetam (KEPPRA) injection 500 mg (500 mg Intravenous Given 5/25/23 1835)   niCARdipine (CARDENE) 25 mg in 250 mL NS infusion kit (5 mg/hr Intravenous New Bag 5/25/23 1841)   hydrALAZINE (APRESOLINE) injection 10 mg (has no administration in time range)   dextrose (GLUTOSE) oral gel 15 g (has no administration in time range)   dextrose (D50W) (25 g/50 mL) IV injection 25 g (has no administration in time  range)   glucagon (GLUCAGEN) injection 1 mg (has no administration in time range)   insulin regular (humuLIN R,novoLIN R) injection 2-9 Units (has no administration in time range)   iopamidol (ISOVUE-370) 76 % injection 100 mL (95 mL Intravenous Given by Other 23 1513)   dexamethasone sodium phosphate injection 4 mg (4 mg Intravenous Given 23 1656)       Imaging results:  CT Head Without Contrast    Result Date: 2023  There is a 2.7 x 4.0 x 3.4 cm heterogeneous and partially hemorrhagic mass involving the right occipital lobe most consistent with a hemorrhagic metastatic lesion with extensive adjacent vasogenic edema. There is 4 mm of midline shift to the left. Further evaluation with MRI examination of the brain with and without contrast is recommended.  The above information was called to and discussed with BARBARA Polk, prior to the dictation.    Radiation dose reduction techniques were utilized, including automated exposure control and exposure modulation based on body size.  This report was finalized on 2023 4:44 PM by Dr. Nik Sharma M.D.      CT Angiogram Chest Pulmonary Embolism    Result Date: 2023  1. No evidence of pulmonary embolus. 2. 3.9 cm x 2.9 cm soft tissue mass in the medial aspect of the right lung/right paramediastinal region also seen on the 2023 study. 3. Scattered groundglass opacities including small groundglass nodule in the right lung.  Radiation dose reduction techniques were utilized, including automated exposure control and exposure modulation based on body size.  This report was finalized on 2023 4:32 PM by Dr. Karan Rodriguez M.D.       Ambulatory status:   - up with assistance    Social issues:   Social History     Socioeconomic History    Marital status:    Tobacco Use    Smoking status: Former     Packs/day: 0.25     Types: Cigarettes     Quit date: 2022     Years since quittin.0    Smokeless tobacco: Never    Tobacco  comments:     Former some day smoker of 1-2 cigs   Vaping Use    Vaping Use: Never used   Substance and Sexual Activity    Alcohol use: Yes     Alcohol/week: 1.0 standard drink     Types: 1 Glasses of wine per week     Comment: occasionally    Drug use: Never    Sexual activity: Defer       NIH Stroke Scale:  Interval: baseline      Elvira Pickard RN  05/25/23 19:57 EDT

## 2023-05-25 NOTE — ED PROVIDER NOTES
EMERGENCY DEPARTMENT ENCOUNTER    Room Number:  03/03  Date of encounter:  5/25/2023  PCP: Nik Mckay MD  Historian: Patient  Full history not obtainable due to: None    HPI:  Chief Complaint: Confusion    Context: Pam Vidal is a 78 y.o. female with a PMH significant for lung adenocarcinoma, muscular deconditioning who presents to the ED c/o multiple complaints.  Her biggest concern is for gait instability and intermittent disorientation over the past few days.  Her symptoms have been mild but certainly noticeable and worsening over the past week or so.  She initially thought that her symptoms were attributed to a new medication, Ditropan.  After the discontinuing the medication and speaking with her oncologist she was encouraged to come to the ED for further evaluation.  Her symptoms have persisted despite discontinuing medication and now she has developed a mild headache and has developed 1 episode of hemoptysis, low volume.  She denies increase shortness of breath, syncope, fever, chills.  No sick contacts at home.      MEDICAL RECORD REVIEW:    Upon review of the medical record it appears the patient was evaluated in the office with oncology on May 15, 2023 for lung adenocarcinoma.  The patient had a normal ferritin on 4/17/2023 and a normal TSH on 5/15/2023.    PAST MEDICAL HISTORY    Active Ambulatory Problems     Diagnosis Date Noted   • Primary lung adenocarcinoma, right 08/30/2022   • Cough with hemoptysis 08/30/2022   • Lung mass 08/31/2022   • Advanced care planning/counseling discussion 10/03/2022   • High risk medication use 10/31/2022   • Muscular deconditioning 04/03/2023   • Neoplastic malignant related fatigue 04/03/2023     Resolved Ambulatory Problems     Diagnosis Date Noted   • No Resolved Ambulatory Problems     Past Medical History:   Diagnosis Date   • COPD (chronic obstructive pulmonary disease)    • Coughing up blood    • History of COVID-19 02/2022   • Hyperlipidemia    • IBS  (irritable bowel syndrome)    • Rheumatoid arthritis          PAST SURGICAL HISTORY  Past Surgical History:   Procedure Laterality Date   • APPENDECTOMY      appendix removed   • BRONCHOSCOPY N/A 2022    Procedure: BRONCHOSCOPY WITH BAL,  BIOPSIES, AND BRUSHINGS WITH ENDOBRONCHIAL ULTRASOUND WITH FNA;  Surgeon: Gregor Vee MD;  Location: [facility] ENDOSCOPY;  Service: Pulmonary;  Laterality: N/A;  PRE- HILAR MASS  POST- SAME   • BRONCHOSCOPY N/A 2023    Procedure: BRONCHOSCOPY with biopsy, lavage, brushing;  Surgeon: Gregor Vee MD;  Location: [facility] ENDOSCOPY;  Service: Pulmonary;  Laterality: N/A;   • CAROTID ENDARTERECTOMY Right    • CAROTID ENDARTERECTOMY Left    • CATARACT EXTRACTION     • CERVICAL FUSION     • CHOLECYSTECTOMY     • HYSTERECTOMY     • SHOULDER ARTHROSCOPY Right 2019    right should scope/cuff repair    • VENOUS ACCESS DEVICE (PORT) INSERTION Right 2022    Procedure: POWERPORT INSERTION;  Surgeon: Remedios Rice MD;  Location: [facility] MAIN OR;  Service: Thoracic;  Laterality: Right;         FAMILY HISTORY  Family History   Problem Relation Age of Onset   • Cancer Mother    • Cancer Father    • Malig Hyperthermia Neg Hx          SOCIAL HISTORY  Social History     Socioeconomic History   • Marital status:    Tobacco Use   • Smoking status: Former     Packs/day: 0.25     Types: Cigarettes     Quit date: 2022     Years since quittin.0   • Smokeless tobacco: Never   • Tobacco comments:     Former some day smoker of 1-2 cigs   Vaping Use   • Vaping Use: Never used   Substance and Sexual Activity   • Alcohol use: Yes     Alcohol/week: 1.0 standard drink     Types: 1 Glasses of wine per week     Comment: occasionally   • Drug use: Never   • Sexual activity: Defer         ALLERGIES  Del-mycin [erythromycin], Gentamicin, Ibuprofen, Latex, Metronidazole, Ofloxacin, Penicillins, Tetracycline, Adhesive tape, Aspirin, and Codeine        REVIEW OF SYSTEMS    All systems  reviewed and marked as negative except as listed in HPI     PHYSICAL EXAM    I have reviewed the triage vital signs and nursing notes.    ED Triage Vitals [05/25/23 1307]   Temp Heart Rate Resp BP SpO2   97.8 °F (36.6 °C) 80 16 -- 94 %      Temp src Heart Rate Source Patient Position BP Location FiO2 (%)   Tympanic Monitor -- -- --       Physical Exam  Constitutional:       General: She is not in acute distress.     Appearance: She is well-developed.   HENT:      Head: Normocephalic and atraumatic.   Eyes:      General: No scleral icterus.     Conjunctiva/sclera: Conjunctivae normal.   Neck:      Trachea: No tracheal deviation.   Cardiovascular:      Rate and Rhythm: Normal rate and regular rhythm.   Pulmonary:      Effort: Pulmonary effort is normal.      Breath sounds: Normal breath sounds.   Chest:       Abdominal:      Palpations: Abdomen is soft.      Tenderness: There is no abdominal tenderness.   Musculoskeletal:         General: No deformity.      Cervical back: Normal range of motion.   Lymphadenopathy:      Cervical: No cervical adenopathy.   Skin:     General: Skin is warm and dry.   Neurological:      Mental Status: She is alert and oriented to person, place, and time.      Sensory: Sensation is intact.      Motor: Motor function is intact.   Psychiatric:         Behavior: Behavior normal.         Vital signs and nursing notes reviewed.            LAB RESULTS  No results found for this or any previous visit (from the past 24 hour(s)).    Ordered the above labs and independently reviewed the results.        RADIOLOGY  No Radiology Exams Resulted Within Past 24 Hours    I ordered the above noted radiological studies. Independently reviewed by me and discussed with radiologist.  See dictation above for official radiology interpretation.      PROCEDURES    Procedures        MEDICATIONS GIVEN IN ER    Medications - No data to display      PROGRESS, DATA ANALYSIS, CONSULTS, AND MEDICAL DECISION MAKING    All  labs have been independently interpreted by me.  All radiology studies have been interpreted by me.  Discussion below represents my analysis of pertinent findings related to patient's condition, differential diagnosis, treatment plan and final disposition.    Patient presentation and evaluation consistent with brain mets from lung adenocarcinoma.  Plan for admission to the hospital with neurosurgical consult and probable operative intervention.  The patient is clinically stable and vital signs are normal at this time.  She is agreeable to this plan.    - Chronic or social conditions impacting care: None      DIFFERENTIAL DIAGNOSIS INCLUDE BUT NOT LIMITED TO:     Brain mass, intracranial hemorrhage, UTI      Orders placed during this visit:  No orders of the defined types were placed in this encounter.        ED Course as of 05/30/23 1010   Thu May 25, 2023   1513 I spoke with the radiologist at this time regarding the patient's CT of the head.  He reports positive CT head for hemorrhagic metastasis.  Recommend neurosurgical consult at this time. [DC]   1600 Pt care assumed from DINO Cartagena, pending neurosurgery consult, pulmonology consult, and disposition. [MP]   1615 Need to call pulmonology back after speaking to neurosurgery. [MP]   1619 Neurosurgery recommends admission to ICU.  We will start 4 mg of Decadron every 6 hours. [MP]   1640 Dr. Ricketts has evaluated patient at bedside.  Okay to have diet. [MP]   1745 Spoke with Dr. Ramirez with pulmonology.  He agrees to admit to the ICU [MP]      ED Course User Index  [DC] Rosendo Cartagena PA  [MP] Patricia Winter PA-C       AS OF 13:24 EDT VITALS:    BP -    HR - 80  TEMP - 97.8 °F (36.6 °C) (Tympanic)  02 SATS - 94%        DIAGNOSIS  Final diagnoses:   Lung cancer metastatic to brain   Vasogenic brain edema   Hemoptysis   Unsteady gait         DISPOSITION  Admit    Pt masked in first look. I wore a surgical mask throughout my encounters with the pt. I performed  hand hygiene on entry into the pt room and upon exit.     Dictated utilizing Dragon dictation     Note Disclaimer: At [facility], we believe that sharing information builds trust and better relationships. You are receiving this note because you recently visited [facility]. It is possible you will see health information before a provider has talked with you about it. This kind of information can be easy to misunderstand. To help you fully understand what it means for your health, we urge you to discuss this note with your provider.      Rosendo Cartagena PA  05/30/23 1012

## 2023-05-25 NOTE — ED PROVIDER NOTES
ED Course as of 05/25/23 2141   Thu May 25, 2023   1513 I spoke with the radiologist at this time regarding the patient's CT of the head.  He reports positive CT head for hemorrhagic metastasis.  Recommend neurosurgical consult at this time. [DC]   1600 Pt care assumed from DINO Cartagena, pending neurosurgery consult, pulmonology consult, and disposition. [MP]   1615 Need to call pulmonology back after speaking to neurosurgery. [MP]   1619 Neurosurgery recommends admission to ICU.  We will start 4 mg of Decadron every 6 hours. [MP]   1640 Dr. Ricketts has evaluated patient at bedside.  Okay to have diet. [MP]   1745 Spoke with Dr. Ramirez with pulmonology.  He agrees to admit to the ICU [MP]      ED Course User Index  [DC] Rosendo Cartagena PA  [MP] Patricia Winter PA-C Pleiss, Melanie M, PA-C  05/25/23 2141

## 2023-05-25 NOTE — ED PROVIDER NOTES
The TRACE[facility] and I have discussed this patient's history, physical exam and treatment plan.  I provided a substantive portion of the care of this patient.  I have reviewed the documentation and personally had a face to face interaction with the patient and personally performed the physical exam, in its entirety.  I affirm the documentation and agree with the treatment and plan.  The following describes my personal findings.      The patient presents complaining of lightheadedness, feeling off balance, generalized weakness, no unilateral weakness or numbness, no visual disturbance or speech disturbance symptoms gradually developing over the past 2 weeks.  Patient with long cancer, receiving treatment Dr. Lagunas.  Patient denies any trauma to her head.  Reports she is not on blood thinners    Comprehensive Physical exam:  Patient is nontoxic appearing oriented, conversant awake, alert  HEENT: normocephalic, atraumatic, pupils equal and reactive to light, extraocular motion intact  Neck: no goiter, no pain with ROM  Pulmonary: Nontachypneic, rhonchi right lung fields  cardiovascular: Nontachycardic  Abdomen: Soft, nontender  musculoskeletal: Good range of motion, pulse, sensation x4  Neuro/psychiatric:calm, appropriate, cooperative, GCS 15  Skin:warm, dry    Patient with brain mets with vasogenic edema, hemorrhagic conversion, midline shift, remarkably physical exam in relation to CT imaging, will admit for further testing, treatment as needed.  Neurosurgery consult.    Patient was wearing facemask when I entered the room and throughout our encounter. Full protective equipment was worn throughout this patient encounter including a face mask, eye protection and gloves. Hand hygiene was performed before donning protective equipment and after removal when leaving the room.           Shani Lynne MD  05/25/23 5114

## 2023-05-26 ENCOUNTER — APPOINTMENT (OUTPATIENT)
Dept: CT IMAGING | Facility: HOSPITAL | Age: 78
DRG: 025 | End: 2023-05-26
Payer: MEDICARE

## 2023-05-26 ENCOUNTER — APPOINTMENT (OUTPATIENT)
Dept: MRI IMAGING | Facility: HOSPITAL | Age: 78
DRG: 025 | End: 2023-05-26
Payer: MEDICARE

## 2023-05-26 LAB
ALBUMIN SERPL-MCNC: 3.8 G/DL (ref 3.5–5.2)
ALBUMIN/GLOB SERPL: 1.3 G/DL
ALP SERPL-CCNC: 66 U/L (ref 39–117)
ALT SERPL W P-5'-P-CCNC: 12 U/L (ref 1–33)
ANION GAP SERPL CALCULATED.3IONS-SCNC: 9 MMOL/L (ref 5–15)
AST SERPL-CCNC: 15 U/L (ref 1–32)
BILIRUB SERPL-MCNC: 0.2 MG/DL (ref 0–1.2)
BUN SERPL-MCNC: 21 MG/DL (ref 8–23)
BUN/CREAT SERPL: 19.6 (ref 7–25)
CALCIUM SPEC-SCNC: 9.2 MG/DL (ref 8.6–10.5)
CHLORIDE SERPL-SCNC: 104 MMOL/L (ref 98–107)
CHOLEST SERPL-MCNC: 179 MG/DL (ref 0–200)
CO2 SERPL-SCNC: 25 MMOL/L (ref 22–29)
CREAT SERPL-MCNC: 1.07 MG/DL (ref 0.57–1)
DEPRECATED RDW RBC AUTO: 45.8 FL (ref 37–54)
EGFRCR SERPLBLD CKD-EPI 2021: 53.9 ML/MIN/1.73
ERYTHROCYTE [DISTWIDTH] IN BLOOD BY AUTOMATED COUNT: 14.7 % (ref 12.3–15.4)
GLOBULIN UR ELPH-MCNC: 2.9 GM/DL
GLUCOSE BLDC GLUCOMTR-MCNC: 161 MG/DL (ref 70–130)
GLUCOSE BLDC GLUCOMTR-MCNC: 218 MG/DL (ref 70–130)
GLUCOSE BLDC GLUCOMTR-MCNC: 242 MG/DL (ref 70–130)
GLUCOSE SERPL-MCNC: 160 MG/DL (ref 65–99)
HBA1C MFR BLD: 5.7 % (ref 4.8–5.6)
HCT VFR BLD AUTO: 40.8 % (ref 34–46.6)
HDLC SERPL-MCNC: 56 MG/DL (ref 40–60)
HGB BLD-MCNC: 13.5 G/DL (ref 12–15.9)
INR PPP: 1.03 (ref 0.9–1.1)
LDLC SERPL CALC-MCNC: 103 MG/DL (ref 0–100)
LDLC/HDLC SERPL: 1.81 {RATIO}
MCH RBC QN AUTO: 28.2 PG (ref 26.6–33)
MCHC RBC AUTO-ENTMCNC: 33.1 G/DL (ref 31.5–35.7)
MCV RBC AUTO: 85.2 FL (ref 79–97)
PLATELET # BLD AUTO: 323 10*3/MM3 (ref 140–450)
PMV BLD AUTO: 11.5 FL (ref 6–12)
POTASSIUM SERPL-SCNC: 3.9 MMOL/L (ref 3.5–5.2)
PROT SERPL-MCNC: 6.7 G/DL (ref 6–8.5)
PROTHROMBIN TIME: 13.6 SECONDS (ref 11.7–14.2)
QT INTERVAL: 391 MS
RBC # BLD AUTO: 4.79 10*6/MM3 (ref 3.77–5.28)
SODIUM SERPL-SCNC: 138 MMOL/L (ref 136–145)
TRIGL SERPL-MCNC: 109 MG/DL (ref 0–150)
VLDLC SERPL-MCNC: 20 MG/DL (ref 5–40)
WBC NRBC COR # BLD: 9.6 10*3/MM3 (ref 3.4–10.8)

## 2023-05-26 PROCEDURE — S0260 H&P FOR SURGERY: HCPCS | Performed by: NURSE PRACTITIONER

## 2023-05-26 PROCEDURE — 82948 REAGENT STRIP/BLOOD GLUCOSE: CPT

## 2023-05-26 PROCEDURE — 80053 COMPREHEN METABOLIC PANEL: CPT | Performed by: INTERNAL MEDICINE

## 2023-05-26 PROCEDURE — 85027 COMPLETE CBC AUTOMATED: CPT | Performed by: INTERNAL MEDICINE

## 2023-05-26 PROCEDURE — 25010000002 LEVETRIRACETAM PER 10 MG: Performed by: INTERNAL MEDICINE

## 2023-05-26 PROCEDURE — 25010000002 DEXAMETHASONE SODIUM PHOSPHATE 20 MG/5ML SOLUTION: Performed by: INTERNAL MEDICINE

## 2023-05-26 PROCEDURE — 83036 HEMOGLOBIN GLYCOSYLATED A1C: CPT | Performed by: INTERNAL MEDICINE

## 2023-05-26 PROCEDURE — 85610 PROTHROMBIN TIME: CPT | Performed by: INTERNAL MEDICINE

## 2023-05-26 PROCEDURE — 97162 PT EVAL MOD COMPLEX 30 MIN: CPT

## 2023-05-26 PROCEDURE — 63710000001 INSULIN REGULAR HUMAN PER 5 UNITS: Performed by: INTERNAL MEDICINE

## 2023-05-26 PROCEDURE — 70450 CT HEAD/BRAIN W/O DYE: CPT

## 2023-05-26 PROCEDURE — A9577 INJ MULTIHANCE: HCPCS | Performed by: INTERNAL MEDICINE

## 2023-05-26 PROCEDURE — 80061 LIPID PANEL: CPT | Performed by: INTERNAL MEDICINE

## 2023-05-26 PROCEDURE — 97110 THERAPEUTIC EXERCISES: CPT

## 2023-05-26 PROCEDURE — 25010000002 HYDRALAZINE PER 20 MG: Performed by: INTERNAL MEDICINE

## 2023-05-26 PROCEDURE — 70553 MRI BRAIN STEM W/O & W/DYE: CPT

## 2023-05-26 PROCEDURE — 0 GADOBENATE DIMEGLUMINE 529 MG/ML SOLUTION: Performed by: INTERNAL MEDICINE

## 2023-05-26 RX ORDER — ESOMEPRAZOLE MAGNESIUM 40 MG/1
CAPSULE, DELAYED RELEASE ORAL DAILY
Status: DISCONTINUED | OUTPATIENT
Start: 2023-05-27 | End: 2023-05-30

## 2023-05-26 RX ORDER — FAMOTIDINE 10 MG/ML
20 INJECTION, SOLUTION INTRAVENOUS DAILY
Status: DISCONTINUED | OUTPATIENT
Start: 2023-05-26 | End: 2023-05-27

## 2023-05-26 RX ORDER — SODIUM CHLORIDE 0.9 % (FLUSH) 0.9 %
10 SYRINGE (ML) INJECTION EVERY 12 HOURS SCHEDULED
Status: DISCONTINUED | OUTPATIENT
Start: 2023-05-26 | End: 2023-05-30

## 2023-05-26 RX ORDER — ACETAMINOPHEN 650 MG/1
650 SUPPOSITORY RECTAL EVERY 4 HOURS PRN
Status: DISCONTINUED | OUTPATIENT
Start: 2023-05-26 | End: 2023-05-30

## 2023-05-26 RX ORDER — NITROGLYCERIN 0.4 MG/1
0.4 TABLET SUBLINGUAL
Status: DISCONTINUED | OUTPATIENT
Start: 2023-05-26 | End: 2023-05-30

## 2023-05-26 RX ORDER — ACETAMINOPHEN 325 MG/1
650 TABLET ORAL EVERY 4 HOURS PRN
Status: DISCONTINUED | OUTPATIENT
Start: 2023-05-26 | End: 2023-05-30

## 2023-05-26 RX ORDER — SODIUM CHLORIDE 0.9 % (FLUSH) 0.9 %
10 SYRINGE (ML) INJECTION AS NEEDED
Status: DISCONTINUED | OUTPATIENT
Start: 2023-05-26 | End: 2023-05-30

## 2023-05-26 RX ORDER — ALUMINA, MAGNESIA, AND SIMETHICONE 2400; 2400; 240 MG/30ML; MG/30ML; MG/30ML
15 SUSPENSION ORAL EVERY 6 HOURS PRN
Status: DISCONTINUED | OUTPATIENT
Start: 2023-05-26 | End: 2023-05-30

## 2023-05-26 RX ORDER — CALCIUM CARBONATE 500 MG/1
3 TABLET, CHEWABLE ORAL 3 TIMES DAILY PRN
Status: DISCONTINUED | OUTPATIENT
Start: 2023-05-26 | End: 2023-05-30

## 2023-05-26 RX ORDER — SODIUM CHLORIDE 9 MG/ML
40 INJECTION, SOLUTION INTRAVENOUS AS NEEDED
Status: DISCONTINUED | OUTPATIENT
Start: 2023-05-26 | End: 2023-05-30

## 2023-05-26 RX ADMIN — HYDRALAZINE HYDROCHLORIDE 10 MG: 20 INJECTION INTRAMUSCULAR; INTRAVENOUS at 21:09

## 2023-05-26 RX ADMIN — INSULIN HUMAN 4 UNITS: 100 INJECTION, SOLUTION PARENTERAL at 18:11

## 2023-05-26 RX ADMIN — Medication 10 ML: at 21:10

## 2023-05-26 RX ADMIN — GADOBENATE DIMEGLUMINE 10 ML: 529 INJECTION, SOLUTION INTRAVENOUS at 11:25

## 2023-05-26 RX ADMIN — LEVETIRACETAM 500 MG: 500 INJECTION, SOLUTION, CONCENTRATE INTRAVENOUS at 09:33

## 2023-05-26 RX ADMIN — Medication 10 ML: at 03:51

## 2023-05-26 RX ADMIN — LEVETIRACETAM 500 MG: 500 INJECTION, SOLUTION, CONCENTRATE INTRAVENOUS at 21:09

## 2023-05-26 RX ADMIN — DEXAMETHASONE SODIUM PHOSPHATE 4 MG: 4 INJECTION, SOLUTION INTRAMUSCULAR; INTRAVENOUS at 17:26

## 2023-05-26 RX ADMIN — INSULIN HUMAN 2 UNITS: 100 INJECTION, SOLUTION PARENTERAL at 05:31

## 2023-05-26 RX ADMIN — DEXAMETHASONE SODIUM PHOSPHATE 4 MG: 4 INJECTION, SOLUTION INTRAMUSCULAR; INTRAVENOUS at 23:14

## 2023-05-26 RX ADMIN — HYDRALAZINE HYDROCHLORIDE 10 MG: 20 INJECTION INTRAMUSCULAR; INTRAVENOUS at 10:34

## 2023-05-26 RX ADMIN — DEXAMETHASONE SODIUM PHOSPHATE 4 MG: 4 INJECTION, SOLUTION INTRAMUSCULAR; INTRAVENOUS at 05:31

## 2023-05-26 RX ADMIN — Medication 10 ML: at 09:36

## 2023-05-26 RX ADMIN — INSULIN HUMAN 4 UNITS: 100 INJECTION, SOLUTION PARENTERAL at 12:32

## 2023-05-26 RX ADMIN — DEXAMETHASONE SODIUM PHOSPHATE 4 MG: 4 INJECTION, SOLUTION INTRAMUSCULAR; INTRAVENOUS at 12:32

## 2023-05-26 NOTE — PROGRESS NOTES
[facility] NEUROSURGERY INTRACRANIAL PROGRESS NOTE    PATIENT IDENTIFICATION:   Name:  Pam Vidal      MRN:  [id number]     78 y.o.  female               CC: Brain mass      Subjective     Interval History: Feeling well this morning.  No headache.  Has had some mild nausea.  Reports chronic peripheral vision issues secondary to the AMD.  Had MRI but poor quality so will repeat.    ROS:  HEENT: No headache, no vision changes  GI: No nausea, vomiting, no swallow difficulties  Neuro: No numbness, tingling, or weakness, no speech difficulties, no balance difficulties    Objective     Vital signs in last 24 hours:  Temp:  [97.5 °F (36.4 °C)-98.1 °F (36.7 °C)] 97.7 °F (36.5 °C)  Heart Rate:  [] 87  Resp:  [20] 20  BP: (107-180)/(48-87) 130/82      Intake/Output this shift:  No intake/output data recorded.      Intake/Output last 3 shifts:  No intake/output data recorded.    LABS:  Results from last 7 days   Lab Units 05/26/23  0350 05/25/23  1346   WBC 10*3/mm3 9.60 11.34*   HEMOGLOBIN g/dL 13.5 14.3   HEMATOCRIT % 40.8 42.0   PLATELETS 10*3/mm3 323 359     Results from last 7 days   Lab Units 05/26/23  0347 05/25/23  1346   SODIUM mmol/L 138 136   POTASSIUM mmol/L 3.9 3.6   CHLORIDE mmol/L 104 97*   CO2 mmol/L 25.0 25.2   BUN mg/dL 21 16   CREATININE mg/dL 1.07* 1.08*   CALCIUM mg/dL 9.2 10.0   BILIRUBIN mg/dL 0.2 0.4   ALK PHOS U/L 66 76   ALT (SGPT) U/L 12 15   AST (SGOT) U/L 15 20   GLUCOSE mg/dL 160* 102*       IMAGING STUDIES:  MRI brain- 3 enhancing metastatic lesions with largest lesion in the right occipital lobe with associated intratumoral hemorrhage.  There is some mass effect upon the right lateral ventricle, occipital horn and temporal horn with mild right to left midline shift of 4 mm at the septum pellucidum.  The other smaller enhancing lesions are in the left occipital lobe and midline cerebellum.    I personally viewed and interpreted the patient's MRI brain.    Meds reviewed/changed:  Yes    Current Facility-Administered Medications:   •  acetaminophen (TYLENOL) tablet 650 mg, 650 mg, Oral, Q4H PRN **OR** acetaminophen (TYLENOL) suppository 650 mg, 650 mg, Rectal, Q4H PRN, Disha Ramirez MD  •  Calcium Replacement - Follow Nurse / BPA Driven Protocol, , Does not apply, PRN, Disha Ramirez MD  •  dexamethasone sodium phosphate injection 4 mg, 4 mg, Intravenous, Q6H, Disha Ramirez MD, 4 mg at 05/26/23 1726  •  dextrose (D50W) (25 g/50 mL) IV injection 25 g, 25 g, Intravenous, Q15 Min PRN, Disha Ramirez MD  •  dextrose (GLUTOSE) oral gel 15 g, 15 g, Oral, Q15 Min PRN, Disha Ramirez MD  •  glucagon (GLUCAGEN) injection 1 mg, 1 mg, Intramuscular, Q15 Min PRN, Disha Ramirez MD  •  hydrALAZINE (APRESOLINE) injection 10 mg, 10 mg, Intravenous, Q4H PRN, Disha Ramirez MD, 10 mg at 05/26/23 1034  •  insulin regular (humuLIN R,novoLIN R) injection 2-9 Units, 2-9 Units, Subcutaneous, Q6H, Disha Ramirez MD, 4 Units at 05/26/23 1232  •  levETIRAcetam (KEPPRA) injection 500 mg, 500 mg, Intravenous, Q12H, Disha Ramirez MD, 500 mg at 05/26/23 0933  •  Magnesium Standard Dose Replacement - Follow Nurse / BPA Driven Protocol, , Does not apply, PRN, Disha Ramirez MD  •  niCARdipine (CARDENE) 25 mg in 250 mL NS infusion kit, 5-15 mg/hr, Intravenous, Titrated, Disha Ramirez MD, Last Rate: 50 mL/hr at 05/25/23 1841, 5 mg/hr at 05/25/23 1841  •  nitroglycerin (NITROSTAT) SL tablet 0.4 mg, 0.4 mg, Sublingual, Q5 Min PRN, Disha Ramirez MD  •  Phosphorus Replacement - Follow Nurse / BPA Driven Protocol, , Does not apply, James SAWYER Lebnan S, MD  •  Potassium Replacement - Follow Nurse / BPA Driven Protocol, , Does not apply, James SAWYER Lebnan S, MD  •  Insert Peripheral IV, , , Once **AND** sodium chloride 0.9 % flush 10 mL, 10 mL, Intravenous, PRN, Disha Ramirez MD  •  sodium chloride 0.9 % flush 10 mL, 10 mL, Intravenous, Q12H, Disha Ramirez MD, 10 mL at 05/26/23 0936  •  sodium chloride 0.9 %  flush 10 mL, 10 mL, Intravenous, James SAWYER Lebnan S, MD  •  sodium chloride 0.9 % infusion 40 mL, 40 mL, Intravenous, James SAWYER Lebnan S, MD      Physical Exam:    General:   Awake, alert, oriented x3. Speech clear with no aphasia  CN II:     left lower quadrantanopsia  CN III IV VI: Extraocular movements are full , PERRL 4 mm brisk  CN VII:    Facial movements are symmetric. No weakness.  Motor:    Normal muscle strength, bulk and tone in upper and lower extremities.  No drift.  Station and Gait:  Per PT note 5/26-able to sit EOb w CGA. She stood w CGA using Rwx and then ambulated approx 50 ft w CGA/min A. She requires cues to keep feet inside walker during ambulation. No overt LOB noted w mobility.  Coordination: Finger-to-nose shows no dysmetria.      Assessment & Plan     ASSESSMENT:      Lung cancer metastatic to brain    Patient with brain mass, with appearance of metastatic disease from primary lung adenocarcinoma.  She has a left lower quadrant visual field cut as well as some recent balance issues and confusion.  MRI reveals 3 separate lesions largest is in the right occipital lobe with surrounding vasogenic edema.  She is currently on IV steroid and Keppra.  The intratumoral hemorrhage seen on studies is stable therefore patient may transfer to the floor.  MRI Stealth was obtained this morning but will be repeated due to poor quality.  She is anticipating surgical intervention of the right occipital lesion on 5/30.  Okay for diet, activity.  Continue IV steroids and Keppra.  We will follow-up Sunday or Monday.    PLAN:   OK for transfer to floor  Cont IV steroid and Keppra  MRI brain STEALTH repeat today    I discussed the patient's findings and my recommendations with patient, family, nursing staff and Dr. Ricketts    During patient visit, I utilized appropriate personal protective equipment including gloves. Appropriate PPE was worn during the entire visit.  Hand hygiene was completed before and after.  "     LOS: 1 day       Neema Anna, APRN  5/26/2023  18:09 EDT    \"Dictated utilizing Dragon dictation\".      "

## 2023-05-26 NOTE — PROGRESS NOTES
Discharge Planning Assessment  [facility]     Patient Name: Pam Vidal  MRN: [id number]  Today's Date: 5/26/2023    Admit Date: 5/25/2023    Plan: Pt plans is undetermined   Discharge Needs Assessment     Row Name 05/26/23 1123       Living Environment    People in Home other relative(s)    Name(s) of People in Home niece Rosio    Current Living Arrangements home    Potentially Unsafe Housing Conditions none    Primary Care Provided by self    Provides Primary Care For no one    Quality of Family Relationships supportive    Able to Return to Prior Arrangements yes       Resource/Environmental Concerns    Resource/Environmental Concerns none    Transportation Concerns none       Food Insecurity    Within the past 12 months, you worried that your food would run out before you got the money to buy more. Never true    Within the past 12 months, the food you bought just didn't last and you didn't have money to get more. Never true       Transition Planning    Patient/Family Anticipates Transition to home with family    Patient/Family Anticipated Services at Transition none    Transportation Anticipated family or friend will provide       Discharge Needs Assessment    Equipment Currently Used at Home none    Concerns to be Addressed discharge planning    Anticipated Changes Related to Illness none    Equipment Needed After Discharge none               Discharge Plan     Row Name 05/26/23 1124       Plan    Plan Pt plans is undetermined    Plan Comments IMM noted. CCP spoke to patient at bedside to discuss discharge plans  Faace sheet verified.  Pt lives in a single story house with her niece Rosio, [phone].  Pt uses no DME to ambulate.  She has no past history of HH or rehab.  She is independent with ADL's.  her niece assists her as needed.  Plan is undetermined.  She would like follow up for needs  CCP following for therapy evaluations              Continued Care and Services - Admitted Since 5/25/2023     Coordination has not been started for this encounter.          Demographic Summary    No documentation.                Functional Status    No documentation.                Psychosocial    No documentation.                Abuse/Neglect    No documentation.                Legal    No documentation.                Substance Abuse    No documentation.                Patient Forms    No documentation.                   Patricia Sahu RN

## 2023-05-26 NOTE — PLAN OF CARE
Goal Outcome Evaluation:  Plan of Care Reviewed With: patient           Outcome Evaluation: Pt seen for PT nicole this afternoon. She was admitted to [facility] yesterday w lung cancer metastatic to brain. At baseline, pt lives w her niece and reports she is normally independent w all mobility and ADLs. Today, pt doing well. Plans noted for brain surgery next tuesday. She currently presents w generalized weakness, decreased activity tolerance, and overall decreased functional mobility. She was able to sit EOb w CGA. She stood w CGA using Rwx and then ambulated approx 50 ft w CGA/min A. She requires cues to keep feet inside walker during ambulation. No overt LOB noted w mobility. She does c/o  mild pain in her head while she is up. Pt returned to bed at end of session. She will continue to benefit from skilled PT to maximize safety, strength, and independence w mobility. Will address DC plans pending medical course.

## 2023-05-26 NOTE — THERAPY EVALUATION
Patient Name: Pam Vidal  : 1945    MRN: [id number]                              Today's Date: 2023       Admit Date: 2023    Visit Dx:     ICD-10-CM ICD-9-CM   1. Lung cancer metastatic to brain  C34.90 162.9    C79.31 198.3   2. Vasogenic brain edema  G93.6 348.5   3. Hemoptysis  R04.2 786.30   4. Unsteady gait  R26.81 781.2     Patient Active Problem List   Diagnosis   • Primary lung adenocarcinoma, right   • Cough with hemoptysis   • Lung mass   • Advanced care planning/counseling discussion   • High risk medication use   • Muscular deconditioning   • Neoplastic malignant related fatigue   • Lung cancer metastatic to brain     Past Medical History:   Diagnosis Date   • COPD (chronic obstructive pulmonary disease)    • Coughing up blood     X2 MONTHS   • History of COVID-19 2022   • Hyperlipidemia    • IBS (irritable bowel syndrome)    • Primary lung adenocarcinoma, right    • Rheumatoid arthritis      Past Surgical History:   Procedure Laterality Date   • APPENDECTOMY      appendix removed   • BRONCHOSCOPY N/A 2022    Procedure: BRONCHOSCOPY WITH BAL,  BIOPSIES, AND BRUSHINGS WITH ENDOBRONCHIAL ULTRASOUND WITH FNA;  Surgeon: Gregor Vee MD;  Location: [facility] ENDOSCOPY;  Service: Pulmonary;  Laterality: N/A;  PRE- HILAR MASS  POST- SAME   • BRONCHOSCOPY N/A 2023    Procedure: BRONCHOSCOPY with biopsy, lavage, brushing;  Surgeon: Gregor Vee MD;  Location: [facility] ENDOSCOPY;  Service: Pulmonary;  Laterality: N/A;   • CAROTID ENDARTERECTOMY Right    • CAROTID ENDARTERECTOMY Left    • CATARACT EXTRACTION     • CERVICAL FUSION     • CHOLECYSTECTOMY     • HYSTERECTOMY     • SHOULDER ARTHROSCOPY Right 2019    right should scope/cuff repair    • VENOUS ACCESS DEVICE (PORT) INSERTION Right 2022    Procedure: POWERPORT INSERTION;  Surgeon: Remedios Rice MD;  Location: [facility] OR;  Service: Thoracic;  Laterality: Right;      General Information     Row Name  05/26/23 1539          Physical Therapy Time and Intention    Document Type evaluation  -EJ     Mode of Treatment physical therapy  -EJ     Row Name 05/26/23 1539          General Information    Patient Profile Reviewed yes  -EJ     Prior Level of Function independent:;ADL's;all household mobility;community mobility  -EJ     Existing Precautions/Restrictions fall  -EJ     Barriers to Rehab medically complex  -EJ     Row Name 05/26/23 1539          Living Environment    People in Home other relative(s)  lives w niece  -EJ     Row Name 05/26/23 1539          Cognition    Orientation Status (Cognition) oriented x 3  -EJ     Row Name 05/26/23 1539          Safety Issues, Functional Mobility    Impairments Affecting Function (Mobility) strength;pain;balance  -EJ           User Key  (r) = Recorded By, (t) = Taken By, (c) = Cosigned By    Initials Name Provider Type    EJ Katelynn Gomez, PT Physical Therapist               Mobility     Row Name 05/26/23 1540          Bed Mobility    Bed Mobility supine-sit;sit-supine  -EJ     Supine-Sit [place] (Bed Mobility) verbal cues;contact guard  -EJ     Sit-Supine [place] (Bed Mobility) verbal cues;minimum assist (75% patient effort)  -EJ     Assistive Device (Bed Mobility) head of bed elevated;bed rails  -EJ     Row Name 05/26/23 1540          Sit-Stand Transfer    Sit-Stand [place] (Transfers) verbal cues;contact guard  -EJ     Assistive Device (Sit-Stand Transfers) walker, front-wheeled  -EJ     Row Name 05/26/23 1540          Gait/Stairs (Locomotion)    [place] Level (Gait) verbal cues;contact guard;minimum assist (75% patient effort)  -EJ     Assistive Device (Gait) walker, front-wheeled  -EJ     Distance in Feet (Gait) 50  -EJ     Deviations/Abnormal Patterns (Gait) mariia decreased;stride length decreased  -EJ     Comment, (Gait/Stairs) cues to keep feet inside walker, c/o mild head pain w ambulation  -EJ           User Key  (r) = Recorded By, (t) =  Taken By, (c) = Cosigned By    Initials Name Provider Type    Katelynn Foote, PT Physical Therapist               Obj/Interventions     Row Name 05/26/23 1541          Range of Motion Comprehensive    General Range of Motion no range of motion deficits identified  -     Row Name 05/26/23 1541          Strength Comprehensive (MMT)    Comment, General Manual Muscle Testing (MMT) Assessment generalized weakness  -EJ           User Key  (r) = Recorded By, (t) = Taken By, (c) = Cosigned By    Initials Name Provider Type    Katelynn Foote, PT Physical Therapist               Goals/Plan     Row Name 05/26/23 1547          Bed Mobility Goal 1 (PT)    Activity/Assistive Device (Bed Mobility Goal 1, PT) bed mobility activities, all  -EJ     [place] Level/Cues Needed (Bed Mobility Goal 1, PT) standby assist  -EJ     Time Frame (Bed Mobility Goal 1, PT) 1 week  -EJ     Row Name 05/26/23 1547          Transfer Goal 1 (PT)    Activity/Assistive Device (Transfer Goal 1, PT) transfers, all;walker, rolling  -EJ     [place] Level/Cues Needed (Transfer Goal 1, PT) standby assist  -EJ     Time Frame (Transfer Goal 1, PT) 1 week  -EJ     Row Name 05/26/23 1547          Gait Training Goal 1 (PT)    Activity/Assistive Device (Gait Training Goal 1, PT) gait (walking locomotion);walker, rolling  -EJ     [place] Level (Gait Training Goal 1, PT) standby assist  -EJ     Distance (Gait Training Goal 1, PT) 150  -EJ     Time Frame (Gait Training Goal 1, PT) 1 week  -EJ     Row Name 05/26/23 1547          Therapy Assessment/Plan (PT)    Planned Therapy Interventions (PT) balance training;bed mobility training;home exercise program;gait training;transfer training;patient/family education;strengthening;stair training;ROM (range of motion)  -EJ           User Key  (r) = Recorded By, (t) = Taken By, (c) = Cosigned By    Initials Name Provider Type    Katelynn Foote, PT Physical Therapist               Clinical  Impression     Row Name 05/26/23 1541          Pain    Pre/Posttreatment Pain Comment mild head pain w ambulation  -EJ     Row Name 05/26/23 1541          Plan of Care Review    Plan of Care Reviewed With patient  -EJ     Outcome Evaluation Pt seen for PT oliviaal this afternoon. She was admitted to [facility] yesterday w lung cancer metastatic to brain. At baseline, pt lives w her niece and reports she is normally independent w all mobility and ADLs. Today, pt doing well. Plans noted for brain surgery next tuesday. She currently presents w generalized weakness, decreased activity tolerance, and overall decreased functional mobility. She was able to sit EOb w CGA. She stood w CGA using Rwx and then ambulated approx 50 ft w CGA/min A. She requires cues to keep feet inside walker during ambulation. No overt LOB noted w mobility. She does c/o  mild pain in her head while she is up. Pt returned to bed at end of session. She will continue to benefit from skilled PT to maximize safety, strength, and independence w mobility. Will address DC plans pending medical course.  -EJ     Row Name 05/26/23 1541          Therapy Assessment/Plan (PT)    Rehab Potential (PT) good, to achieve stated therapy goals  -EJ     Criteria for Skilled Interventions Met (PT) yes  -EJ     Therapy Frequency (PT) 6 times/wk  -EJ     Row Name 05/26/23 1546          Positioning and Restraints    Pre-Treatment Position in bed  -EJ     Post Treatment Position bed  -EJ     In Bed notified nsg;supine;call light within reach;encouraged to call for assist;with nsg  -EJ           User Key  (r) = Recorded By, (t) = Taken By, (c) = Cosigned By    Initials Name Provider Type    Katelynn Foote, PT Physical Therapist               Outcome Measures     Row Name 05/26/23 1548          How much help from another person do you currently need...    Turning from your back to your side while in flat bed without using bedrails? 3  -EJ     Moving from lying on back to sitting  on the side of a flat bed without bedrails? 3  -EJ     Moving to and from a bed to a chair (including a wheelchair)? 3  -EJ     Standing up from a chair using your arms (e.g., wheelchair, bedside chair)? 3  -EJ     Climbing 3-5 steps with a railing? 2  -EJ     To walk in hospital room? 3  -EJ     AM-PAC 6 Clicks Score (PT) 17  -EJ     Highest level of mobility 5 --> Static standing  -EJ     Row Name 05/26/23 1548          Functional Assessment    Outcome Measure Options AM-PAC 6 Clicks Basic Mobility (PT)  -EJ           User Key  (r) = Recorded By, (t) = Taken By, (c) = Cosigned By    Initials Name Provider Type    EJ Katelynn Gomez, PT Physical Therapist                             Physical Therapy Education     Title: PT OT SLP Therapies (In Progress)     Topic: Physical Therapy (In Progress)     Point: Mobility training (Done)     Learning Progress Summary           Patient Acceptance, E,TB,D, VU,NR by  at 5/26/2023 1548                   Point: Home exercise program (Not Started)     Learner Progress:  Not documented in this visit.          Point: Body mechanics (Not Started)     Learner Progress:  Not documented in this visit.          Point: Precautions (Not Started)     Learner Progress:  Not documented in this visit.                      User Key     Initials Effective Dates Name Provider Type [facility] 06/16/21 -  Katelynn Gomez, PT Physical Therapist PT              PT Recommendation and Plan  Planned Therapy Interventions (PT): balance training, bed mobility training, home exercise program, gait training, transfer training, patient/family education, strengthening, stair training, ROM (range of motion)  Plan of Care Reviewed With: patient  Outcome Evaluation: Pt seen for PT nicole this afternoon. She was admitted to [facility] yesterday w lung cancer metastatic to brain. At baseline, pt lives w her niece and reports she is normally independent w all mobility and ADLs. Today, pt doing well. Plans  noted for brain surgery next tuesday. She currently presents w generalized weakness, decreased activity tolerance, and overall decreased functional mobility. She was able to sit EOb w CGA. She stood w CGA using Rwx and then ambulated approx 50 ft w CGA/min A. She requires cues to keep feet inside walker during ambulation. No overt LOB noted w mobility. She does c/o  mild pain in her head while she is up. Pt returned to bed at end of session. She will continue to benefit from skilled PT to maximize safety, strength, and independence w mobility. Will address DC plans pending medical course.     Time Calculation:    PT Charges     Row Name 05/26/23 1548             Time Calculation    Start Time 1515  -EJ      Stop Time 1532  -EJ      Time Calculation (min) 17 min  -EJ      PT Received On 05/26/23  -EJ      PT - Next Appointment 05/27/23  -EJ      PT Goal Re-Cert Due Date 06/02/23  -EJ         Time Calculation- PT    Total Timed Code Minutes- PT 12 minute(s)  -EJ            User Key  (r) = Recorded By, (t) = Taken By, (c) = Cosigned By    Initials Name Provider Type    Katelynn Foote, PT Physical Therapist              Therapy Charges for Today     Code Description Service Date Service Provider Modifiers Qty    07398669603 HC PT EVAL MOD COMPLEXITY 3 5/26/2023 Katelynn Gomez, PT GP 1    61615127105 HC PT THER PROC EA 15 MIN 5/26/2023 Katelynn Gomez, PT GP 1          PT G-Codes  Outcome Measure Options: AM-PAC 6 Clicks Basic Mobility (PT)  AM-PAC 6 Clicks Score (PT): 17  PT Discharge Summary  Anticipated Discharge Disposition (PT): home with assist, home with home health, skilled nursing facility, inpatient rehabilitation facility    Katelynn Gomez, IRIS  5/26/2023

## 2023-05-26 NOTE — PROGRESS NOTES
"                                              LOS: 1 day   Patient Care Team:  Nik Mckay MD as PCP - General  Nik Mckay MD as PCP - Family Medicine  Remedios Rice MD as Surgeon (Thoracic Surgery)  Hector Rodriguez MD as Consulting Physician (Radiation Oncology)  Jannet Chauhan, RN as Nurse Navigator  Remedios Rice MD as Referring Physician (Thoracic Surgery)  Cruzito Lagunas MD as Consulting Physician (Hematology and Oncology)    Chief Complaint:  F/up lung cancer with brain mets, COPD    Subjective   Interval History  I reviewed the admission note, progress notes, PMH, PSH, Family hx, social history, imagings and prior records on this admission, summarized the finding in my note and formulated a transition of care plan.      Denies headache, weakness or numbness.  No seizure.    REVIEW OF SYSTEMS:   CARDIOVASCULAR: No chest pain, chest pressure or chest discomfort. No palpitations or edema.   RESPIRATORY: No shortness of breath, cough or sputum.   GASTROINTESTINAL: No anorexia, nausea, vomiting or diarrhea. No abdominal pain.  CONSTITUTIONAL: No fever or chills.     Ventilator/Non-Invasive Ventilation Settings (From admission, onward)    None                Physical Exam:     Vital Signs  Temp:  [97.5 °F (36.4 °C)-98.1 °F (36.7 °C)] 97.5 °F (36.4 °C)  Heart Rate:  [64-87] 81  Resp:  [16-20] 20  BP: (110-180)/(48-87) 148/75  No intake or output data in the 24 hours ending 05/26/23 1018  Flowsheet Rows    Flowsheet Row First Filed Value   Admission Height 149.9 cm (59.02\") Documented at 05/25/2023 1325   Admission Weight 53.5 kg (118 lb) Documented at 05/25/2023 1325          PPE used per hospital policy    General Appearance:   Alert, cooperative, in no acute distress   ENMT:  Mallampati score 3, moist mucous membrane   Eyes:  Pupils equal and reactive to light. EOMI   Neck:   Large. Trachea midline. No thyromegaly.   Lungs:    Equal but diminished air entry throughout.  No audible crackles or " wheezing.    Heart:   Regular rhythm and normal rate, normal S1 and S2, no         murmur   Skin:   No rash or ecchymosis   Abdomen:    Obese. Soft. No tenderness. No HSM.   Neuro/psych:  Conscious, alert, oriented x3. Strength 5/5 in upper and lower  ext.  Appropriate mood and affect   Extremities:  No cyanosis, clubbing or edema.  Warm extremities and well-perfused          Results Review:        Results from last 7 days   Lab Units 05/26/23  0347 05/25/23  1346   SODIUM mmol/L 138 136   POTASSIUM mmol/L 3.9 3.6   CHLORIDE mmol/L 104 97*   CO2 mmol/L 25.0 25.2   BUN mg/dL 21 16   CREATININE mg/dL 1.07* 1.08*   GLUCOSE mg/dL 160* 102*   CALCIUM mg/dL 9.2 10.0     Results from last 7 days   Lab Units 05/25/23  1655 05/25/23  1346   HSTROP T ng/L 15* 16*     Results from last 7 days   Lab Units 05/26/23  0350 05/25/23  1346   WBC 10*3/mm3 9.60 11.34*   HEMOGLOBIN g/dL 13.5 14.3   HEMATOCRIT % 40.8 42.0   PLATELETS 10*3/mm3 323 359     Results from last 7 days   Lab Units 05/26/23  0347 05/25/23  1346   INR  1.03 1.00   APTT seconds  --  33.7     Results from last 7 days   Lab Units 05/25/23  1346   PROBNP pg/mL 130.0                           I reviewed the patient's new clinical results.        Medication Review:   dexamethasone, 4 mg, Intravenous, Q6H  insulin regular, 2-9 Units, Subcutaneous, Q6H  levETIRAcetam, 500 mg, Intravenous, Q12H  sodium chloride, 10 mL, Intravenous, Q12H        niCARdipine, 5-15 mg/hr, Last Rate: 5 mg/hr (05/25/23 1841)        Diagnostic imaging:  I personally and independently reviewed the following images:  CT brain 5/26/23: Findings concerning for metastatic disease in the right parietal & occipital lobe with extensive vasogenic edema causing 4 mm midline shift.  Tiny amount of intratumoral hemorrhage with blood extending into the adjacent subarachnoid spaces, unchanged from the prior examinations          Assessment     1. Lung cancer with brain mets  2. Localized brain edema with  with mild intraparenchymal hemorrhage  3. COPD  4. Rheumatoid arthritis  5. Hypertensive emergency    All problems new to me    Plan     · Dexamethasone 4 mg every 6 hours  · Insulin sliding scale  · Keppra 500 mg twice a day prophylaxis  · Wean off nicardipine.  · DVT prophylaxis with SCD  · PRN nebs for COPD    Disposition: Transfer to telemetry if okay with neurosurgery.      Brennen Wilkerson MD  05/26/23  10:18 EDT            This note was dictated utilizing Dragon dictation

## 2023-05-27 PROBLEM — M06.9 RHEUMATOID ARTHRITIS: Status: ACTIVE | Noted: 2023-05-27

## 2023-05-27 PROBLEM — D72.829 LEUKOCYTOSIS: Status: ACTIVE | Noted: 2023-05-27

## 2023-05-27 PROBLEM — K58.9 IBS (IRRITABLE BOWEL SYNDROME): Status: ACTIVE | Noted: 2023-05-27

## 2023-05-27 PROBLEM — G93.89 BRAIN MASS: Status: ACTIVE | Noted: 2023-05-27

## 2023-05-27 PROBLEM — J44.9 COPD (CHRONIC OBSTRUCTIVE PULMONARY DISEASE): Status: ACTIVE | Noted: 2023-05-27

## 2023-05-27 PROBLEM — E78.5 HYPERLIPIDEMIA: Status: ACTIVE | Noted: 2023-05-27

## 2023-05-27 LAB
ALBUMIN SERPL-MCNC: 3.4 G/DL (ref 3.5–5.2)
ALBUMIN/GLOB SERPL: 1.1 G/DL
ALP SERPL-CCNC: 61 U/L (ref 39–117)
ALT SERPL W P-5'-P-CCNC: 15 U/L (ref 1–33)
ANION GAP SERPL CALCULATED.3IONS-SCNC: 11.2 MMOL/L (ref 5–15)
AST SERPL-CCNC: 15 U/L (ref 1–32)
BILIRUB SERPL-MCNC: 0.3 MG/DL (ref 0–1.2)
BUN SERPL-MCNC: 22 MG/DL (ref 8–23)
BUN/CREAT SERPL: 25.6 (ref 7–25)
CALCIUM SPEC-SCNC: 9.8 MG/DL (ref 8.6–10.5)
CHLORIDE SERPL-SCNC: 101 MMOL/L (ref 98–107)
CO2 SERPL-SCNC: 23.8 MMOL/L (ref 22–29)
CREAT SERPL-MCNC: 0.86 MG/DL (ref 0.57–1)
DEPRECATED RDW RBC AUTO: 45.4 FL (ref 37–54)
EGFRCR SERPLBLD CKD-EPI 2021: 70.1 ML/MIN/1.73
ERYTHROCYTE [DISTWIDTH] IN BLOOD BY AUTOMATED COUNT: 15 % (ref 12.3–15.4)
GLOBULIN UR ELPH-MCNC: 3.2 GM/DL
GLUCOSE BLDC GLUCOMTR-MCNC: 122 MG/DL (ref 70–130)
GLUCOSE BLDC GLUCOMTR-MCNC: 126 MG/DL (ref 70–130)
GLUCOSE BLDC GLUCOMTR-MCNC: 158 MG/DL (ref 70–130)
GLUCOSE BLDC GLUCOMTR-MCNC: 168 MG/DL (ref 70–130)
GLUCOSE BLDC GLUCOMTR-MCNC: 176 MG/DL (ref 70–130)
GLUCOSE SERPL-MCNC: 116 MG/DL (ref 65–99)
HCT VFR BLD AUTO: 40.3 % (ref 34–46.6)
HGB BLD-MCNC: 13.5 G/DL (ref 12–15.9)
INR PPP: 1.01 (ref 0.9–1.1)
MCH RBC QN AUTO: 28 PG (ref 26.6–33)
MCHC RBC AUTO-ENTMCNC: 33.5 G/DL (ref 31.5–35.7)
MCV RBC AUTO: 83.6 FL (ref 79–97)
PLATELET # BLD AUTO: 352 10*3/MM3 (ref 140–450)
PMV BLD AUTO: 10.7 FL (ref 6–12)
POTASSIUM SERPL-SCNC: 3.9 MMOL/L (ref 3.5–5.2)
PROCALCITONIN SERPL-MCNC: 0.04 NG/ML (ref 0–0.25)
PROT SERPL-MCNC: 6.6 G/DL (ref 6–8.5)
PROTHROMBIN TIME: 13.4 SECONDS (ref 11.7–14.2)
RBC # BLD AUTO: 4.82 10*6/MM3 (ref 3.77–5.28)
SODIUM SERPL-SCNC: 136 MMOL/L (ref 136–145)
WBC NRBC COR # BLD: 23.29 10*3/MM3 (ref 3.4–10.8)

## 2023-05-27 PROCEDURE — 82948 REAGENT STRIP/BLOOD GLUCOSE: CPT

## 2023-05-27 PROCEDURE — 97530 THERAPEUTIC ACTIVITIES: CPT

## 2023-05-27 PROCEDURE — 63710000001 INSULIN REGULAR HUMAN PER 5 UNITS: Performed by: INTERNAL MEDICINE

## 2023-05-27 PROCEDURE — 25010000002 LEVETRIRACETAM PER 10 MG: Performed by: INTERNAL MEDICINE

## 2023-05-27 PROCEDURE — 85610 PROTHROMBIN TIME: CPT | Performed by: INTERNAL MEDICINE

## 2023-05-27 PROCEDURE — 25010000002 DEXAMETHASONE SODIUM PHOSPHATE 20 MG/5ML SOLUTION: Performed by: INTERNAL MEDICINE

## 2023-05-27 PROCEDURE — 85027 COMPLETE CBC AUTOMATED: CPT | Performed by: INTERNAL MEDICINE

## 2023-05-27 PROCEDURE — 84145 PROCALCITONIN (PCT): CPT | Performed by: INTERNAL MEDICINE

## 2023-05-27 PROCEDURE — 80053 COMPREHEN METABOLIC PANEL: CPT | Performed by: INTERNAL MEDICINE

## 2023-05-27 RX ORDER — ATORVASTATIN CALCIUM 20 MG/1
40 TABLET, FILM COATED ORAL NIGHTLY
Status: DISCONTINUED | OUTPATIENT
Start: 2023-05-27 | End: 2023-05-30

## 2023-05-27 RX ADMIN — ATORVASTATIN CALCIUM 40 MG: 20 TABLET, FILM COATED ORAL at 21:03

## 2023-05-27 RX ADMIN — INSULIN HUMAN 2 UNITS: 100 INJECTION, SOLUTION PARENTERAL at 17:48

## 2023-05-27 RX ADMIN — DEXAMETHASONE SODIUM PHOSPHATE 4 MG: 4 INJECTION, SOLUTION INTRAMUSCULAR; INTRAVENOUS at 12:41

## 2023-05-27 RX ADMIN — Medication 10 ML: at 21:16

## 2023-05-27 RX ADMIN — LEVETIRACETAM 500 MG: 500 INJECTION, SOLUTION, CONCENTRATE INTRAVENOUS at 09:09

## 2023-05-27 RX ADMIN — ESOMEPRAZOLE MAGNESIUM: 40 CAPSULE, DELAYED RELEASE ORAL at 09:10

## 2023-05-27 RX ADMIN — DEXAMETHASONE SODIUM PHOSPHATE 4 MG: 4 INJECTION, SOLUTION INTRAMUSCULAR; INTRAVENOUS at 23:59

## 2023-05-27 RX ADMIN — DEXAMETHASONE SODIUM PHOSPHATE 4 MG: 4 INJECTION, SOLUTION INTRAMUSCULAR; INTRAVENOUS at 06:16

## 2023-05-27 RX ADMIN — INSULIN HUMAN 2 UNITS: 100 INJECTION, SOLUTION PARENTERAL at 00:16

## 2023-05-27 RX ADMIN — LEVETIRACETAM 500 MG: 500 INJECTION, SOLUTION, CONCENTRATE INTRAVENOUS at 21:03

## 2023-05-27 RX ADMIN — DEXAMETHASONE SODIUM PHOSPHATE 4 MG: 4 INJECTION, SOLUTION INTRAMUSCULAR; INTRAVENOUS at 17:48

## 2023-05-27 RX ADMIN — Medication 10 ML: at 09:10

## 2023-05-27 RX ADMIN — INSULIN HUMAN 2 UNITS: 100 INJECTION, SOLUTION PARENTERAL at 23:59

## 2023-05-27 NOTE — PLAN OF CARE
Goal Outcome Evaluation:  Plan of Care Reviewed With: patient, family           Outcome Evaluation: Pt demonstrates increased activity tolerance and functional strength as she was able to increase her gait distance and required less assistance. Pt was able to perform bed mobility and transfers with just SBA and she was able to ambulate 150 ft in salter with CGA and walker. Acute care PT will continue to follow to address strength, mobility, and gait. Pt is encouraged to ambulate in the salter with nursing and family.

## 2023-05-27 NOTE — PLAN OF CARE
Problem: Adult Inpatient Plan of Care  Goal: Absence of Hospital-Acquired Illness or Injury  Outcome: Ongoing, Progressing  Intervention: Identify and Manage Fall Risk  Recent Flowsheet Documentation  Taken 5/27/2023 0017 by Lindsey Lowery RN  Safety Promotion/Fall Prevention: safety round/check completed  Taken 5/26/2023 2200 by Lindsey Lowery RN  Safety Promotion/Fall Prevention:   activity supervised   nonskid shoes/slippers when out of bed   room organization consistent   fall prevention program maintained  Taken 5/26/2023 2040 by Lindsey Lowery RN  Safety Promotion/Fall Prevention:   fall prevention program maintained   safety round/check completed  Intervention: Prevent Skin Injury  Recent Flowsheet Documentation  Taken 5/27/2023 0017 by Lindsey Lowery RN  Body Position: position changed independently  Taken 5/26/2023 2200 by Lindsey Lowery RN  Body Position: position changed independently  Taken 5/26/2023 2040 by Lindsey Lowery RN  Body Position: position changed independently  Intervention: Prevent and Manage VTE (Venous Thromboembolism) Risk  Recent Flowsheet Documentation  Taken 5/26/2023 2040 by Lindsey Lowery RN  Activity Management: sitting, edge of bed  Intervention: Prevent Infection  Recent Flowsheet Documentation  Taken 5/27/2023 0017 by Lindsey Lowery RN  Infection Prevention: rest/sleep promoted  Taken 5/26/2023 2200 by Lindsey Lowery RN  Infection Prevention: rest/sleep promoted  Taken 5/26/2023 2040 by Lindsey Lowery RN  Infection Prevention: rest/sleep promoted  Goal: Optimal Comfort and Wellbeing  Outcome: Ongoing, Progressing  Intervention: Provide Person-Centered Care  Recent Flowsheet Documentation  Taken 5/26/2023 2040 by Lindsey Lowery RN  Trust Relationship/Rapport: care explained  Goal: Readiness for Transition of Care  Outcome: Ongoing, Progressing     Problem: Fall Injury Risk  Goal: Absence of Fall and Fall-Related Injury  Outcome: Ongoing,  Progressing  Intervention: Identify and Manage Contributors  Recent Flowsheet Documentation  Taken 5/26/2023 2200 by Lindsey Lowery RN  Medication Review/Management: medications reviewed  Taken 5/26/2023 2040 by Lindsey Lowery RN  Medication Review/Management: medications reviewed  Intervention: Promote Injury-Free Environment  Recent Flowsheet Documentation  Taken 5/27/2023 0017 by Lindsey Lowery RN  Safety Promotion/Fall Prevention: safety round/check completed  Taken 5/26/2023 2200 by Lindsey Lowery RN  Safety Promotion/Fall Prevention:   activity supervised   nonskid shoes/slippers when out of bed   room organization consistent   fall prevention program maintained  Taken 5/26/2023 2040 by Lindsey Lowery RN  Safety Promotion/Fall Prevention:   fall prevention program maintained   safety round/check completed     Problem: Skin Injury Risk Increased  Goal: Skin Health and Integrity  Outcome: Ongoing, Progressing  Intervention: Optimize Skin Protection  Recent Flowsheet Documentation  Taken 5/27/2023 0017 by Lindsey Lowery RN  Head of Bed (HOB) Positioning: HOB at 30 degrees  Taken 5/26/2023 2200 by Lindsey Lowery RN  Head of Bed (HOB) Positioning: HOB at 30 degrees  Taken 5/26/2023 2040 by Lindsey Lowery RN  Head of Bed (HOB) Positioning: HOB elevated     Problem: Adjustment to Illness (Stroke, Hemorrhagic)  Goal: Optimal Coping  Outcome: Ongoing, Progressing  Intervention: Support Psychosocial Response to Stroke  Recent Flowsheet Documentation  Taken 5/26/2023 2040 by Lindsey Lowery RN  Family/Support System Care:   support provided   self-care encouraged     Problem: Bowel Elimination Impaired (Stroke, Hemorrhagic)  Goal: Effective Bowel Elimination  Outcome: Ongoing, Progressing     Problem: Cerebral Tissue Perfusion (Stroke, Hemorrhagic)  Goal: Optimal Cerebral Tissue Perfusion  Outcome: Ongoing, Progressing     Problem: Cognitive Impairment (Stroke, Hemorrhagic)  Goal: Optimal  Cognitive Function  Outcome: Ongoing, Progressing     Problem: Communication Impairment (Stroke, Hemorrhagic)  Goal: Effective Communication Skills  Outcome: Ongoing, Progressing     Problem: Functional Ability Impaired (Stroke, Hemorrhagic)  Goal: Optimal Functional Ability  Outcome: Ongoing, Progressing  Intervention: Optimize Functional Ability  Recent Flowsheet Documentation  Taken 5/26/2023 2040 by Lindsey Lowery RN  Activity Management: sitting, edge of bed     Problem: Pain (Stroke, Hemorrhagic)  Goal: Acceptable Pain Control  Outcome: Ongoing, Progressing     Problem: Respiratory Compromise (Stroke, Hemorrhagic)  Goal: Effective Oxygenation and Ventilation  Outcome: Ongoing, Progressing  Intervention: Optimize Oxygenation and Ventilation  Recent Flowsheet Documentation  Taken 5/27/2023 0017 by Lindsey Lowery RN  Head of Bed (HOB) Positioning: HOB at 30 degrees  Taken 5/26/2023 2200 by Lindsey Lowery RN  Head of Bed (HOB) Positioning: HOB at 30 degrees  Taken 5/26/2023 2040 by Lindsey Lowery RN  Head of Bed (HOB) Positioning: HOB elevated     Problem: Sensorimotor Impairment (Stroke, Hemorrhagic)  Goal: Improved Sensorimotor Function  Outcome: Ongoing, Progressing  Intervention: Optimize Range of Motion, Motor Control and Function  Recent Flowsheet Documentation  Taken 5/27/2023 0017 by Lindsey Lowery RN  Positioning/Transfer Devices: pillows  Taken 5/26/2023 2200 by Lindsey Lowery RN  Positioning/Transfer Devices: pillows     Problem: Swallowing Impairment (Stroke, Hemorrhagic)  Goal: Optimal Eating and Swallowing Without Aspiration  Outcome: Ongoing, Progressing     Problem: Urinary Elimination Impaired (Stroke, Hemorrhagic)  Goal: Effective Urinary Elimination  Outcome: Ongoing, Progressing   Goal Outcome Evaluation:

## 2023-05-27 NOTE — PROGRESS NOTES
"                                              LOS: 2 days   Patient Care Team:  Nik Mckay MD as PCP - General  Nik Mckay MD as PCP - Family Medicine  Remedios Rice MD as Surgeon (Thoracic Surgery)  Hector Rodriguez MD as Consulting Physician (Radiation Oncology)  Jannet Chauhan, RN as Nurse Navigator  Remedios Rice MD as Referring Physician (Thoracic Surgery)  Cruzito Lagunas MD as Consulting Physician (Hematology and Oncology)    Chief Complaint:  F/up lung cancer with brain mets, COPD    Subjective   Interval History  On RA.  No cough or shortness of breath at rest        Ventilator/Non-Invasive Ventilation Settings (From admission, onward)    None                Physical Exam:     Vital Signs  Temp:  [97.5 °F (36.4 °C)-98.5 °F (36.9 °C)] 98.4 °F (36.9 °C)  Heart Rate:  [52-87] 66  Resp:  [18-20] 20  BP: (105-157)/(44-82) 119/47    Intake/Output Summary (Last 24 hours) at 5/27/2023 1652  Last data filed at 5/27/2023 1505  Gross per 24 hour   Intake 780 ml   Output --   Net 780 ml     Flowsheet Rows    Flowsheet Row First Filed Value   Admission Height 149.9 cm (59.02\") Documented at 05/25/2023 1325   Admission Weight 53.5 kg (118 lb) Documented at 05/25/2023 1325          PPE used per hospital policy    General Appearance:   Alert, cooperative, in no acute distress   ENMT:  Mallampati score 3, moist mucous membrane   Eyes:  Pupils equal and reactive to light. EOMI       Lungs:    Equal but diminished air entry throughout.  No audible crackles or wheezing.               Neuro/psych:  Conscious, alert, oriented x3. .  Appropriate mood and affect         Results Review:        Results from last 7 days   Lab Units 05/27/23  0638 05/26/23  0347 05/25/23  1346   SODIUM mmol/L 136 138 136   POTASSIUM mmol/L 3.9 3.9 3.6   CHLORIDE mmol/L 101 104 97*   CO2 mmol/L 23.8 25.0 25.2   BUN mg/dL 22 21 16   CREATININE mg/dL 0.86 1.07* 1.08*   GLUCOSE mg/dL 116* 160* 102*   CALCIUM mg/dL 9.8 9.2 10.0 "     Results from last 7 days   Lab Units 05/25/23  1655 05/25/23  1346   HSTROP T ng/L 15* 16*     Results from last 7 days   Lab Units 05/27/23  0638 05/26/23  0350 05/25/23  1346   WBC 10*3/mm3 23.29* 9.60 11.34*   HEMOGLOBIN g/dL 13.5 13.5 14.3   HEMATOCRIT % 40.3 40.8 42.0   PLATELETS 10*3/mm3 352 323 359     Results from last 7 days   Lab Units 05/27/23  0638 05/26/23  0347 05/25/23  1346   INR  1.01 1.03 1.00   APTT seconds  --   --  33.7     Results from last 7 days   Lab Units 05/25/23  1346   PROBNP pg/mL 130.0                           I reviewed the patient's new clinical results.        Medication Review:   dexamethasone, 4 mg, Intravenous, Q6H  esomeprazole, , Oral, Daily  insulin regular, 2-9 Units, Subcutaneous, Q6H  levETIRAcetam, 500 mg, Intravenous, Q12H  sodium chloride, 10 mL, Intravenous, Q12H             Diagnostic imaging:  I personally and independently reviewed the following images:  CT brain 5/26/23: Findings concerning for metastatic disease in the right parietal & occipital lobe with extensive vasogenic edema causing 4 mm midline shift.  Tiny amount of intratumoral hemorrhage with blood extending into the adjacent subarachnoid spaces, unchanged from the prior examinations          Assessment     1. Lung cancer with brain mets  2. Localized brain edema with with mild intraparenchymal hemorrhage  3. COPD, no exacerbation  4. Rheumatoid arthritis  5. Hypertensive emergency, resolved      Plan       · PRN nebs for COPD    No much else to add from pulmonary perspective and therefore we will sign off.  We appreciate the assistance provided from [facility] and taken care of this patient.      Brennen Wilkerson MD  05/27/23  16:52 EDT            This note was dictated utilizing Dragon dictation

## 2023-05-27 NOTE — CONSULTS
Patient Name:  Pam Vidal  YOB: 1945  MRN:  [id number]  Date of Admission:  5/25/2023  Date of Consult:  5/27/2023  Patient Care Team:  Nik Mckay MD as PCP - General  Nik Mckay MD as PCP - Family Medicine  Remedios Rice MD as Surgeon (Thoracic Surgery)  Hector Rodriguez MD as Consulting Physician (Radiation Oncology)  Jannet Chauhan RN as Nurse Navigator  Remedios Rice MD as Referring Physician (Thoracic Surgery)  Cruzito Lagunas MD as Consulting Physician (Hematology and Oncology)    Inpatient Internal Medicine Consult  Consult performed by: Zoe Mckee APRN  Consult ordered by: Brennen Wilkerson MD        Evaluate status and make recommendations regarding treatment for medical management.  Subjective   History of Present Illness  Ms. Vidal is a 78 y.o. female with past medical history of metastatic lung cancer, COPD, COVID-19, IBS, rheumatoid arthritis that has been admitted to [facility] due complaints of gait instability, disorientation, mild headache, hemoptysis for a few days.  She was found to have hemorrhagic metastatic lesion of the brain.  She has been admitted by the intensivist service and and we were asked to see and assist with her medical problems, specifically relating to her medical management out of the critical care unit for COPD, hyperlipidemia, IBS and other medical problems.      She is undergoing cancer treatment and receives an infusion every other week, followed by [facility].     Currently she has no headache, no pain, no shortness of breath.  No nausea vomiting or diarrhea.  No constipation or abdominal pain.  Intermittent dizziness, no lightheadedness.  No vision changes.  No unintentional weight loss or impaired appetite.  No fever or chills.  No dysuria.  She has intermittent cough that is nonproductive today.       Past Medical History:   Diagnosis Date   • COPD (chronic obstructive pulmonary disease)    •  Coughing up blood     X2 MONTHS   • History of COVID-19 2022   • Hyperlipidemia    • IBS (irritable bowel syndrome)    • Primary lung adenocarcinoma, right    • Rheumatoid arthritis      Past Surgical History:   Procedure Laterality Date   • APPENDECTOMY      appendix removed   • BRONCHOSCOPY N/A 2022    Procedure: BRONCHOSCOPY WITH BAL,  BIOPSIES, AND BRUSHINGS WITH ENDOBRONCHIAL ULTRASOUND WITH FNA;  Surgeon: Gregor Vee MD;  Location: [facility] ENDOSCOPY;  Service: Pulmonary;  Laterality: N/A;  PRE- HILAR MASS  POST- SAME   • BRONCHOSCOPY N/A 2023    Procedure: BRONCHOSCOPY with biopsy, lavage, brushing;  Surgeon: Gregor Vee MD;  Location: [facility] ENDOSCOPY;  Service: Pulmonary;  Laterality: N/A;   • CAROTID ENDARTERECTOMY Right    • CAROTID ENDARTERECTOMY Left    • CATARACT EXTRACTION     • CERVICAL FUSION     • CHOLECYSTECTOMY     • HYSTERECTOMY     • SHOULDER ARTHROSCOPY Right 2019    right should scope/cuff repair    • VENOUS ACCESS DEVICE (PORT) INSERTION Right 2022    Procedure: POWERPORT INSERTION;  Surgeon: Remedios Rice MD;  Location: [facility] MAIN OR;  Service: Thoracic;  Laterality: Right;     Family History   Problem Relation Age of Onset   • Cancer Mother    • Cancer Father    • Malig Hyperthermia Neg Hx      Social History     Tobacco Use   • Smoking status: Former     Packs/day: 0.25     Types: Cigarettes     Quit date: 2022     Years since quittin.0   • Smokeless tobacco: Never   • Tobacco comments:     Former some day smoker of 1-2 cigs   Vaping Use   • Vaping Use: Never used   Substance Use Topics   • Alcohol use: Yes     Alcohol/week: 1.0 standard drink     Types: 1 Glasses of wine per week     Comment: occasionally   • Drug use: Never     Medications Prior to Admission   Medication Sig Dispense Refill Last Dose   • azelastine (ASTELIN) 0.1 % nasal spray 1 spray into the nostril(s) as directed by provider.      • B COMPLEX VITAMINS ER PO Take  by mouth  Daily.      • Cholecalciferol (VITAMIN D3) 5000 units capsule capsule Take 2,000 Units by mouth Daily.      • Colestipol HCl (COLESTID PO) Take 1 tablet by mouth Daily.      • esomeprazole (NexIUM) 10 MG packet Take 10 mg by mouth Daily.      • estradiol (ESTRACE) 1 MG tablet Take 1 tablet by mouth Daily.      • FeroSul 325 (65 Fe) MG tablet TAKE ONE TABLET BY MOUTH DAILY WITH BREAKFAST 30 tablet 5    • HYDROcodone Bit-Homatrop MBr (HYCODAN) 5-1.5 MG/5ML solution Take 5 mL by mouth Every 6 (Six) Hours As Needed for Cough. 250 mL 0    • lidocaine-prilocaine (EMLA) 2.5-2.5 % cream Apply 1 application topically to the appropriate area as directed Every 2 (Two) Hours As Needed for Mild Pain. 5 g 3    • Multiple Vitamins-Minerals (PRESERVISION AREDS 2+MULTI VIT PO) Take  by mouth.      • ofloxacin (OCUFLOX) 0.3 % ophthalmic solution       • ondansetron (ZOFRAN) 8 MG tablet Take 1 tablet by mouth 3 (Three) Times a Day As Needed for Nausea or Vomiting. 30 tablet 5    • oxybutynin (DITROPAN) 5 MG tablet Take 1 tablet by mouth 2 (Two) Times a Day.      • predniSONE (DELTASONE) 10 MG tablet TAKE ONE TABLET BY MOUTH DAILY 30 tablet 1      Allergies:  Del-mycin [erythromycin], Gentamicin, Ibuprofen, Latex, Metronidazole, Ofloxacin, Penicillins, Tetracycline, Adhesive tape, Aspirin, and Codeine    Objective      Vital Signs  Temp:  [97.5 °F (36.4 °C)-98.5 °F (36.9 °C)] 97.8 °F (36.6 °C)  Heart Rate:  [52-92] 73  Resp:  [18-20] 20  BP: (105-157)/(44-82) 128/58  Body mass index is 21.67 kg/m².    Physical Exam  Constitutional:       General: She is not in acute distress.     Appearance: She is normal weight. She is not ill-appearing or toxic-appearing.      Comments: Chronically ill-appearing   HENT:      Head: Normocephalic and atraumatic.      Nose: Nose normal.      Mouth/Throat:      Mouth: Mucous membranes are moist.   Eyes:      Extraocular Movements: Extraocular movements intact.      Conjunctiva/sclera: Conjunctivae  normal.      Pupils: Pupils are equal, round, and reactive to light.   Cardiovascular:      Rate and Rhythm: Normal rate and regular rhythm.      Pulses: Normal pulses.      Heart sounds: Normal heart sounds.   Pulmonary:      Effort: Pulmonary effort is normal.      Comments: Breath sounds diminished throughout all lobes  Abdominal:      General: Bowel sounds are normal. There is no distension.      Palpations: Abdomen is soft.      Tenderness: There is no abdominal tenderness.   Musculoskeletal:         General: No swelling or tenderness. Normal range of motion.      Cervical back: Normal range of motion and neck supple.   Skin:     General: Skin is warm and dry.   Neurological:      General: No focal deficit present.      Mental Status: She is alert and oriented to person, place, and time.   Psychiatric:         Mood and Affect: Mood normal.         Results Review:   I reviewed the patient's new clinical results.           Assessment/Plan     Active Hospital Problems    Diagnosis  POA   • **Lung cancer metastatic to brain [C34.90, C79.31]  Yes   • Leukocytosis [D72.829]  Unknown   • Brain mass [G93.89]  Unknown   • COPD (chronic obstructive pulmonary disease) [J44.9]  Unknown   • Rheumatoid arthritis [M06.9]  Unknown   • IBS (irritable bowel syndrome) [K58.9]  Unknown   • Hyperlipidemia [E78.5]  Unknown   • Cough with hemoptysis [R04.2]  Yes       Determined stable for transfer out of critical care to [facility] unit by neurosurgery.  Plans are to continue IV steroid and Keppra and repeat MRI of the brain.  PPI while on steroids.  Accu-Cheks every 6 and NovoLog SSI while on steroids, consider stopping this soon/depending on any surgical plans.  Neurosurgery and oncology have been consulted.    She was on nicardipine drip for blood pressure management, now weaned off and orders for hydralazine 10 mg as needed systolic BP greater than 140 will be continued.    Leukocytosis likely secondary to steroids.   Monitor.    Home medications will be addressed once verified for accuracy by nursing.    We will follow along with neurosurgery and manage medical problems.    Thank you very much for asking [facility] to be involved in this patient's care.       BARBARA Geronimo  [facility]  05/27/23  13:26 EDT

## 2023-05-27 NOTE — THERAPY TREATMENT NOTE
Patient Name: Pam Vidal  : 1945    MRN: [id number]                              Today's Date: 2023       Admit Date: 2023    Visit Dx:     ICD-10-CM ICD-9-CM   1. Lung cancer metastatic to brain  C34.90 162.9    C79.31 198.3   2. Vasogenic brain edema  G93.6 348.5   3. Hemoptysis  R04.2 786.30   4. Unsteady gait  R26.81 781.2     Patient Active Problem List   Diagnosis   • Primary lung adenocarcinoma, right   • Cough with hemoptysis   • Lung mass   • Advanced care planning/counseling discussion   • High risk medication use   • Muscular deconditioning   • Neoplastic malignant related fatigue   • Lung cancer metastatic to brain     Past Medical History:   Diagnosis Date   • COPD (chronic obstructive pulmonary disease)    • Coughing up blood     X2 MONTHS   • History of COVID-19 2022   • Hyperlipidemia    • IBS (irritable bowel syndrome)    • Primary lung adenocarcinoma, right    • Rheumatoid arthritis      Past Surgical History:   Procedure Laterality Date   • APPENDECTOMY      appendix removed   • BRONCHOSCOPY N/A 2022    Procedure: BRONCHOSCOPY WITH BAL,  BIOPSIES, AND BRUSHINGS WITH ENDOBRONCHIAL ULTRASOUND WITH FNA;  Surgeon: Gregor Vee MD;  Location: [facility] ENDOSCOPY;  Service: Pulmonary;  Laterality: N/A;  PRE- HILAR MASS  POST- SAME   • BRONCHOSCOPY N/A 2023    Procedure: BRONCHOSCOPY with biopsy, lavage, brushing;  Surgeon: Gregor Vee MD;  Location: [facility] ENDOSCOPY;  Service: Pulmonary;  Laterality: N/A;   • CAROTID ENDARTERECTOMY Right    • CAROTID ENDARTERECTOMY Left    • CATARACT EXTRACTION     • CERVICAL FUSION     • CHOLECYSTECTOMY     • HYSTERECTOMY     • SHOULDER ARTHROSCOPY Right 2019    right should scope/cuff repair    • VENOUS ACCESS DEVICE (PORT) INSERTION Right 2022    Procedure: POWERPORT INSERTION;  Surgeon: Remedios Rice MD;  Location: [facility] OR;  Service: Thoracic;  Laterality: Right;      General Information     Row Name  05/27/23 1307          Physical Therapy Time and Intention    Document Type therapy note (daily note)  -     Mode of Treatment individual therapy;physical therapy  -     Row Name 05/27/23 1307          General Information    Existing Precautions/Restrictions fall  -     Row Name 05/27/23 1307          Cognition    Orientation Status (Cognition) oriented x 3  -     Row Name 05/27/23 1307          Safety Issues, Functional Mobility    Impairments Affecting Function (Mobility) strength;pain  -           User Key  (r) = Recorded By, (t) = Taken By, (c) = Cosigned By    Initials Name Provider Type     Mayra Timmons, PT Physical Therapist               Mobility     Row Name 05/27/23 1307          Bed Mobility    Bed Mobility supine-sit;sit-supine  -     Supine-Sit Teller (Bed Mobility) verbal cues;standby assist  -     Sit-Supine Teller (Bed Mobility) verbal cues;standby assist  -     Assistive Device (Bed Mobility) bed rails;head of bed elevated  -     Row Name 05/27/23 1307          Sit-Stand Transfer    Sit-Stand Teller (Transfers) verbal cues;standby assist  -     Assistive Device (Sit-Stand Transfers) walker, front-wheeled  -     Row Name 05/27/23 1307          Gait/Stairs (Locomotion)    Teller Level (Gait) verbal cues;nonverbal cues (demo/gesture);contact guard  -     Assistive Device (Gait) walker, front-wheeled  -     Distance in Feet (Gait) 150  -CH     Deviations/Abnormal Patterns (Gait) mariia decreased;stride length decreased  -     Comment, (Gait/Stairs) gait was slow but steady  -           User Key  (r) = Recorded By, (t) = Taken By, (c) = Cosigned By    Initials Name Provider Type     Mayra Timmons, PT Physical Therapist               Obj/Interventions    No documentation.                Goals/Plan    No documentation.                Clinical Impression     Row Name 05/27/23 1309          Pain    Pretreatment Pain Rating 0/10 - no pain   -     Posttreatment Pain Rating 0/10 - no pain  -     Row Name 05/27/23 1309          Plan of Care Review    Plan of Care Reviewed With patient;family  -     Outcome Evaluation Pt demonstrates increased activity tolerance and functional strength as she was able to increase her gait distance and required less assistance. Pt was able to perform bed mobility and transfers with just SBA and she was able to ambulate 150 ft in salter with CGA and walker. Acute care PT will continue to follow to address strength, mobility, and gait. Pt is encouraged to ambulate in the salter with nursing and family.  -     Row Name 05/27/23 1309          Positioning and Restraints    Pre-Treatment Position in bed  -     Post Treatment Position bed  -     In Bed supine;call light within reach;encouraged to call for assist;exit alarm on;with family/caregiver  -           User Key  (r) = Recorded By, (t) = Taken By, (c) = Cosigned By    Initials Name Provider Type     Mayra Timmons, PT Physical Therapist               Outcome Measures     Row Name 05/27/23 1316 05/27/23 0909       How much help from another person do you currently need...    Turning from your back to your side while in flat bed without using bedrails? 3  -CH 3  -CT    Moving from lying on back to sitting on the side of a flat bed without bedrails? 3  - 3  -CT    Moving to and from a bed to a chair (including a wheelchair)? 3  -CH 3  -CT    Standing up from a chair using your arms (e.g., wheelchair, bedside chair)? 3  - 3  -CT    Climbing 3-5 steps with a railing? 3  - 2  -CT    To walk in hospital room? 3  -CH 3  -CT    AM-PAC 6 Clicks Score (PT) 18  - 17  -CT    Highest level of mobility 6 --> Walked 10 steps or more  - 5 --> Static standing  -CT    Row Name 05/27/23 1316          Functional Assessment    Outcome Measure Options AM-PAC 6 Clicks Basic Mobility (PT)  -           User Key  (r) = Recorded By, (t) = Taken By, (c) = Cosigned By     Initials Name Provider Type     Mayra Timmons, PT Physical Therapist    Radha Daniels, RN Registered Nurse                             Physical Therapy Education     Title: PT OT SLP Therapies (In Progress)     Topic: Physical Therapy (In Progress)     Point: Mobility training (Done)     Learning Progress Summary           Patient Acceptance, E,TB,D, VU,NR by  at 5/27/2023 1317    Acceptance, E,TB,D, VU,NR by  at 5/26/2023 1548                   Point: Home exercise program (Not Started)     Learner Progress:  Not documented in this visit.          Point: Body mechanics (Done)     Learning Progress Summary           Patient Acceptance, E,TB,D, VU,NR by  at 5/27/2023 1317                   Point: Precautions (Done)     Learning Progress Summary           Patient Acceptance, E,TB,D, VU,NR by  at 5/27/2023 1317                               User Key     Initials Effective Dates Name Provider Type Discipline     06/16/21 -  Mayra Timmons, PT Physical Therapist PT     06/16/21 -  Katelynn Gomez, PT Physical Therapist PT              PT Recommendation and Plan     Plan of Care Reviewed With: patient, family  Outcome Evaluation: Pt demonstrates increased activity tolerance and functional strength as she was able to increase her gait distance and required less assistance. Pt was able to perform bed mobility and transfers with just SBA and she was able to ambulate 150 ft in salter with CGA and walker. Acute care PT will continue to follow to address strength, mobility, and gait. Pt is encouraged to ambulate in the salter with nursing and family.     Time Calculation:    PT Charges     Row Name 05/27/23 1317             Time Calculation    Start Time 1052  -      Stop Time 1101  -      Time Calculation (min) 9 min  -      PT Received On 05/27/23  -      PT - Next Appointment 05/30/23  -         Time Calculation- PT    Total Timed Code Minutes- PT 9 minute(s)  -         Timed Charges     15129 - PT Therapeutic Activity Minutes 9  -CH         Total Minutes    Timed Charges Total Minutes 9  -CH       Total Minutes 9  -CH            User Key  (r) = Recorded By, (t) = Taken By, (c) = Cosigned By    Initials Name Provider Type    Mayra Collins, PT Physical Therapist              Therapy Charges for Today     Code Description Service Date Service Provider Modifiers Qty    [id number] HC PT THERAPEUTIC ACT EA 15 MIN 5/27/2023 Mayra Timmons, PT GP 1          PT G-Codes  Outcome Measure Options: AM-PAC 6 Clicks Basic Mobility (PT)  AM-PAC 6 Clicks Score (PT): 18  PT Discharge Summary  Anticipated Discharge Disposition (PT): home with assist, home with home health    Myara Timmons, PT  5/27/2023

## 2023-05-28 ENCOUNTER — ANESTHESIA EVENT (OUTPATIENT)
Dept: PERIOP | Facility: HOSPITAL | Age: 78
DRG: 025 | End: 2023-05-28
Payer: MEDICARE

## 2023-05-28 LAB
ANION GAP SERPL CALCULATED.3IONS-SCNC: 9.4 MMOL/L (ref 5–15)
BASOPHILS # BLD AUTO: 0.02 10*3/MM3 (ref 0–0.2)
BASOPHILS NFR BLD AUTO: 0.1 % (ref 0–1.5)
BUN SERPL-MCNC: 28 MG/DL (ref 8–23)
BUN/CREAT SERPL: 29.8 (ref 7–25)
CALCIUM SPEC-SCNC: 9.5 MG/DL (ref 8.6–10.5)
CHLORIDE SERPL-SCNC: 104 MMOL/L (ref 98–107)
CO2 SERPL-SCNC: 21.6 MMOL/L (ref 22–29)
CREAT SERPL-MCNC: 0.94 MG/DL (ref 0.57–1)
DEPRECATED RDW RBC AUTO: 44.8 FL (ref 37–54)
EGFRCR SERPLBLD CKD-EPI 2021: 63 ML/MIN/1.73
EOSINOPHIL # BLD AUTO: 0.01 10*3/MM3 (ref 0–0.4)
EOSINOPHIL NFR BLD AUTO: 0.1 % (ref 0.3–6.2)
ERYTHROCYTE [DISTWIDTH] IN BLOOD BY AUTOMATED COUNT: 14.9 % (ref 12.3–15.4)
GLUCOSE BLDC GLUCOMTR-MCNC: 120 MG/DL (ref 70–130)
GLUCOSE BLDC GLUCOMTR-MCNC: 124 MG/DL (ref 70–130)
GLUCOSE BLDC GLUCOMTR-MCNC: 143 MG/DL (ref 70–130)
GLUCOSE SERPL-MCNC: 142 MG/DL (ref 65–99)
HCT VFR BLD AUTO: 38.2 % (ref 34–46.6)
HGB BLD-MCNC: 12.9 G/DL (ref 12–15.9)
IMM GRANULOCYTES # BLD AUTO: 0.15 10*3/MM3 (ref 0–0.05)
IMM GRANULOCYTES NFR BLD AUTO: 0.8 % (ref 0–0.5)
LYMPHOCYTES # BLD AUTO: 0.65 10*3/MM3 (ref 0.7–3.1)
LYMPHOCYTES NFR BLD AUTO: 3.7 % (ref 19.6–45.3)
MCH RBC QN AUTO: 28.2 PG (ref 26.6–33)
MCHC RBC AUTO-ENTMCNC: 33.8 G/DL (ref 31.5–35.7)
MCV RBC AUTO: 83.4 FL (ref 79–97)
MONOCYTES # BLD AUTO: 0.64 10*3/MM3 (ref 0.1–0.9)
MONOCYTES NFR BLD AUTO: 3.6 % (ref 5–12)
NEUTROPHILS NFR BLD AUTO: 16.23 10*3/MM3 (ref 1.7–7)
NEUTROPHILS NFR BLD AUTO: 91.7 % (ref 42.7–76)
NRBC BLD AUTO-RTO: 0 /100 WBC (ref 0–0.2)
PLATELET # BLD AUTO: 310 10*3/MM3 (ref 140–450)
PMV BLD AUTO: 11.3 FL (ref 6–12)
POTASSIUM SERPL-SCNC: 3.9 MMOL/L (ref 3.5–5.2)
RBC # BLD AUTO: 4.58 10*6/MM3 (ref 3.77–5.28)
SODIUM SERPL-SCNC: 135 MMOL/L (ref 136–145)
WBC NRBC COR # BLD: 17.7 10*3/MM3 (ref 3.4–10.8)

## 2023-05-28 PROCEDURE — 80048 BASIC METABOLIC PNL TOTAL CA: CPT | Performed by: INTERNAL MEDICINE

## 2023-05-28 PROCEDURE — 85025 COMPLETE CBC W/AUTO DIFF WBC: CPT | Performed by: INTERNAL MEDICINE

## 2023-05-28 PROCEDURE — 99223 1ST HOSP IP/OBS HIGH 75: CPT | Performed by: INTERNAL MEDICINE

## 2023-05-28 PROCEDURE — 25010000002 DEXAMETHASONE SODIUM PHOSPHATE 20 MG/5ML SOLUTION: Performed by: INTERNAL MEDICINE

## 2023-05-28 PROCEDURE — 99222 1ST HOSP IP/OBS MODERATE 55: CPT | Performed by: RADIOLOGY

## 2023-05-28 PROCEDURE — 25010000002 HYDRALAZINE PER 20 MG: Performed by: INTERNAL MEDICINE

## 2023-05-28 PROCEDURE — 82948 REAGENT STRIP/BLOOD GLUCOSE: CPT

## 2023-05-28 PROCEDURE — 25010000002 LEVETRIRACETAM PER 10 MG: Performed by: INTERNAL MEDICINE

## 2023-05-28 RX ADMIN — Medication 10 ML: at 20:50

## 2023-05-28 RX ADMIN — ESOMEPRAZOLE MAGNESIUM: 40 CAPSULE, DELAYED RELEASE ORAL at 08:33

## 2023-05-28 RX ADMIN — LEVETIRACETAM 500 MG: 500 INJECTION, SOLUTION, CONCENTRATE INTRAVENOUS at 20:50

## 2023-05-28 RX ADMIN — Medication 10 ML: at 09:00

## 2023-05-28 RX ADMIN — DEXAMETHASONE SODIUM PHOSPHATE 4 MG: 4 INJECTION, SOLUTION INTRAMUSCULAR; INTRAVENOUS at 12:58

## 2023-05-28 RX ADMIN — ATORVASTATIN CALCIUM 40 MG: 20 TABLET, FILM COATED ORAL at 20:50

## 2023-05-28 RX ADMIN — DEXAMETHASONE SODIUM PHOSPHATE 4 MG: 4 INJECTION, SOLUTION INTRAMUSCULAR; INTRAVENOUS at 05:28

## 2023-05-28 RX ADMIN — HYDRALAZINE HYDROCHLORIDE 10 MG: 20 INJECTION INTRAMUSCULAR; INTRAVENOUS at 05:28

## 2023-05-28 RX ADMIN — HYDRALAZINE HYDROCHLORIDE 10 MG: 20 INJECTION INTRAMUSCULAR; INTRAVENOUS at 17:03

## 2023-05-28 RX ADMIN — DEXAMETHASONE SODIUM PHOSPHATE 4 MG: 4 INJECTION, SOLUTION INTRAMUSCULAR; INTRAVENOUS at 17:04

## 2023-05-28 RX ADMIN — LEVETIRACETAM 500 MG: 500 INJECTION, SOLUTION, CONCENTRATE INTRAVENOUS at 08:33

## 2023-05-28 NOTE — PLAN OF CARE
Problem: Adult Inpatient Plan of Care  Goal: Plan of Care Review  Outcome: Ongoing, Progressing  Goal: Patient-Specific Goal (Individualized)  Outcome: Ongoing, Progressing  Goal: Absence of Hospital-Acquired Illness or Injury  Outcome: Ongoing, Progressing  Goal: Optimal Comfort and Wellbeing  Outcome: Ongoing, Progressing  Goal: Readiness for Transition of Care  Outcome: Ongoing, Progressing     Problem: Fall Injury Risk  Goal: Absence of Fall and Fall-Related Injury  Outcome: Ongoing, Progressing     Problem: Skin Injury Risk Increased  Goal: Skin Health and Integrity  Outcome: Ongoing, Progressing     Problem: Adjustment to Illness (Stroke, Hemorrhagic)  Goal: Optimal Coping  Outcome: Ongoing, Progressing   Goal Outcome Evaluation:

## 2023-05-28 NOTE — PROGRESS NOTES
Name: Pam Vidal ADMIT: 2023   : 1945  PCP: Nik Mckay MD    MRN: [id number] LOS: 3 days   AGE/SEX: 78 y.o. female  ROOM: [facility]     Subjective   Subjective   Patient remains asymptomatic.  No headache.  No loss of consciousness.  No seizures.  No dizziness.  No focal neurological symptoms.    Review of Systems  Cardiovascular/respiratory.  No chest pain.  No shortness of breath.  No palpitation.  No cough.  GI.  No abdominal pain or nausea or vomiting.  Normal bowel habits.  .  No dysuria or hematuria.       Objective   Objective   Vital Signs  Temp:  [97.5 °F (36.4 °C)-98.1 °F (36.7 °C)] 97.5 °F (36.4 °C)  Heart Rate:  [60-84] 84  Resp:  [18-20] 20  BP: (123-153)/(51-64) 153/64  SpO2:  [92 %-95 %] 95 %  on   ;   Device (Oxygen Therapy): room air    Intake/Output Summary (Last 24 hours) at 2023 1551  Last data filed at 2023 1442  Gross per 24 hour   Intake 980 ml   Output --   Net 980 ml     Body mass index is 21.67 kg/m².      23  1325 23  2045 23   Weight: 53.5 kg (118 lb) 54.3 kg (119 lb 11.4 oz) 60.9 kg (134 lb 4.2 oz)     Physical Exam    Results General.  Elderly female.  Alert and oriented x3.  No apparent pain/distress/diaphoresis.  Normal mood and affect  Eyes.  Pupils equal round and reactive.  Status post bilateral cataract surgery.  No pallor or jaundice.  Intact extraocular musculature.  Oral cavity.  Moist mucous membranes  Neck.  Supple.  No JVD.  Positive bilateral carotid scars.  No lymphadenopathy or thyromegaly.  Cardiovascular.  Regular rate and rhythm with no gallops or murmurs.  Chest.  Clear to auscultation bilaterally with no added sounds and poor bilateral air entry.  Abdomen.  Soft lax.  No tenderness.  No organomegaly.  No guarding or rebound.  Extremities.  No clubbing/cyanosis/edema.  CNS.  No acute focal neurological deficits.    Review:      Results from last 7 days   Lab Units 23  0622 23  0638  05/26/23  0347 05/25/23  1346   SODIUM mmol/L 135* 136 138 136   POTASSIUM mmol/L 3.9 3.9 3.9 3.6   CHLORIDE mmol/L 104 101 104 97*   CO2 mmol/L 21.6* 23.8 25.0 25.2   BUN mg/dL 28* 22 21 16   CREATININE mg/dL 0.94 0.86 1.07* 1.08*   GLUCOSE mg/dL 142* 116* 160* 102*   CALCIUM mg/dL 9.5 9.8 9.2 10.0   AST (SGOT) U/L  --  15 15 20   ALT (SGPT) U/L  --  15 12 15     Estimated Creatinine Clearance: 49 mL/min (by C-G formula based on SCr of 0.94 mg/dL).  Results from last 7 days   Lab Units 05/26/23  0347   HEMOGLOBIN A1C % 5.70*     Results from last 7 days   Lab Units 05/28/23  1237 05/28/23  0512 05/27/23  2330 05/27/23  1743 05/27/23  1218 05/27/23  0546 05/27/23  0006 05/26/23  1800   GLUCOSE mg/dL 120 143* 158* 176* 122 126 168* 218*     Results from last 7 days   Lab Units 05/25/23  1655 05/25/23  1346   HSTROP T ng/L 15* 16*     Results from last 7 days   Lab Units 05/25/23  1346   PROBNP pg/mL 130.0     Results from last 7 days   Lab Units 05/25/23  1346   TSH uIU/mL 2.150           Invalid input(s):  PHOS  Results from last 7 days   Lab Units 05/26/23  0347   CHOLESTEROL mg/dL 179   TRIGLYCERIDES mg/dL 109   HDL CHOL mg/dL 56     Results from last 7 days   Lab Units 05/28/23  0622 05/27/23  0638 05/26/23  0350 05/25/23  1346   WBC 10*3/mm3 17.70* 23.29* 9.60 11.34*   HEMOGLOBIN g/dL 12.9 13.5 13.5 14.3   HEMATOCRIT % 38.2 40.3 40.8 42.0   PLATELETS 10*3/mm3 310 352 323 359   MCV fL 83.4 83.6 85.2 83.5   MCH pg 28.2 28.0 28.2 28.4   MCHC g/dL 33.8 33.5 33.1 34.0   RDW % 14.9 15.0 14.7 14.6   RDW-SD fl 44.8 45.4 45.8 44.3   MPV fL 11.3 10.7 11.5 10.5   NEUTROPHIL % % 91.7*  --   --  79.6*   LYMPHOCYTE % % 3.7*  --   --  10.3*   MONOCYTES % % 3.6*  --   --  7.9   EOSINOPHIL % % 0.1*  --   --  1.4   BASOPHIL % % 0.1  --   --  0.3   IMM GRAN % % 0.8*  --   --  0.5   NEUTROS ABS 10*3/mm3 16.23*  --   --  9.02*   LYMPHS ABS 10*3/mm3 0.65*  --   --  1.17   MONOS ABS 10*3/mm3 0.64  --   --  0.90   EOS ABS 10*3/mm3 0.01   --   --  0.16   BASOS ABS 10*3/mm3 0.02  --   --  0.03   IMMATURE GRANS (ABS) 10*3/mm3 0.15*  --   --  0.06*   NRBC /100 WBC 0.0  --   --  0.0     Results from last 7 days   Lab Units 05/27/23  0638 05/26/23  0347 05/25/23  1346   INR  1.01 1.03 1.00   APTT seconds  --   --  33.7         Results from last 7 days   Lab Units 05/27/23  1820   PROCALCITONIN ng/mL 0.04                               Imaging:  Imaging Results (Last 24 Hours)     ** No results found for the last 24 hours. **             I reviewed the patient's new clinical results / labs / tests / procedures      Assessment/Plan     Active Hospital Problems    Diagnosis  POA   • **Lung cancer metastatic to brain [C34.90, C79.31]  Yes   • Leukocytosis [D72.829]  Unknown   • Brain mass [G93.89]  Unknown   • COPD (chronic obstructive pulmonary disease) [J44.9]  Unknown   • Rheumatoid arthritis [M06.9]  Unknown   • IBS (irritable bowel syndrome) [K58.9]  Unknown   • Hyperlipidemia [E78.5]  Unknown   • Cough with hemoptysis [R04.2]  Yes      Resolved Hospital Problems   No resolved problems to display.       1.  Hemorrhagic metastatic lesions of the brain with vasogenic edema.  Neurosurgery is following.  Currently on Keppra and IV steroids.  Repeat MRI shows no significant change.  Neurosurgery planning possible surgical intervention early next week.  Radiation oncology recommends radiation after surgery.  Hematology oncology recommends restaging the patient and if she has distal metastasis surgery probably would not be the best option.    2.  Metastatic lung cancer stage III/COPD/hemoptysis.  Stable respiratory status.  Patient is on chemo/immunotherapy and is s/p radiation..  Hematology oncology on board.  3.  Rheumatoid arthritis.  Appears to be stable.  Currently on steroids.  4.  Dyslipidemia/carotid peripheral vascular disease status post bilateral endarterectomy Aspirin on hold because of #1.  Lipitor initiated yesterday.  5.  Steroid-induced  leukocytosis.  No fever.  No clinical focus of infection.  Will check procalcitonin.  If procalcitonin is elevated will check chest x-ray and UA.  6.  Prediabetes.  A1c is 5.7.  Positive steroid-induced hyperglycemia.  Continue sliding scale.  7.  Hypertensive emergency.  Resolved on Cardene drip.  Currently on no medications with good blood pressure control and no evidence of angina or congestive heart failure.  8.  VTE prophylaxis with sequential compression devices.    Discussed my findings and plan of treatment with the patient/. Peter  Disposition.  To be determined based on clinical course.  Discussed my findings and plan of treatment with the patient/family.          Rhina Castro MD  [facility]  05/28/23  15:51 EDT

## 2023-05-28 NOTE — CONSULTS
Subjective     REASON FOR CONSULTATION: Decision about craniotomy  Provide an opinion on any further workup or treatment                             REQUESTING PHYSICIAN: [facility]    RECORDS OBTAINED:  Records of the patients history including those obtained from the referring provider were reviewed and summarized in detail.    HISTORY OF PRESENT ILLNESS:  The patient is a 78 y.o. year old female who is here for an opinion about the above issue.    History of Present Illness patient is a 78-year-old female with non-small cell lung cancer initially stage III for which she received chemoradiation followed by immunotherapy with Imfinzi her last 5/15/2023.  She is now admitted with confusion and brain MRI showing 3 lesions 2 in the occipital lobe and 1 in the cerebellum which are almost certainly related to her lung cancer.    Neurosurgery has seen her and is scheduled to take her to surgery for the occipital tumor and we are asked whether a biopsy is needed for tissue diagnosis.    She is feeling fairly well overall now that her steroids were started with no headache or visual changes that are significant although I am sure she still has a field defect.    She will need restaging to make sure there is not systemic progression before taking her to surgery and obviously there is no systemic progression radiation to all 3 lesions versus surgery for the larger 1 and radiation are reasonable options    She has no pain or shortness of breath        Past Medical History:   Diagnosis Date   • COPD (chronic obstructive pulmonary disease)    • Coughing up blood     X2 MONTHS   • History of COVID-19 02/2022   • Hyperlipidemia    • IBS (irritable bowel syndrome)    • Primary lung adenocarcinoma, right    • Rheumatoid arthritis         Past Surgical History:   Procedure Laterality Date   • APPENDECTOMY      appendix removed   • BRONCHOSCOPY N/A 8/23/2022    Procedure: BRONCHOSCOPY WITH BAL,  BIOPSIES, AND BRUSHINGS WITH ENDOBRONCHIAL  ULTRASOUND WITH FNA;  Surgeon: Gregor Vee MD;  Location: [facility] ENDOSCOPY;  Service: Pulmonary;  Laterality: N/A;  PRE- HILAR MASS  POST- SAME   • BRONCHOSCOPY N/A 1/4/2023    Procedure: BRONCHOSCOPY with biopsy, lavage, brushing;  Surgeon: Gregor Vee MD;  Location: [facility] ENDOSCOPY;  Service: Pulmonary;  Laterality: N/A;   • CAROTID ENDARTERECTOMY Right    • CAROTID ENDARTERECTOMY Left    • CATARACT EXTRACTION     • CERVICAL FUSION     • CHOLECYSTECTOMY     • HYSTERECTOMY     • SHOULDER ARTHROSCOPY Right 02/19/2019    right should scope/cuff repair    • VENOUS ACCESS DEVICE (PORT) INSERTION Right 9/6/2022    Procedure: POWERPORT INSERTION;  Surgeon: Remedios Rice MD;  Location: [facility] MAIN OR;  Service: Thoracic;  Laterality: Right;        No current facility-administered medications on file prior to encounter.     Current Outpatient Medications on File Prior to Encounter   Medication Sig Dispense Refill   • azelastine (ASTELIN) 0.1 % nasal spray 1 spray into the nostril(s) as directed by provider.     • B COMPLEX VITAMINS ER PO Take  by mouth Daily.     • Cholecalciferol (VITAMIN D3) 5000 units capsule capsule Take 2,000 Units by mouth Daily.     • esomeprazole (NexIUM) 10 MG packet Take 10 mg by mouth Daily.     • estradiol (ESTRACE) 1 MG tablet Take 1 tablet by mouth Daily.     • FeroSul 325 (65 Fe) MG tablet TAKE ONE TABLET BY MOUTH DAILY WITH BREAKFAST 30 tablet 5   • HYDROcodone Bit-Homatrop MBr (HYCODAN) 5-1.5 MG/5ML solution Take 5 mL by mouth Every 6 (Six) Hours As Needed for Cough. 250 mL 0   • lidocaine-prilocaine (EMLA) 2.5-2.5 % cream Apply 1 application topically to the appropriate area as directed Every 2 (Two) Hours As Needed for Mild Pain. 5 g 3   • Multiple Vitamins-Minerals (PRESERVISION AREDS 2+MULTI VIT PO) Take  by mouth.     • ofloxacin (OCUFLOX) 0.3 % ophthalmic solution      • ondansetron (ZOFRAN) 8 MG tablet Take 1 tablet by mouth 3 (Three) Times a Day As Needed for Nausea  or Vomiting. 30 tablet 5   • predniSONE (DELTASONE) 10 MG tablet TAKE ONE TABLET BY MOUTH DAILY 30 tablet 1   • Colestipol HCl (COLESTID PO) Take 1 tablet by mouth Daily.     • oxybutynin (DITROPAN) 5 MG tablet Take 1 tablet by mouth 2 (Two) Times a Day.          ALLERGIES:    Allergies   Allergen Reactions   • Del-Mycin [Erythromycin] Hives   • Gentamicin Hives   • Ibuprofen Nausea And Vomiting   • Latex Dermatitis     GLOVES   • Metronidazole Hives   • Ofloxacin Hives and Nausea Only   • Penicillins Hives   • Tetracycline Hives   • Adhesive Tape Dermatitis     BANDAIDS   • Aspirin GI Intolerance     Vomiting can take EC   • Codeine Nausea And Vomiting        Social History     Socioeconomic History   • Marital status:    Tobacco Use   • Smoking status: Former     Packs/day: 0.25     Types: Cigarettes     Quit date: 2022     Years since quittin.0   • Smokeless tobacco: Never   • Tobacco comments:     Former some day smoker of 1-2 cigs   Vaping Use   • Vaping Use: Never used   Substance and Sexual Activity   • Alcohol use: Yes     Alcohol/week: 1.0 standard drink     Types: 1 Glasses of wine per week     Comment: occasionally   • Drug use: Never   • Sexual activity: Defer        Family History   Problem Relation Age of Onset   • Cancer Mother    • Cancer Father    • Malig Hyperthermia Neg Hx         Review of Systems   Eyes: Positive for visual disturbance.   Psychiatric/Behavioral: Positive for confusion (Improved).       Objective     Vitals:    23 0509 23 0528 23 1021 23 1442   BP: 141/53 147/56 135/56 153/64   BP Location: Left arm  Left arm Left arm   Patient Position: Lying  Lying Lying   Pulse: 65 69 71 84   Resp: 18  18 20   Temp: 98 °F (36.7 °C)  97.7 °F (36.5 °C) 97.5 °F (36.4 °C)   TempSrc: Oral  Oral Oral   SpO2: 93%  92% 95%   Weight:       Height:             5/15/2023    11:26 AM   Current Status   ECOG score 0       Physical Exam    CONSTITUTIONAL:  Vital signs  reviewed.  No distress, looks comfortable.  EYES:  Conjunctivae and lids unremarkable.  PERRLA  EARS,NOSE,MOUTH,THROAT:  Ears and nose appear unremarkable.  Lips, teeth, gums appear unremarkable.  RESPIRATORY:  Normal respiratory effort.  Lungs clear to auscultation bilaterally.  CARDIOVASCULAR:  Normal S1, S2.  No murmurs rubs or gallops.  No significant lower extremity edema.  GASTROINTESTINAL: Abdomen appears unremarkable.  Nontender.  No hepatomegaly.  No splenomegaly.  LYMPHATIC:  No cervical, supraclavicular, axillary lymphadenopathy.  SKIN:  Warm.  No rashes.  PSYCHIATRIC:  Normal judgment and insight.  Normal mood and affect.      RECENT LABS:  Hematology WBC   Date Value Ref Range Status   05/28/2023 17.70 (H) 3.40 - 10.80 10*3/mm3 Final     RBC   Date Value Ref Range Status   05/28/2023 4.58 3.77 - 5.28 10*6/mm3 Final     Hemoglobin   Date Value Ref Range Status   05/28/2023 12.9 12.0 - 15.9 g/dL Final     Hematocrit   Date Value Ref Range Status   05/28/2023 38.2 34.0 - 46.6 % Final     Platelets   Date Value Ref Range Status   05/28/2023 310 140 - 450 10*3/mm3 Final      MRI BRAIN  IMPRESSION:  1. Since the prior MRI of the brain on 08/15/2022, patient has developed  at least 3 enhancing lesions in the brain compatible with at least 3  brain metastases, the largest brain metastases is in the posterior  superior lateral right occipital lobe and measures 3.7 x 2.9 x 2.7 cm in  size and has some subacute and chronic blood products within it, has a  large amount of surrounding vasogenic edema with the vasogenic edema  tracking up to 8 x 5 x 6 cm in anterior posterior and medial lateral and  craniocaudal dimension, and it has mass effect on the posterior temporal  horn and trigone and occipital horn of the right lateral ventricle and  there is associated 3-4 mm right-to-left midline shift at the level of  the septum pellucidum. There is a tiny second metastasis to the  posterior lateral left occipital cortex  measuring 4 x 3.5 x 4 mm in size  that has 15 x 13 mm of surrounding vasogenic edema. The third metastasis  is located in the midline of the superior cerebellum, measures 7 x 6 x 7  mm in size and has 2 x 1.3 cm of surrounding vasogenic edema.  2. There is mild-to-moderate small vessel disease in cerebral white  matter. The remainder of the MRI of the brain is within normal limits.  The results were communicated to the nurse in the CCU taking care of the  patient by telephone on 05/26/2023 at 1:00 AM. I also communicated the  results to Dr. Ricketts from neurosurgery by telephone on 05/26/2023 at 8:20  AM.     This report was finalized on 5/26/2023     Assessment & Plan     • 1.  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  Patient is not felt to be a surgical candidate and combined chemotherapy and radiation.   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  • 9/20/2022 1st dose of weekly carboplatin/taxol.   • She completed 6 cycles of weekly CarboTaxol 10/25/2022.  • Radiation therapy completed 10/27/2022  • First dose durvalumab delivered 11/28/2022  • Recent follow-up bronchoscopy performed 1/4/2023 with no evidence of malignancy identified  • CT scans dated 3/23 show stable disease with no new mets  • 5/15/2023: Cycle #13 Imfinzi today.  Tolerating well.  • Tumor is strongly PD-L1 positive greater than 95% (although the patient may not be a great candidate for immunotherapy in the future due to her rheumatoid arthritis).  • Infusion headache MRI shows 3 brain mets-needs restaging    2.  Other comorbidities including vascular disease with previous carotid endarterectomy surgeries.  She has no known history of stroke or myocardial infarction.  Overall her performance status is very good.  3.  Hemoptysis related to her central tumor.  Improved with initiation of therapy.  She reports she still has some darker blood coming up when she coughs but this is usually now associated more with some  yellow foul-smelling sputum.  4.  Rheumatoid arthritis: Patient previously on Celebrex, but discontinued due to hemoptysis.  Patient started on prednisone 20 mg daily prescribed to manage her arthritis pain.  Prednisone has since been decreased to 10 mg daily.  5.    Lesion on the right kidney stable on further follow-up scans.    PLAN  1.I do not feel there is much doubt that the 3 brain lesions are related to her underlying lung cancer but the bigger question is  If she has systemic progression or just isolated brain involvement.      Indeed if surgery is felt to be the best option for the occipital lesion following surgery focused radiation to the cerebellum would also be a good option after surgery.  If however she is not a good surgical candidate palliative radiation to all 3 sites is also an option.  We will repeat CAT scans and bone scan to make sure there is no systemic progression and then discuss    2.Needs DVT prophylaxis with SCDs in light of hemorrhagic metastasis    3.  Agree with steroids and Keppra  We will follow thank you for this consult    Discussed with Dr. Jones and Dr. Greenberg

## 2023-05-28 NOTE — PLAN OF CARE
Goal Outcome Evaluation:  Plan of Care Reviewed With: patient        Progress: no change  Outcome Evaluation: No complaints overnight. Rested comfortably. IV steroids and keppra given per orders. HOB restrictions maintained. NIH = 0. BP closely monitored, PRN hydralazine given x 1. Oncology to see today. Possible craniotomy 5/30. Q 6 accucheck w sliding scale coverage. Continue safety and progress towards goals.

## 2023-05-28 NOTE — CONSULTS
DIAGNOSIS and REASON FOR CONSULTATION: lung cancer with brain metastases  -  for advice and recommendations for this diagnosis    Referring Provider:  No ref. provider found    HISTORY OF PRESENT ILLNESS:  The patient is a 78 y.o. year old female with a hx of non small cell lung cancer.  She completed radiation to the lung on 10/31/22 with Dr. Hector Rodriguez.  She received 60Gy in 30 fractions.  She went on to receive durvalumab.  She most recently has been on imfinzi.  She presented to the ER with a several day hx of worsening vision, lethargy and fatigue.  She also has a hx of macular degeneration. She had a head CT 5/25/23 which showed a 4cm hemorrhagic mass in right occipital lobe consistent with metastatic disease. She had a brain mri on 5/25/23 which showed a least 3 enhancing lesions consistent with metastatic disease including a 3.7cm lesion right occipital lobe, with some subacute and chronic blood products with edema measuring 8cm and mass effect on posterior temporal horm, a 4mm lesion in the lateral left occipital cortex and a third lesion 7mm in the superior midline cerebellum    She was started on IV steroid and keppra.  Repeat brain mri showed no worsening of hemorrhage and she was transferred out of ICU.  She was evaluated by neurosurgery and according to most recent note from ADRIA Kwok, plan is for surgery on 5/20/23.    I was asked to see the patient at the request of the referring provider noted above for advice and recommendations regarding this diagnosis.    Objective     Past Medical History: she  has a past medical history of COPD (chronic obstructive pulmonary disease), Coughing up blood, History of COVID-19 (02/2022), Hyperlipidemia, IBS (irritable bowel syndrome), Primary lung adenocarcinoma, right, and Rheumatoid arthritis.    Past Surgical History:  she has a past surgical history that includes Cataract extraction; Cholecystectomy; Carotid endarterectomy (Right); Carotid  endarterectomy (Left); Cervical fusion; Hysterectomy; Appendectomy; Shoulder arthroscopy (Right, 02/19/2019); Bronchoscopy (N/A, 8/23/2022); Venous Access Device (Port) (Right, 9/6/2022); and Bronchoscopy (N/A, 1/4/2023).    Meds:    Current Facility-Administered Medications:   •  acetaminophen (TYLENOL) tablet 650 mg, 650 mg, Oral, Q4H PRN **OR** acetaminophen (TYLENOL) suppository 650 mg, 650 mg, Rectal, Q4H PRN, Brennen Wilkerson MD  •  aluminum-magnesium hydroxide-simethicone (MAALOX MAX) 400-400-40 MG/5ML suspension 15 mL, 15 mL, Oral, Q6H PRN, Neema Small APRN  •  atorvastatin (LIPITOR) tablet 40 mg, 40 mg, Oral, Nightly, Rhina Castro MD, 40 mg at 05/27/23 2103  •  calcium carbonate (TUMS) chewable tablet 500 mg (200 mg elemental), 3 tablet, Oral, TID PRN, Neema Small APRN  •  Calcium Replacement - Follow Nurse / BPA Driven Protocol, , Does not apply, PRN, Brennen Wilkerson MD  •  dexamethasone sodium phosphate injection 4 mg, 4 mg, Intravenous, Q6H, Brennen Wilkerson MD, 4 mg at 05/28/23 0528  •  dextrose (D50W) (25 g/50 mL) IV injection 25 g, 25 g, Intravenous, Q15 Min PRN, Brennen Wilkerson MD  •  dextrose (GLUTOSE) oral gel 15 g, 15 g, Oral, Q15 Min PRN, Brennen Wilkerson MD  •  esomeprazole (nexIUM) capsule, , Oral, Daily, Neema Small APRN, Given at 05/28/23 0833  •  glucagon (GLUCAGEN) injection 1 mg, 1 mg, Intramuscular, Q15 Min PRN, Brennen Wilkerson MD  •  hydrALAZINE (APRESOLINE) injection 10 mg, 10 mg, Intravenous, Q4H PRN, Brennen Wilkerson MD, 10 mg at 05/28/23 0528  •  insulin regular (humuLIN R,novoLIN R) injection 2-9 Units, 2-9 Units, Subcutaneous, Q6H, Brennen Wilkerson MD, 2 Units at 05/27/23 3966  •  levETIRAcetam (KEPPRA) injection 500 mg, 500 mg, Intravenous, Q12H, Brennen Wilkerson MD, 500 mg at 05/28/23 0884  •  Magnesium Standard Dose Replacement - Follow Nurse / BPA Driven Protocol, , Does not apply, PRN, Brennen Wilkerson MD  •  nitroglycerin (NITROSTAT) SL tablet 0.4 mg, 0.4 mg, Sublingual,  Q5 Min Rhea SAWYER Rami, MD  •  Phosphorus Replacement - Follow Nurse / BPA Driven Protocol, , Does not apply, Rhea SAWYER Rami, MD  •  Potassium Replacement - Follow Nurse / BPA Driven Protocol, , Does not apply, Rhea SAWYER Rami, MD  •  Insert Peripheral IV, , , Once **AND** sodium chloride 0.9 % flush 10 mL, 10 mL, Intravenous, PRN, Brennen Wilkerson MD  •  sodium chloride 0.9 % flush 10 mL, 10 mL, Intravenous, Q12H, Brennen Wilkerson MD, 10 mL at 05/28/23 0900  •  sodium chloride 0.9 % flush 10 mL, 10 mL, Intravenous, PRRhea MORENO Rami, MD  •  sodium chloride 0.9 % infusion 40 mL, 40 mL, Intravenous, PRRhea MORENO Rami, MD    Allergies:    Allergies   Allergen Reactions   • Del-Mycin [Erythromycin] Hives   • Gentamicin Hives   • Ibuprofen Nausea And Vomiting   • Latex Dermatitis     GLOVES   • Metronidazole Hives   • Ofloxacin Hives and Nausea Only   • Penicillins Hives   • Tetracycline Hives   • Adhesive Tape Dermatitis     BANDAIDS   • Aspirin GI Intolerance     Vomiting can take EC   • Codeine Nausea And Vomiting       Family History:  her family history includes Cancer in her father and mother.    Social History:  she  reports that she quit smoking about 12 months ago. Her smoking use included cigarettes. She smoked an average of .25 packs per day. She has never used smokeless tobacco. She reports current alcohol use of about 1.0 standard drink per week. She reports that she does not use drugs.    Pertinent Findings on   Review of Systems   Constitutional: Positive for fatigue.   Eyes: Positive for eye problems.   Respiratory: Negative.    Neurological: Positive for headaches. Negative for extremity weakness, numbness, seizures and speech difficulty.   Psychiatric/Behavioral: Negative.    :  Subjective     Vitals:    05/27/23 2330 05/28/23 0509 05/28/23 0528 05/28/23 1021   BP: 135/51 141/53 147/56 135/56   BP Location: Left arm Left arm  Left arm   Patient Position: Lying Lying  Lying   Pulse: 60 65 69 71    Resp: 18 18  18   Temp: 98.1 °F (36.7 °C) 98 °F (36.7 °C)  97.7 °F (36.5 °C)   TempSrc: Oral Oral  Oral   SpO2: 95% 93%  92%   Weight:       Height:           Performance Status: (1) Restricted in physically strenuous activity, ambulatory and able to do work of light nature    Pertinent Findings on  Physical Exam  Constitutional:       Appearance: Normal appearance.   HENT:      Head: Atraumatic.   Musculoskeletal:         General: Normal range of motion.   Neurological:      General: No focal deficit present.      Mental Status: She is alert and oriented to person, place, and time.      Sensory: No sensory deficit.      Motor: No weakness.      Coordination: Coordination normal.   Psychiatric:         Mood and Affect: Mood normal.         Behavior: Behavior normal.     :       Assessment: 78 year old female with hx of non small cell lung cancer in 2022 now with solitary hemorrhagic metastatic lesion in the right occipital lobe    Plan:  I reviewed her imaging and records.  She is scheduled for surgery on Tuesday, May 30 for resection of the larger occipital lobe lesion.  I discussed with her post op radiation to the resection cavity as well as SRS to the other 2 small lesions.  I will schedule her to return to see me1-2 weeks after her surgery for re-evaluation and treatment planning.   Objective   I spent greater than 50 mins (24358) on the unit and of that time 40 minutes  in counseling and coordination of care, including my review of imaging and pathology; indications, goals, logistics, alternatives and risks - both common and rare - for my recommendations as well as surveillance and potential outcomes. NCCN Guidelines were consulted, discussed with the patient and used to make the above recommendations.

## 2023-05-29 ENCOUNTER — APPOINTMENT (OUTPATIENT)
Dept: NUCLEAR MEDICINE | Facility: HOSPITAL | Age: 78
DRG: 025 | End: 2023-05-29
Payer: MEDICARE

## 2023-05-29 ENCOUNTER — APPOINTMENT (OUTPATIENT)
Dept: CT IMAGING | Facility: HOSPITAL | Age: 78
DRG: 025 | End: 2023-05-29
Payer: MEDICARE

## 2023-05-29 LAB
ANION GAP SERPL CALCULATED.3IONS-SCNC: 11.6 MMOL/L (ref 5–15)
BASOPHILS # BLD AUTO: 0.01 10*3/MM3 (ref 0–0.2)
BASOPHILS NFR BLD AUTO: 0.1 % (ref 0–1.5)
BUN SERPL-MCNC: 31 MG/DL (ref 8–23)
BUN/CREAT SERPL: 35.2 (ref 7–25)
CALCIUM SPEC-SCNC: 9.3 MG/DL (ref 8.6–10.5)
CHLORIDE SERPL-SCNC: 106 MMOL/L (ref 98–107)
CO2 SERPL-SCNC: 20.4 MMOL/L (ref 22–29)
CREAT SERPL-MCNC: 0.88 MG/DL (ref 0.57–1)
DEPRECATED RDW RBC AUTO: 43.8 FL (ref 37–54)
EGFRCR SERPLBLD CKD-EPI 2021: 68.2 ML/MIN/1.73
EOSINOPHIL # BLD AUTO: 0.01 10*3/MM3 (ref 0–0.4)
EOSINOPHIL NFR BLD AUTO: 0.1 % (ref 0.3–6.2)
ERYTHROCYTE [DISTWIDTH] IN BLOOD BY AUTOMATED COUNT: 14.5 % (ref 12.3–15.4)
GLUCOSE BLDC GLUCOMTR-MCNC: 124 MG/DL (ref 70–130)
GLUCOSE BLDC GLUCOMTR-MCNC: 163 MG/DL (ref 70–130)
GLUCOSE BLDC GLUCOMTR-MCNC: 182 MG/DL (ref 70–130)
GLUCOSE BLDC GLUCOMTR-MCNC: 193 MG/DL (ref 70–130)
GLUCOSE SERPL-MCNC: 139 MG/DL (ref 65–99)
HCT VFR BLD AUTO: 38.4 % (ref 34–46.6)
HGB BLD-MCNC: 13.1 G/DL (ref 12–15.9)
IMM GRANULOCYTES # BLD AUTO: 0.12 10*3/MM3 (ref 0–0.05)
IMM GRANULOCYTES NFR BLD AUTO: 0.8 % (ref 0–0.5)
LYMPHOCYTES # BLD AUTO: 0.54 10*3/MM3 (ref 0.7–3.1)
LYMPHOCYTES NFR BLD AUTO: 3.4 % (ref 19.6–45.3)
MCH RBC QN AUTO: 27.9 PG (ref 26.6–33)
MCHC RBC AUTO-ENTMCNC: 34.1 G/DL (ref 31.5–35.7)
MCV RBC AUTO: 81.7 FL (ref 79–97)
MONOCYTES # BLD AUTO: 0.67 10*3/MM3 (ref 0.1–0.9)
MONOCYTES NFR BLD AUTO: 4.2 % (ref 5–12)
NEUTROPHILS NFR BLD AUTO: 14.5 10*3/MM3 (ref 1.7–7)
NEUTROPHILS NFR BLD AUTO: 91.4 % (ref 42.7–76)
NRBC BLD AUTO-RTO: 0 /100 WBC (ref 0–0.2)
PLATELET # BLD AUTO: 294 10*3/MM3 (ref 140–450)
PMV BLD AUTO: 11 FL (ref 6–12)
POTASSIUM SERPL-SCNC: 4.2 MMOL/L (ref 3.5–5.2)
RBC # BLD AUTO: 4.7 10*6/MM3 (ref 3.77–5.28)
SODIUM SERPL-SCNC: 138 MMOL/L (ref 136–145)
WBC NRBC COR # BLD: 15.85 10*3/MM3 (ref 3.4–10.8)

## 2023-05-29 PROCEDURE — 80048 BASIC METABOLIC PNL TOTAL CA: CPT | Performed by: INTERNAL MEDICINE

## 2023-05-29 PROCEDURE — 25010000002 LEVETRIRACETAM PER 10 MG: Performed by: INTERNAL MEDICINE

## 2023-05-29 PROCEDURE — 78306 BONE IMAGING WHOLE BODY: CPT

## 2023-05-29 PROCEDURE — 0 TECHNETIUM MEDRONATE KIT: Performed by: INTERNAL MEDICINE

## 2023-05-29 PROCEDURE — 25010000002 HYDRALAZINE PER 20 MG: Performed by: INTERNAL MEDICINE

## 2023-05-29 PROCEDURE — 63710000001 INSULIN REGULAR HUMAN PER 5 UNITS: Performed by: INTERNAL MEDICINE

## 2023-05-29 PROCEDURE — 25010000002 DEXAMETHASONE SODIUM PHOSPHATE 20 MG/5ML SOLUTION: Performed by: INTERNAL MEDICINE

## 2023-05-29 PROCEDURE — 85025 COMPLETE CBC W/AUTO DIFF WBC: CPT | Performed by: INTERNAL MEDICINE

## 2023-05-29 PROCEDURE — 25510000001 IOPAMIDOL 61 % SOLUTION: Performed by: INTERNAL MEDICINE

## 2023-05-29 PROCEDURE — 82948 REAGENT STRIP/BLOOD GLUCOSE: CPT

## 2023-05-29 PROCEDURE — 99024 POSTOP FOLLOW-UP VISIT: CPT

## 2023-05-29 PROCEDURE — 99231 SBSQ HOSP IP/OBS SF/LOW 25: CPT | Performed by: INTERNAL MEDICINE

## 2023-05-29 PROCEDURE — 71260 CT THORAX DX C+: CPT

## 2023-05-29 PROCEDURE — A9503 TC99M MEDRONATE: HCPCS | Performed by: INTERNAL MEDICINE

## 2023-05-29 PROCEDURE — 74177 CT ABD & PELVIS W/CONTRAST: CPT

## 2023-05-29 RX ORDER — TC 99M MEDRONATE 20 MG/10ML
23 INJECTION, POWDER, LYOPHILIZED, FOR SOLUTION INTRAVENOUS
Status: COMPLETED | OUTPATIENT
Start: 2023-05-29 | End: 2023-05-29

## 2023-05-29 RX ADMIN — LEVETIRACETAM 500 MG: 500 INJECTION, SOLUTION, CONCENTRATE INTRAVENOUS at 20:09

## 2023-05-29 RX ADMIN — HYDRALAZINE HYDROCHLORIDE 10 MG: 20 INJECTION INTRAMUSCULAR; INTRAVENOUS at 08:17

## 2023-05-29 RX ADMIN — Medication 23 MILLICURIE: at 07:43

## 2023-05-29 RX ADMIN — ESOMEPRAZOLE MAGNESIUM: 40 CAPSULE, DELAYED RELEASE ORAL at 08:10

## 2023-05-29 RX ADMIN — DEXAMETHASONE SODIUM PHOSPHATE 4 MG: 4 INJECTION, SOLUTION INTRAMUSCULAR; INTRAVENOUS at 00:18

## 2023-05-29 RX ADMIN — ATORVASTATIN CALCIUM 40 MG: 20 TABLET, FILM COATED ORAL at 20:08

## 2023-05-29 RX ADMIN — Medication 10 ML: at 09:00

## 2023-05-29 RX ADMIN — Medication 10 ML: at 20:09

## 2023-05-29 RX ADMIN — IOPAMIDOL 85 ML: 612 INJECTION, SOLUTION INTRAVENOUS at 11:30

## 2023-05-29 RX ADMIN — INSULIN HUMAN 2 UNITS: 100 INJECTION, SOLUTION PARENTERAL at 17:15

## 2023-05-29 RX ADMIN — DEXAMETHASONE SODIUM PHOSPHATE 4 MG: 4 INJECTION, SOLUTION INTRAMUSCULAR; INTRAVENOUS at 11:50

## 2023-05-29 RX ADMIN — LEVETIRACETAM 500 MG: 500 INJECTION, SOLUTION, CONCENTRATE INTRAVENOUS at 08:10

## 2023-05-29 RX ADMIN — DEXAMETHASONE SODIUM PHOSPHATE 4 MG: 4 INJECTION, SOLUTION INTRAMUSCULAR; INTRAVENOUS at 05:18

## 2023-05-29 RX ADMIN — DEXAMETHASONE SODIUM PHOSPHATE 4 MG: 4 INJECTION, SOLUTION INTRAMUSCULAR; INTRAVENOUS at 23:25

## 2023-05-29 RX ADMIN — DEXAMETHASONE SODIUM PHOSPHATE 4 MG: 4 INJECTION, SOLUTION INTRAMUSCULAR; INTRAVENOUS at 17:15

## 2023-05-29 RX ADMIN — HYDRALAZINE HYDROCHLORIDE 10 MG: 20 INJECTION INTRAMUSCULAR; INTRAVENOUS at 18:01

## 2023-05-29 NOTE — PROGRESS NOTES
Name: Pam Vidal ADMIT: 2023   : 1945  PCP: Nik Mckay MD    MRN: [id number] LOS: 4 days   AGE/SEX: 78 y.o. female  ROOM: [facility]     Subjective   Subjective   No complaints.  No headache.  No loss of consciousness.  No seizures.  No dizziness.  No focal neurological symptoms.  No fever or chills.    Review of Systems  Cardiovascular/respiratory.  No chest pain.  No shortness of breath.  No palpitation.  No cough.  GI.  No abdominal pain or nausea or vomiting.  No bowel movements today.    .  No dysuria or hematuria.       Objective   Objective   Vital Signs  Temp:  [97.6 °F (36.4 °C)-98 °F (36.7 °C)] 97.6 °F (36.4 °C)  Heart Rate:  [54-72] 68  Resp:  [18-20] 20  BP: (122-168)/(46-67) 142/63  SpO2:  [95 %-100 %] 97 %  on   ;   Device (Oxygen Therapy): room air  No intake or output data in the 24 hours ending 23  Body mass index is 21.67 kg/m².      23  1325 235 23   Weight: 53.5 kg (118 lb) 54.3 kg (119 lb 11.4 oz) 60.9 kg (134 lb 4.2 oz)     Physical Exam      Results General.  Elderly female.  Alert and oriented x3.  No apparent pain/distress/diaphoresis.  Normal mood and affect  Eyes.  Pupils equal round and reactive.  Status post bilateral cataract surgery.  No pallor or jaundice.  Intact extraocular musculature.  Oral cavity.  Moist mucous membranes  Neck.  Supple.  No JVD.  Positive bilateral carotid scars.  No lymphadenopathy or thyromegaly.  Cardiovascular.  Regular rate and rhythm with no gallops or murmurs.  Chest.  Clear to auscultation bilaterally with no added sounds and poor bilateral air entry.  Abdomen.  Soft lax.  No tenderness.  No organomegaly.  No guarding or rebound.  Extremities.  No clubbing/cyanosis/edema.  CNS.  No acute focal neurological deficits.    Review:      Results from last 7 days   Lab Units 23  0637 23  0622 23  0638 23  0347 23  1346   SODIUM mmol/L 138 135* 136 138 136   POTASSIUM  mmol/L 4.2 3.9 3.9 3.9 3.6   CHLORIDE mmol/L 106 104 101 104 97*   CO2 mmol/L 20.4* 21.6* 23.8 25.0 25.2   BUN mg/dL 31* 28* 22 21 16   CREATININE mg/dL 0.88 0.94 0.86 1.07* 1.08*   GLUCOSE mg/dL 139* 142* 116* 160* 102*   CALCIUM mg/dL 9.3 9.5 9.8 9.2 10.0   AST (SGOT) U/L  --   --  15 15 20   ALT (SGPT) U/L  --   --  15 12 15     Estimated Creatinine Clearance: 52.3 mL/min (by C-G formula based on SCr of 0.88 mg/dL).  Results from last 7 days   Lab Units 05/26/23  0347   HEMOGLOBIN A1C % 5.70*     Results from last 7 days   Lab Units 05/29/23  1613 05/29/23  1214 05/29/23  0008 05/28/23  1734 05/28/23  1237 05/28/23  0512 05/27/23  2330 05/27/23  1743   GLUCOSE mg/dL 193* 124 182* 124 120 143* 158* 176*     Results from last 7 days   Lab Units 05/25/23  1655 05/25/23  1346   HSTROP T ng/L 15* 16*     Results from last 7 days   Lab Units 05/25/23  1346   PROBNP pg/mL 130.0     Results from last 7 days   Lab Units 05/25/23  1346   TSH uIU/mL 2.150           Invalid input(s):  PHOS  Results from last 7 days   Lab Units 05/26/23  0347   CHOLESTEROL mg/dL 179   TRIGLYCERIDES mg/dL 109   HDL CHOL mg/dL 56     Results from last 7 days   Lab Units 05/29/23  0637 05/28/23  0622 05/27/23  0638 05/26/23  0350 05/25/23  1346   WBC 10*3/mm3 15.85* 17.70* 23.29* 9.60 11.34*   HEMOGLOBIN g/dL 13.1 12.9 13.5 13.5 14.3   HEMATOCRIT % 38.4 38.2 40.3 40.8 42.0   PLATELETS 10*3/mm3 294 310 352 323 359   MCV fL 81.7 83.4 83.6 85.2 83.5   MCH pg 27.9 28.2 28.0 28.2 28.4   MCHC g/dL 34.1 33.8 33.5 33.1 34.0   RDW % 14.5 14.9 15.0 14.7 14.6   RDW-SD fl 43.8 44.8 45.4 45.8 44.3   MPV fL 11.0 11.3 10.7 11.5 10.5   NEUTROPHIL % % 91.4* 91.7*  --   --  79.6*   LYMPHOCYTE % % 3.4* 3.7*  --   --  10.3*   MONOCYTES % % 4.2* 3.6*  --   --  7.9   EOSINOPHIL % % 0.1* 0.1*  --   --  1.4   BASOPHIL % % 0.1 0.1  --   --  0.3   IMM GRAN % % 0.8* 0.8*  --   --  0.5   NEUTROS ABS 10*3/mm3 14.50* 16.23*  --   --  9.02*   LYMPHS ABS 10*3/mm3 0.54* 0.65*   --   --  1.17   MONOS ABS 10*3/mm3 0.67 0.64  --   --  0.90   EOS ABS 10*3/mm3 0.01 0.01  --   --  0.16   BASOS ABS 10*3/mm3 0.01 0.02  --   --  0.03   IMMATURE GRANS (ABS) 10*3/mm3 0.12* 0.15*  --   --  0.06*   NRBC /100 WBC 0.0 0.0  --   --  0.0     Results from last 7 days   Lab Units 05/27/23  0638 05/26/23  0347 05/25/23  1346   INR  1.01 1.03 1.00   APTT seconds  --   --  33.7         Results from last 7 days   Lab Units 05/27/23  1820   PROCALCITONIN ng/mL 0.04                               Imaging:  Imaging Results (Last 24 Hours)     Procedure Component Value Units Date/Time    CT Abdomen Pelvis With Contrast [[id number]] Collected: 05/29/23 1152     Updated: 05/29/23 1218    Narrative:      CT CHEST W CONTRAST DIAGNOSTIC-, CT ABDOMEN PELVIS W CONTRAST-, NM BONE  SCAN WHOLE BODY-     INDICATIONS: Lung cancer with metastatic brain disease.  Radiation dose  reduction techniques were utilized, including automated exposure control  and exposure modulation based on body size.     TECHNIQUE: Enhanced CT of the chest, abdomen, pelvis. Delayed whole body  bone scan.     COMPARISON: 03/01/2023, correlated with chest CT from 05/25/2023     FINDINGS:     Chest CT:     The heart size is normal without pericardial effusion. A few small  subcentimeter short axis mediastinal lymph nodes are seen that are not  significant by size criteria. Right chest port extends to the cavoatrial  junction region.     The airways appear clear.     No pleural effusion or pneumothorax.     The lungs again show right suprahilar mass, without obvious change from  the recent prior exam. Likewise, vascular mild groundglass opacification  the right upper lobe appears similar to the prior exam, and a previously  noted groundglass nodular density in the right upper lobe appear stable  (axial image 21) likewise, right apical region on image 11, nodular  density or adjacent nodular densities measuring overall 6 mm, stable.           Abdomen pelvis  CT:     Right renal cysts are noted.     Otherwise unremarkable appearance of the liver, spleen, adrenal glands,  pancreas, kidneys, bladder.     No bowel obstruction or abnormal bowel thickening is identified. Colonic  diverticula are seen that do not appear inflamed. Pelvic floor  relaxation/rectocele is suggested, correlate with clinical exam.     No free intraperitoneal gas or free fluid.     Scattered small mesenteric and para-aortic lymph nodes are seen that are  not significant by size criteria.     Abdominal aorta is not aneurysmal. Aortic and other prominent arterial  calcifications are present.     Degenerative changes are seen in the spine. Old fracture deformities of  the right anterior fourth and fifth ribs are seen. Small nonspecific  sclerotic changes apparent in the anterior left fifth and sixth ribs,  could be posttraumatic in nature or potentially evidence of neoplasm;  this appearance is stable from 03/01/2023. Likewise, sclerotic change at  the right anterior eighth rib, axial image 73 could be a result of  healing fracture or potentially sclerotic metastatic disease, follow-up  advised; this appearance is new from the prior exam from 03/01/2023.     No acute fracture is identified.           Delayed whole body bone scan:     Radiotracer: 23.0 mCi Technetium 99m MDP, intravenous     Delayed whole-body anterior and posterior projections were obtained, as  well as bilateral lateral oblique projections at the level of the head  and upper thorax.     Focal increased activity is seen at the anterior right eighth rib,  corresponding to the sclerotic change at this level on the CT exam,  could be healing fracture (new from 03/01/2023) or potentially sclerotic  metastatic disease, follow-up advised. Mildly increased activity is also  seen at the right anterior fourth and fifth ribs left anterior fifth and  sixth ribs, corresponding to chronic changes at these levels noted on  the CT exam.     Focal  increased activity is seen at the mandible, to the right and left  of midline, probably representing dental disease potentially sclerotic  metastatic disease, correlate clinically.     Typical appearing degenerative changes are seen at the shoulders,  elbows, wrists, knees, ankles. Activity at the right humeral head is  asymmetrically prominent; this level is not included on the recent CT  exam, but surgical changes apparent at this level on the CT exam from  11/21/2022, that could account for an asymmetric increased activity at  the proximal right humerus (follow-up cross-sectional imaging at the  level of the right shoulder could be obtained, as indicated).     Expected soft tissue localization of radiotracer is noted.                Impression:            1. Right upper lobe suprahilar pulmonary mass appears stable from the  recent prior exam, again with groundglass and nodular opacities in the  right upper lobe.  2. Sclerotic change at the right anterior eighth rib is new from  03/01/2023, with corresponding increased uptake on bone scan, could be  healing fracture or sclerotic metastatic disease, correlate clinically.  3. Stable appearance of the abdomen and pelvis.     Continued follow-up advised as indicated.     This report was finalized on 5/29/2023 12:15 PM by Dr. Prince Nicole M.D.       CT Chest With Contrast Diagnostic [[id number]] Collected: 05/29/23 1152     Updated: 05/29/23 1218    Narrative:      CT CHEST W CONTRAST DIAGNOSTIC-, CT ABDOMEN PELVIS W CONTRAST-, NM BONE  SCAN WHOLE BODY-     INDICATIONS: Lung cancer with metastatic brain disease.  Radiation dose  reduction techniques were utilized, including automated exposure control  and exposure modulation based on body size.     TECHNIQUE: Enhanced CT of the chest, abdomen, pelvis. Delayed whole body  bone scan.     COMPARISON: 03/01/2023, correlated with chest CT from 05/25/2023     FINDINGS:     Chest CT:     The heart size is normal  without pericardial effusion. A few small  subcentimeter short axis mediastinal lymph nodes are seen that are not  significant by size criteria. Right chest port extends to the cavoatrial  junction region.     The airways appear clear.     No pleural effusion or pneumothorax.     The lungs again show right suprahilar mass, without obvious change from  the recent prior exam. Likewise, vascular mild groundglass opacification  the right upper lobe appears similar to the prior exam, and a previously  noted groundglass nodular density in the right upper lobe appear stable  (axial image 21) likewise, right apical region on image 11, nodular  density or adjacent nodular densities measuring overall 6 mm, stable.           Abdomen pelvis CT:     Right renal cysts are noted.     Otherwise unremarkable appearance of the liver, spleen, adrenal glands,  pancreas, kidneys, bladder.     No bowel obstruction or abnormal bowel thickening is identified. Colonic  diverticula are seen that do not appear inflamed. Pelvic floor  relaxation/rectocele is suggested, correlate with clinical exam.     No free intraperitoneal gas or free fluid.     Scattered small mesenteric and para-aortic lymph nodes are seen that are  not significant by size criteria.     Abdominal aorta is not aneurysmal. Aortic and other prominent arterial  calcifications are present.     Degenerative changes are seen in the spine. Old fracture deformities of  the right anterior fourth and fifth ribs are seen. Small nonspecific  sclerotic changes apparent in the anterior left fifth and sixth ribs,  could be posttraumatic in nature or potentially evidence of neoplasm;  this appearance is stable from 03/01/2023. Likewise, sclerotic change at  the right anterior eighth rib, axial image 73 could be a result of  healing fracture or potentially sclerotic metastatic disease, follow-up  advised; this appearance is new from the prior exam from 03/01/2023.     No acute fracture is  identified.           Delayed whole body bone scan:     Radiotracer: 23.0 mCi Technetium 99m MDP, intravenous     Delayed whole-body anterior and posterior projections were obtained, as  well as bilateral lateral oblique projections at the level of the head  and upper thorax.     Focal increased activity is seen at the anterior right eighth rib,  corresponding to the sclerotic change at this level on the CT exam,  could be healing fracture (new from 03/01/2023) or potentially sclerotic  metastatic disease, follow-up advised. Mildly increased activity is also  seen at the right anterior fourth and fifth ribs left anterior fifth and  sixth ribs, corresponding to chronic changes at these levels noted on  the CT exam.     Focal increased activity is seen at the mandible, to the right and left  of midline, probably representing dental disease potentially sclerotic  metastatic disease, correlate clinically.     Typical appearing degenerative changes are seen at the shoulders,  elbows, wrists, knees, ankles. Activity at the right humeral head is  asymmetrically prominent; this level is not included on the recent CT  exam, but surgical changes apparent at this level on the CT exam from  11/21/2022, that could account for an asymmetric increased activity at  the proximal right humerus (follow-up cross-sectional imaging at the  level of the right shoulder could be obtained, as indicated).     Expected soft tissue localization of radiotracer is noted.                Impression:            1. Right upper lobe suprahilar pulmonary mass appears stable from the  recent prior exam, again with groundglass and nodular opacities in the  right upper lobe.  2. Sclerotic change at the right anterior eighth rib is new from  03/01/2023, with corresponding increased uptake on bone scan, could be  healing fracture or sclerotic metastatic disease, correlate clinically.  3. Stable appearance of the abdomen and pelvis.     Continued follow-up  advised as indicated.     This report was finalized on 5/29/2023 12:15 PM by Dr. Prince Nicole M.D.       NM Bone Scan Whole Body [[id number]] Collected: 05/29/23 1152     Updated: 05/29/23 1218    Narrative:      CT CHEST W CONTRAST DIAGNOSTIC-, CT ABDOMEN PELVIS W CONTRAST-, NM BONE  SCAN WHOLE BODY-     INDICATIONS: Lung cancer with metastatic brain disease.  Radiation dose  reduction techniques were utilized, including automated exposure control  and exposure modulation based on body size.     TECHNIQUE: Enhanced CT of the chest, abdomen, pelvis. Delayed whole body  bone scan.     COMPARISON: 03/01/2023, correlated with chest CT from 05/25/2023     FINDINGS:     Chest CT:     The heart size is normal without pericardial effusion. A few small  subcentimeter short axis mediastinal lymph nodes are seen that are not  significant by size criteria. Right chest port extends to the cavoatrial  junction region.     The airways appear clear.     No pleural effusion or pneumothorax.     The lungs again show right suprahilar mass, without obvious change from  the recent prior exam. Likewise, vascular mild groundglass opacification  the right upper lobe appears similar to the prior exam, and a previously  noted groundglass nodular density in the right upper lobe appear stable  (axial image 21) likewise, right apical region on image 11, nodular  density or adjacent nodular densities measuring overall 6 mm, stable.           Abdomen pelvis CT:     Right renal cysts are noted.     Otherwise unremarkable appearance of the liver, spleen, adrenal glands,  pancreas, kidneys, bladder.     No bowel obstruction or abnormal bowel thickening is identified. Colonic  diverticula are seen that do not appear inflamed. Pelvic floor  relaxation/rectocele is suggested, correlate with clinical exam.     No free intraperitoneal gas or free fluid.     Scattered small mesenteric and para-aortic lymph nodes are seen that are  not significant  by size criteria.     Abdominal aorta is not aneurysmal. Aortic and other prominent arterial  calcifications are present.     Degenerative changes are seen in the spine. Old fracture deformities of  the right anterior fourth and fifth ribs are seen. Small nonspecific  sclerotic changes apparent in the anterior left fifth and sixth ribs,  could be posttraumatic in nature or potentially evidence of neoplasm;  this appearance is stable from 03/01/2023. Likewise, sclerotic change at  the right anterior eighth rib, axial image 73 could be a result of  healing fracture or potentially sclerotic metastatic disease, follow-up  advised; this appearance is new from the prior exam from 03/01/2023.     No acute fracture is identified.           Delayed whole body bone scan:     Radiotracer: 23.0 mCi Technetium 99m MDP, intravenous     Delayed whole-body anterior and posterior projections were obtained, as  well as bilateral lateral oblique projections at the level of the head  and upper thorax.     Focal increased activity is seen at the anterior right eighth rib,  corresponding to the sclerotic change at this level on the CT exam,  could be healing fracture (new from 03/01/2023) or potentially sclerotic  metastatic disease, follow-up advised. Mildly increased activity is also  seen at the right anterior fourth and fifth ribs left anterior fifth and  sixth ribs, corresponding to chronic changes at these levels noted on  the CT exam.     Focal increased activity is seen at the mandible, to the right and left  of midline, probably representing dental disease potentially sclerotic  metastatic disease, correlate clinically.     Typical appearing degenerative changes are seen at the shoulders,  elbows, wrists, knees, ankles. Activity at the right humeral head is  asymmetrically prominent; this level is not included on the recent CT  exam, but surgical changes apparent at this level on the CT exam from  11/21/2022, that could account  for an asymmetric increased activity at  the proximal right humerus (follow-up cross-sectional imaging at the  level of the right shoulder could be obtained, as indicated).     Expected soft tissue localization of radiotracer is noted.                Impression:            1. Right upper lobe suprahilar pulmonary mass appears stable from the  recent prior exam, again with groundglass and nodular opacities in the  right upper lobe.  2. Sclerotic change at the right anterior eighth rib is new from  03/01/2023, with corresponding increased uptake on bone scan, could be  healing fracture or sclerotic metastatic disease, correlate clinically.  3. Stable appearance of the abdomen and pelvis.     Continued follow-up advised as indicated.     This report was finalized on 5/29/2023 12:15 PM by Dr. Prince Nicole M.D.                I reviewed the patient's new clinical results / labs / tests / procedures      Assessment/Plan     Active Hospital Problems    Diagnosis  POA   • **Lung cancer metastatic to brain [C34.90, C79.31]  Yes   • Leukocytosis [D72.829]  Unknown   • Brain mass [G93.89]  Unknown   • COPD (chronic obstructive pulmonary disease) [J44.9]  Unknown   • Rheumatoid arthritis [M06.9]  Unknown   • IBS (irritable bowel syndrome) [K58.9]  Unknown   • Hyperlipidemia [E78.5]  Unknown   • Cough with hemoptysis [R04.2]  Yes      Resolved Hospital Problems   No resolved problems to display.       1.  Hemorrhagic metastatic lesions of the brain with vasogenic edema.  Neurosurgery is following.  Currently on Keppra and IV steroids.  Repeat MRI shows no significant change.  Neurosurgery planning surgical resection of the occipital bleeding metastasis tomorrow.  Radiation oncology recommends radiation after surgery.  CT scan of the chest/abdomen/pelvis revealed a right upper lobe suprahilar pulmonary mass that is stable with sclerotic right anterior eighth rib changes that are new from before with an increased of bone  scan uptake that could be a healing fracture or sclerotic metastatic disease.  No other signs of metastasis.  Hematology oncology following.  2.  Metastatic lung cancer stage III/COPD/hemoptysis.  Stable respiratory status.  Patient is on chemo/immunotherapy and is s/p radiation..  Hematology oncology on board.  CT abdomen/pelvis/chest/nuclear bone scan shows no new metastasis except a sclerotic lesion of the right eighth rib with increased uptake.  3.  Rheumatoid arthritis.  Appears to be stable.  Currently on steroids.  4.  Dyslipidemia/carotid peripheral vascular disease status post bilateral endarterectomy Aspirin on hold because of #1.  Lipitor initiated   5.  Steroid-induced leukocytosis.  No fever.  Normal procalcitonin.  Improving.   6.  Prediabetes.  A1c is 5.7.  Positive steroid-induced hyperglycemia.  Continue sliding scale.  7.  Hypertensive emergency.  Resolved on Cardene drip.  Currently on no medications with good blood pressure control and no evidence of angina or congestive heart failure.  8.  VTE prophylaxis with sequential compression devices.    Discussed my findings and plan of treatment with the patient  Disposition.  To be determined based on clinical course.            Rhina Castro MD  [facility]  05/29/23  17:46 EDT

## 2023-05-29 NOTE — PROGRESS NOTES
Subjective     REASON FOR CONSULTATION: Decision about craniotomy    History of Present Illness  patient is a 78-year-old female with non-small cell lung cancer initially stage III for which she received chemoradiation followed by immunotherapy with Imfinzi her last 5/15/2023.  She is now admitted with confusion and brain MRI showing 3 lesions 2 in the occipital lobe and 1 in the cerebellum which are almost certainly related to her lung cancer.     Neurosurgery has seen her and is scheduled to take her to surgery for the occipital tumor and we are asked whether a biopsy is needed for tissue diagnosis.     She is feeling fairly well overall now that her steroids were started with no headache or visual changes that are significant although I am sure she still has a field defect.     She will need restaging to make sure there is not systemic progression before taking her to surgery and obviously there is no systemic progression radiation to all 3 lesions versus surgery for the larger 1 and radiation are reasonable options     She has no pain or shortness of breath    5/29  In the process of restaging with bone scan and CAT scans  Await results    Past Medical History, Past Surgical History, Social History, Family History have been reviewed and are without significant changes except as mentioned.    Review of Systems   Eyes: Positive for visual disturbance.   All other systems reviewed and are negative.     A comprehensive 14 point review of systems was performed and was negative except as mentioned.    Medications:  The current medication list was reviewed in the EMR    ALLERGIES:    Allergies   Allergen Reactions   • Del-Mycin [Erythromycin] Hives   • Gentamicin Hives   • Ibuprofen Nausea And Vomiting   • Latex Dermatitis     GLOVES   • Metronidazole Hives   • Ofloxacin Hives and Nausea Only   • Penicillins Hives   • Tetracycline Hives   • Adhesive Tape Dermatitis     BANDAIDS   • Aspirin GI Intolerance     Vomiting  can take EC   • Codeine Nausea And Vomiting       Objective      Vitals:    05/28/23 2145 05/29/23 0010 05/29/23 0455 05/29/23 0700   BP: 124/50 128/49 125/46 168/67   BP Location: Left arm Left arm Left arm Left arm   Patient Position: Lying Lying Lying Sitting   Pulse: 70 65 54 72   Resp: 18 18 18 18   Temp: 97.7 °F (36.5 °C) 97.7 °F (36.5 °C) 97.9 °F (36.6 °C) 97.9 °F (36.6 °C)   TempSrc: Oral Oral Oral Oral   SpO2: 95% 97% 100% 97%   Weight:       Height:             5/15/2023    11:26 AM   Current Status   ECOG score 0       Physical Exam    CONSTITUTIONAL:  Vital signs reviewed.  No distress, looks comfortable.  EYES:  Conjunctivae and lids unremarkable.  PERRLA  EARS,NOSE,MOUTH,THROAT:  Ears and nose appear unremarkable.  Lips, teeth, gums appear unremarkable.  RESPIRATORY:  Normal respiratory effort.  Lungs clear to auscultation bilaterally.  CARDIOVASCULAR:  Normal S1, S2.  No murmurs rubs or gallops.  No significant lower extremity edema.  GASTROINTESTINAL: Abdomen appears unremarkable.  Nontender.  No hepatomegaly.  No splenomegaly.  LYMPHATIC:  No cervical, supraclavicular, axillary lymphadenopathy.  SKIN:  Warm.  No rashes.  PSYCHIATRIC:  Normal judgment and insight.  Normal mood and affect.  I have reexamined the patient and the results are consistent with the previously documented exam. Viet Green MD     RECENT LABS:  Hematology WBC   Date Value Ref Range Status   05/29/2023 15.85 (H) 3.40 - 10.80 10*3/mm3 Final     RBC   Date Value Ref Range Status   05/29/2023 4.70 3.77 - 5.28 10*6/mm3 Final     Hemoglobin   Date Value Ref Range Status   05/29/2023 13.1 12.0 - 15.9 g/dL Final     Hematocrit   Date Value Ref Range Status   05/29/2023 38.4 34.0 - 46.6 % Final     Platelets   Date Value Ref Range Status   05/29/2023 294 140 - 450 10*3/mm3 Final          MRI BRAIN  IMPRESSION:  1. Since the prior MRI of the brain on 08/15/2022, patient has developed  at least 3 enhancing lesions in the brain  compatible with at least 3  brain metastases, the largest brain metastases is in the posterior  superior lateral right occipital lobe and measures 3.7 x 2.9 x 2.7 cm in  size and has some subacute and chronic blood products within it, has a  large amount of surrounding vasogenic edema with the vasogenic edema  tracking up to 8 x 5 x 6 cm in anterior posterior and medial lateral and  craniocaudal dimension, and it has mass effect on the posterior temporal  horn and trigone and occipital horn of the right lateral ventricle and  there is associated 3-4 mm right-to-left midline shift at the level of  the septum pellucidum. There is a tiny second metastasis to the  posterior lateral left occipital cortex measuring 4 x 3.5 x 4 mm in size  that has 15 x 13 mm of surrounding vasogenic edema. The third metastasis  is located in the midline of the superior cerebellum, measures 7 x 6 x 7  mm in size and has 2 x 1.3 cm of surrounding vasogenic edema.  2. There is mild-to-moderate small vessel disease in cerebral white  matter. The remainder of the MRI of the brain is within normal limits.  The results were communicated to the nurse in the CCU taking care of the  patient by telephone on 05/26/2023 at 1:00 AM. I also communicated the  results to Dr. Ricketts from neurosurgery by telephone on 05/26/2023 at 8:20  AM.     This report was finalized on 5/26/2023        Assessment & Plan     • 1.  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  Patient is not felt to be a surgical candidate and combined chemotherapy and radiation.   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  • 9/20/2022 1st dose of weekly carboplatin/taxol.   • She completed 6 cycles of weekly CarboTaxol 10/25/2022.  • Radiation therapy completed 10/27/2022  • First dose durvalumab delivered 11/28/2022  • Recent follow-up bronchoscopy performed 1/4/2023 with no evidence of malignancy identified  • CT scans dated 3/23 show stable disease with no  new mets  • 5/15/2023: Cycle #13 Imfinzi today.  Tolerating well.  • Tumor is strongly PD-L1 positive greater than 95% (although the patient may not be a great candidate for immunotherapy in the future due to her rheumatoid arthritis).  • Infusion headache MRI shows 3 brain mets-needs restaging     2.  Other comorbidities including vascular disease with previous carotid endarterectomy surgeries.  She has no known history of stroke or myocardial infarction.  Overall her performance status is very good.  3.  Hemoptysis related to her central tumor.  Improved with initiation of therapy.  She reports she still has some darker blood coming up when she coughs but this is usually now associated more with some yellow foul-smelling sputum.  4.  Rheumatoid arthritis: Patient previously on Celebrex, but discontinued due to hemoptysis.  Patient started on prednisone 20 mg daily prescribed to manage her arthritis pain.  Prednisone has since been decreased to 10 mg daily.  5.    Lesion on the right kidney stable on further follow-up scans.     PLAN  1.I do not feel there is much doubt that the 3 brain lesions are related to her underlying lung cancer but the bigger question is  If she has systemic progression or just isolated brain involvement.       Indeed if surgery is felt to be the best option for the occipital lesion following surgery focused radiation to the cerebellum would also be a good option after surgery.  If however she is not a good surgical candidate palliative radiation to all 3 sites is also an option.  We will repeat CAT scans and bone scan to make sure there is no systemic progression and then discuss  · Results pending     2.Needs DVT prophylaxis with SCDs in light of hemorrhagic metastasis     3.  Agree with steroids and Keppra  We will follow thank you for this consult                     5/29/2023      CC:

## 2023-05-29 NOTE — PROGRESS NOTES
[facility] NEUROSURGERY INTRACRANIAL PROGRESS NOTE    PATIENT IDENTIFICATION:   Name:  Pam Vidal      MRN:  [id number]     78 y.o.  female               CC: Brain mass      Subjective     Interval History: Patient is feeling well today with no headache.  She continues to have nausea but reports that her left quadrantanopsia appears better.  Medical and Radiation oncology have seen the patient and have ordered systemic work-up    ROS:  Constitutional: No fever, chills  HEENT: No headache  Neck: no stiffness  GI: Mild nausea, vomiting, no swallow difficulties  Neuro: Visual difficulties  Objective     Vital signs in last 24 hours:  Temp:  [97.4 °F (36.3 °C)-97.9 °F (36.6 °C)] 97.9 °F (36.6 °C)  Heart Rate:  [54-84] 72  Resp:  [18-20] 18  BP: (123-168)/(46-67) 168/67      Intake/Output this shift:  No intake/output data recorded.      Intake/Output last 3 shifts:  I/O last 3 completed shifts:  In: 920 [P.O.:920]  Out: -     LABS:  .  Results from last 7 days   Lab Units 05/29/23  0637 05/28/23  0622 05/27/23  0638   WBC 10*3/mm3 15.85* 17.70* 23.29*   HEMOGLOBIN g/dL 13.1 12.9 13.5   HEMATOCRIT % 38.4 38.2 40.3   PLATELETS 10*3/mm3 294 310 352     .  Results from last 7 days   Lab Units 05/29/23  0637 05/28/23  0622 05/27/23  0638 05/26/23  0347 05/25/23  1346   SODIUM mmol/L 138 135* 136 138 136   POTASSIUM mmol/L 4.2 3.9 3.9 3.9 3.6   CHLORIDE mmol/L 106 104 101 104 97*   CO2 mmol/L 20.4* 21.6* 23.8 25.0 25.2   BUN mg/dL 31* 28* 22 21 16   CREATININE mg/dL 0.88 0.94 0.86 1.07* 1.08*   CALCIUM mg/dL 9.3 9.5 9.8 9.2 10.0   BILIRUBIN mg/dL  --   --  0.3 0.2 0.4   ALK PHOS U/L  --   --  61 66 76   ALT (SGPT) U/L  --   --  15 12 15   AST (SGOT) U/L  --   --  15 15 20   GLUCOSE mg/dL 139* 142* 116* 160* 102*       IMAGING STUDIES:  MRI brain with and without contrast 5/26/2023:  IMPRESSION:  1. There are 3 enhancing metastatic lesions to the brain. The largest  metastatic lesion is a multilobulated enhancing lesion  with some central  areas of necrosis and subacute hemorrhage within it and some hemosiderin  deposition from some areas of chronic hemorrhage within it. This lesion  involves the posterior superior lateral right occipital lobe and  measures 3.7 x 2.9 x 2.7 cm in size and has a large amount of  surrounding vasogenic edema that tracks up to 10 x 5.8 x 6 cm and there  is mass effect on the posterior body, trigone, occipital horn, and  posterior temporal horn of the right lateral ventricle and up to 4 mm  right-to-left midline shift at the level of the septum pellucidum.  Second enhancing metastatic lesion is a superficial cortical enhancing  metastasis to the posterior lateral left occipital lobe measuring 4 x 3  x 4 mm in size and has 12 x 10 mm of surrounding vasogenic edema. The  third metastasis is a peripherally enhancing 7 x 7 x 7 mm metastasis to  the superior midline of the cerebellum that has 2 x 1.2 cm surrounding  edema. No additional metastatic lesions are seen in the brain.  2. There is mild-to-moderate small vessel disease in cerebral white  matter. The remainder of the MRI of the brain is unremarkable.    I personally viewed and interpreted the patient's labs, imaging study, medications, and chart.    Meds reviewed/changed: Yes    Current Facility-Administered Medications:   •  acetaminophen (TYLENOL) tablet 650 mg, 650 mg, Oral, Q4H PRN **OR** acetaminophen (TYLENOL) suppository 650 mg, 650 mg, Rectal, Q4H PRN, Brennen Wilkerson MD  •  aluminum-magnesium hydroxide-simethicone (MAALOX MAX) 400-400-40 MG/5ML suspension 15 mL, 15 mL, Oral, Q6H PRN, Neema Small, APRJOSH  •  atorvastatin (LIPITOR) tablet 40 mg, 40 mg, Oral, Nightly, Rhina Castro MD, 40 mg at 05/28/23 2050  •  calcium carbonate (TUMS) chewable tablet 500 mg (200 mg elemental), 3 tablet, Oral, TID PRN, Neema Small APRN  •  Calcium Replacement - Follow Nurse / BPA Driven Protocol, , Does not apply, PRN, Brennen Wilkerson MD  •   dexamethasone sodium phosphate injection 4 mg, 4 mg, Intravenous, Q6H, Brennen Wilkerson MD, 4 mg at 05/29/23 0518  •  dextrose (D50W) (25 g/50 mL) IV injection 25 g, 25 g, Intravenous, Q15 Min PRN, Brennen Wilkerson MD  •  dextrose (GLUTOSE) oral gel 15 g, 15 g, Oral, Q15 Min PRN, Brennen Wilkerson MD  •  esomeprazole (nexIUM) capsule, , Oral, Daily, Neema Small APRN, Given at 05/29/23 0810  •  glucagon (GLUCAGEN) injection 1 mg, 1 mg, Intramuscular, Q15 Min PRN, Brennen Wilkerson MD  •  hydrALAZINE (APRESOLINE) injection 10 mg, 10 mg, Intravenous, Q4H PRN, Brennen Wilkerson MD, 10 mg at 05/29/23 0817  •  insulin regular (humuLIN R,novoLIN R) injection 2-9 Units, 2-9 Units, Subcutaneous, TID AC, Rhina Castro MD  •  levETIRAcetam (KEPPRA) injection 500 mg, 500 mg, Intravenous, Q12H, Brennen Wilkerson MD, 500 mg at 05/29/23 0810  •  Magnesium Standard Dose Replacement - Follow Nurse / BPA Driven Protocol, , Does not apply, PRJOSH, Brennen Wilkerson MD  •  nitroglycerin (NITROSTAT) SL tablet 0.4 mg, 0.4 mg, Sublingual, Q5 Min PRN, Brennen Wilkerson MD  •  Phosphorus Replacement - Follow Nurse / BPA Driven Protocol, , Does not apply, PRNRhea Rami, MD  •  Potassium Replacement - Follow Nurse / BPA Driven Protocol, , Does not apply, Rhea SAWYER Rami, MD  •  Insert Peripheral IV, , , Once **AND** sodium chloride 0.9 % flush 10 mL, 10 mL, Intravenous, PRN, Brennen Wilkerson MD  •  sodium chloride 0.9 % flush 10 mL, 10 mL, Intravenous, Q12H, Brennen Wilkerson MD, 10 mL at 05/29/23 0900  •  sodium chloride 0.9 % flush 10 mL, 10 mL, Intravenous, PRN, Brennen Wilkerson MD  •  sodium chloride 0.9 % infusion 40 mL, 40 mL, Intravenous, PRN, Brennen Wilkerson MD      Physical Exam:    General:   Awake, alert, oriented x3. Speech clear with no aphasia  Neck:    supple  CN II:     Mild Left lower quadrantanopsia -appears  CN III IV VI:   Extraocular movements are full , PERRL 4 mm brisk bilaterally.    CN VII:    Facial movements are symmetric. No  weakness.  Motor:    Normal muscle strength, bulk and tone in upper and lower extremities.  No fasciculations, rigidity, spasticity, or abnormal movements.  Reflexes:   Plantar responses are flexor.   Sensation:   Normal to light touch; no extinction  Station and Gait:  Per PT note 5/26/2023:She was able to sit EOb w CGA. She stood w CGA using Rwx and then ambulated approx 50 ft w CGA/min A. She requires cues to keep feet inside walker during ambulation. No overt LOB noted w mobility. She does c/o  mild pain in her head while she is up. Pt returned to bed at end of session. She will continue to benefit from skilled PT to maximize safety, strength, and independence w mobility  Coordination:   Finger-to-nose and heel-to-shin test shows no dysmetria.  Rapid alternating movements are normal.  Extremities:   Wearing SCD    Assessment & Plan     ASSESSMENT:      Lung cancer metastatic to brain    Cough with hemoptysis    Leukocytosis    Brain mass    COPD (chronic obstructive pulmonary disease)    Rheumatoid arthritis    IBS (irritable bowel syndrome)    Hyperlipidemia    Patient with stage III adenocarcinoma of the right upper lobe likely source of metastasis to brain which she has 3 separate lesions.  2 smaller lesions located at the cerebellar vermis and left occipital lobe with the largest in the right occipital lobe which will be the lesion of focus for surgery tomorrow.  Medical oncology has ordered bone scan and CT of chest abdomen and pelvis which are pending.  Despite despite potential for diffuse systemic disease, the patient feels that she understands the risks associated with surgery and is adamant that she wants to proceed with intracranial surgery tomorrow.    PLAN:     OR 5/30 for right occipital tumor resection  N.p.o. after midnight    I discussed the patient's findings and my recommendations with patient, family, nursing staff and BARBARA Howell, and Dr. Francis    During patient visit, I utilized  "appropriate personal protective equipment including mask and gloves.  Mask used was standard procedure mask. Appropriate PPE was worn during the entire visit.  Hand hygiene was completed before and after.      LOS: 4 days       Suresh Brice, APRN  5/29/2023  10:49 EDT    \"Dictated utilizing Dragon dictation\".    "

## 2023-05-29 NOTE — PLAN OF CARE
Goal Outcome Evaluation:         Pt has slept well throughout night with no complaints, no change in Neuro Assessment. VSS.

## 2023-05-29 NOTE — PLAN OF CARE
Problem: Adult Inpatient Plan of Care  Goal: Plan of Care Review  Outcome: Ongoing, Progressing  Goal: Patient-Specific Goal (Individualized)  Outcome: Ongoing, Progressing  Goal: Absence of Hospital-Acquired Illness or Injury  Outcome: Ongoing, Progressing  Goal: Optimal Comfort and Wellbeing  Outcome: Ongoing, Progressing  Goal: Readiness for Transition of Care  Outcome: Ongoing, Progressing     Problem: Fall Injury Risk  Goal: Absence of Fall and Fall-Related Injury  Outcome: Ongoing, Progressing     Problem: Skin Injury Risk Increased  Goal: Skin Health and Integrity  Outcome: Ongoing, Progressing     Problem: Adjustment to Illness (Stroke, Hemorrhagic)  Goal: Optimal Coping  Outcome: Ongoing, Progressing     Problem: Communication Impairment (Stroke, Hemorrhagic)  Goal: Effective Communication Skills  Outcome: Ongoing, Progressing     Problem: Functional Ability Impaired (Stroke, Hemorrhagic)  Goal: Optimal Functional Ability  Outcome: Ongoing, Progressing   Goal Outcome Evaluation:

## 2023-05-30 ENCOUNTER — ANESTHESIA (OUTPATIENT)
Dept: PERIOP | Facility: HOSPITAL | Age: 78
DRG: 025 | End: 2023-05-30
Payer: MEDICARE

## 2023-05-30 LAB
ABO GROUP BLD: NORMAL
ANION GAP SERPL CALCULATED.3IONS-SCNC: 10 MMOL/L (ref 5–15)
BASOPHILS # BLD AUTO: 0.02 10*3/MM3 (ref 0–0.2)
BASOPHILS NFR BLD AUTO: 0.1 % (ref 0–1.5)
BLD GP AB SCN SERPL QL: NEGATIVE
BUN SERPL-MCNC: 35 MG/DL (ref 8–23)
BUN/CREAT SERPL: 37.6 (ref 7–25)
CALCIUM SPEC-SCNC: 9.1 MG/DL (ref 8.6–10.5)
CHLORIDE SERPL-SCNC: 102 MMOL/L (ref 98–107)
CO2 SERPL-SCNC: 23 MMOL/L (ref 22–29)
CREAT SERPL-MCNC: 0.93 MG/DL (ref 0.57–1)
DEPRECATED RDW RBC AUTO: 45.4 FL (ref 37–54)
EGFRCR SERPLBLD CKD-EPI 2021: 63.8 ML/MIN/1.73
EOSINOPHIL # BLD AUTO: 0.01 10*3/MM3 (ref 0–0.4)
EOSINOPHIL NFR BLD AUTO: 0.1 % (ref 0.3–6.2)
ERYTHROCYTE [DISTWIDTH] IN BLOOD BY AUTOMATED COUNT: 15.1 % (ref 12.3–15.4)
GLUCOSE BLDC GLUCOMTR-MCNC: 106 MG/DL (ref 70–130)
GLUCOSE BLDC GLUCOMTR-MCNC: 131 MG/DL (ref 70–130)
GLUCOSE BLDC GLUCOMTR-MCNC: 138 MG/DL (ref 70–130)
GLUCOSE SERPL-MCNC: 118 MG/DL (ref 65–99)
HCT VFR BLD AUTO: 42.6 % (ref 34–46.6)
HGB BLD-MCNC: 14.2 G/DL (ref 12–15.9)
IMM GRANULOCYTES # BLD AUTO: 0.21 10*3/MM3 (ref 0–0.05)
IMM GRANULOCYTES NFR BLD AUTO: 1.2 % (ref 0–0.5)
LYMPHOCYTES # BLD AUTO: 0.8 10*3/MM3 (ref 0.7–3.1)
LYMPHOCYTES NFR BLD AUTO: 4.6 % (ref 19.6–45.3)
MCH RBC QN AUTO: 27.7 PG (ref 26.6–33)
MCHC RBC AUTO-ENTMCNC: 33.3 G/DL (ref 31.5–35.7)
MCV RBC AUTO: 83.2 FL (ref 79–97)
MONOCYTES # BLD AUTO: 0.94 10*3/MM3 (ref 0.1–0.9)
MONOCYTES NFR BLD AUTO: 5.4 % (ref 5–12)
NEUTROPHILS NFR BLD AUTO: 15.32 10*3/MM3 (ref 1.7–7)
NEUTROPHILS NFR BLD AUTO: 88.6 % (ref 42.7–76)
NRBC BLD AUTO-RTO: 0 /100 WBC (ref 0–0.2)
PLATELET # BLD AUTO: 348 10*3/MM3 (ref 140–450)
PMV BLD AUTO: 11.3 FL (ref 6–12)
POTASSIUM SERPL-SCNC: 4.2 MMOL/L (ref 3.5–5.2)
RBC # BLD AUTO: 5.12 10*6/MM3 (ref 3.77–5.28)
RH BLD: POSITIVE
SODIUM SERPL-SCNC: 135 MMOL/L (ref 136–145)
T&S EXPIRATION DATE: NORMAL
WBC NRBC COR # BLD: 17.3 10*3/MM3 (ref 3.4–10.8)

## 2023-05-30 PROCEDURE — 25010000002 PHENYLEPHRINE 10 MG/ML SOLUTION 5 ML VIAL: Performed by: NURSE ANESTHETIST, CERTIFIED REGISTERED

## 2023-05-30 PROCEDURE — 82948 REAGENT STRIP/BLOOD GLUCOSE: CPT

## 2023-05-30 PROCEDURE — 00B00ZZ EXCISION OF BRAIN, OPEN APPROACH: ICD-10-PCS | Performed by: NEUROLOGICAL SURGERY

## 2023-05-30 PROCEDURE — 86900 BLOOD TYPING SEROLOGIC ABO: CPT | Performed by: ANESTHESIOLOGY

## 2023-05-30 PROCEDURE — C1713 ANCHOR/SCREW BN/BN,TIS/BN: HCPCS | Performed by: NEUROLOGICAL SURGERY

## 2023-05-30 PROCEDURE — 25010000002 MANNITOL PER 50 ML: Performed by: NURSE ANESTHETIST, CERTIFIED REGISTERED

## 2023-05-30 PROCEDURE — 86850 RBC ANTIBODY SCREEN: CPT | Performed by: ANESTHESIOLOGY

## 2023-05-30 PROCEDURE — 25010000002 FUROSEMIDE PER 20 MG: Performed by: NURSE ANESTHETIST, CERTIFIED REGISTERED

## 2023-05-30 PROCEDURE — 88307 TISSUE EXAM BY PATHOLOGIST: CPT | Performed by: NEUROLOGICAL SURGERY

## 2023-05-30 PROCEDURE — 25010000002 DEXAMETHASONE SODIUM PHOSPHATE 20 MG/5ML SOLUTION: Performed by: INTERNAL MEDICINE

## 2023-05-30 PROCEDURE — 25010000002 ONDANSETRON PER 1 MG: Performed by: NURSE ANESTHETIST, CERTIFIED REGISTERED

## 2023-05-30 PROCEDURE — 61510 CRNEC TREPH EXC BRN TUM STTL: CPT | Performed by: NEUROLOGICAL SURGERY

## 2023-05-30 PROCEDURE — C1889 IMPLANT/INSERT DEVICE, NOC: HCPCS | Performed by: NEUROLOGICAL SURGERY

## 2023-05-30 PROCEDURE — 69990 MICROSURGERY ADD-ON: CPT | Performed by: NEUROLOGICAL SURGERY

## 2023-05-30 PROCEDURE — 25010000002 FENTANYL CITRATE (PF) 50 MCG/ML SOLUTION: Performed by: NURSE ANESTHETIST, CERTIFIED REGISTERED

## 2023-05-30 PROCEDURE — 25010000002 LEVETRIRACETAM PER 10 MG: Performed by: INTERNAL MEDICINE

## 2023-05-30 PROCEDURE — 86901 BLOOD TYPING SEROLOGIC RH(D): CPT | Performed by: ANESTHESIOLOGY

## 2023-05-30 PROCEDURE — 88342 IMHCHEM/IMCYTCHM 1ST ANTB: CPT | Performed by: NEUROLOGICAL SURGERY

## 2023-05-30 PROCEDURE — 25010000002 PROPOFOL 10 MG/ML EMULSION: Performed by: NURSE ANESTHETIST, CERTIFIED REGISTERED

## 2023-05-30 PROCEDURE — 25010000002 HYDRALAZINE PER 20 MG: Performed by: INTERNAL MEDICINE

## 2023-05-30 PROCEDURE — 25010000002 HYDROMORPHONE PER 4 MG: Performed by: NURSE ANESTHETIST, CERTIFIED REGISTERED

## 2023-05-30 PROCEDURE — 85025 COMPLETE CBC W/AUTO DIFF WBC: CPT | Performed by: INTERNAL MEDICINE

## 2023-05-30 PROCEDURE — 88341 IMHCHEM/IMCYTCHM EA ADD ANTB: CPT | Performed by: NEUROLOGICAL SURGERY

## 2023-05-30 PROCEDURE — 99232 SBSQ HOSP IP/OBS MODERATE 35: CPT | Performed by: INTERNAL MEDICINE

## 2023-05-30 PROCEDURE — 80048 BASIC METABOLIC PNL TOTAL CA: CPT | Performed by: INTERNAL MEDICINE

## 2023-05-30 PROCEDURE — 25010000002 SUGAMMADEX 200 MG/2ML SOLUTION: Performed by: NURSE ANESTHETIST, CERTIFIED REGISTERED

## 2023-05-30 DEVICE — PLT CVR LEVELONE BURHL LP CONTRL .3X17X1.5MM: Type: IMPLANTABLE DEVICE | Site: CRANIAL | Status: FUNCTIONAL

## 2023-05-30 DEVICE — SCRW CRS/DRV DRL FREE MICRO TI 1.5X4MM: Type: IMPLANTABLE DEVICE | Site: CRANIAL | Status: FUNCTIONAL

## 2023-05-30 DEVICE — FLOSEAL HEMOSTATIC MATRIX, 10ML
Type: IMPLANTABLE DEVICE | Site: BRAIN | Status: FUNCTIONAL
Brand: FLOSEAL HEMOSTATIC MATRIX

## 2023-05-30 DEVICE — BONE WAX
Type: IMPLANTABLE DEVICE | Site: BRAIN | Status: FUNCTIONAL
Brand: ETHICON

## 2023-05-30 DEVICE — PLT CRANI LEVELONE LEVELONE LP STR TI 2HL LNG: Type: IMPLANTABLE DEVICE | Site: CRANIAL | Status: FUNCTIONAL

## 2023-05-30 DEVICE — ABSORBABLE HEMOSTAT (OXIDIZED REGENERATED CELLULOSE, U.S.P.)
Type: IMPLANTABLE DEVICE | Site: BRAIN | Status: FUNCTIONAL
Brand: SURGICEL

## 2023-05-30 DEVICE — DURAGEN® PLUS DURAL REGENERATION MATRIX, 2 IN X 2 IN (5 CM X 5 CM)
Type: IMPLANTABLE DEVICE | Site: BRAIN | Status: FUNCTIONAL
Brand: DURAGEN® PLUS

## 2023-05-30 RX ORDER — ACETAMINOPHEN 325 MG/1
650 TABLET ORAL EVERY 4 HOURS PRN
Status: DISCONTINUED | OUTPATIENT
Start: 2023-05-30 | End: 2023-06-02 | Stop reason: HOSPADM

## 2023-05-30 RX ORDER — FLUMAZENIL 0.1 MG/ML
0.2 INJECTION INTRAVENOUS AS NEEDED
Status: DISCONTINUED | OUTPATIENT
Start: 2023-05-30 | End: 2023-05-30 | Stop reason: HOSPADM

## 2023-05-30 RX ORDER — PROMETHAZINE HYDROCHLORIDE 25 MG/1
25 TABLET ORAL ONCE AS NEEDED
Status: DISCONTINUED | OUTPATIENT
Start: 2023-05-30 | End: 2023-05-30 | Stop reason: HOSPADM

## 2023-05-30 RX ORDER — PANTOPRAZOLE SODIUM 40 MG/1
40 TABLET, DELAYED RELEASE ORAL
Status: DISCONTINUED | OUTPATIENT
Start: 2023-05-31 | End: 2023-06-02 | Stop reason: SDUPTHER

## 2023-05-30 RX ORDER — ACETAMINOPHEN 650 MG/1
650 SUPPOSITORY RECTAL EVERY 4 HOURS PRN
Status: DISCONTINUED | OUTPATIENT
Start: 2023-05-30 | End: 2023-06-02 | Stop reason: HOSPADM

## 2023-05-30 RX ORDER — ROCURONIUM BROMIDE 10 MG/ML
INJECTION, SOLUTION INTRAVENOUS AS NEEDED
Status: DISCONTINUED | OUTPATIENT
Start: 2023-05-30 | End: 2023-05-30 | Stop reason: SURG

## 2023-05-30 RX ORDER — ONDANSETRON 2 MG/ML
4 INJECTION INTRAMUSCULAR; INTRAVENOUS EVERY 6 HOURS PRN
Status: DISCONTINUED | OUTPATIENT
Start: 2023-05-30 | End: 2023-06-02 | Stop reason: HOSPADM

## 2023-05-30 RX ORDER — LABETALOL HYDROCHLORIDE 5 MG/ML
5 INJECTION, SOLUTION INTRAVENOUS
Status: DISCONTINUED | OUTPATIENT
Start: 2023-05-30 | End: 2023-05-30 | Stop reason: HOSPADM

## 2023-05-30 RX ORDER — HYDROMORPHONE HYDROCHLORIDE 1 MG/ML
0.25 INJECTION, SOLUTION INTRAMUSCULAR; INTRAVENOUS; SUBCUTANEOUS
Status: DISCONTINUED | OUTPATIENT
Start: 2023-05-30 | End: 2023-05-30 | Stop reason: HOSPADM

## 2023-05-30 RX ORDER — PHENYLEPHRINE HCL IN 0.9% NACL 1 MG/10 ML
SYRINGE (ML) INTRAVENOUS AS NEEDED
Status: DISCONTINUED | OUTPATIENT
Start: 2023-05-30 | End: 2023-05-30 | Stop reason: SURG

## 2023-05-30 RX ORDER — MANNITOL 20 G/100ML
INJECTION, SOLUTION INTRAVENOUS CONTINUOUS PRN
Status: DISCONTINUED | OUTPATIENT
Start: 2023-05-30 | End: 2023-05-30

## 2023-05-30 RX ORDER — DROPERIDOL 2.5 MG/ML
0.62 INJECTION, SOLUTION INTRAMUSCULAR; INTRAVENOUS
Status: DISCONTINUED | OUTPATIENT
Start: 2023-05-30 | End: 2023-05-30 | Stop reason: HOSPADM

## 2023-05-30 RX ORDER — CLINDAMYCIN PHOSPHATE 900 MG/50ML
900 INJECTION INTRAVENOUS EVERY 8 HOURS
Status: DISCONTINUED | OUTPATIENT
Start: 2023-05-31 | End: 2023-05-31

## 2023-05-30 RX ORDER — MANNITOL 250 MG/ML
INJECTION, SOLUTION INTRAVENOUS AS NEEDED
Status: DISCONTINUED | OUTPATIENT
Start: 2023-05-30 | End: 2023-05-30 | Stop reason: SURG

## 2023-05-30 RX ORDER — HYDROCODONE BITARTRATE AND ACETAMINOPHEN 5; 325 MG/1; MG/1
1 TABLET ORAL EVERY 4 HOURS PRN
Status: DISCONTINUED | OUTPATIENT
Start: 2023-05-30 | End: 2023-06-02 | Stop reason: HOSPADM

## 2023-05-30 RX ORDER — NALOXONE HCL 0.4 MG/ML
0.4 VIAL (ML) INJECTION
Status: DISCONTINUED | OUTPATIENT
Start: 2023-05-30 | End: 2023-06-02 | Stop reason: HOSPADM

## 2023-05-30 RX ORDER — NALOXONE HCL 0.4 MG/ML
0.2 VIAL (ML) INJECTION AS NEEDED
Status: DISCONTINUED | OUTPATIENT
Start: 2023-05-30 | End: 2023-05-30 | Stop reason: HOSPADM

## 2023-05-30 RX ORDER — HYDROCODONE BITARTRATE AND ACETAMINOPHEN 5; 325 MG/1; MG/1
1 TABLET ORAL ONCE AS NEEDED
Status: DISCONTINUED | OUTPATIENT
Start: 2023-05-30 | End: 2023-05-30 | Stop reason: HOSPADM

## 2023-05-30 RX ORDER — HYDROCODONE BITARTRATE AND ACETAMINOPHEN 7.5; 325 MG/1; MG/1
1 TABLET ORAL EVERY 4 HOURS PRN
Status: DISCONTINUED | OUTPATIENT
Start: 2023-05-30 | End: 2023-05-30 | Stop reason: HOSPADM

## 2023-05-30 RX ORDER — FUROSEMIDE 10 MG/ML
INJECTION INTRAMUSCULAR; INTRAVENOUS AS NEEDED
Status: DISCONTINUED | OUTPATIENT
Start: 2023-05-30 | End: 2023-05-30 | Stop reason: SURG

## 2023-05-30 RX ORDER — EPHEDRINE SULFATE 50 MG/ML
5 INJECTION, SOLUTION INTRAVENOUS ONCE AS NEEDED
Status: DISCONTINUED | OUTPATIENT
Start: 2023-05-30 | End: 2023-05-30 | Stop reason: HOSPADM

## 2023-05-30 RX ORDER — HYDRALAZINE HYDROCHLORIDE 20 MG/ML
5 INJECTION INTRAMUSCULAR; INTRAVENOUS
Status: DISCONTINUED | OUTPATIENT
Start: 2023-05-30 | End: 2023-05-30 | Stop reason: HOSPADM

## 2023-05-30 RX ORDER — SODIUM CHLORIDE 9 MG/ML
INJECTION, SOLUTION INTRAVENOUS AS NEEDED
Status: DISCONTINUED | OUTPATIENT
Start: 2023-05-30 | End: 2023-05-30 | Stop reason: HOSPADM

## 2023-05-30 RX ORDER — AMOXICILLIN 250 MG
1 CAPSULE ORAL 2 TIMES DAILY
Status: DISCONTINUED | OUTPATIENT
Start: 2023-05-30 | End: 2023-06-02 | Stop reason: HOSPADM

## 2023-05-30 RX ORDER — ONDANSETRON 2 MG/ML
4 INJECTION INTRAMUSCULAR; INTRAVENOUS ONCE AS NEEDED
Status: DISCONTINUED | OUTPATIENT
Start: 2023-05-30 | End: 2023-05-30 | Stop reason: HOSPADM

## 2023-05-30 RX ORDER — HYDROMORPHONE HYDROCHLORIDE 1 MG/ML
0.25 INJECTION, SOLUTION INTRAMUSCULAR; INTRAVENOUS; SUBCUTANEOUS
Status: DISCONTINUED | OUTPATIENT
Start: 2023-05-30 | End: 2023-06-02 | Stop reason: HOSPADM

## 2023-05-30 RX ORDER — ONDANSETRON 2 MG/ML
INJECTION INTRAMUSCULAR; INTRAVENOUS AS NEEDED
Status: DISCONTINUED | OUTPATIENT
Start: 2023-05-30 | End: 2023-05-30 | Stop reason: SURG

## 2023-05-30 RX ORDER — ONDANSETRON 4 MG/1
4 TABLET, FILM COATED ORAL EVERY 6 HOURS PRN
Status: DISCONTINUED | OUTPATIENT
Start: 2023-05-30 | End: 2023-06-02 | Stop reason: HOSPADM

## 2023-05-30 RX ORDER — SODIUM CHLORIDE, SODIUM LACTATE, POTASSIUM CHLORIDE, CALCIUM CHLORIDE 600; 310; 30; 20 MG/100ML; MG/100ML; MG/100ML; MG/100ML
INJECTION, SOLUTION INTRAVENOUS CONTINUOUS PRN
Status: DISCONTINUED | OUTPATIENT
Start: 2023-05-30 | End: 2023-05-30 | Stop reason: SURG

## 2023-05-30 RX ORDER — DIPHENHYDRAMINE HYDROCHLORIDE 50 MG/ML
12.5 INJECTION INTRAMUSCULAR; INTRAVENOUS
Status: DISCONTINUED | OUTPATIENT
Start: 2023-05-30 | End: 2023-05-30 | Stop reason: HOSPADM

## 2023-05-30 RX ORDER — CLINDAMYCIN PHOSPHATE 900 MG/50ML
900 INJECTION INTRAVENOUS ONCE
Status: COMPLETED | OUTPATIENT
Start: 2023-05-30 | End: 2023-05-30

## 2023-05-30 RX ORDER — PROPOFOL 10 MG/ML
VIAL (ML) INTRAVENOUS AS NEEDED
Status: DISCONTINUED | OUTPATIENT
Start: 2023-05-30 | End: 2023-05-30 | Stop reason: SURG

## 2023-05-30 RX ORDER — FENTANYL CITRATE 50 UG/ML
INJECTION, SOLUTION INTRAMUSCULAR; INTRAVENOUS AS NEEDED
Status: DISCONTINUED | OUTPATIENT
Start: 2023-05-30 | End: 2023-05-30 | Stop reason: SURG

## 2023-05-30 RX ORDER — FENTANYL CITRATE 50 UG/ML
25 INJECTION, SOLUTION INTRAMUSCULAR; INTRAVENOUS
Status: DISCONTINUED | OUTPATIENT
Start: 2023-05-30 | End: 2023-05-30 | Stop reason: HOSPADM

## 2023-05-30 RX ORDER — IPRATROPIUM BROMIDE AND ALBUTEROL SULFATE 2.5; .5 MG/3ML; MG/3ML
3 SOLUTION RESPIRATORY (INHALATION) ONCE AS NEEDED
Status: DISCONTINUED | OUTPATIENT
Start: 2023-05-30 | End: 2023-05-30 | Stop reason: HOSPADM

## 2023-05-30 RX ORDER — LIDOCAINE HYDROCHLORIDE 20 MG/ML
INJECTION, SOLUTION INFILTRATION; PERINEURAL AS NEEDED
Status: DISCONTINUED | OUTPATIENT
Start: 2023-05-30 | End: 2023-05-30 | Stop reason: SURG

## 2023-05-30 RX ORDER — PROMETHAZINE HYDROCHLORIDE 25 MG/1
25 SUPPOSITORY RECTAL ONCE AS NEEDED
Status: DISCONTINUED | OUTPATIENT
Start: 2023-05-30 | End: 2023-05-30 | Stop reason: HOSPADM

## 2023-05-30 RX ORDER — SODIUM CHLORIDE 9 MG/ML
INJECTION, SOLUTION INTRAVENOUS CONTINUOUS PRN
Status: DISCONTINUED | OUTPATIENT
Start: 2023-05-30 | End: 2023-05-30 | Stop reason: SURG

## 2023-05-30 RX ADMIN — Medication 10 ML: at 08:53

## 2023-05-30 RX ADMIN — ROCURONIUM BROMIDE 30 MG: 50 INJECTION INTRAVENOUS at 18:30

## 2023-05-30 RX ADMIN — ONDANSETRON 4 MG: 2 INJECTION INTRAMUSCULAR; INTRAVENOUS at 17:14

## 2023-05-30 RX ADMIN — DEXAMETHASONE SODIUM PHOSPHATE 10 MG: 4 INJECTION, SOLUTION INTRAMUSCULAR; INTRAVENOUS at 17:16

## 2023-05-30 RX ADMIN — PROPOFOL 120 MG: 10 INJECTION, EMULSION INTRAVENOUS at 17:14

## 2023-05-30 RX ADMIN — HYDRALAZINE HYDROCHLORIDE 10 MG: 20 INJECTION INTRAMUSCULAR; INTRAVENOUS at 14:13

## 2023-05-30 RX ADMIN — CLINDAMYCIN PHOSPHATE 900 MG: 900 INJECTION, SOLUTION INTRAVENOUS at 16:57

## 2023-05-30 RX ADMIN — MANNITOL 50 G: 12.5 INJECTION, SOLUTION INTRAVENOUS at 18:02

## 2023-05-30 RX ADMIN — DEXAMETHASONE SODIUM PHOSPHATE 4 MG: 4 INJECTION, SOLUTION INTRAMUSCULAR; INTRAVENOUS at 12:18

## 2023-05-30 RX ADMIN — PROPOFOL 100 MG: 10 INJECTION, EMULSION INTRAVENOUS at 17:28

## 2023-05-30 RX ADMIN — LEVETIRACETAM 1000 MG: 500 INJECTION, SOLUTION, CONCENTRATE INTRAVENOUS at 18:10

## 2023-05-30 RX ADMIN — LIDOCAINE HYDROCHLORIDE 100 MG: 20 INJECTION, SOLUTION INFILTRATION; PERINEURAL at 17:14

## 2023-05-30 RX ADMIN — SODIUM CHLORIDE: 9 INJECTION, SOLUTION INTRAVENOUS at 17:38

## 2023-05-30 RX ADMIN — NICARDIPINE HYDROCHLORIDE 5 MG/HR: 2.5 INJECTION, SOLUTION INTRAVENOUS at 19:14

## 2023-05-30 RX ADMIN — LEVETIRACETAM 500 MG: 500 INJECTION, SOLUTION, CONCENTRATE INTRAVENOUS at 08:21

## 2023-05-30 RX ADMIN — Medication 100 MCG: at 19:11

## 2023-05-30 RX ADMIN — SODIUM CHLORIDE, POTASSIUM CHLORIDE, SODIUM LACTATE AND CALCIUM CHLORIDE: 600; 310; 30; 20 INJECTION, SOLUTION INTRAVENOUS at 17:03

## 2023-05-30 RX ADMIN — PHENYLEPHRINE HYDROCHLORIDE 0.5 MCG/KG/MIN: 10 INJECTION INTRAVENOUS at 17:40

## 2023-05-30 RX ADMIN — HYDROMORPHONE HYDROCHLORIDE 0.25 MG: 1 INJECTION, SOLUTION INTRAMUSCULAR; INTRAVENOUS; SUBCUTANEOUS at 20:59

## 2023-05-30 RX ADMIN — FENTANYL CITRATE 50 MCG: 50 INJECTION, SOLUTION INTRAMUSCULAR; INTRAVENOUS at 17:27

## 2023-05-30 RX ADMIN — PROPOFOL 50 MCG/KG/MIN: 10 INJECTION, EMULSION INTRAVENOUS at 17:40

## 2023-05-30 RX ADMIN — Medication 100 MCG: at 17:40

## 2023-05-30 RX ADMIN — ROCURONIUM BROMIDE 50 MG: 50 INJECTION INTRAVENOUS at 17:16

## 2023-05-30 RX ADMIN — SUGAMMADEX 200 MG: 100 INJECTION, SOLUTION INTRAVENOUS at 19:26

## 2023-05-30 RX ADMIN — FUROSEMIDE 15 MG: 10 INJECTION, SOLUTION INTRAMUSCULAR; INTRAVENOUS at 17:57

## 2023-05-30 RX ADMIN — SODIUM CHLORIDE, POTASSIUM CHLORIDE, SODIUM LACTATE AND CALCIUM CHLORIDE: 600; 310; 30; 20 INJECTION, SOLUTION INTRAVENOUS at 19:01

## 2023-05-30 RX ADMIN — DEXAMETHASONE SODIUM PHOSPHATE 4 MG: 4 INJECTION, SOLUTION INTRAMUSCULAR; INTRAVENOUS at 05:02

## 2023-05-30 NOTE — PLAN OF CARE
Problem: Adult Inpatient Plan of Care  Goal: Plan of Care Review  Outcome: Ongoing, Progressing  Goal: Patient-Specific Goal (Individualized)  Outcome: Ongoing, Progressing  Goal: Absence of Hospital-Acquired Illness or Injury  Outcome: Ongoing, Progressing  Intervention: Identify and Manage Fall Risk  Recent Flowsheet Documentation  Taken 5/29/2023 2300 by Lizzy Rodas RN  Safety Promotion/Fall Prevention:   activity supervised   fall prevention program maintained   safety round/check completed  Intervention: Prevent Skin Injury  Recent Flowsheet Documentation  Taken 5/29/2023 2300 by Lizzy Rodas RN  Body Position: position changed independently  Goal: Optimal Comfort and Wellbeing  Outcome: Ongoing, Progressing  Intervention: Provide Person-Centered Care  Recent Flowsheet Documentation  Taken 5/29/2023 2000 by Lizzy Rodas RN  Trust Relationship/Rapport:   care explained   questions encouraged  Goal: Readiness for Transition of Care  Outcome: Ongoing, Progressing     Problem: Fall Injury Risk  Goal: Absence of Fall and Fall-Related Injury  Outcome: Ongoing, Progressing  Intervention: Identify and Manage Contributors  Recent Flowsheet Documentation  Taken 5/29/2023 2300 by Lizzy Rodas RN  Medication Review/Management: medications reviewed  Intervention: Promote Injury-Free Environment  Recent Flowsheet Documentation  Taken 5/29/2023 2300 by Lizzy Rodas RN  Safety Promotion/Fall Prevention:   activity supervised   fall prevention program maintained   safety round/check completed     Problem: Skin Injury Risk Increased  Goal: Skin Health and Integrity  Outcome: Ongoing, Progressing  Intervention: Optimize Skin Protection  Recent Flowsheet Documentation  Taken 5/29/2023 2300 by Lizzy Rodas RN  Head of Bed (HOB) Positioning: HOB elevated     Problem: Adjustment to Illness (Stroke, Hemorrhagic)  Goal: Optimal Coping  Outcome: Ongoing, Progressing  Intervention: Support Psychosocial Response  to Stroke  Recent Flowsheet Documentation  Taken 5/29/2023 2000 by Lizzy Rodas RN  Supportive Measures: active listening utilized  Family/Support System Care:   presence promoted   involvement promoted   self-care encouraged     Problem: Bowel Elimination Impaired (Stroke, Hemorrhagic)  Goal: Effective Bowel Elimination  Outcome: Ongoing, Progressing     Problem: Cerebral Tissue Perfusion (Stroke, Hemorrhagic)  Goal: Optimal Cerebral Tissue Perfusion  Outcome: Ongoing, Progressing  Intervention: Protect and Optimize Cerebral Perfusion  Recent Flowsheet Documentation  Taken 5/29/2023 2000 by Lizzy Rodas RN  Cerebral Perfusion Promotion: blood pressure monitored     Problem: Cognitive Impairment (Stroke, Hemorrhagic)  Goal: Optimal Cognitive Function  Outcome: Ongoing, Progressing     Problem: Communication Impairment (Stroke, Hemorrhagic)  Goal: Effective Communication Skills  Outcome: Ongoing, Progressing  Intervention: Optimize Communication Skills  Recent Flowsheet Documentation  Taken 5/29/2023 2000 by Lizzy Rodas RN  Communication Enhancement Strategies: call light answered in person     Problem: Functional Ability Impaired (Stroke, Hemorrhagic)  Goal: Optimal Functional Ability  Outcome: Ongoing, Progressing     Problem: Pain (Stroke, Hemorrhagic)  Goal: Acceptable Pain Control  Outcome: Ongoing, Progressing     Problem: Respiratory Compromise (Stroke, Hemorrhagic)  Goal: Effective Oxygenation and Ventilation  Outcome: Ongoing, Progressing  Intervention: Optimize Oxygenation and Ventilation  Recent Flowsheet Documentation  Taken 5/29/2023 2300 by Lizzy Rodas RN  Head of Bed (HOB) Positioning: HOB elevated     Problem: Sensorimotor Impairment (Stroke, Hemorrhagic)  Goal: Improved Sensorimotor Function  Outcome: Ongoing, Progressing  Intervention: Optimize Range of Motion, Motor Control and Function  Recent Flowsheet Documentation  Taken 5/29/2023 2300 by Lizzy Rodas  RN  Positioning/Transfer Devices:   pillows   in use     Problem: Swallowing Impairment (Stroke, Hemorrhagic)  Goal: Optimal Eating and Swallowing Without Aspiration  Outcome: Ongoing, Progressing     Problem: Urinary Elimination Impaired (Stroke, Hemorrhagic)  Goal: Effective Urinary Elimination  Outcome: Ongoing, Progressing   Goal Outcome Evaluation:         Pt. Has had no change in Neuro Assessment and has been NPO since midnight in anticipation of possible surgery.

## 2023-05-30 NOTE — ANESTHESIA PROCEDURE NOTES
Airway  Urgency: elective    Date/Time: 5/30/2023 5:18 PM  Airway not difficult    General Information and Staff    Patient location during procedure: OR  CRNA/CAA: Rosendo Naranjo, IVONNE    Indications and Patient Condition  Indications for airway management: airway protection    Preoxygenated: yes  MILS maintained throughout  Mask difficulty assessment: 1 - vent by mask    Final Airway Details  Final airway type: endotracheal airway      Successful airway: ETT  Cuffed: yes   Successful intubation technique: direct laryngoscopy  Facilitating devices/methods: intubating stylet  Endotracheal tube insertion site: oral  Blade: Noé  Blade size: 3  ETT size (mm): 7.0  Cormack-Lehane Classification: grade I - full view of glottis  Placement verified by: chest auscultation and capnometry   Measured from: gums  ETT/EBT to gums (cm): 21  Number of attempts at approach: 1  Assessment: lips, teeth, and gum same as pre-op and atraumatic intubation

## 2023-05-30 NOTE — PROGRESS NOTES
Subjective     REASON FOR CONSULTATION: Decision about craniotomy    History of Present Illness  patient is a 78-year-old female with non-small cell lung cancer initially stage III for which she received chemoradiation followed by immunotherapy with Imfinzi her last 5/15/2023.  She is now admitted with confusion and brain MRI showing 3 lesions 2 in the occipital lobe and 1 in the cerebellum which are almost certainly related to her lung cancer.     Neurosurgery has seen her and is scheduled to take her to surgery for the occipital tumor and we are asked whether a biopsy is needed for tissue diagnosis.     She is feeling fairly well overall now that her steroids were started with no headache or visual changes that are significant although I am sure she still has a field defect.     She will need restaging to make sure there is not systemic progression before taking her to surgery and obviously there is no systemic progression radiation to all 3 lesions versus surgery for the larger 1 and radiation are reasonable options     She has no pain or shortness of breath    5/29  In the process of restaging with bone scan and CAT scans  Await results    5/30'  Staging work-up completed results not yet back  Has plans for surgery later today      Past Medical History, Past Surgical History, Social History, Family History have been reviewed and are without significant changes except as mentioned.    Review of Systems   Eyes: Positive for visual disturbance.   All other systems reviewed and are negative.     A comprehensive 14 point review of systems was performed and was negative except as mentioned.    Medications:  The current medication list was reviewed in the EMR    ALLERGIES:    Allergies   Allergen Reactions   • Del-Mycin [Erythromycin] Hives   • Gentamicin Hives   • Ibuprofen Nausea And Vomiting   • Latex Dermatitis     GLOVES   • Metronidazole Hives   • Ofloxacin Hives and Nausea Only   • Penicillins Hives   •  Tetracycline Hives   • Adhesive Tape Dermatitis     BANDAIDS   • Aspirin GI Intolerance     Vomiting can take EC   • Codeine Nausea And Vomiting       Objective      Vitals:    05/29/23 2055 05/29/23 2327 05/30/23 0025 05/30/23 0445   BP: 111/46  146/65 138/58   BP Location: Left arm  Left arm Left arm   Patient Position: Lying  Lying    Pulse: 65  58 72   Resp: 18 16 16 16   Temp:  98.7 °F (37.1 °C) 98.5 °F (36.9 °C) 98.7 °F (37.1 °C)   TempSrc:  Oral Oral Oral   SpO2:       Weight:       Height:             5/15/2023    11:26 AM   Current Status   ECOG score 0       Physical Exam    CONSTITUTIONAL:  Vital signs reviewed.  No distress, looks comfortable.  EYES:  Conjunctivae and lids unremarkable.  PERRLA  EARS,NOSE,MOUTH,THROAT:  Ears and nose appear unremarkable.  Lips, teeth, gums appear unremarkable.  RESPIRATORY:  Normal respiratory effort.  Lungs clear to auscultation bilaterally.  CARDIOVASCULAR:  Normal S1, S2.  No murmurs rubs or gallops.  No significant lower extremity edema.  GASTROINTESTINAL: Abdomen appears unremarkable.  Nontender.  No hepatomegaly.  No splenomegaly.  LYMPHATIC:  No cervical, supraclavicular, axillary lymphadenopathy.  SKIN:  Warm.  No rashes.  PSYCHIATRIC:  Normal judgment and insight.  Normal mood and affect.  I have reexamined the patient and the results are consistent with the previously documented exam. Viet Green MD     RECENT LABS:  Hematology WBC   Date Value Ref Range Status   05/30/2023 17.30 (H) 3.40 - 10.80 10*3/mm3 Final     RBC   Date Value Ref Range Status   05/30/2023 5.12 3.77 - 5.28 10*6/mm3 Final     Hemoglobin   Date Value Ref Range Status   05/30/2023 14.2 12.0 - 15.9 g/dL Final     Hematocrit   Date Value Ref Range Status   05/30/2023 42.6 34.0 - 46.6 % Final     Platelets   Date Value Ref Range Status   05/30/2023 348 140 - 450 10*3/mm3 Final          MRI BRAIN  IMPRESSION:  1. Since the prior MRI of the brain on 08/15/2022, patient has developed  at  least 3 enhancing lesions in the brain compatible with at least 3  brain metastases, the largest brain metastases is in the posterior  superior lateral right occipital lobe and measures 3.7 x 2.9 x 2.7 cm in  size and has some subacute and chronic blood products within it, has a  large amount of surrounding vasogenic edema with the vasogenic edema  tracking up to 8 x 5 x 6 cm in anterior posterior and medial lateral and  craniocaudal dimension, and it has mass effect on the posterior temporal  horn and trigone and occipital horn of the right lateral ventricle and  there is associated 3-4 mm right-to-left midline shift at the level of  the septum pellucidum. There is a tiny second metastasis to the  posterior lateral left occipital cortex measuring 4 x 3.5 x 4 mm in size  that has 15 x 13 mm of surrounding vasogenic edema. The third metastasis  is located in the midline of the superior cerebellum, measures 7 x 6 x 7  mm in size and has 2 x 1.3 cm of surrounding vasogenic edema.  2. There is mild-to-moderate small vessel disease in cerebral white  matter. The remainder of the MRI of the brain is within normal limits.  The results were communicated to the nurse in the CCU taking care of the  patient by telephone on 05/26/2023 at 1:00 AM. I also communicated the  results to Dr. Ricketts from neurosurgery by telephone on 05/26/2023 at 8:20  AM.     This report was finalized on 5/26/2023        Assessment & Plan     • 1.  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  Patient is not felt to be a surgical candidate and combined chemotherapy and radiation.   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  • 9/20/2022 1st dose of weekly carboplatin/taxol.   • She completed 6 cycles of weekly CarboTaxol 10/25/2022.  • Radiation therapy completed 10/27/2022  • First dose durvalumab delivered 11/28/2022  • Recent follow-up bronchoscopy performed 1/4/2023 with no evidence of malignancy identified  • CT scans  dated 3/23 show stable disease with no new mets  • 5/15/2023: Cycle #13 Imfinzi today.  Tolerating well.  • Tumor is strongly PD-L1 positive greater than 95% (although the patient may not be a great candidate for immunotherapy in the future due to her rheumatoid arthritis).  • confusion headache MRI shows 3 brain mets-needs restaging  • Restaging pending surgery planned on 5/30/2023     2.  Other comorbidities including vascular disease with previous carotid endarterectomy surgeries.  She has no known history of stroke or myocardial infarction.  Overall her performance status is very good.    3.  Hemoptysis related to her central tumor.  Improved with initiation of therapy.  She reports she still has some darker blood coming up when she coughs but this is usually now associated more with some yellow foul-smelling sputum.    4.  Rheumatoid arthritis: Patient previously on Celebrex, but discontinued due to hemoptysis.  Patient started on prednisone 20 mg daily prescribed to manage her arthritis pain.  Prednisone has since been decreased to 10 mg daily.    5.    Lesion on the right kidney stable on further follow-up scans.     PLAN  1.await staging scans-to look for systemic progression-    if surgery is felt to be the best option for the occipital lesion following surgery focused radiation to the cerebellum would also be a good option after surgery   2.Needs DVT prophylaxis with SCDs in light of hemorrhagic metastasis   3.  Agree with steroids and Keppra  We will follow thank you for this consult                     5/30/2023      CC:

## 2023-05-30 NOTE — CASE MANAGEMENT/SOCIAL WORK
Continued Stay Note  [facility]     Patient Name: Pam Vidal  MRN: [id number]  Today's Date: 5/30/2023    Admit Date: 5/25/2023    Plan: pending clinical course   Discharge Plan     Row Name 05/30/23 1016       Plan    Plan pending clinical course    Patient/Family in Agreement with Plan yes    Plan Comments CCP noted care plan and continues to follow for discharge needs should any arise. PT working with patient. Angie LINDSAY RN CCP               Discharge Codes    No documentation.                     Angie Plascencia RN

## 2023-05-30 NOTE — PLAN OF CARE
Problem: Adult Inpatient Plan of Care  Goal: Plan of Care Review  Outcome: Ongoing, Progressing  Goal: Patient-Specific Goal (Individualized)  Outcome: Ongoing, Progressing  Goal: Absence of Hospital-Acquired Illness or Injury  Outcome: Ongoing, Progressing  Goal: Optimal Comfort and Wellbeing  Outcome: Ongoing, Progressing  Goal: Readiness for Transition of Care  Outcome: Ongoing, Progressing     Problem: Fall Injury Risk  Goal: Absence of Fall and Fall-Related Injury  Outcome: Ongoing, Progressing     Problem: Skin Injury Risk Increased  Goal: Skin Health and Integrity  Outcome: Ongoing, Progressing     Problem: Adjustment to Illness (Stroke, Hemorrhagic)  Goal: Optimal Coping  Outcome: Ongoing, Progressing     Problem: Functional Ability Impaired (Stroke, Hemorrhagic)  Goal: Optimal Functional Ability  Outcome: Ongoing, Progressing   Goal Outcome Evaluation:

## 2023-05-30 NOTE — PROGRESS NOTES
Name: Pam Vidal ADMIT: 2023   : 1945  PCP: Nik Mckay MD    MRN: [id number] LOS: 5 days   AGE/SEX: 78 y.o. female  ROOM: [facility]     Subjective   Subjective   Seen preoperatively.  Patient surrounded by a large number of family members from out of town.  No headache.  No loss of consciousness.  No seizures.  No dizziness.  No focal neurological symptoms.  No fever or chills.    Review of Systems  Cardiovascular/respiratory.  No chest pain.  No shortness of breath.  No palpitation.  No cough.  GI.  No abdominal pain or nausea or vomiting.  No bowel movements today.    .  No dysuria or hematuria.       Objective   Objective   Vital Signs  Temp:  [97.6 °F (36.4 °C)-98.7 °F (37.1 °C)] 98.3 °F (36.8 °C)  Heart Rate:  [58-72] 71  Resp:  [16-20] 16  BP: (111-163)/(46-73) 163/73  SpO2:  [100 %] 100 %  on   ;   Device (Oxygen Therapy): room air  No intake or output data in the 24 hours ending 23 1304  Body mass index is 21.67 kg/m².      23  1325 235 23   Weight: 53.5 kg (118 lb) 54.3 kg (119 lb 11.4 oz) 60.9 kg (134 lb 4.2 oz)     Physical Exam      Results General.  Elderly female.  Alert and oriented x3.  No apparent pain/distress/diaphoresis.  Normal mood and affect  Eyes.  Pupils equal round and reactive.  Status post bilateral cataract surgery.  No pallor or jaundice.  Intact extraocular musculature.  Oral cavity.  Moist mucous membranes  Neck.  Supple.  No JVD.  Positive bilateral carotid scars.  No lymphadenopathy or thyromegaly.  Cardiovascular.  Regular rate and rhythm with no gallops or murmurs.  Chest.  Clear to auscultation bilaterally with no added sounds and poor bilateral air entry.  Abdomen.  Soft lax.  No tenderness.  No organomegaly.  No guarding or rebound.  Extremities.  No clubbing/cyanosis/edema.  CNS.  No acute focal neurological deficits.  No change in the exam from yesterday.      Review:      Results from last 7 days   Lab Units  05/30/23  0626 05/29/23  0637 05/28/23  0622 05/27/23  0638 05/26/23  0347 05/25/23  1346   SODIUM mmol/L 135* 138 135* 136 138 136   POTASSIUM mmol/L 4.2 4.2 3.9 3.9 3.9 3.6   CHLORIDE mmol/L 102 106 104 101 104 97*   CO2 mmol/L 23.0 20.4* 21.6* 23.8 25.0 25.2   BUN mg/dL 35* 31* 28* 22 21 16   CREATININE mg/dL 0.93 0.88 0.94 0.86 1.07* 1.08*   GLUCOSE mg/dL 118* 139* 142* 116* 160* 102*   CALCIUM mg/dL 9.1 9.3 9.5 9.8 9.2 10.0   AST (SGOT) U/L  --   --   --  15 15 20   ALT (SGPT) U/L  --   --   --  15 12 15     Estimated Creatinine Clearance: 49.5 mL/min (by C-G formula based on SCr of 0.93 mg/dL).  Results from last 7 days   Lab Units 05/26/23  0347   HEMOGLOBIN A1C % 5.70*     Results from last 7 days   Lab Units 05/30/23  0823 05/29/23  2057 05/29/23  1613 05/29/23  1214 05/29/23  0008 05/28/23  1734 05/28/23  1237 05/28/23  0512   GLUCOSE mg/dL 131* 163* 193* 124 182* 124 120 143*     Results from last 7 days   Lab Units 05/25/23  1655 05/25/23  1346   HSTROP T ng/L 15* 16*     Results from last 7 days   Lab Units 05/25/23  1346   PROBNP pg/mL 130.0     Results from last 7 days   Lab Units 05/25/23  1346   TSH uIU/mL 2.150           Invalid input(s):  PHOS  Results from last 7 days   Lab Units 05/26/23  0347   CHOLESTEROL mg/dL 179   TRIGLYCERIDES mg/dL 109   HDL CHOL mg/dL 56     Results from last 7 days   Lab Units 05/30/23  0626 05/29/23  0637 05/28/23  0622 05/27/23  0638 05/26/23  0350 05/25/23  1346   WBC 10*3/mm3 17.30* 15.85* 17.70* 23.29* 9.60 11.34*   HEMOGLOBIN g/dL 14.2 13.1 12.9 13.5 13.5 14.3   HEMATOCRIT % 42.6 38.4 38.2 40.3 40.8 42.0   PLATELETS 10*3/mm3 348 294 310 352 323 359   MCV fL 83.2 81.7 83.4 83.6 85.2 83.5   MCH pg 27.7 27.9 28.2 28.0 28.2 28.4   MCHC g/dL 33.3 34.1 33.8 33.5 33.1 34.0   RDW % 15.1 14.5 14.9 15.0 14.7 14.6   RDW-SD fl 45.4 43.8 44.8 45.4 45.8 44.3   MPV fL 11.3 11.0 11.3 10.7 11.5 10.5   NEUTROPHIL % % 88.6* 91.4* 91.7*  --   --  79.6*   LYMPHOCYTE % % 4.6* 3.4* 3.7*   --   --  10.3*   MONOCYTES % % 5.4 4.2* 3.6*  --   --  7.9   EOSINOPHIL % % 0.1* 0.1* 0.1*  --   --  1.4   BASOPHIL % % 0.1 0.1 0.1  --   --  0.3   IMM GRAN % % 1.2* 0.8* 0.8*  --   --  0.5   NEUTROS ABS 10*3/mm3 15.32* 14.50* 16.23*  --   --  9.02*   LYMPHS ABS 10*3/mm3 0.80 0.54* 0.65*  --   --  1.17   MONOS ABS 10*3/mm3 0.94* 0.67 0.64  --   --  0.90   EOS ABS 10*3/mm3 0.01 0.01 0.01  --   --  0.16   BASOS ABS 10*3/mm3 0.02 0.01 0.02  --   --  0.03   IMMATURE GRANS (ABS) 10*3/mm3 0.21* 0.12* 0.15*  --   --  0.06*   NRBC /100 WBC 0.0 0.0 0.0  --   --  0.0     Results from last 7 days   Lab Units 05/27/23  0638 05/26/23  0347 05/25/23  1346   INR  1.01 1.03 1.00   APTT seconds  --   --  33.7         Results from last 7 days   Lab Units 05/27/23  1820   PROCALCITONIN ng/mL 0.04                               Imaging:  Imaging Results (Last 24 Hours)     ** No results found for the last 24 hours. **             I reviewed the patient's new clinical results / labs / tests / procedures      Assessment/Plan     Active Hospital Problems    Diagnosis  POA   • **Lung cancer metastatic to brain [C34.90, C79.31]  Yes   • Leukocytosis [D72.829]  Unknown   • Brain mass [G93.89]  Unknown   • COPD (chronic obstructive pulmonary disease) [J44.9]  Unknown   • Rheumatoid arthritis [M06.9]  Unknown   • IBS (irritable bowel syndrome) [K58.9]  Unknown   • Hyperlipidemia [E78.5]  Unknown   • Cough with hemoptysis [R04.2]  Yes      Resolved Hospital Problems   No resolved problems to display.       1.  Hemorrhagic metastatic lesions of the brain with vasogenic edema.  Neurosurgery is following.  Currently on Keppra and IV steroids.  Repeat MRI shows no significant change.  Neurosurgery planning surgical resection of the occipital bleeding metastasis early this afternoon..  Radiation oncology recommends radiation after surgery.  CT scan of the chest/abdomen/pelvis revealed a right upper lobe suprahilar pulmonary mass that is stable with  sclerotic right anterior eighth rib changes that are new from before with an increased of bone scan uptake that could be a healing fracture or sclerotic metastatic disease.  No other signs of metastasis.  Hematology oncology following.  2.  Metastatic lung cancer stage III/COPD/hemoptysis.  Stable respiratory status.  Patient is on chemo/immunotherapy and is s/p radiation..  Hematology oncology on board.  CT abdomen/pelvis/chest/nuclear bone scan shows no new metastasis except a sclerotic lesion of the right eighth rib with increased uptake.  3.  Rheumatoid arthritis.  Appears to be stable.  Currently on steroids.  4.  Dyslipidemia/carotid peripheral vascular disease status post bilateral endarterectomy Aspirin on hold because of #1.  Lipitor initiated   5.  Steroid-induced leukocytosis.  No fever.  Normal procalcitonin.  Stable.  6.  Prediabetes.  A1c is 5.7.  Positive steroid-induced hyperglycemia.  Continue sliding scale.  7.  Hypertensive emergency.  Resolved on Cardene drip.  Currently on no medications with acutely acceptable blood pressure control and no evidence of angina or congestive heart failure.  8.  VTE prophylaxis with sequential compression devices.    Discussed my findings and plan of treatment with the patient  Disposition.  To be determined based on clinical course.            Rhina Castro MD  [facility]  05/30/23  13:04 EDT

## 2023-05-30 NOTE — OP NOTE
Craniotomy Procedure Note    Pam Vidal  5/30/2023  [id number]    SURGEON  Clint Ricketts MD    ASSISTANT:  Monica Stewart CSA was present throughout the entire surgery to provide intraoperative suction, retraction, suturing, and irrigation to promote visualization by the operative surgeon and assist with opening and closure.  The skilled assistance was necessary for the success of this case.  Our practice does not have a relationship with neurosurgical residents.      INDICATION:  Pam Vidal is a 78 y.o. female who presents with visual deficits in the left hemisphere in addition to an abnormal feeling in her head.  Imaging demonstrated a large hemorrhagic right occipital lobe lesion.  Prior to this finding she was considered stage III adenocarcinoma and was not a candidate for surgery for her primary tumor.  Her systemic disease was relatively well controlled on chemotherapy and radiation.  I discussed with her oncologist what the best course of action would be and we felt that resection of this tumor with postoperative radiation and chemotherapy would be in her best interest.  I discussed this with the patient including risks and benefits and alternatives of surgery.  We discussed potential stroke, hemorrhage, death as result of brain surgery.  We also discussed possible transient or permanent visual field deficits from the surgery.  She understood and was willing to proceed with surgery.    Pre-op Diagnosis:   Lung cancer metastatic to brain [C34.90, C79.31]    Post-op Diagnosis:  Post-Op Diagnosis Codes:     * Lung cancer metastatic to brain [C34.90, C79.31]    PROCEDURE PERFORMED:  Procedure(s):  RIGHT CRANIOTOMY FOR TUMOR RESECTION STEREOTACTIC WITH STEALTH    1.  Craniotomy for resection of intra-axial tumor  2.  Intraoperative microscope with microdissection  3.  Intraoperative ultrasound    Anesthesia: General    Staff:   Circulator: Hilda Walsh RN; Dinora Chandler RN; Nayla Lagunas  KRISTIE RN  Scrub Person: Dinora Rodriguez Yamungu  Assistant: Monica Stewart CSA    Estimated Blood Loss: 50 mL    Specimens:    Order Name Source Comment Collection Info Order Time   TYPE AND SCREEN   Collected By: Kay Morales RN 5/30/2023  4:39 PM     Is patient on Daratumumab or [id number]?   No          Release to patient   Routine Release        TISSUE PATHOLOGY EXAM Brain  Collected By: Clint Ricketts MD 5/30/2023  6:41 PM     Release to patient   Routine Release              Drains:   Urethral Catheter Silicone 16 Fr. (Active)       Findings: Large encapsulated irregular shaped tumor    Complications: none immediate    Narrative Description:    Positioning:  After operative consent the patient was brought into the operating room and placed under general endotracheal anesthesia using intravenous and inhalational agents.  The patient was then positioned on the operating table in the prone position.  All pressure points were padded including peripheral points of entrapment.  The head was secured in the [place] three-point pin fixation. The head was placed prone.      Approach:  Following this the hair was shaved over the suboccipital region and then prepped in a sterile technique.  The incision was then made over the right occipital lobe.  This scalp flap and muscular layers were then freed and exposed the underlying posterior skull.    This a DPSIas Rafy drill was used to perform a craniotomy over the occipital lobe.  The dura was opened in a circular fashion    Operating Microscope: The operating microscope was draped using sterile technique and brought into the field and the rest of the operation was performed under operative magnification with microdissection technique and micro-illumination with the aid of the operating microscope.    Mass Biopsy and Resection:    A corticectomy was created over an area of the cortex that had obvious discoloration due to tumor involvement.  This  correlated well with the border of the tumor that was closest to the surface.  A clear margin between the brain and tumor capsule was evident and careful care was used in dissecting the tumor capsule off of adhesions from the brain using bipolar cautery and suction.  An irregular shaped tumor that was well encapsulated was removed in 1 piece and sent for final pathology.  Upon completion I lined the margins of the resection cavity and brain with fibrillar surgicel and there was no evidence of bleeding.      Closure:  The dura was tacked using 4-0 Nurolon suture.  The KLS system mesh was applied to the craniectomy defect and affixed with 4mm screws.  Fibrin tissue glue was used to facilitate a water tight closure over the suture line and craniectomy site.  The galea was closed with 3-0 Vicryl and the skin was closed with a 2-0 nylon. The incision was dressed with a dry guaze dressing and the patient was taken to the recovery room in stable condition.  There were no apparent complications and the sponge, instrument and needle counts were correct at the end of the procedure. The patient will remain in the ICU overnight and a MRI will be obtained tomorrow AM for routine follow up or CT scan sooner if clinically indicated.    Clint Ricketts MD     Date: 5/30/2023  Time: 19:13 EDT

## 2023-05-30 NOTE — ANESTHESIA PREPROCEDURE EVALUATION
Anesthesia Evaluation     Patient summary reviewed and Nursing notes reviewed   no history of anesthetic complications:  NPO Solid Status: > 8 hours  NPO Liquid Status: > 8 hours           Airway   Mallampati: II  TM distance: >3 FB  Neck ROM: full  No difficulty expected  Dental    (+) edentulous    Pulmonary    (+) a smoker Former, lung cancer, COPD mild,     ROS comment: hemoptysis  Cardiovascular   Exercise tolerance: good (4-7 METS)    Rhythm: regular  Rate: normal    (+) hyperlipidemia,   (-) hypertension, CAD, angina, FIERRO, murmur      Neuro/Psych  (-) seizures, CVA  GI/Hepatic/Renal/Endo    (+)   renal disease (renal mass),   (-) diabetes    Musculoskeletal     Abdominal     Abdomen: soft.   Substance History      OB/GYN          Other   arthritis, chronic steroid use    history of cancer (RUL mass on immunotherapy ) active                    Anesthesia Plan    ASA 3     general and Brooke     intravenous induction     Anesthetic plan, risks, benefits, and alternatives have been provided, discussed and informed consent has been obtained with: patient.        CODE STATUS:

## 2023-05-30 NOTE — ANESTHESIA PROCEDURE NOTES
Arterial Line      Patient reassessed immediately prior to procedure    Start time: 5/30/2023 4:41 PM  Stop Time:5/30/2023 4:49 PM       Line placed for hemodynamic monitoring and ABGs/Labs/ISTAT.  Performed By   Anesthesiologist: Kristen Miguel MD   Preanesthetic Checklist  Completed: patient identified, IV checked, site marked, risks and benefits discussed, surgical consent, monitors and equipment checked, pre-op evaluation and timeout performed  Arterial Line Prep    Sterile Tech: gloves, mask and cap  Prep: ChloraPrep  Patient monitoring: blood pressure monitoring, continuous pulse oximetry and EKG  Arterial Line Procedure   Laterality:left  Location:  radial artery  Catheter size: 20 G   Guidance: ultrasound guided  Number of attempts: 2  Successful placement: yes Images: still images not obtained  Post Assessment   Dressing Type: secured with tape and occlusive dressing applied.   Complications no  Circ/Move/Sens Assessment: normal and unchanged.   Patient Tolerance: patient tolerated the procedure well with no apparent complications

## 2023-05-31 ENCOUNTER — APPOINTMENT (OUTPATIENT)
Dept: MRI IMAGING | Facility: HOSPITAL | Age: 78
DRG: 025 | End: 2023-05-31
Payer: MEDICARE

## 2023-05-31 LAB
ANION GAP SERPL CALCULATED.3IONS-SCNC: 11.2 MMOL/L (ref 5–15)
BASOPHILS # BLD AUTO: 0.02 10*3/MM3 (ref 0–0.2)
BASOPHILS NFR BLD AUTO: 0.1 % (ref 0–1.5)
BUN SERPL-MCNC: 33 MG/DL (ref 8–23)
BUN/CREAT SERPL: 35.1 (ref 7–25)
CALCIUM SPEC-SCNC: 8.2 MG/DL (ref 8.6–10.5)
CHLORIDE SERPL-SCNC: 104 MMOL/L (ref 98–107)
CO2 SERPL-SCNC: 22.8 MMOL/L (ref 22–29)
CREAT SERPL-MCNC: 0.94 MG/DL (ref 0.57–1)
DEPRECATED RDW RBC AUTO: 46.3 FL (ref 37–54)
EGFRCR SERPLBLD CKD-EPI 2021: 63 ML/MIN/1.73
EOSINOPHIL # BLD AUTO: 0 10*3/MM3 (ref 0–0.4)
EOSINOPHIL NFR BLD AUTO: 0 % (ref 0.3–6.2)
ERYTHROCYTE [DISTWIDTH] IN BLOOD BY AUTOMATED COUNT: 14.9 % (ref 12.3–15.4)
GLUCOSE BLDC GLUCOMTR-MCNC: 133 MG/DL (ref 70–130)
GLUCOSE BLDC GLUCOMTR-MCNC: 152 MG/DL (ref 70–130)
GLUCOSE BLDC GLUCOMTR-MCNC: 99 MG/DL (ref 70–130)
GLUCOSE SERPL-MCNC: 222 MG/DL (ref 65–99)
HCT VFR BLD AUTO: 36.9 % (ref 34–46.6)
HGB BLD-MCNC: 12.1 G/DL (ref 12–15.9)
IMM GRANULOCYTES # BLD AUTO: 0.14 10*3/MM3 (ref 0–0.05)
IMM GRANULOCYTES NFR BLD AUTO: 0.9 % (ref 0–0.5)
LYMPHOCYTES # BLD AUTO: 0.35 10*3/MM3 (ref 0.7–3.1)
LYMPHOCYTES NFR BLD AUTO: 2.3 % (ref 19.6–45.3)
MAGNESIUM SERPL-MCNC: 2.1 MG/DL (ref 1.6–2.4)
MCH RBC QN AUTO: 27.9 PG (ref 26.6–33)
MCHC RBC AUTO-ENTMCNC: 32.8 G/DL (ref 31.5–35.7)
MCV RBC AUTO: 85 FL (ref 79–97)
MONOCYTES # BLD AUTO: 0.82 10*3/MM3 (ref 0.1–0.9)
MONOCYTES NFR BLD AUTO: 5.4 % (ref 5–12)
NEUTROPHILS NFR BLD AUTO: 13.9 10*3/MM3 (ref 1.7–7)
NEUTROPHILS NFR BLD AUTO: 91.3 % (ref 42.7–76)
NRBC BLD AUTO-RTO: 0.1 /100 WBC (ref 0–0.2)
PHOSPHATE SERPL-MCNC: 3.2 MG/DL (ref 2.5–4.5)
PLATELET # BLD AUTO: 271 10*3/MM3 (ref 140–450)
PMV BLD AUTO: 11.1 FL (ref 6–12)
POTASSIUM SERPL-SCNC: 3.9 MMOL/L (ref 3.5–5.2)
RBC # BLD AUTO: 4.34 10*6/MM3 (ref 3.77–5.28)
SODIUM SERPL-SCNC: 138 MMOL/L (ref 136–145)
WBC NRBC COR # BLD: 15.23 10*3/MM3 (ref 3.4–10.8)

## 2023-05-31 PROCEDURE — 97166 OT EVAL MOD COMPLEX 45 MIN: CPT

## 2023-05-31 PROCEDURE — 85025 COMPLETE CBC W/AUTO DIFF WBC: CPT | Performed by: NEUROLOGICAL SURGERY

## 2023-05-31 PROCEDURE — 63710000001 DEXAMETHASONE PER 0.25 MG: Performed by: NURSE PRACTITIONER

## 2023-05-31 PROCEDURE — 82948 REAGENT STRIP/BLOOD GLUCOSE: CPT

## 2023-05-31 PROCEDURE — 63710000001 ONDANSETRON PER 8 MG: Performed by: NEUROLOGICAL SURGERY

## 2023-05-31 PROCEDURE — 97535 SELF CARE MNGMENT TRAINING: CPT

## 2023-05-31 PROCEDURE — 70553 MRI BRAIN STEM W/O & W/DYE: CPT

## 2023-05-31 PROCEDURE — 25010000002 LEVETRIRACETAM PER 10 MG: Performed by: NEUROLOGICAL SURGERY

## 2023-05-31 PROCEDURE — 80048 BASIC METABOLIC PNL TOTAL CA: CPT | Performed by: NEUROLOGICAL SURGERY

## 2023-05-31 PROCEDURE — A9577 INJ MULTIHANCE: HCPCS | Performed by: HOSPITALIST

## 2023-05-31 PROCEDURE — 84100 ASSAY OF PHOSPHORUS: CPT | Performed by: INTERNAL MEDICINE

## 2023-05-31 PROCEDURE — 99233 SBSQ HOSP IP/OBS HIGH 50: CPT | Performed by: INTERNAL MEDICINE

## 2023-05-31 PROCEDURE — 63710000001 INSULIN REGULAR HUMAN PER 5 UNITS: Performed by: NEUROLOGICAL SURGERY

## 2023-05-31 PROCEDURE — 25010000002 HYDRALAZINE PER 20 MG: Performed by: NEUROLOGICAL SURGERY

## 2023-05-31 PROCEDURE — 0 GADOBENATE DIMEGLUMINE 529 MG/ML SOLUTION: Performed by: HOSPITALIST

## 2023-05-31 PROCEDURE — 99024 POSTOP FOLLOW-UP VISIT: CPT | Performed by: NURSE PRACTITIONER

## 2023-05-31 PROCEDURE — 25010000002 DEXAMETHASONE SODIUM PHOSPHATE 20 MG/5ML SOLUTION: Performed by: NEUROLOGICAL SURGERY

## 2023-05-31 PROCEDURE — 83735 ASSAY OF MAGNESIUM: CPT | Performed by: INTERNAL MEDICINE

## 2023-05-31 RX ORDER — DEXAMETHASONE 2 MG/1
2 TABLET ORAL EVERY 12 HOURS SCHEDULED
Status: DISCONTINUED | OUTPATIENT
Start: 2023-06-12 | End: 2023-06-02 | Stop reason: HOSPADM

## 2023-05-31 RX ORDER — DEXAMETHASONE 2 MG/1
2 TABLET ORAL EVERY 8 HOURS SCHEDULED
Status: DISCONTINUED | OUTPATIENT
Start: 2023-06-08 | End: 2023-06-02 | Stop reason: HOSPADM

## 2023-05-31 RX ORDER — DEXAMETHASONE 2 MG/1
2 TABLET ORAL
Status: DISCONTINUED | OUTPATIENT
Start: 2023-06-16 | End: 2023-06-02 | Stop reason: HOSPADM

## 2023-05-31 RX ORDER — DEXAMETHASONE 6 MG/1
3 TABLET ORAL EVERY 8 HOURS SCHEDULED
Status: DISCONTINUED | OUTPATIENT
Start: 2023-06-04 | End: 2023-06-02 | Stop reason: HOSPADM

## 2023-05-31 RX ORDER — DEXAMETHASONE 4 MG/1
4 TABLET ORAL EVERY 8 HOURS SCHEDULED
Status: DISCONTINUED | OUTPATIENT
Start: 2023-05-31 | End: 2023-06-02 | Stop reason: HOSPADM

## 2023-05-31 RX ORDER — PREDNISONE 10 MG/1
10 TABLET ORAL DAILY
Qty: 30 TABLET | Refills: 1 | Status: ON HOLD
Start: 2023-06-21

## 2023-05-31 RX ORDER — LEVETIRACETAM 500 MG/1
500 TABLET ORAL 2 TIMES DAILY
Status: DISCONTINUED | OUTPATIENT
Start: 2023-05-31 | End: 2023-06-02 | Stop reason: HOSPADM

## 2023-05-31 RX ADMIN — HYDRALAZINE HYDROCHLORIDE 10 MG: 20 INJECTION INTRAMUSCULAR; INTRAVENOUS at 12:30

## 2023-05-31 RX ADMIN — LEVETIRACETAM 500 MG: 500 INJECTION, SOLUTION, CONCENTRATE INTRAVENOUS at 06:16

## 2023-05-31 RX ADMIN — DEXAMETHASONE 4 MG: 4 TABLET ORAL at 21:06

## 2023-05-31 RX ADMIN — PANTOPRAZOLE SODIUM 40 MG: 40 TABLET, DELAYED RELEASE ORAL at 06:16

## 2023-05-31 RX ADMIN — GADOBENATE DIMEGLUMINE 12 ML: 529 INJECTION, SOLUTION INTRAVENOUS at 09:03

## 2023-05-31 RX ADMIN — INSULIN HUMAN 2 UNITS: 100 INJECTION, SOLUTION PARENTERAL at 17:57

## 2023-05-31 RX ADMIN — DEXAMETHASONE SODIUM PHOSPHATE 4 MG: 4 INJECTION, SOLUTION INTRAMUSCULAR; INTRAVENOUS at 12:30

## 2023-05-31 RX ADMIN — ONDANSETRON HYDROCHLORIDE 4 MG: 4 TABLET, FILM COATED ORAL at 08:23

## 2023-05-31 RX ADMIN — DEXAMETHASONE SODIUM PHOSPHATE 4 MG: 4 INJECTION, SOLUTION INTRAMUSCULAR; INTRAVENOUS at 00:08

## 2023-05-31 RX ADMIN — LEVETIRACETAM 500 MG: 500 TABLET, FILM COATED ORAL at 21:06

## 2023-05-31 RX ADMIN — CLINDAMYCIN PHOSPHATE 900 MG: 900 INJECTION, SOLUTION INTRAVENOUS at 10:25

## 2023-05-31 RX ADMIN — CLINDAMYCIN PHOSPHATE 900 MG: 900 INJECTION, SOLUTION INTRAVENOUS at 00:09

## 2023-05-31 RX ADMIN — HYDRALAZINE HYDROCHLORIDE 10 MG: 20 INJECTION INTRAMUSCULAR; INTRAVENOUS at 17:57

## 2023-05-31 RX ADMIN — DOCUSATE SODIUM 50MG AND SENNOSIDES 8.6MG 1 TABLET: 8.6; 5 TABLET, FILM COATED ORAL at 21:06

## 2023-05-31 RX ADMIN — DEXAMETHASONE SODIUM PHOSPHATE 4 MG: 4 INJECTION, SOLUTION INTRAMUSCULAR; INTRAVENOUS at 06:16

## 2023-05-31 NOTE — ANESTHESIA POSTPROCEDURE EVALUATION
"Patient: Pam Vidal    Procedure Summary     Date: 05/30/23 Room / Location: [facility] OR 07 / [facility] MAIN OR    Anesthesia Start: 1703 Anesthesia Stop: 1952    Procedure: RIGHT CRANIOTOMY FOR TUMOR RESECTION STEREOTACTIC WITH STEALTH (Right: Head) Diagnosis:       Lung cancer metastatic to brain      (Lung cancer metastatic to brain [C34.90, C79.31])    Surgeons: Clint Ricketts MD Provider: Kalia Flores DO    Anesthesia Type: generalBrooke ASA Status: 3          Anesthesia Type: general, Brooke    Vitals  Vitals Value Taken Time   BP 98/42 05/30/23 2145   Temp 36.4 °C (97.6 °F) 05/1945   Pulse 60 05/30/23 2146   Resp 16 05/30/23 2115   SpO2 100 % 05/30/23 2146   Vitals shown include unvalidated device data.        Post Anesthesia Care and Evaluation    Patient location during evaluation: bedside  Patient participation: complete - patient participated  Level of consciousness: awake and alert  Pain management: adequate    Airway patency: patent  Anesthetic complications: No anesthetic complications    Cardiovascular status: acceptable  Respiratory status: acceptable  Hydration status: acceptable    Comments: /42   Pulse 64   Temp 36.4 °C (97.6 °F) (Oral)   Resp 16   Ht 167.6 cm (66\")   Wt 60.9 kg (134 lb 4.2 oz)   SpO2 100%   BMI 21.67 kg/m²       "

## 2023-05-31 NOTE — THERAPY EVALUATION
Patient Name: Pam Vidal  : 1945    MRN: [id number]                              Today's Date: 2023       Admit Date: 2023    Visit Dx:     ICD-10-CM ICD-9-CM   1. Lung cancer metastatic to brain  C34.90 162.9    C79.31 198.3   2. Vasogenic brain edema  G93.6 348.5   3. Hemoptysis  R04.2 786.30   4. Unsteady gait  R26.81 781.2     Patient Active Problem List   Diagnosis   • Primary lung adenocarcinoma, right   • Cough with hemoptysis   • Lung mass   • Advanced care planning/counseling discussion   • High risk medication use   • Muscular deconditioning   • Neoplastic malignant related fatigue   • Lung cancer metastatic to brain   • Leukocytosis   • Brain mass   • COPD (chronic obstructive pulmonary disease)   • Rheumatoid arthritis   • IBS (irritable bowel syndrome)   • Hyperlipidemia     Past Medical History:   Diagnosis Date   • COPD (chronic obstructive pulmonary disease)    • Coughing up blood     X2 MONTHS   • History of COVID-19 2022   • Hyperlipidemia    • IBS (irritable bowel syndrome)    • Primary lung adenocarcinoma, right    • Rheumatoid arthritis      Past Surgical History:   Procedure Laterality Date   • APPENDECTOMY      appendix removed   • BRONCHOSCOPY N/A 2022    Procedure: BRONCHOSCOPY WITH BAL,  BIOPSIES, AND BRUSHINGS WITH ENDOBRONCHIAL ULTRASOUND WITH FNA;  Surgeon: Gregor Vee MD;  Location: [facility];  Service: Pulmonary;  Laterality: N/A;  PRE- HILAR MASS  POST- SAME   • BRONCHOSCOPY N/A 2023    Procedure: BRONCHOSCOPY with biopsy, lavage, brushing;  Surgeon: Gregor Vee MD;  Location: [facility] ENDOSCOPY;  Service: Pulmonary;  Laterality: N/A;   • CAROTID ENDARTERECTOMY Right    • CAROTID ENDARTERECTOMY Left    • CATARACT EXTRACTION     • CERVICAL FUSION     • CHOLECYSTECTOMY     • CRANIOTOMY FOR TUMOR Right 2023    Procedure: RIGHT CRANIOTOMY FOR TUMOR RESECTION STEREOTACTIC WITH STEALTH;  Surgeon: Clint Ricketts MD;  Location: [facility]  MAIN OR;  Service: Neurosurgery;  Laterality: Right;   • HYSTERECTOMY     • SHOULDER ARTHROSCOPY Right 02/19/2019    right should scope/cuff repair    • VENOUS ACCESS DEVICE (PORT) INSERTION Right 9/6/2022    Procedure: POWERPORT INSERTION;  Surgeon: Remedios Rice MD;  Location: [facility] MAIN OR;  Service: Thoracic;  Laterality: Right;      General Information     Row Name 05/31/23 1216          OT Time and Intention    Document Type evaluation  -     Mode of Treatment occupational therapy;individual therapy  -     Row Name 05/31/23 1216          General Information    Patient Profile Reviewed yes  -     Prior Level of Function independent:;ADL's;all household mobility;community mobility;work;driving  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  -     Existing Precautions/Restrictions fall  -     Row Name 05/31/23 1216          Living Environment    People in Home other relative(s)  neice  -     Row Name 05/31/23 1216          Cognition    Orientation Status (Cognition) oriented x 3  -     Row Name 05/31/23 1216          Safety Issues, Functional Mobility    Impairments Affecting Function (Mobility) balance;endurance/activity tolerance;strength  -           User Key  (r) = Recorded By, (t) = Taken By, (c) = Cosigned By    Initials Name Provider Type     Linda Cox OT Occupational Therapist                 Mobility/ADL's     Row Name 05/31/23 1218          Transfers    Transfers toilet transfer  -     Row Name 05/31/23 1218          Sit-Stand Transfer    Sit-Stand Spink (Transfers) contact guard  -     Row Name 05/31/23 1218          Toilet Transfer    Spink Level (Toilet Transfer) contact guard  -     Assistive Device (Toilet Transfer) grab bars/safety frame  -     Row Name 05/31/23 1218          Functional Mobility    Functional Mobility- Ind. Level contact guard assist;minimum assist (75% patient effort)  -     Functional Mobility- Comment HHA around room to  bathroom with unsteadiness noted but no LOB  -SM     Row Name 05/31/23 1218          Activities of Daily Living    BADL Assessment/Intervention lower body dressing;feeding;toileting  -SM     Row Name 05/31/23 1218          Lower Body Dressing Assessment/Training    Bath Level (Lower Body Dressing) don;pants/bottoms;moderate assist (50% patient effort)  -     Position (Lower Body Dressing) supported standing;unsupported sitting  -SM     Row Name 05/31/23 1218          Self-Feeding Assessment/Training    Bath Level (Feeding) independent  -     Position (Self-Feeding) supported sitting  -SM     Row Name 05/31/23 1218          Toileting Assessment/Training    Bath Level (Toileting) minimum assist (75% patient effort);adjust/manage clothing  -     Assistive Devices (Toileting) grab bar/safety frame  -           User Key  (r) = Recorded By, (t) = Taken By, (c) = Cosigned By    Initials Name Provider Type     Linda Cox OT Occupational Therapist               Obj/Interventions     Row Name 05/31/23 1219          Sensory Assessment (Somatosensory)    Sensory Assessment (Somatosensory) UE sensation intact  -SM     Row Name 05/31/23 1219          Vision Assessment/Intervention    Vision Assessment Comment L visual field deficit noted by JUAN JOSE note but not obvious during ADLs.  -SM     Row Name 05/31/23 1219          Range of Motion Comprehensive    General Range of Motion bilateral upper extremity ROM WFL  -SM     Row Name 05/31/23 1219          Strength Comprehensive (MMT)    Comment, General Manual Muscle Testing (MMT) Assessment BUE 4/5  -SM     Row Name 05/31/23 1219          Motor Skills    Motor Skills coordination  -     Coordination WFL  A line to L wrist today  -SM     Row Name 05/31/23 1219          Balance    Balance Assessment sitting static balance;standing static balance;standing dynamic balance  -     Static Sitting Balance supervision  -     Position, Sitting  Balance unsupported  -SM     Static Standing Balance contact guard  -SM     Dynamic Standing Balance minimal assist  -SM     Position/Device Used, Standing Balance supported  -SM     Balance Interventions standing;occupation based/functional task  -SM           User Key  (r) = Recorded By, (t) = Taken By, (c) = Cosigned By    Initials Name Provider Type    SM Linda Cox OT Occupational Therapist               Goals/Plan     Row Name 05/31/23 1228          Transfer Goal 1 (OT)    Activity/Assistive Device (Transfer Goal 1, OT) toilet;shower chair  -SM     Nelson Level/Cues Needed (Transfer Goal 1, OT) supervision required  -SM     Time Frame (Transfer Goal 1, OT) short term goal (STG);2 weeks  -SM     Progress/Outcome (Transfer Goal 1, OT) goal ongoing  -[facility] Name 05/31/23 1228          Bathing Goal 1 (OT)    Activity/Device (Bathing Goal 1, OT) bathing skills, all  -SM     Nelson Level/Cues Needed (Bathing Goal 1, OT) supervision required  -SM     Time Frame (Bathing Goal 1, OT) short term goal (STG);2 weeks  -SM     Progress/Outcomes (Bathing Goal 1, OT) goal ongoing  -[facility] Name 05/31/23 1228          Dressing Goal 1 (OT)    Activity/Device (Dressing Goal 1, OT) dressing skills, all  -SM     Nelson/Cues Needed (Dressing Goal 1, OT) supervision required  -SM     Time Frame (Dressing Goal 1, OT) short term goal (STG);2 weeks  -SM     Progress/Outcome (Dressing Goal 1, OT) goal ongoing  -[facility] Name 05/31/23 1228          Toileting Goal 1 (OT)    Activity/Device (Toileting Goal 1, OT) toileting skills, all  -SM     Nelson Level/Cues Needed (Toileting Goal 1, OT) supervision required  -SM     Time Frame (Toileting Goal 1, OT) short term goal (STG);2 weeks  -SM     Progress/Outcome (Toileting Goal 1, OT) goal ongoing  -     Row Name 05/31/23 1228          Therapy Assessment/Plan (OT)    Planned Therapy Interventions (OT) activity tolerance training;adaptive equipment  "training;BADL retraining;functional balance retraining;occupation/activity based interventions;patient/caregiver education/training;transfer/mobility retraining;strengthening exercise;ROM/therapeutic exercise;IADL retraining  -           User Key  (r) = Recorded By, (t) = Taken By, (c) = Cosigned By    Initials Name Provider Type    Linda Trinidad, OT Occupational Therapist               Clinical Impression     Row Name 05/31/23 1225          Pain Assessment    Pretreatment Pain Rating 0/10 - no pain  -     Posttreatment Pain Rating 0/10 - no pain  -[facility] Name 05/31/23 1225          Plan of Care Review    Plan of Care Reviewed With patient  -     Outcome Evaluation Pt is a 78 y.o female POD 1 R sided craniotomy for brain tumor. Pt with hx of metastatic lung CA. She reports she is independent with all ADLs, iADLs, works, drives. She has been working with OP PT and OT for \"leg strengthening\" and \" a \"knot in my R arm\". Today she is able to complete ADLs and mobility around room with some assistance. She is noted to have mild balance deficits, decreased endurance, strength. She would benefit from continued OT while admitted. She reports she does live with family. Anticipate dc home with assist. She does report she would be open to AD if needed- rwx vs SPC but will defer to PT eval for AD needed.  -     Row Name 05/31/23 1225          Therapy Assessment/Plan (OT)    Rehab Potential (OT) good, to achieve stated therapy goals  -     Criteria for Skilled Therapeutic Interventions Met (OT) yes;skilled treatment is necessary  -     Therapy Frequency (OT) 5 times/wk  -     Row Name 05/31/23 1225          Therapy Plan Review/Discharge Plan (OT)    Equipment Needs Upon Discharge (OT) --  has a shower chair, may need rwx vs SPC  -     Anticipated Discharge Disposition (OT) home with assist;home with home health  -     Row Name 05/31/23 1225          Positioning and Restraints    Pre-Treatment " Position other (comment)  transport  -SM     Post Treatment Position chair  -SM     In Chair sitting;encouraged to call for assist;exit alarm on;call light within reach;notified nsg  -           User Key  (r) = Recorded By, (t) = Taken By, (c) = Cosigned By    Initials Name Provider Type    Linda Trinidad OT Occupational Therapist               Outcome Measures     Row Name 05/31/23 1229          How much help from another is currently needed...    Putting on and taking off regular lower body clothing? 2  -SM     Bathing (including washing, rinsing, and drying) 3  -SM     Toileting (which includes using toilet bed pan or urinal) 3  -SM     Putting on and taking off regular upper body clothing 3  -SM     Taking care of personal grooming (such as brushing teeth) 3  -SM     Eating meals 4  -SM     AM-PAC 6 Clicks Score (OT) 18  -     Row Name 05/31/23 1229          Modified Cade Scale    Modified Cade Scale 4 - Moderately severe disability.  Unable to walk without assistance, and unable to attend to own bodily needs without assistance.  -     Row Name 05/31/23 1229          Functional Assessment    Outcome Measure Options AM-PAC 6 Clicks Daily Activity (OT);Modified [place]  -           User Key  (r) = Recorded By, (t) = Taken By, (c) = Cosigned By    Initials Name Provider Type    Linda Trinidad OT Occupational Therapist                Occupational Therapy Education     Title: PT OT SLP Therapies (In Progress)     Topic: Occupational Therapy (In Progress)     Point: ADL training (Done)     Description:   Instruct learner(s) on proper safety adaptation and remediation techniques during self care or transfers.   Instruct in proper use of assistive devices.              Learning Progress Summary           Patient Acceptance, E, VU by  at 5/31/2023 1230    Comment: OT goals, POC, dc recommendations for ADL safety at [place]                   Point: Home exercise program (Not Started)     Description:  "  Instruct learner(s) on appropriate technique for monitoring, assisting and/or progressing therapeutic exercises/activities.              Learner Progress:  Not documented in this visit.          Point: Precautions (Not Started)     Description:   Instruct learner(s) on prescribed precautions during self-care and functional transfers.              Learner Progress:  Not documented in this visit.          Point: Body mechanics (Not Started)     Description:   Instruct learner(s) on proper positioning and spine alignment during self-care, functional mobility activities and/or exercises.              Learner Progress:  Not documented in this visit.                      User Key     Initials Effective Dates Name Provider Type Discipline     04/02/20 -  Linda Cox, TITA Occupational Therapist OT              OT Recommendation and Plan  Planned Therapy Interventions (OT): activity tolerance training, adaptive equipment training, BADL retraining, functional balance retraining, occupation/activity based interventions, patient/caregiver education/training, transfer/mobility retraining, strengthening exercise, ROM/therapeutic exercise, IADL retraining  Therapy Frequency (OT): 5 times/wk  Plan of Care Review  Plan of Care Reviewed With: patient  Outcome Evaluation: Pt is a 78 y.o female POD 1 R sided craniotomy for brain tumor. Pt with hx of metastatic lung CA. She reports she is independent with all ADLs, iADLs, works, drives. She has been working with OP PT and OT for \"leg strengthening\" and \" a \"knot in my R arm\". Today she is able to complete ADLs and mobility around room with some assistance. She is noted to have mild balance deficits, decreased endurance, strength. She would benefit from continued OT while admitted. She reports she does live with family. Anticipate dc home with assist. She does report she would be open to AD if needed- rwx vs SPC but will defer to PT eval for AD needed.     Time Calculation:    " Time Calculation- OT     Row Name 05/31/23 1230             Time Calculation- OT    OT Start Time 0925  -      OT Stop Time 0949  -      OT Time Calculation (min) 24 min  -SM      Total Timed Code Minutes- OT 15 minute(s)  -      OT Received On 05/31/23  -      OT - Next Appointment 06/01/23  -      OT Goal Re-Cert Due Date 06/14/23  -         Timed Charges    37104 - OT Self Care/Mgmt Minutes 15  -SM         Untimed Charges    OT Eval/Re-eval Minutes 9  -SM         Total Minutes    Timed Charges Total Minutes 15  -SM      Untimed Charges Total Minutes 9  -SM       Total Minutes 24  -SM            User Key  (r) = Recorded By, (t) = Taken By, (c) = Cosigned By    Initials Name Provider Type     Linda Cox OT Occupational Therapist              Therapy Charges for Today     Code Description Service Date Service Provider Modifiers Qty    18773069500 HC OT SELF CARE/MGMT/TRAIN EA 15 MIN 5/31/2023 Linda Cox OT GO 1    26566761385 HC OT EVAL MOD COMPLEXITY 2 5/31/2023 Linda Cox OT GO 1               Linda Cox OT  5/31/2023

## 2023-05-31 NOTE — SIGNIFICANT NOTE
05/31/23 1411   OTHER   Discipline physical therapist   Rehab Time/Intention   Session Not Performed patient/family declined treatment  (Pt refused due to fatigue from OT and ambulating to/from bathroom. Notified MARTIN Ashton. Will follow up tomorrow.)   Recommendation   PT - Next Appointment 06/01/23

## 2023-05-31 NOTE — ANESTHESIA POSTPROCEDURE EVALUATION
"Patient: Pam Vidal    Procedure Summary     Date: 05/30/23 Room / Location: [facility] OR 07 / [facility] MAIN OR    Anesthesia Start: 1703 Anesthesia Stop: 1952    Procedure: RIGHT CRANIOTOMY FOR TUMOR RESECTION STEREOTACTIC WITH STEALTH (Right: Head) Diagnosis:       Lung cancer metastatic to brain      (Lung cancer metastatic to brain [C34.90, C79.31])    Surgeons: Clint Ricketts MD Provider: Kalia Flores DO    Anesthesia Type: generalBrooke ASA Status: 3          Anesthesia Type: general, Brooke    Vitals  Vitals Value Taken Time   /48 05/30/23 2200   Temp 36.4 °C (97.6 °F) 05/30/23 2130   Pulse 66 05/30/23 2208   Resp 20 05/30/23 2200   SpO2 99 % 05/30/23 2208   Vitals shown include unvalidated device data.        Post Anesthesia Care and Evaluation    Patient location during evaluation: bedside  Patient participation: complete - patient participated  Level of consciousness: awake and alert  Pain management: adequate    Airway patency: patent  Anesthetic complications: No anesthetic complications    Cardiovascular status: acceptable  Respiratory status: acceptable  Hydration status: acceptable    Comments: /53   Pulse 66   Temp 36.8 °C (98.2 °F) (Oral)   Resp 20   Ht 167.6 cm (66\")   Wt 60.9 kg (134 lb 4.2 oz)   SpO2 97%   BMI 21.67 kg/m²       "

## 2023-05-31 NOTE — DISCHARGE INSTRUCTIONS
[facility]rological Surgery  [address], [address]  [place] [address]  Phone:  [phone]  Fax:  [phone]    Dr. Clint Ricketts MD            Craniotomy Post-Operative Instructions        Sleep and rest with your head at a 30° angle of elevation.  This helps keep brain swelling down and prevents increased pressure in the head.  This is most important immediately after surgery.  You can sleep flat after the first week.    Contact your surgeon immediately if you develop a persistent drainage from the incision, this could be a cerebrospinal fluid (CSF)  leak and needs to be managed quickly.    You may resume your usual diet as you tolerate    No lifting overhead, although you may place your arms above your head.  DO NOT lift anything over five (5) pounds.  Walking is allowed and encouraged. There are no restrictions on sitting or climbing stairs, but refrain from overly strenuous activity and take it slow and easy.  You may notice that a constant position (standing or sitting) greater than 45 minutes may tend to make you more sore or tired than normal and therefore it is recommended that you change positions frequently. Do not drive until you are seen back for your first postoperative visit, although you may be a passenger in a car.  DO NOT DRIVE UNTIL CLEARED BY OPHTHALMOLOGIST DUE TO VISUAL DEFICITS    You do not have to keep a dressing on the incision but you should cover the incision with a hat, bandana, or scarf when you leave your home. Make sure your head covering is loose and breathable.    Refrain from any sexual activity until after your first evaluation at the office.     You may shower and pat the incision dry, but do not soak your wound in water such as a swimming pool, hot tub or lake.   Avoid direct sunlight to the wound for at least three (3) months.  You may apply ice to the surgical site for 15 to 20 minutes each hour while awake for the first few days following surgery.  Simply put the ice  in a plastic bag in place a towel between the bag of ice and your skin.    You will leave the hospital with prescriptions for pain medicine.  These medications must be taken as prescribed only.  If a refill of your pain medication is required, in order to have this medication refilled, you must contact the office 3 days (72 hrs) prior to running out, but the amount prescribed is generally enough to last until the first post-operative visit.      A small amount of bleeding from the incision during the first few days is not unusual.       You should have a post-operative visit approximately 2 weeks after surgery.  If you do not have a scheduled appointment, call the office at [phone] to schedule one.  We will discuss return to work at your first post-operative visit, but you can expect to be off of work for an average of 6 weeks.     Notify the office if there are any problems, such as:    Excessive incision pain, headache, nausea or vomiting  Persistent temperature of 101.5° F (38.6 ° C) or greater that is not relieved by Tylenol.  Excessive bleeding, redness, heat, leakage of fluid, swelling or pus around the incision   If you have difficulty breathing, have chest pain or shortness of breath, call 911.   You develop swelling in the calf or leg  Your pain is not controlled with medication  You seem to be getting worse rather than better    Thank you.

## 2023-05-31 NOTE — PROGRESS NOTES
[facility] NEUROSURGERY INTRACRANIAL POSTOP note    PATIENT IDENTIFICATION:   Name:  Pam Vidal      MRN:  [id number]     78 y.o.  female               CC:POD 1 right suboccipital craniotomy for tumor      Subjective     Interval History: Doing great.  Denies headache nausea, vomiting, weakness.  She feels that her vision is stable.  She was on Cardene but it has been stopped.  MRI just completed    Objective     Vital signs in last 24 hours:  Temp:  [97.6 °F (36.4 °C)-98.2 °F (36.8 °C)] 97.8 °F (36.6 °C)  Heart Rate:  [58-88] 82  Resp:  [12-20] 20  BP: ()/() 131/63      Intake/Output this shift:  I/O this shift:  In: 300 [P.O.:250; IV Piggyback:50]  Out: -     Intake/Output last 3 shifts:  I/O last 3 completed shifts:  In: 1970 [P.O.:100; I.V.:1820; IV Piggyback:50]  Out: 3250 [Urine:3250]      LABS:  Results from last 7 days   Lab Units 05/31/23  0323 05/30/23  0626 05/29/23  0637   WBC 10*3/mm3 15.23* 17.30* 15.85*   HEMOGLOBIN g/dL 12.1 14.2 13.1   HEMATOCRIT % 36.9 42.6 38.4   PLATELETS 10*3/mm3 271 348 294     Results from last 7 days   Lab Units 05/31/23  0323 05/30/23  0626 05/29/23  0637 05/28/23  0622 05/27/23  0638 05/26/23  0347 05/25/23  1346   SODIUM mmol/L 138 135* 138   < > 136 138 136   POTASSIUM mmol/L 3.9 4.2 4.2   < > 3.9 3.9 3.6   CHLORIDE mmol/L 104 102 106   < > 101 104 97*   CO2 mmol/L 22.8 23.0 20.4*   < > 23.8 25.0 25.2   BUN mg/dL 33* 35* 31*   < > 22 21 16   CREATININE mg/dL 0.94 0.93 0.88   < > 0.86 1.07* 1.08*   CALCIUM mg/dL 8.2* 9.1 9.3   < > 9.8 9.2 10.0   BILIRUBIN mg/dL  --   --   --   --  0.3 0.2 0.4   ALK PHOS U/L  --   --   --   --  61 66 76   ALT (SGPT) U/L  --   --   --   --  15 12 15   AST (SGOT) U/L  --   --   --   --  15 15 20   GLUCOSE mg/dL 222* 118* 139*   < > 116* 160* 102*    < > = values in this interval not displayed.        IMAGING STUDIES:  MRI brain postoperative changes right occipital with no evidence of residual tumor.  There remains to be  surrounding vasogenic edema throughout the entire right parietal and occipital lobe as well as into the mid right temporal lobe.  Stable left occipital and superior cerebellar metastatic lesion.  Some T1 hyperintensity centrally within the cerebellar lesion which may indicate some subacute hemorrhage    I personally viewed and interpreted the patient's MRI brain.  Also reviewed by and discussed with Dr. Ricketts.    Meds reviewed/changed: Yes  Dex 4 mg IV q 6 hrs  Keppra 500 mg IV q 6 hours  Zofran 4 mg IV q 6hrs- 1 dose    Physical Exam:    General:   Awake, alert, oriented x3. Speech clear with no aphasia.  Sitting up in chair  HEENT:    Right occipital craniotomy incision well approximated with sutures.  No redness drainage swelling  Neck:    supple  CN II:    Left lower quadrantanopsia in the right eye but not  really evident in the left eye today.  May have someMild discrepancy but the Visual loss does appear better  CN III IV VI: Extraocular movements are full , PERRL, Very mild 1-2 beat nystagmus bilaterally   CN VII:    Facial movements are symmetric. No weakness.  Motor:    Normal muscle strength, bulk and tone in upper and lower extremities.  No fasciculations, rigidity, spasticity, or abnormal movements.  No drift  Station and Gait: Per OT note 5/31-able to complete ADLs and mobility around room with some assistance. She is noted to have mild balance deficits, decreased endurance, strength. She would benefit from continued OT while admitted. She reports she does live with family. Anticipate dc home with assist. She does report she would be open to AD if needed- rwx vs SPC but will defer to PT eval for AD needed.  Coordination: Finger-to-nose     Assessment & Plan     ASSESSMENT:      Lung cancer metastatic to brain    Cough with hemoptysis    Leukocytosis    Brain mass    COPD (chronic obstructive pulmonary disease)    Rheumatoid arthritis    IBS (irritable bowel syndrome)    Hyperlipidemia    Patient now  "postop day 1 from right occipital craniotomy for suspected metastatic lung disease.  She is doing well following surgery.  No significant headaches.  Blood pressure stable and she has been weaned off Cardene.  Exam reveals ongoing but improving left lower quadrantanopsia.  Otherwise incision site looks good and she is doing well.  We will work on steroid lead and transition to p.o. steroid Keppra.  His blood pressure remains stable and within parameters, she can transfer to floor and even discharged home she is doing well with therapy.  Hold any pharmacologic DVT prophylaxis for 3 days postop.  We will arrange outpatient follow-up in 2 weeks for wound check. NO DRIVING. PATIENT HAS BEEN ADVISED.    PLAN:   Keep SBP<140  May transfer to floor once blood pressure controlled off of Cardene; if stable from therapy standpoint, OK for DC from JUAN [facility]SE perspective  Wean steroid x 3 weeks then resume home prednisone, changed to p.o.  Changed to p.o. Keppra  PT/OT eval  Out of bed  Continue SCD, hold pharmacologic DVT prophylaxis  Will need evaluation by ophthalmologist prior to driving       I discussed the patient's findings and my recommendations with patient, family, nursing staff and Dr. Ricketts    During patient visit, I utilized appropriate personal protective equipment including gloves. Appropriate PPE was worn during the entire visit.  Hand hygiene was completed before and after.      LOS: 6 days       Neema Anna, APRN  5/31/2023  14:25 EDT    \"Dictated utilizing Dragon dictation\".      "

## 2023-05-31 NOTE — PROGRESS NOTES
[facility]     LOS: 6 days     Subjective:    CC:Weakness - Generalized    DIET:  Diet Order   Procedures   • Diet: Regular/House Diet; Texture: Regular Texture (IDDSI 7); Fluid Consistency: Thin (IDDSI 0)     Headaches better  Vision is good    Objective:    Vital Signs:  Temp:  [97.6 °F (36.4 °C)-98.2 °F (36.8 °C)] 97.8 °F (36.6 °C)  Heart Rate:  [58-88] 82  Resp:  [16-20] 20  BP: ()/() 131/63    SpO2:  [92 %-100 %] 94 %  on  Flow (L/min):  [2-4] 2;   Device (Oxygen Therapy): nasal cannula  Body mass index is 21.67 kg/m².    Physical Exam  HENT:      Head: Normocephalic and atraumatic.   Cardiovascular:      Heart sounds: No murmur heard.    No friction rub.   Pulmonary:      Effort: Pulmonary effort is normal.      Breath sounds: Normal breath sounds.   Abdominal:      General: Bowel sounds are normal. There is no distension.      Palpations: Abdomen is soft.      Tenderness: There is no abdominal tenderness.   Skin:     General: Skin is warm and dry.         Results Review:    Glucose   Date Value Ref Range Status   05/31/2023 222 (H) 65 - 99 mg/dL Final   05/30/2023 118 (H) 65 - 99 mg/dL Final   05/29/2023 139 (H) 65 - 99 mg/dL Final     Results from last 7 days   Lab Units 05/31/23  0323   WBC 10*3/mm3 15.23*   HEMOGLOBIN g/dL 12.1   HEMATOCRIT % 36.9   PLATELETS 10*3/mm3 271     Results from last 7 days   Lab Units 05/31/23  0323 05/28/23  0622 05/27/23  0638   SODIUM mmol/L 138   < > 136   POTASSIUM mmol/L 3.9   < > 3.9   CHLORIDE mmol/L 104   < > 101   CO2 mmol/L 22.8   < > 23.8   BUN mg/dL 33*   < > 22   CREATININE mg/dL 0.94   < > 0.86   CALCIUM mg/dL 8.2*   < > 9.8   BILIRUBIN mg/dL  --   --  0.3   ALK PHOS U/L  --   --  61   ALT (SGPT) U/L  --   --  15   AST (SGOT) U/L  --   --  15   GLUCOSE mg/dL 222*   < > 116*    < > = values in this interval not displayed.     Results from last 7 days   Lab Units 05/27/23  0638 05/26/23  0347 05/25/23  1346   INR  1.01   < >  1.00   APTT seconds  --   --  33.7    < > = values in this interval not displayed.     Results from last 7 days   Lab Units 05/31/23  0323   MAGNESIUM mg/dL 2.1     Results from last 7 days   Lab Units 05/25/23  1655 05/25/23  1346   HSTROP T ng/L 15* 16*     Cultures:  No results found for: BLOODCX, URINECX, WOUNDCX, MRSACX, RESPCX, STOOLCX    I have reviewed daily medications and changes in CPOE    Scheduled meds  [START ON 6/8/2023] dexamethasone, 2 mg, Oral, Q8H  [START ON 6/12/2023] dexamethasone, 2 mg, Oral, Q12H  [START ON 6/16/2023] dexamethasone, 2 mg, Oral, Daily With Breakfast  [START ON 6/4/2023] dexamethasone, 3 mg, Oral, Q8H  dexamethasone, 4 mg, Oral, Q8H  insulin regular, 2-9 Units, Subcutaneous, TID AC  levETIRAcetam, 500 mg, Oral, BID  pantoprazole, 40 mg, Oral, Q AM  senna-docusate sodium, 1 tablet, Oral, BID        niCARdipine, 5-15 mg/hr, Last Rate: Stopped (05/31/23 0803)      PRN meds  •  acetaminophen **OR** acetaminophen  •  hydrALAZINE  •  HYDROcodone-acetaminophen  •  HYDROmorphone **AND** naloxone  •  ondansetron **OR** ondansetron        Lung cancer metastatic to brain    Cough with hemoptysis    Leukocytosis    Brain mass    COPD (chronic obstructive pulmonary disease)    Rheumatoid arthritis    IBS (irritable bowel syndrome)    Hyperlipidemia        Assessment/Plan:  Hemorrhagic metastatic lesion to the brain with vasogenic edema  -Continue with Keppra and steroids  -Neurosurgery has written from their standpoint okay for discharge    Likely metastatic lung cancer to brain  -Oncology following    Rheumatoid arthritis for which she takes steroids chronically    Carotid vascular disease status post bilateral endarterectomy is  -Hold on aspirin    Hypertensive emergency  -Blood pressure much better    DVT PPX: Hold on chemoprophylaxis 3 days postop      Nik Pond MD  05/31/23  16:33 EDT

## 2023-05-31 NOTE — PLAN OF CARE
"Goal Outcome Evaluation:  Plan of Care Reviewed With: patient           Outcome Evaluation: Pt is a 78 y.o female POD 1 R sided craniotomy for brain tumor. Pt with hx of metastatic lung CA. She reports she is independent with all ADLs, iADLs, works, drives. She has been working with OP PT and OT for \"leg strengthening\" and \" a \"knot in my R arm\". Today she is able to complete ADLs and mobility around room with some assistance. She is noted to have mild balance deficits, decreased endurance, strength. She would benefit from continued OT while admitted. She reports she does live with family. Anticipate dc home with assist. She does report she would be open to AD if needed- rwx vs SPC but will defer to PT eval for AD needed.         "

## 2023-05-31 NOTE — PROGRESS NOTES
[facility]  [phone]  Dr. Gurdeep Abrams    Subjective:  LOS: 5    Chief Complaint: Brain mets    Patient was seen initially in the ICU by our service when she presented with balance issues and weakness in her legs.  Discovered to have brain mets from adeno CA lung.  Subsequently was on the floors with further work-up by oncology.  Today she had craniotomy with resection of occipital tumor.  She is in the ICU for postop care.    Objective   Vital Signs past 24hrs  Temp range: Temp (24hrs), Av.2 °F (36.8 °C), Min:97.6 °F (36.4 °C), Max:98.7 °F (37.1 °C)    BP range: BP: ()/() 87/77  Pulse range: Heart Rate:  [58-81] 66  Resp rate range: Resp:  [12-20] 20  Device (Oxygen Therapy): nasal cannulaFlow (L/min):  [2-4] 2  Oxygen range:SpO2:  [92 %-100 %] 97 %     Physical Exam  Constitutional:       General: She is awake.   HENT:      Head: Normocephalic.   Eyes:      Pupils: Pupils are equal, round, and reactive to light.   Cardiovascular:      Rate and Rhythm: Normal rate and regular rhythm.      Heart sounds: No murmur heard.  Pulmonary:      Effort: Pulmonary effort is normal.      Breath sounds: Normal breath sounds.   Abdominal:      General: Bowel sounds are normal. There is no distension.      Palpations: Abdomen is soft. There is no mass.   Musculoskeletal:         General: No swelling.   Skin:     General: Skin is warm and dry.   Neurological:      General: No focal deficit present.      Mental Status: She is alert and oriented to person, place, and time.       Results Review:    I have reviewed the laboratory and imaging data since the last note by [facility] physician.  My annotations are noted in assessment and plan.      Result Review:  I have personally reviewed the results from last note by [facility] physician to 2023 23:37 EDT and agree with these findings:  [x]  Laboratory list / accordion  [x]  Microbiology  [x]  Radiology  []  EKG/Telemetry   [x]  Cardiology/Vascular   []   Pathology  []  Old records  []  Other:    Medication Review:  I have reviewed the current MAR.  My annotations are noted in assessment and plan.    [START ON 5/31/2023] clindamycin, 900 mg, Intravenous, Q8H  dexamethasone, 4 mg, Intravenous, Q6H  insulin regular, 2-9 Units, Subcutaneous, TID AC  levETIRAcetam, 500 mg, Intravenous, Q12H  [START ON 5/31/2023] pantoprazole, 40 mg, Oral, Q AM  senna-docusate sodium, 1 tablet, Oral, BID        niCARdipine, 5-15 mg/hr, Last Rate: 5 mg/hr (05/30/23 2100)      Lines, Drains & Airways     Active LDAs     Name Placement date Placement time Site Days    Peripheral IV 05/26/23 0353 Anterior;Left Forearm 05/26/23  0353  Forearm  4    Peripheral IV 05/30/23 1616 Posterior;Right Hand 05/30/23  1616  Hand  less than 1    Urethral Catheter Silicone 16 Fr. 05/30/23  1719  -- less than 1    Arterial Line 05/30/23 Left Radial 05/30/23  1641  created via procedure documentation  Radial  less than 1    Single Lumen Implantable Port 09/06/22 Right Chest 09/06/22  1404  Chest  266              No active isolations  Diet Orders (active) (From admission, onward)     Start     Ordered    05/30/23 2239  Diet: Regular/House Diet; Texture: Regular Texture (IDDSI 7); Fluid Consistency: Thin (IDDSI 0)  Diet Effective Now         05/30/23 2238                PCCM Problems  Status postcraniotomy with occipital met resection, 5/30/2023  Adeno CA lung now with distant metastases  Occasional hemoptysis  Rheumatoid arthritis        THESE ARE NEW MEDICAL PROBLEMS TO ME.    Plan of Treatment    Patient is doing well postoperatively.  She is alert and awake.  Appears without focal deficit.  Headache was slight.  But now improved after meds.  She has been able to take p.o. diet.  Will be monitored overnight in the ICU with plans to transfer out in the morning.    No hemoptysis reported today.    Leukocytosis likely from dexamethasone for brain tumor.    Remains on seizure prophylaxis with Keppra  IV.    Gurdeep Abrams MD  05/30/23  23:37 EDT      Part of this note may be an electronic transcription/translation of spoken language to printed text using the Dragon Dictation System.

## 2023-05-31 NOTE — PROGRESS NOTES
Remington Saenz MD                          [phone]            Patient ID:    Name:  Pam Vidal    MRN:  [id number]    1945   78 y.o.  female            Patient Care Team:  Nik Mckay MD as PCP - General  Nik Mckay MD as PCP - Family Medicine  Remedios Rice MD as Surgeon (Thoracic Surgery)  Hector Rodriguez MD as Consulting Physician (Radiation Oncology)  Jannet Chauhan RN as Nurse Navigator  Remedios Rice MD as Referring Physician (Thoracic Surgery)  Cruzito Lagunas MD as Consulting Physician (Hematology and Oncology)    CC/ Reason for visit: Postop creatinine, metastatic lung cancer    Subjective: Pt seen and examined this AM. No acute overnight events noted. Doing better.  Denies any new neurological deficits.  Sitting up in a chair and feels like she is getting better.    ROS: Denies any subjective fevers, syncope or presyncopal events, new neurological deficits, nausea or vomiting currently    Objective     Vital Signs past 24hrs    BP range: BP: ()/() 131/63  Pulse range: Heart Rate:  [58-88] 82  Resp rate range: Resp:  [12-20] 20  Temp range: Temp (24hrs), Av.9 °F (36.6 °C), Min:97.6 °F (36.4 °C), Max:98.2 °F (36.8 °C)      Ventilator/Non-Invasive Ventilation Settings (From admission, onward)    None          Vent Settings                                         Device (Oxygen Therapy): nasal cannula       60.9 kg (134 lb 4.2 oz); Body mass index is 21.67 kg/m².      Intake/Output Summary (Last 24 hours) at 2023 1426  Last data filed at 2023 1025  Gross per 24 hour   Intake 2270 ml   Output 3250 ml   Net -980 ml       PHYSICAL EXAM   Constitutional: Middle-aged pt in bed, no acute respiratory distress, no accessory muscle use  Head: -Postop Craney changes  Eyes: No pallor.  Anicteric sclerae, EOMI.  ENMT:  Mallampati 3, no oral thrush. Moist MM.   NECK: Trachea midline, No thyromegaly, no palpable cervical  lymphadenopathy  Heart: RRR, no murmur. No pedal edema   Lungs: KAMALA +, distant breath sounds no wheezes/ crackles heard    Abdomen: Soft. No tenderness, guarding or rigidity. No palpable masses  Extremities: Extremities warm and well perfused. No cyanosis/ clubbing  Neuro: Conscious, answers appropriately, no gross focal neuro deficits  Psych: Mood and affect appropriate    PPE recommended per [facility] infectious disease Isolation protocol for the current clinical scenario (as mentioned below) was followed.     Scheduled meds:  clindamycin, 900 mg, Intravenous, Q8H  [START ON 6/8/2023] dexamethasone, 2 mg, Oral, Q8H  [START ON 6/12/2023] dexamethasone, 2 mg, Oral, Q12H  [START ON 6/16/2023] dexamethasone, 2 mg, Oral, Daily With Breakfast  [START ON 6/4/2023] dexamethasone, 3 mg, Oral, Q8H  dexamethasone, 4 mg, Oral, Q8H  insulin regular, 2-9 Units, Subcutaneous, TID AC  levETIRAcetam, 500 mg, Oral, BID  pantoprazole, 40 mg, Oral, Q AM  senna-docusate sodium, 1 tablet, Oral, BID        IV meds:                      niCARdipine, 5-15 mg/hr, Last Rate: Stopped (05/31/23 0803)        Data Review:      Results from last 7 days   Lab Units 05/31/23  0323 05/30/23  0626 05/29/23  0637 05/28/23  0622 05/27/23  1820 05/27/23  0638 05/26/23  0350 05/26/23  0347 05/25/23  1346   SODIUM mmol/L 138 135* 138   < >  --  136  --  138 136   POTASSIUM mmol/L 3.9 4.2 4.2   < >  --  3.9  --  3.9 3.6   CHLORIDE mmol/L 104 102 106   < >  --  101  --  104 97*   CO2 mmol/L 22.8 23.0 20.4*   < >  --  23.8  --  25.0 25.2   BUN mg/dL 33* 35* 31*   < >  --  22  --  21 16   CREATININE mg/dL 0.94 0.93 0.88   < >  --  0.86  --  1.07* 1.08*   CALCIUM mg/dL 8.2* 9.1 9.3   < >  --  9.8  --  9.2 10.0   BILIRUBIN mg/dL  --   --   --   --   --  0.3  --  0.2 0.4   ALK PHOS U/L  --   --   --   --   --  61  --  66 76   ALT (SGPT) U/L  --   --   --   --   --  15  --  12 15   AST (SGOT) U/L  --   --   --   --   --  15  --  15 20   GLUCOSE mg/dL 222*  118* 139*   < >  --  116*  --  160* 102*   WBC 10*3/mm3 15.23* 17.30* 15.85*   < >  --  23.29*   < >  --  11.34*   HEMOGLOBIN g/dL 12.1 14.2 13.1   < >  --  13.5   < >  --  14.3   PLATELETS 10*3/mm3 271 348 294   < >  --  352   < >  --  359   INR   --   --   --   --   --  1.01  --  1.03 1.00   PROBNP pg/mL  --   --   --   --   --   --   --   --  130.0   PROCALCITONIN ng/mL  --   --   --   --  0.04  --   --   --   --     < > = values in this interval not displayed.       Lab Results   Component Value Date    CALCIUM 8.2 (L) 05/31/2023    PHOS 3.2 05/31/2023                    I have personally reviewed the results from the time of this admission to 5/31/2023 14:26 EDT and agree with these findings:  [x]  Laboratory  [x]  Microbiology  [x]  Radiology  []  EKG/Telemetry   [x]  Cardiology/Vascular   []  Pathology  []  Old records    ASSESSMENT   Occipital met s/p resection-5/30  Metastatic adenocarcinoma of the lung  Occasional hemoptysis  Uncontrolled hypertension  COPD  PAD s/p bilateral CEA  Rheumatoid arthritis    PLAN:  All problems are new to me.  Work-up done so far as well as recommendations from prior pulmonologist/intensivist noted.  Patient is doing well from a postop standpoint.  Does not have any new neurological deficits.  MRI being ordered this morning by neurosurgery  Postop management per them.  Noted steroids  No hemoptysis plan concerns noted.  Continue with COPD management  Seizure prophylaxis  Continue with home medications  Therapy evaluation  Chemical DVT prophylaxis when cleared    Okay to transfer out of ICU from my standpoint.  We will sign off and be available for any new pulmonary or critical care issues.  Please reconsult for the same    Remington Saenz MD  5/31/2023

## 2023-05-31 NOTE — PROGRESS NOTES
Subjective     REASON FOR CONSULTATION: Decision about craniotomy    Weakness - Generalized      patient is a 78-year-old female with non-small cell lung cancer initially stage III for which she received chemoradiation followed by immunotherapy with Imfinzi her last 5/15/2023.  She is now admitted with confusion and brain MRI showing 3 lesions 2 in the occipital lobe and 1 in the cerebellum which are almost certainly related to her lung cancer.     Neurosurgery has seen her and is scheduled to take her to surgery for the occipital tumor and we are asked whether a biopsy is needed for tissue diagnosis.     She is feeling fairly well overall now that her steroids were started with no headache or visual changes that are significant although I am sure she still has a field defect.     She will need restaging to make sure there is not systemic progression before taking her to surgery and obviously there is no systemic progression radiation to all 3 lesions versus surgery for the larger 1 and radiation are reasonable options     She has no pain or shortness of breath    5/29  In the process of restaging with bone scan and CAT scans  Await results    5/30'  Staging work-up completed results not yet back  Has plans for surgery later today    5/31/2023  CT of the chest abdomen pelvis from 5/29/2023-images apparently reviewed and interpreted by me.  Right upper lobe suprahilar pulmonary mass remains stable.  Sclerotic change in the right anterior eighth rib is new compared to March 2023 and corresponds to increased uptake noted in the bone scan which could be due to healing fracture or sclerotic metastatic disease.  No evidence of metastatic disease in the abdomen or pelvis.  Craniotomy and occipital tumor resection performed 5/31/2023  Pathology pending  Vital signs remained stable.  She is on 2 L of oxygen by nasal cannula  WBC count mildly elevated at 15.23 secondary to steroids, hemoglobin and platelets  normal  Chemistries normal    Sitting up in a chair without any complaints     Past Medical History, Past Surgical History, Social History, Family History have been reviewed and are without significant changes except as mentioned.    Review of Systems   Eyes: Positive for visual disturbance.   All other systems reviewed and are negative.     A comprehensive 14 point review of systems was performed and was negative except as mentioned.    Medications:  The current medication list was reviewed in the EMR    ALLERGIES:    Allergies   Allergen Reactions   • Del-Mycin [Erythromycin] Hives   • Gentamicin Hives   • Ibuprofen Nausea And Vomiting   • Latex Dermatitis     GLOVES   • Metronidazole Hives   • Ofloxacin Hives and Nausea Only   • Penicillins Hives   • Tetracycline Hives   • Adhesive Tape Dermatitis     BANDAIDS   • Aspirin GI Intolerance     Vomiting can take EC   • Codeine Nausea And Vomiting       Objective      Vitals:    05/31/23 0738 05/31/23 0800 05/31/23 1000 05/31/23 1114   BP:  106/61  132/60   Pulse:  66 88 79   Resp:       Temp: 98.2 °F (36.8 °C)   98.1 °F (36.7 °C)   TempSrc: Oral   Oral   SpO2:  97% 94% 96%   Weight:       Height:             5/15/2023    11:26 AM   Current Status   ECOG score 0       Physical Exam    CONSTITUTIONAL:  Vital signs reviewed.  No distress, looks comfortable.  EYES:  Conjunctivae and lids unremarkable.  PERRLA  EARS,NOSE,MOUTH,THROAT:  Ears and nose appear unremarkable.  Lips, teeth, gums appear unremarkable.  RESPIRATORY:  Normal respiratory effort.  Lungs clear to auscultation bilaterally.  CARDIOVASCULAR:  Normal S1, S2.  No murmurs rubs or gallops.  No significant lower extremity edema.  GASTROINTESTINAL: Abdomen appears unremarkable.  Nontender.  No hepatomegaly.  No splenomegaly.  LYMPHATIC:  No cervical, supraclavicular, axillary lymphadenopathy.  SKIN:  Warm.  No rashes.  PSYCHIATRIC:  Normal judgment and insight.  Normal mood and affect.    I have reexamined the  patient and the results are consistent with the previously documented exam. Flor Amado MD     RECENT LABS:  Hematology WBC   Date Value Ref Range Status   05/31/2023 15.23 (H) 3.40 - 10.80 10*3/mm3 Final     RBC   Date Value Ref Range Status   05/31/2023 4.34 3.77 - 5.28 10*6/mm3 Final     Hemoglobin   Date Value Ref Range Status   05/31/2023 12.1 12.0 - 15.9 g/dL Final     Hematocrit   Date Value Ref Range Status   05/31/2023 36.9 34.0 - 46.6 % Final     Platelets   Date Value Ref Range Status   05/31/2023 271 140 - 450 10*3/mm3 Final          MRI BRAIN  IMPRESSION:  1. Since the prior MRI of the brain on 08/15/2022, patient has developed  at least 3 enhancing lesions in the brain compatible with at least 3  brain metastases, the largest brain metastases is in the posterior  superior lateral right occipital lobe and measures 3.7 x 2.9 x 2.7 cm in  size and has some subacute and chronic blood products within it, has a  large amount of surrounding vasogenic edema with the vasogenic edema  tracking up to 8 x 5 x 6 cm in anterior posterior and medial lateral and  craniocaudal dimension, and it has mass effect on the posterior temporal  horn and trigone and occipital horn of the right lateral ventricle and  there is associated 3-4 mm right-to-left midline shift at the level of  the septum pellucidum. There is a tiny second metastasis to the  posterior lateral left occipital cortex measuring 4 x 3.5 x 4 mm in size  that has 15 x 13 mm of surrounding vasogenic edema. The third metastasis  is located in the midline of the superior cerebellum, measures 7 x 6 x 7  mm in size and has 2 x 1.3 cm of surrounding vasogenic edema.  2. There is mild-to-moderate small vessel disease in cerebral white  matter. The remainder of the MRI of the brain is within normal limits.  The results were communicated to the nurse in the CCU taking care of the  patient by telephone on 05/26/2023 at 1:00 AM. I also communicated the  results to  Dr. Ricketts from neurosurgery by telephone on 05/26/2023 at 8:20  AM.     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  Patient is not felt to be a surgical candidate and combined chemotherapy and radiation.   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  • 9/20/2022 1st dose of weekly carboplatin/taxol.   • She completed 6 cycles of weekly CarboTaxol 10/25/2022.  • Radiation therapy completed 10/27/2022  • First dose durvalumab delivered 11/28/2022  • Recent follow-up bronchoscopy performed 1/4/2023 with no evidence of malignancy identified  • CT scans dated 3/23 show stable disease with no new mets  • 5/15/2023: Cycle #13 Imfinzi today.  Tolerating well.  • Tumor is strongly PD-L1 positive greater than 95% (although the patient may not be a great candidate for immunotherapy in the future due to her rheumatoid arthritis).  • confusion headache MRI shows 3 brain mets-needs restaging  • Craniotomy and occipital tumor resection on 5/30/2023  • CT of the chest abdomen pelvis from 5/29/2023 showed no evidence of metastatic disease in the abdomen.  Right hilar mass is stable.  There is a new rib lesion on the right eighth which is possibly metastatic disease or fracture.  There is also uptake on the bone scan at that location  • That would require follow-up staging scans for this lesion.  • Follow-up on pathology from the occipital tumor.  • Consult radiation oncology for radiation to the of the 2 lesions/whole brain.     2.  Other comorbidities including vascular disease with previous carotid endarterectomy surgeries.  She has no known history of stroke or myocardial infarction.  Performance status remains good..    3.  Hemoptysis related to her central tumor.  Improved with initiation of therapy.  She reports she still has some darker blood coming up when she coughs but this is usually now associated more with some yellow  foul-smelling sputum.  No changes    4.  Rheumatoid arthritis: Patient previously on Celebrex, but discontinued due to hemoptysis.  Patient started on prednisone 20 mg daily prescribed to manage her arthritis pain.  Prednisone has since been decreased to 10 mg daily.  She is currently on dexamethasone 4 mg IV every 6 hours for the cranial metastasis.  Prednisone being held.    5.    Lesion on the right kidney stable on further follow-up scans.     PLAN  1.status post craniotomy with occipital tumor removal 5/30/2023.  Staging scans with no significant systemic disease other than a small area of abnormality in the right eighth rib.  This could likely be treated with radiation.  Follow-up on pathology from the occipital tumor removal.  She will need radiation for the other 2 lesions in the brain.  2.could start Lovenox once cleared by neurosurgery for DVT prophylaxis   3.   Continue steroids and Keppra, MRI from 5/31/2023 shows persistence of edema and midline shift.    Patient is new to me and extensive review of the medical records including the images of the CT chest abdomen pelvis and bone scan as well as MRI of the brain have been performed.  All issues new to me today.                     5/31/2023      CC:

## 2023-06-01 PROCEDURE — 97530 THERAPEUTIC ACTIVITIES: CPT

## 2023-06-01 PROCEDURE — 99232 SBSQ HOSP IP/OBS MODERATE 35: CPT | Performed by: INTERNAL MEDICINE

## 2023-06-01 PROCEDURE — 25010000002 HYDRALAZINE PER 20 MG: Performed by: NEUROLOGICAL SURGERY

## 2023-06-01 PROCEDURE — 97164 PT RE-EVAL EST PLAN CARE: CPT

## 2023-06-01 PROCEDURE — 99024 POSTOP FOLLOW-UP VISIT: CPT | Performed by: NURSE PRACTITIONER

## 2023-06-01 PROCEDURE — 63710000001 DEXAMETHASONE PER 0.25 MG: Performed by: NURSE PRACTITIONER

## 2023-06-01 PROCEDURE — 97535 SELF CARE MNGMENT TRAINING: CPT

## 2023-06-01 PROCEDURE — 25010000002 ENOXAPARIN PER 10 MG: Performed by: NURSE PRACTITIONER

## 2023-06-01 RX ORDER — ENOXAPARIN SODIUM 100 MG/ML
40 INJECTION SUBCUTANEOUS EVERY 24 HOURS
Status: DISCONTINUED | OUTPATIENT
Start: 2023-06-01 | End: 2023-06-02 | Stop reason: HOSPADM

## 2023-06-01 RX ADMIN — LEVETIRACETAM 500 MG: 500 TABLET, FILM COATED ORAL at 08:43

## 2023-06-01 RX ADMIN — LEVETIRACETAM 500 MG: 500 TABLET, FILM COATED ORAL at 21:07

## 2023-06-01 RX ADMIN — DOCUSATE SODIUM 50MG AND SENNOSIDES 8.6MG 1 TABLET: 8.6; 5 TABLET, FILM COATED ORAL at 08:43

## 2023-06-01 RX ADMIN — DEXAMETHASONE 4 MG: 4 TABLET ORAL at 14:10

## 2023-06-01 RX ADMIN — DEXAMETHASONE 4 MG: 4 TABLET ORAL at 05:47

## 2023-06-01 RX ADMIN — ENOXAPARIN SODIUM 40 MG: 100 INJECTION SUBCUTANEOUS at 15:29

## 2023-06-01 RX ADMIN — HYDRALAZINE HYDROCHLORIDE 10 MG: 20 INJECTION INTRAMUSCULAR; INTRAVENOUS at 22:36

## 2023-06-01 RX ADMIN — DOCUSATE SODIUM 50MG AND SENNOSIDES 8.6MG 1 TABLET: 8.6; 5 TABLET, FILM COATED ORAL at 21:07

## 2023-06-01 RX ADMIN — DEXAMETHASONE 4 MG: 4 TABLET ORAL at 21:07

## 2023-06-01 NOTE — PLAN OF CARE
Goal Outcome Evaluation:    Pt is improving. Pt is alert and oriented times four. Pt's VS are WNL. Pt is an assist times one. Pt is compliant with medication regimen. Pt care is ongoing.

## 2023-06-01 NOTE — PROGRESS NOTES
Continued Stay Note  [facility]     Patient Name: Pam Vidal  MRN: [id number]  Today's Date: 6/1/2023    Admit Date: 5/25/2023    Plan: [facility] acute rehab eval pending   Discharge Plan     Row Name 06/01/23 1108       Plan    Plan [facility] acute rehab eval pending    Patient/Family in Agreement with Plan yes    Plan Comments [facility] acute rehab eval pending per JUAN JOSE. CCP to follow. Octavia Bach LCSW               Discharge Codes    No documentation.               Expected Discharge Date and Time     Expected Discharge Date Expected Discharge Time    Jun 1, 2023             Quyen Bach LCSW

## 2023-06-01 NOTE — THERAPY RE-EVALUATION
Patient Name: Pam Vidal  : 1945    MRN: [id number]                              Today's Date: 2023       Admit Date: 2023    Visit Dx:     ICD-10-CM ICD-9-CM   1. Lung cancer metastatic to brain  C34.90 162.9    C79.31 198.3   2. Vasogenic brain edema  G93.6 348.5   3. Hemoptysis  R04.2 786.30   4. Unsteady gait  R26.81 781.2   5. Cough with hemoptysis  R04.2 786.39   6. Primary lung adenocarcinoma, right  C34.91 162.9     Patient Active Problem List   Diagnosis   • Primary lung adenocarcinoma, right   • Cough with hemoptysis   • Lung mass   • Advanced care planning/counseling discussion   • High risk medication use   • Muscular deconditioning   • Neoplastic malignant related fatigue   • Lung cancer metastatic to brain   • Leukocytosis   • Brain mass   • COPD (chronic obstructive pulmonary disease)   • Rheumatoid arthritis   • IBS (irritable bowel syndrome)   • Hyperlipidemia     Past Medical History:   Diagnosis Date   • COPD (chronic obstructive pulmonary disease)    • Coughing up blood     X2 MONTHS   • History of COVID-19 2022   • Hyperlipidemia    • IBS (irritable bowel syndrome)    • Primary lung adenocarcinoma, right    • Rheumatoid arthritis      Past Surgical History:   Procedure Laterality Date   • APPENDECTOMY      appendix removed   • BRONCHOSCOPY N/A 2022    Procedure: BRONCHOSCOPY WITH BAL,  BIOPSIES, AND BRUSHINGS WITH ENDOBRONCHIAL ULTRASOUND WITH FNA;  Surgeon: Gregor Vee MD;  Location: [facility] ENDOSCOPY;  Service: Pulmonary;  Laterality: N/A;  PRE- HILAR MASS  POST- SAME   • BRONCHOSCOPY N/A 2023    Procedure: BRONCHOSCOPY with biopsy, lavage, brushing;  Surgeon: Gregor Vee MD;  Location: [facility] ENDOSCOPY;  Service: Pulmonary;  Laterality: N/A;   • CAROTID ENDARTERECTOMY Right    • CAROTID ENDARTERECTOMY Left    • CATARACT EXTRACTION     • CERVICAL FUSION     • CHOLECYSTECTOMY     • CRANIOTOMY FOR TUMOR Right 2023    Procedure: RIGHT CRANIOTOMY  FOR TUMOR RESECTION STEREOTACTIC WITH STEALTH;  Surgeon: Clint Ricketts MD;  Location: [facility] OR;  Service: Neurosurgery;  Laterality: Right;   • HYSTERECTOMY     • SHOULDER ARTHROSCOPY Right 02/19/2019    right should scope/cuff repair    • VENOUS ACCESS DEVICE (PORT) INSERTION Right 9/6/2022    Procedure: POWERPORT INSERTION;  Surgeon: Remedios Rice MD;  Location: [facility] MAIN OR;  Service: Thoracic;  Laterality: Right;      General Information     Row Name 06/01/23 0959          Physical Therapy Time and Intention    Document Type re-evaluation  -     Mode of Treatment individual therapy;physical therapy  -     Row Name 06/01/23 0959          General Information    Prior Level of Function independent:;gait;transfer;bed mobility  -     Existing Precautions/Restrictions fall  -     Barriers to Rehab medically complex  -     Row Name 06/01/23 0959          Living Environment    People in Home other relative(s)  lives with niece  -     Row Name 06/01/23 0959          Cognition    Orientation Status (Cognition) oriented x 3  -     Row Name 06/01/23 0959          Safety Issues, Functional Mobility    Impairments Affecting Function (Mobility) balance;endurance/activity tolerance;strength  -           User Key  (r) = Recorded By, (t) = Taken By, (c) = Cosigned By    Initials Name Provider Type     Mayra Timmons, PT Physical Therapist               Mobility     Row Name 06/01/23 1000          Bed Mobility    Bed Mobility supine-sit;sit-supine  -     Supine-Sit [place] (Bed Mobility) verbal cues;standby assist  -     Sit-Supine [place] (Bed Mobility) verbal cues;standby assist  -     Assistive Device (Bed Mobility) bed rails;head of bed elevated  -     Row Name 06/01/23 1000          Sit-Stand Transfer    Sit-Stand [place] (Transfers) verbal cues;nonverbal cues (demo/gesture);contact guard  -     Assistive Device (Sit-Stand Transfers) walker, front-wheeled  -     Row  Name 06/01/23 1000          Gait/Stairs (Locomotion)    [place] Level (Gait) verbal cues;nonverbal cues (demo/gesture);contact guard  -     Assistive Device (Gait) walker, front-wheeled  -     Distance in Feet (Gait) 30  -     Deviations/Abnormal Patterns (Gait) mariia decreased;festinating/shuffling;gait speed decreased;stride length decreased  -     Bilateral Gait Deviations forward flexed posture  -     Comment, (Gait/Stairs) pt with very slow gait, required increased time to ambulate 30 feet and pt fatigued quickly  -           User Key  (r) = Recorded By, (t) = Taken By, (c) = Cosigned By    Initials Name Provider Type     Mayra Timmons, PT Physical Therapist               Obj/Interventions     Row Name 06/01/23 1001          Range of Motion Comprehensive    General Range of Motion no range of motion deficits identified  -     Row Name 06/01/23 1001          Strength Comprehensive (MMT)    Comment, General Manual Muscle Testing (MMT) Assessment generalized weakness noted with functional mobility  -     Row Name 06/01/23 1001          Balance    Balance Assessment standing static balance;standing dynamic balance  -     Static Standing Balance verbal cues;non-verbal cues (demo/gesture);contact guard  -     Dynamic Standing Balance verbal cues;non-verbal cues (demo/gesture);contact guard  -     Position/Device Used, Standing Balance walker, rolling  -           User Key  (r) = Recorded By, (t) = Taken By, (c) = Cosigned By    Initials Name Provider Type     Mayra Timmons, PT Physical Therapist               Goals/Plan     Row Name 06/01/23 1005          Bed Mobility Goal 1 (PT)    Activity/Assistive Device (Bed Mobility Goal 1, PT) bed mobility activities, all  -     [place] Level/Cues Needed (Bed Mobility Goal 1, PT) independent  -     Time Frame (Bed Mobility Goal 1, PT) 1 week  -     Row Name 06/01/23 1005          Transfer Goal 1 (PT)    Activity/Assistive  Device (Transfer Goal 1, PT) transfers, all;walker, rolling  -CH     [place] Level/Cues Needed (Transfer Goal 1, PT) supervision required  -CH     Time Frame (Transfer Goal 1, PT) 1 week  -     Row Name 06/01/23 1005          Gait Training Goal 1 (PT)    Activity/Assistive Device (Gait Training Goal 1, PT) gait (walking locomotion);walker, rolling  -CH     [place] Level (Gait Training Goal 1, PT) supervision required  -     Distance (Gait Training Goal 1, PT) 150  -CH     Time Frame (Gait Training Goal 1, PT) 1 week  -     Row Name 06/01/23 1005          Therapy Assessment/Plan (PT)    Planned Therapy Interventions (PT) balance training;bed mobility training;gait training;home exercise program;patient/family education;strengthening;transfer training  -           User Key  (r) = Recorded By, (t) = Taken By, (c) = Cosigned By    Initials Name Provider Type     Mayra Timmons, PT Physical Therapist               Clinical Impression     Row Name 06/01/23 1001          Pain    Pretreatment Pain Rating 0/10 - no pain  -     Posttreatment Pain Rating 0/10 - no pain  -     Row Name 06/01/23 1001          Plan of Care Review    Plan of Care Reviewed With patient;family  -     Outcome Evaluation Pt is a 79 yo F who is post-op R craniotoby for brain tumor. Pt presents to PT with some impaired functional mobility and gait post-op secondary to generalized weakness and decreased activity tolerance. Pt required increased time to ambulate just 30 ft compared to ambulating 150ft pre-op. Pt may benefit from skilled PT to address strength, mobility, and gait.  -     Row Name 06/01/23 1001          Therapy Assessment/Plan (PT)    Patient/Family Therapy Goals Statement (PT) to return to PLOF  -     Rehab Potential (PT) good, to achieve stated therapy goals  -     Criteria for Skilled Interventions Met (PT) skilled treatment is necessary  -     Therapy Frequency (PT) 5 times/wk  -     Row Name  06/01/23 1001          Positioning and Restraints    Pre-Treatment Position in bed  -     Post Treatment Position bed  -CH     In Bed supine;call light within reach;encouraged to call for assist;with family/caregiver  -           User Key  (r) = Recorded By, (t) = Taken By, (c) = Cosigned By    Initials Name Provider Type    Mayra Collins, PT Physical Therapist               Outcome Measures     Row Name 06/01/23 1005 06/01/23 0800       How much help from another person do you currently need...    Turning from your back to your side while in flat bed without using bedrails? 4  -CH 4 (P)   -EB    Moving from lying on back to sitting on the side of a flat bed without bedrails? 4  -CH 3 (P)   -EB    Moving to and from a bed to a chair (including a wheelchair)? 3  -CH 3 (P)   -EB    Standing up from a chair using your arms (e.g., wheelchair, bedside chair)? 3  -CH 3 (P)   -EB    Climbing 3-5 steps with a railing? 3  -CH 3 (P)   -EB    To walk in hospital room? 3  -CH 3 (P)   -EB    AM-PAC 6 Clicks Score (PT) 20  - 19 (P)   -EB    Highest level of mobility 6 --> Walked 10 steps or more  - 6 --> Walked 10 steps or more (P)   -EB    Row Name 06/01/23 1005          Functional Assessment    Outcome Measure Options AM-PAC 6 Clicks Basic Mobility (PT)  -           User Key  (r) = Recorded By, (t) = Taken By, (c) = Cosigned By    Initials Name Provider Type    Mayra Collins, PT Physical Therapist    Sandra Weston, RN Extern Registered Nurse                             Physical Therapy Education     Title: PT OT SLP Therapies (In Progress)     Topic: Physical Therapy (In Progress)     Point: Mobility training (Done)     Learning Progress Summary           Patient Acceptance, E,TB,D, VU,NR by  at 6/1/2023 1008    Acceptance, E,TB,D, VU,NR by  at 5/27/2023 1317    Acceptance, E,TB,D, VU,NR by  at 5/26/2023 1548                   Point: Home exercise program (Not Started)     Learner  Progress:  Not documented in this visit.          Point: Body mechanics (Done)     Learning Progress Summary           Patient Acceptance, E,TB,D, VU,NR by  at 6/1/2023 1008    Acceptance, E,TB,D, VU,NR by  at 5/27/2023 1317                   Point: Precautions (Done)     Learning Progress Summary           Patient Acceptance, E,TB,D, VU,NR by  at 6/1/2023 1008    Acceptance, E,TB,D, VU,NR by  at 5/27/2023 1317                               User Key     Initials Effective Dates Name Provider Type Discipline     06/16/21 -  Mayra Timmons, PT Physical Therapist PT    EJ 06/16/21 -  Katelynn Gomez, PT Physical Therapist PT              PT Recommendation and Plan  Planned Therapy Interventions (PT): balance training, bed mobility training, gait training, home exercise program, patient/family education, strengthening, transfer training  Plan of Care Reviewed With: patient, family  Outcome Evaluation: Pt is a 77 yo F who is post-op R craniotoby for brain tumor. Pt presents to PT with some impaired functional mobility and gait post-op secondary to generalized weakness and decreased activity tolerance. Pt required increased time to ambulate just 30 ft compared to ambulating 150ft pre-op. Pt may benefit from skilled PT to address strength, mobility, and gait.     Time Calculation:    PT Charges     Row Name 06/01/23 1008             Time Calculation    Start Time 0943  -      Stop Time 0955  -      Time Calculation (min) 12 min  -      PT Received On 06/01/23  -      PT - Next Appointment 06/02/23  -      PT Goal Re-Cert Due Date 06/08/23  -         Time Calculation- PT    Total Timed Code Minutes- PT 8 minute(s)  -         Timed Charges    47792 - PT Therapeutic Activity Minutes 8  -CH         Total Minutes    Timed Charges Total Minutes 8  -CH       Total Minutes 8  -CH            User Key  (r) = Recorded By, (t) = Taken By, (c) = Cosigned By    Initials Name Provider Type     Iain  Mayra WALSH, PT Physical Therapist              Therapy Charges for Today     Code Description Service Date Service Provider Modifiers Qty    [id number]  PT THERAPEUTIC ACT EA 15 MIN 6/1/2023 Mayra Timmons, PT GP 1    [id number]  PT RE-EVAL ESTABLISHED PLAN 2 6/1/2023 Mayra Timmons, PT GP 1          PT G-Codes  Outcome Measure Options: AM-PAC 6 Clicks Basic Mobility (PT)  AM-PAC 6 Clicks Score (PT): 20  AM-PAC 6 Clicks Score (OT): 18  Modified [place] Scale: 4 - Moderately severe disability.  Unable to walk without assistance, and unable to attend to own bodily needs without assistance.  PT Discharge Summary  Anticipated Discharge Disposition (PT): home with assist, home with home health    Mayra Timmons, IRIS  6/1/2023

## 2023-06-01 NOTE — PROGRESS NOTES
Name: Pam Vidal ADMIT: 2023   : 1945  PCP: Nik Mckay MD    MRN: [id number] LOS: 7 days   AGE/SEX: 78 y.o. female  ROOM: [facility]     Subjective   Subjective   Mild dizziness w/position changes. Denies HA, chest pain, soa. Appetite wnl. No nausea       Objective   Objective   Vital Signs  Temp:  [98.1 °F (36.7 °C)-98.3 °F (36.8 °C)] 98.1 °F (36.7 °C)  Heart Rate:  [66-99] 72  Resp:  [18-20] 18  BP: (122-170)/(57-71) 149/60  SpO2:  [94 %] 94 %  on   ;   Device (Oxygen Therapy): room air  Body mass index is 21.67 kg/m².  Physical Exam  Vitals and nursing note reviewed.   Constitutional:       General: She is not in acute distress.     Appearance: She is ill-appearing. She is not toxic-appearing.   HENT:      Head: Normocephalic.      Comments: Craniotomy incision approximated; no drainage, swelling     Mouth/Throat:      Mouth: Mucous membranes are moist.   Eyes:      Conjunctiva/sclera: Conjunctivae normal.   Cardiovascular:      Rate and Rhythm: Normal rate and regular rhythm.   Pulmonary:      Effort: Pulmonary effort is normal. No respiratory distress.      Breath sounds: No wheezing or rales.   Abdominal:      General: Bowel sounds are normal.      Palpations: Abdomen is soft.   Musculoskeletal:      Cervical back: Neck supple.      Right lower leg: No edema.      Left lower leg: No edema.   Skin:     General: Skin is warm and dry.   Neurological:      Mental Status: She is alert and oriented to person, place, and time.      Comments: Ambulating to bathroom w/therapy using walker, gait slow, cautious   Psychiatric:         Mood and Affect: Mood normal.         Behavior: Behavior normal.       Results Review     I reviewed the patient's new clinical results.  Results from last 7 days   Lab Units 23  0323 23  0626 23  0637 23  0622   WBC 10*3/mm3 15.23* 17.30* 15.85* 17.70*   HEMOGLOBIN g/dL 12.1 14.2 13.1 12.9   PLATELETS 10*3/mm3 271 348 294 310     Results from  last 7 days   Lab Units 05/31/23 0323 05/30/23 0626 05/29/23  0637 05/28/23  0622   SODIUM mmol/L 138 135* 138 135*   POTASSIUM mmol/L 3.9 4.2 4.2 3.9   CHLORIDE mmol/L 104 102 106 104   CO2 mmol/L 22.8 23.0 20.4* 21.6*   BUN mg/dL 33* 35* 31* 28*   CREATININE mg/dL 0.94 0.93 0.88 0.94   GLUCOSE mg/dL 222* 118* 139* 142*   EGFR mL/min/1.73 63.0 63.8 68.2 63.0     Results from last 7 days   Lab Units 05/27/23  0638 05/26/23  0347   ALBUMIN g/dL 3.4* 3.8   BILIRUBIN mg/dL 0.3 0.2   ALK PHOS U/L 61 66   AST (SGOT) U/L 15 15   ALT (SGPT) U/L 15 12     Results from last 7 days   Lab Units 05/31/23 0323 05/30/23 0626 05/29/23 0637 05/28/23  0622 05/27/23  0638 05/26/23  0347   CALCIUM mg/dL 8.2* 9.1 9.3 9.5 9.8 9.2   ALBUMIN g/dL  --   --   --   --  3.4* 3.8   MAGNESIUM mg/dL 2.1  --   --   --   --   --    PHOSPHORUS mg/dL 3.2  --   --   --   --   --      Results from last 7 days   Lab Units 05/27/23  1820   PROCALCITONIN ng/mL 0.04     Glucose   Date/Time Value Ref Range Status   05/31/2023 1724 152 (H) 70 - 130 mg/dL Final     Comment:     Meter: [id number] : [id number] Corey Vannessa JELENA   05/31/2023 1201 133 (H) 70 - 130 mg/dL Final     Comment:     Meter: [id number] : [id number] Sudhakar Bunn  JELENA   05/31/2023 0759 99 70 - 130 mg/dL Final     Comment:     Meter: [id number] : [id number] Sudhakar Hardinin  JELENA   05/30/2023 2230 138 (H) 70 - 130 mg/dL Final     Comment:     Meter: [id number] : [id number] Antoinette Jacqui RN   05/30/2023 1304 106 70 - 130 mg/dL Final     Comment:     Meter: [id number] : [id number] Corey Vannessa JELENA   05/30/2023 0823 131 (H) 70 - 130 mg/dL Final     Comment:     Meter: [id number] : [id number] Corey Sotoha JELENA   05/29/2023 2057 163 (H) 70 - 130 mg/dL Final     Comment:     Meter: [id number] : [id number] Hali WHITE       MRI Brain With & Without Contrast    Result Date: 5/31/2023  1. Since preoperative MRI 05/26/2023, the patient underwent a 5 x 5 cm posterior  right parietal craniotomy yesterday, 05/30/2023, in order to resect a multilobulated 3.7 x 2.9 x 2.7 cm peripherally enhancing centrally necrotic and partially hemorrhagic mass likely metastatic lesion in the posterior superior lateral right occipital lobe parenchyma. I see no convincing residual enhancing tumor. There is a 1.9 x 1.7 x 1.8 cm postoperative resection cavity that has some precontrast T1 hyperintensity indicating some blood products within the resection cavity and adjacent to the anterior medial edge of the resection cavity is a discrete T1 low signal and T2 high signal 16 x 10 x 10 mm cyst that is unchanged. There is stable extensive FLAIR and T2 high signal compatible with extensive vasogenic edema tracking throughout the entire right parietal and occipital white matter into the posterior and mid right temporal lobe white matter and posterior limb of the right internal capsule, posterior right external capsule and the edema tracks up to 10 x 5 x 6 cm and there is some mass effect in 3-4 mm right-to-left midline shift at the level septum pellucidum which is unchanged. There is expected postoperative edema in the scalp external to the craniotomy flap. 2. There is a stable 4 x 3 x 3 mm enhancing superficial cortical metastatic lesion in the posterior lateral left occipital lobe with 1 cm surrounding edema. There is stable size 7 x 7 x 6 mm enhancing metastases to the midline of the superior cerebellum that has some new precontrast T1 hyperintensity centrally within it suggesting some minimal new acute or subacute hemorrhage into the central portion of this small cerebellar metastasis. There is mild 16 x 10 mm of surrounding vasogenic edema. No additional metastatic lesions are seen in the brain 3. There is mild-to-moderate small vessel disease in cerebral white matter. The remainder of the MRI of the brain is normal.  This report was finalized on 5/31/2023 12:58 PM by Dr. Song Cruz M.D.      I have  personally reviewed all medications:  Scheduled Medications  [START ON 6/8/2023] dexamethasone, 2 mg, Oral, Q8H  [START ON 6/12/2023] dexamethasone, 2 mg, Oral, Q12H  [START ON 6/16/2023] dexamethasone, 2 mg, Oral, Daily With Breakfast  [START ON 6/4/2023] dexamethasone, 3 mg, Oral, Q8H  dexamethasone, 4 mg, Oral, Q8H  levETIRAcetam, 500 mg, Oral, BID  pantoprazole, 40 mg, Oral, Q AM  senna-docusate sodium, 1 tablet, Oral, BID    Infusions  niCARdipine, 5-15 mg/hr, Last Rate: Stopped (05/31/23 0803)    Diet  Diet: Regular/House Diet; Texture: Regular Texture (IDDSI 7); Fluid Consistency: Thin (IDDSI 0)    I have personally reviewed:  [x]  Laboratory   [x]  Microbiology   [x]  Radiology   [x]  EKG/Telemetry  [x]  Cardiology/Vascular   []  Pathology    []  Records       Assessment/Plan     Active Hospital Problems    Diagnosis  POA   • **Lung cancer metastatic to brain [C34.90, C79.31]  Yes   • Leukocytosis [D72.829]  Unknown   • Brain mass [G93.89]  Unknown   • COPD (chronic obstructive pulmonary disease) [J44.9]  Unknown   • Rheumatoid arthritis [M06.9]  Unknown   • IBS (irritable bowel syndrome) [K58.9]  Unknown   • Hyperlipidemia [E78.5]  Unknown   • Cough with hemoptysis [R04.2]  Yes      Resolved Hospital Problems   No resolved problems to display.       78 y.o. female admitted with Lung cancer metastatic to brain.    Hemorrhagic metastatic lesion to the brain with vasogenic edema  -Continue with Keppra and steroids, now on po taper  -Neurosurgery has written from their standpoint okay for discharge; being evaluated by [facility]     Likely metastatic lung cancer to brain  -Oncology following. Pathology pending. CT chest w/small area abnormality rt 8th rib     Rheumatoid arthritis for which she takes steroids chronically     Carotid vascular disease status post bilateral endarterectomy  -Hold on aspirin     Hypertensive emergency  -Blood pressure a little higher today but stable, monitor     DVT PPX: Has been cleared  to start chemoprophylaxis postop day 3 (tomorrow). Oncology recommends lovenox      · SCDs for DVT prophylaxis.  · Full code.  · Discussed with patient.  · Anticipate discharge Acute rehab vs home once arrangements have been made..      BARBARA Andrews  [facility]  06/01/23  14:56 EDT

## 2023-06-01 NOTE — THERAPY TREATMENT NOTE
Patient Name: Pam Vidal  : 1945    MRN: [id number]                              Today's Date: 2023       Admit Date: 2023    Visit Dx:     ICD-10-CM ICD-9-CM   1. Lung cancer metastatic to brain  C34.90 162.9    C79.31 198.3   2. Vasogenic brain edema  G93.6 348.5   3. Hemoptysis  R04.2 786.30   4. Unsteady gait  R26.81 781.2   5. Cough with hemoptysis  R04.2 786.39   6. Primary lung adenocarcinoma, right  C34.91 162.9     Patient Active Problem List   Diagnosis   • Primary lung adenocarcinoma, right   • Cough with hemoptysis   • Lung mass   • Advanced care planning/counseling discussion   • High risk medication use   • Muscular deconditioning   • Neoplastic malignant related fatigue   • Lung cancer metastatic to brain   • Leukocytosis   • Brain mass   • COPD (chronic obstructive pulmonary disease)   • Rheumatoid arthritis   • IBS (irritable bowel syndrome)   • Hyperlipidemia     Past Medical History:   Diagnosis Date   • COPD (chronic obstructive pulmonary disease)    • Coughing up blood     X2 MONTHS   • History of COVID-19 2022   • Hyperlipidemia    • IBS (irritable bowel syndrome)    • Primary lung adenocarcinoma, right    • Rheumatoid arthritis      Past Surgical History:   Procedure Laterality Date   • APPENDECTOMY      appendix removed   • BRONCHOSCOPY N/A 2022    Procedure: BRONCHOSCOPY WITH BAL,  BIOPSIES, AND BRUSHINGS WITH ENDOBRONCHIAL ULTRASOUND WITH FNA;  Surgeon: Gregor Vee MD;  Location: [facility] ENDOSCOPY;  Service: Pulmonary;  Laterality: N/A;  PRE- HILAR MASS  POST- SAME   • BRONCHOSCOPY N/A 2023    Procedure: BRONCHOSCOPY with biopsy, lavage, brushing;  Surgeon: Gregor Vee MD;  Location: [facility] ENDOSCOPY;  Service: Pulmonary;  Laterality: N/A;   • CAROTID ENDARTERECTOMY Right    • CAROTID ENDARTERECTOMY Left    • CATARACT EXTRACTION     • CERVICAL FUSION     • CHOLECYSTECTOMY     • CRANIOTOMY FOR TUMOR Right 2023    Procedure: RIGHT CRANIOTOMY  FOR TUMOR RESECTION STEREOTACTIC WITH STEALTH;  Surgeon: Clint Ricketts MD;  Location: [facility] MAIN OR;  Service: Neurosurgery;  Laterality: Right;   • HYSTERECTOMY     • SHOULDER ARTHROSCOPY Right 02/19/2019    right should scope/cuff repair    • VENOUS ACCESS DEVICE (PORT) INSERTION Right 9/6/2022    Procedure: POWERPORT INSERTION;  Surgeon: Remedios Rice MD;  Location: [facility] MAIN OR;  Service: Thoracic;  Laterality: Right;      General Information     Row Name 06/01/23 1519          OT Time and Intention    Document Type therapy note (daily note)  -     Mode of Treatment occupational therapy;individual therapy  -     Row Name 06/01/23 1519          General Information    Patient Profile Reviewed yes  -     Existing Precautions/Restrictions fall  -     Row Name 06/01/23 1519          Cognition    Orientation Status (Cognition) oriented x 3  mild confusion noted throughout session  -     Row Name 06/01/23 1519          Safety Issues, Functional Mobility    Impairments Affecting Function (Mobility) balance;endurance/activity tolerance;strength  -           User Key  (r) = Recorded By, (t) = Taken By, (c) = Cosigned By    Initials Name Provider Type     Josie Paul OT Occupational Therapist                 Mobility/ADL's     Row Name 06/01/23 1519          Bed Mobility    Bed Mobility supine-sit;sit-supine  -     Supine-Sit Gregory (Bed Mobility) verbal cues;standby assist  -     Sit-Supine Gregory (Bed Mobility) verbal cues;standby assist  -     Assistive Device (Bed Mobility) bed rails;head of bed elevated  -     Row Name 06/01/23 1519          Transfers    Transfers sit-stand transfer;stand-sit transfer  -     Row Name 06/01/23 1519          Sit-Stand Transfer    Sit-Stand Gregory (Transfers) verbal cues;nonverbal cues (demo/gesture);contact guard  -     Assistive Device (Sit-Stand Transfers) walker, front-wheeled  -     Row Name 06/01/23 1519          Stand-Sit  Transfer    Stand-Sit Morton (Transfers) contact guard  -     Assistive Device (Stand-Sit Transfers) walker, front-wheeled  -     Row Name 06/01/23 1519          Toilet Transfer    Comment, (Toilet Transfer) declined need to void however completed func mob to sinkside as pt demo'd distance  -     Row Name 06/01/23 1519          Functional Mobility    Functional Mobility- Ind. Level contact guard assist  -     Functional Mobility- Device walker, front-wheeled  -MW     Functional Mobility- Comment pt demo func mob to sinkside in BR  -     Row Name 06/01/23 1519          Activities of Daily Living    BADL Assessment/Intervention grooming;feeding  -     Row Name 06/01/23 1519          Self-Feeding Assessment/Training    Morton Level (Feeding) independent  -     Position (Self-Feeding) supported sitting  -     Row Name 06/01/23 1519          Grooming Assessment/Training    Morton Level (Grooming) grooming skills;oral care regimen;hair care, combing/brushing;set up;standby assist  -     Position (Grooming) supported sitting;sink side  -           User Key  (r) = Recorded By, (t) = Taken By, (c) = Cosigned By    Initials Name Provider Type     Josie Paul OT Occupational Therapist               Obj/Interventions     Row Name 06/01/23 1520          Balance    Balance Assessment sitting static balance;sitting dynamic balance;sit to stand dynamic balance;standing static balance;standing dynamic balance  -     Static Sitting Balance supervision  -     Dynamic Sitting Balance supervision  -     Position, Sitting Balance sitting edge of bed  -     Sit to Stand Dynamic Balance contact guard  -     Static Standing Balance contact guard  -     Dynamic Standing Balance contact guard  -     Position/Device Used, Standing Balance supported;walker, front-wheeled  -MW     Balance Interventions occupation based/functional task  -           User Key  (r) = Recorded By, (t) =  Taken By, (c) = Cosigned By    Initials Name Provider Type    Josie Isbell OT Occupational Therapist               Goals/Plan    No documentation.                Clinical Impression     Row Name 06/01/23 1521          Pain Assessment    Pretreatment Pain Rating 0/10 - no pain  -MW     Posttreatment Pain Rating 0/10 - no pain  -MW     Row Name 06/01/23 1521          Plan of Care Review    Plan of Care Reviewed With patient  -MW     Progress improving  -MW     Outcome Evaluation Pt seen this PM for OT tx session, agreeable to therapy. Pt supine on arrival, SBA for bed mobility, (I) with self feeding, STS and func mob to BR sinkside with CGA to engage in g/h ADLs. Pt seated at sinkside to complete 2/2 poor activity tolerance in upright standing for increased time. Slow pace with func mob to return to bed, however no LOBs. Pt will continue to benefit from skilled OT to address overall (I) and safety with ADLs and func transfers, anticipate home with assist at d/c and HHOT.  -     Row Name 06/01/23 1521          Therapy Plan Review/Discharge Plan (OT)    Anticipated Discharge Disposition (OT) home with assist;home with home health  -     Row Name 06/01/23 1521          Vital Signs    O2 Delivery Pre Treatment room air  -MW     Pre Patient Position Supine  -MW     Intra Patient Position Standing  -MW     Post Patient Position Supine  -MW     Row Name 06/01/23 1521          Positioning and Restraints    Pre-Treatment Position in bed  -MW     Post Treatment Position bed  -MW     In Bed notified nsg;fowlers;call light within reach;exit alarm on;encouraged to call for assist;side rails up x3  -MW           User Key  (r) = Recorded By, (t) = Taken By, (c) = Cosigned By    Initials Name Provider Type    Josie Isbell OT Occupational Therapist               Outcome Measures     Row Name 06/01/23 1523          How much help from another is currently needed...    Putting on and taking off regular lower body  clothing? 3  -MW     Bathing (including washing, rinsing, and drying) 3  -MW     Toileting (which includes using toilet bed pan or urinal) 3  -MW     Putting on and taking off regular upper body clothing 3  -MW     Taking care of personal grooming (such as brushing teeth) 3  -MW     Eating meals 4  -MW     AM-PAC 6 Clicks Score (OT) 19  -MW     Row Name 06/01/23 1005 06/01/23 0800       How much help from another person do you currently need...    Turning from your back to your side while in flat bed without using bedrails? 4  -CH 4  -LC (r) EB (t) LC (c)    Moving from lying on back to sitting on the side of a flat bed without bedrails? 4  -CH 3  -LC (r) EB (t) LC (c)    Moving to and from a bed to a chair (including a wheelchair)? 3  -CH 3  -LC (r) EB (t) LC (c)    Standing up from a chair using your arms (e.g., wheelchair, bedside chair)? 3  -CH 3  -LC (r) EB (t) LC (c)    Climbing 3-5 steps with a railing? 3  -CH 3  -LC (r) EB (t) LC (c)    To walk in hospital room? 3  -CH 3  -LC (r) EB (t) LC (c)    AM-PAC 6 Clicks Score (PT) 20  -CH 19  -LC (r) EB (t)    Highest level of mobility 6 --> Walked 10 steps or more  -CH 6 --> Walked 10 steps or more  -LC (r) EB (t)    Row Name 06/01/23 1523 06/01/23 1005       Functional Assessment    Outcome Measure Options AM-PAC 6 Clicks Daily Activity (OT)  -MW AM-PAC 6 Clicks Basic Mobility (PT)  -CH          User Key  (r) = Recorded By, (t) = Taken By, (c) = Cosigned By    Initials Name Provider Type    CH Mayra Timmons, PT Physical Therapist    Marlene Avila RN Registered Nurse    Josie Isbell, OT Occupational Therapist    Sandra Weston RN Extern Registered Nurse                Occupational Therapy Education     Title: PT OT SLP Therapies (In Progress)     Topic: Occupational Therapy (In Progress)     Point: ADL training (Done)     Description:   Instruct learner(s) on proper safety adaptation and remediation techniques during self care or  transfers.   Instruct in proper use of assistive devices.              Learning Progress Summary           Patient Acceptance, E, VU by  at 5/31/2023 1230    Comment: OT goals, POC, dc recommendations for ADL safety at dc                   Point: Home exercise program (Not Started)     Description:   Instruct learner(s) on appropriate technique for monitoring, assisting and/or progressing therapeutic exercises/activities.              Learner Progress:  Not documented in this visit.          Point: Precautions (Not Started)     Description:   Instruct learner(s) on prescribed precautions during self-care and functional transfers.              Learner Progress:  Not documented in this visit.          Point: Body mechanics (Not Started)     Description:   Instruct learner(s) on proper positioning and spine alignment during self-care, functional mobility activities and/or exercises.              Learner Progress:  Not documented in this visit.                      User Key     Initials Effective Dates Name Provider Type Discipline     04/02/20 -  Linda Cox OT Occupational Therapist OT              OT Recommendation and Plan     Plan of Care Review  Plan of Care Reviewed With: patient  Progress: improving  Outcome Evaluation: Pt seen this PM for OT tx session, agreeable to therapy. Pt supine on arrival, SBA for bed mobility, (I) with self feeding, STS and func mob to BR sinkside with CGA to engage in g/h ADLs. Pt seated at sinkside to complete 2/2 poor activity tolerance in upright standing for increased time. Slow pace with func mob to return to bed, however no LOBs. Pt will continue to benefit from skilled OT to address overall (I) and safety with ADLs and func transfers, anticipate home with assist at d/c and HHOT.     Time Calculation:    Time Calculation- OT     Row Name 06/01/23 1524             Time Calculation- OT    OT Start Time 1341  -MW      OT Stop Time 1409  -MW      OT Time Calculation (min)  28 min  -MW      Total Timed Code Minutes- OT 28 minute(s)  -MW      OT Received On 06/01/23  -MW      OT - Next Appointment 06/02/23  -MW         Timed Charges    87730 - OT Therapeutic Activity Minutes 10  -MW      45914 - OT Self Care/Mgmt Minutes 18  -MW         Total Minutes    Timed Charges Total Minutes 28  -MW       Total Minutes 28  -MW            User Key  (r) = Recorded By, (t) = Taken By, (c) = Cosigned By    Initials Name Provider Type    Josie Isbell OT Occupational Therapist              Therapy Charges for Today     Code Description Service Date Service Provider Modifiers Qty    15410966840 HC OT THERAPEUTIC ACT EA 15 MIN 6/1/2023 Josie Paul OT GO 1    03471999903 HC OT SELF CARE/MGMT/TRAIN EA 15 MIN 6/1/2023 Josie Paul OT GO 1               Josie Paul OT  6/1/2023

## 2023-06-01 NOTE — PROGRESS NOTES
Subjective     REASON FOR CONSULTATION: Decision about craniotomy    Weakness - Generalized      patient is a 78-year-old female with non-small cell lung cancer initially stage III for which she received chemoradiation followed by immunotherapy with Imfinzi her last 5/15/2023.  She is now admitted with confusion and brain MRI showing 3 lesions 2 in the occipital lobe and 1 in the cerebellum which are almost certainly related to her lung cancer.     Neurosurgery has seen her and is scheduled to take her to surgery for the occipital tumor and we are asked whether a biopsy is needed for tissue diagnosis.     She is feeling fairly well overall now that her steroids were started with no headache or visual changes that are significant although I am sure she still has a field defect.     She will need restaging to make sure there is not systemic progression before taking her to surgery and obviously there is no systemic progression radiation to all 3 lesions versus surgery for the larger 1 and radiation are reasonable options     She has no pain or shortness of breath    5/29  In the process of restaging with bone scan and CAT scans  Await results    5/30'  Staging work-up completed results not yet back  Has plans for surgery later today    5/31/2023  CT of the chest abdomen pelvis from 5/29/2023-images apparently reviewed and interpreted by me.  Right upper lobe suprahilar pulmonary mass remains stable.  Sclerotic change in the right anterior eighth rib is new compared to March 2023 and corresponds to increased uptake noted in the bone scan which could be due to healing fracture or sclerotic metastatic disease.  No evidence of metastatic disease in the abdomen or pelvis.  Craniotomy and occipital tumor resection performed 5/31/2023  Pathology pending  Vital signs remained stable.  She is on 2 L of oxygen by nasal cannula  WBC count mildly elevated at 15.23 secondary to steroids, hemoglobin and platelets  normal  Chemistries normal    6/1  Doing well postop   Will plan to see as outpt    Sitting up in a chair without any complaints     Past Medical History, Past Surgical History, Social History, Family History have been reviewed and are without significant changes except as mentioned.    Review of Systems   Eyes: Positive for visual disturbance.   All other systems reviewed and are negative.     A comprehensive 14 point review of systems was performed and was negative except as mentioned.    Medications:  The current medication list was reviewed in the EMR    ALLERGIES:    Allergies   Allergen Reactions   • Del-Mycin [Erythromycin] Hives   • Gentamicin Hives   • Ibuprofen Nausea And Vomiting   • Latex Dermatitis     GLOVES   • Metronidazole Hives   • Ofloxacin Hives and Nausea Only   • Penicillins Hives   • Tetracycline Hives   • Adhesive Tape Dermatitis     BANDAIDS   • Aspirin GI Intolerance     Vomiting can take EC   • Codeine Nausea And Vomiting       Objective      Vitals:    05/31/23 1342 05/31/23 1751 05/31/23 2037 06/01/23 0410   BP: 131/63 159/63 129/66 122/57   BP Location: Left arm Left arm Left arm Left arm   Patient Position: Lying Lying Lying Lying   Pulse: 82 99 74 66   Resp: 20 20 20 18   Temp: 97.8 °F (36.6 °C) 98.1 °F (36.7 °C) 98.1 °F (36.7 °C) 98.3 °F (36.8 °C)   TempSrc: Oral Oral Oral Oral   SpO2: 94% 94% 94% 94%   Weight:       Height:             5/15/2023    11:26 AM   Current Status   ECOG score 0       Physical Exam    CONSTITUTIONAL:  Vital signs reviewed.  No distress, looks comfortable.  EYES:  Conjunctivae and lids unremarkable.  PERRLA  EARS,NOSE,MOUTH,THROAT:  Ears and nose appear unremarkable.  Lips, teeth, gums appear unremarkable.  RESPIRATORY:  Normal respiratory effort.  Lungs clear to auscultation bilaterally.  CARDIOVASCULAR:  Normal S1, S2.  No murmurs rubs or gallops.  No significant lower extremity edema.  GASTROINTESTINAL: Abdomen appears unremarkable.  Nontender.  No  hepatomegaly.  No splenomegaly.  LYMPHATIC:  No cervical, supraclavicular, axillary lymphadenopathy.  SKIN:  Warm.  No rashes.  PSYCHIATRIC:  Normal judgment and insight.  Normal mood and affect.    I have reexamined the patient and the results are consistent with the previously documented exam. Viet Green MD     RECENT LABS:  Hematology WBC   Date Value Ref Range Status   05/31/2023 15.23 (H) 3.40 - 10.80 10*3/mm3 Final     RBC   Date Value Ref Range Status   05/31/2023 4.34 3.77 - 5.28 10*6/mm3 Final     Hemoglobin   Date Value Ref Range Status   05/31/2023 12.1 12.0 - 15.9 g/dL Final     Hematocrit   Date Value Ref Range Status   05/31/2023 36.9 34.0 - 46.6 % Final     Platelets   Date Value Ref Range Status   05/31/2023 271 140 - 450 10*3/mm3 Final          MRI BRAIN  IMPRESSION:  1. Since the prior MRI of the brain on 08/15/2022, patient has developed  at least 3 enhancing lesions in the brain compatible with at least 3  brain metastases, the largest brain metastases is in the posterior  superior lateral right occipital lobe and measures 3.7 x 2.9 x 2.7 cm in  size and has some subacute and chronic blood products within it, has a  large amount of surrounding vasogenic edema with the vasogenic edema  tracking up to 8 x 5 x 6 cm in anterior posterior and medial lateral and  craniocaudal dimension, and it has mass effect on the posterior temporal  horn and trigone and occipital horn of the right lateral ventricle and  there is associated 3-4 mm right-to-left midline shift at the level of  the septum pellucidum. There is a tiny second metastasis to the  posterior lateral left occipital cortex measuring 4 x 3.5 x 4 mm in size  that has 15 x 13 mm of surrounding vasogenic edema. The third metastasis  is located in the midline of the superior cerebellum, measures 7 x 6 x 7  mm in size and has 2 x 1.3 cm of surrounding vasogenic edema.  2. There is mild-to-moderate small vessel disease in cerebral  white  matter. The remainder of the MRI of the brain is within normal limits.  The results were communicated to the nurse in the CCU taking care of the  patient by telephone on 05/26/2023 at 1:00 AM. I also communicated the  results to Dr. Ricketts from neurosurgery by telephone on 05/26/2023 at 8:20  AM.     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  Patient is not felt to be a surgical candidate and combined chemotherapy and radiation.   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  • 9/20/2022 1st dose of weekly carboplatin/taxol.   • She completed 6 cycles of weekly CarboTaxol 10/25/2022.  • Radiation therapy completed 10/27/2022  • First dose durvalumab delivered 11/28/2022  • Recent follow-up bronchoscopy performed 1/4/2023 with no evidence of malignancy identified  • CT scans dated 3/23 show stable disease with no new mets  • 5/15/2023: Cycle #13 Imfinzi today.  Tolerating well.  • Tumor is strongly PD-L1 positive greater than 95% (although the patient may not be a great candidate for immunotherapy in the future due to her rheumatoid arthritis).  • confusion headache MRI shows 3 brain mets-needs restaging  • Craniotomy and occipital tumor resection on 5/30/2023  • CT of the chest abdomen pelvis from 5/29/2023 showed no evidence of metastatic disease in the abdomen.  Right hilar mass is stable.  There is a new rib lesion on the right eighth which is possibly metastatic disease or fracture.  There is also uptake on the bone scan at that location  • That would require follow-up staging scans for this lesion.  • Follow-up on pathology from the occipital tumor.  • Consult radiation oncology for radiation to the of the 2 lesions/whole brain.     2.  Other comorbidities including vascular disease with previous carotid endarterectomy surgeries.  She has no known history of stroke or myocardial infarction.  Performance  status remains good..    3.  Hemoptysis related to her central tumor.  Improved with initiation of therapy.  She reports she still has some darker blood coming up when she coughs but this is usually now associated more with some yellow foul-smelling sputum.  No changes    4.  Rheumatoid arthritis: Patient previously on Celebrex, but discontinued due to hemoptysis.  Patient started on prednisone 20 mg daily prescribed to manage her arthritis pain.  Prednisone has since been decreased to 10 mg daily.  She is currently on dexamethasone 4 mg IV every 6 hours for the cranial metastasis.  Prednisone being held.    5.    Lesion on the right kidney stable on further follow-up scans.     PLAN  1.status post craniotomy with occipital tumor removal 5/30/2023.  Staging scans with no significant systemic disease other than a small area of abnormality in the right eighth rib.  This could likely be treated with radiation.  Follow-up on pathology from the occipital tumor removal.  She will need radiation for the other 2 lesions in the brain.  2.could start Lovenox once cleared by neurosurgery for DVT prophylaxis   3.   Continue steroids and Keppra, MRI from 5/31/2023 shows persistence of edema and midline shift.taper per neurosurgery    F/u with dR Lagunas in 2 weeks hold imfinzi             6/1/2023      CC:

## 2023-06-01 NOTE — PLAN OF CARE
Goal Outcome Evaluation:  Plan of Care Reviewed With: patient, family           Outcome Evaluation: Pt is a 79 yo F who is post-op R craniotoby for brain tumor. Pt presents to PT with some impaired functional mobility and gait post-op secondary to generalized weakness and decreased activity tolerance. Pt required increased time to ambulate just 30 ft compared to ambulating 150ft pre-op. Pt may benefit from skilled PT to address strength, mobility, and gait.

## 2023-06-01 NOTE — PROGRESS NOTES
"  [facility], [place], operated by [facility] NEUROSURGERY INTRACRANIAL POSTOP note    PATIENT IDENTIFICATION:   Name:  Pam Vidal      MRN:  [id number]     78 y.o.  female               CC:POD 2 right suboccipital craniotomy for tumor      Subjective     Interval History: Feels that she is doing \"better today\".  Denies headache.  States that she still not stable on her feet to go home.  Is not interested in subacute rehab but would consider our acute rehab.    Objective     Vital signs in last 24 hours:  Temp:  [97.8 °F (36.6 °C)-98.3 °F (36.8 °C)] 98.1 °F (36.7 °C)  Heart Rate:  [66-99] 78  Resp:  [18-20] 18  BP: (122-162)/(57-70) 162/70      Intake/Output this shift:  I/O this shift:  In: 240 [P.O.:240]  Out: -     Intake/Output last 3 shifts:  I/O last 3 completed shifts:  In: 1510 [P.O.:590; I.V.:870; IV Piggyback:50]  Out: 2225 [Urine:2225]      LABS:  Results from last 7 days   Lab Units 05/31/23  0323 05/30/23  0626 05/29/23  0637   WBC 10*3/mm3 15.23* 17.30* 15.85*   HEMOGLOBIN g/dL 12.1 14.2 13.1   HEMATOCRIT % 36.9 42.6 38.4   PLATELETS 10*3/mm3 271 348 294     Results from last 7 days   Lab Units 05/31/23  0323 05/30/23  0626 05/29/23  0637 05/28/23  0622 05/27/23  0638 05/26/23  0347 05/25/23  1346   SODIUM mmol/L 138 135* 138   < > 136 138 136   POTASSIUM mmol/L 3.9 4.2 4.2   < > 3.9 3.9 3.6   CHLORIDE mmol/L 104 102 106   < > 101 104 97*   CO2 mmol/L 22.8 23.0 20.4*   < > 23.8 25.0 25.2   BUN mg/dL 33* 35* 31*   < > 22 21 16   CREATININE mg/dL 0.94 0.93 0.88   < > 0.86 1.07* 1.08*   CALCIUM mg/dL 8.2* 9.1 9.3   < > 9.8 9.2 10.0   BILIRUBIN mg/dL  --   --   --   --  0.3 0.2 0.4   ALK PHOS U/L  --   --   --   --  61 66 76   ALT (SGPT) U/L  --   --   --   --  15 12 15   AST (SGOT) U/L  --   --   --   --  15 15 20   GLUCOSE mg/dL 222* 118* 139*   < > 116* 160* 102*    < > = values in this interval not displayed.     Pathology-pending    IMAGING STUDIES:  No new imaging    I personally viewed and interpreted the patient's chart    Meds " reviewed/changed: Yes  Dexamethasone oral taper  Keppra 500 mg p.o. every 12 hours      Physical Exam:    General:   Awake and alert.  Sitting up in bed.  Pleasant and cooperative   HEENT:    Right occipital craniotomy incision well approximated with sutures.  No redness drainage swelling  CN VII:    Facial movements are symmetric. No weakness.  Motor:    Moving all extremities   Station and Gait:  Per PT note 6/1-required increased time to ambulate just 30 ft compared to ambulating 150ft pre-op.   coordination:   Finger-to-nose     Assessment & Plan     ASSESSMENT:      Lung cancer metastatic to brain    Cough with hemoptysis    Leukocytosis    Brain mass    COPD (chronic obstructive pulmonary disease)    Rheumatoid arthritis    IBS (irritable bowel syndrome)    Hyperlipidemia    Patient 2 days postop from right occipital craniotomy for suspected metastatic lung cancer.  Continues to do well.  Functionally diminished some with regard to ambulation following surgery.  Is working with therapies.  May benefit from rehab but patient is not interested in subacute rehab.  We will ask for acute rehab to evaluate.  As she has some visual discrepancy pre and postop, she should not drive until she is cleared by an ophthalmologist.  Continue steroid taper as ordered as well as Keppra indefinitely.  We will arrange outpatient follow-up.  Pathology still pending but suspect related to lung adenocarcinoma.  Medical oncology continues to follow.    PLAN:   Keep SBP<140  Continue dexamethasone wean as ordered  Continue Keppra indefinitely  Continue therapies and out of bed activity  Acute rehab eval  Okay for discharge when safe plan in place.  May begin pharmacologic DVT prophylaxis on postop day 3-will defer to primary service type and dose  Will need evaluation by ophthalmologist prior to driving       I discussed the patient's findings and my recommendations with patient and Dr. Ricketts    During patient visit, I utilized  "appropriate personal protective equipment including gloves. Appropriate PPE was worn during the entire visit.  Hand hygiene was completed before and after.      LOS: 7 days       Neema Anna, APRN  6/1/2023  10:43 EDT    \"Dictated utilizing Dragon dictation\".      "

## 2023-06-01 NOTE — DISCHARGE PLACEMENT REQUEST
"Shana Vidal (78 y.o. Female)     Date of Birth   1945    Social Security Number       Address   [address] [place] [address] [facility]    Home Phone   [phone]    MRN   [id number]       Spiritism   Non-Orthodoxy    Marital Status                               Admission Date   5/25/23    Admission Type   Emergency    Admitting Provider   Disha Ramirez MD    Attending Provider   Nik Pond MD    Department, Room/Bed   [address], P584/1       Discharge Date       Discharge Disposition       Discharge Destination                               Attending Provider: Nik Pond MD    Allergies: Del-mycin [Erythromycin], Gentamicin, Ibuprofen, Latex, Metronidazole, Ofloxacin, Penicillins, Tetracycline, Adhesive Tape, Aspirin, Codeine    Isolation: None   Infection: None   Code Status: CPR    Ht: 167.6 cm (66\")   Wt: 60.9 kg (134 lb 4.2 oz)    Admission Cmt: None   Principal Problem: Lung cancer metastatic to brain [C34.90,C79.31]                 Active Insurance as of 5/25/2023     Primary Coverage     Payor Plan Insurance Group Employer/Plan Group    MEDICARE MEDICARE A & B      Payor Plan Address Payor Plan Phone Number Payor Plan Fax Number Effective Dates    [address] [phone]  2/1/2012 - None Entered    formerly [facility] [address]       Subscriber Name Subscriber Birth Date Member ID       SHANA VIDAL 1945 [id number]           Secondary Coverage     Payor Plan Insurance Group Employer/Plan Group    [employer] SUP AAR HEALTH CARE OPTIONS      Payor Plan Address Payor Plan Phone Number Payor Plan Fax Number Effective Dates    [facility] [phone]  1/1/2019 - None Entered    [address]       [facility] [address]       Subscriber Name Subscriber Birth Date Member ID       SHANA VIDAL 1945 [id number]                 Emergency Contacts      (Rel.) Home Phone Work Phone Mobile Phone    ZIA DURANT (Relative) " [phone] -- [phone]    Matthew Shah (Daughter) -- -- [phone]

## 2023-06-01 NOTE — PLAN OF CARE
Goal Outcome Evaluation:  Plan of Care Reviewed With: patient        Progress: improving  Outcome Evaluation: Pt seen this PM for OT tx session, agreeable to therapy. Pt supine on arrival, SBA for bed mobility, (I) with self feeding, STS and func mob to BR sinkside with CGA to engage in g/h ADLs. Pt seated at sinkside to complete 2/2 poor activity tolerance in upright standing for increased time. Slow pace with func mob to return to bed, however no LOBs. Pt will continue to benefit from skilled OT to address overall (I) and safety with ADLs and func transfers, anticipate home with assist at d/c and HHOT.

## 2023-06-01 NOTE — PROGRESS NOTES
[facility] Acute Rehab    Referral received and evaluation in progress.  Will continue to follow progress with therapies to determine most appropriate level of rehab for patient when medically stable.    Thank you,  Mackenzie Arevalo, RN  Rehab Admission Nurse

## 2023-06-01 NOTE — PLAN OF CARE
Goal Outcome Evaluation:      VSS throughout shift. BP remained within range. Pt assessed for BAR this shift. Cardene drip d/c

## 2023-06-01 NOTE — PROGRESS NOTES
"[facility] Clinical Pharmacy Services: Enoxaparin Consult    Pam Vidal has a pharmacy consult to dose prophylactic enoxaparin per Radha JIMENEZ's request.     Indication: VTE Prophylaxis  Home Anticoagulation: None     Relevant clinical data and objective history reviewed:  78 y.o. female 167.6 cm (66\") 60.9 kg (134 lb 4.2 oz)   Body mass index is 21.67 kg/m².   Results from last 7 days   Lab Units 05/31/23  0323   PLATELETS 10*3/mm3 271     Estimated Creatinine Clearance: 49 mL/min (by C-G formula based on SCr of 0.94 mg/dL).    Assessment/Plan    Will start patient on 40mg subcutaneous every 24 hours, adjusted for renal function. Consult order will be discontinued but pharmacy will continue to follow.     Vj Momin, [facility]  Clinical Pharmacist    "

## 2023-06-02 ENCOUNTER — READMISSION MANAGEMENT (OUTPATIENT)
Dept: CALL CENTER | Facility: HOSPITAL | Age: 78
End: 2023-06-02

## 2023-06-02 ENCOUNTER — TELEPHONE (OUTPATIENT)
Dept: NEUROSURGERY | Facility: CLINIC | Age: 78
End: 2023-06-02

## 2023-06-02 VITALS
SYSTOLIC BLOOD PRESSURE: 157 MMHG | TEMPERATURE: 98.4 F | RESPIRATION RATE: 18 BRPM | DIASTOLIC BLOOD PRESSURE: 74 MMHG | BODY MASS INDEX: 21.58 KG/M2 | HEIGHT: 66 IN | OXYGEN SATURATION: 95 % | HEART RATE: 66 BPM | WEIGHT: 134.26 LBS

## 2023-06-02 PROBLEM — D72.829 LEUKOCYTOSIS: Status: RESOLVED | Noted: 2023-05-27 | Resolved: 2023-06-02

## 2023-06-02 LAB
ANION GAP SERPL CALCULATED.3IONS-SCNC: 7.5 MMOL/L (ref 5–15)
BUN SERPL-MCNC: 35 MG/DL (ref 8–23)
BUN/CREAT SERPL: 38 (ref 7–25)
CALCIUM SPEC-SCNC: 8.8 MG/DL (ref 8.6–10.5)
CHLORIDE SERPL-SCNC: 107 MMOL/L (ref 98–107)
CO2 SERPL-SCNC: 22.5 MMOL/L (ref 22–29)
CREAT SERPL-MCNC: 0.92 MG/DL (ref 0.57–1)
DEPRECATED RDW RBC AUTO: 44.4 FL (ref 37–54)
EGFRCR SERPLBLD CKD-EPI 2021: 64.7 ML/MIN/1.73
ERYTHROCYTE [DISTWIDTH] IN BLOOD BY AUTOMATED COUNT: 14.7 % (ref 12.3–15.4)
GLUCOSE SERPL-MCNC: 134 MG/DL (ref 65–99)
HCT VFR BLD AUTO: 38.5 % (ref 34–46.6)
HGB BLD-MCNC: 12.9 G/DL (ref 12–15.9)
LAB AP CASE REPORT: NORMAL
LAB AP DIAGNOSIS COMMENT: NORMAL
LAB AP SPECIAL STAINS: NORMAL
MCH RBC QN AUTO: 28 PG (ref 26.6–33)
MCHC RBC AUTO-ENTMCNC: 33.5 G/DL (ref 31.5–35.7)
MCV RBC AUTO: 83.5 FL (ref 79–97)
PATH REPORT.FINAL DX SPEC: NORMAL
PATH REPORT.GROSS SPEC: NORMAL
PLATELET # BLD AUTO: 288 10*3/MM3 (ref 140–450)
PMV BLD AUTO: 10.8 FL (ref 6–12)
POTASSIUM SERPL-SCNC: 4.3 MMOL/L (ref 3.5–5.2)
RBC # BLD AUTO: 4.61 10*6/MM3 (ref 3.77–5.28)
SODIUM SERPL-SCNC: 137 MMOL/L (ref 136–145)
WBC NRBC COR # BLD: 16.55 10*3/MM3 (ref 3.4–10.8)

## 2023-06-02 PROCEDURE — 85027 COMPLETE CBC AUTOMATED: CPT | Performed by: NURSE PRACTITIONER

## 2023-06-02 PROCEDURE — 63710000001 DEXAMETHASONE PER 0.25 MG: Performed by: NURSE PRACTITIONER

## 2023-06-02 PROCEDURE — 97535 SELF CARE MNGMENT TRAINING: CPT

## 2023-06-02 PROCEDURE — 80048 BASIC METABOLIC PNL TOTAL CA: CPT | Performed by: NURSE PRACTITIONER

## 2023-06-02 RX ORDER — DEXAMETHASONE 4 MG/1
4 TABLET ORAL EVERY 8 HOURS SCHEDULED
Qty: 5 TABLET | Refills: 0 | Status: SHIPPED | OUTPATIENT
Start: 2023-06-02 | End: 2023-06-04

## 2023-06-02 RX ORDER — DEXAMETHASONE 2 MG/1
2 TABLET ORAL EVERY 8 HOURS SCHEDULED
Qty: 12 TABLET | Refills: 0 | Status: ON HOLD | OUTPATIENT
Start: 2023-06-08 | End: 2023-06-12

## 2023-06-02 RX ORDER — DEXAMETHASONE 2 MG/1
2 TABLET ORAL
Qty: 4 TABLET | Refills: 0 | Status: SHIPPED | OUTPATIENT
Start: 2023-06-16 | End: 2023-06-11

## 2023-06-02 RX ORDER — DEXAMETHASONE 1.5 MG/1
3 TABLET ORAL EVERY 8 HOURS SCHEDULED
Qty: 24 TABLET | Refills: 0 | Status: SHIPPED | OUTPATIENT
Start: 2023-06-04 | End: 2023-06-08

## 2023-06-02 RX ORDER — DEXAMETHASONE 2 MG/1
2 TABLET ORAL EVERY 12 HOURS SCHEDULED
Qty: 8 TABLET | Refills: 0 | Status: ON HOLD | OUTPATIENT
Start: 2023-06-12 | End: 2023-06-16

## 2023-06-02 RX ORDER — LISINOPRIL 5 MG/1
5 TABLET ORAL
Qty: 30 TABLET | Refills: 0 | Status: ON HOLD | OUTPATIENT
Start: 2023-06-03

## 2023-06-02 RX ORDER — LEVETIRACETAM 500 MG/1
500 TABLET ORAL 2 TIMES DAILY
Qty: 60 TABLET | Refills: 0 | Status: ON HOLD | OUTPATIENT
Start: 2023-06-02 | End: 2023-07-02

## 2023-06-02 RX ORDER — LISINOPRIL 5 MG/1
5 TABLET ORAL
Status: DISCONTINUED | OUTPATIENT
Start: 2023-06-02 | End: 2023-06-02 | Stop reason: HOSPADM

## 2023-06-02 RX ADMIN — LISINOPRIL 5 MG: 5 TABLET ORAL at 13:51

## 2023-06-02 RX ADMIN — DEXAMETHASONE 4 MG: 4 TABLET ORAL at 13:51

## 2023-06-02 RX ADMIN — DEXAMETHASONE 4 MG: 4 TABLET ORAL at 05:31

## 2023-06-02 RX ADMIN — Medication 20 MG: at 09:01

## 2023-06-02 RX ADMIN — LEVETIRACETAM 500 MG: 500 TABLET, FILM COATED ORAL at 08:15

## 2023-06-02 RX ADMIN — DOCUSATE SODIUM 50MG AND SENNOSIDES 8.6MG 1 TABLET: 8.6; 5 TABLET, FILM COATED ORAL at 08:14

## 2023-06-02 NOTE — PROGRESS NOTES
Case Management Discharge Note      Final Note: Pt has declined [facility] acute rehab evaluating and plans to return home with family to assist and transport. [facility] accepted and pt agreeable to walker with no DME company preference ([employer] to deliver to bedside today). Pt denies additional needs. Octavia Bach LCSW         Selected Continued Care - Admitted Since 5/25/2023     Destination    No services have been selected for the patient.              Durable Medical Equipment Coordination complete.    Service Provider Selected Services Address Phone Fax Patient Preferred    [facility] [address], [facility] [address] [phone] --          Dialysis/Infusion    No services have been selected for the patient.              Home Medical Care Coordination complete.    Service Provider Selected Services Address Phone Fax Patient Preferred    [facility] [address], [address], [place] [address] [phone] --          Therapy    No services have been selected for the patient.              Community Resources    No services have been selected for the patient.              Community & DME    No services have been selected for the patient.                  Transportation Services  Private: Car    Final Discharge Disposition Code: 06 - home with home health care

## 2023-06-02 NOTE — PLAN OF CARE
Goal Outcome Evaluation:      VSS throughout shift. Pt to be d/c home with home health this evening.

## 2023-06-02 NOTE — THERAPY TREATMENT NOTE
Patient Name: Pam Vidal  : 1945    MRN: [id number]                              Today's Date: 2023       Admit Date: 2023    Visit Dx:     ICD-10-CM ICD-9-CM   1. Lung cancer metastatic to brain  C34.90 162.9    C79.31 198.3   2. Vasogenic brain edema  G93.6 348.5   3. Hemoptysis  R04.2 786.30   4. Unsteady gait  R26.81 781.2   5. Cough with hemoptysis  R04.2 786.39   6. Primary lung adenocarcinoma, right  C34.91 162.9     Patient Active Problem List   Diagnosis   • Primary lung adenocarcinoma, right   • Cough with hemoptysis   • Lung mass   • Advanced care planning/counseling discussion   • High risk medication use   • Muscular deconditioning   • Neoplastic malignant related fatigue   • Lung cancer metastatic to brain   • Leukocytosis   • Brain mass   • COPD (chronic obstructive pulmonary disease)   • Rheumatoid arthritis   • IBS (irritable bowel syndrome)   • Hyperlipidemia     Past Medical History:   Diagnosis Date   • COPD (chronic obstructive pulmonary disease)    • Coughing up blood     X2 MONTHS   • History of COVID-19 2022   • Hyperlipidemia    • IBS (irritable bowel syndrome)    • Primary lung adenocarcinoma, right    • Rheumatoid arthritis      Past Surgical History:   Procedure Laterality Date   • APPENDECTOMY      appendix removed   • BRONCHOSCOPY N/A 2022    Procedure: BRONCHOSCOPY WITH BAL,  BIOPSIES, AND BRUSHINGS WITH ENDOBRONCHIAL ULTRASOUND WITH FNA;  Surgeon: Gregor Vee MD;  Location: [facility] ENDOSCOPY;  Service: Pulmonary;  Laterality: N/A;  PRE- HILAR MASS  POST- SAME   • BRONCHOSCOPY N/A 2023    Procedure: BRONCHOSCOPY with biopsy, lavage, brushing;  Surgeon: Gregor Vee MD;  Location: [facility] ENDOSCOPY;  Service: Pulmonary;  Laterality: N/A;   • CAROTID ENDARTERECTOMY Right    • CAROTID ENDARTERECTOMY Left    • CATARACT EXTRACTION     • CERVICAL FUSION     • CHOLECYSTECTOMY     • CRANIOTOMY FOR TUMOR Right 2023    Procedure: RIGHT CRANIOTOMY  FOR TUMOR RESECTION STEREOTACTIC WITH STEALTH;  Surgeon: Clint Ricketts MD;  Location: [facility] MAIN OR;  Service: Neurosurgery;  Laterality: Right;   • HYSTERECTOMY     • SHOULDER ARTHROSCOPY Right 02/19/2019    right should scope/cuff repair    • VENOUS ACCESS DEVICE (PORT) INSERTION Right 9/6/2022    Procedure: POWERPORT INSERTION;  Surgeon: Remedios Rice MD;  Location: [facility] MAIN OR;  Service: Thoracic;  Laterality: Right;      General Information     Row Name 06/02/23 1422          OT Time and Intention    Document Type therapy note (daily note)  -     Mode of Treatment occupational therapy;individual therapy  -     Row Name 06/02/23 1422          General Information    Existing Precautions/Restrictions fall  -     Row Name 06/02/23 1422          Cognition    Orientation Status (Cognition) oriented x 3  -     Row Name 06/02/23 1422          Safety Issues, Functional Mobility    Impairments Affecting Function (Mobility) endurance/activity tolerance;strength  -           User Key  (r) = Recorded By, (t) = Taken By, (c) = Cosigned By    Initials Name Provider Type     Josie Paul OT Occupational Therapist                 Mobility/ADL's     Row Name 06/02/23 1422          Bed Mobility    Bed Mobility supine-sit;sit-supine  -     Supine-Sit [place] (Bed Mobility) verbal cues;standby assist  -     Sit-Supine [place] (Bed Mobility) verbal cues;standby assist  -     Assistive Device (Bed Mobility) bed rails;head of bed elevated  -     Row Name 06/02/23 1422          Transfers    Transfers sit-stand transfer;stand-sit transfer;toilet transfer  -     Row Name 06/02/23 1422          Sit-Stand Transfer    Sit-Stand [place] (Transfers) standby assist  -     Assistive Device (Sit-Stand Transfers) walker, front-wheeled  -     Row Name 06/02/23 1422          Stand-Sit Transfer    Stand-Sit [place] (Transfers) standby assist  -     Assistive Device (Stand-Sit Transfers)  walker, front-wheeled  -MW     Row Name 06/02/23 1422          Toilet Transfer    [place] Level (Toilet Transfer) standby assist  -     Assistive Device (Toilet Transfer) commode;grab bars/safety frame;walker, front-wheeled  -MW     Row Name 06/02/23 1422          Functional Mobility    Functional Mobility- Ind. Level standby assist  -MW     Functional Mobility- Device walker, front-wheeled  -MW     Functional Mobility- Comment pt demo func mob to BR commode and to sinkside with SBA mainly, Rwx support  -     Row Name 06/02/23 1422          Activities of Daily Living    BADL Assessment/Intervention lower body dressing;grooming;toileting  -     Row Name 06/02/23 1422          Lower Body Dressing Assessment/Training    [place] Level (Lower Body Dressing) don;pants/bottoms;contact guard assist  -     Position (Lower Body Dressing) supported standing;unsupported sitting  -     Row Name 06/02/23 1422          Toileting Assessment/Training    [place] Level (Toileting) toileting skills;standby assist  -     Assistive Devices (Toileting) grab bar/safety frame  -     Position (Toileting) unsupported sitting  -     Row Name 06/02/23 1422          Grooming Assessment/Training    [place] Level (Grooming) grooming skills;oral care regimen;hair care, combing/brushing;set up;standby assist  -     Position (Grooming) supported sitting;sink side  -           User Key  (r) = Recorded By, (t) = Taken By, (c) = Cosigned By    Initials Name Provider Type    Josie Isbell OT Occupational Therapist               Obj/Interventions     Row Name 06/02/23 1423          Balance    Balance Assessment sitting static balance;sitting dynamic balance;sit to stand dynamic balance;standing static balance;standing dynamic balance  -     Static Sitting Balance modified independence  -     Dynamic Sitting Balance supervision  -     Position, Sitting Balance sitting edge of bed  -     Sit to Stand  Dynamic Balance standby assist  -MW     Static Standing Balance standby assist  -MW     Dynamic Standing Balance contact guard;standby assist  -MW     Position/Device Used, Standing Balance supported;walker, front-wheeled  -MW     Balance Interventions occupation based/functional task  -MW           User Key  (r) = Recorded By, (t) = Taken By, (c) = Cosigned By    Initials Name Provider Type    MW Josie Paul, TITA Occupational Therapist               Goals/Plan    No documentation.                Clinical Impression     Row Name 06/02/23 1424          Pain Assessment    Pretreatment Pain Rating 0/10 - no pain  -MW     Posttreatment Pain Rating 0/10 - no pain  -MW     Row Name 06/02/23 1424          Plan of Care Review    Plan of Care Reviewed With patient  -MW     Progress improving  -MW     Outcome Evaluation Pt seen for OT tx session in PM, pt agreeable and motivated. A&Ox3 this date, SBA for bed mob, reports no pain. STS with SBA, SBA/CGA for func mob into BR to commode, LBD with CGA, toileting hygiene with SBA while seated, sink side g/h seated at sink with SPV. Pt with noted improved act tolerance this date, reports plan is home with HH and family support. Eager to get home, continue to follow.  -MW     Row Name 06/02/23 1424          Therapy Plan Review/Discharge Plan (OT)    Anticipated Discharge Disposition (OT) home with assist;home with home health  -     Row Name 06/02/23 1424          Vital Signs    O2 Delivery Pre Treatment room air  -MW     Pre Patient Position Supine  -MW     Intra Patient Position Standing  -MW     Post Patient Position Supine  -MW     Row Name 06/02/23 1424          Positioning and Restraints    Pre-Treatment Position in bed  -MW     Post Treatment Position bed  -MW     In Bed notified nsg;fowlers;call light within reach;encouraged to call for assist;exit alarm on;side rails up x3  -MW           User Key  (r) = Recorded By, (t) = Taken By, (c) = Cosigned By    Initials Name  Provider Type    Josie Isbell OT Occupational Therapist               Outcome Measures     Row Name 06/02/23 1426          How much help from another is currently needed...    Putting on and taking off regular lower body clothing? 3  -MW     Bathing (including washing, rinsing, and drying) 3  -MW     Toileting (which includes using toilet bed pan or urinal) 3  -MW     Putting on and taking off regular upper body clothing 3  -MW     Taking care of personal grooming (such as brushing teeth) 4  -MW     Eating meals 4  -MW     AM-PAC 6 Clicks Score (OT) 20  -MW     Row Name 06/02/23 0814          How much help from another person do you currently need...    Turning from your back to your side while in flat bed without using bedrails? 4  -LC     Moving from lying on back to sitting on the side of a flat bed without bedrails? 4  -LC     Moving to and from a bed to a chair (including a wheelchair)? 3  -LC     Standing up from a chair using your arms (e.g., wheelchair, bedside chair)? 3  -LC     Climbing 3-5 steps with a railing? 3  -LC     To walk in hospital room? 3  -LC     AM-PAC 6 Clicks Score (PT) 20  -LC     Highest level of mobility 6 --> Walked 10 steps or more  -     Row Name 06/02/23 1426          Functional Assessment    Outcome Measure Options AM-PAC 6 Clicks Daily Activity (OT)  -           User Key  (r) = Recorded By, (t) = Taken By, (c) = Cosigned By    Initials Name Provider Type    Marlene Avila RN Registered Nurse    Josie Isbell OT Occupational Therapist                Occupational Therapy Education     Title: PT OT SLP Therapies (In Progress)     Topic: Occupational Therapy (In Progress)     Point: ADL training (Done)     Description:   Instruct learner(s) on proper safety adaptation and remediation techniques during self care or transfers.   Instruct in proper use of assistive devices.              Learning Progress Summary           Patient Acceptance, E, VU by DEJA at  5/31/2023 1230    Comment: OT goals, POC, dc recommendations for ADL safety at dc                   Point: Home exercise program (Not Started)     Description:   Instruct learner(s) on appropriate technique for monitoring, assisting and/or progressing therapeutic exercises/activities.              Learner Progress:  Not documented in this visit.          Point: Precautions (Not Started)     Description:   Instruct learner(s) on prescribed precautions during self-care and functional transfers.              Learner Progress:  Not documented in this visit.          Point: Body mechanics (Not Started)     Description:   Instruct learner(s) on proper positioning and spine alignment during self-care, functional mobility activities and/or exercises.              Learner Progress:  Not documented in this visit.                      User Key     Initials Effective Dates Name Provider Type Discipline     04/02/20 -  Linda Cox, TITA Occupational Therapist OT              OT Recommendation and Plan     Plan of Care Review  Plan of Care Reviewed With: patient  Progress: improving  Outcome Evaluation: Pt seen for OT tx session in PM, pt agreeable and motivated. A&Ox3 this date, SBA for bed mob, reports no pain. STS with SBA, SBA/CGA for func mob into BR to commode, LBD with CGA, toileting hygiene with SBA while seated, sink side g/h seated at sink with SPV. Pt with noted improved act tolerance this date, reports plan is home with HH and family support. Eager to get home, continue to follow.     Time Calculation:    Time Calculation- OT     Row Name 06/02/23 1426             Time Calculation- OT    OT Start Time 1318  -MW      OT Stop Time 1341  -MW      OT Time Calculation (min) 23 min  -MW      Total Timed Code Minutes- OT 23 minute(s)  -MW      OT Received On 06/02/23  -MW      OT - Next Appointment 06/05/23  -MW         Timed Charges    00579 - OT Self Care/Mgmt Minutes 23  -MW         Total Minutes    Timed Charges  Total Minutes 23  -MW       Total Minutes 23  -MW            User Key  (r) = Recorded By, (t) = Taken By, (c) = Cosigned By    Initials Name Provider Type    Josie Isbell OT Occupational Therapist              Therapy Charges for Today     Code Description Service Date Service Provider Modifiers Qty    78687551015 HC OT THERAPEUTIC ACT EA 15 MIN 6/1/2023 Josie Paul OT GO 1    04530092606 HC OT SELF CARE/MGMT/TRAIN EA 15 MIN 6/1/2023 Josie Paul OT GO 1    92826331658 HC OT SELF CARE/MGMT/TRAIN EA 15 MIN 6/2/2023 Josie Paul OT GO 2               Josie Paul OT  6/2/2023

## 2023-06-02 NOTE — DISCHARGE SUMMARY
Patient Name: Pam Vidal  : 1945  MRN: [id number]    Date of Admission: 2023  Date of Discharge:  2023  Primary Care Physician: Nik Mckay MD      Chief Complaint:   Weakness - Generalized      Discharge Diagnoses     Active Hospital Problems    Diagnosis  POA   • **Lung cancer metastatic to brain [C34.90, C79.31]  Yes   • Brain mass [G93.89]  Yes   • COPD (chronic obstructive pulmonary disease) [J44.9]  Yes   • Rheumatoid arthritis [M06.9]  Yes   • IBS (irritable bowel syndrome) [K58.9]  Yes   • Hyperlipidemia [E78.5]  Yes   • Cough with hemoptysis [R04.2]  Yes      Resolved Hospital Problems    Diagnosis Date Resolved POA   • Leukocytosis [D72.829] 2023 Yes        Hospital Course     Ms. Vidal is a 78 y.o. female with a history of COPD, HLD, IBS that was admitted with a hemorrhagic metastatic lesion of the brain with vasogenic edema from lung cancer metastasis.  She was started on Keppra and IV steroids. She was taken on  for resection of the hemorrhagic metastatic lesion by neurosurgery.  Oncology has also been following. She was evaluated by radiation oncology, she will need post op radiation to resection cavity and SRS to 2 other small lesions.   Her COPD is stable and she has carotid peripheral vascular disease, dyslipidemia but her aspirin has been on hold secondary to the hemorrhagic metastatic lesion.  She had hypertensive emergency that resolved on Cardene drip. Her current blood pressures are above goal so she has been started on lisinopril and will need to follow up with her PCP. She was being evaluated for acute rehab but has now decided to return home with home health, PT OT and nursing.  She is to continue steroids and Keppra and follow-up with oncology in 2 weeks and follow-up with neurosurgery as directed.    Day of Discharge     Subjective:  Feels okay.  No new complaints.  Feels she is stable to be discharged home, he has plenty of family that can help  her    Physical Exam:  Temp:  [97.7 °F (36.5 °C)-98.2 °F (36.8 °C)] 97.8 °F (36.6 °C)  Heart Rate:  [56-77] 77  Resp:  [16-18] 18  BP: (122-165)/(49-71) 156/71  Body mass index is 21.67 kg/m².  Physical Exam  Unchanged since progress note  Consultants     Consult Orders (all) (From admission, onward)     Start     Ordered    06/01/23 1053  Inpatient Rehab Admission Consult  Once        Provider:  (Not yet assigned)    06/01/23 1052    05/27/23 1719  Hematology & Oncology Inpatient Consult  Once        Specialty:  Hematology and Oncology  Provider:  Cruzito Lagunas MD    05/27/23 1719    05/27/23 1649  Inpatient Radiation Oncology Consult  Once        Specialty:  Radiation Oncology  Provider:  Mckenna Moreira MD    05/27/23 1648    05/27/23 0702  Inpatient Internal Medicine Consult  IN AM        Specialty:  Internal Medicine  Provider:  Todd Garcia MD    05/26/23 2339    05/26/23 0145  Inpatient Case Management  Consult  Once        Provider:  (Not yet assigned)    05/26/23 0145    05/26/23 0145  Inpatient Diabetes Educator Consult  Once,   Status:  Canceled        Provider:  (Not yet assigned)    05/26/23 0145    05/26/23 0145  Inpatient Neurosurgery Consult  Once        Specialty:  Neurosurgery  Provider:  Walter Francis MD    05/26/23 0145    05/25/23 1642  Inpatient Medical Oncology Consult  Once,   Status:  Canceled        Specialty:  Medical Oncology  Provider:  (Not yet assigned)    05/25/23 1643    05/25/23 1621  Pulmonology (on-call MD unless specified)  Once,   Status:  Canceled        Specialty:  Pulmonary Disease  Provider:  Remington Saenz MD    05/25/23 1620    05/25/23 1511  Pulmonology (on-call MD unless specified)  Once,   Status:  Canceled        Specialty:  Pulmonary Disease  Provider:  Remington Saenz MD    05/25/23 1511    05/25/23 1511  Neurosurgery (on-call MD unless specified)  Once        Specialty:  Neurosurgery  Provider:  (Not yet  assigned)    05/25/23 1511              Procedures     RIGHT CRANIOTOMY FOR TUMOR RESECTION STEREOTACTIC WITH STEALTH      Imaging Results (All)     Procedure Component Value Units Date/Time    MRI Brain With & Without Contrast [[id number]] Collected: 05/31/23 1112     Updated: 05/31/23 1301    Narrative:      MRI OF THE BRAIN WITH AND WITHOUT CONTRAST ON 05/31/2023     CLINICAL HISTORY: Patient is status post craniotomy yesterday,  05/30/2023, with resection of right occipital lobe lesion.     TECHNIQUE: Axial T1, FLAIR, fat-suppressed T2, axial diffusion sagittal  T1 and postcontrast axial fat-suppressed T1 and sagittal and coronal T1  and thin cut 1.5 mm thick postcontrast axial spoiled gradient echo  T1-weighted images were obtained of the entire head.     COMPARISON: This is correlated to preoperative MRI of the brain on  05/26/2023.     FINDINGS: Since the MRI of the brain on 05/26/2023, the patient  underwent a 5 x 5 cm posterior right parietal craniotomy yesterday,  05/30/2023, to resect a multilobulated 3.7 x 2.9 x 2.7 cm peripherally  enhancing mass from the posterior superior lateral right occipital lobe.  There is a postoperative resection cavity at the site of the resected  mass with the resection cavity measuring 1.9 x 1.7 x 1.8 cm in size and  has some precontrast T1 hyperintensity within it compatible with some  blood products and fluid within it. I see no discernible residual  enhancing tumor. There is an ovoid discrete focus of T1 low signal T2  high signal along the anterior medial margin of the resection cavity  that measures 16 x 10 x 10 mm in anterior posterior and medial lateral  and craniocaudal dimension, likely a small cyst and this is unchanged.  There is stable extensive FLAIR and T2 high signal tracking throughout  the entire right occipital, right parietal and mid and posterior right  temporal white matter and tracking into the posterior aspect of the  right external capsule and  posterior limb of the right internal capsule  and posterior right corona radiata region compatible with exuberant  surrounding vasogenic edema. The vasogenic edema tracking up to 10 x 5 x  6 cm in anterior posterior and medial lateral and craniocaudal  dimension. There is some associated mass effect with partial effacement  of posterior body and trigone and occipital horn posterior temporal horn  of the right lateral ventricle and there is approximately 3 mm bowing of  the septum pellucidum from right to left. The edema is unchanged and  mass effect and mild midline shift is unchanged. Patient has a subtle  focal superficial cortical area of enhancement involving the posterior  lateral left occipital cortex that measures 4 x 3 x 3 mm in size and  there is focal surrounding FLAIR and T2 high signal that tracks up to 10  mm compatible with some minimal surrounding edema. There is an  additional 7 x 7 x 6 mm enhancing lesion in the midline of the superior  cerebellum that has 1.6 x 1 cm surrounding edema compatible with an  additional metastatic lesion and this lesion has some new precontrast T1  hyperintensity within it suggesting some methemoglobin deposition from  some minimal hemorrhage within it. There are scattered patchy nodular  foci T2 high signal in the cerebral white matter consistent with  mild-to-moderate small vessel disease. The remainder of the brain  parenchyma is normal in signal intensity. Other than the mass effect on  the posterior body trigone occipital and posterior temporal horn of the  right lateral ventricle, the remainder of the ventricular system is  normal in size. No extra-axial fluid collections are identified and no  additional abnormal areas of enhancement are seen in the head. The  paranasal sinuses and mastoid air cells and middle ear cavities are  clear. Good flow voids are demonstrated within the cerebral vessels and  in the dural venous sinuses. The calvarium and the skull  base  demonstrate normal marrow signal intensity. The orbits are unremarkable.  The       Impression:      1. Since preoperative MRI 05/26/2023, the patient underwent a 5 x 5 cm  posterior right parietal craniotomy yesterday, 05/30/2023, in order to  resect a multilobulated 3.7 x 2.9 x 2.7 cm peripherally enhancing  centrally necrotic and partially hemorrhagic mass likely metastatic  lesion in the posterior superior lateral right occipital lobe  parenchyma. I see no convincing residual enhancing tumor. There is a 1.9  x 1.7 x 1.8 cm postoperative resection cavity that has some precontrast  T1 hyperintensity indicating some blood products within the resection  cavity and adjacent to the anterior medial edge of the resection cavity  is a discrete T1 low signal and T2 high signal 16 x 10 x 10 mm cyst that  is unchanged. There is stable extensive FLAIR and T2 high signal  compatible with extensive vasogenic edema tracking throughout the entire  right parietal and occipital white matter into the posterior and mid  right temporal lobe white matter and posterior limb of the right  internal capsule, posterior right external capsule and the edema tracks  up to 10 x 5 x 6 cm and there is some mass effect in 3-4 mm  right-to-left midline shift at the level septum pellucidum which is  unchanged. There is expected postoperative edema in the scalp external  to the craniotomy flap.  2. There is a stable 4 x 3 x 3 mm enhancing superficial cortical  metastatic lesion in the posterior lateral left occipital lobe with 1 cm  surrounding edema. There is stable size 7 x 7 x 6 mm enhancing  metastases to the midline of the superior cerebellum that has some new  precontrast T1 hyperintensity centrally within it suggesting some  minimal new acute or subacute hemorrhage into the central portion of  this small cerebellar metastasis. There is mild 16 x 10 mm of  surrounding vasogenic edema. No additional metastatic lesions are seen  in the  brain  3. There is mild-to-moderate small vessel disease in cerebral white  matter. The remainder of the MRI of the brain is normal.     This report was finalized on 5/31/2023 12:58 PM by Dr. Song Cruz M.D.       CT Abdomen Pelvis With Contrast [[id number]] Collected: 05/29/23 1152     Updated: 05/29/23 1218    Narrative:      CT CHEST W CONTRAST DIAGNOSTIC-, CT ABDOMEN PELVIS W CONTRAST-, NM BONE  SCAN WHOLE BODY-     INDICATIONS: Lung cancer with metastatic brain disease.  Radiation dose  reduction techniques were utilized, including automated exposure control  and exposure modulation based on body size.     TECHNIQUE: Enhanced CT of the chest, abdomen, pelvis. Delayed whole body  bone scan.     COMPARISON: 03/01/2023, correlated with chest CT from 05/25/2023     FINDINGS:     Chest CT:     The heart size is normal without pericardial effusion. A few small  subcentimeter short axis mediastinal lymph nodes are seen that are not  significant by size criteria. Right chest port extends to the cavoatrial  junction region.     The airways appear clear.     No pleural effusion or pneumothorax.     The lungs again show right suprahilar mass, without obvious change from  the recent prior exam. Likewise, vascular mild groundglass opacification  the right upper lobe appears similar to the prior exam, and a previously  noted groundglass nodular density in the right upper lobe appear stable  (axial image 21) likewise, right apical region on image 11, nodular  density or adjacent nodular densities measuring overall 6 mm, stable.           Abdomen pelvis CT:     Right renal cysts are noted.     Otherwise unremarkable appearance of the liver, spleen, adrenal glands,  pancreas, kidneys, bladder.     No bowel obstruction or abnormal bowel thickening is identified. Colonic  diverticula are seen that do not appear inflamed. Pelvic floor  relaxation/rectocele is suggested, correlate with clinical exam.     No free intraperitoneal  gas or free fluid.     Scattered small mesenteric and para-aortic lymph nodes are seen that are  not significant by size criteria.     Abdominal aorta is not aneurysmal. Aortic and other prominent arterial  calcifications are present.     Degenerative changes are seen in the spine. Old fracture deformities of  the right anterior fourth and fifth ribs are seen. Small nonspecific  sclerotic changes apparent in the anterior left fifth and sixth ribs,  could be posttraumatic in nature or potentially evidence of neoplasm;  this appearance is stable from 03/01/2023. Likewise, sclerotic change at  the right anterior eighth rib, axial image 73 could be a result of  healing fracture or potentially sclerotic metastatic disease, follow-up  advised; this appearance is new from the prior exam from 03/01/2023.     No acute fracture is identified.           Delayed whole body bone scan:     Radiotracer: 23.0 mCi Technetium 99m MDP, intravenous     Delayed whole-body anterior and posterior projections were obtained, as  well as bilateral lateral oblique projections at the level of the head  and upper thorax.     Focal increased activity is seen at the anterior right eighth rib,  corresponding to the sclerotic change at this level on the CT exam,  could be healing fracture (new from 03/01/2023) or potentially sclerotic  metastatic disease, follow-up advised. Mildly increased activity is also  seen at the right anterior fourth and fifth ribs left anterior fifth and  sixth ribs, corresponding to chronic changes at these levels noted on  the CT exam.     Focal increased activity is seen at the mandible, to the right and left  of midline, probably representing dental disease potentially sclerotic  metastatic disease, correlate clinically.     Typical appearing degenerative changes are seen at the shoulders,  elbows, wrists, knees, ankles. Activity at the right humeral head is  asymmetrically prominent; this level is not included on the  recent CT  exam, but surgical changes apparent at this level on the CT exam from  11/21/2022, that could account for an asymmetric increased activity at  the proximal right humerus (follow-up cross-sectional imaging at the  level of the right shoulder could be obtained, as indicated).     Expected soft tissue localization of radiotracer is noted.                Impression:            1. Right upper lobe suprahilar pulmonary mass appears stable from the  recent prior exam, again with groundglass and nodular opacities in the  right upper lobe.  2. Sclerotic change at the right anterior eighth rib is new from  03/01/2023, with corresponding increased uptake on bone scan, could be  healing fracture or sclerotic metastatic disease, correlate clinically.  3. Stable appearance of the abdomen and pelvis.     Continued follow-up advised as indicated.     This report was finalized on 5/29/2023 12:15 PM by Dr. Prince Nicole M.D.       CT Chest With Contrast Diagnostic [[id number]] Collected: 05/29/23 1152     Updated: 05/29/23 1218    Narrative:      CT CHEST W CONTRAST DIAGNOSTIC-, CT ABDOMEN PELVIS W CONTRAST-, NM BONE  SCAN WHOLE BODY-     INDICATIONS: Lung cancer with metastatic brain disease.  Radiation dose  reduction techniques were utilized, including automated exposure control  and exposure modulation based on body size.     TECHNIQUE: Enhanced CT of the chest, abdomen, pelvis. Delayed whole body  bone scan.     COMPARISON: 03/01/2023, correlated with chest CT from 05/25/2023     FINDINGS:     Chest CT:     The heart size is normal without pericardial effusion. A few small  subcentimeter short axis mediastinal lymph nodes are seen that are not  significant by size criteria. Right chest port extends to the cavoatrial  junction region.     The airways appear clear.     No pleural effusion or pneumothorax.     The lungs again show right suprahilar mass, without obvious change from  the recent prior exam. Likewise,  vascular mild groundglass opacification  the right upper lobe appears similar to the prior exam, and a previously  noted groundglass nodular density in the right upper lobe appear stable  (axial image 21) likewise, right apical region on image 11, nodular  density or adjacent nodular densities measuring overall 6 mm, stable.           Abdomen pelvis CT:     Right renal cysts are noted.     Otherwise unremarkable appearance of the liver, spleen, adrenal glands,  pancreas, kidneys, bladder.     No bowel obstruction or abnormal bowel thickening is identified. Colonic  diverticula are seen that do not appear inflamed. Pelvic floor  relaxation/rectocele is suggested, correlate with clinical exam.     No free intraperitoneal gas or free fluid.     Scattered small mesenteric and para-aortic lymph nodes are seen that are  not significant by size criteria.     Abdominal aorta is not aneurysmal. Aortic and other prominent arterial  calcifications are present.     Degenerative changes are seen in the spine. Old fracture deformities of  the right anterior fourth and fifth ribs are seen. Small nonspecific  sclerotic changes apparent in the anterior left fifth and sixth ribs,  could be posttraumatic in nature or potentially evidence of neoplasm;  this appearance is stable from 03/01/2023. Likewise, sclerotic change at  the right anterior eighth rib, axial image 73 could be a result of  healing fracture or potentially sclerotic metastatic disease, follow-up  advised; this appearance is new from the prior exam from 03/01/2023.     No acute fracture is identified.           Delayed whole body bone scan:     Radiotracer: 23.0 mCi Technetium 99m MDP, intravenous     Delayed whole-body anterior and posterior projections were obtained, as  well as bilateral lateral oblique projections at the level of the head  and upper thorax.     Focal increased activity is seen at the anterior right eighth rib,  corresponding to the sclerotic change  at this level on the CT exam,  could be healing fracture (new from 03/01/2023) or potentially sclerotic  metastatic disease, follow-up advised. Mildly increased activity is also  seen at the right anterior fourth and fifth ribs left anterior fifth and  sixth ribs, corresponding to chronic changes at these levels noted on  the CT exam.     Focal increased activity is seen at the mandible, to the right and left  of midline, probably representing dental disease potentially sclerotic  metastatic disease, correlate clinically.     Typical appearing degenerative changes are seen at the shoulders,  elbows, wrists, knees, ankles. Activity at the right humeral head is  asymmetrically prominent; this level is not included on the recent CT  exam, but surgical changes apparent at this level on the CT exam from  11/21/2022, that could account for an asymmetric increased activity at  the proximal right humerus (follow-up cross-sectional imaging at the  level of the right shoulder could be obtained, as indicated).     Expected soft tissue localization of radiotracer is noted.                Impression:            1. Right upper lobe suprahilar pulmonary mass appears stable from the  recent prior exam, again with groundglass and nodular opacities in the  right upper lobe.  2. Sclerotic change at the right anterior eighth rib is new from  03/01/2023, with corresponding increased uptake on bone scan, could be  healing fracture or sclerotic metastatic disease, correlate clinically.  3. Stable appearance of the abdomen and pelvis.     Continued follow-up advised as indicated.     This report was finalized on 5/29/2023 12:15 PM by Dr. Prince Nicole M.D.       NM Bone Scan Whole Body [[id number]] Collected: 05/29/23 1152     Updated: 05/29/23 1218    Narrative:      CT CHEST W CONTRAST DIAGNOSTIC-, CT ABDOMEN PELVIS W CONTRAST-, NM BONE  SCAN WHOLE BODY-     INDICATIONS: Lung cancer with metastatic brain disease.  Radiation  dose  reduction techniques were utilized, including automated exposure control  and exposure modulation based on body size.     TECHNIQUE: Enhanced CT of the chest, abdomen, pelvis. Delayed whole body  bone scan.     COMPARISON: 03/01/2023, correlated with chest CT from 05/25/2023     FINDINGS:     Chest CT:     The heart size is normal without pericardial effusion. A few small  subcentimeter short axis mediastinal lymph nodes are seen that are not  significant by size criteria. Right chest port extends to the cavoatrial  junction region.     The airways appear clear.     No pleural effusion or pneumothorax.     The lungs again show right suprahilar mass, without obvious change from  the recent prior exam. Likewise, vascular mild groundglass opacification  the right upper lobe appears similar to the prior exam, and a previously  noted groundglass nodular density in the right upper lobe appear stable  (axial image 21) likewise, right apical region on image 11, nodular  density or adjacent nodular densities measuring overall 6 mm, stable.           Abdomen pelvis CT:     Right renal cysts are noted.     Otherwise unremarkable appearance of the liver, spleen, adrenal glands,  pancreas, kidneys, bladder.     No bowel obstruction or abnormal bowel thickening is identified. Colonic  diverticula are seen that do not appear inflamed. Pelvic floor  relaxation/rectocele is suggested, correlate with clinical exam.     No free intraperitoneal gas or free fluid.     Scattered small mesenteric and para-aortic lymph nodes are seen that are  not significant by size criteria.     Abdominal aorta is not aneurysmal. Aortic and other prominent arterial  calcifications are present.     Degenerative changes are seen in the spine. Old fracture deformities of  the right anterior fourth and fifth ribs are seen. Small nonspecific  sclerotic changes apparent in the anterior left fifth and sixth ribs,  could be posttraumatic in nature or  potentially evidence of neoplasm;  this appearance is stable from 03/01/2023. Likewise, sclerotic change at  the right anterior eighth rib, axial image 73 could be a result of  healing fracture or potentially sclerotic metastatic disease, follow-up  advised; this appearance is new from the prior exam from 03/01/2023.     No acute fracture is identified.           Delayed whole body bone scan:     Radiotracer: 23.0 mCi Technetium 99m MDP, intravenous     Delayed whole-body anterior and posterior projections were obtained, as  well as bilateral lateral oblique projections at the level of the head  and upper thorax.     Focal increased activity is seen at the anterior right eighth rib,  corresponding to the sclerotic change at this level on the CT exam,  could be healing fracture (new from 03/01/2023) or potentially sclerotic  metastatic disease, follow-up advised. Mildly increased activity is also  seen at the right anterior fourth and fifth ribs left anterior fifth and  sixth ribs, corresponding to chronic changes at these levels noted on  the CT exam.     Focal increased activity is seen at the mandible, to the right and left  of midline, probably representing dental disease potentially sclerotic  metastatic disease, correlate clinically.     Typical appearing degenerative changes are seen at the shoulders,  elbows, wrists, knees, ankles. Activity at the right humeral head is  asymmetrically prominent; this level is not included on the recent CT  exam, but surgical changes apparent at this level on the CT exam from  11/21/2022, that could account for an asymmetric increased activity at  the proximal right humerus (follow-up cross-sectional imaging at the  level of the right shoulder could be obtained, as indicated).     Expected soft tissue localization of radiotracer is noted.                Impression:            1. Right upper lobe suprahilar pulmonary mass appears stable from the  recent prior exam, again with  groundglass and nodular opacities in the  right upper lobe.  2. Sclerotic change at the right anterior eighth rib is new from  03/01/2023, with corresponding increased uptake on bone scan, could be  healing fracture or sclerotic metastatic disease, correlate clinically.  3. Stable appearance of the abdomen and pelvis.     Continued follow-up advised as indicated.     This report was finalized on 5/29/2023 12:15 PM by Dr. Prince Nicole M.D.       MRI Brain With & Without Contrast [[id number]] Collected: 05/26/23 1323     Updated: 05/26/23 1612    Narrative:      MRI OF THE BRAIN WITH AND WITHOUT CONTRAST ON 05/26/2023     CLINICAL HISTORY: MRI to be performed with stereotactic guidance for  purposes of the preoperative evaluation of brain metastasis.      TECHNIQUE: Axial FLAIR and diffusion weighted images were obtained of  the entire head, in addition thin cut 1 mm thick axial postcontrast  spoiled gradient echo T1-weighted images were obtained of the entire  head.     This is correlated to yesterday evenings MRI of the brain 05/25/2023 at  9:35 PM.     FINDINGS: There are scattered patchy nodular foci of T2 high signal in  the cerebral white matter consistent with mild-to-moderate small vessel  disease. There is a multilobulated enhancing mass centered within the  posterior superior lateral right occipital lobe extending to the  parietal occipital region. This mass has some diffusion hyperintense  subacute blood products within it, and also has some hemosiderin  staining in it consistent with old blood products. Mass maximally  measures 3.7 x 2.9 x 2.7 cm in medial lateral, anterior posterior and  craniocaudal dimension. There is a large amount of surrounding FLAIR  hyperintensity, compatible with surrounding vasogenic edema that tracks  throughout the entire right occipital and majority of the right parietal  white matter and into the posterior and midportion of the right temporal  white matter, as well as  into the posterior aspect of the right external  capsule and posterior limb of the right internal capsule and posterior  right corona radiata region. Surrounding vasogenic edema tracks up to 10  x 5.8 x 6 cm in anterior posterior, medial lateral and craniocaudal  dimension and there is mass effect on the posterior body, trigone,  occipital and posterior temporal horn of the right lateral ventricle,  and there is 3-4 mm right-to-left midline shift at the level of the  septum pellucidum. There is a tiny superficial cortical 4 x 3 x 4 mm  enhancing metastasis in the posterior lateral left occipital cortex and  it has some surrounding FLAIR hyperintensity, compatible with  surrounding edema that tracks up to 12 x 10 mm. This is consistent with  a 2nd metastatic lesion and lastly there is a 3rd metastatic lesion  which is a peripherally enhancing lesion in the midline of the superior  cerebellum that measures 7 x 7 x 7 mm and this lesion has 2 x 1.2 cm of  surrounding vasogenic edema. No additional enhancing lesions are seen in  the brain. Other than the mass effect on the posterior body, trigone,  occipital and posterior temporal horn of the right lateral ventricle,  the remainder of the ventricular system is normal in size. No  extra-axial fluid collections are identified. No additional abnormal  areas of enhancement are seen in the brain. The paranasal sinuses and  mastoid air cells and middle ear cavities are clear. The calvarium and  skull base are normal in appearance.       Impression:      1. There are 3 enhancing metastatic lesions to the brain. The largest  metastatic lesion is a multilobulated enhancing lesion with some central  areas of necrosis and subacute hemorrhage within it and some hemosiderin  deposition from some areas of chronic hemorrhage within it. This lesion  involves the posterior superior lateral right occipital lobe and  measures 3.7 x 2.9 x 2.7 cm in size and has a large amount  of  surrounding vasogenic edema that tracks up to 10 x 5.8 x 6 cm and there  is mass effect on the posterior body, trigone, occipital horn, and  posterior temporal horn of the right lateral ventricle and up to 4 mm  right-to-left midline shift at the level of the septum pellucidum.  Second enhancing metastatic lesion is a superficial cortical enhancing  metastasis to the posterior lateral left occipital lobe measuring 4 x 3  x 4 mm in size and has 12 x 10 mm of surrounding vasogenic edema. The  third metastasis is a peripherally enhancing 7 x 7 x 7 mm metastasis to  the superior midline of the cerebellum that has 2 x 1.2 cm surrounding  edema. No additional metastatic lesions are seen in the brain.  2. There is mild-to-moderate small vessel disease in cerebral white  matter. The remainder of the MRI of the brain is unremarkable.     This report was finalized on 5/26/2023 4:09 PM by Dr. Song Cruz M.D.       MRI Brain With & Without Contrast [[id number]] Collected: 05/26/23 0650     Updated: 05/26/23 0829    Narrative:      MRI OF THE BRAIN WITH AND WITHOUT CONTRAST ON 05/25/2023     CLINICAL HISTORY: Patient has a history of lung cancer and has brain  mass, likely brain metastasis.     TECHNIQUE: Axial T1, FLAIR, fat-suppressed T2, axial diffusion and  gradient echo T2 sagittal T1 and postcontrast axial fat-suppressed T1  sagittal and coronal T1 and thin cut 1.5 mm thick axial postcontrast  spoiled gradient echo T1-weighted images were obtained of the entire  head.     COMPARISON: This is correlated to an MRI of the brain from [facility] on 08/15/2022, as well as a CT scan of the head  without contrast earlier today on 05/25/2023 at 3:00 PM.     FINDINGS: There is a multilobulated enhancing mass involving the  posterior superior lateral right occipital lobe that measures  approximately 3.7 x 2.9 x 2.7 cm in size and has some precontrast T1  hyperintensity compatible with methemoglobin from subacute  hemorrhage to  the lateral aspect of the mass and an area of subacute hemorrhage in the  lateral aspect of the mass measures 16 x 9 mm. Furthermore on the  gradient echo T2-weighted images, there is signal loss throughout  portions of the mass indicating hemosiderin deposition from prior  hemorrhage into this mass. There is a large amount of surrounding  vasogenic edema that tracks up to 8 x 5 x 6 cm, tracks throughout the  entire right occipital lobe, the majority of the right parietal and  posterior right temporal white matter, and there is mass effect on the  trigone, occipital horn, posterior temporal horn of the right lateral  ventricle, and there is a 4 mm right-to-left midline shift at the level  of the septum pellucidum. There is an additional tiny enhancing lesion  in the posterior lateral left occipital cortex that measures 4 x 3.5 x 4  mm in size, and there is 15 x 13 mm of surrounding vasogenic edema and  this is consistent with a second brain metastasis. Furthermore, there is  a 7 x 6 x 7 mm enhancing metastasis to the superior midline of the  cerebellum that has 2 cm of surrounding vasogenic edema. This is  consistent with a third brain metastasis. There is some scattered patchy  nodular foci of T2 high signal in the cerebral white matter, consistent  with mild-to-moderate small vessel disease. There is mass effect on the  trigone and occipital horn, posterior temporal horn of the right lateral  ventricle. The remainder of the ventricles are normal in size. No  extra-axial fluid collections are identified. Paranasal sinuses and  mastoid air cells and middle ear cavities are clear. Good flow voids are  demonstrated within the cerebral vessels and in the dural venous  sinuses. The calvarium and skull base demonstrate normal marrow signal  intensity. The orbits are unremarkable.        Impression:      1. Since the prior MRI of the brain on 08/15/2022, patient has developed  at least 3 enhancing lesions  in the brain compatible with at least 3  brain metastases, the largest brain metastases is in the posterior  superior lateral right occipital lobe and measures 3.7 x 2.9 x 2.7 cm in  size and has some subacute and chronic blood products within it, has a  large amount of surrounding vasogenic edema with the vasogenic edema  tracking up to 8 x 5 x 6 cm in anterior posterior and medial lateral and  craniocaudal dimension, and it has mass effect on the posterior temporal  horn and trigone and occipital horn of the right lateral ventricle and  there is associated 3-4 mm right-to-left midline shift at the level of  the septum pellucidum. There is a tiny second metastasis to the  posterior lateral left occipital cortex measuring 4 x 3.5 x 4 mm in size  that has 15 x 13 mm of surrounding vasogenic edema. The third metastasis  is located in the midline of the superior cerebellum, measures 7 x 6 x 7  mm in size and has 2 x 1.3 cm of surrounding vasogenic edema.  2. There is mild-to-moderate small vessel disease in cerebral white  matter. The remainder of the MRI of the brain is within normal limits.  The results were communicated to the nurse in the CCU taking care of the  patient by telephone on 05/26/2023 at 1:00 AM. I also communicated the  results to Dr. Ricketts from neurosurgery by telephone on 05/26/2023 at 8:20  AM.     This report was finalized on 5/26/2023 8:26 AM by Dr. Song Cruz M.D.       CT Head Without Contrast [[id number]] Collected: 05/26/23 0639     Updated: 05/26/23 0639    Narrative:        Patient: SHANA ZAZUETA  Time Out: 06:38  Exam(s): CT HEAD Without Contrast     EXAM:    CT Head Without Intravenous Contrast    CLINICAL HISTORY:     Reason for exam: ICH.    TECHNIQUE:    Axial computed tomography images of the head brain without intravenous   contrast.  CTDI is 46.03 mGy and DLP is 834 mGy-cm.  This CT exam was   performed according to the principle of ALARA (As Low As Reasonably   Achievable) by  using one or more of the following dose reduction   techniques: automated exposure control, adjustment of the mA and or kV   according to patient size, and or use of iterative reconstruction   technique.    COMPARISON:  Comparison made to prior brain MRI and noncontrast head CT from May 25,   2023.    FINDINGS:    Brain: Findings concerning for a mass in the right parietal and   occipital lobe with extensive vasogenic edema causing mild regional mass-  effect and a 4 mm midline shift.  There is a tiny amount of intratumoral   hemorrhage with blood extending into the adjacent subarachnoid spaces.    Mild nonspecific white matter changes.    Ventricles: There is attenuation of the right lateral ventricle.  No   ventriculomegaly.    Bones joints:  Unremarkable.  No acute fracture.    Soft tissues: Bilateral lens replacements.  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    Sinuses:  Unremarkable as visualized.  No acute sinusitis.    Mastoid air cells:  Unremarkable as visualized.  No mastoid effusion.    IMPRESSION:       Findings concerning for metastatic disease in the right parietal and   occipital lobe with extensive vasogenic edema causing 4 mm midline shift.    Tiny amount of intratumoral hemorrhage with blood extending into the   adjacent subarachnoid spaces, unchanged from the prior examinations.      Impression:          Electronically signed by Ema Swanson MD on 05-26-23 at 0638    CT Head Without Contrast [[id number]] Collected: 05/25/23 1534     Updated: 05/25/23 1647    Narrative:      CT HEAD WITHOUT CONTRAST     HISTORY: Lung cancer, disoriented.      COMPARISON: MRI brain 08/15/2022.     FINDINGS: There is a heterogeneous and hemorrhagic metastatic lesion  involving the right occipital lobe posteriorly. It measures  approximately 4 cm in maximum transverse dimension and approximately 2.7  cm in the short axis. There is extensive vasogenic  edema involving the  right occipital white matter which also tracks superiorly to the right  parietal lobe, posterior aspect of the right frontal lobe and anteriorly  throughout the majority of the right temporal lobe. The mass measures  approximately 3.4 cm in craniocaudal dimension. There is approximately 4  mm of midline shift to the left. There is no evidence of acute  infarction or of transtentorial herniation.       Impression:      There is a 2.7 x 4.0 x 3.4 cm heterogeneous and partially  hemorrhagic mass involving the right occipital lobe most consistent with  a hemorrhagic metastatic lesion with extensive adjacent vasogenic edema.  There is 4 mm of midline shift to the left. Further evaluation with MRI  examination of the brain with and without contrast is recommended.     The above information was called to and discussed with BARBARA Plok, prior to the dictation.           Radiation dose reduction techniques were utilized, including automated  exposure control and exposure modulation based on body size.     This report was finalized on 5/25/2023 4:44 PM by Dr. Nik Sharma M.D.       CT Angiogram Chest Pulmonary Embolism [[id number]] Collected: 05/25/23 1548     Updated: 05/25/23 1635    Narrative:      CT ANGIOGRAM OF THE CHEST WITH CONTRAST INCLUDING RECONSTRUCTION IMAGES  05/25/2023     HISTORY: Lung cancer.     Following the intravenous contrast injection CT angiography was  performed through the chest. Sagittal, coronal and 3-D reconstruction  images were reviewed.     The pulmonary arterial system is well opacified with no evidence of  pulmonary embolus. There is some aortic and coronary calcification.     There is an approximately 3.9 cm x 2.9 cm right paramediastinal mass in  the right suprahilar region. This appears similar to the appearance on  the 03/01/2023 study. There is some scattered mild groundglass  opacification in the right upper lobe including a somewhat more  focal  groundglass nodule in the right upper lobe on image 39 measuring  approximately 7 mm in size. This is also seen on the previous study of  03/01/2023.     Left lung appears relatively clear.     Gallbladder has been removed. One or 2 small splenules are seen in the  left upper quadrant.     At least 2 low-density right renal lesions are seen better seen on the  CT scan of 03/01/2023 and consistent with renal cysts.       Impression:      1. No evidence of pulmonary embolus.  2. 3.9 cm x 2.9 cm soft tissue mass in the medial aspect of the right  lung/right paramediastinal region also seen on the 03/01/2023 study.  3. Scattered groundglass opacities including small groundglass nodule in  the right lung.     Radiation dose reduction techniques were utilized, including automated  exposure control and exposure modulation based on body size.     This report was finalized on 5/25/2023 4:32 PM by Dr. Karan Rodriguez M.D.               Pertinent Labs     Results from last 7 days   Lab Units 06/02/23 0537 05/31/23 0323 05/30/23 0626 05/29/23  0637   WBC 10*3/mm3 16.55* 15.23* 17.30* 15.85*   HEMOGLOBIN g/dL 12.9 12.1 14.2 13.1   PLATELETS 10*3/mm3 288 271 348 294     Results from last 7 days   Lab Units 06/02/23 0537 05/31/23 0323 05/30/23 0626 05/29/23  0637   SODIUM mmol/L 137 138 135* 138   POTASSIUM mmol/L 4.3 3.9 4.2 4.2   CHLORIDE mmol/L 107 104 102 106   CO2 mmol/L 22.5 22.8 23.0 20.4*   BUN mg/dL 35* 33* 35* 31*   CREATININE mg/dL 0.92 0.94 0.93 0.88   GLUCOSE mg/dL 134* 222* 118* 139*   EGFR mL/min/1.73 64.7 63.0 63.8 68.2     Results from last 7 days   Lab Units 05/27/23  0638   ALBUMIN g/dL 3.4*   BILIRUBIN mg/dL 0.3   ALK PHOS U/L 61   AST (SGOT) U/L 15   ALT (SGPT) U/L 15     Results from last 7 days   Lab Units 06/02/23 0537 05/31/23  0323 05/30/23  0626 05/29/23  0637 05/28/23  0622 05/27/23  0638   CALCIUM mg/dL 8.8 8.2* 9.1 9.3   < > 9.8   ALBUMIN g/dL  --   --   --   --   --  3.4*   MAGNESIUM  mg/dL  --  2.1  --   --   --   --    PHOSPHORUS mg/dL  --  3.2  --   --   --   --     < > = values in this interval not displayed.               Invalid input(s): LDLCALC          Test Results Pending at Discharge       Discharge Details        Discharge Medications      New Medications      Instructions Start Date   dexamethasone 4 MG tablet  Commonly known as: DECADRON   4 mg, Oral, Every 8 Hours Scheduled      dexamethasone 1.5 MG tablet  Commonly known as: DECADRON   3 mg, Oral, Every 8 Hours Scheduled   Start Date: June 4, 2023     dexamethasone 2 MG tablet  Commonly known as: DECADRON   2 mg, Oral, Every 8 Hours Scheduled   Start Date: June 8, 2023     dexamethasone 2 MG tablet  Commonly known as: DECADRON   2 mg, Oral, Every 12 Hours Scheduled   Start Date: June 12, 2023     dexamethasone 2 MG tablet  Commonly known as: DECADRON   2 mg, Oral, Daily With Breakfast   Start Date: June 16, 2023     levETIRAcetam 500 MG tablet  Commonly known as: KEPPRA   500 mg, Oral, 2 Times Daily      lisinopril 5 MG tablet  Commonly known as: PRINIVIL,ZESTRIL   5 mg, Oral, Every 24 Hours Scheduled   Start Date: Isabell 3, 2023        Changes to Medications      Instructions Start Date   predniSONE 10 MG tablet  Commonly known as: DELTASONE  What changed:   · additional instructions  · These instructions start on June 21, 2023. If you are unsure what to do until then, ask your doctor or other care provider.   10 mg, Oral, Daily, Resume home dose the day after completion of dexamethasone taper   Start Date: June 21, 2023        Continue These Medications      Instructions Start Date   azelastine 0.1 % nasal spray  Commonly known as: ASTELIN   1 spray, Nasal      B COMPLEX VITAMINS ER PO   Oral, Daily      esomeprazole 10 MG packet  Commonly known as: NexIUM   10 mg, Oral, Daily      estradiol 1 MG tablet  Commonly known as: ESTRACE   1 mg, Oral, Daily      FeroSul 325 (65 FE) MG tablet  Generic drug: ferrous sulfate   TAKE ONE  TABLET BY MOUTH DAILY WITH BREAKFAST      HYDROcodone Bit-Homatrop MBr 5-1.5 MG/5ML solution  Commonly known as: HYCODAN   5 mL, Oral, Every 6 Hours PRN      lidocaine-prilocaine 2.5-2.5 % cream  Commonly known as: EMLA   1 application, Topical, Every 2 Hours PRN      ofloxacin 0.3 % ophthalmic solution  Commonly known as: OCUFLOX   No dose, route, or frequency recorded.      ondansetron 8 MG tablet  Commonly known as: ZOFRAN   8 mg, Oral, 3 Times Daily PRN      PRESERVISION AREDS 2+MULTI VIT PO   Oral      vitamin D3 125 MCG (5000 UT) capsule capsule   2,000 Units, Oral, Daily         Stop These Medications    COLESTID PO     oxybutynin 5 MG tablet  Commonly known as: DITROPAN            Allergies   Allergen Reactions   • Del-Mycin [Erythromycin] Hives   • Gentamicin Hives   • Ibuprofen Nausea And Vomiting   • Latex Dermatitis     GLOVES   • Metronidazole Hives   • Ofloxacin Hives and Nausea Only   • Penicillins Hives   • Tetracycline Hives   • Adhesive Tape Dermatitis     BANDAIDS   • Aspirin GI Intolerance     Vomiting can take EC   • Codeine Nausea And Vomiting       [place] [facility] [place] Diet:  Diet Order   Procedures   • Diet: Regular/House Diet; Texture: Regular Texture (IDDSI 7); Fluid Consistency: Thin (IDDSI 0)       Discharge Activity:   Activity Instructions     Activity as Tolerated            CODE STATUS:    Code Status and Medical Interventions:   Ordered at: 05/30/23 2764     Level Of Support Discussed With:    Patient     Code Status (Patient has no pulse and is not breathing):    CPR (Attempt to Resuscitate)     Medical Interventions (Patient has pulse or is breathing):    Full Support     Release to patient:    Routine Release       Future Appointments   Date Time Provider Department Center   6/7/2023  6:00 PM [facility] CT 2  [facility]   6/12/2023  9:45 AM Mckenna Moreira MD [facility] RO [facility] None   6/12/2023 11:00 AM [facility]  [facility]    6/12/2023 11:20 AM Cruzito Lagunas MD [facility]   6/15/2023 10:30 AM Clint Ricketts MD [facility]     Additional Instructions for the Follow-ups that You Need to Schedule     Ambulatory Referral to Home Health   As directed      Face to Face Visit Date: 6/2/2023    Follow-up provider for Plan of Care?: I treated the patient in an acute care facility and will not continue treatment after discharge.    Follow-up provider: NIK COOMBS [[id number]]    Reason/Clinical Findings: impaired mobility r/t cancer    Describe mobility limitations that make leaving home difficult: impaired mobility falls risk    Nursing/Therapeutic Services Requested: Skilled Nursing Physical Therapy Occupational Therapy    Skilled nursing orders: Cardiopulmonary assessments Medication education    PT orders: Therapeutic exercise Gait Training Transfer training Strengthening    Weight Bearing Status: As Tolerated    Occupational orders: Activities of daily living Energy conservation Strengthening Cognition Fine motor    Frequency: Other            Contact information for follow-up providers     Nik Coombs MD .    Specialty: Family Medicine  Contact information:  [address] [facility] [place] [address] [phone]             Nik Coombs MD .    Specialty: Family Medicine  Contact information:  [address] [place] [address] [phone]                   Contact information for after-discharge care     Durable Medical Equipment     [facility] .    Service: Durable Medical Equipment  Contact information:  [address]  [place] [id number] [phone]                 Home Medical Care     [facility] .    Service: Home Health Services  Contact information:  [address]  [place] [address] [phone]                             Additional Instructions for the Follow-ups that You Need to Schedule     Ambulatory  Referral to Home Health   As directed      Face to Face Visit Date: 6/2/2023    Follow-up provider for Plan of Care?: I treated the patient in an acute care facility and will not continue treatment after discharge.    Follow-up provider: KRISTI COOMBS [[id number]]    Reason/Clinical Findings: impaired mobility r/t cancer    Describe mobility limitations that make leaving home difficult: impaired mobility falls risk    Nursing/Therapeutic Services Requested: Skilled Nursing Physical Therapy Occupational Therapy    Skilled nursing orders: Cardiopulmonary assessments Medication education    PT orders: Therapeutic exercise Gait Training Transfer training Strengthening    Weight Bearing Status: As Tolerated    Occupational orders: Activities of daily living Energy conservation Strengthening Cognition Fine motor    Frequency: Other           Time Spent on Discharge:  Greater than 55 minutes      BARBARA Dolan  [facility]  06/02/23  15:02 EDT

## 2023-06-02 NOTE — PLAN OF CARE
Goal Outcome Evaluation:  Plan of Care Reviewed With: patient        Progress: improving  Outcome Evaluation: Ambulatory with walker and standby assist, no complaints noted during the night, given prn Hydralazine x 1 for BP >140.

## 2023-06-02 NOTE — PROGRESS NOTES
Name: Pam Vidal ADMIT: 2023   : 1945  PCP: Nik Mckay MD    MRN: [id number] LOS: 8 days   AGE/SEX: 78 y.o. female  ROOM: [facility]     Subjective   Subjective   Mild dizziness w/position changes but otherwise no new complaints or issues. Denies HA, chest pain, soa. Appetite wnl. No nausea       Objective   Objective   Vital Signs  Temp:  [97.7 °F (36.5 °C)-98.2 °F (36.8 °C)] 97.9 °F (36.6 °C)  Heart Rate:  [56-75] 70  Resp:  [16-18] 18  BP: (122-170)/(49-71) 142/52  SpO2:  [93 %-95 %] 93 %  on   ;   Device (Oxygen Therapy): room air  Body mass index is 21.67 kg/m².  Physical Exam  Vitals and nursing note reviewed.   Constitutional:       General: She is not in acute distress.     Appearance: She is ill-appearing. She is not toxic-appearing.   HENT:      Head: Normocephalic.      Comments: Craniotomy incision approximated; no drainage, swelling     Mouth/Throat:      Mouth: Mucous membranes are moist.   Eyes:      Conjunctiva/sclera: Conjunctivae normal.   Cardiovascular:      Rate and Rhythm: Normal rate and regular rhythm.   Pulmonary:      Effort: Pulmonary effort is normal. No respiratory distress.      Breath sounds: No wheezing or rales.   Abdominal:      General: Bowel sounds are normal.      Palpations: Abdomen is soft.   Musculoskeletal:      Cervical back: Neck supple.      Right lower leg: No edema.      Left lower leg: No edema.   Skin:     General: Skin is warm and dry.   Neurological:      Mental Status: She is alert and oriented to person, place, and time.      Comments: Sitting up in bed. No focal deficits.    Psychiatric:         Mood and Affect: Mood normal.         Behavior: Behavior normal.       Results Review     I reviewed the patient's new clinical results.  Results from last 7 days   Lab Units 23  0537 23  0323 23  0626 23  0637   WBC 10*3/mm3 16.55* 15.23* 17.30* 15.85*   HEMOGLOBIN g/dL 12.9 12.1 14.2 13.1   PLATELETS 10*3/mm3 288 271 348  294     Results from last 7 days   Lab Units 06/02/23 0537 05/31/23 0323 05/30/23 0626 05/29/23  0637   SODIUM mmol/L 137 138 135* 138   POTASSIUM mmol/L 4.3 3.9 4.2 4.2   CHLORIDE mmol/L 107 104 102 106   CO2 mmol/L 22.5 22.8 23.0 20.4*   BUN mg/dL 35* 33* 35* 31*   CREATININE mg/dL 0.92 0.94 0.93 0.88   GLUCOSE mg/dL 134* 222* 118* 139*   EGFR mL/min/1.73 64.7 63.0 63.8 68.2     Results from last 7 days   Lab Units 05/27/23  0638   ALBUMIN g/dL 3.4*   BILIRUBIN mg/dL 0.3   ALK PHOS U/L 61   AST (SGOT) U/L 15   ALT (SGPT) U/L 15     Results from last 7 days   Lab Units 06/02/23 0537 05/31/23 0323 05/30/23 0626 05/29/23  0637 05/28/23  0622 05/27/23  0638   CALCIUM mg/dL 8.8 8.2* 9.1 9.3   < > 9.8   ALBUMIN g/dL  --   --   --   --   --  3.4*   MAGNESIUM mg/dL  --  2.1  --   --   --   --    PHOSPHORUS mg/dL  --  3.2  --   --   --   --     < > = values in this interval not displayed.     Results from last 7 days   Lab Units 05/27/23  1820   PROCALCITONIN ng/mL 0.04     Glucose   Date/Time Value Ref Range Status   05/31/2023 1724 152 (H) 70 - 130 mg/dL Final     Comment:     Meter: [id number] : [id number] Corey Hurd JELENA   05/31/2023 1201 133 (H) 70 - 130 mg/dL Final     Comment:     Meter: [id number] : [id number] Sudhakar Hardinin  JELENA   05/31/2023 0759 99 70 - 130 mg/dL Final     Comment:     Meter: [id number] : [id number] Sudhakar Sepideh  JELENA   05/30/2023 2230 138 (H) 70 - 130 mg/dL Final     Comment:     Meter: [id number] : [id number] Antoinette Osman RN   05/30/2023 1304 106 70 - 130 mg/dL Final     Comment:     Meter: [id number] : [id number] Corey BOWLES       No radiology results for the last day  I have personally reviewed all medications:  Scheduled Medications  [START ON 6/8/2023] dexamethasone, 2 mg, Oral, Q8H  [START ON 6/12/2023] dexamethasone, 2 mg, Oral, Q12H  [START ON 6/16/2023] dexamethasone, 2 mg, Oral, Daily With Breakfast  [START ON 6/4/2023] dexamethasone, 3 mg, Oral,  Q8H  dexamethasone, 4 mg, Oral, Q8H  enoxaparin, 40 mg, Subcutaneous, Q24H  esomeprazole, 20 mg, Oral, Daily  levETIRAcetam, 500 mg, Oral, BID  senna-docusate sodium, 1 tablet, Oral, BID    Infusions  niCARdipine, 5-15 mg/hr, Last Rate: Stopped (05/31/23 0803)    Diet  Diet: Regular/House Diet; Texture: Regular Texture (IDDSI 7); Fluid Consistency: Thin (IDDSI 0)    I have personally reviewed:  [x]  Laboratory   [x]  Microbiology   [x]  Radiology   [x]  EKG/Telemetry  [x]  Cardiology/Vascular   []  Pathology    []  Records       Assessment/Plan     Active Hospital Problems    Diagnosis  POA   • **Lung cancer metastatic to brain [C34.90, C79.31]  Yes   • Leukocytosis [D72.829]  Unknown   • Brain mass [G93.89]  Unknown   • COPD (chronic obstructive pulmonary disease) [J44.9]  Unknown   • Rheumatoid arthritis [M06.9]  Unknown   • IBS (irritable bowel syndrome) [K58.9]  Unknown   • Hyperlipidemia [E78.5]  Unknown   • Cough with hemoptysis [R04.2]  Yes      Resolved Hospital Problems   No resolved problems to display.       78 y.o. female admitted with Lung cancer metastatic to brain.    Hemorrhagic metastatic lesion to the brain with vasogenic edema  -Continue with Keppra and steroids, now on po taper. WBC elevation due to steroids  -Neurosurgery has written from their standpoint okay for discharge; being evaluated by [facility]     Likely metastatic lung cancer to brain  -Oncology following. Pathology pending. CT chest w/small area abnormality rt 8th rib     Rheumatoid arthritis for which she takes steroids chronically     Carotid vascular disease status post bilateral endarterectomy  -Hold on aspirin     Hypertensive emergency  -Blood pressure 140s/50s.    Hydralazine last given 1136 last night but /61. Stop hydralazine PRN. Add lisinopril 5mg     DVT PPX: Has been cleared to start chemoprophylaxis.  Oncology recommends lovenox  f/u with dR Lagunas in 2 weeks hold imfinzi     · SCDs for DVT prophylaxis.  · Full  code.  · Discussed with patient.  · Anticipate discharge Acute rehab vs home once arrangements have been made.. BAR evaluating patient but she know states she wants to go home with HH. Await pt ot eval today.       BARBARA Dolan  [facility]  06/02/23  10:46 EDT

## 2023-06-02 NOTE — TELEPHONE ENCOUNTER
Called patient and left VM. Informed patient that her 2 week po is scheduled for 06/15 at 10:30 am.

## 2023-06-02 NOTE — PLAN OF CARE
Goal Outcome Evaluation:  Plan of Care Reviewed With: patient        Progress: improving  Outcome Evaluation: Pt seen for OT tx session in PM, pt agreeable and motivated. A&Ox3 this date, SBA for bed mob, reports no pain. STS with SBA, SBA/CGA for func mob into BR to commode, LBD with CGA, toileting hygiene with SBA while seated, sink side g/h seated at sink with SPV. Pt with noted improved act tolerance this date, reports plan is home with HH and family support. Eager to get home, continue to follow.

## 2023-06-02 NOTE — PROGRESS NOTES
[facility] Acute Inpt Rehab    Went to patients room to discuss acute rehab. Patient states she is going home with HH.  Will sign off.  Please let us know if we could be of further assistance.  CCP notified.    Thank you,  Mackenzie Arevalo, RN  Rehab Admission Nurse

## 2023-06-03 NOTE — NURSING NOTE
Patient discharged.  Discharge paperwork with patient. Son as patient's transport home. Care is relinquished at this time.

## 2023-06-03 NOTE — OUTREACH NOTE
Prep Survey    Flowsheet Row Responses   [facility] facility patient discharged from? [place]   Is LACE score < 7 ? No   Eligibility Readm Mgmt   Discharge diagnosis **Lung cancer metastatic to brain   Does the patient have one of the following disease processes/diagnoses(primary or secondary)? Other   Does the patient have Home health ordered? Yes   What is the [facility] health [facility]  [facility]   Is there a DME ordered? Yes   What DME was ordered? [facility] [place]   Prep survey completed? Yes          MARY WAY - Registered Nurse

## 2023-06-05 ENCOUNTER — READMISSION MANAGEMENT (OUTPATIENT)
Dept: CALL CENTER | Facility: HOSPITAL | Age: 78
End: 2023-06-05
Payer: MEDICARE

## 2023-06-05 NOTE — OUTREACH NOTE
Medical Week 1 Survey      Flowsheet Row Responses   [facility] patient discharged from? [place]   Does the patient have one of the following disease processes/diagnoses(primary or secondary)? Other   Week 1 attempt successful? Yes   Call start time 1935   Call end time 1942   Discharge diagnosis **Lung cancer metastatic to brain   Meds reviewed with patient/caregiver? Yes   Is the patient having any side effects they believe may be caused by any medication additions or changes? No   Does the patient have all medications ordered at discharge? Yes   Is the patient taking all medications as directed (includes completed medication regime)? Yes   Does the patient have a primary care provider?  Yes   Does the patient have an appointment with their PCP within 7 days of discharge? Yes   Has the patient kept scheduled appointments due by today? N/A   What is the Home health agency?  [facility]   Has home health visited the patient within 72 hours of discharge? No   Home health comments States they called but she thinks she deleted the message and doesn't have the phone #.  Offered phone # but states she can't see well to write it down.   What [facility] was [facility]   Has all DME been delivered? Yes   Psychosocial issues? No   Did the patient receive a copy of their discharge instructions? Yes   Nursing interventions Reviewed instructions with patient   What is the patient's perception of their health status since discharge? Improving   Is the patient/caregiver able to teach back signs and symptoms related to disease process for when to call PCP? Yes   Is the patient/caregiver able to teach back signs and symptoms related to disease process for when to call 911? Yes   Is the patient/caregiver able to teach back the hierarchy of who to call/visit for symptoms/problems? PCP, Specialist, Home health nurse, Urgent Care, ED, 911 Yes   If the patient is a current smoker, are  they able to teach back resources for cessation? Not a smoker   Additional teach back comments States she is doing well.  Using walker to get around.   Week 1 call completed? Yes   Is the patient interested in additional calls from an ambulatory ?  NOTE:  applies to high risk patients requiring additional follow-up. Yes   Graduated/Revoked comments Ambulatory Care Management to follow            Mackenzie Kim Nurse

## 2023-06-06 ENCOUNTER — TELEPHONE (OUTPATIENT)
Dept: NEUROSURGERY | Facility: CLINIC | Age: 78
End: 2023-06-06
Payer: MEDICARE

## 2023-06-06 NOTE — TELEPHONE ENCOUNTER
Kristin with [facility] called and wanted to know if Dr Ricketts will be signing the home health order.  Please call Kristin figueroa @ [phone]

## 2023-06-07 ENCOUNTER — HOSPITAL ENCOUNTER (OUTPATIENT)
Dept: CT IMAGING | Facility: HOSPITAL | Age: 78
Discharge: HOME | End: 2023-06-07
Admitting: INTERNAL MEDICINE
Payer: MEDICARE

## 2023-06-07 ENCOUNTER — PATIENT OUTREACH (OUTPATIENT)
Dept: CASE MANAGEMENT | Facility: OTHER | Age: 78
End: 2023-06-07
Payer: MEDICARE

## 2023-06-07 DIAGNOSIS — Z79.899 HIGH RISK MEDICATION USE: ICD-10-CM

## 2023-06-07 DIAGNOSIS — C34.91 PRIMARY LUNG ADENOCARCINOMA, RIGHT: ICD-10-CM

## 2023-06-07 LAB — CREAT BLDA-MCNC: 1 MG/DL (ref 0.6–1.3)

## 2023-06-07 PROCEDURE — 25510000001 IOPAMIDOL 61 % SOLUTION: Performed by: INTERNAL MEDICINE

## 2023-06-07 PROCEDURE — 71260 CT THORAX DX C+: CPT

## 2023-06-07 PROCEDURE — 74177 CT ABD & PELVIS W/CONTRAST: CPT

## 2023-06-07 PROCEDURE — 82565 ASSAY OF CREATININE: CPT

## 2023-06-07 RX ADMIN — IOPAMIDOL 85 ML: 612 INJECTION, SOLUTION INTRAVENOUS at 18:43

## 2023-06-07 NOTE — OUTREACH NOTE
AMBULATORY CASE MANAGEMENT NOTE    Name and Relationship of Patient/Support Person: Pam Vidal E - Self    Patient Outreach    Received referral from  Call Center, to follow up with pt regarding home health. RN-ACM outreach call made to pt. Explained role of RN-ACM and reason for call. Pt reports to be doing well, states home health nurse from [facility] is coming tomorrow for visit, as she has CT scheduled this evening. Reviewed hospital discharge AVS with pt. She reports compliant with medications, states her daughter and niece help manage her meds. Reviewed SDOH. Pt denies any needs at this time. She reports to have good family support. She has oncology follow up appts scheduled. Pt reports she has PCP follow up appt scheduled. No questions per pt. Advised her to call with any needs. Follow up outreach prn.     Adult Patient Profile  Questions/Answers      Flowsheet Row Most Recent Value   Barriers to Taking Medication as Prescribed none   Equipment Currently Used at Home walker, standard   Primary Source of Support/Comfort child(kathleen), extended family   People in Home other relative(s)  [neice]   Current Living Arrangements home          Send Education  Questions/Answers      Flowsheet Row Most Recent Value   Other Patient Education/Resources  24/7 [facility] Nurse Call Line   24/7 Nurse Call Line Education Method Verbal          SDOH updated and reviewed with the patient during this program:  Financial Resource Strain: Low Risk     Difficulty of Paying Living Expenses: Not very hard      Food Insecurity: No Food Insecurity    Worried About Running Out of Food in the Last Year: Never true    Ran Out of Food in the Last Year: Never true      Transportation Needs: No Transportation Needs    Lack of Transportation (Medical): No    Lack of Transportation (Non-Medical): No         Sri KNIGHT  Ambulatory Case Management    6/7/2023, 14:14 EDT

## 2023-06-08 ENCOUNTER — TELEPHONE (OUTPATIENT)
Dept: ONCOLOGY | Facility: CLINIC | Age: 78
End: 2023-06-08
Payer: MEDICARE

## 2023-06-08 NOTE — TELEPHONE ENCOUNTER
Caller: ZIA DURANT    Relationship to patient: VANESSA    Best call back number: [phone]    Patient is needing: DR. FUENTES TO PUT IN PT'S CHART SHE WOULD LIKE CONTRAST OR ANYTHING ELSE THAT HAS TO BE GIVEN BY IV GIVEN THROUGH HER PORT, IF POSSIBLE.

## 2023-06-08 NOTE — TELEPHONE ENCOUNTER
Pt informed to request port access next time she is in the hospital. Pt V/U.     Message to Dr. Lagunas  Pts would like to thank you and your team. She states she wouldn't be here today without you. Everyone has been so kind to her. She said God bless you.

## 2023-06-11 ENCOUNTER — APPOINTMENT (OUTPATIENT)
Dept: CT IMAGING | Facility: HOSPITAL | Age: 78
DRG: 595 | End: 2023-06-11
Payer: MEDICARE

## 2023-06-11 ENCOUNTER — HOSPITAL ENCOUNTER (INPATIENT)
Facility: HOSPITAL | Age: 78
LOS: 3 days | Discharge: HOME HEALTH | DRG: 595 | End: 2023-06-14
Attending: EMERGENCY MEDICINE | Admitting: INTERNAL MEDICINE
Payer: MEDICARE

## 2023-06-11 ENCOUNTER — APPOINTMENT (OUTPATIENT)
Dept: CARDIOLOGY | Facility: HOSPITAL | Age: 78
DRG: 595 | End: 2023-06-11
Payer: MEDICARE

## 2023-06-11 ENCOUNTER — TELEPHONE (OUTPATIENT)
Dept: ONCOLOGY | Facility: CLINIC | Age: 78
End: 2023-06-11
Payer: MEDICARE

## 2023-06-11 DIAGNOSIS — B02.9 HERPES ZOSTER WITHOUT COMPLICATION: ICD-10-CM

## 2023-06-11 DIAGNOSIS — G93.89 BRAIN MASS: ICD-10-CM

## 2023-06-11 DIAGNOSIS — C34.91 PRIMARY LUNG ADENOCARCINOMA, RIGHT: ICD-10-CM

## 2023-06-11 DIAGNOSIS — R93.89 ABNORMAL CT OF THE CHEST: Primary | ICD-10-CM

## 2023-06-11 PROBLEM — I26.99 ACUTE PULMONARY EMBOLISM: Status: ACTIVE | Noted: 2023-06-11

## 2023-06-11 PROBLEM — I10 HTN (HYPERTENSION): Status: ACTIVE | Noted: 2023-06-11

## 2023-06-11 LAB
ALBUMIN SERPL-MCNC: 4 G/DL (ref 3.5–5.2)
ALBUMIN/GLOB SERPL: 1.5 G/DL
ALP SERPL-CCNC: 82 U/L (ref 39–117)
ALT SERPL W P-5'-P-CCNC: 44 U/L (ref 1–33)
ANION GAP SERPL CALCULATED.3IONS-SCNC: 13 MMOL/L (ref 5–15)
APTT PPP: 25.2 SECONDS (ref 22.7–35.4)
AST SERPL-CCNC: 23 U/L (ref 1–32)
BASOPHILS # BLD AUTO: 0.02 10*3/MM3 (ref 0–0.2)
BASOPHILS NFR BLD AUTO: 0.1 % (ref 0–1.5)
BILIRUB SERPL-MCNC: 0.4 MG/DL (ref 0–1.2)
BUN SERPL-MCNC: 27 MG/DL (ref 8–23)
BUN/CREAT SERPL: 31.8 (ref 7–25)
CALCIUM SPEC-SCNC: 9.2 MG/DL (ref 8.6–10.5)
CHLORIDE SERPL-SCNC: 99 MMOL/L (ref 98–107)
CO2 SERPL-SCNC: 23 MMOL/L (ref 22–29)
CREAT SERPL-MCNC: 0.85 MG/DL (ref 0.57–1)
D DIMER PPP FEU-MCNC: 0.88 MCGFEU/ML (ref 0–0.76)
DEPRECATED RDW RBC AUTO: 46.3 FL (ref 37–54)
EGFRCR SERPLBLD CKD-EPI 2021: 71.1 ML/MIN/1.73
EOSINOPHIL # BLD AUTO: 0.02 10*3/MM3 (ref 0–0.4)
EOSINOPHIL NFR BLD AUTO: 0.1 % (ref 0.3–6.2)
ERYTHROCYTE [DISTWIDTH] IN BLOOD BY AUTOMATED COUNT: 15.2 % (ref 12.3–15.4)
GLOBULIN UR ELPH-MCNC: 2.6 GM/DL
GLUCOSE SERPL-MCNC: 130 MG/DL (ref 65–99)
HCT VFR BLD AUTO: 43.2 % (ref 34–46.6)
HGB BLD-MCNC: 14.8 G/DL (ref 12–15.9)
IMM GRANULOCYTES # BLD AUTO: 0.26 10*3/MM3 (ref 0–0.05)
IMM GRANULOCYTES NFR BLD AUTO: 1.4 % (ref 0–0.5)
INR PPP: 0.91 (ref 0.9–1.1)
LYMPHOCYTES # BLD AUTO: 0.42 10*3/MM3 (ref 0.7–3.1)
LYMPHOCYTES NFR BLD AUTO: 2.3 % (ref 19.6–45.3)
MCH RBC QN AUTO: 28.6 PG (ref 26.6–33)
MCHC RBC AUTO-ENTMCNC: 34.3 G/DL (ref 31.5–35.7)
MCV RBC AUTO: 83.4 FL (ref 79–97)
MONOCYTES # BLD AUTO: 0.74 10*3/MM3 (ref 0.1–0.9)
MONOCYTES NFR BLD AUTO: 4.1 % (ref 5–12)
NEUTROPHILS NFR BLD AUTO: 16.77 10*3/MM3 (ref 1.7–7)
NEUTROPHILS NFR BLD AUTO: 92 % (ref 42.7–76)
NRBC BLD AUTO-RTO: 0 /100 WBC (ref 0–0.2)
NT-PROBNP SERPL-MCNC: 331 PG/ML (ref 0–1800)
PLATELET # BLD AUTO: 275 10*3/MM3 (ref 140–450)
PMV BLD AUTO: 10.3 FL (ref 6–12)
POTASSIUM SERPL-SCNC: 4.3 MMOL/L (ref 3.5–5.2)
PROT SERPL-MCNC: 6.6 G/DL (ref 6–8.5)
PROTHROMBIN TIME: 12.4 SECONDS (ref 11.7–14.2)
RBC # BLD AUTO: 5.18 10*6/MM3 (ref 3.77–5.28)
SODIUM SERPL-SCNC: 135 MMOL/L (ref 136–145)
TROPONIN T SERPL HS-MCNC: 14 NG/L
WBC NRBC COR # BLD: 18.23 10*3/MM3 (ref 3.4–10.8)

## 2023-06-11 PROCEDURE — 25010000002 HEPARIN (PORCINE) 25000-0.45 UT/250ML-% SOLUTION: Performed by: PHYSICIAN ASSISTANT

## 2023-06-11 PROCEDURE — 80053 COMPREHEN METABOLIC PANEL: CPT | Performed by: PHYSICIAN ASSISTANT

## 2023-06-11 PROCEDURE — 70450 CT HEAD/BRAIN W/O DYE: CPT

## 2023-06-11 PROCEDURE — 25010000002 HYDRALAZINE PER 20 MG: Performed by: INTERNAL MEDICINE

## 2023-06-11 PROCEDURE — 71275 CT ANGIOGRAPHY CHEST: CPT

## 2023-06-11 PROCEDURE — 93971 EXTREMITY STUDY: CPT

## 2023-06-11 PROCEDURE — 85730 THROMBOPLASTIN TIME PARTIAL: CPT | Performed by: PHYSICIAN ASSISTANT

## 2023-06-11 PROCEDURE — 85610 PROTHROMBIN TIME: CPT | Performed by: PHYSICIAN ASSISTANT

## 2023-06-11 PROCEDURE — 85379 FIBRIN DEGRADATION QUANT: CPT | Performed by: INTERNAL MEDICINE

## 2023-06-11 PROCEDURE — 25510000001 IOPAMIDOL PER 1 ML: Performed by: EMERGENCY MEDICINE

## 2023-06-11 PROCEDURE — 83880 ASSAY OF NATRIURETIC PEPTIDE: CPT | Performed by: PHYSICIAN ASSISTANT

## 2023-06-11 PROCEDURE — 84484 ASSAY OF TROPONIN QUANT: CPT | Performed by: PHYSICIAN ASSISTANT

## 2023-06-11 PROCEDURE — 93005 ELECTROCARDIOGRAM TRACING: CPT | Performed by: PHYSICIAN ASSISTANT

## 2023-06-11 PROCEDURE — 85025 COMPLETE CBC W/AUTO DIFF WBC: CPT | Performed by: PHYSICIAN ASSISTANT

## 2023-06-11 RX ORDER — SODIUM CHLORIDE 9 MG/ML
40 INJECTION, SOLUTION INTRAVENOUS AS NEEDED
Status: DISCONTINUED | OUTPATIENT
Start: 2023-06-11 | End: 2023-06-14 | Stop reason: HOSPADM

## 2023-06-11 RX ORDER — BISACODYL 5 MG/1
5 TABLET, DELAYED RELEASE ORAL DAILY PRN
Status: DISCONTINUED | OUTPATIENT
Start: 2023-06-11 | End: 2023-06-14 | Stop reason: HOSPADM

## 2023-06-11 RX ORDER — ACYCLOVIR 400 MG/1
800 TABLET ORAL
Status: DISCONTINUED | OUTPATIENT
Start: 2023-06-11 | End: 2023-06-14 | Stop reason: HOSPADM

## 2023-06-11 RX ORDER — POLYETHYLENE GLYCOL 3350 17 G/17G
17 POWDER, FOR SOLUTION ORAL DAILY PRN
Status: DISCONTINUED | OUTPATIENT
Start: 2023-06-11 | End: 2023-06-14 | Stop reason: HOSPADM

## 2023-06-11 RX ORDER — ONDANSETRON 2 MG/ML
4 INJECTION INTRAMUSCULAR; INTRAVENOUS EVERY 6 HOURS PRN
Status: DISCONTINUED | OUTPATIENT
Start: 2023-06-11 | End: 2023-06-14 | Stop reason: HOSPADM

## 2023-06-11 RX ORDER — ENOXAPARIN SODIUM 100 MG/ML
1 INJECTION SUBCUTANEOUS ONCE
Status: DISCONTINUED | OUTPATIENT
Start: 2023-06-11 | End: 2023-06-11

## 2023-06-11 RX ORDER — ACETAMINOPHEN 325 MG/1
650 TABLET ORAL EVERY 4 HOURS PRN
Status: DISCONTINUED | OUTPATIENT
Start: 2023-06-11 | End: 2023-06-14 | Stop reason: HOSPADM

## 2023-06-11 RX ORDER — GABAPENTIN 100 MG/1
100 CAPSULE ORAL EVERY 8 HOURS SCHEDULED
Status: DISCONTINUED | OUTPATIENT
Start: 2023-06-11 | End: 2023-06-14 | Stop reason: HOSPADM

## 2023-06-11 RX ORDER — ACETAMINOPHEN 160 MG/5ML
650 SOLUTION ORAL EVERY 4 HOURS PRN
Status: DISCONTINUED | OUTPATIENT
Start: 2023-06-11 | End: 2023-06-14 | Stop reason: HOSPADM

## 2023-06-11 RX ORDER — LEVETIRACETAM 500 MG/1
500 TABLET ORAL 2 TIMES DAILY
Status: DISCONTINUED | OUTPATIENT
Start: 2023-06-12 | End: 2023-06-14 | Stop reason: HOSPADM

## 2023-06-11 RX ORDER — DEXAMETHASONE 2 MG/1
2 TABLET ORAL EVERY 8 HOURS SCHEDULED
Status: COMPLETED | OUTPATIENT
Start: 2023-06-12 | End: 2023-06-12

## 2023-06-11 RX ORDER — PANTOPRAZOLE SODIUM 40 MG/1
40 TABLET, DELAYED RELEASE ORAL
Status: DISCONTINUED | OUTPATIENT
Start: 2023-06-12 | End: 2023-06-14 | Stop reason: HOSPADM

## 2023-06-11 RX ORDER — SODIUM CHLORIDE 0.9 % (FLUSH) 0.9 %
10 SYRINGE (ML) INJECTION EVERY 12 HOURS SCHEDULED
Status: DISCONTINUED | OUTPATIENT
Start: 2023-06-11 | End: 2023-06-14 | Stop reason: HOSPADM

## 2023-06-11 RX ORDER — HEPARIN SODIUM 10000 [USP'U]/100ML
18 INJECTION, SOLUTION INTRAVENOUS
Status: DISCONTINUED | OUTPATIENT
Start: 2023-06-11 | End: 2023-06-11

## 2023-06-11 RX ORDER — ACETAMINOPHEN 650 MG/1
650 SUPPOSITORY RECTAL EVERY 4 HOURS PRN
Status: DISCONTINUED | OUTPATIENT
Start: 2023-06-11 | End: 2023-06-14 | Stop reason: HOSPADM

## 2023-06-11 RX ORDER — HEPARIN SODIUM (PORCINE) LOCK FLUSH IV SOLN 100 UNIT/ML 100 UNIT/ML
5 SOLUTION INTRAVENOUS AS NEEDED
Status: DISCONTINUED | OUTPATIENT
Start: 2023-06-11 | End: 2023-06-14 | Stop reason: HOSPADM

## 2023-06-11 RX ORDER — ACYCLOVIR 400 MG/1
800 TABLET ORAL ONCE
Status: DISCONTINUED | OUTPATIENT
Start: 2023-06-11 | End: 2023-06-11

## 2023-06-11 RX ORDER — HYDRALAZINE HYDROCHLORIDE 20 MG/ML
10 INJECTION INTRAMUSCULAR; INTRAVENOUS EVERY 6 HOURS PRN
Status: DISCONTINUED | OUTPATIENT
Start: 2023-06-11 | End: 2023-06-14 | Stop reason: HOSPADM

## 2023-06-11 RX ORDER — SODIUM CHLORIDE 0.9 % (FLUSH) 0.9 %
10 SYRINGE (ML) INJECTION AS NEEDED
Status: DISCONTINUED | OUTPATIENT
Start: 2023-06-11 | End: 2023-06-14 | Stop reason: HOSPADM

## 2023-06-11 RX ORDER — NITROGLYCERIN 0.4 MG/1
0.4 TABLET SUBLINGUAL
Status: DISCONTINUED | OUTPATIENT
Start: 2023-06-11 | End: 2023-06-14 | Stop reason: HOSPADM

## 2023-06-11 RX ORDER — LISINOPRIL 5 MG/1
5 TABLET ORAL
Status: DISCONTINUED | OUTPATIENT
Start: 2023-06-12 | End: 2023-06-12

## 2023-06-11 RX ORDER — BISACODYL 10 MG
10 SUPPOSITORY, RECTAL RECTAL DAILY PRN
Status: DISCONTINUED | OUTPATIENT
Start: 2023-06-11 | End: 2023-06-14 | Stop reason: HOSPADM

## 2023-06-11 RX ORDER — SODIUM CHLORIDE 0.9 % (FLUSH) 0.9 %
20 SYRINGE (ML) INJECTION AS NEEDED
Status: DISCONTINUED | OUTPATIENT
Start: 2023-06-11 | End: 2023-06-14 | Stop reason: HOSPADM

## 2023-06-11 RX ORDER — AMOXICILLIN 250 MG
2 CAPSULE ORAL 2 TIMES DAILY
Status: DISCONTINUED | OUTPATIENT
Start: 2023-06-11 | End: 2023-06-14 | Stop reason: HOSPADM

## 2023-06-11 RX ORDER — DEXAMETHASONE 2 MG/1
2 TABLET ORAL EVERY 12 HOURS SCHEDULED
Status: DISCONTINUED | OUTPATIENT
Start: 2023-06-12 | End: 2023-06-12

## 2023-06-11 RX ADMIN — ACYCLOVIR 800 MG: 400 TABLET ORAL at 22:13

## 2023-06-11 RX ADMIN — HYDRALAZINE HYDROCHLORIDE 10 MG: 20 INJECTION INTRAMUSCULAR; INTRAVENOUS at 23:45

## 2023-06-11 RX ADMIN — ACETAMINOPHEN 650 MG: 325 TABLET, FILM COATED ORAL at 23:17

## 2023-06-11 RX ADMIN — Medication 10 ML: at 23:46

## 2023-06-11 RX ADMIN — HEPARIN SODIUM 18 UNITS/KG/HR: 10000 INJECTION, SOLUTION INTRAVENOUS at 21:30

## 2023-06-11 RX ADMIN — IOPAMIDOL 95 ML: 755 INJECTION, SOLUTION INTRAVENOUS at 19:36

## 2023-06-11 NOTE — ED PROVIDER NOTES
" EMERGENCY DEPARTMENT ENCOUNTER    Room Number:  S615/1  Date of encounter:  6/11/2023  PCP: Nik Mckay MD  Patient Care Team:  Nik Mckay MD as PCP - General  Nik Mckay MD as PCP - Family Medicine  Remedios Rice MD as Surgeon (Thoracic Surgery)  Hector Rodriguez MD as Consulting Physician (Radiation Oncology)  Jannet Chauhan, RN as Nurse Navigator  Remedios Rice MD as Referring Physician (Thoracic Surgery)  Cruzito Lagunas MD as Consulting Physician (Hematology and Oncology)  Sri Coy RN as Ambulatory  ([facility])   Independent Historians: Patient    HPI:  Chief Complaint: Arm pain, numbness, rash  A complete HPI/ROS/PMH/PSH/SH/FH are unobtainable due to: None    Chronic or social conditions impacting patient care (social determinants of health): History of metastatic lung cancer, recent craniotomy    Context: Pam Vidal is a 78 y.o. female who presents to the ED c/o left arm pain, numbness, rash that began this morning.  Patient has metastatic lung cancer and recently underwent craniotomy at the end of May 2023 by Dr. Ricketts.  Today she has had increased left arm pain.  No history of DVT or PE.  She does have some left-sided \"lung pain\".  She has been prescribed new medications recently but is unsure if her pain or the rash began after using these.  She was recommended not to get the shingles vaccine secondary to finding out about her lung cancer around the same time.  She denies having had shingles previously.    Review of prior external notes (non-ED): Reviewed op note by Dr. Ricketts, neurosurgery, on 5/30/2023 for right craniotomy for tumor resection.  Note does mention prior to finding of large hemorrhagic right occipital lobe lesion she was considered stage III adenocarcinoma and was not a candidate for surgery for her primary tumor.  After discussion with her oncologist patient underwent resection of the tumor and plans for postoperative radiation and " chemotherapy.  No obvious complications intra or postoperatively.    Review of prior external test results outside of this encounter: MRI of the brain with and without contrast on 5/31/2023.  There is no convincing evidence of residual enhancing tumor.  Expected postoperative edema in the scalp external to the craniotomy flap.  There are other stable metastatic lesions noted without any additional lesions on this exam since preoperative MRI on 5/26/2023.    PAST MEDICAL HISTORY  Active Ambulatory Problems     Diagnosis Date Noted    Primary lung adenocarcinoma, right 08/30/2022    Cough with hemoptysis 08/30/2022    Lung mass 08/31/2022    Advanced care planning/counseling discussion 10/03/2022    High risk medication use 10/31/2022    Muscular deconditioning 04/03/2023    Neoplastic malignant related fatigue 04/03/2023    Lung cancer metastatic to brain 05/25/2023    Brain mass 05/27/2023    COPD (chronic obstructive pulmonary disease) 05/27/2023    Rheumatoid arthritis 05/27/2023    IBS (irritable bowel syndrome) 05/27/2023    Hyperlipidemia 05/27/2023     Resolved Ambulatory Problems     Diagnosis Date Noted    Leukocytosis 05/27/2023     Past Medical History:   Diagnosis Date    Coughing up blood     History of COVID-19 02/2022       The patient has started, but not completed, their COVID-19 vaccination series.    PAST SURGICAL HISTORY  Past Surgical History:   Procedure Laterality Date    APPENDECTOMY      appendix removed    BRONCHOSCOPY N/A 8/23/2022    Procedure: BRONCHOSCOPY WITH BAL,  BIOPSIES, AND BRUSHINGS WITH ENDOBRONCHIAL ULTRASOUND WITH FNA;  Surgeon: Gregor Vee MD;  Location: [facility] ENDOSCOPY;  Service: Pulmonary;  Laterality: N/A;  PRE- HILAR MASS  POST- SAME    BRONCHOSCOPY N/A 1/4/2023    Procedure: BRONCHOSCOPY with biopsy, lavage, brushing;  Surgeon: Gregor Vee MD;  Location: [facility] ENDOSCOPY;  Service: Pulmonary;  Laterality: N/A;    CAROTID ENDARTERECTOMY Right     CAROTID  ENDARTERECTOMY Left     CATARACT EXTRACTION      CERVICAL FUSION      CHOLECYSTECTOMY      CRANIOTOMY FOR TUMOR Right 2023    Procedure: RIGHT CRANIOTOMY FOR TUMOR RESECTION STEREOTACTIC WITH STEALTH;  Surgeon: Clint Ricketts MD;  Location: [facility] OR;  Service: Neurosurgery;  Laterality: Right;    HYSTERECTOMY      SHOULDER ARTHROSCOPY Right 2019    right should scope/cuff repair     VENOUS ACCESS DEVICE (PORT) INSERTION Right 2022    Procedure: POWERPORT INSERTION;  Surgeon: Remedios Rice MD;  Location: [facility] OR;  Service: Thoracic;  Laterality: Right;         FAMILY HISTORY  Family History   Problem Relation Age of Onset    Cancer Mother     Cancer Father     Malig Hyperthermia Neg Hx          SOCIAL HISTORY  Social History     Socioeconomic History    Marital status:    Tobacco Use    Smoking status: Former     Packs/day: 0.25     Types: Cigarettes     Quit date: 2022     Years since quittin.1    Smokeless tobacco: Never    Tobacco comments:     Former some day smoker of 1-2 cigs   Vaping Use    Vaping Use: Never used   Substance and Sexual Activity    Alcohol use: Yes     Alcohol/week: 2.0 standard drinks     Types: 2 Glasses of wine per week     Comment: occasionally    Drug use: Never    Sexual activity: Defer         ALLERGIES  Del-mycin [erythromycin], Gentamicin, Ibuprofen, Latex, Metronidazole, Ofloxacin, Penicillins, Tetracycline, Adhesive tape, Aspirin, and Codeine        REVIEW OF SYSTEMS  Review of Systems   Constitutional:  Negative for fever.   Respiratory:  Negative for cough and shortness of breath.    Cardiovascular:  Negative for leg swelling.   Gastrointestinal:  Negative for abdominal pain.   Musculoskeletal:         Left arm pain   Skin:  Positive for rash.   Neurological:  Positive for numbness (Left arm). Negative for facial asymmetry, weakness and headaches.      All systems reviewed and negative except for those discussed in HPI.       PHYSICAL  EXAM    I have reviewed the triage vital signs and nursing notes.    ED Triage Vitals [06/11/23 1605]   Temp Heart Rate Resp BP SpO2   98.5 °F (36.9 °C) 103 20 (!) 167/102 97 %      Temp src Heart Rate Source Patient Position BP Location FiO2 (%)   Oral Monitor -- -- --       Physical Exam  GENERAL: alert, chronically ill appearing, no acute distress  SKIN: Warm, dry, papulovesicular rash noted to inner aspect of LUE as well as to the left lower cervical region, rash involves medial aspect of the palm  HENT: Normocephalic, atraumatic  EYES: no scleral icterus  CV: regular rhythm, regular rate  RESPIRATORY: normal effort, lungs clear  ABDOMEN: soft, nontender, nondistended  MUSCULOSKELETAL: no deformity, normal strength to extremities, no pitting edema  NEURO: alert, moves all extremities, follows commands, no focal neurological deficits          LAB RESULTS  Recent Results (from the past 24 hour(s))   Comprehensive Metabolic Panel    Collection Time: 06/11/23  6:09 PM    Specimen: Blood   Result Value Ref Range    Glucose 130 (H) 65 - 99 mg/dL    BUN 27 (H) 8 - 23 mg/dL    Creatinine 0.85 0.57 - 1.00 mg/dL    Sodium 135 (L) 136 - 145 mmol/L    Potassium 4.3 3.5 - 5.2 mmol/L    Chloride 99 98 - 107 mmol/L    CO2 23.0 22.0 - 29.0 mmol/L    Calcium 9.2 8.6 - 10.5 mg/dL    Total Protein 6.6 6.0 - 8.5 g/dL    Albumin 4.0 3.5 - 5.2 g/dL    ALT (SGPT) 44 (H) 1 - 33 U/L    AST (SGOT) 23 1 - 32 U/L    Alkaline Phosphatase 82 39 - 117 U/L    Total Bilirubin 0.4 0.0 - 1.2 mg/dL    Globulin 2.6 gm/dL    A/G Ratio 1.5 g/dL    BUN/Creatinine Ratio 31.8 (H) 7.0 - 25.0    Anion Gap 13.0 5.0 - 15.0 mmol/L    eGFR 71.1 >60.0 mL/min/1.73   CBC Auto Differential    Collection Time: 06/11/23  6:09 PM    Specimen: Blood   Result Value Ref Range    WBC 18.23 (H) 3.40 - 10.80 10*3/mm3    RBC 5.18 3.77 - 5.28 10*6/mm3    Hemoglobin 14.8 12.0 - 15.9 g/dL    Hematocrit 43.2 34.0 - 46.6 %    MCV 83.4 79.0 - 97.0 fL    MCH 28.6 26.6 - 33.0 pg     MCHC 34.3 31.5 - 35.7 g/dL    RDW 15.2 12.3 - 15.4 %    RDW-SD 46.3 37.0 - 54.0 fl    MPV 10.3 6.0 - 12.0 fL    Platelets 275 140 - 450 10*3/mm3    Neutrophil % 92.0 (H) 42.7 - 76.0 %    Lymphocyte % 2.3 (L) 19.6 - 45.3 %    Monocyte % 4.1 (L) 5.0 - 12.0 %    Eosinophil % 0.1 (L) 0.3 - 6.2 %    Basophil % 0.1 0.0 - 1.5 %    Immature Grans % 1.4 (H) 0.0 - 0.5 %    Neutrophils, Absolute 16.77 (H) 1.70 - 7.00 10*3/mm3    Lymphocytes, Absolute 0.42 (L) 0.70 - 3.10 10*3/mm3    Monocytes, Absolute 0.74 0.10 - 0.90 10*3/mm3    Eosinophils, Absolute 0.02 0.00 - 0.40 10*3/mm3    Basophils, Absolute 0.02 0.00 - 0.20 10*3/mm3    Immature Grans, Absolute 0.26 (H) 0.00 - 0.05 10*3/mm3    nRBC 0.0 0.0 - 0.2 /100 WBC   aPTT    Collection Time: 06/11/23  6:09 PM    Specimen: Blood   Result Value Ref Range    PTT 25.2 22.7 - 35.4 seconds   Protime-INR    Collection Time: 06/11/23  6:09 PM    Specimen: Blood   Result Value Ref Range    Protime 12.4 11.7 - 14.2 Seconds    INR 0.91 0.90 - 1.10   High Sensitivity Troponin T    Collection Time: 06/11/23  6:09 PM    Specimen: Blood   Result Value Ref Range    HS Troponin T 14 (H) <10 ng/L   BNP    Collection Time: 06/11/23  6:09 PM    Specimen: Blood   Result Value Ref Range    proBNP 331.0 0.0 - 1,800.0 pg/mL   Duplex Venous Upper Extremity - Left    Collection Time: 06/11/23  7:04 PM   Result Value Ref Range    Right Internal Jugular Spont Y     Right Internal Jugular Phasic Y     Right Internal Jugular Compress C     Right Internal Jugular Augment Y     Right Subclavian Spont Y     Right Subclavian Phasic Y     Right Subclavian Compress C     Right Subclavian Augment Y     Left Internal Jugular Spont Y     Left Internal Jugular Phasic Y     Left Internal Jugular Compress C     Left Internal Jugular Augment Y     Left Subclavian Spont Y     Left Subclavian Phasic Y     Left Subclavian Compress C     Left Subclavian Augment Y     Left Axillary Spont Y     Left Axillary Phasic Y      Left Axillary Compress C     Left Axillary Augment Y     Left Brachial Compress C     Left Radial Compress C     Left Ulnar Compress C     Left Basilic Upper Compress C     Left Basilic Forearm Compress C     Left Cephalic Upper Compress C     Left Cephalic Forearm Compress C     BH CV VAS PRELIMINARY FINDINGS SCRIPTING 1.0        Ordered the above labs and independently reviewed and interpreted the results.        RADIOLOGY  CT Angiogram Chest Pulmonary Embolism   Final Result   Addendum (preliminary) 1 of 1   FINDINGS were called to Dr. Ozuna at 8:35 PM.       This report was finalized on 6/11/2023 8:38 PM by Dr. Kady Barrios M.D.          Final       1. The patient has a filling defect identified within the main pulmonary   artery. It may represent a thrombus, or even a vegetation, given its   proximity to the pulmonary valve. Correlation with echo is recommended.   No distal emboli or acute infiltrates are seen.       Radiation dose reduction techniques were utilized, including automated   exposure control and exposure modulation based on body size.       This report was finalized on 6/11/2023 8:21 PM by Dr. Kady Barrios M.D.          CT Head Without Contrast   Final Result       1. Examination is compromised by the lack of immediate postoperative CT   for comparison.   2. There are postoperative changes noted within the right parietal lobe,   which appear similar to the postoperative MRI. No superimposed acute   hemorrhage is seen. Mild midline shift is stable to perhaps slightly   improved.   2. There is a small lesion noted within the left occipital lobe, which   likely corresponds to known metastasis. An additional metastasis within   the midline cerebellum appears more hyperdense than on prior CT,   although the interim MRI suggested some new hemorrhage within it, which   may account for its more hyperdense appearance.       FINDINGS were called to Dr. Ozuna at 8:35 PM.       Radiation dose  reduction techniques were utilized, including automated   exposure control and exposure modulation based on body size.       This report was finalized on 6/11/2023 8:37 PM by Dr. Kady Barrios M.D.              I ordered the above noted radiological studies. Independently reviewed and interpreted by me.  See dictation for official radiology interpretation.      PROCEDURES    Procedures      MEDICATIONS GIVEN IN ER    Medications   sodium chloride 0.9 % flush 10 mL (has no administration in time range)   sodium chloride 0.9 % flush 10 mL (10 mL Intravenous Given 6/13/23 2103)   sodium chloride 0.9 % flush 10 mL (has no administration in time range)   sodium chloride 0.9 % flush 20 mL (has no administration in time range)   sodium chloride 0.9 % infusion 40 mL (has no administration in time range)   heparin injection 500 Units (has no administration in time range)   acyclovir (ZOVIRAX) tablet 800 mg (800 mg Oral Given 6/13/23 2101)   hydrALAZINE (APRESOLINE) injection 10 mg ( Intravenous Canceled Entry 6/12/23 0147)   sodium chloride 0.9 % flush 10 mL (10 mL Intravenous Given 6/13/23 2103)   sodium chloride 0.9 % flush 10 mL (has no administration in time range)   sodium chloride 0.9 % infusion 40 mL (has no administration in time range)   sennosides-docusate (PERICOLACE) 8.6-50 MG per tablet 2 tablet (2 tablets Oral Not Given 6/13/23 2234)     And   polyethylene glycol (MIRALAX) packet 17 g (has no administration in time range)     And   bisacodyl (DULCOLAX) EC tablet 5 mg (has no administration in time range)     And   bisacodyl (DULCOLAX) suppository 10 mg (has no administration in time range)   nitroglycerin (NITROSTAT) SL tablet 0.4 mg (has no administration in time range)   acetaminophen (TYLENOL) tablet 650 mg (650 mg Oral Given 6/13/23 1821)     Or   acetaminophen (TYLENOL) 160 MG/5ML solution 650 mg ( Oral Not Given:  See Alt 6/13/23 1821)     Or   acetaminophen (TYLENOL) suppository 650 mg ( Rectal Not  Given:  See Alt 6/13/23 1821)   ondansetron (ZOFRAN) injection 4 mg (has no administration in time range)   gabapentin (NEURONTIN) capsule 100 mg (100 mg Oral Given 6/13/23 2101)   pantoprazole (PROTONIX) EC tablet 40 mg (40 mg Oral Given 6/13/23 0559)   levETIRAcetam (KEPPRA) tablet 500 mg (500 mg Oral Given 6/13/23 2101)   HYDROcodone-acetaminophen (NORCO) 5-325 MG per tablet 1 tablet (1 tablet Oral Not Given 6/12/23 1446)   lisinopril (PRINIVIL,ZESTRIL) tablet 40 mg (40 mg Oral Given 6/13/23 0817)   dexamethasone (DECADRON) tablet 2 mg (2 mg Oral Given 6/13/23 2101)   iopamidol (ISOVUE-370) 76 % injection 100 mL (95 mL Intravenous Given 6/11/23 1936)   dexamethasone (DECADRON) tablet 2 mg (2 mg Oral Given 6/12/23 1439)   perflutren (DEFINITY) 8.476 mg in Sodium Chloride (PF) 0.9 % 10 mL injection (1 mL Intravenous Given 6/12/23 0914)         PROGRESS, DATA ANALYSIS, CONSULTS, AND MEDICAL DECISION MAKING    All labs have been independently reviewed and interpreted by me.  All radiology studies have been independently reviewed and interpreted by me and discussed with radiologist dictating the report.   EKG's independently reviewed and interpreted by me.  Discussion below represents my analysis of pertinent findings related to patient's condition, differential diagnosis, treatment plan and final disposition.    Differential diagnosis: DVT, PE, herpes zoster, cervical radiculopathy, ACS    ED Course as of 06/13/23 2313   Sun Jun 11, 2023 1822 WBC(!): 18.23  Persistent leukocytosis, no significant change over from labs in the past 12 days [DC]   1823 Hemoglobin: 14.8 [DC]   1823 Hematocrit: 43.2 [DC]   1823 Platelets: 275 [DC]   1849 Glucose(!): 130 [DC]   1849 BUN(!): 27 [DC]   1849 Creatinine: 0.85 [DC]   1849 Sodium(!): 135 [DC]   1849 proBNP: 331.0 [DC]   1849 HS Troponin T(!): 14  15/16 two weeks ago [DC]   1909 Discussed case with Maria Luisa, vascular , who reports preliminary report of US ELIE schneider  negative for DVT or superficial thrombophlebitis. [DC]   2017 CT Head Without Contrast  Radiology study independently interpreted by me and my findings are postsurgical changes without large acute intracranial hemorrhage.   [DC]   2036 CT Angiogram chest shows new filling defect concerning for thrombus vs vegetation and echocardiogram is recommended. Will admit for further care. [DC]   2101 Discussed case with Dr. Whitlock, [facility], who will admit the patient. Recommends administering heparin infusion without bolus.  [DC]      ED Course User Index  [DC] Kavitha Sultana PA           PPE: Patient was placed in face mask in first look. Patient was wearing facemask when I entered the room and throughout our encounter. I wore full protective equipment throughout this patient encounter including a face mask, and gloves. Hand hygiene was performed before donning protective equipment and after removal when leaving the room.        AS OF 23:13 EDT VITALS:    BP - 116/51  HR - 81  TEMP - 98.1 °F (36.7 °C) (Oral)  O2 SATS - 92%        DIAGNOSIS  Final diagnoses:   Abnormal CT of the chest   Herpes zoster without complication         DISPOSITION  ED Disposition       ED Disposition   Decision to Admit    Condition   --    Comment   Level of Care: Telemetry [5]   Diagnosis: Abnormal CT of the chest [227988]   Admitting Physician: BRITTANY WHITLOCK [[id number]]   Attending Physician: BRITTANY WHITLOCK [[id number]]   Certification: I Certify That Inpatient Hospital Services Are Medically Necessary For Greater Than 2 Midnights                    Note Disclaimer: At [facility], we believe that sharing information builds trust and better relationships. You are receiving this note because you recently visited [facility]. It is possible you will see health information before a provider has talked with you about it. This kind of information can be easy to misunderstand. To help you fully understand what it means for your  health, we urge you to discuss this note with your provider.         Kavitha Sultana PA  06/13/23 2778

## 2023-06-11 NOTE — TELEPHONE ENCOUNTER
Patient's niece called reporting intense pain in patient's left upper extremity extending from her neck to her hands with swelling and purple blistering.  She reports pain started on Friday but has continued to worsen.  Patient has been taking Tylenol arthritis with minimal relief.  She has a history of metastatic lung disease and just had a right craniotomy by Dr. Ricketts at the end of May.  Patient is scheduled for follow-up with Dr. Ricketts later this week.  Patient is instructed to go to the emergency room for further work-up and acute pain management.  I did call [facility] emergency room and spoke to the triage nurse to give her a heads up.

## 2023-06-11 NOTE — ED NOTES
Pt arrives in a wheelchair to triage for numbness and pain in the left arm that started yesterday morning. Pt had an arterial line in that arm. Pt had a mass removed from her brain about two weeks ago.

## 2023-06-11 NOTE — ED PROVIDER NOTES
"MD ATTESTATION NOTE    The TRACEY and I have discussed this patient's history, physical exam, and treatment plan.  I have reviewed the documentation and personally had a face to face interaction with the patient. I affirm the documentation and agree with the treatment and plan.  The attached note describes my personal findings.      I provided a substantive portion of the care of the patient.  I personally performed the physical exam in its entirety, and below are my findings.  For this patient encounter, the patient wore surgical mask, I wore full protective PPE including N95 and eye protection.      Brief HPI: Patient presents for evaluation of new onset left-sided arm pain, numbness and a rash as well as pain in the left upper back area.  Patient has a history of metastatic lung cancer and had a recent craniotomy surgery this past month.  She expresses concern about the possibility of a \"blood clot\" in her left arm.  She denies chest pain.    PHYSICAL EXAM  ED Triage Vitals [06/11/23 1605]   Temp Heart Rate Resp BP SpO2   98.5 °F (36.9 °C) 103 20 (!) 167/102 97 %      Temp src Heart Rate Source Patient Position BP Location FiO2 (%)   Oral Monitor -- -- --         GENERAL: Calm, no diaphoresis no acute distress  HENT: nares patent, normocephalic, atraumatic  EYES: no scleral icterus, normal conjunctiva  CV: Normal pulses, normal rate  RESPIRATORY: normal effort, no stridor  ABDOMEN: soft, nontender  MUSCULOSKELETAL: no deformity  NEURO: alert, moves all extremities, follows commands  PSYCH:  calm, cooperative  SKIN: warm, dry, there is a vesicular rash notable to the left hand, wrist, forearm and upper back soft tissues in the thoracic region.  This is classic for shingles.    Vital signs and nursing notes reviewed.        Plan: Physical exam is highly suggestive of shingles.  However given complaint of back pain and arm swelling in the context of recent significant neurosurgical procedure, we will also rule out DVT " and pulmonary bliss obtaining ultrasound and CT angiogram studies.  If these are normal and reassuring then I think patient can probably be discharged home for typical routine symptomatic management of shingles diagnosis.       Jack Ozuna MD  06/11/23 2366

## 2023-06-12 ENCOUNTER — APPOINTMENT (OUTPATIENT)
Dept: CARDIOLOGY | Facility: HOSPITAL | Age: 78
DRG: 595 | End: 2023-06-12
Payer: MEDICARE

## 2023-06-12 LAB
ANION GAP SERPL CALCULATED.3IONS-SCNC: 8.6 MMOL/L (ref 5–15)
AORTIC DIMENSIONLESS INDEX: 0.7 (DI)
AV HCM GRAD VALS: 29 MMHG
AV LVOT PEAK GRADIENT: 16 MMHG
BH CV ECHO MEAS - AO MAX PG: 10 MMHG
BH CV ECHO MEAS - AO MEAN PG: 5 MMHG
BH CV ECHO MEAS - AO ROOT DIAM: 3.1 CM
BH CV ECHO MEAS - AO V2 MAX: 158.1 CM/SEC
BH CV ECHO MEAS - AO V2 VTI: 28.5 CM
BH CV ECHO MEAS - AVA(I,D): 1.71 CM2
BH CV ECHO MEAS - CONTRAST EF 4CH: 66 CM2
BH CV ECHO MEAS - EDV(CUBED): 66.6 ML
BH CV ECHO MEAS - EDV(MOD-SP2): 54 ML
BH CV ECHO MEAS - EDV(MOD-SP4): 74 ML
BH CV ECHO MEAS - EF(MOD-BP): 66.7 %
BH CV ECHO MEAS - EF(MOD-SP2): 70.4 %
BH CV ECHO MEAS - EF(MOD-SP4): 66.2 %
BH CV ECHO MEAS - ESV(CUBED): 18.5 ML
BH CV ECHO MEAS - ESV(MOD-SP2): 16 ML
BH CV ECHO MEAS - ESV(MOD-SP4): 25 ML
BH CV ECHO MEAS - FS: 34.8 %
BH CV ECHO MEAS - IVS/LVPW: 1 CM
BH CV ECHO MEAS - IVSD: 1.03 CM
BH CV ECHO MEAS - LAT PEAK E' VEL: 6.3 CM/SEC
BH CV ECHO MEAS - LV DIASTOLIC VOL/BSA (35-75): 50.3 CM2
BH CV ECHO MEAS - LV MASS(C)D: 134.9 GRAMS
BH CV ECHO MEAS - LV MAX PG: 5.8 MMHG
BH CV ECHO MEAS - LV MEAN PG: 3.4 MMHG
BH CV ECHO MEAS - LV SYSTOLIC VOL/BSA (12-30): 17 CM2
BH CV ECHO MEAS - LV V1 MAX: 120.3 CM/SEC
BH CV ECHO MEAS - LV V1 VTI: 21.2 CM
BH CV ECHO MEAS - LVIDD: 4.1 CM
BH CV ECHO MEAS - LVIDS: 2.6 CM
BH CV ECHO MEAS - LVOT AREA: 2.3 CM2
BH CV ECHO MEAS - LVOT DIAM: 1.71 CM
BH CV ECHO MEAS - LVPWD: 1.03 CM
BH CV ECHO MEAS - MED PEAK E' VEL: 4.1 CM/SEC
BH CV ECHO MEAS - MV A DUR: 0.15 SEC
BH CV ECHO MEAS - MV A MAX VEL: 142.6 CM/SEC
BH CV ECHO MEAS - MV DEC SLOPE: 197.4 CM/SEC2
BH CV ECHO MEAS - MV DEC TIME: 298 MSEC
BH CV ECHO MEAS - MV E MAX VEL: 73.2 CM/SEC
BH CV ECHO MEAS - MV E/A: 0.51
BH CV ECHO MEAS - MV MAX PG: 8.4 MMHG
BH CV ECHO MEAS - MV MEAN PG: 3.1 MMHG
BH CV ECHO MEAS - MV P1/2T: 125.9 MSEC
BH CV ECHO MEAS - MV V2 VTI: 27.3 CM
BH CV ECHO MEAS - MVA(P1/2T): 1.75 CM2
BH CV ECHO MEAS - MVA(VTI): 1.79 CM2
BH CV ECHO MEAS - PA ACC TIME: 0.13 SEC
BH CV ECHO MEAS - PA V2 MAX: 117.4 CM/SEC
BH CV ECHO MEAS - RAP SYSTOLE: 3 MMHG
BH CV ECHO MEAS - RV MAX PG: 5.3 MMHG
BH CV ECHO MEAS - RV V1 MAX: 115 CM/SEC
BH CV ECHO MEAS - RV V1 VTI: 23.7 CM
BH CV ECHO MEAS - RVSP: 38.8 MMHG
BH CV ECHO MEAS - SI(MOD-SP2): 25.9 ML/M2
BH CV ECHO MEAS - SI(MOD-SP4): 33.3 ML/M2
BH CV ECHO MEAS - SV(LVOT): 48.7 ML
BH CV ECHO MEAS - SV(MOD-SP2): 38 ML
BH CV ECHO MEAS - SV(MOD-SP4): 49 ML
BH CV ECHO MEAS - TAPSE (>1.6): 1.9 CM
BH CV ECHO MEAS - TR MAX PG: 35.8 MMHG
BH CV ECHO MEAS - TR MAX VEL: 299.1 CM/SEC
BH CV ECHO MEASUREMENTS AVERAGE E/E' RATIO: 14.08
BH CV LOWER VASCULAR LEFT COMMON FEMORAL AUGMENT: NORMAL
BH CV LOWER VASCULAR LEFT COMMON FEMORAL COMPETENT: NORMAL
BH CV LOWER VASCULAR LEFT COMMON FEMORAL COMPRESS: NORMAL
BH CV LOWER VASCULAR LEFT COMMON FEMORAL PHASIC: NORMAL
BH CV LOWER VASCULAR LEFT COMMON FEMORAL SPONT: NORMAL
BH CV LOWER VASCULAR LEFT DISTAL FEMORAL COMPRESS: NORMAL
BH CV LOWER VASCULAR LEFT GASTRONEMIUS COMPRESS: NORMAL
BH CV LOWER VASCULAR LEFT GREATER SAPH AK COMPRESS: NORMAL
BH CV LOWER VASCULAR LEFT GREATER SAPH BK COMPRESS: NORMAL
BH CV LOWER VASCULAR LEFT LESSER SAPH COMPRESS: NORMAL
BH CV LOWER VASCULAR LEFT MID FEMORAL AUGMENT: NORMAL
BH CV LOWER VASCULAR LEFT MID FEMORAL COMPETENT: NORMAL
BH CV LOWER VASCULAR LEFT MID FEMORAL COMPRESS: NORMAL
BH CV LOWER VASCULAR LEFT MID FEMORAL PHASIC: NORMAL
BH CV LOWER VASCULAR LEFT MID FEMORAL SPONT: NORMAL
BH CV LOWER VASCULAR LEFT PERONEAL COMPRESS: NORMAL
BH CV LOWER VASCULAR LEFT POPLITEAL AUGMENT: NORMAL
BH CV LOWER VASCULAR LEFT POPLITEAL COMPETENT: NORMAL
BH CV LOWER VASCULAR LEFT POPLITEAL COMPRESS: NORMAL
BH CV LOWER VASCULAR LEFT POPLITEAL PHASIC: NORMAL
BH CV LOWER VASCULAR LEFT POPLITEAL SPONT: NORMAL
BH CV LOWER VASCULAR LEFT POSTERIOR TIBIAL COMPRESS: NORMAL
BH CV LOWER VASCULAR LEFT PROFUNDA FEMORAL COMPRESS: NORMAL
BH CV LOWER VASCULAR LEFT PROXIMAL FEMORAL COMPRESS: NORMAL
BH CV LOWER VASCULAR LEFT SAPHENOFEMORAL JUNCTION COMPRESS: NORMAL
BH CV LOWER VASCULAR RIGHT COMMON FEMORAL AUGMENT: NORMAL
BH CV LOWER VASCULAR RIGHT COMMON FEMORAL COMPETENT: NORMAL
BH CV LOWER VASCULAR RIGHT COMMON FEMORAL COMPRESS: NORMAL
BH CV LOWER VASCULAR RIGHT COMMON FEMORAL PHASIC: NORMAL
BH CV LOWER VASCULAR RIGHT COMMON FEMORAL SPONT: NORMAL
BH CV LOWER VASCULAR RIGHT DISTAL FEMORAL COMPRESS: NORMAL
BH CV LOWER VASCULAR RIGHT GASTRONEMIUS COMPRESS: NORMAL
BH CV LOWER VASCULAR RIGHT GREATER SAPH AK COMPRESS: NORMAL
BH CV LOWER VASCULAR RIGHT GREATER SAPH BK COMPRESS: NORMAL
BH CV LOWER VASCULAR RIGHT LESSER SAPH COMPRESS: NORMAL
BH CV LOWER VASCULAR RIGHT MID FEMORAL AUGMENT: NORMAL
BH CV LOWER VASCULAR RIGHT MID FEMORAL COMPETENT: NORMAL
BH CV LOWER VASCULAR RIGHT MID FEMORAL COMPRESS: NORMAL
BH CV LOWER VASCULAR RIGHT MID FEMORAL PHASIC: NORMAL
BH CV LOWER VASCULAR RIGHT MID FEMORAL SPONT: NORMAL
BH CV LOWER VASCULAR RIGHT PERONEAL COMPRESS: NORMAL
BH CV LOWER VASCULAR RIGHT POPLITEAL AUGMENT: NORMAL
BH CV LOWER VASCULAR RIGHT POPLITEAL COMPETENT: NORMAL
BH CV LOWER VASCULAR RIGHT POPLITEAL COMPRESS: NORMAL
BH CV LOWER VASCULAR RIGHT POPLITEAL PHASIC: NORMAL
BH CV LOWER VASCULAR RIGHT POPLITEAL SPONT: NORMAL
BH CV LOWER VASCULAR RIGHT POSTERIOR TIBIAL COMPRESS: NORMAL
BH CV LOWER VASCULAR RIGHT PROFUNDA FEMORAL COMPRESS: NORMAL
BH CV LOWER VASCULAR RIGHT PROXIMAL FEMORAL COMPRESS: NORMAL
BH CV LOWER VASCULAR RIGHT SAPHENOFEMORAL JUNCTION COMPRESS: NORMAL
BH CV UPPER VENOUS LEFT AXILLARY AUGMENT: NORMAL
BH CV UPPER VENOUS LEFT AXILLARY COMPRESS: NORMAL
BH CV UPPER VENOUS LEFT AXILLARY PHASIC: NORMAL
BH CV UPPER VENOUS LEFT AXILLARY SPONT: NORMAL
BH CV UPPER VENOUS LEFT BASILIC FOREARM COMPRESS: NORMAL
BH CV UPPER VENOUS LEFT BASILIC UPPER COMPRESS: NORMAL
BH CV UPPER VENOUS LEFT BRACHIAL COMPRESS: NORMAL
BH CV UPPER VENOUS LEFT CEPHALIC FOREARM COMPRESS: NORMAL
BH CV UPPER VENOUS LEFT CEPHALIC UPPER COMPRESS: NORMAL
BH CV UPPER VENOUS LEFT INTERNAL JUGULAR AUGMENT: NORMAL
BH CV UPPER VENOUS LEFT INTERNAL JUGULAR COMPRESS: NORMAL
BH CV UPPER VENOUS LEFT INTERNAL JUGULAR PHASIC: NORMAL
BH CV UPPER VENOUS LEFT INTERNAL JUGULAR SPONT: NORMAL
BH CV UPPER VENOUS LEFT RADIAL COMPRESS: NORMAL
BH CV UPPER VENOUS LEFT SUBCLAVIAN AUGMENT: NORMAL
BH CV UPPER VENOUS LEFT SUBCLAVIAN COMPRESS: NORMAL
BH CV UPPER VENOUS LEFT SUBCLAVIAN PHASIC: NORMAL
BH CV UPPER VENOUS LEFT SUBCLAVIAN SPONT: NORMAL
BH CV UPPER VENOUS LEFT ULNAR COMPRESS: NORMAL
BH CV UPPER VENOUS RIGHT INTERNAL JUGULAR AUGMENT: NORMAL
BH CV UPPER VENOUS RIGHT INTERNAL JUGULAR COMPRESS: NORMAL
BH CV UPPER VENOUS RIGHT INTERNAL JUGULAR PHASIC: NORMAL
BH CV UPPER VENOUS RIGHT INTERNAL JUGULAR SPONT: NORMAL
BH CV UPPER VENOUS RIGHT SUBCLAVIAN AUGMENT: NORMAL
BH CV UPPER VENOUS RIGHT SUBCLAVIAN COMPRESS: NORMAL
BH CV UPPER VENOUS RIGHT SUBCLAVIAN PHASIC: NORMAL
BH CV UPPER VENOUS RIGHT SUBCLAVIAN SPONT: NORMAL
BH CV VAS BP RIGHT ARM: NORMAL MMHG
BH CV VAS PRELIMINARY FINDINGS SCRIPTING: 1
BH CV XLRA - RV BASE: 2.22 CM
BH CV XLRA - RV LENGTH: 4.8 CM
BH CV XLRA - RV MID: 1.81 CM
BH CV XLRA - TDI S': 17.7 CM/SEC
BUN SERPL-MCNC: 32 MG/DL (ref 8–23)
BUN/CREAT SERPL: 33.7 (ref 7–25)
CALCIUM SPEC-SCNC: 9.4 MG/DL (ref 8.6–10.5)
CHLORIDE SERPL-SCNC: 101 MMOL/L (ref 98–107)
CO2 SERPL-SCNC: 24.4 MMOL/L (ref 22–29)
CREAT SERPL-MCNC: 0.95 MG/DL (ref 0.57–1)
DEPRECATED RDW RBC AUTO: 47 FL (ref 37–54)
EGFRCR SERPLBLD CKD-EPI 2021: 62.2 ML/MIN/1.73
ERYTHROCYTE [DISTWIDTH] IN BLOOD BY AUTOMATED COUNT: 15.5 % (ref 12.3–15.4)
GLUCOSE SERPL-MCNC: 94 MG/DL (ref 65–99)
HCT VFR BLD AUTO: 41.1 % (ref 34–46.6)
HGB BLD-MCNC: 13.9 G/DL (ref 12–15.9)
LEFT ATRIUM VOLUME INDEX: 28.5 ML/M2
MAXIMAL PREDICTED HEART RATE: 144 BPM
MCH RBC QN AUTO: 28.5 PG (ref 26.6–33)
MCHC RBC AUTO-ENTMCNC: 33.8 G/DL (ref 31.5–35.7)
MCV RBC AUTO: 84.2 FL (ref 79–97)
PLATELET # BLD AUTO: 272 10*3/MM3 (ref 140–450)
PMV BLD AUTO: 10.5 FL (ref 6–12)
POTASSIUM SERPL-SCNC: 4.1 MMOL/L (ref 3.5–5.2)
QT INTERVAL: 380 MS
RBC # BLD AUTO: 4.88 10*6/MM3 (ref 3.77–5.28)
SODIUM SERPL-SCNC: 134 MMOL/L (ref 136–145)
STRESS TARGET HR: 122 BPM
WBC NRBC COR # BLD: 16.27 10*3/MM3 (ref 3.4–10.8)

## 2023-06-12 PROCEDURE — 25510000001 PERFLUTREN (DEFINITY) 8.476 MG IN SODIUM CHLORIDE (PF) 0.9 % 10 ML INJECTION: Performed by: INTERNAL MEDICINE

## 2023-06-12 PROCEDURE — 99222 1ST HOSP IP/OBS MODERATE 55: CPT | Performed by: INTERNAL MEDICINE

## 2023-06-12 PROCEDURE — 93970 EXTREMITY STUDY: CPT

## 2023-06-12 PROCEDURE — 85027 COMPLETE CBC AUTOMATED: CPT | Performed by: STUDENT IN AN ORGANIZED HEALTH CARE EDUCATION/TRAINING PROGRAM

## 2023-06-12 PROCEDURE — 80048 BASIC METABOLIC PNL TOTAL CA: CPT | Performed by: STUDENT IN AN ORGANIZED HEALTH CARE EDUCATION/TRAINING PROGRAM

## 2023-06-12 PROCEDURE — 93306 TTE W/DOPPLER COMPLETE: CPT

## 2023-06-12 RX ORDER — HYDROCODONE BITARTRATE AND ACETAMINOPHEN 5; 325 MG/1; MG/1
1 TABLET ORAL EVERY 4 HOURS PRN
Status: DISCONTINUED | OUTPATIENT
Start: 2023-06-12 | End: 2023-06-14 | Stop reason: HOSPADM

## 2023-06-12 RX ORDER — DEXAMETHASONE 2 MG/1
2 TABLET ORAL EVERY 12 HOURS SCHEDULED
Status: DISCONTINUED | OUTPATIENT
Start: 2023-06-13 | End: 2023-06-14 | Stop reason: HOSPADM

## 2023-06-12 RX ORDER — LISINOPRIL 40 MG/1
40 TABLET ORAL
Status: DISCONTINUED | OUTPATIENT
Start: 2023-06-12 | End: 2023-06-14 | Stop reason: HOSPADM

## 2023-06-12 RX ADMIN — PERFLUTREN 1 ML: 6.52 INJECTION, SUSPENSION INTRAVENOUS at 09:14

## 2023-06-12 RX ADMIN — ACYCLOVIR 800 MG: 400 TABLET ORAL at 12:15

## 2023-06-12 RX ADMIN — LEVETIRACETAM 500 MG: 500 TABLET, FILM COATED ORAL at 09:15

## 2023-06-12 RX ADMIN — LEVETIRACETAM 500 MG: 500 TABLET, FILM COATED ORAL at 20:42

## 2023-06-12 RX ADMIN — ACETAMINOPHEN 650 MG: 325 TABLET, FILM COATED ORAL at 16:36

## 2023-06-12 RX ADMIN — Medication 10 ML: at 21:57

## 2023-06-12 RX ADMIN — ACYCLOVIR 800 MG: 400 TABLET ORAL at 06:03

## 2023-06-12 RX ADMIN — PANTOPRAZOLE SODIUM 40 MG: 40 TABLET, DELAYED RELEASE ORAL at 06:03

## 2023-06-12 RX ADMIN — Medication 10 ML: at 09:16

## 2023-06-12 RX ADMIN — DEXAMETHASONE 2 MG: 2 TABLET ORAL at 00:41

## 2023-06-12 RX ADMIN — ACYCLOVIR 800 MG: 400 TABLET ORAL at 21:56

## 2023-06-12 RX ADMIN — DEXAMETHASONE 2 MG: 2 TABLET ORAL at 14:39

## 2023-06-12 RX ADMIN — GABAPENTIN 100 MG: 100 CAPSULE ORAL at 21:56

## 2023-06-12 RX ADMIN — ACETAMINOPHEN 650 MG: 325 TABLET, FILM COATED ORAL at 12:15

## 2023-06-12 RX ADMIN — GABAPENTIN 100 MG: 100 CAPSULE ORAL at 14:39

## 2023-06-12 RX ADMIN — LEVETIRACETAM 500 MG: 500 TABLET, FILM COATED ORAL at 00:41

## 2023-06-12 RX ADMIN — ACETAMINOPHEN 650 MG: 325 TABLET, FILM COATED ORAL at 06:03

## 2023-06-12 RX ADMIN — LISINOPRIL 40 MG: 40 TABLET ORAL at 09:15

## 2023-06-12 RX ADMIN — ACYCLOVIR 800 MG: 400 TABLET ORAL at 18:36

## 2023-06-12 RX ADMIN — ACETAMINOPHEN 650 MG: 325 TABLET, FILM COATED ORAL at 20:42

## 2023-06-12 RX ADMIN — DEXAMETHASONE 2 MG: 2 TABLET ORAL at 09:15

## 2023-06-12 RX ADMIN — ACYCLOVIR 800 MG: 400 TABLET ORAL at 14:39

## 2023-06-12 NOTE — DISCHARGE PLACEMENT REQUEST
"Shana Vidal (78 y.o. Female)       Date of Birth   1945    Social Security Number       Address   [address] [place] [address] [place] [facility]    Home Phone   [phone]    MRN   [id number]       Restorationism   Non-Sabianist    Marital Status                               Admission Date   6/11/23    Admission Type   Emergency    Admitting Provider   Song Chacko MD    Attending Provider   Simone Nelson MD    Department, Room/Bed   [address], S615/1       Discharge Date       Discharge Disposition       Discharge Destination                                 Attending Provider: Simone Nelson MD    Allergies: Del-mycin [Erythromycin], Gentamicin, Ibuprofen, Latex, Metronidazole, Ofloxacin, Penicillins, Tetracycline, Adhesive Tape, Aspirin, Codeine    Isolation: Contact Air   Infection: Disseminated Herpes Zoster (06/12/23)   Code Status: CPR    Ht: 152.4 cm (60\")   Wt: 51.7 kg (114 lb)    Admission Cmt: None   Principal Problem: Abnormal CT of the chest [R93.89]                   Active Insurance as of 6/11/2023       Primary Coverage       Payor Plan Insurance Group Employer/Plan Group    MEDICARE MEDICARE A & B        Payor Plan Address Payor Plan Phone Number Payor Plan Fax Number Effective Dates    [address] [phone]  2/1/2012 - None Entered    [facility] [id number]         Subscriber Name Subscriber Birth Date Member ID       SHANA VIDAL 1945 [id number]               Secondary Coverage       Payor Plan Insurance Group Employer/Plan Group    [employer] AAR HE[employer] CARE OPTIONS        Payor Plan Address Payor Plan Phone Number Payor Plan Fax Number Effective Dates    [facility] [phone]  1/1/2019 - None Entered    [address]       [facility] [address]         Subscriber Name Subscriber Birth Date Member ID       SHANA VIDAL 1945 [id number]                     Emergency Contacts        " (Rel.) Home Phone Work Phone Mobile Phone    ZIA DURANT (Relative) [phone] -- [phone]    Matthew Shah (Daughter) -- -- [phone]    Espinoza Vidal (Son) [phone] -- [phone]

## 2023-06-12 NOTE — CONSULTS
REASON FOR CONSULTATION:  lung cancer, ?PE  Provide an opinion on any further workup or treatment                             REQUESTING PHYSICIAN:  Omar     RECORDS OBTAINED:  Records of the patients history including those obtained from the referring provider were reviewed and summarized in detail.     History of Present Illness   This is a 78-year-old woman followed by Dr. 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  She then initiated durvalumab November 2022 and continued through 5/15/2023.  She was admitted latter part of May 2023 with leg weakness and imbalance and imaging of the brain by MRI 5/25/2023 showed 3 enhancing lesions in the brain largest in the posterior superior lateral right occipital lobe 3.7 x 2.9 x 2.7 cm with subacute blood products and significant vasogenic edema including 3 to 4 mm of midline shift; tiny second metastasis left occipital cortex 4 mm and third in the superior cerebellum 7 mm.  CT chest abdomen pelvis and bone scan 5/29/2023 showed a stable right upper lobe suprahilar mass and new sclerotic changes right eighth rib anteriorly which could be healing fracture or sclerotic metastatic disease.  She underwent a right craniotomy for resection of the right occipital tumor on 5/30/2023 pathology showing poorly differentiated adenocarcinoma consistent with known lung adenocarcinoma.     The patient called in 6/11/2023 with complaints of left upper extremity pain, swelling and a blistering rash.  For evaluation of the arm swelling a venous ultrasound was performed of the left upper extremity negative for DVT and a CT angiogram chest showed a possible filling defect in the main pulmonary artery 6 mm with no evidence of distal pulmonary emboli, stable right suprahilar mass.  D-dimer on presentation minimally elevated 0.88.  She has not required  supplemental oxygen since admission.  She denies to me significant shortness of breath or chest pain.  Dopplers of the bilateral lower extremities negative for DVT.  2D echocardiogram pending.     Medical History        Past Medical History:   Diagnosis Date    COPD (chronic obstructive pulmonary disease)      Coughing up blood       X2 MONTHS    History of COVID-19 02/2022    Hyperlipidemia      IBS (irritable bowel syndrome)      Primary lung adenocarcinoma, right      Rheumatoid arthritis              Surgical History         Past Surgical History:   Procedure Laterality Date    APPENDECTOMY         appendix removed    BRONCHOSCOPY N/A 8/23/2022     Procedure: BRONCHOSCOPY WITH BAL,  BIOPSIES, AND BRUSHINGS WITH ENDOBRONCHIAL ULTRASOUND WITH FNA;  Surgeon: Gregor Vee MD;  Location: [facility] ENDOSCOPY;  Service: Pulmonary;  Laterality: N/A;  PRE- HILAR MASS  POST- SAME    BRONCHOSCOPY N/A 1/4/2023     Procedure: BRONCHOSCOPY with biopsy, lavage, brushing;  Surgeon: Gregor Vee MD;  Location: [facility] ENDOSCOPY;  Service: Pulmonary;  Laterality: N/A;    CAROTID ENDARTERECTOMY Right      CAROTID ENDARTERECTOMY Left      CATARACT EXTRACTION        CERVICAL FUSION        CHOLECYSTECTOMY        CRANIOTOMY FOR TUMOR Right 5/30/2023     Procedure: RIGHT CRANIOTOMY FOR TUMOR RESECTION STEREOTACTIC WITH STEALTH;  Surgeon: Clint Ricketts MD;  Location: [facility] OR;  Service: Neurosurgery;  Laterality: Right;    HYSTERECTOMY        SHOULDER ARTHROSCOPY Right 02/19/2019     right should scope/cuff repair     VENOUS ACCESS DEVICE (PORT) INSERTION Right 9/6/2022     Procedure: POWERPORT INSERTION;  Surgeon: Remedios Rice MD;  Location: [facility] OR;  Service: Thoracic;  Laterality: Right;            No current facility-administered medications on file prior to encounter.             Current Outpatient Medications on File Prior to Encounter   Medication Sig Dispense Refill    azelastine (ASTELIN) 0.1 % nasal spray  1 spray into the nostril(s) as directed by provider.        B COMPLEX VITAMINS ER PO Take  by mouth Daily.        Cholecalciferol (VITAMIN D3) 5000 units capsule capsule Take 2,000 Units by mouth Daily.        esomeprazole (NexIUM) 10 MG packet Take 10 mg by mouth Daily.        estradiol (ESTRACE) 1 MG tablet Take 1 tablet by mouth Daily.        FeroSul 325 (65 Fe) MG tablet TAKE ONE TABLET BY MOUTH DAILY WITH BREAKFAST 30 tablet 5    HYDROcodone Bit-Homatrop MBr (HYCODAN) 5-1.5 MG/5ML solution Take 5 mL by mouth Every 6 (Six) Hours As Needed for Cough. 250 mL 0    levETIRAcetam (KEPPRA) 500 MG tablet Take 1 tablet by mouth 2 (Two) Times a Day for 30 days. 60 tablet 0    lidocaine-prilocaine (EMLA) 2.5-2.5 % cream Apply 1 application topically to the appropriate area as directed Every 2 (Two) Hours As Needed for Mild Pain. 5 g 3    lisinopril (PRINIVIL,ZESTRIL) 5 MG tablet Take 1 tablet by mouth Daily. 30 tablet 0    Multiple Vitamins-Minerals (PRESERVISION AREDS 2+MULTI VIT PO) Take  by mouth.        ofloxacin (OCUFLOX) 0.3 % ophthalmic solution          ondansetron (ZOFRAN) 8 MG tablet Take 1 tablet by mouth 3 (Three) Times a Day As Needed for Nausea or Vomiting. 30 tablet 5    [START ON 6/21/2023] predniSONE (DELTASONE) 10 MG tablet Take 1 tablet by mouth Daily. Resume home dose the day after completion of dexamethasone taper 30 tablet 1    dexamethasone (DECADRON) 2 MG tablet Take 1 tablet by mouth Every 8 (Eight) Hours for 12 doses. 12 tablet 0    dexamethasone (DECADRON) 2 MG tablet Take 1 tablet by mouth Every 12 (Twelve) Hours for 8 doses. 8 tablet 0         ALLERGIES:          Allergies   Allergen Reactions    Del-Mycin [Erythromycin] Hives    Gentamicin Hives    Ibuprofen Nausea And Vomiting    Latex Dermatitis       GLOVES    Metronidazole Hives    Ofloxacin Hives and Nausea Only    Penicillins Hives    Tetracycline Hives    Adhesive Tape Dermatitis       BANDAIDS    Aspirin GI Intolerance       Vomiting  can take EC    Codeine Nausea And Vomiting         Social History   Social History            Socioeconomic History    Marital status:    Tobacco Use    Smoking status: Former       Packs/day: 0.25       Types: Cigarettes       Quit date: 2022       Years since quittin.1    Smokeless tobacco: Never    Tobacco comments:       Former some day smoker of 1-2 cigs   Vaping Use    Vaping Use: Never used   Substance and Sexual Activity    Alcohol use: Yes       Alcohol/week: 2.0 standard drinks       Types: 2 Glasses of wine per week       Comment: occasionally    Drug use: Never    Sexual activity: Defer                  Family History   Problem Relation Age of Onset    Cancer Mother      Cancer Father      Malig Hyperthermia Neg Hx           Review of System  Pertinent for mild vision change, left arm pain and rash, mild memory issues  Denies shortness of breath, fever, chills, chest pain, leg swelling, leg pain, nausea, vomiting, diarrhea, dysuria, hematuria        Objective         Vitals          Vitals:     23 2156 23 2300 23 0004 23 0042   BP: (!) 193/91 (!) 210/87   116/48   BP Location:   Right arm   Right arm   Patient Position:   Lying   Lying   Pulse: 81     97   Resp:   20       Temp:   97.9 °F (36.6 °C)       TempSrc:   Oral       SpO2: 94% 97%       Weight:     52 kg (114 lb 9.6 oz)     Height:                      5/15/2023    11:26 AM   Current Status   ECOG score 0         Physical Exam    CONSTITUTIONAL: pleasant well-developed adult woman in no distress  HEENT: no icterus, no thrush, moist membranes  LYMPH: no cervical or supraclavicular lad  CV: RRR, S1S2, no murmur  RESP: cta bilat, no wheezing, no rales, normal respiratory effort  GI: soft, non-tender, no splenomegaly, +bs  MUSC: no edema  NEURO: alert and oriented x3, normal strength  PSYCH: normal mood and affect, poor memory.  Skin: Left lower arm is wrapped       RECENT LABS:  Hematology       WBC   Date  Value Ref Range Status   06/11/2023 18.23 (H) 3.40 - 10.80 10*3/mm3 Final            RBC   Date Value Ref Range Status   06/11/2023 5.18 3.77 - 5.28 10*6/mm3 Final            Hemoglobin   Date Value Ref Range Status   06/11/2023 14.8 12.0 - 15.9 g/dL Final            Hematocrit   Date Value Ref Range Status   06/11/2023 43.2 34.0 - 46.6 % Final            Platelets   Date Value Ref Range Status   06/11/2023 275 140 - 450 10*3/mm3 Final                Lab Results   Component Value Date     GLUCOSE 130 (H) 06/11/2023     BUN 27 (H) 06/11/2023     CREATININE 0.85 06/11/2023     EGFR 71.1 06/11/2023     BCR 31.8 (H) 06/11/2023     K 4.3 06/11/2023     CO2 23.0 06/11/2023     CALCIUM 9.2 06/11/2023     ALBUMIN 4.0 06/11/2023     BILITOT 0.4 06/11/2023     AST 23 06/11/2023     ALT 44 (H) 06/11/2023      Ct angiogram chest:  Narrative & Impression   CT ANGIOGRAM OF THE CHEST     HISTORY: Shortness of air.     COMPARISON: 06/07/2023     TECHNIQUE: Axial CT imaging was obtained through the thorax. IV contrast  was administered. Three-D reformatted images were obtained.     FINDINGS:  There is a filling defect identified within the main pulmonary artery,  which measures up to 6 mm on the axial series. It may represent some  thrombus. Given its proximity to the pulmonic valve, with vegetation  would be a consideration. Consideration for echo is recommended. I do  not see any distal pulmonary emboli. This finding is new when compared  to prior study. Thoracic aorta measures within normal size limits. There  are coronary artery calcifications. The thyroid gland and trachea are  within normal limits. There is a small hiatal hernia. There are  background emphysematous changes. A previously identified right  suprahilar mass appears stable. There are some surrounding  reticulonodular infiltrates. These appear similar to the recent study.  No acute infiltrates are seen. There is minimal dependent atelectasis.  Mediastinal lymph  nodes do not appear pathologically enlarged. The  findings within the upper abdomen are stable when compared to recent CT  abdomen and pelvis. There is extensive atherosclerotic involvement of  the abdominal aorta. No acute osseous abnormalities are seen. Right  internal jugular vein Mediport terminates within the right atrium.     IMPRESSION:     1. The patient has a filling defect identified within the main pulmonary  artery. It may represent a thrombus, or even a vegetation, given its  proximity to the pulmonary valve. Correlation with echo is recommended.  No distal emboli or acute infiltrates are seen.     Radiation dose reduction techniques were utilized, including automated  exposure control and exposure modulation based on body size.               Assessment & Plan        *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)  Initially treated as stage III disease in the right upper lobe with carboplatin/Taxol and radiation completed 10/27/2022 followed by durvalumab 11/28/2022 through May 2023  May 2023 diagnosed with 3 metastatic lesions in the brain largest right occipital lobe 3.7 x 2.9 x 2.7 cm with significant edema and midline shift, smaller left occipital cortex 4 mm and third lesion midline superior cerebellum 7 mm  5/30/2023-resection of right occipital tumor-pathology showed metastatic poorly differentiated adenocarcinoma consistent with lung primary     *Filling defect main pulmonary artery by CT angiogram 6/11/2023-thrombus versus vegetation  D-dimer mildly elevated 0.88 in the context of recent surgery and malignancy  Patient oxygenating on room air hemodynamically stable  Doppler ultrasound left upper extremity negative for DVT  lower extremity Dopplers negative for DVT  Patient currently not anticoagulated since brain imaging most recently 5/31/2023 MRI showed possible new hemorrhage in the cerebellar metastasis  2D echocardiogram pending     *Shingles left upper extremity  Initiated on  acyclovir and 100 mg 5X per day and gabapentin 100 mg every 8 hours     *Vasogenic edema secondary to brain metastases  Currently on dexamethasone 2 mg every 12 hours     *Leukocytosis likely secondary to steroids    Hematology plan/recommendations:  Agree with holding off anticoagulation given the indeterminate CT, lack of respiratory symptoms, negative extremity Dopplers and risk of worsening hemorrhagic conversion of brain metastases  Follow-up 2D echo results  She has outpatient radiation oncology appointment 6/15/2023 for unresected brain mets  Continue dexamethasone 2 mg every 12 hours  Shingles treatment per admitting  She will need follow-up with Dr. Lagunas after discharge

## 2023-06-12 NOTE — PROGRESS NOTES
Name: Pam Vidal ADMIT: 2023   : 1945  PCP: Nik Mckay MD    MRN: [id number] LOS: 1 days   AGE/SEX: 78 y.o. female  ROOM: [facility]     Subjective   Subjective   Having pain in left arm. No dyspnea. Does feel like vision is blurrier since brain surgery.     Objective   Objective   Vital Signs  Temp:  [97.4 °F (36.3 °C)-97.9 °F (36.6 °C)] 97.4 °F (36.3 °C)  Heart Rate:  [81-99] 82  Resp:  [16-20] 16  BP: (104-210)/(48-91) 104/55  SpO2:  [92 %-97 %] 92 %  on   ;   Device (Oxygen Therapy): room air  Body mass index is 22.26 kg/m².  Physical Exam  Constitutional:       General: She is not in acute distress.     Appearance: She is ill-appearing.   Cardiovascular:      Rate and Rhythm: Normal rate and regular rhythm.      Pulses: Normal pulses.   Pulmonary:      Effort: Pulmonary effort is normal.      Breath sounds: Normal breath sounds.   Abdominal:      General: Abdomen is flat. There is no distension.      Palpations: Abdomen is soft.   Musculoskeletal:      Right lower leg: No edema.      Left lower leg: No edema.   Skin:     General: Skin is warm.      Comments: L arm covered with Kerlex   Neurological:      General: No focal deficit present.      Mental Status: She is alert and oriented to person, place, and time.       Results Review     I reviewed the patient's new clinical results.  Results from last 7 days   Lab Units 23  0649 23  1809   WBC 10*3/mm3 16.27* 18.23*   HEMOGLOBIN g/dL 13.9 14.8   PLATELETS 10*3/mm3 272 275     Results from last 7 days   Lab Units 23  0649 23  1809 23  1735   SODIUM mmol/L 134* 135*  --    POTASSIUM mmol/L 4.1 4.3  --    CHLORIDE mmol/L 101 99  --    CO2 mmol/L 24.4 23.0  --    BUN mg/dL 32* 27*  --    CREATININE mg/dL 0.95 0.85 1.00   GLUCOSE mg/dL 94 130*  --    EGFR mL/min/1.73 62.2 71.1  --      Results from last 7 days   Lab Units 23  1809   ALBUMIN g/dL 4.0   BILIRUBIN mg/dL 0.4   ALK PHOS U/L 82   AST (SGOT) U/L  23   ALT (SGPT) U/L 44*     Results from last 7 days   Lab Units 06/12/23  0649 06/11/23  1809   CALCIUM mg/dL 9.4 9.2   ALBUMIN g/dL  --  4.0       No results found for: HGBA1C, POCGLU    CT Head Without Contrast    Result Date: 6/11/2023   1. Examination is compromised by the lack of immediate postoperative CT for comparison. 2. There are postoperative changes noted within the right parietal lobe, which appear similar to the postoperative MRI. No superimposed acute hemorrhage is seen. Mild midline shift is stable to perhaps slightly improved. 2. There is a small lesion noted within the left occipital lobe, which likely corresponds to known metastasis. An additional metastasis within the midline cerebellum appears more hyperdense than on prior CT, although the interim MRI suggested some new hemorrhage within it, which may account for its more hyperdense appearance.  FINDINGS were called to Dr. Ozuna at 8:35 PM.  Radiation dose reduction techniques were utilized, including automated exposure control and exposure modulation based on body size.  This report was finalized on 6/11/2023 8:37 PM by Dr. Kady Barrios M.D.      CT Angiogram Chest Pulmonary Embolism    Addendum Date: 6/11/2023    FINDINGS were called to Dr. Ozuna at 8:35 PM.  This report was finalized on 6/11/2023 8:38 PM by Dr. Kady Barrios M.D.      Result Date: 6/11/2023   1. The patient has a filling defect identified within the main pulmonary artery. It may represent a thrombus, or even a vegetation, given its proximity to the pulmonary valve. Correlation with echo is recommended. No distal emboli or acute infiltrates are seen.  Radiation dose reduction techniques were utilized, including automated exposure control and exposure modulation based on body size.  This report was finalized on 6/11/2023 8:21 PM by Dr. Kady Barrios M.D.     Scheduled Medications  acyclovir, 800 mg, Oral, 5x Daily  [START ON 6/13/2023] dexamethasone, 2 mg, Oral,  Q12H  gabapentin, 100 mg, Oral, Q8H  levETIRAcetam, 500 mg, Oral, BID  lisinopril, 40 mg, Oral, Q24H  pantoprazole, 40 mg, Oral, Q AM  senna-docusate sodium, 2 tablet, Oral, BID  sodium chloride, 10 mL, Intravenous, Q12H  sodium chloride, 10 mL, Intravenous, Q12H    Infusions   Diet  Diet: Regular/House Diet; Texture: Regular Texture (IDDSI 7); Fluid Consistency: Thin (IDDSI 0)       Assessment/Plan     Active Hospital Problems    Diagnosis  POA    **Abnormal CT of the chest [R93.89]  Yes    Shingles, left arm [B02.9]  Unknown    HTN (hypertension) [I10]  Unknown    COPD (chronic obstructive pulmonary disease) [J44.9]  Yes    Lung cancer metastatic to brain [C34.90, C79.31]  Yes    Primary lung adenocarcinoma, right [C34.91]  Yes      Resolved Hospital Problems   No resolved problems to display.       78 y.o. female admitted with Abnormal CT of the chest.      06/12/23  F/u JUAN JOSE. Will continue holding AC for now.    Herpes zoster, not-disseminated  -LUE  -gabapentin, Norco PRN for pain  -cont acyclovir; consider switching to valacyclovir on dc for easier dosing    Filling defect of main pulmonary artery  -seen on CTA- thrombus vs vegetation  -LE/UE doppler negative  -TTE (6/12/2023): 67%; indeterminate diastolic function; RVSP from TR mildly elevated  -no CP, breathing comfortably on RA  -AC deferred for now given MRI on 5/31/23 w/ possible hemorrhage in cerebellar metastasis  -pending JUAN JOSE consult    Stage IV adenocarcinoma of lung (w/ brain mets)  -Oncology following (follows with Dr Lagunas- apt on 6/15/23)  -cont dexamethasone 2mg BID for vasogenic edema  -Keppra 500mg BID    HTN  -lisinopril 40mg    SCDs for DVT prophylaxis.  Discussed with patient and care team on multidisciplinary rounds.  Anticipate discharge home when cleared by consultants      Simone Nelson MD  [facility]  06/12/23  20:49 EDT

## 2023-06-12 NOTE — H&P
[facility] Admission H&P    Patient Care Team:  Nik Mckay MD as PCP - General  Nik Mckay MD as PCP - Family Medicine  Remedios Rice MD as Surgeon (Thoracic Surgery)  Hector Rodriguez MD as Consulting Physician (Radiation Oncology)  Jannet Chauhan, RN as Nurse Navigator  Remedios Rice MD as Referring Physician (Thoracic Surgery)  Cruzito Lagunas MD as Consulting Physician (Hematology and Oncology)  Sri Coy RN as Ambulatory  ([facility])    Chief complaint: left arm and hand pain, blister formation    History of Present Illness    This is a 78-year-old female with history of primary lung cancer with brain mets, COPD, rheumatoid arthritis who recently underwent resection of hemorrhagic metastatic lesion by neurosurgery on 5/30/2023.  She presents back to the emergency room with complaints of primarily left hand pain over the past 24 hours that has extended over the back of the arm and into the scapular region.  She states that she also has some very slight left lateral chest wall discomfort that is burning in nature.  This morning she began to notice blister formation in the hand and arm.  She denies any pleuritic type chest pain or shortness of breath.  No substernal pain or pressure.  She denies any fevers or chills.  She is on room air and has no tachycardia.  Her main complaint is that of pain in the arm and hand.  Evaluation in the emergency room was consistent with shingles of the left arm and back.  Due to her history she also had a CT scan of the chest with PE protocol that revealed an indeterminate appearing filling defect within the main pulmonary artery and correlation with echocardiogram was recommended.  Left upper extremity Doppler negative for DVT.  Given the abnormal CT scan I been asked to admit her for further evaluation as well as for pain control efforts regarding the shingles.    Past Medical History:   Diagnosis Date    COPD (chronic obstructive pulmonary  disease)     Coughing up blood     X2 MONTHS    History of COVID-19 2022    Hyperlipidemia     IBS (irritable bowel syndrome)     Primary lung adenocarcinoma, right     Rheumatoid arthritis      Past Surgical History:   Procedure Laterality Date    APPENDECTOMY      appendix removed    BRONCHOSCOPY N/A 2022    Procedure: BRONCHOSCOPY WITH BAL,  BIOPSIES, AND BRUSHINGS WITH ENDOBRONCHIAL ULTRASOUND WITH FNA;  Surgeon: Gregor Vee MD;  [facility];  Service: Pulmonary;  Laterality: N/A;  PRE- HILAR MASS  POST- SAME    BRONCHOSCOPY N/A 2023    Procedure: BRONCHOSCOPY with biopsy, lavage, brushing;  Surgeon: Gregor Vee MD;  [facility];  Service: Pulmonary;  Laterality: N/A;    CAROTID ENDARTERECTOMY Right     CAROTID ENDARTERECTOMY Left     CATARACT EXTRACTION      CERVICAL FUSION      CHOLECYSTECTOMY      CRANIOTOMY FOR TUMOR Right 2023    Procedure: RIGHT CRANIOTOMY FOR TUMOR RESECTION STEREOTACTIC WITH STEALTH;  Surgeon: Clint Ricketts MD;  Location: [facility] MAIN OR;  Service: Neurosurgery;  Laterality: Right;    HYSTERECTOMY      SHOULDER ARTHROSCOPY Right 2019    right should scope/cuff repair     VENOUS ACCESS DEVICE (PORT) INSERTION Right 2022    Procedure: POWERPORT INSERTION;  Surgeon: Remedios Rice MD;  Location: [facility] MAIN OR;  Service: Thoracic;  Laterality: Right;     Family History   Problem Relation Age of Onset    Cancer Mother     Cancer Father     Malig Hyperthermia Neg Hx      Social History     Tobacco Use    Smoking status: Former     Packs/day: 0.25     Types: Cigarettes     Quit date: 2022     Years since quittin.1    Smokeless tobacco: Never    Tobacco comments:     Former some day smoker of 1-2 cigs   Vaping Use    Vaping Use: Never used   Substance Use Topics    Alcohol use: Yes     Alcohol/week: 1.0 standard drink     Types: 1 Glasses of wine per week     Comment: occasionally    Drug use: Never     Medications  reviewed    Allergies:  Del-mycin [erythromycin], Gentamicin, Ibuprofen, Latex, Metronidazole, Ofloxacin, Penicillins, Tetracycline, Adhesive tape, Aspirin, and Codeine    Review of Systems   Constitutional:  Negative for chills and fever.   HENT:  Negative for congestion and sore throat.    Eyes:  Negative for visual disturbance.   Respiratory:  Negative for cough, chest tightness, shortness of breath and wheezing.    Cardiovascular:  Negative for chest pain, palpitations and leg swelling.   Gastrointestinal:  Negative for abdominal distention, abdominal pain, diarrhea, nausea and vomiting.   Endocrine: Negative for polydipsia and polyuria.   Genitourinary:  Negative for difficulty urinating, dysuria, frequency and urgency.   Musculoskeletal:  Negative for arthralgias and myalgias.        Pain in the left hand and arm   Skin:  Negative for color change and rash.        Blister formation over the palm of the left hand and back of the left arm as well as scapular region   Neurological:  Negative for dizziness and light-headedness.      PHYSICAL EXAM    Vital Signs  tMax 24 hrs:  Temp (24hrs), Av.5 °F (36.9 °C), Min:98.5 °F (36.9 °C), Max:98.5 °F (36.9 °C)    Vitals Ranges:  Temp:  [98.5 °F (36.9 °C)] 98.5 °F (36.9 °C)  Heart Rate:  [] 82  Resp:  [20] 20  BP: (167-196)/() 196/87    Physical Exam  Vitals and nursing note reviewed.   Constitutional:       General: She is not in acute distress.  HENT:      Head: Normocephalic and atraumatic.   Eyes:      Extraocular Movements: Extraocular movements intact.      Pupils: Pupils are equal, round, and reactive to light.   Cardiovascular:      Rate and Rhythm: Normal rate and regular rhythm.   Pulmonary:      Effort: Pulmonary effort is normal. No respiratory distress.      Breath sounds: Normal breath sounds.   Abdominal:      General: There is no distension.      Palpations: Abdomen is soft.      Tenderness: There is no abdominal tenderness.    Musculoskeletal:         General: No swelling or deformity.      Cervical back: Normal range of motion.   Skin:     General: Skin is warm and dry.      Comments: Exam of the left arm and left scapular region are consistent with what appears to be shingles with vesicle formation   Neurological:      General: No focal deficit present.      Mental Status: She is alert and oriented to person, place, and time.       Results Review:  Results from last 7 days   Lab Units 06/11/23  1809   WBC 10*3/mm3 18.23*   HEMOGLOBIN g/dL 14.8   HEMATOCRIT % 43.2   PLATELETS 10*3/mm3 275     Results from last 7 days   Lab Units 06/11/23  1809   SODIUM mmol/L 135*   POTASSIUM mmol/L 4.3   CHLORIDE mmol/L 99   CO2 mmol/L 23.0   BUN mg/dL 27*   CREATININE mg/dL 0.85   CALCIUM mg/dL 9.2   BILIRUBIN mg/dL 0.4   ALK PHOS U/L 82   ALT (SGPT) U/L 44*   AST (SGOT) U/L 23   GLUCOSE mg/dL 130*        I reviewed the patient's new clinical results.  I reviewed the patient's new imaging results and agree with the interpretation.        Active Hospital Problems    Diagnosis  POA    Abnormal CT of the chest [R93.89]  Yes    Shingles, left arm [B02.9]  Unknown    HTN (hypertension) [I10]  Unknown    COPD (chronic obstructive pulmonary disease) [J44.9]  Yes    Lung cancer metastatic to brain [C34.90, C79.31]  Yes    Primary lung adenocarcinoma, right [C34.91]  Yes      Resolved Hospital Problems   No resolved problems to display.       Assessment & Plan    The patient will be admitted.  Exam of the left arm is consistent with shingles and she will be started on acyclovir given that symptoms started less than 72 hours ago.  We will also initiate low-dose gabapentin and some opiate pain medication.  The pulmonary artery filling defect is indeterminate in nature at this time.  Her vital signs are completely stable and she has no complaints of pleuritic chest pain or shortness of breath.  This situation is complicated given the fact that she has brain  metastases with recent history of hemorrhagic lesions for which she could not even be on aspirin.  Head CT also notes additional metastasis within the cerebellum appearing more hyperdense possibly suggestive of new hemorrhage.  Given that we cannot definitively say that this is a thrombus and the fact that she looks very stable right now I am going to initially hold off on initiating full dose anticoagulation and will order an echocardiogram as well as lower extremity Doppler.  D-dimer is also pending.  Per radiology, vegetation is another possibility given proximity to the pulmonic valve.  Will ask for her oncologist as well as neurosurgeon to see her.  We will monitor her closely on telemetry.  Her blood pressure is elevated due to pain and I will provide some IV hydralazine as needed while we get her on a pain control regimen.  Her COPD appears stable with no evidence of acute exacerbation.  Additional plans based on her clinical course.    I discussed the patients findings and my recommendations with patient and family      Song Chacko MD  06/11/23  21:49 EDT

## 2023-06-12 NOTE — PLAN OF CARE
Goal Outcome Evaluation:      Patient ambulates to bathroom with assistance. Heparin stopped per Dr. Chacko. Hydralazine given for hypertension, Tylenol given for pain. Patient's left arm wrapped in Kerlix. All needs met.

## 2023-06-12 NOTE — CASE MANAGEMENT/SOCIAL WORK
Discharge Planning Assessment  [facility]     Patient Name: Pam Vidal  MRN: [id number]  Today's Date: 6/12/2023    Admit Date: 6/11/2023    Plan: Return home with family and [facility]  following   Discharge Needs Assessment       Row Name 06/12/23 1155       Living Environment    People in Home other relative(s)    Name(s) of People in Home Danyel Garrett    Current Living Arrangements home    Potentially Unsafe Housing Conditions none    Primary Care Provided by self    Provides Primary Care For no one, unable/limited ability to care for self    Family Caregiver if Needed other relative(s)    Family Caregiver Names Danyel Garrett [phone]    Quality of Family Relationships helpful    Able to Return to Prior Arrangements yes       Resource/Environmental Concerns    Resource/Environmental Concerns none    Transportation Concerns none       Food Insecurity    Within the past 12 months, you worried that your food would run out before you got the money to buy more. Never true    Within the past 12 months, the food you bought just didn't last and you didn't have money to get more. Never true       Transition Planning    Patient/Family Anticipates Transition to home with family    Patient/Family Anticipated Services at Transition home health care    Transportation Anticipated family or friend will provide       Discharge Needs Assessment    Readmission Within the Last 30 Days no previous admission in last 30 days    Equipment Currently Used at Home walker, rolling;cane, straight;grab bar;shower chair    Concerns to be Addressed basic needs;discharge planning    Anticipated Changes Related to Illness none    Equipment Needed After Discharge none    Discharge Facility/Level of Care Needs home with home health    Provided Post Acute Provider List? N/A    N/A Provider List Comment Current with [facility] and will continue to use them                   Discharge Plan       Row Name 06/12/23 1152        Plan    Plan Return home with family and [facility] following    Patient/Family in Agreement with Plan yes    Plan Comments Spoke with patient at bedside.  Danyel Garrett [phone] lives with her.  She uses a cane, walker, has grab bars in the BR and walk in shower.  She states she only started using the cane/walker very recently, she was working part time up until recent illness.  She is current with [facility] - spoke with Irma and she will follow, and has never been to SNF.  PCP is Dr. Nik Mckay and pharmacy is [facility] in [place].  Danyel will drive and assist if needed.  She plans to return home at OR with [facility] following.  No PT eval yet.  CCP will follow.  Phil SALAZAR                  Continued Care and Services - Admitted Since 6/11/2023       Home Medical Care       Service Provider Request Status Selected Services Address Phone Fax Patient Preferred    [facility] Considering N/A [address], [address], [place] [facility] [place] [facility] [address] [phone] --                  Selected Continued Care - Episodes Includes continued care and service providers with selected services from the active episodes listed below      High Risk Care Management Episode start date: 6/5/2023   There are no active outsourced providers for this episode.                 Selected Continued Care - Prior Encounters Includes continued care and service providers with selected services from prior encounters from 3/13/2023 to 6/12/2023      Discharged on 6/2/2023 Admission date: 5/25/2023 - Discharge disposition: Home-Health Care Svc      Durable Medical Equipment       Service Provider Selected Services Address Phone Fax Patient Preferred    [facility] [address], [place] [address] [phone] --              Home Medical Care       Service Provider Selected Services Address Phone Fax Patient Preferred     [facility] [address], [facility] [id number] [phone] --                          Expected Discharge Date and Time       Expected Discharge Date Expected Discharge Time    Angelo 15, 2023            Demographic Summary       Row Name 06/12/23 1145       General Information    Admission Type inpatient    Arrived From home    Referral Source admission list    Reason for Consult discharge planning    Preferred Language English                   Functional Status       Row Name 06/12/23 1155       Functional Status    Usual Activity Tolerance moderate    Current Activity Tolerance moderate       Functional Status, IADL    Medications independent    Meal Preparation assistive person    Housekeeping assistive person    Laundry assistive person    Shopping assistive person       Mental Status    General Appearance WDL WDL       Mental Status Summary    Recent Changes in Mental Status/Cognitive Functioning no changes                     Becky S. [place], RN

## 2023-06-12 NOTE — CONSULTS
NEUROSURGERY     Referring Provider: Song Chacko MD  Reason for Consultation: Brain metastases with possible new hemorrhagic component.     Patient Care Team:  Nik Mckay MD as PCP - General  Nik Mckay MD as PCP - Family Medicine  Remedios Rice MD as Surgeon (Thoracic Surgery)  Hector Rodriguez MD as Consulting Physician (Radiation Oncology)  Jannet Chauhan, RN as Nurse Navigator  Remedios Rice MD as Referring Physician (Thoracic Surgery)  Cruzito Lagunas MD as Consulting Physician (Hematology and Oncology)  Sri Coy RN as Ambulatory  ([facility])    Chief complaint: left hand pain, shortness of breath with chest pain.    Subjective .     History of present illness:  This is a 77 yo female known to Dr. Ricketts from recent consultation 5/25/2023 for brain metastases. This patient has a history of primary lung cancer, COPD, RA, and hemorrhagic right occipital metastatic lesion resected by Dr. Ricketts 5/30/2023.  Pathology revealed metastatic adenocarcinoma of the lung. The patient was discharged home with home health on 6/05/2023. She was to follow up today with both Hematology/Oncology and Radiation Oncology however she presented to the emergency department yesterday with complaints of left hand pain as well as shortness of breath and chest pain.  There was blistering and swelling noted to the left hand/UE with associated pain.  [place] to the emergency room notes this is believed to be shingles.  A CTA of the chest revealed stable right suprahilar mass with a 6 mm possible filling defect of the main pulmonary artery.  This could be related to either thrombus or some sort of vegetation. D-dimer was mildly elevated at 0.88.  There was no finding of COPD exacerbation. Hematology/oncology consultation was requested by the admitting service.  CT imaging of the brain performed without contrast showed expected right parietal region postoperative changes and no  significant change when compared to the previous postop MRI.  There is a small lesion noted on the left occipital lobe which was also present on the previous MRI consistent with known metastases as well as a lesion to the midline cerebellar which appeared more hyperdense than the previous head CT bed on previous MRI seem to be suggestive of new hemorrhage.  Neurosurgery has been asked to evaluate given these head CT findings.      Review of Systems  All systems were reviewed and negative except for:  Constitution:  positive for chills, fatigue, and See HPI    History  Past Medical History:   Diagnosis Date    COPD (chronic obstructive pulmonary disease)     Coughing up blood     X2 MONTHS    History of COVID-19 02/2022    Hyperlipidemia     IBS (irritable bowel syndrome)     Primary lung adenocarcinoma, right     Rheumatoid arthritis        Objective       Vital Signs   Temp:  [97.6 °F (36.4 °C)-98.5 °F (36.9 °C)] 97.6 °F (36.4 °C)  Heart Rate:  [] 98  Resp:  [16-20] 16  BP: (116-210)/() 163/61    Physical Exam:    AA&O x 3.  No acute distress. Speech is normal.    Respirations even and unlabored.    Head atraumatic, normocephalic. Posterior crani incision well approximated with sutures intact. No redness, swelling or drainage.   Mucous membranes pink and moist.   PERRL. EOM's intact. Face symmetric.   Tongue midline without fasiculations or atrophy.   Follows commands x 4 extremities. No drift. No dysmetria.   L arm wrap intact.   No calf swelling or tenderness to bilateral palpation.       Results Review:    Imaging Results (Last 24 Hours)       Procedure Component Value Units Date/Time    CT Angiogram Chest Pulmonary Embolism [[id number]] Collected: 06/11/23 2008     Updated: 06/11/23 2041    Addenda:        FINDINGS were called to Dr. Ozuna at 8:35 PM.     This report was finalized on 6/11/2023 8:38 PM by Dr. Kady Barrios M.D.     Signed: 06/11/23 2038 by Kady Barrios MD    Narrative:       CT ANGIOGRAM OF THE CHEST     HISTORY: Shortness of air.     COMPARISON: 06/07/2023     TECHNIQUE: Axial CT imaging was obtained through the thorax. IV contrast  was administered. Three-D reformatted images were obtained.     FINDINGS:  There is a filling defect identified within the main pulmonary artery,  which measures up to 6 mm on the axial series. It may represent some  thrombus. Given its proximity to the pulmonic valve, with vegetation  would be a consideration. Consideration for echo is recommended. I do  not see any distal pulmonary emboli. This finding is new when compared  to prior study. Thoracic aorta measures within normal size limits. There  are coronary artery calcifications. The thyroid gland and trachea are  within normal limits. There is a small hiatal hernia. There are  background emphysematous changes. A previously identified right  suprahilar mass appears stable. There are some surrounding  reticulonodular infiltrates. These appear similar to the recent study.  No acute infiltrates are seen. There is minimal dependent atelectasis.  Mediastinal lymph nodes do not appear pathologically enlarged. The  findings within the upper abdomen are stable when compared to recent CT  abdomen and pelvis. There is extensive atherosclerotic involvement of  the abdominal aorta. No acute osseous abnormalities are seen. Right  internal jugular vein Mediport terminates within the right atrium.       Impression:         1. The patient has a filling defect identified within the main pulmonary  artery. It may represent a thrombus, or even a vegetation, given its  proximity to the pulmonary valve. Correlation with echo is recommended.  No distal emboli or acute infiltrates are seen.     Radiation dose reduction techniques were utilized, including automated  exposure control and exposure modulation based on body size.     This report was finalized on 6/11/2023 8:21 PM by Dr. Kady Barrios M.D.       CT Head  Without Contrast [[id number]] Collected: 06/11/23 2021     Updated: 06/11/23 2040    Narrative:      CT OF THE HEAD WITHOUT CONTRAST     HISTORY: Left arm numbness. HISTORY of brain surgery.     COMPARISON: 05/31/2023     TECHNIQUE: Axial CT imaging was obtained through the brain. No IV  contrast was administered.     FINDINGS:  Exam is compromised by the lack of immediate postoperative CT. Patient  did have an MRI on 05/31/2023, which showed changes of prior right  parietal craniotomy for resection of a mass within the right occipital  lobe. Prior MRI did demonstrate some residual blood products and fluid  within the resection cavity. No definite acute intracranial hemorrhage  is seen on the current study. There is persistent surrounding vasogenic  edema. A previously described cystic area along the medial aspect of the  resection cavity appears stable. There is some mild mass effect upon the  posterior horn of the right lateral ventricle. There is some mild  midline shift to the left approximately 3 mm, which is stable to perhaps  slightly improved when compared to prior study. There is atrophy. There  is periventricular and deep white matter microangiopathic change. Prior  MRI did demonstrate a small enhancing lesion within the left occipital  lobe, which I think is is visible is a small area of low attenuation,  measuring 4 mm. There is also a lesion within the superior cerebellum.  This measures up to 7 mm. It is more hyperdense than on prior CT. Prior  MRI did suggest some new hemorrhage within it, and this may account for  the more hyperdense appearance on the current study. There is some mild  mucosal thickening within the ethmoid sinuses.       Impression:         1. Examination is compromised by the lack of immediate postoperative CT  for comparison.  2. There are postoperative changes noted within the right parietal lobe,  which appear similar to the postoperative MRI. No superimposed acute  hemorrhage is  seen. Mild midline shift is stable to perhaps slightly  improved.  2. There is a small lesion noted within the left occipital lobe, which  likely corresponds to known metastasis. An additional metastasis within  the midline cerebellum appears more hyperdense than on prior CT,  although the interim MRI suggested some new hemorrhage within it, which  may account for its more hyperdense appearance.     FINDINGS were called to Dr. Ozuna at 8:35 PM.     Radiation dose reduction techniques were utilized, including automated  exposure control and exposure modulation based on body size.     This report was finalized on 6/11/2023 8:37 PM by Dr. Kady Barrios M.D.              I reviewed the patient's new clinical results.  Discussed with Dr. Ricketts.       Assessment & Plan       Abnormal CT of the chest    Primary lung adenocarcinoma, right    Lung cancer metastatic to brain    COPD (chronic obstructive pulmonary disease)    Shingles, left arm    HTN (hypertension)      POD 13 R occipital crani for metastatic tumor resection  HX of lung cancer    I will discuss the head CT findings with Dr. Ricketts however the L occipital and midline cerebellar lesions are not new and appear stable.       I discussed the patients findings and my recommendations with patient and Dr. Ricketts.    Beth Zamorano, APRN  06/12/23  10:32 EDT

## 2023-06-12 NOTE — ED NOTES
Nursing report ED to floor  Pam Vidal  78 y.o.  female    HPI :   Chief Complaint   Patient presents with    Arm Pain    Numbness       Admitting doctor:   Song Chacko MD    Admitting diagnosis:   The primary encounter diagnosis was Abnormal CT of the chest. A diagnosis of Herpes zoster without complication was also pertinent to this visit.    Code status:   Current Code Status       Date Active Code Status Order ID Comments User Context       6/11/2023 2149 CPR (Attempt to Resuscitate) [id number]  Song Chacko MD ED        Question Answer    Code Status (Patient has no pulse and is not breathing) CPR (Attempt to Resuscitate)    Medical Interventions (Patient has pulse or is breathing) Full Support                    Allergies:   Del-mycin [erythromycin], Gentamicin, Ibuprofen, Latex, Metronidazole, Ofloxacin, Penicillins, Tetracycline, Adhesive tape, Aspirin, and Codeine    Isolation:   No active isolations    Intake and Output  No intake or output data in the 24 hours ending 06/11/23 2211    Weight:       06/11/23  1605   Weight: 53.1 kg (117 lb)       Most recent vitals:   Vitals:    06/11/23 1725 06/11/23 1735 06/11/23 1810 06/11/23 1956   BP:    (!) 196/87   Pulse: 81 87 87 82   Resp:       Temp:       TempSrc:       SpO2: 97% 97% 97% 91%   Weight:       Height:           Active LDAs/IV Access:   Lines, Drains & Airways       Active LDAs       Name Placement date Placement time Site Days    Peripheral IV 06/07/23 1734 Anterior;Right Forearm 06/07/23  1734  Forearm  4    Single Lumen Implantable Port 08/31/22 Right Subclavian 08/31/22  1200  Subclavian  Powerport  Vortex 6f  model [id number]; Lot [id number]  284                    Labs (abnormal labs have a star):   Labs Reviewed   COMPREHENSIVE METABOLIC PANEL - Abnormal; Notable for the following components:       Result Value    Glucose 130 (*)     BUN 27 (*)     Sodium 135 (*)     ALT (SGPT) 44 (*)     BUN/Creatinine Ratio 31.8  (*)     All other components within normal limits    Narrative:     GFR Normal >60  Chronic Kidney Disease <60  Kidney Failure <15    The GFR formula is only valid for adults with stable renal function between ages 18 and 70.   CBC WITH AUTO DIFFERENTIAL - Abnormal; Notable for the following components:    WBC 18.23 (*)     Neutrophil % 92.0 (*)     Lymphocyte % 2.3 (*)     Monocyte % 4.1 (*)     Eosinophil % 0.1 (*)     Immature Grans % 1.4 (*)     Neutrophils, Absolute 16.77 (*)     Lymphocytes, Absolute 0.42 (*)     Immature Grans, Absolute 0.26 (*)     All other components within normal limits   TROPONIN - Abnormal; Notable for the following components:    HS Troponin T 14 (*)     All other components within normal limits    Narrative:     High Sensitive Troponin T Reference Range:  <10.0 ng/L- Negative Female for AMI  <15.0 ng/L- Negative Male for AMI  >=10 - Abnormal Female indicating possible myocardial injury.  >=15 - Abnormal Male indicating possible myocardial injury.   Clinicians would have to utilize clinical acumen, EKG, Troponin, and serial changes to determine if it is an Acute Myocardial Infarction or myocardial injury due to an underlying chronic condition.        APTT - Normal   PROTIME-INR - Normal   BNP (IN-HOUSE) - Normal    Narrative:     Among patients with dyspnea, NT-proBNP is highly sensitive for the detection of acute congestive heart failure. In addition NT-proBNP of <300 pg/ml effectively rules out acute congestive heart failure with 99% negative predictive value.    Results may be falsely decreased if patient taking Biotin.     D-DIMER, QUANTITATIVE   CBC (NO DIFF)   BASIC METABOLIC PANEL   CBC AND DIFFERENTIAL    Narrative:     The following orders were created for panel order CBC & Differential.  Procedure                               Abnormality         Status                     ---------                               -----------         ------                     CBC Auto  Differential[[id number]]        Abnormal            Final result                 Please view results for these tests on the individual orders.       EKG:   ECG 12 Lead Other; left arm pain   Preliminary Result   HEART RATE= 74  bpm   RR Interval= 811  ms   DE Interval= 153  ms   P Horizontal Axis= -16  deg   P Front Axis= 85  deg   QRSD Interval= 85  ms   QT Interval= 380  ms   QRS Axis= 56  deg   T Wave Axis= 58  deg   - BORDERLINE ECG -   Sinus rhythm   Probable left atrial enlargement   Minimal ST elevation, anterior leads   Electronically Signed By:    Date and Time of Study: 2023-06-11 19:22:33          Meds given in ED:   Medications   sodium chloride 0.9 % flush 10 mL (has no administration in time range)   sodium chloride 0.9 % flush 10 mL (has no administration in time range)   sodium chloride 0.9 % flush 10 mL (has no administration in time range)   sodium chloride 0.9 % flush 20 mL (has no administration in time range)   sodium chloride 0.9 % infusion 40 mL (has no administration in time range)   heparin injection 500 Units (has no administration in time range)   acyclovir (ZOVIRAX) tablet 800 mg (has no administration in time range)   hydrALAZINE (APRESOLINE) injection 10 mg (has no administration in time range)   sodium chloride 0.9 % flush 10 mL (has no administration in time range)   sodium chloride 0.9 % flush 10 mL (has no administration in time range)   sodium chloride 0.9 % infusion 40 mL (has no administration in time range)   sennosides-docusate (PERICOLACE) 8.6-50 MG per tablet 2 tablet (has no administration in time range)     And   polyethylene glycol (MIRALAX) packet 17 g (has no administration in time range)     And   bisacodyl (DULCOLAX) EC tablet 5 mg (has no administration in time range)     And   bisacodyl (DULCOLAX) suppository 10 mg (has no administration in time range)   nitroglycerin (NITROSTAT) SL tablet 0.4 mg (has no administration in time range)   acetaminophen (TYLENOL) tablet  650 mg (has no administration in time range)     Or   acetaminophen (TYLENOL) 160 MG/5ML solution 650 mg (has no administration in time range)     Or   acetaminophen (TYLENOL) suppository 650 mg (has no administration in time range)   ondansetron (ZOFRAN) injection 4 mg (has no administration in time range)   iopamidol (ISOVUE-370) 76 % injection 100 mL (95 mL Intravenous Given 6/11/23 1936)       Imaging results:  CT Head Without Contrast    Result Date: 6/11/2023   1. Examination is compromised by the lack of immediate postoperative CT for comparison. 2. There are postoperative changes noted within the right parietal lobe, which appear similar to the postoperative MRI. No superimposed acute hemorrhage is seen. Mild midline shift is stable to perhaps slightly improved. 2. There is a small lesion noted within the left occipital lobe, which likely corresponds to known metastasis. An additional metastasis within the midline cerebellum appears more hyperdense than on prior CT, although the interim MRI suggested some new hemorrhage within it, which may account for its more hyperdense appearance.  FINDINGS were called to Dr. Ozuna at 8:35 PM.  Radiation dose reduction techniques were utilized, including automated exposure control and exposure modulation based on body size.  This report was finalized on 6/11/2023 8:37 PM by Dr. Kady Barrios M.D.      CT Angiogram Chest Pulmonary Embolism    Addendum Date: 6/11/2023    FINDINGS were called to Dr. Ozuna at 8:35 PM.  This report was finalized on 6/11/2023 8:38 PM by Dr. Kady Barrios M.D.      Result Date: 6/11/2023   1. The patient has a filling defect identified within the main pulmonary artery. It may represent a thrombus, or even a vegetation, given its proximity to the pulmonary valve. Correlation with echo is recommended. No distal emboli or acute infiltrates are seen.  Radiation dose reduction techniques were utilized, including automated exposure control  and exposure modulation based on body size.  This report was finalized on 2023 8:21 PM by Dr. Kady Barrios M.D.       Ambulatory status:   - up with assist    Social issues:   Social History     Socioeconomic History    Marital status:    Tobacco Use    Smoking status: Former     Packs/day: 0.25     Types: Cigarettes     Quit date: 2022     Years since quittin.1    Smokeless tobacco: Never    Tobacco comments:     Former some day smoker of 1-2 cigs   Vaping Use    Vaping Use: Never used   Substance and Sexual Activity    Alcohol use: Yes     Alcohol/week: 1.0 standard drink     Types: 1 Glasses of wine per week     Comment: occasionally    Drug use: Never    Sexual activity: Defer       NIH Stroke Scale:         Jackie Ingram RN  23 22:11 EDT

## 2023-06-12 NOTE — PLAN OF CARE
Goal Outcome Evaluation:  Plan of Care Reviewed With: patient           Outcome Evaluation: VSS. A & O x4. Up w/ assist x1 with BRP. C/O frequent severe pain in her left arm. The patient states that the pain is from the shingles. Pt does not want to take pain medication, pt request to only take Tylenol at this time. Will continue to monitor.

## 2023-06-13 ENCOUNTER — TELEPHONE (OUTPATIENT)
Dept: NEUROSURGERY | Facility: CLINIC | Age: 78
End: 2023-06-13
Payer: MEDICARE

## 2023-06-13 DIAGNOSIS — C34.91 PRIMARY LUNG ADENOCARCINOMA, RIGHT: Primary | ICD-10-CM

## 2023-06-13 LAB
ANION GAP SERPL CALCULATED.3IONS-SCNC: 9 MMOL/L (ref 5–15)
BUN SERPL-MCNC: 40 MG/DL (ref 8–23)
BUN/CREAT SERPL: 42.1 (ref 7–25)
CALCIUM SPEC-SCNC: 8.7 MG/DL (ref 8.6–10.5)
CHLORIDE SERPL-SCNC: 99 MMOL/L (ref 98–107)
CO2 SERPL-SCNC: 24 MMOL/L (ref 22–29)
CREAT SERPL-MCNC: 0.95 MG/DL (ref 0.57–1)
DEPRECATED RDW RBC AUTO: 46.7 FL (ref 37–54)
EGFRCR SERPLBLD CKD-EPI 2021: 62.2 ML/MIN/1.73
ERYTHROCYTE [DISTWIDTH] IN BLOOD BY AUTOMATED COUNT: 15.2 % (ref 12.3–15.4)
GLUCOSE SERPL-MCNC: 97 MG/DL (ref 65–99)
HCT VFR BLD AUTO: 40.3 % (ref 34–46.6)
HGB BLD-MCNC: 13.7 G/DL (ref 12–15.9)
MCH RBC QN AUTO: 28.4 PG (ref 26.6–33)
MCHC RBC AUTO-ENTMCNC: 34 G/DL (ref 31.5–35.7)
MCV RBC AUTO: 83.6 FL (ref 79–97)
PLATELET # BLD AUTO: 233 10*3/MM3 (ref 140–450)
PMV BLD AUTO: 9.8 FL (ref 6–12)
POTASSIUM SERPL-SCNC: 4.2 MMOL/L (ref 3.5–5.2)
RBC # BLD AUTO: 4.82 10*6/MM3 (ref 3.77–5.28)
SODIUM SERPL-SCNC: 132 MMOL/L (ref 136–145)
WBC NRBC COR # BLD: 11.93 10*3/MM3 (ref 3.4–10.8)

## 2023-06-13 PROCEDURE — 99232 SBSQ HOSP IP/OBS MODERATE 35: CPT | Performed by: INTERNAL MEDICINE

## 2023-06-13 PROCEDURE — 99024 POSTOP FOLLOW-UP VISIT: CPT | Performed by: NURSE PRACTITIONER

## 2023-06-13 PROCEDURE — 80048 BASIC METABOLIC PNL TOTAL CA: CPT | Performed by: STUDENT IN AN ORGANIZED HEALTH CARE EDUCATION/TRAINING PROGRAM

## 2023-06-13 PROCEDURE — 85027 COMPLETE CBC AUTOMATED: CPT | Performed by: STUDENT IN AN ORGANIZED HEALTH CARE EDUCATION/TRAINING PROGRAM

## 2023-06-13 RX ADMIN — LEVETIRACETAM 500 MG: 500 TABLET, FILM COATED ORAL at 08:17

## 2023-06-13 RX ADMIN — DEXAMETHASONE 2 MG: 2 TABLET ORAL at 21:01

## 2023-06-13 RX ADMIN — ACYCLOVIR 800 MG: 400 TABLET ORAL at 06:00

## 2023-06-13 RX ADMIN — Medication 10 ML: at 21:03

## 2023-06-13 RX ADMIN — GABAPENTIN 100 MG: 100 CAPSULE ORAL at 13:24

## 2023-06-13 RX ADMIN — LEVETIRACETAM 500 MG: 500 TABLET, FILM COATED ORAL at 21:01

## 2023-06-13 RX ADMIN — Medication 10 ML: at 08:18

## 2023-06-13 RX ADMIN — PANTOPRAZOLE SODIUM 40 MG: 40 TABLET, DELAYED RELEASE ORAL at 05:59

## 2023-06-13 RX ADMIN — Medication 10 ML: at 09:00

## 2023-06-13 RX ADMIN — ACETAMINOPHEN 650 MG: 325 TABLET, FILM COATED ORAL at 00:33

## 2023-06-13 RX ADMIN — ACETAMINOPHEN 650 MG: 325 TABLET, FILM COATED ORAL at 04:43

## 2023-06-13 RX ADMIN — ACETAMINOPHEN 650 MG: 325 TABLET, FILM COATED ORAL at 18:21

## 2023-06-13 RX ADMIN — GABAPENTIN 100 MG: 100 CAPSULE ORAL at 05:59

## 2023-06-13 RX ADMIN — ACYCLOVIR 800 MG: 400 TABLET ORAL at 21:01

## 2023-06-13 RX ADMIN — DEXAMETHASONE 2 MG: 2 TABLET ORAL at 08:17

## 2023-06-13 RX ADMIN — ACETAMINOPHEN 650 MG: 325 TABLET, FILM COATED ORAL at 09:00

## 2023-06-13 RX ADMIN — ACYCLOVIR 800 MG: 400 TABLET ORAL at 13:24

## 2023-06-13 RX ADMIN — GABAPENTIN 100 MG: 100 CAPSULE ORAL at 21:01

## 2023-06-13 RX ADMIN — ACYCLOVIR 800 MG: 400 TABLET ORAL at 11:39

## 2023-06-13 RX ADMIN — ACETAMINOPHEN 650 MG: 325 TABLET, FILM COATED ORAL at 13:24

## 2023-06-13 RX ADMIN — ACYCLOVIR 800 MG: 400 TABLET ORAL at 18:21

## 2023-06-13 RX ADMIN — LISINOPRIL 40 MG: 40 TABLET ORAL at 08:17

## 2023-06-13 NOTE — DISCHARGE INSTRUCTIONS
Call Dr. Ricketts's office [phone] if you begin noticing any redness, swelling or drainage to the craniotomy incision or if you begin having difficulty with balance, weakness, mental status changes, headaches, nausea, visual problems.

## 2023-06-13 NOTE — PLAN OF CARE
Goal Outcome Evaluation:         VSS, Tylenol and Neurontin for pain. Patient states Norco makes her nauseous. All needs met.

## 2023-06-13 NOTE — PROGRESS NOTES
CHIEF COMPLAINT:  lung cancer    INTERVAL HISTORY:  The patient denies significant chest pain or shortness of breath.  Still having pain related to shingles on the left arm controlled when she takes Norco    HISTORY OF PRESENT ILLNESS:   This is a 78-year-old woman followed by Dr. 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  She then initiated durvalumab November 2022 and continued through 5/15/2023.  She was admitted latter part of May 2023 with leg weakness and imbalance and imaging of the brain by MRI 5/25/2023 showed 3 enhancing lesions in the brain largest in the posterior superior lateral right occipital lobe 3.7 x 2.9 x 2.7 cm with subacute blood products and significant vasogenic edema including 3 to 4 mm of midline shift; tiny second metastasis left occipital cortex 4 mm and third in the superior cerebellum 7 mm.  CT chest abdomen pelvis and bone scan 5/29/2023 showed a stable right upper lobe suprahilar mass and new sclerotic changes right eighth rib anteriorly which could be healing fracture or sclerotic metastatic disease.  She underwent a right craniotomy for resection of the right occipital tumor on 5/30/2023 pathology showing poorly differentiated adenocarcinoma consistent with known lung adenocarcinoma.     The patient called in 6/11/2023 with complaints of left upper extremity pain, swelling and a blistering rash.  For evaluation of the arm swelling a venous ultrasound was performed of the left upper extremity negative for DVT and a CT angiogram chest showed a possible filling defect in the main pulmonary artery 6 mm with no evidence of distal pulmonary emboli, stable right suprahilar mass.  D-dimer on presentation minimally elevated 0.88.  She has not required supplemental oxygen since admission.  She denies to me significant shortness of breath or chest pain.   Dopplers of the bilateral lower extremities negative for DVT.  2D echocardiogram pending.      Past Medical History, Past Surgical History, Social History, Family History have been reviewed and are without significant changes except as mentioned.    Review of Systems   Constitutional: Negative.    HENT: Negative.     Respiratory: Negative.     Cardiovascular: Negative.    Gastrointestinal: Negative.    Skin:  Positive for rash.   Neurological:  Negative for dizziness.   Psychiatric/Behavioral:  Positive for decreased concentration.         Medications:  The current medication list was reviewed in the EMR    ALLERGIES:    Allergies   Allergen Reactions    Del-Mycin [Erythromycin] Hives    Gentamicin Hives    Ibuprofen Nausea And Vomiting    Latex Dermatitis     GLOVES    Metronidazole Hives    Ofloxacin Hives and Nausea Only    Penicillins Hives    Tetracycline Hives    Adhesive Tape Dermatitis     BANDAIDS    Aspirin GI Intolerance     Vomiting can take EC    Codeine Nausea And Vomiting       Objective      Vitals:    06/13/23 0729   BP: 144/75   Pulse: 79   Resp: 16   Temp: 97.6 °F (36.4 °C)   SpO2: 94%          Physical Exam    CONSTITUTIONAL: pleasant well-developed adult woman in no distress sitting up in bed eating breakfast  HEENT: no icterus, no thrush, moist membranes  LYMPH: no cervical or supraclavicular lad  CV: RRR, S1S2, no murmur  RESP: cta bilat, no wheezing, no rales, normal respiratory effort  GI: soft, non-tender, no splenomegaly, +bs  MUSC: no edema  NEURO: alert and oriented x3, normal strength  PSYCH: normal mood and affect, poor memory.  Skin: Left lower arm is wrapped    RECENT LABS:  Hematology Results from last 7 days   Lab Units 06/13/23  0444 06/12/23  0649 06/11/23  1809   WBC 10*3/mm3 11.93* 16.27* 18.23*   HEMOGLOBIN g/dL 13.7 13.9 14.8   HEMATOCRIT % 40.3 41.1 43.2   PLATELETS 10*3/mm3 233 272 275     2  Lab Results   Component Value Date    GLUCOSE 97 06/13/2023    BUN 40 (H)  06/13/2023    CREATININE 0.95 06/13/2023    BCR 42.1 (H) 06/13/2023    CO2 24.0 06/13/2023    CALCIUM 8.7 06/13/2023    ALBUMIN 4.0 06/11/2023    AST 23 06/11/2023    ALT 44 (H) 06/11/2023       Lab Results   Component Value Date    IRON 30 (L) 04/17/2023    TIBC 293 04/17/2023    FERRITIN 106.30 04/17/2023       No results found for: [id number]    No results found for: FOLATE       Assessment & Plan     *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)  Initially treated as stage III disease in the right upper lobe with carboplatin/Taxol and radiation completed 10/27/2022 followed by durvalumab 11/28/2022 through May 2023  May 2023 diagnosed with 3 metastatic lesions in the brain largest right occipital lobe 3.7 x 2.9 x 2.7 cm with significant edema and midline shift, smaller left occipital cortex 4 mm and third lesion midline superior cerebellum 7 mm  CT chest abdomen pelvis 5/29/2023-stable right suprahilar pulmonary mass, sclerotic changes right anterior eighth rib  5/30/2023-resection of right occipital tumor-pathology showed metastatic poorly differentiated adenocarcinoma consistent with lung primary     *Filling defect main pulmonary artery by CT angiogram 6/11/2023-thrombus versus vegetation  D-dimer mildly elevated 0.88 in the context of recent surgery and malignancy  Patient oxygenating on room air hemodynamically stable  Doppler ultrasound left upper extremity negative for DVT  lower extremity Dopplers negative for DVT  Patient currently not anticoagulated since brain imaging most recently 5/31/2023 MRI showed possible new hemorrhage in the cerebellar metastasis  2D echocardiogram showed no evidence of valvular vegetations     *Shingles left upper extremity  Initiated on acyclovir and 100 mg 5X per day and gabapentin 100 mg every 8 hours  Norco available as needed pain     *Vasogenic edema secondary to brain metastases  Currently on dexamethasone 2 mg every 12 hours     *Leukocytosis likely  secondary to steroids     Hematology plan/recommendations:  Agree with holding off anticoagulation given the indeterminate CT, lack of respiratory symptoms, negative extremity Dopplers and risk of worsening hemorrhagic conversion of brain metastases  She has outpatient radiation oncology appointment 6/15/2023 for unresected brain mets  Continue dexamethasone 2 mg every 12 hours  Shingles treatment per admitting  Request [facility] next generation sequencing on recent brain pathology and to follow-up with Dr. Lagunas 2 to 3 weeks     Please call if further needed during the hospital stay.                6/13/2023      CC:

## 2023-06-13 NOTE — TELEPHONE ENCOUNTER
----- Message from BARBARA Kendrick sent at 6/13/2023 12:41 PM EDT -----  Regarding: please schedule  Im not sure if [facility] is here. I just need to make sure this patient gets a post op appointment scheduled with Dr. Ricketts in 4 weeks. Please call patient when set up.     THANKS

## 2023-06-13 NOTE — PROGRESS NOTES
NEUROSURGERY POST OP NOTE     LOS: 2 days   Patient Care Team:  Nik Mckay MD as PCP - General  Nik Mckay MD as PCP - Family Medicine  Remedios Rice MD as Surgeon (Thoracic Surgery)  Hector Rodriguez MD as Consulting Physician (Radiation Oncology)  Jannet Chauhan, RN as Nurse Navigator  Remedios Rice MD as Referring Physician (Thoracic Surgery)  Cruzito Lagunas MD as Consulting Physician (Hematology and Oncology)  Sri Coy RN as Ambulatory  (Population Health)      Interval History: Feels better. Shingles pain is better with medications. Denies shortness of breath or chest pain. Reviewed oncology note. No AC recommended at this time given that patient is asymptomatic; CTA  showed pulmonary artery filling defect - indeterminate for thrombus, or vegetation. No distal emboli or acute infiltrates are seen. 2D echo and LE venous dopplers performed for further clarification and both were negative. For this reason Heme/Onc did not recommend AC. Patient to continue on current dose Decadron 2mg BID, continue with symptom management for shingles. Further sequencing studies ordered on recent brain path. F/U with Dr. Lagunas in 2-3 weeks.     History taken from: patient chart    Objective        Vital Signs  Temp:  [97.4 °F (36.3 °C)-98 °F (36.7 °C)] 97.6 °F (36.4 °C)  Heart Rate:  [79-99] 79  Resp:  [16] 16  BP: (104-144)/(53-75) 144/75    Physical Exam:     AA&O x 3. Respirations unlabored. Talking in complete sentences. No distress.   R occipital incision well approximated; no redness, swelling, or drainage.   Sutures removed without difficulty.  No drift. No dysmetria.   No calf swelling or tenderness to bilateral palpation.        Results Review:     I reviewed the patient's new clinical results.  Discussed with Dr. Ricketts.     Transthoracic Echocardiogram - 6/12/23    Clinical Indication    Other Reasons; Additional Reasons; Pulmonary Embolism   Comments: Pulmonary artery filling  defect.  Eval for PE versus vegetation     Interpretation Summary         Left ventricular systolic function is normal. Calculated left ventricular EF = 66.7%    Left ventricular outflow tract peak flow gradient at rest is 16 mmHg. Left ventricular outflow tract peak gradient with valsalva is 29 mmHg.    Left ventricular diastolic function was indeterminate.    Estimated right ventricular systolic pressure from tricuspid regurgitation is mildly elevated (35-45 mmHg).       Echocardiogram Findings    Left Ventricle Left ventricular systolic function is normal. Calculated left ventricular EF = 66.7%     Normal left ventricular cavity size and wall thickness noted. All left ventricular wall segments contract normally. Left ventricular outflow tract peak flow gradient at rest is 16 mmHg. Left ventricular outflow tract peak gradient with valsalva is 29 mmHg. Left ventricular diastolic function was indeterminate. Elevated left atrial pressure.   Right Ventricle Normal right ventricular cavity size, wall thickness, systolic function and septal motion noted.   Left Atrium Normal left atrial size and volume noted.   Right Atrium Normal right atrial cavity size noted.   Aortic Valve The aortic valve is structurally normal with no regurgitation or stenosis present.   Mitral Valve The mitral valve is structurally normal with no regurgitation or significant stenosis present.   Tricuspid Valve The tricuspid valve is structurally normal with no significant regurgitation or significant stenosis present. Estimated right ventricular systolic pressure from tricuspid regurgitation is mildly elevated (35-45 mmHg).   Pulmonic Valve The pulmonic valve is structurally normal with no regurgitation or significant stenosis present.   Greater Vessels The aortic root not well visualized. No dilation of the aortic root is present.   Pericardium The pericardium is normal. There is no evidence of pericardial effusion. .       BILATERAL LE VENOUS  DOPPLERS 6/12/23      Normal bilateral lower extremity venous duplex scan.     .  Results from last 7 days   Lab Units 06/13/23  0444 06/12/23  0649 06/11/23  1809   WBC 10*3/mm3 11.93* 16.27* 18.23*   HEMOGLOBIN g/dL 13.7 13.9 14.8   HEMATOCRIT % 40.3 41.1 43.2   PLATELETS 10*3/mm3 233 272 275     .  Results from last 7 days   Lab Units 06/13/23  0444 06/12/23  0649 06/11/23  1809   SODIUM mmol/L 132* 134* 135*   POTASSIUM mmol/L 4.2 4.1 4.3   CHLORIDE mmol/L 99 101 99   CO2 mmol/L 24.0 24.4 23.0   BUN mg/dL 40* 32* 27*   CREATININE mg/dL 0.95 0.95 0.85   GLUCOSE mg/dL 97 94 130*   CALCIUM mg/dL 8.7 9.4 9.2     Results from last 7 days   Lab Units 06/11/23  1809   INR  0.91   APTT seconds 25.2           Assessment & Plan       Abnormal CT of the chest    Primary lung adenocarcinoma, right    Lung cancer metastatic to brain    COPD (chronic obstructive pulmonary disease)    Shingles, left arm    HTN (hypertension)    POD 14 R occipital crani for metastatic tumor resection  Hx of primary lung cancer    All above findings d/w Dr. Ricketts.     - Incisional sutures removed today.   - Needs to start Radiation (may have missed appointment) - will consult Rad/Onc  - FU appointment will be arranged for one month. Our office will contact patient.   - Please call for any incisional redness, swelling, drainage or for further questions or concerns.       Beth Zamorano, BARBARA  06/13/23  12:35 EDT

## 2023-06-13 NOTE — PROGRESS NOTES
Dedicated to Hospital Care    [phone]   LOS: 2 days     Name: Pam Vidal  Age/Sex: 78 y.o. female  :  1945        PCP: Nik Mckay MD  Chief Complaint   Patient presents with    Arm Pain    Numbness      Subjective   Still with a headache and still with arm pain but says her pain is fairly better controlled today.  Denies new issues.  Rested decently overnight.  General: No Fever or Chills, Cardiac: No Chest Pain or Palpitations, Resp: No Cough or SOA, GI: No Nausea, Vomiting, or Diarrhea, and Other: No bleeding    acyclovir, 800 mg, Oral, 5x Daily  dexamethasone, 2 mg, Oral, Q12H  gabapentin, 100 mg, Oral, Q8H  levETIRAcetam, 500 mg, Oral, BID  lisinopril, 40 mg, Oral, Q24H  pantoprazole, 40 mg, Oral, Q AM  senna-docusate sodium, 2 tablet, Oral, BID  sodium chloride, 10 mL, Intravenous, Q12H  sodium chloride, 10 mL, Intravenous, Q12H           Objective   Vital Signs  Temp:  [97.4 °F (36.3 °C)-98 °F (36.7 °C)] 97.9 °F (36.6 °C)  Heart Rate:  [76-82] 76  Resp:  [16] 16  BP: ()/(48-75) 97/48  Body mass index is 22.26 kg/m².    Intake/Output Summary (Last 24 hours) at 2023 1423  Last data filed at 2023 1304  Gross per 24 hour   Intake 420 ml   Output --   Net 420 ml       Physical Exam  Vitals and nursing note reviewed.   Constitutional:       Appearance: Normal appearance.   Cardiovascular:      Rate and Rhythm: Normal rate.   Pulmonary:      Effort: No respiratory distress.      Breath sounds: Normal breath sounds.   Abdominal:      General: Bowel sounds are normal. There is no distension.      Palpations: Abdomen is soft.   Neurological:      General: No focal deficit present.      Mental Status: She is alert. Mental status is at baseline.         Results Review:       I reviewed the patient's new clinical results.  Results from last 7 days   Lab Units 23  0444 23  0649 23  1809   WBC 10*3/mm3 11.93* 16.27* 18.23*   HEMOGLOBIN g/dL 13.7 13.9 14.8    PLATELETS 10*3/mm3 233 272 275     Results from last 7 days   Lab Units 06/13/23  0444 06/12/23  0649 06/11/23  1809 06/07/23  1735   SODIUM mmol/L 132* 134* 135*  --    POTASSIUM mmol/L 4.2 4.1 4.3  --    CHLORIDE mmol/L 99 101 99  --    CO2 mmol/L 24.0 24.4 23.0  --    BUN mg/dL 40* 32* 27*  --    CREATININE mg/dL 0.95 0.95 0.85 1.00   CALCIUM mg/dL 8.7 9.4 9.2  --    Estimated Creatinine Clearance: 41.1 mL/min (by C-G formula based on SCr of 0.95 mg/dL).      Assessment & Plan   Active Hospital Problems    Diagnosis  POA    **Abnormal CT of the chest [R93.89]  Yes    Shingles, left arm [B02.9]  Unknown    HTN (hypertension) [I10]  Unknown    COPD (chronic obstructive pulmonary disease) [J44.9]  Yes    Lung cancer metastatic to brain [C34.90, C79.31]  Yes    Primary lung adenocarcinoma, right [C34.91]  Yes      Resolved Hospital Problems   No resolved problems to display.       PLAN  This is a 78-year-old female with a history of lung cancer with metastatic disease to her brain who presents to the hospital with left arm and hand pain and is found to have shingles on her left arm.  Incidentally noted to have questionable pulmonary artery filling defects on CT scan and admitted to the medicine service for stabilization and evaluation  -Continue gabapentin and Norco and acyclovir for her herpes zoster infection  -Noted filling defect within the pulmonary artery but unclear if this is blood clot.  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  Both oncology and neurosurgery in agreement with this.  -Radiation oncology consulted today and oncology is following the patient.  She remains on Keppra and dexamethasone  -Blood pressure stable  -At this point hemoglobin stable neuro new neurologic findings plan will be to discharge home tomorrow if cleared by subspecialty services.  -She could benefit from goals of care discussion.  She has stage  IV lung cancer with metastatic disease to her brain and the patient readily admits that her quality life is taken a pretty significant hit.  I do not think she would benefit greatly from cardiopulmonary resuscitation.  We will continue ongoing discussions prior to discharge tomorrow.  If unable to come up with a plan or decision would recommend discussions with both her primary care physician and her oncologist in the outpatient setting      Disposition  Expected Discharge Date: 6/15/2023; Expected Discharge Time:        Nik Rain MD  [facility]  06/13/23  14:23 EDT

## 2023-06-14 VITALS
HEART RATE: 87 BPM | TEMPERATURE: 97.7 F | SYSTOLIC BLOOD PRESSURE: 115 MMHG | DIASTOLIC BLOOD PRESSURE: 53 MMHG | WEIGHT: 114 LBS | RESPIRATION RATE: 18 BRPM | OXYGEN SATURATION: 97 % | BODY MASS INDEX: 22.38 KG/M2 | HEIGHT: 60 IN

## 2023-06-14 LAB
ANION GAP SERPL CALCULATED.3IONS-SCNC: 7 MMOL/L (ref 5–15)
BUN SERPL-MCNC: 47 MG/DL (ref 8–23)
BUN/CREAT SERPL: 51.1 (ref 7–25)
CALCIUM SPEC-SCNC: 8.4 MG/DL (ref 8.6–10.5)
CHLORIDE SERPL-SCNC: 102 MMOL/L (ref 98–107)
CO2 SERPL-SCNC: 23 MMOL/L (ref 22–29)
CREAT SERPL-MCNC: 0.92 MG/DL (ref 0.57–1)
DEPRECATED RDW RBC AUTO: 45.5 FL (ref 37–54)
EGFRCR SERPLBLD CKD-EPI 2021: 64.7 ML/MIN/1.73
ERYTHROCYTE [DISTWIDTH] IN BLOOD BY AUTOMATED COUNT: 15.2 % (ref 12.3–15.4)
GLUCOSE SERPL-MCNC: 99 MG/DL (ref 65–99)
HCT VFR BLD AUTO: 37.4 % (ref 34–46.6)
HGB BLD-MCNC: 12.7 G/DL (ref 12–15.9)
MCH RBC QN AUTO: 28.1 PG (ref 26.6–33)
MCHC RBC AUTO-ENTMCNC: 34 G/DL (ref 31.5–35.7)
MCV RBC AUTO: 82.7 FL (ref 79–97)
PLATELET # BLD AUTO: 222 10*3/MM3 (ref 140–450)
PMV BLD AUTO: 10.5 FL (ref 6–12)
POTASSIUM SERPL-SCNC: 4.3 MMOL/L (ref 3.5–5.2)
RBC # BLD AUTO: 4.52 10*6/MM3 (ref 3.77–5.28)
SODIUM SERPL-SCNC: 132 MMOL/L (ref 136–145)
WBC NRBC COR # BLD: 10.96 10*3/MM3 (ref 3.4–10.8)

## 2023-06-14 PROCEDURE — 85027 COMPLETE CBC AUTOMATED: CPT | Performed by: HOSPITALIST

## 2023-06-14 PROCEDURE — 25010000002 HEPARIN LOCK FLUSH PER 10 UNITS: Performed by: EMERGENCY MEDICINE

## 2023-06-14 PROCEDURE — 80048 BASIC METABOLIC PNL TOTAL CA: CPT | Performed by: HOSPITALIST

## 2023-06-14 RX ORDER — DEXAMETHASONE 2 MG/1
2 TABLET ORAL EVERY 12 HOURS SCHEDULED
Qty: 42 TABLET | Refills: 0 | Status: SHIPPED | OUTPATIENT
Start: 2023-06-14 | End: 2023-07-05

## 2023-06-14 RX ORDER — ACYCLOVIR 800 MG/1
800 TABLET ORAL
Qty: 23 TABLET | Refills: 0 | Status: SHIPPED | OUTPATIENT
Start: 2023-06-14 | End: 2023-06-19

## 2023-06-14 RX ORDER — GABAPENTIN 100 MG/1
100 CAPSULE ORAL EVERY 8 HOURS SCHEDULED
Qty: 45 CAPSULE | Refills: 0 | Status: SHIPPED | OUTPATIENT
Start: 2023-06-14 | End: 2023-06-29

## 2023-06-14 RX ORDER — HYDROCODONE BITARTRATE AND ACETAMINOPHEN 5; 325 MG/1; MG/1
1 TABLET ORAL EVERY 4 HOURS PRN
Qty: 24 TABLET | Refills: 0 | Status: SHIPPED | OUTPATIENT
Start: 2023-06-14 | End: 2023-06-18

## 2023-06-14 RX ORDER — LISINOPRIL 20 MG/1
20 TABLET ORAL
Qty: 30 TABLET | Refills: 0 | Status: SHIPPED | OUTPATIENT
Start: 2023-06-15 | End: 2023-07-15

## 2023-06-14 RX ADMIN — ACYCLOVIR 800 MG: 400 TABLET ORAL at 15:54

## 2023-06-14 RX ADMIN — LISINOPRIL 40 MG: 40 TABLET ORAL at 08:56

## 2023-06-14 RX ADMIN — GABAPENTIN 100 MG: 100 CAPSULE ORAL at 06:17

## 2023-06-14 RX ADMIN — ACYCLOVIR 800 MG: 400 TABLET ORAL at 12:06

## 2023-06-14 RX ADMIN — ACYCLOVIR 800 MG: 400 TABLET ORAL at 18:27

## 2023-06-14 RX ADMIN — PANTOPRAZOLE SODIUM 40 MG: 40 TABLET, DELAYED RELEASE ORAL at 06:18

## 2023-06-14 RX ADMIN — GABAPENTIN 100 MG: 100 CAPSULE ORAL at 15:54

## 2023-06-14 RX ADMIN — Medication 10 ML: at 11:18

## 2023-06-14 RX ADMIN — ACETAMINOPHEN 650 MG: 325 TABLET, FILM COATED ORAL at 10:30

## 2023-06-14 RX ADMIN — DEXAMETHASONE 2 MG: 2 TABLET ORAL at 18:26

## 2023-06-14 RX ADMIN — ACETAMINOPHEN 650 MG: 325 TABLET, FILM COATED ORAL at 15:54

## 2023-06-14 RX ADMIN — DEXAMETHASONE 2 MG: 2 TABLET ORAL at 08:56

## 2023-06-14 RX ADMIN — ACETAMINOPHEN 650 MG: 325 TABLET, FILM COATED ORAL at 06:17

## 2023-06-14 RX ADMIN — LEVETIRACETAM 500 MG: 500 TABLET, FILM COATED ORAL at 08:56

## 2023-06-14 RX ADMIN — ACYCLOVIR 800 MG: 400 TABLET ORAL at 06:17

## 2023-06-14 RX ADMIN — HEPARIN 500 UNITS: 100 SYRINGE at 18:26

## 2023-06-14 NOTE — DISCHARGE SUMMARY
Patient Name: Pam Vidal  : 1945  MRN: [id number]    Date of Admission: 2023  Date of Discharge:  2023  Primary Care Physician: Nik Mckay MD      Chief Complaint:   Arm Pain and Numbness      Discharge Diagnoses     Active Hospital Problems    Diagnosis  POA    **Abnormal CT of the chest [R93.89]  Yes    Shingles, left arm [B02.9]  Unknown    HTN (hypertension) [I10]  Unknown    COPD (chronic obstructive pulmonary disease) [J44.9]  Yes    Lung cancer metastatic to brain [C34.90, C79.31]  Yes    Primary lung adenocarcinoma, right [C34.91]  Yes      Resolved Hospital Problems   No resolved problems to display.        Hospital Course     Ms. Vidal is a 78 y.o. female with a history of lung cancer with metastatic disease to the brain, hypertension, COPD who presented to [facility] initially complaining of arm pain and numbness.  Please see the admitting history and physical for further details.  She was found to have shingles and was admitted to the hospital for further evaluation and treatment.  She was admitted to the hospital with localized shingles.  Was also noted on work-up in the emergency room that she had a possible filling defect on her CTA within one of the pulmonary arteries.  She had a recent MRI of her brain following her debulking surgery for her brain mets that showed a possible hemorrhage with cerebellar metastasis.  She was seen by neurosurgery and oncology both of who recommend deferring empiric anticoagulation with no clear diagnosis of pulmonary embolus.  She was not hypoxic she was not tachycardic and she was not complaining of chest pain on admission.  Neurosurgery evaluated the patient and recommended continuing steroids they recommended no further management regarding findings on CT and MRI.  They will follow her up in the office in 2 to 3 weeks.  She has follow-up arranged with radiation oncology tomorrow morning and plan will be for her to  make it to that appointment.  Otherwise the plans been discussed with the patient at the bedside she has shown improvement in her singles at this point and plan is to discharge home with close outpatient follow-up with her primary care physician.      Day of Discharge     Subjective:  Pain is better controlled today.  Denies new issues or complaints.  She is okay with discharge today.    Physical Exam:  Temp:  [96.1 °F (35.6 °C)-98.1 °F (36.7 °C)] 97.8 °F (36.6 °C)  Heart Rate:  [76-84] 84  Resp:  [16-18] 18  BP: ()/(48-51) 106/51  Body mass index is 22.26 kg/m².  Physical Exam  Vitals and nursing note reviewed.   Constitutional:       General: She is not in acute distress.     Appearance: She is ill-appearing.   Cardiovascular:      Rate and Rhythm: Normal rate and regular rhythm.   Neurological:      Mental Status: She is alert.       Consultants     Consult Orders (all) (From admission, onward)       Start     Ordered    06/13/23 1239  Inpatient Radiation Oncology Consult  Once        Specialty:  Radiation Oncology  Provider:  Mckenna Moreira MD    06/13/23 1239    06/11/23 2157  Hematology & Oncology Inpatient Consult  Once        Specialty:  Hematology and Oncology  Provider:  Cruzito Lagunas MD    06/11/23 2157 06/11/23 2148  Inpatient Neurosurgery Consult  Once        Specialty:  Neurosurgery  Provider:  Clint Ricketts MD    06/11/23 2148 06/11/23 2033  [facility] (on-call MD unless specified) Details  Once        Specialty:  Hospitalist  Provider:  (Not yet assigned)    06/11/23 2032                  Procedures     * Surgery not found *      Imaging Results (All)       Procedure Component Value Units Date/Time    CT Angiogram Chest Pulmonary Embolism [[id number]] Collected: 06/11/23 2008     Updated: 06/11/23 2041    Addenda:        FINDINGS were called to Dr. Ozuna at 8:35 PM.     This report was finalized on 6/11/2023 8:38 PM by Dr. Kady Barrios M.D.     Signed: 06/11/23 2038 by  Kady Barrios MD    Narrative:      CT ANGIOGRAM OF THE CHEST     HISTORY: Shortness of air.     COMPARISON: 06/07/2023     TECHNIQUE: Axial CT imaging was obtained through the thorax. IV contrast  was administered. Three-D reformatted images were obtained.     FINDINGS:  There is a filling defect identified within the main pulmonary artery,  which measures up to 6 mm on the axial series. It may represent some  thrombus. Given its proximity to the pulmonic valve, with vegetation  would be a consideration. Consideration for echo is recommended. I do  not see any distal pulmonary emboli. This finding is new when compared  to prior study. Thoracic aorta measures within normal size limits. There  are coronary artery calcifications. The thyroid gland and trachea are  within normal limits. There is a small hiatal hernia. There are  background emphysematous changes. A previously identified right  suprahilar mass appears stable. There are some surrounding  reticulonodular infiltrates. These appear similar to the recent study.  No acute infiltrates are seen. There is minimal dependent atelectasis.  Mediastinal lymph nodes do not appear pathologically enlarged. The  findings within the upper abdomen are stable when compared to recent CT  abdomen and pelvis. There is extensive atherosclerotic involvement of  the abdominal aorta. No acute osseous abnormalities are seen. Right  internal jugular vein Mediport terminates within the right atrium.       Impression:         1. The patient has a filling defect identified within the main pulmonary  artery. It may represent a thrombus, or even a vegetation, given its  proximity to the pulmonary valve. Correlation with echo is recommended.  No distal emboli or acute infiltrates are seen.     Radiation dose reduction techniques were utilized, including automated  exposure control and exposure modulation based on body size.     This report was finalized on 6/11/2023 8:21 PM by   Kady Barrios M.D.       CT Head Without Contrast [[id number]] Collected: 06/11/23 2021     Updated: 06/11/23 2040    Narrative:      CT OF THE HEAD WITHOUT CONTRAST     HISTORY: Left arm numbness. HISTORY of brain surgery.     COMPARISON: 05/31/2023     TECHNIQUE: Axial CT imaging was obtained through the brain. No IV  contrast was administered.     FINDINGS:  Exam is compromised by the lack of immediate postoperative CT. Patient  did have an MRI on 05/31/2023, which showed changes of prior right  parietal craniotomy for resection of a mass within the right occipital  lobe. Prior MRI did demonstrate some residual blood products and fluid  within the resection cavity. No definite acute intracranial hemorrhage  is seen on the current study. There is persistent surrounding vasogenic  edema. A previously described cystic area along the medial aspect of the  resection cavity appears stable. There is some mild mass effect upon the  posterior horn of the right lateral ventricle. There is some mild  midline shift to the left approximately 3 mm, which is stable to perhaps  slightly improved when compared to prior study. There is atrophy. There  is periventricular and deep white matter microangiopathic change. Prior  MRI did demonstrate a small enhancing lesion within the left occipital  lobe, which I think is is visible is a small area of low attenuation,  measuring 4 mm. There is also a lesion within the superior cerebellum.  This measures up to 7 mm. It is more hyperdense than on prior CT. Prior  MRI did suggest some new hemorrhage within it, and this may account for  the more hyperdense appearance on the current study. There is some mild  mucosal thickening within the ethmoid sinuses.       Impression:         1. Examination is compromised by the lack of immediate postoperative CT  for comparison.  2. There are postoperative changes noted within the right parietal lobe,  which appear similar to the postoperative MRI.  No superimposed acute  hemorrhage is seen. Mild midline shift is stable to perhaps slightly  improved.  2. There is a small lesion noted within the left occipital lobe, which  likely corresponds to known metastasis. An additional metastasis within  the midline cerebellum appears more hyperdense than on prior CT,  although the interim MRI suggested some new hemorrhage within it, which  may account for its more hyperdense appearance.     FINDINGS were called to Dr. Ozuna at 8:35 PM.     Radiation dose reduction techniques were utilized, including automated  exposure control and exposure modulation based on body size.     This report was finalized on 6/11/2023 8:37 PM by Dr. Kady Barrios M.D.             Results for orders placed during the hospital encounter of 06/11/23    Duplex Venous Lower Extremity - Bilateral CAR    Interpretation Summary    Normal bilateral lower extremity venous duplex scan.    Results for orders placed during the hospital encounter of 06/11/23    Adult Transthoracic Echo Complete W/ Cont if Necessary Per Protocol    Interpretation Summary    Left ventricular systolic function is normal. Calculated left ventricular EF = 66.7%    Left ventricular outflow tract peak flow gradient at rest is 16 mmHg. Left ventricular outflow tract peak gradient with valsalva is 29 mmHg.    Left ventricular diastolic function was indeterminate.    Estimated right ventricular systolic pressure from tricuspid regurgitation is mildly elevated (35-45 mmHg).    Pertinent Labs     Results from last 7 days   Lab Units 06/14/23  0620 06/13/23  0444 06/12/23  0649 06/11/23  1809   WBC 10*3/mm3 10.96* 11.93* 16.27* 18.23*   HEMOGLOBIN g/dL 12.7 13.7 13.9 14.8   PLATELETS 10*3/mm3 222 233 272 275     Results from last 7 days   Lab Units 06/14/23  0620 06/13/23  0444 06/12/23  0649 06/11/23  1809   SODIUM mmol/L 132* 132* 134* 135*   POTASSIUM mmol/L 4.3 4.2 4.1 4.3   CHLORIDE mmol/L 102 99 101 99   CO2 mmol/L 23.0 24.0  24.4 23.0   BUN mg/dL 47* 40* 32* 27*   CREATININE mg/dL 0.92 0.95 0.95 0.85   GLUCOSE mg/dL 99 97 94 130*   EGFR mL/min/1.73 64.7 62.2 62.2 71.1     Results from last 7 days   Lab Units 06/11/23  1809   ALBUMIN g/dL 4.0   BILIRUBIN mg/dL 0.4   ALK PHOS U/L 82   AST (SGOT) U/L 23   ALT (SGPT) U/L 44*     Results from last 7 days   Lab Units 06/14/23  0620 06/13/23  0444 06/12/23  0649 06/11/23  1809   CALCIUM mg/dL 8.4* 8.7 9.4 9.2   ALBUMIN g/dL  --   --   --  4.0       Results from last 7 days   Lab Units 06/11/23  1809   HSTROP T ng/L 14*   PROBNP pg/mL 331.0   D DIMER QUANT MCGFEU/mL 0.88*           Invalid input(s): LDLCALC          Test Results Pending at Discharge       Discharge Details        Discharge Medications        New Medications        Instructions Start Date   acyclovir 800 MG tablet  Commonly known as: ZOVIRAX   800 mg, Oral, 5 Times Daily, Take no more than 5 doses a day.      gabapentin 100 MG capsule  Commonly known as: NEURONTIN   100 mg, Oral, Every 8 Hours Scheduled      HYDROcodone-acetaminophen 5-325 MG per tablet  Commonly known as: NORCO   1 tablet, Oral, Every 4 Hours PRN             Changes to Medications        Instructions Start Date   dexamethasone 2 MG tablet  Commonly known as: DECADRON  What changed: Another medication with the same name was removed. Continue taking this medication, and follow the directions you see here.   2 mg, Oral, Every 12 Hours Scheduled             Continue These Medications        Instructions Start Date   azelastine 0.1 % nasal spray  Commonly known as: ASTELIN   1 spray, Nasal      B COMPLEX VITAMINS ER PO   Oral, Daily      esomeprazole 10 MG packet  Commonly known as: NexIUM   10 mg, Oral, Daily      estradiol 1 MG tablet  Commonly known as: ESTRACE   1 mg, Oral, Daily      FeroSul 325 (65 FE) MG tablet  Generic drug: ferrous sulfate   TAKE ONE TABLET BY MOUTH DAILY WITH BREAKFAST      levETIRAcetam 500 MG tablet  Commonly known as: KEPPRA   500 mg,  Oral, 2 Times Daily      lidocaine-prilocaine 2.5-2.5 % cream  Commonly known as: EMLA   1 application, Topical, Every 2 Hours PRN      lisinopril 5 MG tablet  Commonly known as: PRINIVIL,ZESTRIL   5 mg, Oral, Every 24 Hours Scheduled      ofloxacin 0.3 % ophthalmic solution  Commonly known as: OCUFLOX   No dose, route, or frequency recorded.      ondansetron 8 MG tablet  Commonly known as: ZOFRAN   8 mg, Oral, 3 Times Daily PRN      PRESERVISION AREDS 2+MULTI VIT PO   Oral      vitamin D3 125 MCG (5000 UT) capsule capsule   2,000 Units, Oral, Daily             Stop These Medications      HYDROcodone Bit-Homatrop MBr 5-1.5 MG/5ML solution  Commonly known as: HYCODAN     predniSONE 10 MG tablet  Commonly known as: DELTASONE              Allergies   Allergen Reactions    Del-Mycin [Erythromycin] Hives    Gentamicin Hives    Ibuprofen Nausea And Vomiting    Latex Dermatitis     GLOVES    Metronidazole Hives    Ofloxacin Hives and Nausea Only    Penicillins Hives    Tetracycline Hives    Adhesive Tape Dermatitis     BANDAIDS    Aspirin GI Intolerance     Vomiting can take EC    Codeine Nausea And Vomiting       Discharge Disposition:  Home-Health Care c      Discharge Diet:  Diet Order   Procedures    Diet: Regular/House Diet; Texture: Regular Texture (IDDSI 7); Fluid Consistency: Thin (IDDSI 0)       Discharge Activity:   Activity Instructions       Activity as Tolerated              CODE STATUS:    Code Status and Medical Interventions:   Ordered at: 06/11/23 2148     Code Status (Patient has no pulse and is not breathing):    CPR (Attempt to Resuscitate)     Medical Interventions (Patient has pulse or is breathing):    Full Support       Future Appointments   Date Time Provider Department Center   6/15/2023  8:00 AM Mckenna Moreira MD [facility] None   7/3/2023  9:20 AM LAB CHAIR 5 [facility]  [facility]   7/3/2023  9:40 AM Cruzito Lagunas MD [facility] CBC [facility]   7/9/2023 12:30 PM [facility] 1 (3T)   [facility]   7/13/2023 10:30 AM Clint Ricketts MD [facility] NS [facility]     Additional Instructions for the Follow-ups that You Need to Schedule       Ambulatory Referral to Home Health (Hospital)   As directed      Face to Face Visit Date: 6/14/2023    Follow-up provider for Plan of Care?: I treated the patient in an acute care facility and will not continue treatment after discharge.    Follow-up provider: NIK COOMBS [[id number]]    Reason/Clinical Findings: pain and debility with cancer and brain surgery    Describe mobility limitations that make leaving home difficult: pain and debility with cancer and brain surgery    Nursing/Therapeutic Services Requested: Physical Therapy Occupational Therapy    Frequency: 1 Week 1         Discharge Follow-up with PCP   As directed       Currently Documented PCP:    Nik Coombs MD    PCP Phone Number:    [phone]     Follow Up Details: 1-2 weeks for shingles         Discharge Follow-up with Specified Provider: Dr Ricketts as directed   As directed      To: Dr Ricketts as directed         Discharge Follow-up with Specified Provider: Dr Lagunas   As directed      To: Dr Lagunas         Discharge Follow-up with Specified Provider: Radiation oncology; 1 Day   As directed      To: Radiation oncology    Follow Up: 1 Day                Follow-up Information       Clint Ricketts MD Follow up.    Specialty: Neurosurgery  Why: For wound re-check in one month. Our office will contact patient. Instruct patient to call office if she does not receive a phone call to schedule appointment.  Contact information:  [address] [id number] [phone]               Nik Coombs MD .    Specialty: Family Medicine  Why: 1-2 weeks for shingles  Contact information:  [address] [facility] [place] [address]  [phone]               Nik Coombs MD .    Specialty: Family Medicine  Why: 1-2 weeks for shingles  Contact information:  [address] [place]  [address]  [phone]                             Additional Instructions for the Follow-ups that You Need to Schedule       Ambulatory Referral to Home Health (Hospital)   As directed      Face to Face Visit Date: 6/14/2023    Follow-up provider for Plan of Care?: I treated the patient in an acute care facility and will not continue treatment after discharge.    Follow-up provider: NIK COOMBS [[id number]]    Reason/Clinical Findings: pain and debility with cancer and brain surgery    Describe mobility limitations that make leaving home difficult: pain and debility with cancer and brain surgery    Nursing/Therapeutic Services Requested: Physical Therapy Occupational Therapy    Frequency: 1 Week 1         Discharge Follow-up with PCP   As directed       Currently Documented PCP:    Nik Coombs MD    PCP Phone Number:    [phone]     Follow Up Details: 1-2 weeks for shingles         Discharge Follow-up with Specified Provider: Dr Ricketts as directed   As directed      To: Dr Ricketts as directed         Discharge Follow-up with Specified Provider: Dr Lagunas   As directed      To: Dr Lagunas         Discharge Follow-up with Specified Provider: Radiation oncology; 1 Day   As directed      To: Radiation oncology    Follow Up: 1 Day             Time Spent on Discharge:  Greater than 30 minutes      Nik Rain MD  [facility]  06/14/23  08:29 EDT

## 2023-06-14 NOTE — PROGRESS NOTES
Nutrition Services    Patient Name:  Pam Vidal  YOB: 1945  MRN: [id number]  Admit Date:  6/11/2023    Assessment Date:  06/14/23    Comment: MST 2:  Pt seen for MST 2 for wt loss. Pt with 8 lb (7%) wt loss x 1 month. Pt states great appetite and she has been eating 100% of all meals at home and here in hospital. No signs of fat/muscle wasting noted on NFPE except mild wasting noted on calves. Pt tolerating Regular diet with % po intake of meals. Pt stated she drinks vanilla Boost daily at home. Ordered Boost Plus (vanilla) at dinner daily. Provided pt with Boost coupons to use after d/c home. Noted plans for d/c later today.    Recommend:  Boost Plus daily at dinner (vanilla), will order.    Will follow per protocol.    CLINICAL NUTRITION ASSESSMENT      Reason for Assessment MST score 2+     Diagnosis/Problem   Abnormal CT of chest, shingles L arm, lung cancer metastatic to brain   Medical/Surgical History Past Medical History:   Diagnosis Date    COPD (chronic obstructive pulmonary disease)     Coughing up blood     X2 MONTHS    History of COVID-19 02/2022    Hyperlipidemia     IBS (irritable bowel syndrome)     Primary lung adenocarcinoma, right     Rheumatoid arthritis        Past Surgical History:   Procedure Laterality Date    APPENDECTOMY      appendix removed    BRONCHOSCOPY N/A 8/23/2022    Procedure: BRONCHOSCOPY WITH BAL,  BIOPSIES, AND BRUSHINGS WITH ENDOBRONCHIAL ULTRASOUND WITH FNA;  Surgeon: Gregor Vee MD;  Location: [facility] ENDOSCOPY;  Service: Pulmonary;  Laterality: N/A;  PRE- HILAR MASS  POST- SAME    BRONCHOSCOPY N/A 1/4/2023    Procedure: BRONCHOSCOPY with biopsy, lavage, brushing;  Surgeon: Gregor Vee MD;  Location: [facility] ENDOSCOPY;  Service: Pulmonary;  Laterality: N/A;    CAROTID ENDARTERECTOMY Right     CAROTID ENDARTERECTOMY Left     CATARACT EXTRACTION      CERVICAL FUSION      CHOLECYSTECTOMY      CRANIOTOMY FOR TUMOR Right 5/30/2023    Procedure:  "RIGHT CRANIOTOMY FOR TUMOR RESECTION STEREOTACTIC WITH STEALTH;  Surgeon: Clint Ricketts MD;  Location: [facility] OR;  Service: Neurosurgery;  Laterality: Right;    HYSTERECTOMY      SHOULDER ARTHROSCOPY Right 02/19/2019    right should scope/cuff repair     VENOUS ACCESS DEVICE (PORT) INSERTION Right 9/6/2022    Procedure: POWERPORT INSERTION;  Surgeon: Remedios Rice MD;  Location: [facility] OR;  Service: Thoracic;  Laterality: Right;        Encounter Information        Nutrition History:  Pt seen for MST 2 for wt loss. Pt with 8 lb (7%) wt loss x 1 month. Pt states great appetite and she has been eating 100% of all meals at home and here in hospital. No signs of fat/muscle wasting noted on NFPE except mild wasting noted on calves. Pt tolerating Regular diet with % po intake of meals. Pt stated she drinks vanilla Boost daily at home. Ordered Boost Plus (vanilla) at dinner daily. Provided pt with Boost coupons to use after d/c home. Noted plans for d/c later today.   Food Preferences:    Supplements:    Factors Affecting Intake: No factors at this time     Anthropometrics        Current Height  Current Weight  BMI kg/m2 Height: 152.4 cm (60\")  Weight: 51.7 kg (114 lb) (06/12/23 0913)  Body mass index is 22.26 kg/m².   Adjusted BMI (if applicable)    BMI Category Normal/Healthy (18.4 - 24.9)       Admission Weight Weight: 51.7 kg (114 lb)        Ideal Body Weight (IBW) 100 lb   Adjusted IBW (if applicable)        Usual Body Weight (UBW) 122 lb   Weight Change/Trend Loss, Amount/Timeframe:  8 lb (7%) wt loss x 1 month       Weight History Wt Readings from Last 30 Encounters:   06/12/23 0913 51.7 kg (114 lb)   06/12/23 0004 52 kg (114 lb 9.6 oz)   06/11/23 1605 53.1 kg (117 lb)   05/26/23 2027 60.9 kg (134 lb 4.2 oz)   05/25/23 2045 54.3 kg (119 lb 11.4 oz)   05/25/23 1325 53.5 kg (118 lb)   05/15/23 1121 53.6 kg (118 lb 3.2 oz)   05/01/23 1118 55.5 kg (122 lb 6.4 oz)   04/17/23 1345 54.8 kg (120 lb 14.4 " oz)   04/03/23 1239 54.7 kg (120 lb 9.6 oz)   04/03/23 1240 54.2 kg (119 lb 8 oz)   03/20/23 1320 55.2 kg (121 lb 12.8 oz)   03/06/23 1015 54.9 kg (121 lb)   02/20/23 1114 55.7 kg (122 lb 12.8 oz)   02/06/23 1053 57.7 kg (127 lb 3.2 oz)   01/23/23 0955 56.8 kg (125 lb 4.8 oz)   01/09/23 1207 57.3 kg (126 lb 6.4 oz)   12/27/22 1241 57.5 kg (126 lb 12.8 oz)   12/12/22 1321 57 kg (125 lb 9.6 oz)   12/01/22 1458 57.2 kg (126 lb)   11/28/22 0940 57 kg (125 lb 9.6 oz)   11/21/22 1257 55.7 kg (122 lb 12.8 oz)   10/31/22 0906 57 kg (125 lb 9.6 oz)   10/27/22 1236 57.3 kg (126 lb 6.4 oz)   10/25/22 0915 57 kg (125 lb 9.6 oz)   10/20/22 1112 57.4 kg (126 lb 9.6 oz)   10/18/22 1109 57.1 kg (125 lb 12.8 oz)   10/17/22 0808 57.4 kg (126 lb 8 oz)   10/13/22 1114 56.8 kg (125 lb 3.2 oz)   10/11/22 1042 57.8 kg (127 lb 6.4 oz)   10/06/22 1103 57.9 kg (127 lb 9.6 oz)   10/04/22 1104 58.7 kg (129 lb 6.4 oz)   10/03/22 1131 58.1 kg (128 lb 1.6 oz)   09/29/22 0957 57.3 kg (126 lb 6.4 oz)           --  Tests/Procedures        Tests/Procedures No new tests/procedures     Labs       Pertinent Labs    Results from last 7 days   Lab Units 06/14/23  0620 06/13/23  0444 06/12/23  0649 06/11/23  1809   SODIUM mmol/L 132* 132* 134* 135*   POTASSIUM mmol/L 4.3 4.2 4.1 4.3   CHLORIDE mmol/L 102 99 101 99   CO2 mmol/L 23.0 24.0 24.4 23.0   BUN mg/dL 47* 40* 32* 27*   CREATININE mg/dL 0.92 0.95 0.95 0.85   CALCIUM mg/dL 8.4* 8.7 9.4 9.2   BILIRUBIN mg/dL  --   --   --  0.4   ALK PHOS U/L  --   --   --  82   ALT (SGPT) U/L  --   --   --  44*   AST (SGOT) U/L  --   --   --  23   GLUCOSE mg/dL 99 97 94 130*     Results from last 7 days   Lab Units 06/14/23  0620 06/12/23  0649 06/11/23  1809   HEMOGLOBIN g/dL 12.7   < > 14.8   HEMATOCRIT % 37.4   < > 43.2   WBC 10*3/mm3 10.96*   < > 18.23*   ALBUMIN g/dL  --   --  4.0    < > = values in this interval not displayed.     Results from last 7 days   Lab Units 06/14/23  0620 06/13/23  0444 06/12/23  0649  06/11/23  1809   INR   --   --   --  0.91   APTT seconds  --   --   --  25.2   PLATELETS 10*3/mm3 222 233 272 275     COVID19   Date Value Ref Range Status   08/20/2022 Not Detected Not Detected - Ref. Range Final     Lab Results   Component Value Date    HGBA1C 5.70 (H) 05/26/2023          Medications           Scheduled Medications acyclovir, 800 mg, Oral, 5x Daily  dexamethasone, 2 mg, Oral, Q12H  gabapentin, 100 mg, Oral, Q8H  levETIRAcetam, 500 mg, Oral, BID  lisinopril, 40 mg, Oral, Q24H  pantoprazole, 40 mg, Oral, Q AM  senna-docusate sodium, 2 tablet, Oral, BID  sodium chloride, 10 mL, Intravenous, Q12H  sodium chloride, 10 mL, Intravenous, Q12H       Infusions     PRN Medications   acetaminophen **OR** acetaminophen **OR** acetaminophen    senna-docusate sodium **AND** polyethylene glycol **AND** bisacodyl **AND** bisacodyl    heparin    hydrALAZINE    HYDROcodone-acetaminophen    nitroglycerin    ondansetron    Access VAD **AND** sodium chloride    sodium chloride    sodium chloride    sodium chloride    sodium chloride    sodium chloride     Physical Findings          Physical Appearance alert, oriented, room air   Oral/Mouth Cavity tooth or teeth missing   Edema  no edema   Gastrointestinal non-distended , normoactive, last bowel movement:6/13   Skin  skin intact, other:rash, shingles   Tubes/Drains/Lines implantable port   NFPE No clinical signs of muscle wasting or fat loss   --  Current Nutrition Orders & Evaluation of Intake       Oral Nutrition     Food Allergies NKFA   Current PO Diet Diet: Regular/House Diet; Texture: Regular Texture (IDDSI 7); Fluid Consistency: Thin (IDDSI 0)   Supplement n/a   PO Evaluation     % PO Intake %    # of Days Evaluated 2   --  PES STATEMENT / NUTRITION DIAGNOSIS      Nutrition Dx Problem  Problem: Unintentional Weight Loss  Etiology: Medical Diagnosiscancer  Signs/Symptoms: Unintended Weight Change    Comment:    --  NUTRITION INTERVENTION / PLAN OF CARE       Intervention Goal(s) Maintain nutrition status, Meet estimated needs, Disease management/therapy, Tolerate PO , Maintain intake, and Maintain weight         RD Intervention/Action Interview for preferences, Encourage intake, Follow Tx Progress, Care plan reviewed, and Recommend/ordered:          Prescription/Orders:       PO Diet       Supplements Boost Plus (vanilla) at dinner      Snacks       Enteral Nutrition       Parenteral Nutrition    New Prescription Ordered? Yes   --      Monitor/Evaluation Per protocol   Discharge Plan/Needs Pending clinical course   Education Will instruct as appropriate   --    RD to follow per protocol.      Electronically signed by:  Onelia Diaz RD  06/14/23 16:14 EDT

## 2023-06-14 NOTE — CASE MANAGEMENT/SOCIAL WORK
Continued Stay Note  [facility]     Patient Name: Pam Vidal  MRN: [id number]  Today's Date: 6/14/2023    Admit Date: 6/11/2023    Plan: Return home with family and [facility] following   Discharge Plan       Row Name 06/14/23 1124       Plan    Plan Return home with family and [facility] following    Patient/Family in Agreement with Plan yes    Plan Comments Spoke with patient at bedside.  Plan remains to return home with niece.  Spoke with Irma/[facility] - she is aware of DC.  Phil SALAZAR                 Expected Discharge Date and Time       Expected Discharge Date Expected Discharge Time    Jun 14, 2023               Becky S. [place], RN

## 2023-06-14 NOTE — PLAN OF CARE
Goal Outcome Evaluation:      VSS, left arm rewrapped, scheduled gabapentin for pain. Patient has slept well. All needs met.

## 2023-06-15 ENCOUNTER — READMISSION MANAGEMENT (OUTPATIENT)
Dept: CALL CENTER | Facility: HOSPITAL | Age: 78
End: 2023-06-15
Payer: MEDICARE

## 2023-07-05 LAB
LAB AP CASE REPORT: NORMAL
LAB AP DIAGNOSIS COMMENT: NORMAL
LAB AP SPECIAL STAINS: NORMAL
Lab: NORMAL
PATH REPORT.ADDENDUM SPEC: NORMAL
PATH REPORT.FINAL DX SPEC: NORMAL
PATH REPORT.GROSS SPEC: NORMAL

## 2023-07-25 ENCOUNTER — PATIENT OUTREACH (OUTPATIENT)
Dept: OTHER | Facility: HOSPITAL | Age: 78
End: 2023-07-25
Payer: MEDICARE

## 2023-07-25 NOTE — PROGRESS NOTES
"Reviewed chart prior to call    Called patient. Left message that I was just touching base to see how she was doing. Wanted to know if she had any questions/concerns or resource/supportive care needs; requested cb    Call from patient. She has been doing home PT.  She complains of ongoing nerve pain due to her shingles. It is affecting her sleep and her ability to do her home PT.  She is asking if anything can else be done. The patient states Neurontin and pain medication help, just \"not enough\".  I will reach out to Dr. Lagunas. The patient asked about Lidocaine patches. We also discussed possible adjustment in Neurontin dose.     The patient met with the neurosurgeon recently. She states that appt went well.    The patient denies any ongoing resource/supportive care needs.      I will touch base next month; she will call as needed.      Message to Dr. Lagunas and his nurse regarding her nerve pain  "

## 2023-07-26 ENCOUNTER — PATIENT OUTREACH (OUTPATIENT)
Dept: OTHER | Facility: HOSPITAL | Age: 78
End: 2023-07-26
Payer: MEDICARE

## 2023-07-26 ENCOUNTER — TELEPHONE (OUTPATIENT)
Dept: ONCOLOGY | Facility: CLINIC | Age: 78
End: 2023-07-26
Payer: MEDICARE

## 2023-07-26 RX ORDER — LIDOCAINE 50 MG/G
1 PATCH TOPICAL EVERY 24 HOURS
Qty: 15 PATCH | Refills: 1 | Status: SHIPPED | OUTPATIENT
Start: 2023-07-26

## 2023-07-26 NOTE — PROGRESS NOTES
Message from Dr. Lagunas about Lidoderm patches; he can call in script or she may be able to get OTC.  Called patient, she prefers called in a script if possible. I will ask Dr. Lagunas to do that.  Msg to Dr. Lagunas about script

## 2023-07-26 NOTE — TELEPHONE ENCOUNTER
----- Message from Cruzito Lagunas MD sent at 7/26/2023 12:11 PM EDT -----  Please order lidoderm patches. Thanks, Mariah  ----- Message -----  From: Jannet Chauhan RN  Sent: 7/26/2023  11:53 AM EDT  To: Cruzito Lagunas MD, #    She prefers the prescription if you can submit that for her, thanks.    ----- Message -----  From: Cruzito Lagunas MD  Sent: 7/26/2023  11:36 AM EDT  To: Mariah Subramanian RN, #    Available OTC but we can send in a prescription if the patient prefers.  ----- Message -----  From: Jannet Chauhan RN  Sent: 7/26/2023   7:32 AM EDT  To: Cruzito Lagunas MD    Is that OTC or a prescription?  ----- Message -----  From: Cruzito Lagunas MD  Sent: 7/25/2023   4:58 PM EDT  To: Jannet Chauhan RN    Lidoderm patches would be fine if the shingles lesions have completely dried up.  ----- Message -----  From: Jannet Chauhan RN  Sent: 7/25/2023   1:33 PM EDT  To: Cruzito Lagunas MD, #    Good afternoon Dr. Lagunas. I just spoke with this patient. She stated her pain due to shingles in pretty severe; it is interfering with her sleep and other activities.  She says the Neurontin and pain medication helps, just not enough. Is there anything else that you can suggest? She is even asking about lidocaine patches. Please advise. Thanks!

## 2023-08-03 ENCOUNTER — HOSPITAL ENCOUNTER (EMERGENCY)
Facility: HOSPITAL | Age: 78
Discharge: HOME | End: 2023-08-03
Attending: EMERGENCY MEDICINE
Payer: MEDICARE

## 2023-08-03 ENCOUNTER — APPOINTMENT (OUTPATIENT)
Dept: CT IMAGING | Facility: HOSPITAL | Age: 78
End: 2023-08-03
Payer: MEDICARE

## 2023-08-03 ENCOUNTER — TELEPHONE (OUTPATIENT)
Dept: ONCOLOGY | Facility: CLINIC | Age: 78
End: 2023-08-03
Payer: MEDICARE

## 2023-08-03 VITALS
OXYGEN SATURATION: 96 % | RESPIRATION RATE: 18 BRPM | TEMPERATURE: 97.8 F | DIASTOLIC BLOOD PRESSURE: 68 MMHG | HEART RATE: 60 BPM | SYSTOLIC BLOOD PRESSURE: 128 MMHG

## 2023-08-03 DIAGNOSIS — M79.2 RADICULAR PAIN OF LEFT UPPER EXTREMITY: Primary | ICD-10-CM

## 2023-08-03 LAB — QT INTERVAL: 375 MS

## 2023-08-03 PROCEDURE — 93005 ELECTROCARDIOGRAM TRACING: CPT

## 2023-08-03 PROCEDURE — 25010000002 MORPHINE PER 10 MG: Performed by: EMERGENCY MEDICINE

## 2023-08-03 PROCEDURE — 93010 ELECTROCARDIOGRAM REPORT: CPT | Performed by: INTERNAL MEDICINE

## 2023-08-03 PROCEDURE — 72125 CT NECK SPINE W/O DYE: CPT

## 2023-08-03 PROCEDURE — 99284 EMERGENCY DEPT VISIT MOD MDM: CPT

## 2023-08-03 PROCEDURE — 96372 THER/PROPH/DIAG INJ SC/IM: CPT

## 2023-08-03 RX ORDER — MORPHINE SULFATE 2 MG/ML
4 INJECTION, SOLUTION INTRAMUSCULAR; INTRAVENOUS ONCE
Status: COMPLETED | OUTPATIENT
Start: 2023-08-03 | End: 2023-08-03

## 2023-08-03 RX ORDER — HYDROCODONE BITARTRATE AND ACETAMINOPHEN 7.5; 325 MG/1; MG/1
1 TABLET ORAL ONCE
Status: COMPLETED | OUTPATIENT
Start: 2023-08-03 | End: 2023-08-03

## 2023-08-03 RX ADMIN — HYDROCODONE BITARTRATE AND ACETAMINOPHEN 1 TABLET: 7.5; 325 TABLET ORAL at 17:27

## 2023-08-03 RX ADMIN — MORPHINE SULFATE 4 MG: 2 INJECTION, SOLUTION INTRAMUSCULAR; INTRAVENOUS at 19:48

## 2023-08-03 NOTE — ED PROVIDER NOTES
EMERGENCY DEPARTMENT ENCOUNTER    Room Number:  02/02  PCP: Nik Mckay MD  Discussed/ obtained information from independent historians: patient      HPI:  Chief Complaint: left arm pain  A complete HPI/ROS/PMH/PSH/SH/FH are unobtainable due to: none  Context: Pam Vidal is a 78 y.o. female who presents to the ED c/o left arm pain for the last 6 weeks.  It is constant shooting and throbbing, nothing noted to make it worse or better.  She has a history of left upper extremity radiculopathy since 1996 and states it feels similar to that.  She has had more pain in the left arm since having a bout of shingles in May of this year, 3 months ago.  She takes gabapentin 300 mg 3 times daily, called her PCP yesterday who increased it to 600 mg 3 times daily but she was due for refill and has not started the new dose yet.  She denies any injuries as well as any fever chills headache chest pain or shortness of breath.      External (non-ED) record review: Office visit with Dr. Lagunas of oncology on 7/12/2023.  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  She underwent chemotherapy and radiation in 2022.  She was started on immunotherapy.  She developed brain metastases and May.  She underwent neurosurgical resection on 5/30/2023 and also had stereotactic radiosurgery on 2 smaller lesions.  Hospitalized 6/11/2023 to 6/14/2023 due to extensive shingles outbreak of the left upper extremity.  Outpatient MRI of the brain 7/9/2023 showed no definite tumor recurrence and areas treated with stereotactic radiation.  Stable.  She was prescribed gabapentin and hydrocodone 5 mg every 4 hours 100 mg every 8 hours for left upper extremity pain      PAST MEDICAL HISTORY  Active Ambulatory Problems     Diagnosis Date Noted    Primary lung adenocarcinoma, right 08/30/2022    Cough with hemoptysis 08/30/2022    Lung mass 08/31/2022    Advanced care planning/counseling discussion  10/03/2022    High risk medication use 10/31/2022    Muscular deconditioning 04/03/2023    Neoplastic malignant related fatigue 04/03/2023    Lung cancer metastatic to brain 05/25/2023    Brain mass 05/27/2023    COPD (chronic obstructive pulmonary disease) 05/27/2023    Rheumatoid arthritis 05/27/2023    IBS (irritable bowel syndrome) 05/27/2023    Hyperlipidemia 05/27/2023    Abnormal CT of the chest 06/11/2023    Shingles, left arm 06/11/2023    HTN (hypertension) 06/11/2023     Resolved Ambulatory Problems     Diagnosis Date Noted    Leukocytosis 05/27/2023     Past Medical History:   Diagnosis Date    Coughing up blood     History of COVID-19 02/2022         PAST SURGICAL HISTORY  Past Surgical History:   Procedure Laterality Date    APPENDECTOMY      appendix removed    BRONCHOSCOPY N/A 8/23/2022    Procedure: BRONCHOSCOPY WITH BAL,  BIOPSIES, AND BRUSHINGS WITH ENDOBRONCHIAL ULTRASOUND WITH FNA;  Surgeon: Gregor Vee MD;  Location: [facility] ENDOSCOPY;  Service: Pulmonary;  Laterality: N/A;  PRE- HILAR MASS  POST- SAME    BRONCHOSCOPY N/A 1/4/2023    Procedure: BRONCHOSCOPY with biopsy, lavage, brushing;  Surgeon: Gregor Vee MD;  Location: [facility] ENDOSCOPY;  Service: Pulmonary;  Laterality: N/A;    CAROTID ENDARTERECTOMY Right     CAROTID ENDARTERECTOMY Left     CATARACT EXTRACTION      CERVICAL FUSION      CHOLECYSTECTOMY      CRANIOTOMY FOR TUMOR Right 5/30/2023    Procedure: RIGHT CRANIOTOMY FOR TUMOR RESECTION STEREOTACTIC WITH STEALTH;  Surgeon: Clint Ricketts MD;  Location: [facility] MAIN OR;  Service: Neurosurgery;  Laterality: Right;    HYSTERECTOMY      SHOULDER ARTHROSCOPY Right 02/19/2019    right should scope/cuff repair     VENOUS ACCESS DEVICE (PORT) INSERTION Right 9/6/2022    Procedure: POWERPORT INSERTION;  Surgeon: Remedios Rice MD;  Location: [facility] MAIN OR;  Service: Thoracic;  Laterality: Right;         FAMILY HISTORY  Family History   Problem Relation Age of Onset    Cancer Mother      Cancer Father     Malwilmer Hyperthermia Neg Hx          SOCIAL HISTORY  Social History     Socioeconomic History    Marital status:    Tobacco Use    Smoking status: Former     Packs/day: 0.25     Types: Cigarettes     Quit date: 2022     Years since quittin.2    Smokeless tobacco: Never    Tobacco comments:     Former some day smoker of 1-2 cigs   Vaping Use    Vaping Use: Never used   Substance and Sexual Activity    Alcohol use: Yes     Alcohol/week: 2.0 standard drinks     Types: 2 Glasses of wine per week     Comment: occasionally    Drug use: Never    Sexual activity: Defer         ALLERGIES  Del-mycin [erythromycin], Gentamicin, Ibuprofen, Latex, Metronidazole, Ofloxacin, Penicillins, Tetracycline, Adhesive tape, Aspirin, and Codeine        REVIEW OF SYSTEMS  Review of Systems         PHYSICAL EXAM  ED Triage Vitals [23 1607]   Temp Heart Rate Resp BP SpO2   97.8 øF (36.6 øC) 71 12 136/80 98 %      Temp src Heart Rate Source Patient Position BP Location FiO2 (%)   -- -- -- -- --       Physical Exam      GENERAL: no acute distress  HENT: normocephalic, atraumatic  EYES: no scleral icterus  CV: regular rhythm, normal rate  RESPIRATORY: normal effort CTA B  ABDOMEN: nondistended soft nontender  MUSCULOSKELETAL: no deformity.  No C-spine tenderness.  On inspection of the left upper extremity there is no edema erythema or rash or ecchymosis.  There is some postinflammatory hyperpigmentation to the left palm from previous shingles.  Radial pulse 2+, cap refill brisk, sensation intact in radial ulnar median nerve distributions.  NEURO: alert, moves all extremities, follows commands  PSYCH:  calm, cooperative  SKIN: warm, dry    Vital signs and nursing notes reviewed.          LAB RESULTS  Recent Results (from the past 24 hour(s))   ECG 12 Lead Other; left sided pain    Collection Time: 23  4:19 PM   Result Value Ref Range    QT Interval 375 ms       Ordered the above labs and reviewed the  results.        RADIOLOGY  CT Cervical Spine Without Contrast    Result Date: 8/3/2023  CERVICAL SPINE CT WITHOUT CONTRAST  HISTORY: Metastatic lung cancer, neck pain radiating to left arm.  TECHNIQUE: Noncontrast cervical spine CT with multiplanar reformatted images is correlated with CT of the chest 06/11/2023.  Radiation dose reduction techniques were utilized, including automated exposure control and exposure modulation based on body size.  FINDINGS: There are posterior cerclage wires around the spinous processes from C6 through T1 with solid ankylosis posteriorly at those levels and nonsurgical ankylosis across the lateral right disc space and the right facet joint at T1 to. An old, severe central compression deformity of C7 is observed and associated with minimal loss of height anteriorly and posteriorly and solid ankylosis around the periphery of the C6-7 disc space.  The articulations of the skull base with C1 and C1 with C2 appear normal. At C2-3, there is hypertrophic facet change on the right. The disc and the left facet appear normal and the canal and foramina are patent.  At C3-4, there is loss of disc height with a small disc bulge posteriorly and facet arthropathy on the left. The canal and foramina are patent.  At C4-5, there is complete loss of disc height with a bulky ridge of posterior disc and osteophyte material which indents the thecal sac and narrows the spinal canal to 6 to 7 mm greatest AP diameter. Uncovertebral osteophyte creates severe osseous foraminal stenosis bilaterally.  At C5-6, there is complete loss of disc height with one or 2 mm anterolisthesis of C5 and a posterior disc and osteophyte complex which appears to moderately narrow the spinal canal. The right foramen is moderately stenotic. The left foramen is patent.  The canal and foramina appear widely patent from C6 through T2.  There is sclerosis of the C4 and C5 vertebral bodies which is favored to be related to be advanced  degenerative disc change. No osseous deformity suggestive of tumor is present. No lymphadenopathy or mass lesion is present in the neck.  The most caudad axial images show asymmetric soft tissue density or dense infiltrate in the medial right upper lobe, new in comparison with the chest CT from June 11. Whether this represents progressive tumor, posttreatment change, or postobstructive pneumonitis is not clear. No lesion is present in the visualized left lung apex. No Pancoast lesion in the left upper chest or supraclavicular region.      1. Infiltrate or tumor in the medial right upper lobe is new in comparison with CT angiogram chest 06/11/2023. No tumor identified in the cervical spine or the visualized neck. 2. There is an old C7 central compression fracture and posterior cerclage fusion hardware along with solid osseous ankylosis from C6 through T2. Degenerative disc and facet changes are observed above the fused levels, most prominently at C4-5 and C5-6 where there is moderate to severe spinal canal stenosis with chronic appearing foraminal stenosis.  This report was finalized on 8/3/2023 7:59 PM by Dr. Hector Rosario M.D.       Ordered the above noted radiological studies. Reviewed by me in PACS.            PROCEDURES  Procedures              MEDICATIONS GIVEN IN ER  Medications   HYDROcodone-acetaminophen (NORCO) 7.5-325 MG per tablet 1 tablet (1 tablet Oral Given 8/3/23 1727)   morphine injection 4 mg (4 mg Intramuscular Given 8/3/23 1948)                   MEDICAL DECISION MAKING, PROGRESS, and CONSULTS    All labs have been independently reviewed by me.  All radiology studies have been reviewed by me and I have also reviewed the radiology report.   EKG's independently viewed and interpreted by me.  Discussion below represents my analysis of pertinent findings related to patient's condition, differential diagnosis, treatment plan and final disposition.            Orders placed during this visit:  Orders  Placed This Encounter   Procedures    CT Cervical Spine Without Contrast    ECG 12 Lead Other; left sided pain           Differential diagnosis:  Radiculopathy, postherpetic neuralgia, neuropathy      Independent interpretation of labs, radiology studies, and discussions with consultants:  ED Course as of 08/03/23 2317   Thu Aug 03, 2023   1757 EKG          EKG time: 1619  Rhythm/Rate: Normal sinus, rate 77  P waves and WY: Normal P, normal WY  QRS, axis: Narrow QRS, normal axis  ST and T waves: No acute    Independently Interpreted by me  Not significantly changed compared to prior 6/11/2023   [TR]   1832 CT Cervical Spine Without Contrast  My independent interpretation of the CT of the cervical spine is narrowed disc space C4-C5 [TR]   2012 CT scan unremarkable for acute findings, right upper lobe adenocarcinoma is known and being treated by oncology.  She has chronic pain, is currently being treated for this by her oncologist.  I recommended continued follow-up with them as well as her PCP.  We will trial a Medrol Dosepak I recommended she follow-up as an outpatient within 2-3 business days with her PCP. [KA]   2015 I reassessed the patient, she is resting much more comfortably.  Counseled her on her CT findings, no acute intervention indicated.  This pain to a large degree is chronic, apparently she has a prescription for Neurontin waiting for her at the pharmacy. I have recommended that she follow-up closely with PCP and neurosurgery for further pain management.  She is agreeable.  It looks like she is prescribed prednisone 10 mg daily previously by her oncologist for arthritis pain.  When I asked the patient about this she is not sure if she is taking it.  It looks like it was represcribed a couple weeks ago with 5 refills have encouraged her to pick that up and continue taking it. [KA]      ED Course User Index  [KA] Velia Escobar PA  [TR] Jaime Cardona MD             Additional sources:    - Chronic or  social conditions impacting care: History of cervical spine surgery, chronic pain, left cervical radiculopathy, postherpetic neuralgia          DIAGNOSIS  Final diagnoses:   Radicular pain of left upper extremity           Follow Up:  Nik Mckay MD  [facility] [place] [facility]  [phone]    Call in 1 day  Follow-up as soon as possible      RX:     Medication List      No changes were made to your prescriptions during this visit.         Latest Documented Vital Signs:  As of 23:17 EDT  BP- 128/68 HR- 60 Temp- 97.8 øF (36.6 øC) O2 sat- 96%      ANDRES reviewed by Jaime Cardona MD       --    Please note that portions of this were completed with a voice recognition program.       Note Disclaimer: At [facility], we believe that sharing information builds trust and better relationships. You are receiving this note because you are receiving care at [facility] or recently visited. It is possible you will see health information before a provider has talked with you about it. This kind of information can be easy to misunderstand. To help you fully understand what it means for your health, we urge you to discuss this note with your provider.             Velia Escobar PA  08/03/23 9545

## 2023-08-03 NOTE — ED TRIAGE NOTES
"Patient to ED via EMS from home. Patient c/o left sided generalized pain. Patient states she in pain \"from my lung down.\" Patient reports she thinks this is d/t shingles.   "

## 2023-08-03 NOTE — ED NOTES
Warm blanket and pillow given. Pt repositioned for comfort. Temp adjusted in room and lights dimmed. Call light in reach.

## 2023-08-03 NOTE — ED PROVIDER NOTES
MD ATTESTATION NOTE    The TRACEY and I have discussed this patient's history, physical exam, and treatment plan.  I have reviewed the documentation and personally had a face to face interaction with the patient. I affirm the documentation and agree with the treatment and plan.  The attached note describes my personal findings.    I provided a substantive portion of the care of this patient. I personally performed the physical exam, in its entirety.    Independent Historians: Patient    A complete HPI/ROS/PMH/PSH/SH/FH are unobtainable due to: Nothing    Chronic or social conditions impacting patient care (social determinants of health): None    Pam Vidal is a 78 y.o. female who presents to the ED c/o acute on chronic left arm pain.  The patient reports that she has had pain in her left arm ever since she was diagnosed with shingles in May.  She reports that she has no weakness or numbness.  She reports that they have been increasing her gabapentin dose.  She states she ran out of her gabapentin today.  She reports severe pain to her left arm.  She denies any specific injury.  Nothing makes it worse or better.  She denies any fevers.          Prescription drug monitoring program review: ANDRES reviewed by Jaime Cardona MD      On exam:  GENERAL: Awake, alert, no acute distress  SKIN: Warm, dry  HENT: Normocephalic, atraumatic  EYES: no scleral icterus  CV: regular rhythm, regular rate  RESPIRATORY: normal effort, lungs clear  ABDOMEN: soft, nontender, nondistended  MUSCULOSKELETAL: no deformity.  Equal pulses in her upper extremities.  Normal sensation and motor in her upper extremities.  No skin changes.  NEURO: alert, moves all extremities, follows commands                                                             Labs  Recent Results (from the past 24 hour(s))   ECG 12 Lead Other; left sided pain    Collection Time: 08/03/23  4:19 PM   Result Value Ref Range    QT Interval 375 ms       Radiology  CT  Cervical Spine Without Contrast    Result Date: 8/3/2023  CERVICAL SPINE CT WITHOUT CONTRAST  HISTORY: Metastatic lung cancer, neck pain radiating to left arm.  TECHNIQUE: Noncontrast cervical spine CT with multiplanar reformatted images is correlated with CT of the chest 06/11/2023.  Radiation dose reduction techniques were utilized, including automated exposure control and exposure modulation based on body size.  FINDINGS: There are posterior cerclage wires around the spinous processes from C6 through T1 with solid ankylosis posteriorly at those levels and nonsurgical ankylosis across the lateral right disc space and the right facet joint at T1 to. An old, severe central compression deformity of C7 is observed and associated with minimal loss of height anteriorly and posteriorly and solid ankylosis around the periphery of the C6-7 disc space.  The articulations of the skull base with C1 and C1 with C2 appear normal. At C2-3, there is hypertrophic facet change on the right. The disc and the left facet appear normal and the canal and foramina are patent.  At C3-4, there is loss of disc height with a small disc bulge posteriorly and facet arthropathy on the left. The canal and foramina are patent.  At C4-5, there is complete loss of disc height with a bulky ridge of posterior disc and osteophyte material which indents the thecal sac and narrows the spinal canal to 6 to 7 mm greatest AP diameter. Uncovertebral osteophyte creates severe osseous foraminal stenosis bilaterally.  At C5-6, there is complete loss of disc height with one or 2 mm anterolisthesis of C5 and a posterior disc and osteophyte complex which appears to moderately narrow the spinal canal. The right foramen is moderately stenotic. The left foramen is patent.  The canal and foramina appear widely patent from C6 through T2.  There is sclerosis of the C4 and C5 vertebral bodies which is favored to be related to be advanced degenerative disc change. No  osseous deformity suggestive of tumor is present. No lymphadenopathy or mass lesion is present in the neck.  The most caudad axial images show asymmetric soft tissue density or dense infiltrate in the medial right upper lobe, new in comparison with the chest CT from June 11. Whether this represents progressive tumor, posttreatment change, or postobstructive pneumonitis is not clear. No lesion is present in the visualized left lung apex. No Pancoast lesion in the left upper chest or supraclavicular region.      1. Infiltrate or tumor in the medial right upper lobe is new in comparison with CT angiogram chest 06/11/2023. No tumor identified in the cervical spine or the visualized neck. 2. There is an old C7 central compression fracture and posterior cerclage fusion hardware along with solid osseous ankylosis from C6 through T2. Degenerative disc and facet changes are observed above the fused levels, most prominently at C4-5 and C5-6 where there is moderate to severe spinal canal stenosis with chronic appearing foraminal stenosis.  This report was finalized on 8/3/2023 7:59 PM by Dr. Hector Rosario M.D.       Medical Decision Making:  ED Course as of 08/04/23 1221   Thu Aug 03, 2023   1757 EKG          EKG time: 1619  Rhythm/Rate: Normal sinus, rate 77  P waves and MD: Normal P, normal MD  QRS, axis: Narrow QRS, normal axis  ST and T waves: No acute    Independently Interpreted by me  Not significantly changed compared to prior 6/11/2023   [TR]   1832 CT Cervical Spine Without Contrast  My independent interpretation of the CT of the cervical spine is narrowed disc space C4-C5 [TR]   2012 CT scan unremarkable for acute findings, right upper lobe adenocarcinoma is known and being treated by oncology.  She has chronic pain, is currently being treated for this by her oncologist.  I recommended continued follow-up with them as well as her PCP.  We will trial a Medrol Dosepak I recommended she follow-up as an outpatient  within 2-3 business days with her PCP. [KA]   2015 I reassessed the patient, she is resting much more comfortably.  Counseled her on her CT findings, no acute intervention indicated.  This pain to a large degree is chronic, apparently she has a prescription for Neurontin waiting for her at the pharmacy. I have recommended that she follow-up closely with PCP and neurosurgery for further pain management.  She is agreeable.  It looks like she is prescribed prednisone 10 mg daily previously by her oncologist for arthritis pain.  When I asked the patient about this she is not sure if she is taking it.  It looks like it was represcribed a couple weeks ago with 5 refills have encouraged her to pick that up and continue taking it. [KA]      ED Course User Index  [KA] Velia Escobar PA  [TR] Jaime Cardona MD       The patient presents with chronic left upper extremity pain.  She does have a history of lung adenocarcinoma and brain mass.  She was last seen 3 weeks ago by neurosurgery.  She has controlled left upper extremity pain.  They note in her prior oncology note that she has had severe shingles reaction.  We will give her medication for pain.  We will CT her cervical spine to rule out any bony metastasis or cause for her left upper extremity radicular pain.  My differential diagnosis includes chronic pain, acute pain, postherpetic neuralgia, cervical radiculopathy, and others.    Procedures:  Procedures            Diagnosis  Final diagnoses:   Radicular pain of left upper extremity       Note Disclaimer: At [facility], we believe that sharing information builds trust and better relationships. You are receiving this note because you recently visited [facility]. It is possible you will see health information before a provider has talked with you about it. This kind of information can be easy to misunderstand. To help you fully understand what it means for your health, we urge you to discuss this note with  your provider.       Jaime Cardona MD  08/03/23 9566       Jaime Cardona MD  08/04/23 1223

## 2023-08-04 NOTE — ED NOTES
Pt ready for discharge. Pt's niece, Rosio, called and will come get pt shortly. ERT to assist pt up and into w/c for d/c to lobby.

## 2023-08-08 ENCOUNTER — TELEPHONE (OUTPATIENT)
Dept: ONCOLOGY | Facility: CLINIC | Age: 78
End: 2023-08-08
Payer: MEDICARE

## 2023-08-08 RX ORDER — MORPHINE SULFATE 30 MG/1
30 TABLET ORAL EVERY 4 HOURS PRN
Qty: 60 TABLET | Refills: 0 | Status: SHIPPED | OUTPATIENT
Start: 2023-08-08

## 2023-08-08 NOTE — TELEPHONE ENCOUNTER
Called Rosio back after talking with Dr. Anderson clinic RN Mariah and Dr. Lagunas wanted MSIR 30mg 1 tab every 4 hours prn for severe pain to be entered. Rosio did not answer but I made her aware I sent it to Dr. Lagunas for signature and that the patient would not be taking the norco any longer. Rosio did not answer but a v/m was left.

## 2023-08-08 NOTE — TELEPHONE ENCOUNTER
Rosio called me back and stated that the patient was seen in the ER last week and that she received morphine that worked. I looked through notation and she was given a 4mg IV dose of morphine.I let her know that we would not be able to continue with that because of the route but that I would talk with Dr. Lagunas and let her know. Rosio reid/kandis.

## 2023-08-08 NOTE — TELEPHONE ENCOUNTER
Caller: ZIA DURANT    Relationship: Emergency Contact    Best call back number: [phone]     What is the best time to reach you: ANYTIME    Who are you requesting to speak with (clinical staff, provider,  specific staff member): CLINICAL    What was the call regarding: PT WENT TO THE HOSPITAL AND HAD RECEIVED A MORPHINE SHOT THAT REALLY HELPED THE PT OUT IMMEDIATELY. THE PAIN IS COMING BACK.. THEY WOULD LIKE TO KNOW WHEN THE NEW PAIN MEDICATION THAT WAS TALKED ABOUT WOULD BE SENT IN (NOT SURE OF THE NAME)?    [facility] [id number] - [place], [place] - [address] - [phone]  - [phone]     Is it okay if the provider responds through UIBLUEPRINTt: NO - PLEASE CALL BACK.     **PT NIECE ALSO HAD SOME QUESTIONS ABOUT HOW THE PT'S ER VISIT AND IF THEY HAD HANDLED THINGS CORRECTLY. PLEASE CALL TO DISCUSS.

## 2023-08-17 ENCOUNTER — HOSPITAL ENCOUNTER (OUTPATIENT)
Facility: HOSPITAL | Age: 78
Discharge: HOME | End: 2023-08-17
Payer: MEDICARE

## 2023-08-23 ENCOUNTER — INFUSION (OUTPATIENT)
Dept: ONCOLOGY | Facility: HOSPITAL | Age: 78
End: 2023-08-23
Payer: MEDICARE

## 2023-08-23 ENCOUNTER — OFFICE VISIT (OUTPATIENT)
Dept: ONCOLOGY | Facility: CLINIC | Age: 78
End: 2023-08-23
Payer: MEDICARE

## 2023-08-23 VITALS
OXYGEN SATURATION: 95 % | SYSTOLIC BLOOD PRESSURE: 150 MMHG | RESPIRATION RATE: 18 BRPM | HEART RATE: 99 BPM | TEMPERATURE: 98.2 F | WEIGHT: 116.5 LBS | DIASTOLIC BLOOD PRESSURE: 67 MMHG | HEIGHT: 60 IN | BODY MASS INDEX: 22.87 KG/M2

## 2023-08-23 DIAGNOSIS — C79.31 LUNG CANCER METASTATIC TO BRAIN: ICD-10-CM

## 2023-08-23 DIAGNOSIS — C34.90 LUNG CANCER METASTATIC TO BRAIN: ICD-10-CM

## 2023-08-23 DIAGNOSIS — B02.9 HERPES ZOSTER WITHOUT COMPLICATION: ICD-10-CM

## 2023-08-23 DIAGNOSIS — C34.91 PRIMARY LUNG ADENOCARCINOMA, RIGHT: Primary | ICD-10-CM

## 2023-08-23 LAB
ALBUMIN SERPL-MCNC: 3.7 G/DL (ref 3.5–5.2)
ALBUMIN/GLOB SERPL: 1.1 G/DL
ALP SERPL-CCNC: 112 U/L (ref 39–117)
ALT SERPL W P-5'-P-CCNC: 32 U/L (ref 1–33)
ANION GAP SERPL CALCULATED.3IONS-SCNC: 17.7 MMOL/L (ref 5–15)
AST SERPL-CCNC: 66 U/L (ref 1–32)
BASOPHILS # BLD AUTO: 0.05 10*3/MM3 (ref 0–0.2)
BASOPHILS NFR BLD AUTO: 0.4 % (ref 0–1.5)
BILIRUB SERPL-MCNC: 0.6 MG/DL (ref 0–1.2)
BUN SERPL-MCNC: 19 MG/DL (ref 8–23)
BUN/CREAT SERPL: 12.5 (ref 7–25)
CALCIUM SPEC-SCNC: 9.7 MG/DL (ref 8.6–10.5)
CHLORIDE SERPL-SCNC: 91 MMOL/L (ref 98–107)
CO2 SERPL-SCNC: 22.3 MMOL/L (ref 22–29)
CREAT SERPL-MCNC: 1.52 MG/DL (ref 0.6–1.1)
DEPRECATED RDW RBC AUTO: 63.3 FL (ref 37–54)
EGFRCR SERPLBLD CKD-EPI 2021: 35.4 ML/MIN/1.73
EOSINOPHIL # BLD AUTO: 0.06 10*3/MM3 (ref 0–0.4)
EOSINOPHIL NFR BLD AUTO: 0.4 % (ref 0.3–6.2)
ERYTHROCYTE [DISTWIDTH] IN BLOOD BY AUTOMATED COUNT: 18.2 % (ref 12.3–15.4)
GLOBULIN UR ELPH-MCNC: 3.4 GM/DL
GLUCOSE SERPL-MCNC: 100 MG/DL (ref 65–99)
HCT VFR BLD AUTO: 35.1 % (ref 34–46.6)
HGB BLD-MCNC: 11 G/DL (ref 12–15.9)
IMM GRANULOCYTES # BLD AUTO: 0.14 10*3/MM3 (ref 0–0.05)
IMM GRANULOCYTES NFR BLD AUTO: 1 % (ref 0–0.5)
LYMPHOCYTES # BLD AUTO: 1.02 10*3/MM3 (ref 0.7–3.1)
LYMPHOCYTES NFR BLD AUTO: 7.6 % (ref 19.6–45.3)
MCH RBC QN AUTO: 29.6 PG (ref 26.6–33)
MCHC RBC AUTO-ENTMCNC: 31.3 G/DL (ref 31.5–35.7)
MCV RBC AUTO: 94.6 FL (ref 79–97)
MONOCYTES # BLD AUTO: 1.25 10*3/MM3 (ref 0.1–0.9)
MONOCYTES NFR BLD AUTO: 9.3 % (ref 5–12)
NEUTROPHILS NFR BLD AUTO: 10.92 10*3/MM3 (ref 1.7–7)
NEUTROPHILS NFR BLD AUTO: 81.3 % (ref 42.7–76)
NRBC BLD AUTO-RTO: 0 /100 WBC (ref 0–0.2)
PLATELET # BLD AUTO: 396 10*3/MM3 (ref 140–450)
PMV BLD AUTO: 9.1 FL (ref 6–12)
POTASSIUM SERPL-SCNC: 4.1 MMOL/L (ref 3.5–5.2)
PROT SERPL-MCNC: 7.1 G/DL (ref 6–8.5)
RBC # BLD AUTO: 3.71 10*6/MM3 (ref 3.77–5.28)
SODIUM SERPL-SCNC: 131 MMOL/L (ref 136–145)
WBC NRBC COR # BLD: 13.44 10*3/MM3 (ref 3.4–10.8)

## 2023-08-23 PROCEDURE — 36591 DRAW BLOOD OFF VENOUS DEVICE: CPT

## 2023-08-23 PROCEDURE — 85025 COMPLETE CBC W/AUTO DIFF WBC: CPT

## 2023-08-23 PROCEDURE — 25010000002 HEPARIN LOCK FLUSH PER 10 UNITS: Performed by: INTERNAL MEDICINE

## 2023-08-23 PROCEDURE — 80053 COMPREHEN METABOLIC PANEL: CPT

## 2023-08-23 RX ORDER — SODIUM CHLORIDE 0.9 % (FLUSH) 0.9 %
10 SYRINGE (ML) INJECTION AS NEEDED
OUTPATIENT
Start: 2023-08-23

## 2023-08-23 RX ORDER — HEPARIN SODIUM (PORCINE) LOCK FLUSH IV SOLN 100 UNIT/ML 100 UNIT/ML
500 SOLUTION INTRAVENOUS AS NEEDED
Status: DISCONTINUED | OUTPATIENT
Start: 2023-08-23 | End: 2023-08-23 | Stop reason: HOSPADM

## 2023-08-23 RX ORDER — SODIUM CHLORIDE 0.9 % (FLUSH) 0.9 %
10 SYRINGE (ML) INJECTION AS NEEDED
Status: DISCONTINUED | OUTPATIENT
Start: 2023-08-23 | End: 2023-08-23 | Stop reason: HOSPADM

## 2023-08-23 RX ORDER — HEPARIN SODIUM (PORCINE) LOCK FLUSH IV SOLN 100 UNIT/ML 100 UNIT/ML
500 SOLUTION INTRAVENOUS AS NEEDED
OUTPATIENT
Start: 2023-08-23

## 2023-08-23 RX ADMIN — Medication 10 ML: at 10:14

## 2023-08-23 RX ADMIN — HEPARIN 500 UNITS: 100 SYRINGE at 10:14

## 2023-08-23 NOTE — PROGRESS NOTES
Subjective     REASON FOR FOLLOW UP:   1.  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  PD-L1 positive greater than 95%  3.  Primary chemoradiation with weekly carboplatin and Taxol completed 10/27/2022  4.  Imfinzi immunotherapy every 2 weeks for 12 months total.  First treatment 11/28/2022  5.  Repeat bronchoscopy 1/4/2023 due to persistent hemoptysis.  Pathology revealed no malignancy.  6.  Exophytic lesion of the kidney noted on surveillance CT scans.    HISTORY OF PRESENT ILLNESS:  The patient is a 78 y.o. year old female who is a former smoker referred to us from thoracic surgery (Dr. Remedios Rice) for evaluation and treatment of clinical stage III adenocarcinoma of the lung.  She was having persistent cough and underwent chest x-ray initially in late June which showed possible right upper lobe mass.  This was followed by CT scan of the chest confirming the presence of a right upper lobe mass.  She initially underwent a CT-guided needle biopsy on 7/22/2022 which was nondiagnostic.    She was referred to Dr. Glass for bronchoscopy and biopsy which was performed on 8/23/2022 with pathology returning as poorly differentiated adenocarcinoma.  PD-L1 stain on the tissue was positive at greater than 95%.    She underwent further staging with PET scan and MRI of the brain.  PET scan was performed on 8/12/2022 showing hypermetabolic activity in the large mass in the right upper lobe as well as mediastinal lymph nodes.  She was noted to have a mass on the kidney as well but this was not hypermetabolic.  There was no obvious distant metastatic disease.    MRI of the brain from 8/15/2022 likewise showed no evidence of metastatic disease.  She is scheduled also to see Dr. Hector Rodriguez and radiation oncology on 9/7/2022.    We recommended weekly carboplatin and Taxol concurrent with radiation therapy.  Following completion of treatment she will receive immunotherapy for maintenance   (She does have a diagnosis of rheumatoid  arthritis and is currently taking only Celebrex.  This could become an issue regarding her ability to tolerate immunotherapy in the future).    She received her final fraction of radiation 10/27/2022.  She also completed 6 weeks of carboplatin and Taxol and tolerated it well. CT scans performed 11/21/2022 showed right upper lobe mass appeared stable in size.  Her mediastinal lymphadenopathy had decreased.  There were no new sites of disease identified.    She was started on immunotherapy with Imfinzi with plans to continue immunotherapy for 1 year until December 2023.      She has had several hospitalizations including hospitalization due to development of brain metastases in late May.  She had a dominant metastatic lesion in the right occipital area and was able to undergo neurosurgical resection of the dominant mass on 5/30/2023 with Dr. Ricketts.  She had 2 additional small lesions that were treated with stereotactic radiosurgery by Dr. Mckenna Moreira.  During this time her immuno therapy with Imfinzi was held (her last Imfinzi treatment in our office was 5/15/2023).    She again required repeat hospitalization from 6/11/2023 to 6/14/2023 due to development of extensive shingles outbreak on the left upper extremity.    She had a post treatment outpatient MRI of the brain from 7/9/2023 which showed the resection cavity in the right occipital area with no definite tumor recurrence in that area.  The other 2 small areas that were treated with stereotactic radiation appeared stable.  There were no new sites of disease identified.    INTERVAL HISTORY:  Since her last office visit here, she was seen in the emergency room 8/4/2023 for severe pain in the left arm.  This was also where she had the shingles and had been experiencing pain related to that outbreak.  While she was in the ER she had a CT scan of the cervical spine to be sure there was no mechanical nerve root impingement.  There were no acute findings in the  cervical spine on the CT but slices through the lung apices did show what appeared to be new density in the right upper lobe which had not been seen on her previous CT scan from June.     On today's visit.  She reports that her arm pain actually is beginning to resolve.  She is not taking any narcotic pain meds at this point because they were upsetting her stomach.  She is not having any breathing difficulty and reports 1 episode of small amount of bloody sputum.    Certainly the findings on the CT scan are concerning and I think at this point we need to do a complete resurvey of her malignancy with a PET scan as well as a repeat MRI of the brain.  History of Present Illness     Past Medical History:   Diagnosis Date    COPD (chronic obstructive pulmonary disease)     Coughing up blood     X2 MONTHS    History of COVID-19 02/2022    Hyperlipidemia     IBS (irritable bowel syndrome)     Primary lung adenocarcinoma, right     Rheumatoid arthritis         Past Surgical History:   Procedure Laterality Date    APPENDECTOMY      appendix removed    BRONCHOSCOPY N/A 8/23/2022    Procedure: BRONCHOSCOPY WITH BAL,  BIOPSIES, AND BRUSHINGS WITH ENDOBRONCHIAL ULTRASOUND WITH FNA;  Surgeon: Gregor Vee MD;  Location: [facility] ENDOSCOPY;  Service: Pulmonary;  Laterality: N/A;  PRE- HILAR MASS  POST- SAME    BRONCHOSCOPY N/A 1/4/2023    Procedure: BRONCHOSCOPY with biopsy, lavage, brushing;  Surgeon: Gregor Vee MD;  Location: [facility] ENDOSCOPY;  Service: Pulmonary;  Laterality: N/A;    CAROTID ENDARTERECTOMY Right     CAROTID ENDARTERECTOMY Left     CATARACT EXTRACTION      CERVICAL FUSION      CHOLECYSTECTOMY      CRANIOTOMY FOR TUMOR Right 5/30/2023    Procedure: RIGHT CRANIOTOMY FOR TUMOR RESECTION STEREOTACTIC WITH STEALTH;  Surgeon: Clint Ricketts MD;  Location: [facility] OR;  Service: Neurosurgery;  Laterality: Right;    HYSTERECTOMY      SHOULDER ARTHROSCOPY Right 02/19/2019    right should scope/cuff repair      VENOUS ACCESS DEVICE (PORT) INSERTION Right 9/6/2022    Procedure: POWERPORT INSERTION;  Surgeon: Remedios Rice MD;  Location: [facility] MAIN OR;  Service: Thoracic;  Laterality: Right;        Current Outpatient Medications on File Prior to Visit   Medication Sig Dispense Refill    azelastine (ASTELIN) 0.1 % nasal spray 1 spray into the nostril(s) as directed by provider.      B COMPLEX VITAMINS ER PO Take  by mouth Daily.      Cholecalciferol (VITAMIN D3) 5000 units capsule capsule Take 2,000 Units by mouth Daily.      esomeprazole (NexIUM) 10 MG packet Take 10 mg by mouth Daily.      estradiol (ESTRACE) 1 MG tablet Take 1 tablet by mouth Daily.      FeroSul 325 (65 Fe) MG tablet TAKE ONE TABLET BY MOUTH DAILY WITH BREAKFAST 30 tablet 5    gabapentin (NEURONTIN) 100 MG capsule Take 1 capsule by mouth Every 8 (Eight) Hours. 45 capsule 0    levETIRAcetam (KEPPRA) 500 MG tablet Take 1 tablet by mouth 2 (Two) Times a Day for 240 days. 120 tablet 3    lidocaine (LIDODERM) 5 % Place 1 patch on the skin as directed by provider Daily. Remove & Discard patch within 12 hours or as directed by MD 15 patch 1    lidocaine-prilocaine (EMLA) 2.5-2.5 % cream Apply 1 application topically to the appropriate area as directed Every 2 (Two) Hours As Needed for Mild Pain. 5 g 3    Morphine (MSIR) 30 MG tablet Take 1 tablet by mouth Every 4 (Four) Hours As Needed for Severe Pain. 60 tablet 0    Multiple Vitamins-Minerals (PRESERVISION AREDS 2+MULTI VIT PO) Take  by mouth.      ofloxacin (OCUFLOX) 0.3 % ophthalmic solution       ondansetron (ZOFRAN) 8 MG tablet Take 1 tablet by mouth 3 (Three) Times a Day As Needed for Nausea or Vomiting. 30 tablet 5    predniSONE (DELTASONE) 10 MG tablet Take 1 tablet by mouth Daily. 30 tablet 5    valACYclovir (VALTREX) 1000 MG tablet       lisinopril (PRINIVIL,ZESTRIL) 20 MG tablet Take 1 tablet by mouth Daily for 30 days. 30 tablet 0     No current facility-administered medications on file prior to  visit.        ALLERGIES:    Allergies   Allergen Reactions    Del-Mycin [Erythromycin] Hives    Gentamicin Hives    Ibuprofen Nausea And Vomiting    Latex Dermatitis     GLOVES    Metronidazole Hives    Ofloxacin Hives and Nausea Only    Penicillins Hives    Tetracycline Hives    Adhesive Tape Dermatitis     BANDAIDS    Aspirin GI Intolerance     Vomiting can take EC    Codeine Nausea And Vomiting        Social History     Socioeconomic History    Marital status:    Tobacco Use    Smoking status: Former     Packs/day: 0.25     Types: Cigarettes     Quit date: 2022     Years since quittin.3    Smokeless tobacco: Never    Tobacco comments:     Former some day smoker of 1-2 cigs   Vaping Use    Vaping Use: Never used   Substance and Sexual Activity    Alcohol use: Yes     Alcohol/week: 2.0 standard drinks     Types: 2 Glasses of wine per week     Comment: occasionally    Drug use: Never    Sexual activity: Defer        Family History   Problem Relation Age of Onset    Cancer Mother     Cancer Father     Malig Hyperthermia Neg Hx         Review of Systems   Constitutional:  Negative for activity change, chills, fatigue and fever.   HENT:  Negative for mouth sores, trouble swallowing and voice change.    Eyes:  Negative for pain and visual disturbance.   Respiratory:  Positive for cough. Negative for wheezing.    Cardiovascular:  Negative for chest pain and palpitations.   Gastrointestinal:  Negative for abdominal pain, constipation, diarrhea, nausea and vomiting.   Genitourinary:  Negative for difficulty urinating, frequency and urgency.   Musculoskeletal:  Negative for arthralgias and joint swelling.   Skin:  Negative for rash.   Neurological:  Positive for weakness. Negative for dizziness, seizures and headaches.        Severe pain in the left upper arm due to postherpetic neuralgia.  She also had some weakness in the arm which she feels is improving.   Hematological:  Negative for adenopathy.  "Bruises/bleeds easily.   Psychiatric/Behavioral:  Negative for behavioral problems and confusion. The patient is not nervous/anxious.   08/23/2023 ROS updated     Objective     Vitals:    08/23/23 1025   BP: 150/67   Pulse: 99   Resp: 18   Temp: 98.2 øF (36.8 øC)   TempSrc: Temporal   SpO2: 95%   Weight: 52.8 kg (116 lb 8 oz)   Height: 152.4 cm (60\")   PainSc:   8   PainLoc: Arm  Comment: shingles pain         8/23/2023    10:26 AM   Current Status   ECOG score 1     Physical Exam  Vitals reviewed.   Constitutional:       General: She is not in acute distress.     Appearance: Normal appearance. She is well-developed.   HENT:      Head: Normocephalic and atraumatic.      Mouth/Throat:      Pharynx: No oropharyngeal exudate.   Eyes:      Pupils: Pupils are equal, round, and reactive to light.   Neck:      Comments: Bilateral scars from carotid endarterectomies  Cardiovascular:      Rate and Rhythm: Normal rate and regular rhythm.      Heart sounds: Normal heart sounds. No murmur heard.  Pulmonary:      Effort: Pulmonary effort is normal. No respiratory distress.      Breath sounds: Decreased breath sounds present. No wheezing, rhonchi or rales.   Abdominal:      General: Bowel sounds are normal. There is no distension.      Palpations: Abdomen is soft.   Musculoskeletal:         General: Normal range of motion.      Cervical back: Normal range of motion.   Skin:     General: Skin is warm and dry.      Findings: No rash.   Neurological:      Mental Status: She is alert and oriented to person, place, and time.    I have reexamined the patient and the results are consistent with the previously documented exam. Cruzito aLgunas MD        RECENT LABS:  Results from last 7 days   Lab Units 08/23/23  1005   WBC 10*3/mm3 13.44*   NEUTROS ABS 10*3/mm3 10.92*   HEMOGLOBIN g/dL 11.0*   HEMATOCRIT % 35.1   PLATELETS 10*3/mm3 396     Results from last 7 days   Lab Units 08/23/23  1005   SODIUM mmol/L 131*   POTASSIUM mmol/L 4.1 "   CHLORIDE mmol/L 91*   CO2 mmol/L 22.3   BUN mg/dL 19   CREATININE mg/dL 1.52*   CALCIUM mg/dL 9.7   ALBUMIN g/dL 3.7   BILIRUBIN mg/dL 0.6   ALK PHOS U/L 112   ALT (SGPT) U/L 32   AST (SGOT) U/L 66*   GLUCOSE mg/dL 100*         BRONCHOSCOPY PATHOLOGY 1/4/2023  Final Diagnosis   Fluid, Lung, Right Upper Lobe, Bronchoalveolar Lavage (BAL):  A. Negative for malignant cells- reactive atypia.  B. Reactive bronchial cells, macrophages, inflammation (mostly acute) and mucin.  C. Negative for definitive fungal organisms by GMS staining. See comment.     2. Fluid, Lung, Right Upper Lobe, Brushing:               A. Negative for malignant cells.               B. Bronchial cells, scattered inflammation and mucinous debris.      Final Diagnosis   1. Lung, Right Upper Lobe, Biopsy:  Endobronchial mucosa and submucosa with                A. Squamous metaplasia, mild to moderate chronic active inflammation, fibrinous and necrotic debris and reactive      epithelial changes.               B. No definitive dysplasia nor malignancy identified.               C. No granulomata, diagnostic viral inclusions nor fungal organisms identified.             BRONCHOSCOPY PATHOLOGY 8/23/2022  Final Diagnosis   1. Lung, Right Upper Lobe, Endobronchial Biopsies: INVASIVE POORLY DIFFERENTIATED ADENOCARCINOMA OF PULMONARY ORIGIN.   PDL-1 POSITIVE >95%    IMAGING:  CERVICAL SPINE CT WITHOUT CONTRAST  8/4/2023  IMPRESSION:  1. Infiltrate or tumor in the medial right upper lobe is new in  comparison with CT angiogram chest 06/11/2023. No tumor identified in  the cervical spine or the visualized neck.  2. There is an old C7 central compression fracture and posterior  cerclage fusion hardware along with solid osseous ankylosis from C6  through T2. Degenerative disc and facet changes are observed above the  fused levels, most prominently at C4-5 and C5-6 where there is moderate  to severe spinal canal stenosis with chronic appearing  foraminal  stenosis.    MRI OF THE BRAIN WITH AND WITHOUT CONTRAST  7/9/2023  IMPRESSION:     The patient is status post a right parieto-occipital craniotomy for the  purposes of resection of a metastatic lesion within the right occipital  lobe. The resection cavity within the right occipital lobe is remarkable  for thin circumferential contrast enhancement which may simply be  representative of postoperative granulation tissue. There is no  convincing evidence to suggest residual or recurrent tumor at this site.  The metastatic lesions within the left occipital cortex and the superior  aspect of the cerebellar vermis demonstrate no significant interval  change.    CT CHEST W CONTRAST DIAGNOSTIC-, CT ABDOMEN PELVIS W CONTRAST-, NM BONE SCAN WHOLE BODY- 5/29/2023  1. Right upper lobe suprahilar pulmonary mass appears stable from the  recent prior exam, again with groundglass and nodular opacities in the  right upper lobe.  2. Sclerotic change at the right anterior eighth rib is new from  03/01/2023, with corresponding increased uptake on bone scan, could be  healing fracture or sclerotic metastatic disease, correlate clinically.  3. Stable appearance of the abdomen and pelvis.    CT CHEST, ABDOMEN, AND PELVIS WITH IV CONTRAST 3/1/2023  IMPRESSION:  Decreased size of the right upper lobe mass with stable  surrounding groundglass opacity and decreased size of the right lower  lobe opacity. No adenopathy seen on today's exam. Incidental findings as  Above.      F-18 FDG PET FROM SKULL BASE TO MID THIGH WITH PET/CT FUSION 8/12/2022  IMPRESSION:  1.  Large mass centered within the right upper lobe representing  patient's known malignancy. Interlobular septal thickening and ground  glass opacification projecting from the periphery of the mass throughout  the right upper lobe is highly concerning for lymphangitic spread. Of  note, the mass abuts the pleura and mediastinum over a long segment and  underlying invasion cannot  be excluded.  2.  FDG avid hilar and mediastinal adenopathy concerning for metastatic  disease.  3.  A few irregular subcentimeter pulmonary nodules are present within  the right lower and left upper lobes measuring up to 0.9 cm which while  nonspecific raises concern for metastatic disease. At least close  attention on followup is recommended.   4.  Minimally hypermetabolic arnoldo hepatic node and bilateral sub-6 mm  pulmonary nodules are indeterminate. Continued followup with chest and  abdominal CT in 3 months is recommended.  5.  Indeterminate density 4.4 cm mass arises off the right kidney.  Further evaluation with multiphase CT or MRI of the abdomen with and  without contrast is recommended to exclude neoplasm.  6.  Focal uptake over the right shoulder in the area of prior rotator  cuff repair is favored be postsurgical with metastatic disease less  likely.  7.  Other findings as above.    MRI OF THE BRAIN WITH AND WITHOUT CONTRAST 08/15/2022   IMPRESSION:  1. There is mild-to-moderate small vessel disease in cerebral and  central pontine white matter. Otherwise, the MRI of the brain is normal  with no evidence of metastatic disease to the brain.   2. There are some nonspecific signal abnormality in the C4 cervical  vertebra with T1 low signal throughout the visualized portions C4  cervical vertebrae on the sagittal images. The patient has had prior  cervical spine surgery as demonstrated on prior PET scan and this argues  in favor that the T1 low signal and C4 vertebrae is related to sclerosis  from degenerative disc changes at C4-5 although an osseous metastatic  lesion in the C4 vertebra cannot be excluded on the basis of this exam.  Apparently the C4 vertebra was not hot on the recent PET scan which is  further evidence that this is degenerative in origin and correlation  with recent PET scan is suggested.      Assessment & Plan   1.  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  Patient is not felt to  be a surgical candidate and combined chemotherapy and radiation.   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  9/20/2022 1st dose of weekly carboplatin/taxol.   She completed 6 cycles of weekly CarboTaxol 10/25/2022.  Radiation therapy completed 10/27/2022  First dose durvalumab delivered 11/28/2022.   Durvalumab on hold since May 2023      2.  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    3.  Other comorbidities including vascular disease with previous carotid   endarterectomy surgeries.  She has no known history of stroke or myocardial infarction.     5.  Rheumatoid arthritis: Patient previously on Celebrex, but discontinued due to hemoptysis.  Patient started on prednisone 20 mg daily prescribed to manage her arthritis pain.  Prednisone has since been decreased to 10 mg daily.    6.  Development of brain metastases in May 2023.  Patient underwent resection of the dominant mass in the right occipital area by Dr. Cho of neurosurgery.  She received post op radiation therapy to the resection bed and to 2 smaller lesions with SRS under the direction of Dr. Mckenna Moreira at [facility].  Her posttreatment MRI of the brain from 7/9/2023 as noted above shows no new sites of disease, improved edema, and no definite recurrence in the resection bed.    7.  Severe shingles outbreak of left upper extremity with severe pain.  The shingles outbreak has dried up and she has completed her acyclovir treatment.  She is still having pain and numbness in the left hand and is undergoing physical therapy and Occupational Therapy.  She reports this pain is finally subsiding.    8.  Concern for recurrent tumor in the right upper lobe based on CT of the cervical spine performed on an ER visit 8/4/2023.        PLAN:   1.  We reviewed the records from her recent ER visit from  8/4/2023 including the CT scan of the cervical spine with results as noted above.  Unfortunately, we are concerned that she may have recurrence of tumor in the right upper lobe based on the scan and we will order complete resurvey of her disease at this point with a PET scan and MRI of the brain.  2.  She has pain medication but has not been using it regularly due to severe nausea.  As noted above her pain does seem to be improved.  She seems relatively comfortable on the visit today.  3.  We will see her back in the office after the MRI of the brain and PET scan are completed to review those results and discuss if additional treatment options are available if she does indeed have progression of her metastatic lung cancer.    This patient is on high risk drug therapy requiring intensive monitoring for toxicity.      Cruzito Lagunas MD   08/23/2023

## 2023-08-26 ENCOUNTER — APPOINTMENT (OUTPATIENT)
Dept: CT IMAGING | Facility: HOSPITAL | Age: 78
DRG: 871 | End: 2023-08-26
Payer: MEDICARE

## 2023-08-26 ENCOUNTER — APPOINTMENT (OUTPATIENT)
Dept: GENERAL RADIOLOGY | Facility: HOSPITAL | Age: 78
DRG: 871 | End: 2023-08-26
Payer: MEDICARE

## 2023-08-26 ENCOUNTER — HOSPITAL ENCOUNTER (INPATIENT)
Facility: HOSPITAL | Age: 78
LOS: 7 days | Discharge: HOME | DRG: 871 | End: 2023-09-02
Attending: EMERGENCY MEDICINE | Admitting: INTERNAL MEDICINE
Payer: MEDICARE

## 2023-08-26 ENCOUNTER — DOCUMENTATION (OUTPATIENT)
Dept: ONCOLOGY | Facility: CLINIC | Age: 78
End: 2023-08-26
Payer: MEDICARE

## 2023-08-26 DIAGNOSIS — R41.82 ALTERED MENTAL STATUS, UNSPECIFIED ALTERED MENTAL STATUS TYPE: ICD-10-CM

## 2023-08-26 DIAGNOSIS — R53.1 GENERALIZED WEAKNESS: ICD-10-CM

## 2023-08-26 DIAGNOSIS — R42 DIZZINESS: ICD-10-CM

## 2023-08-26 DIAGNOSIS — R19.7 DIARRHEA, UNSPECIFIED TYPE: ICD-10-CM

## 2023-08-26 DIAGNOSIS — R11.2 NAUSEA AND VOMITING, UNSPECIFIED VOMITING TYPE: Primary | ICD-10-CM

## 2023-08-26 DIAGNOSIS — G89.29 OTHER CHRONIC PAIN: ICD-10-CM

## 2023-08-26 DIAGNOSIS — M79.2 NEUROPATHIC PAIN: ICD-10-CM

## 2023-08-26 LAB
ALBUMIN SERPL-MCNC: 3.3 G/DL (ref 3.5–5.2)
ALBUMIN/GLOB SERPL: 1.1 G/DL
ALP SERPL-CCNC: 100 U/L (ref 39–117)
ALT SERPL W P-5'-P-CCNC: 33 U/L (ref 1–33)
ANION GAP SERPL CALCULATED.3IONS-SCNC: 11.3 MMOL/L (ref 5–15)
AST SERPL-CCNC: 31 U/L (ref 1–32)
BASOPHILS # BLD AUTO: 0.05 10*3/MM3 (ref 0–0.2)
BASOPHILS NFR BLD AUTO: 0.3 % (ref 0–1.5)
BILIRUB SERPL-MCNC: 0.3 MG/DL (ref 0–1.2)
BILIRUB UR QL STRIP: NEGATIVE
BUN SERPL-MCNC: 21 MG/DL (ref 8–23)
BUN/CREAT SERPL: 19.4 (ref 7–25)
CALCIUM SPEC-SCNC: 9.9 MG/DL (ref 8.6–10.5)
CHLORIDE SERPL-SCNC: 94 MMOL/L (ref 98–107)
CK SERPL-CCNC: 125 U/L (ref 20–180)
CLARITY UR: CLEAR
CO2 SERPL-SCNC: 23.7 MMOL/L (ref 22–29)
COLOR UR: YELLOW
CREAT SERPL-MCNC: 1.08 MG/DL (ref 0.57–1)
D-LACTATE SERPL-SCNC: 0.8 MMOL/L (ref 0.5–2)
DEPRECATED RDW RBC AUTO: 54.8 FL (ref 37–54)
EGFRCR SERPLBLD CKD-EPI 2021: 53.3 ML/MIN/1.73
EOSINOPHIL # BLD AUTO: 0.03 10*3/MM3 (ref 0–0.4)
EOSINOPHIL NFR BLD AUTO: 0.2 % (ref 0.3–6.2)
ERYTHROCYTE [DISTWIDTH] IN BLOOD BY AUTOMATED COUNT: 16.6 % (ref 12.3–15.4)
GLOBULIN UR ELPH-MCNC: 3 GM/DL
GLUCOSE SERPL-MCNC: 131 MG/DL (ref 65–99)
GLUCOSE UR STRIP-MCNC: NEGATIVE MG/DL
HCT VFR BLD AUTO: 30.3 % (ref 34–46.6)
HGB BLD-MCNC: 10 G/DL (ref 12–15.9)
HGB UR QL STRIP.AUTO: NEGATIVE
HOLD SPECIMEN: NORMAL
HOLD SPECIMEN: NORMAL
IMM GRANULOCYTES # BLD AUTO: 0.25 10*3/MM3 (ref 0–0.05)
IMM GRANULOCYTES NFR BLD AUTO: 1.3 % (ref 0–0.5)
KETONES UR QL STRIP: NEGATIVE
LEUKOCYTE ESTERASE UR QL STRIP.AUTO: NEGATIVE
LYMPHOCYTES # BLD AUTO: 0.83 10*3/MM3 (ref 0.7–3.1)
LYMPHOCYTES NFR BLD AUTO: 4.3 % (ref 19.6–45.3)
MAGNESIUM SERPL-MCNC: 2.2 MG/DL (ref 1.6–2.4)
MCH RBC QN AUTO: 29.7 PG (ref 26.6–33)
MCHC RBC AUTO-ENTMCNC: 33 G/DL (ref 31.5–35.7)
MCV RBC AUTO: 89.9 FL (ref 79–97)
MONOCYTES # BLD AUTO: 1.37 10*3/MM3 (ref 0.1–0.9)
MONOCYTES NFR BLD AUTO: 7.1 % (ref 5–12)
NEUTROPHILS NFR BLD AUTO: 16.89 10*3/MM3 (ref 1.7–7)
NEUTROPHILS NFR BLD AUTO: 86.8 % (ref 42.7–76)
NITRITE UR QL STRIP: NEGATIVE
NRBC BLD AUTO-RTO: 0 /100 WBC (ref 0–0.2)
PH UR STRIP.AUTO: 5.5 [PH] (ref 5–8)
PLATELET # BLD AUTO: 372 10*3/MM3 (ref 140–450)
PMV BLD AUTO: 9.4 FL (ref 6–12)
POTASSIUM SERPL-SCNC: 4.3 MMOL/L (ref 3.5–5.2)
PROT SERPL-MCNC: 6.3 G/DL (ref 6–8.5)
PROT UR QL STRIP: NEGATIVE
QT INTERVAL: 337 MS
QTC INTERVAL: 401 MS
RBC # BLD AUTO: 3.37 10*6/MM3 (ref 3.77–5.28)
SARS-COV-2 RNA RESP QL NAA+PROBE: NOT DETECTED
SODIUM SERPL-SCNC: 129 MMOL/L (ref 136–145)
SP GR UR STRIP: 1.01 (ref 1–1.03)
UROBILINOGEN UR QL STRIP: NORMAL
WBC NRBC COR # BLD: 19.42 10*3/MM3 (ref 3.4–10.8)
WHOLE BLOOD HOLD COAG: NORMAL
WHOLE BLOOD HOLD SPECIMEN: NORMAL

## 2023-08-26 PROCEDURE — 80053 COMPREHEN METABOLIC PANEL: CPT | Performed by: EMERGENCY MEDICINE

## 2023-08-26 PROCEDURE — 82550 ASSAY OF CK (CPK): CPT | Performed by: EMERGENCY MEDICINE

## 2023-08-26 PROCEDURE — 99285 EMERGENCY DEPT VISIT HI MDM: CPT

## 2023-08-26 PROCEDURE — 93005 ELECTROCARDIOGRAM TRACING: CPT | Performed by: EMERGENCY MEDICINE

## 2023-08-26 PROCEDURE — 71045 X-RAY EXAM CHEST 1 VIEW: CPT

## 2023-08-26 PROCEDURE — 25010000002 CEFTRIAXONE PER 250 MG: Performed by: INTERNAL MEDICINE

## 2023-08-26 PROCEDURE — 70450 CT HEAD/BRAIN W/O DYE: CPT

## 2023-08-26 PROCEDURE — 25010000002 ONDANSETRON PER 1 MG: Performed by: EMERGENCY MEDICINE

## 2023-08-26 PROCEDURE — 25510000001 IOPAMIDOL 61 % SOLUTION: Performed by: EMERGENCY MEDICINE

## 2023-08-26 PROCEDURE — 25010000002 MORPHINE PER 10 MG: Performed by: INTERNAL MEDICINE

## 2023-08-26 PROCEDURE — 87493 C DIFF AMPLIFIED PROBE: CPT | Performed by: EMERGENCY MEDICINE

## 2023-08-26 PROCEDURE — 87081 CULTURE SCREEN ONLY: CPT | Performed by: EMERGENCY MEDICINE

## 2023-08-26 PROCEDURE — 93010 ELECTROCARDIOGRAM REPORT: CPT | Performed by: INTERNAL MEDICINE

## 2023-08-26 PROCEDURE — 83735 ASSAY OF MAGNESIUM: CPT | Performed by: EMERGENCY MEDICINE

## 2023-08-26 PROCEDURE — 83605 ASSAY OF LACTIC ACID: CPT | Performed by: INTERNAL MEDICINE

## 2023-08-26 PROCEDURE — 87040 BLOOD CULTURE FOR BACTERIA: CPT | Performed by: EMERGENCY MEDICINE

## 2023-08-26 PROCEDURE — 81003 URINALYSIS AUTO W/O SCOPE: CPT | Performed by: EMERGENCY MEDICINE

## 2023-08-26 PROCEDURE — P9612 CATHETERIZE FOR URINE SPEC: HCPCS

## 2023-08-26 PROCEDURE — 87635 SARS-COV-2 COVID-19 AMP PRB: CPT | Performed by: EMERGENCY MEDICINE

## 2023-08-26 PROCEDURE — 36415 COLL VENOUS BLD VENIPUNCTURE: CPT

## 2023-08-26 PROCEDURE — 25010000002 MORPHINE PER 10 MG: Performed by: EMERGENCY MEDICINE

## 2023-08-26 PROCEDURE — 87507 IADNA-DNA/RNA PROBE TQ 12-25: CPT | Performed by: EMERGENCY MEDICINE

## 2023-08-26 PROCEDURE — 74177 CT ABD & PELVIS W/CONTRAST: CPT

## 2023-08-26 PROCEDURE — 85025 COMPLETE CBC W/AUTO DIFF WBC: CPT | Performed by: EMERGENCY MEDICINE

## 2023-08-26 RX ORDER — UREA 10 %
3 LOTION (ML) TOPICAL NIGHTLY PRN
Status: DISCONTINUED | OUTPATIENT
Start: 2023-08-26 | End: 2023-09-02 | Stop reason: HOSPADM

## 2023-08-26 RX ORDER — SODIUM CHLORIDE 0.9 % (FLUSH) 0.9 %
10 SYRINGE (ML) INJECTION AS NEEDED
Status: DISCONTINUED | OUTPATIENT
Start: 2023-08-26 | End: 2023-09-02 | Stop reason: HOSPADM

## 2023-08-26 RX ORDER — ONDANSETRON 2 MG/ML
4 INJECTION INTRAMUSCULAR; INTRAVENOUS ONCE
Status: COMPLETED | OUTPATIENT
Start: 2023-08-26 | End: 2023-08-26

## 2023-08-26 RX ORDER — BISACODYL 5 MG/1
5 TABLET, DELAYED RELEASE ORAL DAILY PRN
Status: DISCONTINUED | OUTPATIENT
Start: 2023-08-26 | End: 2023-09-02 | Stop reason: HOSPADM

## 2023-08-26 RX ORDER — SODIUM CHLORIDE 0.9 % (FLUSH) 0.9 %
20 SYRINGE (ML) INJECTION AS NEEDED
Status: DISCONTINUED | OUTPATIENT
Start: 2023-08-26 | End: 2023-09-02 | Stop reason: HOSPADM

## 2023-08-26 RX ORDER — SODIUM CHLORIDE 9 MG/ML
40 INJECTION, SOLUTION INTRAVENOUS AS NEEDED
Status: DISCONTINUED | OUTPATIENT
Start: 2023-08-26 | End: 2023-09-02 | Stop reason: HOSPADM

## 2023-08-26 RX ORDER — HYDROCODONE BITARTRATE AND ACETAMINOPHEN 7.5; 325 MG/1; MG/1
1 TABLET ORAL DAILY
COMMUNITY
End: 2023-09-02 | Stop reason: HOSPADM

## 2023-08-26 RX ORDER — HEPARIN SODIUM (PORCINE) LOCK FLUSH IV SOLN 100 UNIT/ML 100 UNIT/ML
5 SOLUTION INTRAVENOUS AS NEEDED
Status: DISCONTINUED | OUTPATIENT
Start: 2023-08-26 | End: 2023-09-02 | Stop reason: HOSPADM

## 2023-08-26 RX ORDER — BISACODYL 10 MG
10 SUPPOSITORY, RECTAL RECTAL DAILY PRN
Status: DISCONTINUED | OUTPATIENT
Start: 2023-08-26 | End: 2023-09-02 | Stop reason: HOSPADM

## 2023-08-26 RX ORDER — MORPHINE SULFATE 2 MG/ML
4 INJECTION, SOLUTION INTRAMUSCULAR; INTRAVENOUS EVERY 4 HOURS PRN
Status: DISCONTINUED | OUTPATIENT
Start: 2023-08-26 | End: 2023-09-02 | Stop reason: HOSPADM

## 2023-08-26 RX ORDER — ONDANSETRON 2 MG/ML
4 INJECTION INTRAMUSCULAR; INTRAVENOUS EVERY 6 HOURS PRN
Status: DISCONTINUED | OUTPATIENT
Start: 2023-08-26 | End: 2023-09-02 | Stop reason: HOSPADM

## 2023-08-26 RX ORDER — SODIUM CHLORIDE 0.9 % (FLUSH) 0.9 %
10 SYRINGE (ML) INJECTION EVERY 12 HOURS SCHEDULED
Status: DISCONTINUED | OUTPATIENT
Start: 2023-08-26 | End: 2023-09-02 | Stop reason: HOSPADM

## 2023-08-26 RX ORDER — LEVETIRACETAM 500 MG/1
500 TABLET ORAL 2 TIMES DAILY
Status: DISCONTINUED | OUTPATIENT
Start: 2023-08-26 | End: 2023-09-02 | Stop reason: HOSPADM

## 2023-08-26 RX ORDER — PANTOPRAZOLE SODIUM 40 MG/1
40 TABLET, DELAYED RELEASE ORAL
Status: DISCONTINUED | OUTPATIENT
Start: 2023-08-27 | End: 2023-09-02 | Stop reason: HOSPADM

## 2023-08-26 RX ORDER — ACETAMINOPHEN 325 MG/1
650 TABLET ORAL EVERY 4 HOURS PRN
Status: DISCONTINUED | OUTPATIENT
Start: 2023-08-26 | End: 2023-09-02 | Stop reason: HOSPADM

## 2023-08-26 RX ORDER — MORPHINE SULFATE 2 MG/ML
2 INJECTION, SOLUTION INTRAMUSCULAR; INTRAVENOUS EVERY 4 HOURS PRN
Status: DISCONTINUED | OUTPATIENT
Start: 2023-08-26 | End: 2023-08-26

## 2023-08-26 RX ORDER — POLYETHYLENE GLYCOL 3350 17 G/17G
17 POWDER, FOR SOLUTION ORAL DAILY PRN
Status: DISCONTINUED | OUTPATIENT
Start: 2023-08-26 | End: 2023-09-02 | Stop reason: HOSPADM

## 2023-08-26 RX ORDER — SODIUM CHLORIDE 9 MG/ML
75 INJECTION, SOLUTION INTRAVENOUS CONTINUOUS
Status: DISCONTINUED | OUTPATIENT
Start: 2023-08-26 | End: 2023-08-30

## 2023-08-26 RX ORDER — AMOXICILLIN 250 MG
2 CAPSULE ORAL 2 TIMES DAILY
Status: DISCONTINUED | OUTPATIENT
Start: 2023-08-26 | End: 2023-09-02 | Stop reason: HOSPADM

## 2023-08-26 RX ORDER — GABAPENTIN 100 MG/1
100 CAPSULE ORAL EVERY 8 HOURS SCHEDULED
Status: DISCONTINUED | OUTPATIENT
Start: 2023-08-26 | End: 2023-08-27

## 2023-08-26 RX ORDER — ONDANSETRON 4 MG/1
4 TABLET, FILM COATED ORAL EVERY 6 HOURS PRN
Status: DISCONTINUED | OUTPATIENT
Start: 2023-08-26 | End: 2023-09-02 | Stop reason: HOSPADM

## 2023-08-26 RX ORDER — MORPHINE SULFATE 2 MG/ML
2 INJECTION, SOLUTION INTRAMUSCULAR; INTRAVENOUS ONCE
Status: COMPLETED | OUTPATIENT
Start: 2023-08-26 | End: 2023-08-26

## 2023-08-26 RX ADMIN — SODIUM CHLORIDE 500 ML: 9 INJECTION, SOLUTION INTRAVENOUS at 16:03

## 2023-08-26 RX ADMIN — CEFTRIAXONE SODIUM 1000 MG: 1 INJECTION, POWDER, FOR SOLUTION INTRAMUSCULAR; INTRAVENOUS at 22:18

## 2023-08-26 RX ADMIN — ONDANSETRON 4 MG: 2 INJECTION INTRAMUSCULAR; INTRAVENOUS at 16:04

## 2023-08-26 RX ADMIN — Medication 10 ML: at 21:21

## 2023-08-26 RX ADMIN — MORPHINE SULFATE 4 MG: 2 INJECTION, SOLUTION INTRAMUSCULAR; INTRAVENOUS at 20:04

## 2023-08-26 RX ADMIN — SODIUM CHLORIDE 75 ML/HR: 9 INJECTION, SOLUTION INTRAVENOUS at 21:20

## 2023-08-26 RX ADMIN — MORPHINE SULFATE 2 MG: 2 INJECTION, SOLUTION INTRAMUSCULAR; INTRAVENOUS at 16:35

## 2023-08-26 RX ADMIN — IOPAMIDOL 100 ML: 612 INJECTION, SOLUTION INTRAVENOUS at 18:21

## 2023-08-26 NOTE — PROGRESS NOTES
Patient's family paged our on-call service today. I returned their call and spoke with patient's son-in-law. He notes that his mother-in-law says her balance is off and that her equilibrium is off. He reports she is having weakness in her left leg and also on her left side. She is also having numbness. Due to the acute change in the function of her left side of her body and additional symptoms they have been encouraged to go to the emergency room immediately. If they do not feel that they can get her to the car in their car quickly I did encourage them to call 911. I am concerned patient could be having a stroke and needs to be evaluated immediately. He verbalized understanding and they plan to call 911 and take his mother-in-law to the emergency room for evaluation. They were appreciative of call back.

## 2023-08-26 NOTE — ED NOTES
Pt is dizzy.  Her balance is off.  She is nauseous and vomiting since 0300.  She had brain surgery a month ago.  She has had increased confusion since 1130      She currently has shingles

## 2023-08-26 NOTE — ED NOTES
Nursing report ED to floor  Pam Vidal  78 y.o.  female    HPI :   Chief Complaint   Patient presents with    Dizziness       Admitting doctor:   Justyna Diop MD    Admitting diagnosis:   The primary encounter diagnosis was Nausea and vomiting, unspecified vomiting type. Diagnoses of Diarrhea, unspecified type, Other chronic pain, Altered mental status, unspecified altered mental status type, Dizziness, and Generalized weakness were also pertinent to this visit.    Code status:   Current Code Status       Date Active Code Status Order ID Comments User Context       8/26/2023 1844 CPR (Attempt to Resuscitate) [id number]  Justyna Diop MD ED        Question Answer    Code Status (Patient has no pulse and is not breathing) CPR (Attempt to Resuscitate)    Medical Interventions (Patient has pulse or is breathing) Full                    Allergies:   Del-mycin [erythromycin], Gentamicin, Ibuprofen, Latex, Metronidazole, Ofloxacin, Penicillins, Tetracycline, Adhesive tape, Aspirin, and Codeine    Isolation:   No active isolations    Intake and Output  No intake or output data in the 24 hours ending 08/26/23 1910    Weight:       08/26/23  1443   Weight: 52.6 kg (116 lb)       Most recent vitals:   Vitals:    08/26/23 1446 08/26/23 1501 08/26/23 1606 08/26/23 1731   BP: 115/78 129/80 133/80 121/47   Pulse:  91 88 93   Resp:   16 18   Temp:       TempSrc:       SpO2:  96% 94% 94%   Weight:       Height:           Active LDAs/IV Access:   Lines, Drains & Airways       Active LDAs       Name Placement date Placement time Site Days    Single Lumen Implantable Port 08/31/22 Right Subclavian 08/31/22  1200  Subclavian  Powerport  Vortex 6f  model [id number]; Lot [id number]  360                    Labs (abnormal labs have a star):   Labs Reviewed   COMPREHENSIVE METABOLIC PANEL - Abnormal; Notable for the following components:       Result Value    Glucose 131 (*)     Creatinine 1.08 (*)     Sodium 129 (*)      Chloride 94 (*)     Albumin 3.3 (*)     eGFR 53.3 (*)     All other components within normal limits    Narrative:     GFR Normal >60  Chronic Kidney Disease <60  Kidney Failure <15    The GFR formula is only valid for adults with stable renal function between ages 18 and 70.   CBC WITH AUTO DIFFERENTIAL - Abnormal; Notable for the following components:    WBC 19.42 (*)     RBC 3.37 (*)     Hemoglobin 10.0 (*)     Hematocrit 30.3 (*)     RDW 16.6 (*)     RDW-SD 54.8 (*)     Neutrophil % 86.8 (*)     Lymphocyte % 4.3 (*)     Eosinophil % 0.2 (*)     Immature Grans % 1.3 (*)     Neutrophils, Absolute 16.89 (*)     Monocytes, Absolute 1.37 (*)     Immature Grans, Absolute 0.25 (*)     All other components within normal limits   COVID-19,[facility]/MARTI, NP SWAB IN TRANSPORT MEDIA 8-12 HR TAT - Normal    Narrative:     Fact sheet for providers: https://www.fda.gov/media/740710/download    Fact sheet for patients: https://www.fda.gov/media/424379/download    Test performed by PCR.   URINALYSIS W/ MICROSCOPIC IF INDICATED (NO CULTURE) - Normal    Narrative:     Urine microscopic not indicated.   CK - Normal   MAGNESIUM - Normal   BLOOD CULTURE   BLOOD CULTURE   GASTROINTESTINAL PANEL, PCR (PREFERRED) DOES NOT INCLUDE CDIFF   CLOSTRIDIOIDES DIFFICILE TOXIN    Narrative:     The following orders were created for panel order Clostridioides difficile Toxin - Stool, Per Rectum.  Procedure                               Abnormality         Status                     ---------                               -----------         ------                     Clostridioides difficile...[[id number]]                                                   Please view results for these tests on the individual orders.   CLOSTRIDIOIDES DIFFICILE TOXIN, PCR   RAINBOW DRAW    Narrative:     The following orders were created for panel order [facility] Draw.  Procedure                               Abnormality         Status                      ---------                               -----------         ------                     Green Top (Gel)[[id number]]                                  Final result               Lavender Top[[id number]]                                     Final result               Gold Top - SST[[id number]]                                   Final result               Light Blue Top[[id number]]                                   Final result                 Please view results for these tests on the individual orders.   GREEN TOP   LAVENDER TOP   GOLD TOP - SST   LIGHT BLUE TOP   CBC AND DIFFERENTIAL    Narrative:     The following orders were created for panel order CBC & Differential.  Procedure                               Abnormality         Status                     ---------                               -----------         ------                     CBC Auto Differential[[id number]]        Abnormal            Final result                 Please view results for these tests on the individual orders.       EKG:   ECG 12 Lead Syncope   Preliminary Result   HEART RATE= 85  bpm   RR Interval= 706  ms   ID Interval= 150  ms   P Horizontal Axis= 1  deg   P Front Axis= 74  deg   QRSD Interval= 86  ms   QT Interval= 337  ms   QTcB= 401  ms   QRS Axis= 54  deg   T Wave Axis= 60  deg   - ABNORMAL ECG -   Sinus rhythm   Anteroseptal infarct, old   Electronically Signed By:    Date and Time of Study: 2023-08-26 15:54:57          Meds given in ED:   Medications   sodium chloride 0.9 % flush 10 mL (has no administration in time range)   sodium chloride 0.9 % flush 10 mL (has no administration in time range)   sodium chloride 0.9 % flush 10 mL (has no administration in time range)   sodium chloride 0.9 % flush 20 mL (has no administration in time range)   sodium chloride 0.9 % infusion 40 mL (has no administration in time range)   heparin injection 500 Units (has no administration in time range)   sodium chloride 0.9 % flush 10 mL (has no  administration in time range)   sodium chloride 0.9 % flush 10 mL (has no administration in time range)   sodium chloride 0.9 % flush 20 mL (has no administration in time range)   sodium chloride 0.9 % infusion 40 mL (has no administration in time range)   heparin injection 500 Units (has no administration in time range)   acetaminophen (TYLENOL) tablet 650 mg (has no administration in time range)   sennosides-docusate (PERICOLACE) 8.6-50 MG per tablet 2 tablet (has no administration in time range)     And   polyethylene glycol (MIRALAX) packet 17 g (has no administration in time range)     And   bisacodyl (DULCOLAX) EC tablet 5 mg (has no administration in time range)     And   bisacodyl (DULCOLAX) suppository 10 mg (has no administration in time range)   ondansetron (ZOFRAN) tablet 4 mg (has no administration in time range)     Or   ondansetron (ZOFRAN) injection 4 mg (has no administration in time range)   melatonin tablet 3 mg (has no administration in time range)   sodium chloride 0.9 % bolus 500 mL (0 mL Intravenous Stopped 23 1638)   ondansetron (ZOFRAN) injection 4 mg (4 mg Intravenous Given 23 1604)   morphine injection 2 mg (2 mg Intravenous Given 23 1635)   iopamidol (ISOVUE-300) 61 % injection 100 mL (100 mL Intravenous Given 23 1821)       Imaging results:  No radiology results for the last day    Ambulatory status:   - bedrest     Social issues:   Social History     Socioeconomic History    Marital status:    Tobacco Use    Smoking status: Former     Packs/day: 0.25     Types: Cigarettes     Quit date: 2022     Years since quittin.3    Smokeless tobacco: Never    Tobacco comments:     Former some day smoker of 1-2 cigs   Vaping Use    Vaping Use: Never used   Substance and Sexual Activity    Alcohol use: Yes     Alcohol/week: 2.0 standard drinks     Types: 2 Glasses of wine per week     Comment: occasionally    Drug use: Never    Sexual activity: Defer       NIH Stroke  Scale:       Che Zurita RN  08/26/23 19:10 EDT

## 2023-08-26 NOTE — ED NOTES
Pt c/o dizziness that started started last night when she was getting ready for bed. Dizziness worse with movement. 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

## 2023-08-26 NOTE — H&P
HISTORY AND PHYSICAL   [facility]        Date of Admission: 2023  Patient Identification:  Name: Pam Vidal  Age: 78 y.o.  Sex: female  :  1945  MRN: [id number]                     Primary Care Physician: Nik Mckay MD    Chief Complaint:  78 year old female who was taken to the ED with dizziness and confusion; she has also had issues with her balance; she has had diarrhea; the patient is not able to give a good history; some family is present and able to help a little; she has a history of lung cancer and is followed by dr ng; she fell last night; she has had a prior craniotomy and resection for lung cancer    History of Present Illness:   As above    Past Medical History:  Past Medical History:   Diagnosis Date    COPD (chronic obstructive pulmonary disease)     Coughing up blood     X2 MONTHS    History of COVID-19 2022    Hyperlipidemia     IBS (irritable bowel syndrome)     Primary lung adenocarcinoma, right     Rheumatoid arthritis      Past Surgical History:  Past Surgical History:   Procedure Laterality Date    APPENDECTOMY      appendix removed    BRONCHOSCOPY N/A 2022    Procedure: BRONCHOSCOPY WITH BAL,  BIOPSIES, AND BRUSHINGS WITH ENDOBRONCHIAL ULTRASOUND WITH FNA;  Surgeon: Gregor Vee MD;  Location: [facility] ENDOSCOPY;  Service: Pulmonary;  Laterality: N/A;  PRE- HILAR MASS  POST- SAME    BRONCHOSCOPY N/A 2023    Procedure: BRONCHOSCOPY with biopsy, lavage, brushing;  Surgeon: Gregor Vee MD;  Location: [facility] ENDOSCOPY;  Service: Pulmonary;  Laterality: N/A;    CAROTID ENDARTERECTOMY Right     CAROTID ENDARTERECTOMY Left     CATARACT EXTRACTION      CERVICAL FUSION      CHOLECYSTECTOMY      CRANIOTOMY FOR TUMOR Right 2023    Procedure: RIGHT CRANIOTOMY FOR TUMOR RESECTION STEREOTACTIC WITH STEALTH;  Surgeon: Clint Ricketts MD;  Location: [facility] OR;  Service: Neurosurgery;  Laterality: Right;    HYSTERECTOMY      SHOULDER  ARTHROSCOPY Right 2019    right should scope/cuff repair     VENOUS ACCESS DEVICE (PORT) INSERTION Right 2022    Procedure: POWERPORT INSERTION;  Surgeon: Remedios Rice MD;  Location: [facility] OR;  Service: Thoracic;  Laterality: Right;      Home Meds:  (Not in a hospital admission)      Allergies:  Allergies   Allergen Reactions    Del-Mycin [Erythromycin] Hives    Gentamicin Hives    Ibuprofen Nausea And Vomiting    Latex Dermatitis     GLOVES    Metronidazole Hives    Ofloxacin Hives and Nausea Only    Penicillins Hives    Tetracycline Hives    Adhesive Tape Dermatitis     BANDAIDS    Aspirin GI Intolerance     Vomiting can take EC    Codeine Nausea And Vomiting     Immunizations:  Immunization History   Administered Date(s) Administered    COVID-19 (PFIZER) Purple Cap Monovalent 2021, 03/10/2021    Influenza, Unspecified 10/01/2021, 10/01/2022    Pneumococcal Polysaccharide (PPSV23) 10/06/2014     Social History:   Social History     Social History Narrative    Not on file     Social History     Socioeconomic History    Marital status:    Tobacco Use    Smoking status: Former     Packs/day: 0.25     Types: Cigarettes     Quit date: 2022     Years since quittin.3    Smokeless tobacco: Never    Tobacco comments:     Former some day smoker of 1-2 cigs   Vaping Use    Vaping Use: Never used   Substance and Sexual Activity    Alcohol use: Yes     Alcohol/week: 2.0 standard drinks     Types: 2 Glasses of wine per week     Comment: occasionally    Drug use: Never    Sexual activity: Defer       Family History:  Family History   Problem Relation Age of Onset    Cancer Mother     Cancer Father     Malig Hyperthermia Neg Hx         Review of Systems  Not obtainable from the patient    Objective:  T Max 24 hrs: Temp (24hrs), Av.1 °F (37.3 °C), Min:99.1 °F (37.3 °C), Max:99.1 °F (37.3 °C)    Vitals Ranges:   Temp:  [99.1 °F (37.3 °C)] 99.1 °F (37.3 °C)  Heart Rate:  [] 93  Resp:  " [16-18] 18  BP: (115-133)/(47-80) 121/47      Exam:  /47   Pulse 93   Temp 99.1 °F (37.3 °C) (Tympanic)   Resp 18   Ht 152.4 cm (60\")   Wt 52.6 kg (116 lb)   SpO2 94%   BMI 22.65 kg/m²     General Appearance:    Alert, cooperative, no distress, appears stated age   Head:    Normocephalic, without obvious abnormality, atraumatic   Eyes:    PERRL, conjunctivae/corneas clear, EOM's intact, both eyes   Ears:    Normal external ear canals, both ears   Nose:   Nares normal, septum midline, mucosa normal, no drainage    or sinus tenderness   Throat:   Lips, mucosa, and tongue normal   Neck:   Supple, symmetrical, trachea midline, no adenopathy;     thyroid:  no enlargement/tenderness/nodules; no carotid    bruit or JVD   Back:     Symmetric, no curvature, ROM normal, no CVA tenderness   Lungs:     Decreased breath sounds bilaterally, respirations unlabored   Chest Wall:    No tenderness or deformity    Heart:    Regular rate and rhythm, S1 and S2 normal, no murmur, rub   or gallop   Abdomen:     Soft, nontender, bowel sounds active all four quadrants,     no masses, no hepatomegaly, no splenomegaly   Extremities:   Extremities normal, atraumatic, no cyanosis or edema      .    Data Review:  Labs in chart were reviewed.  WBC   Date Value Ref Range Status   08/26/2023 19.42 (H) 3.40 - 10.80 10*3/mm3 Final     Hemoglobin   Date Value Ref Range Status   08/26/2023 10.0 (L) 12.0 - 15.9 g/dL Final     Hematocrit   Date Value Ref Range Status   08/26/2023 30.3 (L) 34.0 - 46.6 % Final     Platelets   Date Value Ref Range Status   08/26/2023 372 140 - 450 10*3/mm3 Final     Sodium   Date Value Ref Range Status   08/26/2023 129 (L) 136 - 145 mmol/L Final     Potassium   Date Value Ref Range Status   08/26/2023 4.3 3.5 - 5.2 mmol/L Final     Chloride   Date Value Ref Range Status   08/26/2023 94 (L) 98 - 107 mmol/L Final     CO2   Date Value Ref Range Status   08/26/2023 23.7 22.0 - 29.0 mmol/L Final     BUN   Date " Value Ref Range Status   08/26/2023 21 8 - 23 mg/dL Final     Creatinine   Date Value Ref Range Status   08/26/2023 1.08 (H) 0.57 - 1.00 mg/dL Final     Glucose   Date Value Ref Range Status   08/26/2023 131 (H) 65 - 99 mg/dL Final     Calcium   Date Value Ref Range Status   08/26/2023 9.9 8.6 - 10.5 mg/dL Final     Magnesium   Date Value Ref Range Status   08/26/2023 2.2 1.6 - 2.4 mg/dL Final                Imaging Results (All)       Procedure Component Value Units Date/Time    XR Chest 1 View [[id number]] Collected: 08/26/23 1904     Updated: 08/26/23 1911    Narrative:      Portable chest radiograph     HISTORY: Vomiting metastatic lung cancer     TECHNIQUE: Single AP portable radiograph of the chest     COMPARISON: Chest radiograph 9/6/2022       Impression:      FINDINGS AND IMPRESSION:  Right Port-A-Cath tip terminates over the superior vena cava.     Asymmetric, somewhat nodular opacification overlying the right upper  lung representing patient's known malignancy has improved since 9/6/2022  but is overall not well evaluated. Findings are best seen on recent  chest CT 6/11/2023 please refer to this dictation for further  information. Findings can be better characterized with CT chest if  clinically indicated.     No pleural effusion or pneumothorax is seen. Asymmetric increased pulm  opacification is present within the right lower lung suggestive of  atelectasis versus pneumonia in the appropriate clinical context and  correlation with patient history is recommended with at least follow-up  chest radiograph in 6 to 8 weeks to ensure resolution given patient  history. Findings can also be further evaluated with chest CT if  clinically indicated. Wires overlie the lower cervical spine.     This report was finalized on 8/26/2023 7:08 PM by Dr. Fady Davis M.D.       CT Head Without Contrast [[id number]] Collected: 08/26/23 1854     Updated: 08/26/23 1907    Narrative:      CT HEAD     HISTORY: Mental status  change, unknown cause     TECHNIQUE: CT scan of the head was obtained with 3 mm axial images  without intravenous contrast.  Radiation dose reduction techniques were  utilized, including automated exposure control and exposure modulation  based on body size.     COMPARISON: Head CT 5/26/2023 and 6/11/2023     FINDINGS AND compression:  Postsurgical changes from right parietal craniotomy and mass resection  are present, as before. The previously seen vasogenic edema projecting  from the operative bed has decreased.     The irregular hypodensity within the operative bed measures up to 3.3  cm, grossly unchanged since 8/26/2023. There is no significant midline  shift. Hypodensity within the doron has a more prominent appearance when  compared with prior CTs. While findings may be related to streak  artifact, further evaluation with MRI of the brain is recommended to  exclude acute intracranial pathology.                 This report was finalized on 8/26/2023 7:04 PM by Dr. Fady Davis M.D.       CT Abdomen Pelvis With Contrast [[id number]] Resulted: 08/26/23 1817     Updated: 08/26/23 1821              Assessment:  Active Hospital Problems    Diagnosis  POA    **Dizziness [R42]  Yes      Resolved Hospital Problems   No resolved problems to display.   Lung cancer  Ataxia  Left arm pain  anemia    Plan:  Mri brain  Ask oncology to see her  Fluids  Pain control  Antibiotics  Ct abd not read yet  Dw patient, family and ed provider  Justyna Diop MD  8/26/2023  19:26 EDT

## 2023-08-26 NOTE — ED PROVIDER NOTES
" EMERGENCY DEPARTMENT ENCOUNTER    Room Number:  E656/1  PCP: Nik Mckay MD  Patient Care Team:  Nik Mckay MD as PCP - General  Nik Mckay MD as PCP - Family Medicine  Remedios Rice MD as Surgeon (Thoracic Surgery)  Jannet Chauhan, RN as Nurse Navigator  Remedios Rice MD as Referring Physician (Thoracic Surgery)  Cruzito Lagunas MD as Consulting Physician (Hematology and Oncology)  Mckenna Moreira MD as Consulting Physician (Radiation Oncology)   Independent Historians: Patient and son    HPI:  Chief Complaint: TIA    A complete HPI/ROS/PMH/PSH/SH/FH are unobtainable due to: None    Chronic or social conditions impacting patient care (Social Determinants of Health): None  (Financial Resource Strain / Food Insecurity / Transportation Needs / Physical Activity / Stress / Social Connections / Intimate Partner Violence / Housing Stability)    Context: Pam Vidal is a 78 y.o. female who presents to the ED c/o acute mental status changes, balance problems, dizziness, nausea vomiting and diarrhea.  Patient's symptoms all peaked overnight.  She had 2 episodes of completely messing up her bed and wall with diarrhea.  She also had an episode of diarrhea on Wednesday.  Last night was the worst of her symptoms and she even lost her balance cleaning up and fell onto her right shoulder.  Since then she has felt weak and depleted and had chills.  No measured temperature and no cough, sore throat, runny nose.  Patient also complains of chronic left arm and left hand pain from shingles in May.  Patient's youngest son is at bedside and his concern after calling patient's oncology nurse practitioner for Dr. Lagunas that patient may have had a stroke.  He remarks that she has not taken any of her regular medications today to explain her dizziness or \"goofiness\".  Patient's son says that she at one point told him that her left side was not working properly.  She stated she had weakness in her left " arm and left leg.  She denies that now and has equal function but states generalized weakness.    Review of prior external notes (non-ED) -and- Review of prior external test results outside of this encounter: I reviewed patient's last note from Dr. Lagunas on August 23 this week.  Patient is noted to have had bilateral carotid endarterectomy surgeries in the remote past with no known history of stroke or MI.  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 Cho.  She had postop radiation to the resection bed as well as 2 smaller lesions with Dr. Moreira.  After those interventions she had improved edema with no definite recurrence.  They also note that she had severe shingles outbreak of the left upper extremity with severe pain for which she took acyclovir but has ongoing pain for which she was doing PT and OT.  Of note patient was seen in the ER on August 4 and had a CT of her cervical spine with an area that was concerning for right upper lobe recurrence of tumor.  Dr. Lagunas did note a plan for a complete resurvey of her disease with a PET scan and MRI of the brain.    Prescription drug monitoring program review:         PAST MEDICAL HISTORY  Active Ambulatory Problems     Diagnosis Date Noted    Primary lung adenocarcinoma, right 08/30/2022    Cough with hemoptysis 08/30/2022    Lung mass 08/31/2022    Advanced care planning/counseling discussion 10/03/2022    High risk medication use 10/31/2022    Muscular deconditioning 04/03/2023    Neoplastic malignant related fatigue 04/03/2023    Lung cancer metastatic to brain 05/25/2023    Brain mass 05/27/2023    COPD (chronic obstructive pulmonary disease) 05/27/2023    Rheumatoid arthritis 05/27/2023    IBS (irritable bowel syndrome) 05/27/2023    Hyperlipidemia 05/27/2023    Abnormal CT of the chest 06/11/2023    Shingles,  left arm 2023    HTN (hypertension) 2023     Resolved Ambulatory Problems     Diagnosis Date Noted    Leukocytosis 2023     Past Medical History:   Diagnosis Date    Coughing up blood     History of COVID-19 2022         PAST SURGICAL HISTORY  Past Surgical History:   Procedure Laterality Date    APPENDECTOMY      appendix removed    BRONCHOSCOPY N/A 2022    Procedure: BRONCHOSCOPY WITH BAL,  BIOPSIES, AND BRUSHINGS WITH ENDOBRONCHIAL ULTRASOUND WITH FNA;  Surgeon: Gregor Vee MD;  Location: [facility] ENDOSCOPY;  Service: Pulmonary;  Laterality: N/A;  PRE- HILAR MASS  POST- SAME    BRONCHOSCOPY N/A 2023    Procedure: BRONCHOSCOPY with biopsy, lavage, brushing;  Surgeon: Gregor Vee MD;  Location: [facility] ENDOSCOPY;  Service: Pulmonary;  Laterality: N/A;    CAROTID ENDARTERECTOMY Right     CAROTID ENDARTERECTOMY Left     CATARACT EXTRACTION      CERVICAL FUSION      CHOLECYSTECTOMY      CRANIOTOMY FOR TUMOR Right 2023    Procedure: RIGHT CRANIOTOMY FOR TUMOR RESECTION STEREOTACTIC WITH STEALTH;  Surgeon: Clint Ricketts MD;  Location: [facility] MAIN OR;  Service: Neurosurgery;  Laterality: Right;    HYSTERECTOMY      SHOULDER ARTHROSCOPY Right 2019    right should scope/cuff repair     VENOUS ACCESS DEVICE (PORT) INSERTION Right 2022    Procedure: POWERPORT INSERTION;  Surgeon: Remedios Rice MD;  Location: [facility] MAIN OR;  Service: Thoracic;  Laterality: Right;         FAMILY HISTORY  Family History   Problem Relation Age of Onset    Cancer Mother     Cancer Father     Malig Hyperthermia Neg Hx          SOCIAL HISTORY  Social History     Socioeconomic History    Marital status:    Tobacco Use    Smoking status: Former     Packs/day: 0.25     Types: Cigarettes     Quit date: 2022     Years since quittin.3    Smokeless tobacco: Never    Tobacco comments:     Former some day smoker of 1-2 cigs   Vaping Use    Vaping Use: Never used   Substance and Sexual  Activity    Alcohol use: Yes     Alcohol/week: 2.0 standard drinks     Types: 2 Glasses of wine per week     Comment: occasionally    Drug use: Never    Sexual activity: Defer         ALLERGIES  Del-mycin [erythromycin], Gentamicin, Ibuprofen, Latex, Metronidazole, Ofloxacin, Penicillins, Tetracycline, Adhesive tape, Aspirin, and Codeine        REVIEW OF SYSTEMS  Review of Systems  Included in HPI  All systems reviewed and negative except for those discussed in HPI.      PHYSICAL EXAM    I have reviewed the triage vital signs and nursing notes.    ED Triage Vitals   Temp Heart Rate Resp BP SpO2   08/26/23 1436 08/26/23 1436 08/26/23 1436 08/26/23 1446 08/26/23 1436   99.1 °F (37.3 °C) 101 16 115/78 94 %      Temp src Heart Rate Source Patient Position BP Location FiO2 (%)   08/26/23 1436 08/26/23 1436 -- -- --   Tympanic Monitor          Physical Exam  GENERAL: Pleasant cooperative and conversant  female, alert, no acute distress  SKIN: Warm, dry  HENT: Normocephalic, atraumatic  EYES: no scleral icterus  CV: regular rhythm, regular rate, no murmur  RESPIRATORY: normal effort, lungs clear, no wheeze  ABDOMEN: soft, nontender, nondistended, no rebound, no guarding  MUSCULOSKELETAL: no deformity, no calf tenderness to palpation, left hand with some scattered erythema where patient says she has residual pain from shingles  NEURO: alert, moves all extremities, follows commands, face symmetric, normal speech, grossly intact strength and sensation throughout, no pronator drift; NIH of 0 on my exam                                                               LAB RESULTS  Recent Results (from the past 24 hour(s))   Green Top (Gel)    Collection Time: 08/26/23  3:44 PM   Result Value Ref Range    Extra Tube Hold for add-ons.    Lavender Top    Collection Time: 08/26/23  3:44 PM   Result Value Ref Range    Extra Tube hold for add-on    Gold Top - SST    Collection Time: 08/26/23  3:44 PM   Result Value Ref Range     Extra Tube Hold for add-ons.    Light Blue Top    Collection Time: 08/26/23  3:44 PM   Result Value Ref Range    Extra Tube Hold for add-ons.    Comprehensive Metabolic Panel    Collection Time: 08/26/23  3:44 PM    Specimen: Blood   Result Value Ref Range    Glucose 131 (H) 65 - 99 mg/dL    BUN 21 8 - 23 mg/dL    Creatinine 1.08 (H) 0.57 - 1.00 mg/dL    Sodium 129 (L) 136 - 145 mmol/L    Potassium 4.3 3.5 - 5.2 mmol/L    Chloride 94 (L) 98 - 107 mmol/L    CO2 23.7 22.0 - 29.0 mmol/L    Calcium 9.9 8.6 - 10.5 mg/dL    Total Protein 6.3 6.0 - 8.5 g/dL    Albumin 3.3 (L) 3.5 - 5.2 g/dL    ALT (SGPT) 33 1 - 33 U/L    AST (SGOT) 31 1 - 32 U/L    Alkaline Phosphatase 100 39 - 117 U/L    Total Bilirubin 0.3 0.0 - 1.2 mg/dL    Globulin 3.0 gm/dL    A/G Ratio 1.1 g/dL    BUN/Creatinine Ratio 19.4 7.0 - 25.0    Anion Gap 11.3 5.0 - 15.0 mmol/L    eGFR 53.3 (L) >60.0 mL/min/1.73   CK    Collection Time: 08/26/23  3:44 PM    Specimen: Blood   Result Value Ref Range    Creatine Kinase 125 20 - 180 U/L   Magnesium    Collection Time: 08/26/23  3:44 PM    Specimen: Blood   Result Value Ref Range    Magnesium 2.2 1.6 - 2.4 mg/dL   CBC Auto Differential    Collection Time: 08/26/23  3:44 PM    Specimen: Blood   Result Value Ref Range    WBC 19.42 (H) 3.40 - 10.80 10*3/mm3    RBC 3.37 (L) 3.77 - 5.28 10*6/mm3    Hemoglobin 10.0 (L) 12.0 - 15.9 g/dL    Hematocrit 30.3 (L) 34.0 - 46.6 %    MCV 89.9 79.0 - 97.0 fL    MCH 29.7 26.6 - 33.0 pg    MCHC 33.0 31.5 - 35.7 g/dL    RDW 16.6 (H) 12.3 - 15.4 %    RDW-SD 54.8 (H) 37.0 - 54.0 fl    MPV 9.4 6.0 - 12.0 fL    Platelets 372 140 - 450 10*3/mm3    Neutrophil % 86.8 (H) 42.7 - 76.0 %    Lymphocyte % 4.3 (L) 19.6 - 45.3 %    Monocyte % 7.1 5.0 - 12.0 %    Eosinophil % 0.2 (L) 0.3 - 6.2 %    Basophil % 0.3 0.0 - 1.5 %    Immature Grans % 1.3 (H) 0.0 - 0.5 %    Neutrophils, Absolute 16.89 (H) 1.70 - 7.00 10*3/mm3    Lymphocytes, Absolute 0.83 0.70 - 3.10 10*3/mm3    Monocytes, Absolute 1.37  (H) 0.10 - 0.90 10*3/mm3    Eosinophils, Absolute 0.03 0.00 - 0.40 10*3/mm3    Basophils, Absolute 0.05 0.00 - 0.20 10*3/mm3    Immature Grans, Absolute 0.25 (H) 0.00 - 0.05 10*3/mm3    nRBC 0.0 0.0 - 0.2 /100 WBC   COVID-19,[facility]-HOUSE CEPHEID/[facility] NP SWAB IN TRANSPORT MEDIA 8-12 HR TAT - Swab, Nasopharynx    Collection Time: 08/26/23  3:45 PM    Specimen: Nasopharynx; Swab   Result Value Ref Range    COVID19 Not Detected Not Detected - Ref. Range   ECG 12 Lead Syncope    Collection Time: 08/26/23  3:54 PM   Result Value Ref Range    QT Interval 337 ms    QTC Interval 401 ms   Urinalysis With Microscopic If Indicated (No Culture) - Straight Cath    Collection Time: 08/26/23  6:00 PM    Specimen: Straight Cath; Urine   Result Value Ref Range    Color, UA Yellow Yellow, Straw    Appearance, UA Clear Clear    pH, UA 5.5 5.0 - 8.0    Specific Gravity, UA 1.007 1.005 - 1.030    Glucose, UA Negative Negative    Ketones, UA Negative Negative    Bilirubin, UA Negative Negative    Blood, UA Negative Negative    Protein, UA Negative Negative    Leuk Esterase, UA Negative Negative    Nitrite, UA Negative Negative    Urobilinogen, UA 0.2 E.U./dL 0.2 - 1.0 E.U./dL   Lactic Acid, Plasma    Collection Time: 08/26/23  8:08 PM    Specimen: Blood   Result Value Ref Range    Lactate 0.8 0.5 - 2.0 mmol/L       I ordered the above labs and independently reviewed the results.        RADIOLOGY  CT Head Without Contrast    Result Date: 8/26/2023  CT HEAD  HISTORY: Mental status change, unknown cause  TECHNIQUE: CT scan of the head was obtained with 3 mm axial images without intravenous contrast.  Radiation dose reduction techniques were utilized, including automated exposure control and exposure modulation based on body size.  COMPARISON: Head CT 5/26/2023 and 6/11/2023  FINDINGS AND compression: Postsurgical changes from right parietal craniotomy and mass resection are present, as before. The previously seen vasogenic edema  projecting from the operative bed has decreased.  The irregular hypodensity within the operative bed measures up to 3.3 cm, grossly unchanged since 8/26/2023. There is no significant midline shift. Hypodensity within the doron has a more prominent appearance when compared with prior CTs. While findings may be related to streak artifact, further evaluation with MRI of the brain is recommended to exclude acute intracranial pathology.      This report was finalized on 8/26/2023 7:04 PM by Dr. Fady Davis M.D.      CT Abdomen Pelvis With Contrast    Result Date: 8/26/2023  CT ABDOMEN AND PELVIS WITH IV CONTRAST  HISTORY: Nausea vomiting, history of lung cancer with intracranial metastasis  TECHNIQUE: Radiation dose reduction techniques were utilized, including automated exposure control and exposure modulation based on body size. 3 mm images were obtained through the abdomen and pelvis after the administration of IV contrast.  COMPARISON: CT abdomen pelvis 6/7/2023  FINDINGS:  Asymmetric right basilar groundglass opacification is present, not seen on 6/7/2023. There are no findings of small bowel obstruction. The appendix is surgically absent mild to moderate intra and extrahepatic bile duct dilation is present status post cholecystectomy, as before. The pancreas, spleen and adrenal glands have an unremarkable postcontrast CT appearance. Subcentimeter renal lesions are too small to characterize. Larger hypodense lesions demonstrating density less than 15 Hounsfield units are likely benign Bosniak 2018 criteria. There are indeterminate density lesions within the right kidney. Lesion within the inferior pole of the right kidney measuring up to 5.2 cm, as before. There is no hydronephrosis. The bladder is underdistended and not well evaluated. The uterus is surgically absent.  No abdominal pelvic adenopathy by size criteria. There is colonic diverticulosis. There is a long segment of bowel wall thickening with mucosal  hyperenhancement extending from the transverse through the sigmoid colon. There appears to be subtle surrounding pericolonic fat stranding as well. No free intraperitoneal air is seen.  For the purpose of this dictation, last well-formed space referred to as L5-S1. There is mild to moderate anterolisthesis of L5 on S1.      1.  Findings of a long segment of colitis extending from the transverse through the sigmoid colon. There is associated diverticulosis. Findings are favored be infectious in etiology given the distribution with diverticulitis less likely. Correlation with patient history is recommended. Stool sample may be helpful. 2.  Indeterminate right renal lesions measuring up to 5.2 cm, as before. At least continued close interval follow-up is recommended to exclude the plasm. Findings can also be further evaluated with multiphase CT of the abdomen with and without contrast if clinically indicated. 3.  Subtle asymmetric right basilar groundglass opacification suggestive of atypical pneumonia and/or asymmetric edema in the appropriate clinical context and correlation with patient history is recommended. Underlying malignant etiology cannot be excluded given patient history and can be further evaluated with chest CT if clinically indicated. 4.  Other findings as above  This report was finalized on 8/26/2023 9:08 PM by Dr. Fady Davis M.D.      XR Chest 1 View    Result Date: 8/26/2023  Portable chest radiograph  HISTORY: Vomiting metastatic lung cancer  TECHNIQUE: Single AP portable radiograph of the chest  COMPARISON: Chest radiograph 9/6/2022      FINDINGS AND IMPRESSION: Right Port-A-Cath tip terminates over the superior vena cava.  Asymmetric, somewhat nodular opacification overlying the right upper lung representing patient's known malignancy has improved since 9/6/2022 but is overall not well evaluated. Findings are best seen on recent chest CT 6/11/2023 please refer to this dictation for further  information. Findings can be better characterized with CT chest if clinically indicated.  No pleural effusion or pneumothorax is seen. Asymmetric increased pulm opacification is present within the right lower lung suggestive of atelectasis versus pneumonia in the appropriate clinical context and correlation with patient history is recommended with at least follow-up chest radiograph in 6 to 8 weeks to ensure resolution given patient history. Findings can also be further evaluated with chest CT if clinically indicated. Wires overlie the lower cervical spine.  This report was finalized on 8/26/2023 7:08 PM by Dr. Fady Davis M.D.       I ordered the above noted radiological studies. Reviewed by me. See dictation for official radiology interpretation.      PROCEDURES    Procedures      MEDICATIONS GIVEN IN ER    Medications   sodium chloride 0.9 % flush 10 mL (has no administration in time range)   sodium chloride 0.9 % flush 10 mL (has no administration in time range)   sodium chloride 0.9 % flush 10 mL (has no administration in time range)   sodium chloride 0.9 % flush 20 mL (has no administration in time range)   sodium chloride 0.9 % infusion 40 mL (has no administration in time range)   heparin injection 500 Units (has no administration in time range)   sodium chloride 0.9 % flush 10 mL (has no administration in time range)   sodium chloride 0.9 % flush 10 mL (has no administration in time range)   sodium chloride 0.9 % flush 20 mL (has no administration in time range)   sodium chloride 0.9 % infusion 40 mL (has no administration in time range)   heparin injection 500 Units (has no administration in time range)   acetaminophen (TYLENOL) tablet 650 mg (has no administration in time range)   sennosides-docusate (PERICOLACE) 8.6-50 MG per tablet 2 tablet (has no administration in time range)     And   polyethylene glycol (MIRALAX) packet 17 g (has no administration in time range)     And   bisacodyl (DULCOLAX) EC  tablet 5 mg (has no administration in time range)     And   bisacodyl (DULCOLAX) suppository 10 mg (has no administration in time range)   ondansetron (ZOFRAN) tablet 4 mg (has no administration in time range)     Or   ondansetron (ZOFRAN) injection 4 mg (has no administration in time range)   melatonin tablet 3 mg (has no administration in time range)   cefTRIAXone (ROCEPHIN) 1,000 mg in sodium chloride 0.9 % 100 mL IVPB-VTB (has no administration in time range)   sodium chloride 0.9 % infusion (has no administration in time range)   morphine injection 4 mg (4 mg Intravenous Given 8/26/23 2004)   sodium chloride 0.9 % bolus 500 mL (0 mL Intravenous Stopped 8/26/23 1638)   ondansetron (ZOFRAN) injection 4 mg (4 mg Intravenous Given 8/26/23 1604)   morphine injection 2 mg (2 mg Intravenous Given 8/26/23 1635)   iopamidol (ISOVUE-300) 61 % injection 100 mL (100 mL Intravenous Given 8/26/23 1821)         ORDERS PLACED DURING THIS VISIT:  Orders Placed This Encounter   Procedures    Blood Culture - Blood,    Blood Culture - Blood,    COVID-19,[facility]HE[facility] NP SWAB IN TRANSPORT MEDIA 8-12 HR TAT - Swab, Nasopharynx    Gastrointestinal Panel, PCR - Stool, Per Rectum    Clostridioides difficile Toxin - Stool, Per Rectum    Clostridioides difficile Toxin, PCR - Stool, Per Rectum    XR Chest 1 View    CT Head Without Contrast    CT Abdomen Pelvis With Contrast    MRI Brain With & Without Contrast    [facility] Draw    Comprehensive Metabolic Panel    Urinalysis With Microscopic If Indicated (No Culture) - Urine, Clean Catch    CK    Magnesium    CBC Auto Differential    Basic Metabolic Panel    CBC (No Diff)    Lactic Acid, Plasma    Diet: Cardiac Diets; Healthy Heart (2-3 Na+); Texture: Regular Texture (IDDSI 7); Fluid Consistency: Thin (IDDSI 0)    Connectors / Hubs Must Be Scrubbed 15 Seconds Using 70% Alcohol Before Access - Allow to Dry Before Accessing Line    Change Dressing to IV Site As Needed When Damp,  Loose or Soiled    Change Needleless Connectors    Change Cosme Needle & Transparent Dressing Every 7 Days    Change Cosme Needle As Needed    Daily CHG Bath While Central Line in Place    Connectors / Hubs Must Be Scrubbed 15 Seconds Using 70% Alcohol Before Access - Allow to Dry Before Accessing Line    Change Dressing to IV Site As Needed When Damp, Loose or Soiled    Change Needleless Connectors    Change Cosme Needle & Transparent Dressing Every 7 Days    Change Cosme Needle As Needed    Daily CHG Bath While Central Line in Place    Monitor Blood Pressure    Vital Signs    Up with assistance    Daily Weights    Strict Intake & Output    Oral Care    Place Sequential Compression Device    Maintain Sequential Compression Device    Code Status and Medical Interventions:    JUSTIN (on-call MD unless specified) Details    Hematology & Oncology Inpatient Consult    Patient Isolation Contact Spore    ECG 12 Lead Syncope    Access VAD    Inpatient Admission    Inpatient Admission    Green Top (Gel)    Lavender Top    Gold Top - SST    Light Blue Top    CBC & Differential         PROGRESS, DATA ANALYSIS, CONSULTS, AND MEDICAL DECISION MAKING    All labs have been independently interpreted by me.  All radiology studies have been reviewed by me.   EKG's independently viewed and interpreted by me.  Discussion below represents my analysis of pertinent findings related to patient's condition, differential diagnosis, treatment plan and final disposition.    MDM this patient presents with altered mental status.  The differential is broad and includes metabolic disturbances (hyper/hyponatremia, hypercalcemia, hypo/hyperglycemia, renal failure with uremia), infection/sepsis, alcohol intoxication or withdrawal, hypoxia/hypercapnia, hepatic encephalopathy, hyper/hypothyroidism, adrenal insufficiency, seizure, trauma, ICH, CVA.  Given the broad differential, plan to monitor patient and obtain EKG, accucheck, serum labs to include  electrolytes, cbc, lfts, troponin, alcohol level, urine/urine tox, CT head, and chest xray. Also may include ABG, LP, and antibiotic administration but will depend on vital signs, initial test results (anion gap etc), and clinical course.      Symptoms occurred overnight with no distinct onset time, so although CVA in differential, patient is not within a window to be appropriate for tpa.  She also has several physical complaints like nausea vomiting and diarrhea that speak against TIA or CVA.  .    ED Course as of 08/26/23 2117   Sat Aug 26, 2023   1608 EKG ER MD interpretation   Time: 15: 54  Rhythm and rate: Normal sinus rhythm at a rate of 85  Axis: Normal  P waves: Normal  QRS complexes: Normal  ST segments: no elevation nor depressions  T waves: Isolated T wave inversion in V1 with no congruent lead changes   [AR]   1626 I viewed patient's CT head and on my interpretation patient has right occipital area postoperative changes with encephalomalacia but no obvious intracranial hemorrhage nor midline shift [AR]   1741 Still awaiting urine and CT abdomen pelvis but the patient does meet SIRS criteria with her heart rate of 101 initially as well and having a white blood cell count of 19.4.  Will await additional studies as patient's vitals have improved and she has multiple antibiotic class allergies.  Looking at her prior records she has been on clindamycin iv in past.  Will look at cephalosporins and other antibiotic choices in past closer. Pt is on prednisone daily which may contribute to her leukocytosis as well. [AR]   1818 Pt has not had any loose stools here but will add stool studies if she produces a stool. [AR]      ED Course User Index  [AR] Shannan Vaughn MD           PPE: I wore and adhered to appropriate PPE per hospital protocols for specific patient presentation. (For respiratory patients with suspected Covid-19 or other infectious etiology suspected for patient's symptoms, the patient wore a  mask and I wore an N95 mask throughout the entire patient encounter.) Proper hand hygiene both before and after patient encounter was performed as well.         AS OF 21:17 EDT VITALS:    BP - 121/47  HR - 93  TEMP - 99.1 °F (37.3 °C) (Tympanic)  O2 SATS - 94%        DIAGNOSIS  Final diagnoses:   Nausea and vomiting, unspecified vomiting type   Diarrhea, unspecified type   Other chronic pain   Altered mental status, unspecified altered mental status type   Dizziness   Generalized weakness         DISPOSITION  ED Disposition       ED Disposition   Decision to Admit    Condition   --    Comment   Level of Care: Telemetry [5]   Diagnosis: Dizziness [[id number]]   Admitting Physician: KENTRELL OROZCO [[id number]]   Attending Physician: KENTRELL OROZCO [[id number]]   Certification: I Certify That Inpatient Hospital Services Are Medically Necessary For Greater Than 2 Midnights                    Note Disclaimer: At [facility], we believe that sharing information builds trust and better relationships. You are receiving this note because you recently visited [facility]. It is possible you will see health information before a provider has talked with you about it. This kind of information can be easy to misunderstand. To help you fully understand what it means for your health, we urge you to discuss this note with your provider.         Shannan Vaughn MD  08/26/23 [id number]

## 2023-08-26 NOTE — Clinical Note
Level of Care: Telemetry [5]   Diagnosis: Dizziness [[id number]]   Admitting Physician: KENTRELL OROZCO [[id number]]   Attending Physician: KENTRELL OROZCO [[id number]]   Certification: I certify that inpatient services are medically necessary for this patient for a duration of greater than two midnights. See H&P and MD Progress Notes for additional information about the patient's course of treatment.

## 2023-08-27 ENCOUNTER — APPOINTMENT (OUTPATIENT)
Dept: MRI IMAGING | Facility: HOSPITAL | Age: 78
DRG: 871 | End: 2023-08-27
Payer: MEDICARE

## 2023-08-27 PROBLEM — A04.5 CAMPYLOBACTER DIARRHEA: Status: ACTIVE | Noted: 2023-08-27

## 2023-08-27 PROBLEM — R19.7 DIARRHEA: Status: ACTIVE | Noted: 2023-08-27

## 2023-08-27 PROBLEM — A04.9 BACTERIAL COLITIS: Status: ACTIVE | Noted: 2023-08-27

## 2023-08-27 PROBLEM — M79.2 NEUROPATHIC PAIN: Status: ACTIVE | Noted: 2023-08-27

## 2023-08-27 LAB
ADV 40+41 DNA STL QL NAA+NON-PROBE: NOT DETECTED
ANION GAP SERPL CALCULATED.3IONS-SCNC: 11.2 MMOL/L (ref 5–15)
ASTRO TYP 1-8 RNA STL QL NAA+NON-PROBE: NOT DETECTED
BUN SERPL-MCNC: 15 MG/DL (ref 8–23)
BUN/CREAT SERPL: 20 (ref 7–25)
C CAYETANENSIS DNA STL QL NAA+NON-PROBE: NOT DETECTED
C COLI+JEJ+UPSA DNA STL QL NAA+NON-PROBE: DETECTED
C DIFF TOX GENS STL QL NAA+PROBE: NEGATIVE
CALCIUM SPEC-SCNC: 8.4 MG/DL (ref 8.6–10.5)
CHLORIDE SERPL-SCNC: 98 MMOL/L (ref 98–107)
CO2 SERPL-SCNC: 23.8 MMOL/L (ref 22–29)
CREAT SERPL-MCNC: 0.75 MG/DL (ref 0.57–1)
CRYPTOSP DNA STL QL NAA+NON-PROBE: NOT DETECTED
DEPRECATED RDW RBC AUTO: 55.1 FL (ref 37–54)
E HISTOLYT DNA STL QL NAA+NON-PROBE: NOT DETECTED
EAEC PAA PLAS AGGR+AATA ST NAA+NON-PRB: NOT DETECTED
EC STX1+STX2 GENES STL QL NAA+NON-PROBE: NOT DETECTED
EGFRCR SERPLBLD CKD-EPI 2021: 82.6 ML/MIN/1.73
EPEC EAE GENE STL QL NAA+NON-PROBE: NOT DETECTED
ERYTHROCYTE [DISTWIDTH] IN BLOOD BY AUTOMATED COUNT: 16.8 % (ref 12.3–15.4)
ETEC LTA+ST1A+ST1B TOX ST NAA+NON-PROBE: NOT DETECTED
G LAMBLIA DNA STL QL NAA+NON-PROBE: NOT DETECTED
GLUCOSE SERPL-MCNC: 92 MG/DL (ref 65–99)
HCT VFR BLD AUTO: 24.4 % (ref 34–46.6)
HGB BLD-MCNC: 8.3 G/DL (ref 12–15.9)
MCH RBC QN AUTO: 30.3 PG (ref 26.6–33)
MCHC RBC AUTO-ENTMCNC: 34 G/DL (ref 31.5–35.7)
MCV RBC AUTO: 89.1 FL (ref 79–97)
NOROVIRUS GI+II RNA STL QL NAA+NON-PROBE: NOT DETECTED
P SHIGELLOIDES DNA STL QL NAA+NON-PROBE: NOT DETECTED
PLATELET # BLD AUTO: 274 10*3/MM3 (ref 140–450)
PMV BLD AUTO: 9.2 FL (ref 6–12)
POTASSIUM SERPL-SCNC: 3.7 MMOL/L (ref 3.5–5.2)
RBC # BLD AUTO: 2.74 10*6/MM3 (ref 3.77–5.28)
RVA RNA STL QL NAA+NON-PROBE: NOT DETECTED
S ENT+BONG DNA STL QL NAA+NON-PROBE: NOT DETECTED
SAPO I+II+IV+V RNA STL QL NAA+NON-PROBE: NOT DETECTED
SHIGELLA SP+EIEC IPAH ST NAA+NON-PROBE: NOT DETECTED
SODIUM SERPL-SCNC: 133 MMOL/L (ref 136–145)
V CHOL+PARA+VUL DNA STL QL NAA+NON-PROBE: NOT DETECTED
V CHOLERAE DNA STL QL NAA+NON-PROBE: NOT DETECTED
WBC NRBC COR # BLD: 12.17 10*3/MM3 (ref 3.4–10.8)
Y ENTEROCOL DNA STL QL NAA+NON-PROBE: NOT DETECTED

## 2023-08-27 PROCEDURE — A9577 INJ MULTIHANCE: HCPCS | Performed by: STUDENT IN AN ORGANIZED HEALTH CARE EDUCATION/TRAINING PROGRAM

## 2023-08-27 PROCEDURE — 25010000002 AZITHROMYCIN PER 500 MG: Performed by: STUDENT IN AN ORGANIZED HEALTH CARE EDUCATION/TRAINING PROGRAM

## 2023-08-27 PROCEDURE — 99222 1ST HOSP IP/OBS MODERATE 55: CPT | Performed by: INTERNAL MEDICINE

## 2023-08-27 PROCEDURE — 85027 COMPLETE CBC AUTOMATED: CPT | Performed by: INTERNAL MEDICINE

## 2023-08-27 PROCEDURE — 70553 MRI BRAIN STEM W/O & W/DYE: CPT

## 2023-08-27 PROCEDURE — 80048 BASIC METABOLIC PNL TOTAL CA: CPT | Performed by: INTERNAL MEDICINE

## 2023-08-27 PROCEDURE — 25010000002 ONDANSETRON PER 1 MG: Performed by: INTERNAL MEDICINE

## 2023-08-27 PROCEDURE — 0 GADOBENATE DIMEGLUMINE 529 MG/ML SOLUTION: Performed by: STUDENT IN AN ORGANIZED HEALTH CARE EDUCATION/TRAINING PROGRAM

## 2023-08-27 PROCEDURE — 63710000001 PREDNISONE PER 5 MG: Performed by: STUDENT IN AN ORGANIZED HEALTH CARE EDUCATION/TRAINING PROGRAM

## 2023-08-27 PROCEDURE — 25010000002 MORPHINE PER 10 MG: Performed by: INTERNAL MEDICINE

## 2023-08-27 RX ORDER — PREDNISONE 10 MG/1
10 TABLET ORAL DAILY
Status: DISCONTINUED | OUTPATIENT
Start: 2023-08-27 | End: 2023-09-02 | Stop reason: HOSPADM

## 2023-08-27 RX ORDER — HYDROCODONE BITARTRATE AND ACETAMINOPHEN 7.5; 325 MG/1; MG/1
1 TABLET ORAL DAILY
Status: DISCONTINUED | OUTPATIENT
Start: 2023-08-27 | End: 2023-08-30

## 2023-08-27 RX ORDER — DULOXETIN HYDROCHLORIDE 30 MG/1
30 CAPSULE, DELAYED RELEASE ORAL DAILY
Status: DISCONTINUED | OUTPATIENT
Start: 2023-08-27 | End: 2023-09-02 | Stop reason: HOSPADM

## 2023-08-27 RX ORDER — PREGABALIN 75 MG/1
75 CAPSULE ORAL DAILY
Status: DISCONTINUED | OUTPATIENT
Start: 2023-08-27 | End: 2023-08-28

## 2023-08-27 RX ADMIN — ONDANSETRON 4 MG: 2 INJECTION INTRAMUSCULAR; INTRAVENOUS at 15:22

## 2023-08-27 RX ADMIN — PREDNISONE 10 MG: 10 TABLET ORAL at 13:21

## 2023-08-27 RX ADMIN — GADOBENATE DIMEGLUMINE 11 ML: 529 INJECTION, SOLUTION INTRAVENOUS at 18:45

## 2023-08-27 RX ADMIN — GABAPENTIN 100 MG: 100 CAPSULE ORAL at 05:25

## 2023-08-27 RX ADMIN — PREGABALIN 75 MG: 75 CAPSULE ORAL at 11:40

## 2023-08-27 RX ADMIN — MORPHINE SULFATE 4 MG: 2 INJECTION, SOLUTION INTRAMUSCULAR; INTRAVENOUS at 21:40

## 2023-08-27 RX ADMIN — SODIUM CHLORIDE 75 ML/HR: 9 INJECTION, SOLUTION INTRAVENOUS at 14:53

## 2023-08-27 RX ADMIN — MORPHINE SULFATE 4 MG: 2 INJECTION, SOLUTION INTRAMUSCULAR; INTRAVENOUS at 13:21

## 2023-08-27 RX ADMIN — AZITHROMYCIN MONOHYDRATE 500 MG: 500 INJECTION, POWDER, LYOPHILIZED, FOR SOLUTION INTRAVENOUS at 13:22

## 2023-08-27 RX ADMIN — MORPHINE SULFATE 4 MG: 2 INJECTION, SOLUTION INTRAMUSCULAR; INTRAVENOUS at 00:07

## 2023-08-27 RX ADMIN — Medication 10 ML: at 08:15

## 2023-08-27 RX ADMIN — DULOXETINE HYDROCHLORIDE 30 MG: 30 CAPSULE, DELAYED RELEASE ORAL at 13:21

## 2023-08-27 RX ADMIN — MORPHINE SULFATE 4 MG: 2 INJECTION, SOLUTION INTRAMUSCULAR; INTRAVENOUS at 08:14

## 2023-08-27 RX ADMIN — HYDROCODONE BITARTRATE AND ACETAMINOPHEN 1 TABLET: 7.5; 325 TABLET ORAL at 11:39

## 2023-08-27 RX ADMIN — MORPHINE SULFATE 4 MG: 2 INJECTION, SOLUTION INTRAMUSCULAR; INTRAVENOUS at 04:14

## 2023-08-27 RX ADMIN — LEVETIRACETAM 500 MG: 500 TABLET, FILM COATED ORAL at 08:13

## 2023-08-27 RX ADMIN — LEVETIRACETAM 500 MG: 500 TABLET, FILM COATED ORAL at 00:06

## 2023-08-27 RX ADMIN — PANTOPRAZOLE SODIUM 40 MG: 40 TABLET, DELAYED RELEASE ORAL at 04:14

## 2023-08-27 RX ADMIN — LEVETIRACETAM 500 MG: 500 TABLET, FILM COATED ORAL at 21:46

## 2023-08-27 NOTE — CONSULTS
Subjective patient feels about the same, worsening diarrhea    REASON FOR CONSULTATION:   1.  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  PD-L1 positive greater than 95%  3.  Primary chemoradiation with weekly carboplatin and Taxol completed 10/27/2022  4.  Imfinzi immunotherapy every 2 weeks for 12 months total.  First treatment 11/28/2022  5.  Repeat bronchoscopy 1/4/2023 due to persistent hemoptysis.  Pathology revealed no malignancy.  6.  Exophytic lesion of the kidney noted on surveillance CT scans.                               HISTORY OF PRESENT ILLNESS:  The patient is a 78 y.o. year old female who had developed increasing dizziness and confusion as well as imbalance, recent worsening of diarrhea.  She is followed at [facility] with a history of non-small cell lung carcinoma undergoing assessment for persistent cough in late June 2022 after the finding, additionally, a right upper lobe mass.  Bronchoscopy and biopsy 8/23/2022 demonstrated poorly differentiated adenocarcinoma, PD-L1 greater than 95%.  Further staging included PET without distant metastatic disease and MRI of the brain otherwise negative.  The patient went on to be treated weekly carboplatin Taxol and concurrent radiation therapy received her final fraction of radiation 10/27/2022 and 6 weeks of carboplatin Taxol.  Her scans 10/27/2023 revealed stable right upper lobe mass, reduction of mediastinal adenopathy.  The patient began Imfinzi in late November 2022 with plans to continue December 2023.  Unfortunate she had subsequent development of brain metastases late May 2023 with a dominant lesion in the right occipital lobe, undergoing neurosurgical resection 5/30/2023 and 2 additional small lesions treated with radiation therapy-SBRT.  Patient had a hospitalization 6/11-14/2023 with shingles involving the left upper extremity.  She had a post treatment MRI of the brain 7/9/2023 without definite recurrence.    He is followed in [facility] office last seen  8/23/2023.  She had been in the emergency room 8/4 for pain in left arm where she previously had shingles.  While there CT scanning cervical spine demonstrate no acute findings but CT slices to the upper lung did show a new density in the right upper lobe.  A complete resurvey was planned thereafter with MRI and PET/CT.    Unfortunately patient presented to the emergency room 8/26/2023 having developed worsening equilibrium and weakness in her left leg.  Follow-up exams included normal EKG, CT head with right occipital postoperative changes and encephalomalacia without intercranial hemorrhage or midline shift.  She met criteria for potential septicemia and patient admitted for treatment include IV antibiotic therapies, IV hydration and additional scanning.    Interval history:  8/27/2023  T98.3, pulse 80, respirations 16, /54, SPO2 84%  Patient more alert this a.m., worsening diarrhea  H&H 8.3 and 24.4, white count 12,170, platelet count 2 74,000, sodium 133, calcium 8.4  CT abdomen with long segment colitis from transverse to sigmoid colon, associated diverticulitis, indeterminate right renal lesions up to 5.2 cm, subtle asymmetric right basilar groundglass opacifications consistent with atypical pneumonia.      Past Medical History:   Diagnosis Date    COPD (chronic obstructive pulmonary disease)     Coughing up blood     X2 MONTHS    History of COVID-19 02/2022    Hyperlipidemia     IBS (irritable bowel syndrome)     Primary lung adenocarcinoma, right     Rheumatoid arthritis         Past Surgical History:   Procedure Laterality Date    APPENDECTOMY      appendix removed    BRONCHOSCOPY N/A 8/23/2022    Procedure: BRONCHOSCOPY WITH BAL,  BIOPSIES, AND BRUSHINGS WITH ENDOBRONCHIAL ULTRASOUND WITH FNA;  Surgeon: Gregor Vee MD;  Location: [facility] ENDOSCOPY;  Service: Pulmonary;  Laterality: N/A;  PRE- HILAR MASS  POST- SAME    BRONCHOSCOPY N/A 1/4/2023    Procedure: BRONCHOSCOPY with biopsy, lavage,  brushing;  Surgeon: Gregor Vee MD;  Location: [facility] ENDOSCOPY;  Service: Pulmonary;  Laterality: N/A;    CAROTID ENDARTERECTOMY Right     CAROTID ENDARTERECTOMY Left     CATARACT EXTRACTION      CERVICAL FUSION      CHOLECYSTECTOMY      CRANIOTOMY FOR TUMOR Right 5/30/2023    Procedure: RIGHT CRANIOTOMY FOR TUMOR RESECTION STEREOTACTIC WITH STEALTH;  Surgeon: Clint Ricketts MD;  Location: [facility] MAIN OR;  Service: Neurosurgery;  Laterality: Right;    HYSTERECTOMY      SHOULDER ARTHROSCOPY Right 02/19/2019    right should scope/cuff repair     VENOUS ACCESS DEVICE (PORT) INSERTION Right 9/6/2022    Procedure: POWERPORT INSERTION;  Surgeon: Remedios Rice MD;  Location: [facility] MAIN OR;  Service: Thoracic;  Laterality: Right;        No current facility-administered medications on file prior to encounter.     Current Outpatient Medications on File Prior to Encounter   Medication Sig Dispense Refill    azelastine (ASTELIN) 0.1 % nasal spray 1 spray into the nostril(s) as directed by provider.      B COMPLEX VITAMINS ER PO Take  by mouth Daily.      Cholecalciferol (VITAMIN D3) 5000 units capsule capsule Take 2,000 Units by mouth Daily.      esomeprazole (nexIUM) 20 MG capsule Take 1 capsule by mouth Every Morning Before Breakfast.      estradiol (ESTRACE) 1 MG tablet Take 1 tablet by mouth Daily.      FeroSul 325 (65 Fe) MG tablet TAKE ONE TABLET BY MOUTH DAILY WITH BREAKFAST 30 tablet 5    gabapentin (NEURONTIN) 100 MG capsule Take 1 capsule by mouth Every 8 (Eight) Hours. 45 capsule 0    HYDROcodone-acetaminophen (NORCO) 7.5-325 MG per tablet Take 1 tablet by mouth Daily.      levETIRAcetam (KEPPRA) 500 MG tablet Take 1 tablet by mouth 2 (Two) Times a Day for 240 days. 120 tablet 3    lidocaine-prilocaine (EMLA) 2.5-2.5 % cream Apply 1 application topically to the appropriate area as directed Every 2 (Two) Hours As Needed for Mild Pain. 5 g 3    Morphine (MSIR) 30 MG tablet Take 1 tablet by mouth Every 4 (Four)  Hours As Needed for Severe Pain. 60 tablet 0    Multiple Vitamins-Minerals (PRESERVISION AREDS 2+MULTI VIT PO) Take  by mouth.      ofloxacin (OCUFLOX) 0.3 % ophthalmic solution Administer 2 drops to both eyes 2 (Two) Times a Day.      ondansetron (ZOFRAN) 8 MG tablet Take 1 tablet by mouth 3 (Three) Times a Day As Needed for Nausea or Vomiting. 30 tablet 5    predniSONE (DELTASONE) 10 MG tablet Take 1 tablet by mouth Daily. 30 tablet 5    valACYclovir (VALTREX) 1000 MG tablet       lidocaine (LIDODERM) 5 % Place 1 patch on the skin as directed by provider Daily. Remove & Discard patch within 12 hours or as directed by MD 15 patch 1    lisinopril (PRINIVIL,ZESTRIL) 20 MG tablet Take 1 tablet by mouth Daily for 30 days. 30 tablet 0        ALLERGIES:    Allergies   Allergen Reactions    Del-Mycin [Erythromycin] Hives    Gentamicin Hives    Ibuprofen Nausea And Vomiting    Latex Dermatitis     GLOVES    Metronidazole Hives    Ofloxacin Hives and Nausea Only    Penicillins Hives    Tetracycline Hives    Adhesive Tape Dermatitis     BANDAIDS    Aspirin GI Intolerance     Vomiting can take EC    Codeine Nausea And Vomiting        Social History     Socioeconomic History    Marital status:    Tobacco Use    Smoking status: Former     Packs/day: 0.25     Types: Cigarettes     Quit date: 2022     Years since quittin.3    Smokeless tobacco: Never    Tobacco comments:     Former some day smoker of 1-2 cigs   Vaping Use    Vaping Use: Never used   Substance and Sexual Activity    Alcohol use: Yes     Alcohol/week: 2.0 standard drinks     Types: 2 Glasses of wine per week     Comment: occasionally    Drug use: Never    Sexual activity: Defer        Family History   Problem Relation Age of Onset    Cancer Mother     Cancer Father     Malig Hyperthermia Neg Hx         Review of Systems     Objective     Vitals:    23 2119 23 2338 23 0300 23 0737   BP: 132/57 106/45  136/54   BP Location: Right  arm Right arm  Right arm   Patient Position: Lying Lying  Lying   Pulse: 82 83  79   Resp: 18 18  16   Temp: 98.2 °F (36.8 °C) 99.9 °F (37.7 °C)  98.3 °F (36.8 °C)   TempSrc: Oral Oral  Oral   SpO2: 92% 94%  (!) 84%   Weight: 53.7 kg (118 lb 6.2 oz)  53.7 kg (118 lb 6.2 oz)    Height:             8/23/2023    10:26 AM   Current Status   ECOG score 1       Physical Exam  HENT:      Head: Normocephalic and atraumatic.      Nose: Nose normal.      Mouth/Throat:      Mouth: Mucous membranes are moist.      Pharynx: Oropharynx is clear.   Eyes:      Extraocular Movements: Extraocular movements intact.      Conjunctiva/sclera: Conjunctivae normal.      Pupils: Pupils are equal, round, and reactive to light.   Cardiovascular:      Rate and Rhythm: Normal rate and regular rhythm.   Pulmonary:      Effort: Pulmonary effort is normal.      Breath sounds: Normal breath sounds.   Abdominal:      Palpations: Abdomen is soft.      Comments: Hyperactive bowel sounds   Musculoskeletal:         General: Normal range of motion.      Cervical back: Normal range of motion and neck supple.   Skin:     General: Skin is warm and dry.   Neurological:      General: No focal deficit present.      Mental Status: She is alert and oriented to person, place, and time.       RECENT LABS:  Hematology WBC   Date Value Ref Range Status   08/27/2023 12.17 (H) 3.40 - 10.80 10*3/mm3 Final     RBC   Date Value Ref Range Status   08/27/2023 2.74 (L) 3.77 - 5.28 10*6/mm3 Final     Hemoglobin   Date Value Ref Range Status   08/27/2023 8.3 (L) 12.0 - 15.9 g/dL Final     Hematocrit   Date Value Ref Range Status   08/27/2023 24.4 (L) 34.0 - 46.6 % Final     Platelets   Date Value Ref Range Status   08/27/2023 274 140 - 450 10*3/mm3 Final          Assessment & Plan   1.  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  Patient is not felt to be a surgical candidate and combined chemotherapy and radiation.   Guardant 360 assay positive for KRAS mutation and  TP53 mutation.  9/20/2022 1st dose of weekly carboplatin/taxol.   She completed 6 cycles of weekly CarboTaxol 10/25/2022.  Radiation therapy completed 10/27/2022  First dose durvalumab delivered 11/28/2022.   Durvalumab on hold since May 2023        2.  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     3.  Other comorbidities including vascular disease with previous carotid   endarterectomy surgeries.  She has no known history of stroke or myocardial infarction.      5.  Rheumatoid arthritis: Patient previously on Celebrex, but discontinued due to hemoptysis.  Patient started on prednisone 20 mg daily prescribed to manage her arthritis pain.  Prednisone has since been decreased to 10 mg daily.     6.  Development of brain metastases in May 2023.  Patient underwent resection of the dominant mass in the right occipital area by Dr. Cho of neurosurgery.  She received post op radiation therapy to the resection bed and to 2 smaller lesions with SRS under the direction of Dr. Mckenna Moreira at [facility].  Her posttreatment MRI of the brain from 7/9/2023 as noted above shows no new sites of disease, improved edema, and no definite recurrence in the resection bed.     7.  Severe shingles outbreak of left upper extremity with severe pain.  The shingles outbreak has dried up and she has completed her acyclovir treatment.  She is still having pain and numbness in the left hand and is undergoing physical therapy and Occupational Therapy.  She reports this pain is finally subsiding.     8.  Concern 8/23/2023 for recurrent tumor in the right upper lobe based on CT of the cervical spine performed on an ER visit 8/4/2023.  Plans made for PET/CT and MRI brain.    9.  Patient admitted 8/26/2023 with worsening equilibrium and weakness in her left leg.  Follow-up exams included normal EKG, CT head  with right occipital postoperative changes and encephalomalacia without intercranial hemorrhage or midline shift.  She met criteria for potential septicemia and patient admitted for treatment include IV antibiotic therapies, IV hydration and additional scanning.  CT studies consistent with long segment colitis from transverse to sigmoid colon, associated diverticulitis, indeterminate right renal lesions up to 5.2 cm, subtle asymmetric right basilar groundglass opacifications consistent with atypical pneumonia.  Additional studies include negative findings for C. difficile, positive for Campylobacter on GI panel, MRI of the brain pending.

## 2023-08-27 NOTE — PLAN OF CARE
Goal Outcome Evaluation:   Patient alert follows commands admitted from er. Patient can not stand without max assist and walker per family prior to admission. Prn pain meds given. Database and assessment completed. Orders place and consult for hem/onc called in for MD to see patient Sunday. Family involved. Incont care and skin care provided. Iv fluids infusing. Iv antibx given. Stool specimen collected. Negative for c-diff, positive for campylobacter on gi panel, [facility] on call aware no new orders. No acute distress noted will continue to monitor

## 2023-08-27 NOTE — PROGRESS NOTES
REASON FOR FOLLOWUP/CHIEF COMPLAINT:  Lung cancer    HISTORY OF PRESENT ILLNESS:   States she thinks she will probably go home tomorrow.  Son is at bedside.  States she is working with physical therapy and believes she can handle things at home.    Past Medical History, Past Surgical History, Social History, Family History have been reviewed and are without significant changes except as mentioned.    Review of Systems   Review of Systems   Constitutional:  Negative for activity change.   HENT:  Negative for nosebleeds and trouble swallowing.    Respiratory:  Negative for shortness of breath and wheezing.    Cardiovascular:  Negative for chest pain and palpitations.   Gastrointestinal:  Negative for constipation, diarrhea and nausea.   Genitourinary:  Negative for dysuria and hematuria.   Musculoskeletal:  Negative for arthralgias and myalgias.   Skin:  Negative for rash and wound.   Neurological:  Negative for seizures and syncope.   Hematological:  Negative for adenopathy. Does not bruise/bleed easily.   Psychiatric/Behavioral:  Negative for confusion.      Medications:  The current medication list was reviewed in the EMR    ALLERGIES:    Allergies   Allergen Reactions    Del-Mycin [Erythromycin] Hives    Gentamicin Hives    Ibuprofen Nausea And Vomiting    Latex Dermatitis     GLOVES    Metronidazole Hives    Ofloxacin Hives and Nausea Only    Penicillins Hives    Tetracycline Hives    Adhesive Tape Dermatitis     BANDAIDS    Aspirin GI Intolerance     Vomiting can take EC    Codeine Nausea And Vomiting              Vitals:    08/26/23 2338 08/27/23 0300 08/27/23 0737 08/27/23 1347   BP: 106/45  136/54 145/56   BP Location: Right arm  Right arm Left arm   Patient Position: Lying  Lying Lying   Pulse: 83  79 85   Resp: 18  16 16   Temp: 99.9 °F (37.7 °C)  98.3 °F (36.8 °C) 98.2 °F (36.8 °C)   TempSrc: Oral  Oral Oral   SpO2: 94%  (!) 84% 96%   Weight:  53.7 kg (118 lb 6.2 oz)     Height:         Physical  Exam    CONSTITUTIONAL:  Vital signs reviewed.  No distress, looks comfortable.  EYES:  Conjunctivae and lids unremarkable.  PERRLA  EARS, NOSE, MOUTH, THROAT:  Ears and nose appear unremarkable.  Lips, teeth, gums appear unremarkable.  RESPIRATORY:  Normal respiratory effort.  Lungs clear to auscultation bilaterally.  CARDIOVASCULAR:  Normal S1, S2.  No murmurs, rubs or gallops.  No significant lower extremity edema.  GASTROINTESTINAL: Abdomen appears unremarkable.  Nontender.  No hepatomegaly.  No splenomegaly.  NEURO: Cranial nerves 2-12 grossly intact.  No focal deficits.  Appears to have equal strength all 4 extremities.  MUSCULOSKELETAL:  Unremarkable digits/nails.  No cyanosis or clubbing.  SKIN:  Warm.  No rashes.  PSYCHIATRIC:  Normal judgment and insight.  Normal mood and affect.      RECENT LABS:  WBC   Date Value Ref Range Status   08/27/2023 12.17 (H) 3.40 - 10.80 10*3/mm3 Final   08/26/2023 19.42 (H) 3.40 - 10.80 10*3/mm3 Final     Hemoglobin   Date Value Ref Range Status   08/27/2023 8.3 (L) 12.0 - 15.9 g/dL Final   08/26/2023 10.0 (L) 12.0 - 15.9 g/dL Final     Platelets   Date Value Ref Range Status   08/27/2023 274 140 - 450 10*3/mm3 Final   08/26/2023 372 140 - 450 10*3/mm3 Final       ASSESSMENT/PLAN:  Pam Vidal E656/1        * Stage III adenocarcinoma of the right upper lobe.  Patient is not felt to be a surgical candidate and combined chemotherapy and radiation.   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  9/20/2022 1st dose of weekly carboplatin/taxol.   She completed 6 cycles of weekly CarboTaxol 10/25/2022.  Radiation therapy completed 10/27/2022  First dose durvalumab delivered 11/28/2022.   Durvalumab on hold since May 2023  She can discuss if there are any remaining treatment options with Dr. Lagunas in the office.     2.  Tumor is strongly PD-L1 positive greater than 95% (although the patient may not be a great candidate for immunotherapy in the future due to  her rheumatoid arthritis).     3.  Other comorbidities including vascular disease with previous carotid   endarterectomy surgeries.  She has no known history of stroke or myocardial infarction.      5.  Rheumatoid arthritis: Patient previously on Celebrex, but discontinued due to hemoptysis.  Patient started on prednisone 20 mg daily prescribed to manage her arthritis pain.  Prednisone has since been decreased to 10 mg daily.     6.  Development of brain metastases in May 2023.  Patient underwent resection of the dominant mass in the right occipital area by Dr. Cho of neurosurgery.  She received post op radiation therapy to the resection bed and to 2 smaller lesions with SRS under the direction of Dr. Mckenna Moreira at [facility].  Her posttreatment MRI of the brain from 7/9/2023 as noted above shows no new sites of disease, improved edema, and no definite recurrence in the resection bed.  MRI brain 8/27/2023: No evidence of tumor recurrence.     7.  Severe shingles outbreak of left upper extremity with severe pain.  The shingles outbreak has dried up and she has completed her acyclovir treatment.  She is still having pain and numbness in the left hand and is undergoing physical therapy and Occupational Therapy.  She reports this pain is finally subsiding.     8.  Concern 8/23/2023 for recurrent tumor in the right upper lobe based on CT of the cervical spine performed on an ER visit 8/4/2023.  Plans made for PET/CT and MRI brain.     9.  Campylobacter infection with sepsis  admitted 8/26/2023 with worsening equilibrium and weakness in her left leg.  Follow-up exams included normal EKG, CT head with right occipital postoperative changes and encephalomalacia without intercranial hemorrhage or midline shift.  She met criteria for potential septicemia and patient admitted for treatment include IV antibiotic therapies, IV hydration and additional scanning.  CT studies consistent with long segment colitis  from transverse to sigmoid colon, associated diverticulitis, indeterminate right renal lesions up to 5.2 cm, subtle asymmetric right basilar groundglass opacifications consistent with atypical pneumonia.  Additional studies include negative findings for C. difficile, positive for Campylobacter on GI panel, MRI of the brain pending.    Plan  Sees Dr. Lagunas as an outpatient.  He last saw her in the office, 8/23/2023.  He planned MRI of the brain and PET scan and to see her again to review the above to discuss if any additional treatment options are available.  PET is scheduled on Tuesday, 8/29/2023.  MRI brain was done on 8/27/2023 but is also scheduled Friday, 9/1/2023 (likely scheduled from prior and will be canceled).  Note CT abdomen pelvis with contrast done 8/26/2023 with no clear evidence of progression of disease.  Hospitalist note from 8/26/2023 anticipated discharge to SNF in 2 to 3 days.  She thinks she will likely go home tomorrow.    I sent a message to Dr. Lagunas because I see he is out of the office beginning September 7, to see when he wants to see her in follow-up.  The PET scan that was scheduled for tomorrow will likely need to be delayed until a few days before she sees Dr. Lagunas, all of which will hopefully occur before September 7, because he is out of the office for a while beginning then

## 2023-08-27 NOTE — PROGRESS NOTES
Name: Pam Vidal ADMIT: 2023   : 1945  PCP: Nik Mckay MD    MRN: [id number] LOS: 1 days   AGE/SEX: 78 y.o. female  ROOM: [facility]     Subjective   Subjective   Patient continues to have significant left upper extremity neuropathic pain from her shingles she had a few months ago.  Diarrhea is improving.  No nausea vomiting or abdominal pain at this time.  Afebrile    Review of Systems   Constitutional:  Negative for chills and fever.   Respiratory:  Negative for cough and shortness of breath.    Cardiovascular:  Negative for chest pain and leg swelling.   Gastrointestinal:  Positive for diarrhea. Negative for abdominal pain and nausea.   As above     Objective   Objective   Vital Signs  Temp:  [98.2 °F (36.8 °C)-99.9 °F (37.7 °C)] 98.3 °F (36.8 °C)  Heart Rate:  [] 79  Resp:  [16-18] 16  BP: (106-136)/(45-80) 136/54  SpO2:  [84 %-96 %] 84 %  on   ;   Device (Oxygen Therapy): room air  Body mass index is 23.12 kg/m².  Physical Exam  Constitutional:       General: She is not in acute distress.     Appearance: She is ill-appearing.   Cardiovascular:      Rate and Rhythm: Normal rate and regular rhythm.   Pulmonary:      Effort: Pulmonary effort is normal. No respiratory distress.   Abdominal:      General: Abdomen is flat. There is no distension.      Tenderness: There is no abdominal tenderness.   Musculoskeletal:         General: No swelling or deformity. Normal range of motion.   Skin:     General: Skin is warm and dry.   Neurological:      General: No focal deficit present.      Mental Status: She is alert. Mental status is at baseline.       Results Review     I reviewed the patient's new clinical results.  Results from last 7 days   Lab Units 23  0621 23  1544 23  1005   WBC 10*3/mm3 12.17* 19.42* 13.44*   HEMOGLOBIN g/dL 8.3* 10.0* 11.0*   PLATELETS 10*3/mm3 274 372 396     Results from last 7 days   Lab Units 23  0621 23  1544 23  1005    SODIUM mmol/L 133* 129* 131*   POTASSIUM mmol/L 3.7 4.3 4.1   CHLORIDE mmol/L 98 94* 91*   CO2 mmol/L 23.8 23.7 22.3   BUN mg/dL 15 21 19   CREATININE mg/dL 0.75 1.08* 1.52*   GLUCOSE mg/dL 92 131* 100*   Estimated Creatinine Clearance: 54.1 mL/min (by C-G formula based on SCr of 0.75 mg/dL).  Results from last 7 days   Lab Units 08/26/23  1544 08/23/23  1005   ALBUMIN g/dL 3.3* 3.7   BILIRUBIN mg/dL 0.3 0.6   ALK PHOS U/L 100 112   AST (SGOT) U/L 31 66*   ALT (SGPT) U/L 33 32     Results from last 7 days   Lab Units 08/27/23  0621 08/26/23  1544 08/23/23  1005   CALCIUM mg/dL 8.4* 9.9 9.7   ALBUMIN g/dL  --  3.3* 3.7   MAGNESIUM mg/dL  --  2.2  --      Results from last 7 days   Lab Units 08/26/23 2008   LACTATE mmol/L 0.8     COVID19   Date Value Ref Range Status   08/26/2023 Not Detected Not Detected - Ref. Range Final   08/20/2022 Not Detected Not Detected - Ref. Range Final     No results found for: HGBA1C, POCGLU    CT Abdomen Pelvis With Contrast  Narrative: CT ABDOMEN AND PELVIS WITH IV CONTRAST     HISTORY: Nausea vomiting, history of lung cancer with intracranial  metastasis     TECHNIQUE: Radiation dose reduction techniques were utilized, including  automated exposure control and exposure modulation based on body size.   3 mm images were obtained through the abdomen and pelvis after the  administration of IV contrast.     COMPARISON: CT abdomen pelvis 6/7/2023     FINDINGS:     Asymmetric right basilar groundglass opacification is present, not seen  on 6/7/2023. There are no findings of small bowel obstruction. The  appendix is surgically absent mild to moderate intra and extrahepatic  bile duct dilation is present status post cholecystectomy, as before.  The pancreas, spleen and adrenal glands have an unremarkable  postcontrast CT appearance. Subcentimeter renal lesions are too small to  characterize. Larger hypodense lesions demonstrating density less than  15 Hounsfield units are likely benign  Bosniak 2018 criteria. There are  indeterminate density lesions within the right kidney. Lesion within the  inferior pole of the right kidney measuring up to 5.2 cm, as before.  There is no hydronephrosis. The bladder is underdistended and not well  evaluated. The uterus is surgically absent.     No abdominal pelvic adenopathy by size criteria. There is colonic  diverticulosis. There is a long segment of bowel wall thickening with  mucosal hyperenhancement extending from the transverse through the  sigmoid colon. There appears to be subtle surrounding pericolonic fat  stranding as well. No free intraperitoneal air is seen.     For the purpose of this dictation, last well-formed space referred to as  L5-S1. There is mild to moderate anterolisthesis of L5 on S1.     Impression: 1.  Findings of a long segment of colitis extending from the transverse  through the sigmoid colon. There is associated diverticulosis. Findings  are favored be infectious in etiology given the distribution with  diverticulitis less likely. Correlation with patient history is  recommended. Stool sample may be helpful.  2.  Indeterminate right renal lesions measuring up to 5.2 cm, as before.  At least continued close interval follow-up is recommended to exclude  the plasm. Findings can also be further evaluated with multiphase CT of  the abdomen with and without contrast if clinically indicated.  3.  Subtle asymmetric right basilar groundglass opacification suggestive  of atypical pneumonia and/or asymmetric edema in the appropriate  clinical context and correlation with patient history is recommended.  Underlying malignant etiology cannot be excluded given patient history  and can be further evaluated with chest CT if clinically indicated.  4.  Other findings as above     This report was finalized on 8/26/2023 9:08 PM by Dr. Fady Davis M.D.     XR Chest 1 View  Narrative: Portable chest radiograph     HISTORY: Vomiting metastatic lung  cancer     TECHNIQUE: Single AP portable radiograph of the chest     COMPARISON: Chest radiograph 9/6/2022     Impression: FINDINGS AND IMPRESSION:  Right Port-A-Cath tip terminates over the superior vena cava.     Asymmetric, somewhat nodular opacification overlying the right upper  lung representing patient's known malignancy has improved since 9/6/2022  but is overall not well evaluated. Findings are best seen on recent  chest CT 6/11/2023 please refer to this dictation for further  information. Findings can be better characterized with CT chest if  clinically indicated.     No pleural effusion or pneumothorax is seen. Asymmetric increased pulm  opacification is present within the right lower lung suggestive of  atelectasis versus pneumonia in the appropriate clinical context and  correlation with patient history is recommended with at least follow-up  chest radiograph in 6 to 8 weeks to ensure resolution given patient  history. Findings can also be further evaluated with chest CT if  clinically indicated. Wires overlie the lower cervical spine.     This report was finalized on 8/26/2023 7:08 PM by Dr. Fady Davis M.D.     CT Head Without Contrast  CT HEAD     HISTORY: Mental status change, unknown cause     TECHNIQUE: CT scan of the head was obtained with 3 mm axial images  without intravenous contrast.  Radiation dose reduction techniques were  utilized, including automated exposure control and exposure modulation  based on body size.     COMPARISON: Head CT 5/26/2023 and 6/11/2023     FINDINGS AND compression:  Postsurgical changes from right parietal craniotomy and mass resection  are present, as before. The previously seen vasogenic edema projecting  from the operative bed has decreased.     The irregular hypodensity within the operative bed measures up to 3.3  cm, grossly unchanged since 8/26/2023. There is no significant midline  shift. Hypodensity within the doron has a more prominent appearance  when  compared with prior CTs. While findings may be related to streak  artifact, further evaluation with MRI of the brain is recommended to  exclude acute intracranial pathology.                 This report was finalized on 8/26/2023 7:04 PM by Dr. Fady Davis M.D.       I reviewed the patient's daily medications.  Scheduled Medications  azithromycin, 500 mg, Intravenous, Q24H  DULoxetine, 30 mg, Oral, Daily  HYDROcodone-acetaminophen, 1 tablet, Oral, Daily  levETIRAcetam, 500 mg, Oral, BID  pantoprazole, 40 mg, Oral, Q AM  predniSONE, 10 mg, Oral, Daily  pregabalin, 75 mg, Oral, Daily  senna-docusate sodium, 2 tablet, Oral, BID  sodium chloride, 10 mL, Intravenous, Q12H  sodium chloride, 10 mL, Intravenous, Q12H    Infusions  sodium chloride, 75 mL/hr, Last Rate: 75 mL/hr (08/26/23 2120)    Diet  Diet: Cardiac Diets; Healthy Heart (2-3 Na+); Texture: Regular Texture (IDDSI 7); Fluid Consistency: Thin (IDDSI 0)         I have personally reviewed:  [x]  Laboratory   [x]  Microbiology   [x]  Radiology   []  EKG/Telemetry   []  Cardiology/Vascular   []  Pathology   [x]  Records     Assessment/Plan     Active Hospital Problems    Diagnosis  POA    **Dizziness [R42]  Yes    Campylobacter diarrhea [A04.5]  Yes    Bacterial colitis [A04.9]  Yes    Diarrhea [R19.7]  Yes    Neuropathic pain [M79.2]  Yes    Rheumatoid arthritis [M06.9]  Yes    Brain mass [G93.89]  Yes    Primary lung adenocarcinoma, right [C34.91]  Yes      Resolved Hospital Problems   No resolved problems to display.       78 y.o. female admitted with Dizziness.    Campylobacter infection with sepsis (tachycardia, leukocytosis)  Colitis  -Nausea, diarrhea improving  -Discussed with patient she has tolerated azithromycin in the past  -Azithromycin 500 mg x 3 days  -Continue IV fluids    Dizziness, likely secondary to above  -Continue fluid resuscitation  -PT pending    Lung cancer with brain metastases  -Status post resection with neurosurgery.  Had some  postop radiation therapy.  MRI from 7/9 with no new sites of disease  -Discussed with Dr. Lee-plans been made for PET/CT as an outpatient.    -CT head with no new acute findings, but recommended MRI brain.  MRI brain pending.    Neuropathic pain from left upper extremity shingles  -Patient opted not gabapentin 900 mg an outpatient and it did not help her, will discontinue  -Start Lyrica and Cymbalta    Rheumatoid arthritis  -Continue prednisone 10 mg    SCDs for DVT prophylaxis.  Full code.  Discussed with patient, nursing staff, and consulting provider.  Anticipate discharge home with HH vs SNU facility in 2-3 days.    Expected discharge date/ time has not been documented.      Todd Garcia MD  [facility]  08/27/23  11:52 EDT

## 2023-08-27 NOTE — PLAN OF CARE
Goal Outcome Evaluation:  Plan of Care Reviewed With: patient        Progress: no change  Outcome Evaluation: Pt has frequent c/o of L shoulder pain from shingelles pt reprots, pain severe scale of 9-10, medicated with prn morphine inj x3 doses received this shift, pt also had one dose on norco and lyrica. got nauseated x1 releived by zofran dose x1. eating adequately, vss, sr on tele. Son, Gt updated with current status and treatments. Awaiting MRI to be done. voiding well, Loose bms x3 this shift, pt + camphylobacter.  afebrile, spore isolation observed.

## 2023-08-28 LAB
ANION GAP SERPL CALCULATED.3IONS-SCNC: 9.2 MMOL/L (ref 5–15)
BUN SERPL-MCNC: 7 MG/DL (ref 8–23)
BUN/CREAT SERPL: 10.4 (ref 7–25)
CALCIUM SPEC-SCNC: 8.1 MG/DL (ref 8.6–10.5)
CHLORIDE SERPL-SCNC: 102 MMOL/L (ref 98–107)
CO2 SERPL-SCNC: 23.8 MMOL/L (ref 22–29)
CREAT SERPL-MCNC: 0.67 MG/DL (ref 0.57–1)
DEPRECATED RDW RBC AUTO: 52.6 FL (ref 37–54)
EGFRCR SERPLBLD CKD-EPI 2021: 90.7 ML/MIN/1.73
ERYTHROCYTE [DISTWIDTH] IN BLOOD BY AUTOMATED COUNT: 16.3 % (ref 12.3–15.4)
GLUCOSE SERPL-MCNC: 120 MG/DL (ref 65–99)
HCT VFR BLD AUTO: 25.9 % (ref 34–46.6)
HGB BLD-MCNC: 8.6 G/DL (ref 12–15.9)
MCH RBC QN AUTO: 29.4 PG (ref 26.6–33)
MCHC RBC AUTO-ENTMCNC: 33.2 G/DL (ref 31.5–35.7)
MCV RBC AUTO: 88.4 FL (ref 79–97)
PLATELET # BLD AUTO: 299 10*3/MM3 (ref 140–450)
PMV BLD AUTO: 9.8 FL (ref 6–12)
POTASSIUM SERPL-SCNC: 3.6 MMOL/L (ref 3.5–5.2)
RBC # BLD AUTO: 2.93 10*6/MM3 (ref 3.77–5.28)
SODIUM SERPL-SCNC: 135 MMOL/L (ref 136–145)
WBC NRBC COR # BLD: 10.49 10*3/MM3 (ref 3.4–10.8)

## 2023-08-28 PROCEDURE — 99232 SBSQ HOSP IP/OBS MODERATE 35: CPT | Performed by: INTERNAL MEDICINE

## 2023-08-28 PROCEDURE — 97110 THERAPEUTIC EXERCISES: CPT

## 2023-08-28 PROCEDURE — 25010000002 AZITHROMYCIN PER 500 MG: Performed by: STUDENT IN AN ORGANIZED HEALTH CARE EDUCATION/TRAINING PROGRAM

## 2023-08-28 PROCEDURE — 63710000001 PREDNISONE PER 5 MG: Performed by: STUDENT IN AN ORGANIZED HEALTH CARE EDUCATION/TRAINING PROGRAM

## 2023-08-28 PROCEDURE — 25010000002 MORPHINE PER 10 MG: Performed by: INTERNAL MEDICINE

## 2023-08-28 PROCEDURE — 85027 COMPLETE CBC AUTOMATED: CPT | Performed by: STUDENT IN AN ORGANIZED HEALTH CARE EDUCATION/TRAINING PROGRAM

## 2023-08-28 PROCEDURE — 80048 BASIC METABOLIC PNL TOTAL CA: CPT | Performed by: STUDENT IN AN ORGANIZED HEALTH CARE EDUCATION/TRAINING PROGRAM

## 2023-08-28 PROCEDURE — 97162 PT EVAL MOD COMPLEX 30 MIN: CPT

## 2023-08-28 RX ORDER — AZITHROMYCIN 250 MG/1
500 TABLET, FILM COATED ORAL
Status: COMPLETED | OUTPATIENT
Start: 2023-08-29 | End: 2023-08-29

## 2023-08-28 RX ORDER — PREGABALIN 75 MG/1
75 CAPSULE ORAL EVERY 12 HOURS SCHEDULED
Status: DISCONTINUED | OUTPATIENT
Start: 2023-08-28 | End: 2023-08-30

## 2023-08-28 RX ADMIN — HYDROCODONE BITARTRATE AND ACETAMINOPHEN 1 TABLET: 7.5; 325 TABLET ORAL at 08:25

## 2023-08-28 RX ADMIN — MORPHINE SULFATE 4 MG: 2 INJECTION, SOLUTION INTRAMUSCULAR; INTRAVENOUS at 14:23

## 2023-08-28 RX ADMIN — MORPHINE SULFATE 4 MG: 2 INJECTION, SOLUTION INTRAMUSCULAR; INTRAVENOUS at 05:12

## 2023-08-28 RX ADMIN — LEVETIRACETAM 500 MG: 500 TABLET, FILM COATED ORAL at 21:28

## 2023-08-28 RX ADMIN — SENNOSIDES AND DOCUSATE SODIUM 2 TABLET: 50; 8.6 TABLET ORAL at 21:28

## 2023-08-28 RX ADMIN — Medication 10 ML: at 08:26

## 2023-08-28 RX ADMIN — PREGABALIN 75 MG: 75 CAPSULE ORAL at 08:25

## 2023-08-28 RX ADMIN — DULOXETINE HYDROCHLORIDE 30 MG: 30 CAPSULE, DELAYED RELEASE ORAL at 08:25

## 2023-08-28 RX ADMIN — MORPHINE SULFATE 4 MG: 2 INJECTION, SOLUTION INTRAMUSCULAR; INTRAVENOUS at 22:24

## 2023-08-28 RX ADMIN — MORPHINE SULFATE 4 MG: 2 INJECTION, SOLUTION INTRAMUSCULAR; INTRAVENOUS at 01:22

## 2023-08-28 RX ADMIN — MORPHINE SULFATE 4 MG: 2 INJECTION, SOLUTION INTRAMUSCULAR; INTRAVENOUS at 18:33

## 2023-08-28 RX ADMIN — LEVETIRACETAM 500 MG: 500 TABLET, FILM COATED ORAL at 08:25

## 2023-08-28 RX ADMIN — PREDNISONE 10 MG: 10 TABLET ORAL at 08:25

## 2023-08-28 RX ADMIN — PREGABALIN 75 MG: 75 CAPSULE ORAL at 21:28

## 2023-08-28 RX ADMIN — PANTOPRAZOLE SODIUM 40 MG: 40 TABLET, DELAYED RELEASE ORAL at 05:12

## 2023-08-28 RX ADMIN — AZITHROMYCIN MONOHYDRATE 500 MG: 500 INJECTION, POWDER, LYOPHILIZED, FOR SOLUTION INTRAVENOUS at 12:37

## 2023-08-28 NOTE — PROGRESS NOTES
Name: Pam Vidal ADMIT: 2023   : 1945  PCP: Nik Mckay MD    MRN: [id number] LOS: 2 days   AGE/SEX: 78 y.o. female  ROOM: [facility]     Subjective   Subjective   Patient continues to have left lower extremity neuropathic pain from her shingles.  Diarrhea mood is stable.  No nausea or vomiting.  Tolerating a diet.    Review of Systems   Constitutional:  Negative for chills and fever.   Respiratory:  Negative for cough and shortness of breath.    Cardiovascular:  Negative for chest pain and leg swelling.   Gastrointestinal:  Positive for diarrhea. Negative for abdominal pain and nausea.   As above     Objective   Objective   Vital Signs  Temp:  [98 °F (36.7 °C)-98.2 °F (36.8 °C)] 98 °F (36.7 °C)  Heart Rate:  [72-85] 72  Resp:  [16] 16  BP: (126-160)/(51-64) 160/51  SpO2:  [95 %-97 %] 97 %  on   ;   Device (Oxygen Therapy): room air  Body mass index is 23.77 kg/m².  Physical Exam  Constitutional:       General: She is not in acute distress.     Appearance: She is ill-appearing (Chronically).   Cardiovascular:      Rate and Rhythm: Normal rate and regular rhythm.   Pulmonary:      Effort: Pulmonary effort is normal. No respiratory distress.   Abdominal:      General: Abdomen is flat. There is no distension.      Tenderness: There is no abdominal tenderness.   Musculoskeletal:         General: No swelling or deformity. Normal range of motion.   Skin:     General: Skin is warm and dry.   Neurological:      General: No focal deficit present.      Mental Status: She is alert. Mental status is at baseline.       Results Review     I reviewed the patient's new clinical results.  Results from last 7 days   Lab Units 23  0903 23  0621 23  1544 23  1005   WBC 10*3/mm3 10.49 12.17* 19.42* 13.44*   HEMOGLOBIN g/dL 8.6* 8.3* 10.0* 11.0*   PLATELETS 10*3/mm3 299 274 372 396       Results from last 7 days   Lab Units 23  0903 23  0621 23  1544 23  1005    SODIUM mmol/L 135* 133* 129* 131*   POTASSIUM mmol/L 3.6 3.7 4.3 4.1   CHLORIDE mmol/L 102 98 94* 91*   CO2 mmol/L 23.8 23.8 23.7 22.3   BUN mg/dL 7* 15 21 19   CREATININE mg/dL 0.67 0.75 1.08* 1.52*   GLUCOSE mg/dL 120* 92 131* 100*     Estimated Creatinine Clearance: 55.7 mL/min (by C-G formula based on SCr of 0.67 mg/dL).  Results from last 7 days   Lab Units 08/26/23  1544 08/23/23  1005   ALBUMIN g/dL 3.3* 3.7   BILIRUBIN mg/dL 0.3 0.6   ALK PHOS U/L 100 112   AST (SGOT) U/L 31 66*   ALT (SGPT) U/L 33 32       Results from last 7 days   Lab Units 08/28/23  0903 08/27/23  0621 08/26/23  1544 08/23/23  1005   CALCIUM mg/dL 8.1* 8.4* 9.9 9.7   ALBUMIN g/dL  --   --  3.3* 3.7   MAGNESIUM mg/dL  --   --  2.2  --        Results from last 7 days   Lab Units 08/26/23 2008   LACTATE mmol/L 0.8       COVID19   Date Value Ref Range Status   08/26/2023 Not Detected Not Detected - Ref. Range Final   08/20/2022 Not Detected Not Detected - Ref. Range Final     No results found for: HGBA1C, POCGLU    MRI Brain With & Without Contrast  Narrative: Patient: SHANA ZAZUETA  Time Out: 20:29  Exam(s): MRI HEAD W WO Contrast IV Amt: multihance 11ml    EXAM:  MR Head Without and With Intravenous Contrast    CLINICAL HISTORY:  Reason for exam: hx of brain mass, s p resection. recent ct with rec of   MRI brain.    TECHNIQUE:  Magnetic resonance images of the head brain without and with intravenous   contrast in multiple planes.    COMPARISON:  CT head 8 26 23; MRI brain 7 9 23    FINDINGS:  Patient has undergone previous right parieto-occipital craniotomy.  There   is a subjacent resection cavity at the right parieto-occipital junction.    Susceptibility artifact along the margins of the resection cavity is   compatible with old blood products, a normal postoperative finding.    There is jersey-resectional gliosis, unchanged from MRI of 7 9 23.  Thin   marginal enhancement along the resection cavity, as well as adjacent   meningeal  enhancement, is grossly unchanged.  No enhancing mass lesion is   visualized within the brain.    There is no diffusion restriction to suggest acute cerebral ischemia.    There is no evidence of acute intracranial hemorrhage.  There is no mass-  effect or midline shift.    Periventricular, deep cerebral white matter, and pontine white matter   foci of T2 FLAIR signal hyperintensity are compatible with chronic small   vessel ischemic disease.  There is mild generalized parenchymal volume   loss.  No hydrocephalus is observed.    Patient has undergone previous bilateral lens replacement.  Sinuses and   mastoid air cells are clear.    IMPRESSION:     1.  Status post right parieto-occipital craniotomy with subjacent   resection cavity.  Expected postoperative changes as detailed above.  No   evidence of tumor recurrence.  2.  Moderate chronic small vessel ischemic changes.  3.  No specific pontine abnormality aside from chronic small vessel   ischemic disease.  Finding on reference CT likely reflected artifact.  4.  No acute infarct.  No acute hemorrhage.  Impression: Electronically signed by Justa Saini M.D. on 08-27-23 at 2029    I reviewed the patient's daily medications.  Scheduled Medications  azithromycin, 500 mg, Intravenous, Q24H  DULoxetine, 30 mg, Oral, Daily  HYDROcodone-acetaminophen, 1 tablet, Oral, Daily  levETIRAcetam, 500 mg, Oral, BID  pantoprazole, 40 mg, Oral, Q AM  predniSONE, 10 mg, Oral, Daily  pregabalin, 75 mg, Oral, Q12H  senna-docusate sodium, 2 tablet, Oral, BID  sodium chloride, 10 mL, Intravenous, Q12H  sodium chloride, 10 mL, Intravenous, Q12H    Infusions  sodium chloride, 75 mL/hr, Last Rate: 75 mL/hr (08/27/23 1453)    Diet  Diet: Cardiac Diets; Healthy Heart (2-3 Na+); Texture: Regular Texture (IDDSI 7); Fluid Consistency: Thin (IDDSI 0)         I have personally reviewed:  [x]  Laboratory   [x]  Microbiology   [x]  Radiology   []  EKG/Telemetry   []  Cardiology/Vascular   []   Pathology   []  Records     Assessment/Plan     Active Hospital Problems    Diagnosis  POA    **Dizziness [R42]  Yes    Campylobacter diarrhea [A04.5]  Yes    Bacterial colitis [A04.9]  Yes    Diarrhea [R19.7]  Yes    Neuropathic pain [M79.2]  Yes    Rheumatoid arthritis [M06.9]  Yes    Brain mass [G93.89]  Yes    Primary lung adenocarcinoma, right [C34.91]  Yes      Resolved Hospital Problems   No resolved problems to display.       78 y.o. female admitted with Dizziness.    Campylobacter infection with sepsis (tachycardia, leukocytosis)  Colitis  -Nausea, diarrhea improving  -Discussed with patient she has tolerated azithromycin in the past  -Azithromycin 500 mg x 3 days  -Continue IV fluids    Dizziness, likely secondary to above  -Continue fluid resuscitation  -PT pending    Lung cancer with brain metastases  -Status post resection with neurosurgery.  Had some postop radiation therapy.  MRI from 7/9 with no new sites of disease  -Oncology consulted-patient is to have a PET scan and follow-up with Dr. Lagunas as an outpatient to discuss any further treatment options.    -CT head with no new acute findings, but recommended MRI brain.  MRI brain with expected postoperative changes, no acute findings, no progression of disease    Neuropathic pain from left upper extremity shingles  -Patient opted not gabapentin 900 mg an outpatient and it did not help her, will discontinue  -Continue Cymbalta, increase Lyrica    Rheumatoid arthritis  -Continue prednisone 10 mg    SCDs for DVT prophylaxis.  Full code.  Discussed with patient, nursing staff, consulting provider, and care team on multidisciplinary rounds.  Anticipate discharge home with HH vs SNU facility in 1-2 days.    Expected discharge date/ time has not been documented.      Todd Garcia MD  [facility]  08/28/23  12:48 EDT

## 2023-08-28 NOTE — PLAN OF CARE
Goal Outcome Evaluation:      Pt resting in bed. MRI completed. Medicated for pain per MAR. No other c/o voiced. VSS No s/s of distress.

## 2023-08-28 NOTE — THERAPY EVALUATION
Patient Name: Pam Vidal  : 1945    MRN: [id number]                              Today's Date: 2023       Admit Date: 2023    Visit Dx:     ICD-10-CM ICD-9-CM   1. Nausea and vomiting, unspecified vomiting type  R11.2 787.01   2. Diarrhea, unspecified type  R19.7 787.91   3. Other chronic pain  G89.29 338.29   4. Altered mental status, unspecified altered mental status type  R41.82 780.97   5. Dizziness  R42 780.4   6. Generalized weakness  R53.1 780.79     Patient Active Problem List   Diagnosis    Primary lung adenocarcinoma, right    Cough with hemoptysis    Lung mass    Advanced care planning/counseling discussion    High risk medication use    Muscular deconditioning    Neoplastic malignant related fatigue    Lung cancer metastatic to brain    Brain mass    COPD (chronic obstructive pulmonary disease)    Rheumatoid arthritis    IBS (irritable bowel syndrome)    Hyperlipidemia    Abnormal CT of the chest    Shingles, left arm    HTN (hypertension)    Dizziness    Campylobacter diarrhea    Bacterial colitis    Diarrhea    Neuropathic pain     Past Medical History:   Diagnosis Date    COPD (chronic obstructive pulmonary disease)     Coughing up blood     X2 MONTHS    History of COVID-19 2022    Hyperlipidemia     IBS (irritable bowel syndrome)     Primary lung adenocarcinoma, right     Rheumatoid arthritis      Past Surgical History:   Procedure Laterality Date    APPENDECTOMY      appendix removed    BRONCHOSCOPY N/A 2022    Procedure: BRONCHOSCOPY WITH BAL,  BIOPSIES, AND BRUSHINGS WITH ENDOBRONCHIAL ULTRASOUND WITH FNA;  Surgeon: Gregor Vee MD;  Location: [facility] ENDOSCOPY;  Service: Pulmonary;  Laterality: N/A;  PRE- HILAR MASS  POST- SAME    BRONCHOSCOPY N/A 2023    Procedure: BRONCHOSCOPY with biopsy, lavage, brushing;  Surgeon: Gregor Vee MD;  Location: [facility] ENDOSCOPY;  Service: Pulmonary;  Laterality: N/A;    CAROTID ENDARTERECTOMY Right     CAROTID  ENDARTERECTOMY Left     CATARACT EXTRACTION      CERVICAL FUSION      CHOLECYSTECTOMY      CRANIOTOMY FOR TUMOR Right 5/30/2023    Procedure: RIGHT CRANIOTOMY FOR TUMOR RESECTION STEREOTACTIC WITH STEALTH;  Surgeon: Clint Ricketts MD;  Location: [facility];  Service: Neurosurgery;  Laterality: Right;    HYSTERECTOMY      SHOULDER ARTHROSCOPY Right 02/19/2019    right should scope/cuff repair     VENOUS ACCESS DEVICE (PORT) INSERTION Right 9/6/2022    Procedure: POWERPORT INSERTION;  Surgeon: Remedios Rice MD;  Location: [facility] OR;  Service: Thoracic;  Laterality: Right;      General Information       Row Name 08/28/23 1342          Physical Therapy Time and Intention    Document Type evaluation  -EJ     Mode of Treatment physical therapy  -EJ       Row Name 08/28/23 1342          General Information    Patient Profile Reviewed yes  -EJ     Prior Level of Function independent:;min assist:;all household mobility;ADL's  niece assists pt as needed  -EJ     Existing Precautions/Restrictions fall  -EJ     Barriers to Rehab medically complex;previous functional deficit  -EJ       Row Name 08/28/23 1342          Living Environment    People in Home --  niece  -EJ       Row Name 08/28/23 1342          Home Main Entrance    Number of Stairs, Main Entrance one  -EJ       Row Name 08/28/23 1342          Stairs Within Home, Primary    Number of Stairs, Within Home, Primary none  -EJ       Row Name 08/28/23 1342          Cognition    Orientation Status (Cognition) oriented x 4  -EJ       Row Name 08/28/23 1342          Safety Issues, Functional Mobility    Impairments Affecting Function (Mobility) strength;endurance/activity tolerance;pain  -EJ               User Key  (r) = Recorded By, (t) = Taken By, (c) = Cosigned By      Initials Name Provider Type    EJ Katelynn Gomez, PT Physical Therapist                   Mobility       Row Name 08/28/23 1343          Bed Mobility    Bed Mobility supine-sit;sit-supine  -EJ      Supine-Sit [place] (Bed Mobility) standby assist  -EJ     Sit-Supine [place] (Bed Mobility) standby assist  -EJ     Assistive Device (Bed Mobility) head of bed elevated;bed rails  -EJ       [facility] Name 08/28/23 1343          Sit-Stand Transfer    Sit-Stand [place] (Transfers) verbal cues;contact guard  -EJ     Assistive Device (Sit-Stand Transfers) walker, front-wheeled  -EJ       Row Name 08/28/23 1343          Gait/Stairs (Locomotion)    [place] Level (Gait) verbal cues;contact guard  -EJ     Assistive Device (Gait) walker, front-wheeled  -EJ     Distance in Feet (Gait) 40  -EJ     Deviations/Abnormal Patterns (Gait) mariia decreased;stride length decreased  -EJ     Bilateral Gait Deviations forward flexed posture  -EJ     Comment, (Gait/Stairs) slow pace, no overt LOB noted  -EJ               User Key  (r) = Recorded By, (t) = Taken By, (c) = Cosigned By      Initials Name Provider Type    Katelynn Foote, PT Physical Therapist                   Obj/Interventions       [facility] Name 08/28/23 1347          Range of Motion Comprehensive    General Range of Motion no range of motion deficits identified  -EJ       Row Name 08/28/23 1347          Strength Comprehensive (MMT)    Comment, General Manual Muscle Testing (MMT) Assessment generalized weakness  -[facility] Name 08/28/23 1347          Balance    Balance Assessment sitting static balance;standing static balance;standing dynamic balance  -EJ     Static Sitting Balance standby assist;supervision  -EJ     Position, Sitting Balance sitting edge of bed  -EJ     Static Standing Balance contact guard  -EJ     Dynamic Standing Balance contact guard  -EJ     Position/Device Used, Standing Balance walker, front-wheeled  -EJ               User Key  (r) = Recorded By, (t) = Taken By, (c) = Cosigned By      Initials Name Provider Type    Katelynn Foote, PT Physical Therapist                   Goals/Plan       Row Name 08/28/23 8465           Transfer Goal 1 (PT)    Activity/Assistive Device (Transfer Goal 1, PT) transfers, all  -EJ     [place] Level/Cues Needed (Transfer Goal 1, PT) standby assist  -EJ     Time Frame (Transfer Goal 1, PT) 1 week  -EJ       Row Name 08/28/23 4101          Gait Training Goal 1 (PT)    Activity/Assistive Device (Gait Training Goal 1, PT) gait (walking locomotion);walker, rolling  -EJ     [place] Level (Gait Training Goal 1, PT) standby assist  -EJ     Distance (Gait Training Goal 1, PT) 150  -EJ     Time Frame (Gait Training Goal 1, PT) 1 week  -EJ       Row Name 08/28/23 7190          Therapy Assessment/Plan (PT)    Planned Therapy Interventions (PT) balance training;bed mobility training;gait training;home exercise program;stretching;strengthening;stair training;ROM (range of motion);patient/family education;transfer training  -EJ               User Key  (r) = Recorded By, (t) = Taken By, (c) = Cosigned By      Initials Name Provider Type    EJ Katelynn Gomez, PT Physical Therapist                   Clinical Impression       Row Name 08/28/23 8490          Pain    Pretreatment Pain Rating 7/10  -EJ     Pain Location - Side/Orientation Left  -EJ     Pain Location upper  -EJ     Pain Location - extremity  -EJ     Pain Intervention(s) Ambulation/increased activity;Repositioned  -EJ       Row Name 08/28/23 6809          Plan of Care Review    Plan of Care Reviewed With patient  -EJ     Outcome Evaluation Pt seen for PT eval this afternoon. She is a 78 year old female admitted to [facility] on 8/26/23w c/o dizziness, L sided weakness, and diarrhea. Pt w hx of lung cancer w brain mets s/p craniotomy as well as recent shingles affecting L UE. MRI negative for acute infarct. At baseline, pt lives at home w her niece and states she does require assistance w mobility and ADLs. She uses walker for ambulation. Currently, pt able to perform bed mobiltiy w SBA. She stood w CGA and then was able to ambulate approx 40 ft w CGA  and Rwx. Distance limited 2' BR needs today. Pt plans home at [place] w assist of family. She will continue to benefit from skilled PT to maximize safety, strength, and indepence w mobiltiy.  -EJ       Row Name 08/28/23 1348          Therapy Assessment/Plan (PT)    Rehab Potential (PT) good, to achieve stated therapy goals  -EJ     Criteria for Skilled Interventions Met (PT) yes  -EJ     Therapy Frequency (PT) 5 times/wk  -EJ       Row Name 08/28/23 1348          Positioning and Restraints    Pre-Treatment Position in bed  -EJ     Post Treatment Position bed  -EJ     In Bed notified nsg;supine;call light within reach;encouraged to call for assist;exit alarm on  -EJ               User Key  (r) = Recorded By, (t) = Taken By, (c) = Cosigned By      Initials Name Provider Type    Katelynn Foote, PT Physical Therapist                   Outcome Measures       Row Name 08/28/23 3972          How much help from another person do you currently need...    Turning from your back to your side while in flat bed without using bedrails? 4  -EJ     Moving from lying on back to sitting on the side of a flat bed without bedrails? 3  -EJ     Moving to and from a bed to a chair (including a wheelchair)? 3  -EJ     Standing up from a chair using your arms (e.g., wheelchair, bedside chair)? 3  -EJ     Climbing 3-5 steps with a railing? 3  -EJ     To walk in hospital room? 3  -EJ     AM-PAC 6 Clicks Score (PT) 19  -EJ     Highest level of mobility 6 --> Walked 10 steps or more  -EJ               User Key  (r) = Recorded By, (t) = Taken By, (c) = Cosigned By      Initials Name Provider Type    Katelynn Foote, PT Physical Therapist                                   PT Recommendation and Plan  Planned Therapy Interventions (PT): balance training, bed mobility training, gait training, home exercise program, stretching, strengthening, stair training, ROM (range of motion), patient/family education, transfer training  Plan of Care  Reviewed With: patient  Outcome Evaluation: Pt seen for PT eval this afternoon. She is a 78 year old female admitted to [facility] on 8/26/23w c/o dizziness, L sided weakness, and diarrhea. Pt w hx of lung cancer w brain mets s/p craniotomy as well as recent shingles affecting L UE. MRI negative for acute infarct. At baseline, pt lives at home w her niece and states she does require assistance w mobility and ADLs. She uses walker for ambulation. Currently, pt able to perform bed mobiltiy w SBA. She stood w CGA and then was able to ambulate approx 40 ft w CGA and Rwx. Distance limited 2' BR needs today. Pt plans home at [place] w assist of family. She will continue to benefit from skilled PT to maximize safety, strength, and indepence w mobiltiy.     Time Calculation:         PT Charges       Row Name 08/28/23 1355             Time Calculation    Start Time 1325  -EJ      Stop Time 1342  -EJ      Time Calculation (min) 17 min  -EJ      PT Received On 08/28/23  -EJ      PT - Next Appointment 08/29/23  -EJ      PT Goal Re-Cert Due Date 09/04/23  -EJ         Time Calculation- PT    Total Timed Code Minutes- PT 12 minute(s)  -EJ                User Key  (r) = Recorded By, (t) = Taken By, (c) = Cosigned By      Initials Name Provider Type    Katelynn Foote, PT Physical Therapist                  Therapy Charges for Today       Code Description Service Date Service Provider Modifiers Qty    83766218913 HC PT EVAL MOD COMPLEXITY 3 8/28/2023 Katelynn Gomez, PT GP 1    02854301304 HC PT THER PROC EA 15 MIN 8/28/2023 Katelynn Gomez, PT GP 1            PT G-Codes  AM-PAC 6 Clicks Score (PT): 19  PT Discharge Summary  Anticipated Discharge Disposition (PT): home with assist    Katelynn Gomez PT  8/28/2023

## 2023-08-29 ENCOUNTER — HOSPITAL ENCOUNTER (OUTPATIENT)
Dept: PET IMAGING | Facility: HOSPITAL | Age: 78
Discharge: HOME | End: 2023-08-29
Payer: MEDICARE

## 2023-08-29 LAB
ANION GAP SERPL CALCULATED.3IONS-SCNC: 8 MMOL/L (ref 5–15)
BUN SERPL-MCNC: 6 MG/DL (ref 8–23)
BUN/CREAT SERPL: 10 (ref 7–25)
CALCIUM SPEC-SCNC: 8.2 MG/DL (ref 8.6–10.5)
CHLORIDE SERPL-SCNC: 102 MMOL/L (ref 98–107)
CO2 SERPL-SCNC: 24 MMOL/L (ref 22–29)
CREAT SERPL-MCNC: 0.6 MG/DL (ref 0.57–1)
DEPRECATED RDW RBC AUTO: 52.9 FL (ref 37–54)
EGFRCR SERPLBLD CKD-EPI 2021: 93.2 ML/MIN/1.73
ERYTHROCYTE [DISTWIDTH] IN BLOOD BY AUTOMATED COUNT: 16.5 % (ref 12.3–15.4)
GLUCOSE SERPL-MCNC: 86 MG/DL (ref 65–99)
HCT VFR BLD AUTO: 24.3 % (ref 34–46.6)
HGB BLD-MCNC: 8.1 G/DL (ref 12–15.9)
MCH RBC QN AUTO: 29.5 PG (ref 26.6–33)
MCHC RBC AUTO-ENTMCNC: 33.3 G/DL (ref 31.5–35.7)
MCV RBC AUTO: 88.4 FL (ref 79–97)
PLATELET # BLD AUTO: 308 10*3/MM3 (ref 140–450)
PMV BLD AUTO: 9.4 FL (ref 6–12)
POTASSIUM SERPL-SCNC: 3.6 MMOL/L (ref 3.5–5.2)
RBC # BLD AUTO: 2.75 10*6/MM3 (ref 3.77–5.28)
SODIUM SERPL-SCNC: 134 MMOL/L (ref 136–145)
WBC NRBC COR # BLD: 10.54 10*3/MM3 (ref 3.4–10.8)

## 2023-08-29 PROCEDURE — 63710000001 PREDNISONE PER 5 MG: Performed by: STUDENT IN AN ORGANIZED HEALTH CARE EDUCATION/TRAINING PROGRAM

## 2023-08-29 PROCEDURE — 94761 N-INVAS EAR/PLS OXIMETRY MLT: CPT

## 2023-08-29 PROCEDURE — 99222 1ST HOSP IP/OBS MODERATE 55: CPT | Performed by: STUDENT IN AN ORGANIZED HEALTH CARE EDUCATION/TRAINING PROGRAM

## 2023-08-29 PROCEDURE — 97110 THERAPEUTIC EXERCISES: CPT

## 2023-08-29 PROCEDURE — 99232 SBSQ HOSP IP/OBS MODERATE 35: CPT | Performed by: INTERNAL MEDICINE

## 2023-08-29 PROCEDURE — 80048 BASIC METABOLIC PNL TOTAL CA: CPT | Performed by: STUDENT IN AN ORGANIZED HEALTH CARE EDUCATION/TRAINING PROGRAM

## 2023-08-29 PROCEDURE — 94640 AIRWAY INHALATION TREATMENT: CPT

## 2023-08-29 PROCEDURE — 94799 UNLISTED PULMONARY SVC/PX: CPT

## 2023-08-29 PROCEDURE — 85027 COMPLETE CBC AUTOMATED: CPT | Performed by: STUDENT IN AN ORGANIZED HEALTH CARE EDUCATION/TRAINING PROGRAM

## 2023-08-29 PROCEDURE — 94664 DEMO&/EVAL PT USE INHALER: CPT

## 2023-08-29 PROCEDURE — 25010000002 MORPHINE PER 10 MG: Performed by: INTERNAL MEDICINE

## 2023-08-29 RX ORDER — ALBUTEROL SULFATE 2.5 MG/3ML
2.5 SOLUTION RESPIRATORY (INHALATION) EVERY 6 HOURS PRN
Status: DISCONTINUED | OUTPATIENT
Start: 2023-08-29 | End: 2023-09-02 | Stop reason: HOSPADM

## 2023-08-29 RX ADMIN — MORPHINE SULFATE 4 MG: 2 INJECTION, SOLUTION INTRAMUSCULAR; INTRAVENOUS at 19:50

## 2023-08-29 RX ADMIN — PREGABALIN 75 MG: 75 CAPSULE ORAL at 19:51

## 2023-08-29 RX ADMIN — MORPHINE SULFATE 4 MG: 2 INJECTION, SOLUTION INTRAMUSCULAR; INTRAVENOUS at 15:22

## 2023-08-29 RX ADMIN — ALBUTEROL SULFATE 2.5 MG: 2.5 SOLUTION RESPIRATORY (INHALATION) at 08:19

## 2023-08-29 RX ADMIN — PREGABALIN 75 MG: 75 CAPSULE ORAL at 08:08

## 2023-08-29 RX ADMIN — MORPHINE SULFATE 4 MG: 2 INJECTION, SOLUTION INTRAMUSCULAR; INTRAVENOUS at 11:44

## 2023-08-29 RX ADMIN — LEVETIRACETAM 500 MG: 500 TABLET, FILM COATED ORAL at 19:50

## 2023-08-29 RX ADMIN — PREDNISONE 10 MG: 10 TABLET ORAL at 08:09

## 2023-08-29 RX ADMIN — Medication 10 ML: at 08:11

## 2023-08-29 RX ADMIN — PANTOPRAZOLE SODIUM 40 MG: 40 TABLET, DELAYED RELEASE ORAL at 06:01

## 2023-08-29 RX ADMIN — DULOXETINE HYDROCHLORIDE 30 MG: 30 CAPSULE, DELAYED RELEASE ORAL at 08:09

## 2023-08-29 RX ADMIN — LEVETIRACETAM 500 MG: 500 TABLET, FILM COATED ORAL at 08:09

## 2023-08-29 RX ADMIN — AZITHROMYCIN DIHYDRATE 500 MG: 250 TABLET, FILM COATED ORAL at 11:44

## 2023-08-29 RX ADMIN — MORPHINE SULFATE 4 MG: 2 INJECTION, SOLUTION INTRAMUSCULAR; INTRAVENOUS at 02:25

## 2023-08-29 RX ADMIN — HYDROCODONE BITARTRATE AND ACETAMINOPHEN 1 TABLET: 7.5; 325 TABLET ORAL at 08:08

## 2023-08-29 NOTE — PLAN OF CARE
Goal Outcome Evaluation:      Pt resting in bed. Medicated for pain per MAR. Pt assisted w turning as needed. No other c/o voiced. VSS No s/s of distress

## 2023-08-29 NOTE — PROGRESS NOTES
Name: Pma Vidal ADMIT: 2023   : 1945  PCP: Nik Mckay MD    MRN: [id number] LOS: 3 days   AGE/SEX: 78 y.o. female  ROOM: [facility]     Subjective   Subjective   Patient continues to have left upper extremity neuropathic pain from her shingles, but improving with lyrica. Diarrhea improving.    Review of Systems   Constitutional:  Negative for chills and fever.   Respiratory:  Negative for cough and shortness of breath.    Cardiovascular:  Negative for chest pain and leg swelling.   Gastrointestinal:  Positive for diarrhea. Negative for abdominal pain and nausea.   Neurological:  Positive for dizziness.   As above     Objective   Objective   Vital Signs  Temp:  [97.8 °F (36.6 °C)-98.8 °F (37.1 °C)] 98 °F (36.7 °C)  Heart Rate:  [68-91] 84  Resp:  [16-20] 20  BP: (120-153)/(47-61) 122/50  SpO2:  [93 %-97 %] 95 %  on   ;   Device (Oxygen Therapy): room air  Body mass index is 24.41 kg/m².  Physical Exam  Constitutional:       General: She is not in acute distress.     Appearance: She is ill-appearing (Chronically).   Cardiovascular:      Rate and Rhythm: Normal rate and regular rhythm.   Pulmonary:      Effort: Pulmonary effort is normal. No respiratory distress.   Abdominal:      General: Abdomen is flat. There is no distension.      Tenderness: There is no abdominal tenderness.   Musculoskeletal:         General: No swelling or deformity. Normal range of motion.   Skin:     General: Skin is warm and dry.   Neurological:      General: No focal deficit present.      Mental Status: She is alert. Mental status is at baseline.       Results Review     I reviewed the patient's new clinical results.  Results from last 7 days   Lab Units 23  0600 23  0903 23  0623  1544   WBC 10*3/mm3 10.54 10.49 12.17* 19.42*   HEMOGLOBIN g/dL 8.1* 8.6* 8.3* 10.0*   PLATELETS 10*3/mm3 308 299 274 372       Results from last 7 days   Lab Units 23  0600 23  0903 23  08/26/23  1544   SODIUM mmol/L 134* 135* 133* 129*   POTASSIUM mmol/L 3.6 3.6 3.7 4.3   CHLORIDE mmol/L 102 102 98 94*   CO2 mmol/L 24.0 23.8 23.8 23.7   BUN mg/dL 6* 7* 15 21   CREATININE mg/dL 0.60 0.67 0.75 1.08*   GLUCOSE mg/dL 86 120* 92 131*     Estimated Creatinine Clearance: 63 mL/min (by C-G formula based on SCr of 0.6 mg/dL).  Results from last 7 days   Lab Units 08/26/23  1544 08/23/23  1005   ALBUMIN g/dL 3.3* 3.7   BILIRUBIN mg/dL 0.3 0.6   ALK PHOS U/L 100 112   AST (SGOT) U/L 31 66*   ALT (SGPT) U/L 33 32       Results from last 7 days   Lab Units 08/29/23  0600 08/28/23  0903 08/27/23  0621 08/26/23  1544 08/23/23  1005   CALCIUM mg/dL 8.2* 8.1* 8.4* 9.9 9.7   ALBUMIN g/dL  --   --   --  3.3* 3.7   MAGNESIUM mg/dL  --   --   --  2.2  --        Results from last 7 days   Lab Units 08/26/23 2008   LACTATE mmol/L 0.8       COVID19   Date Value Ref Range Status   08/26/2023 Not Detected Not Detected - Ref. Range Final   08/20/2022 Not Detected Not Detected - Ref. Range Final     No results found for: HGBA1C, POCGLU    MRI Brain With & Without Contrast  Narrative: Patient: SHANA ZAZUETA  Time Out: 20:29  Exam(s): MRI HEAD W WO Contrast IV Amt: multihance 11ml    EXAM:  MR Head Without and With Intravenous Contrast    CLINICAL HISTORY:  Reason for exam: hx of brain mass, s p resection. recent ct with rec of   MRI brain.    TECHNIQUE:  Magnetic resonance images of the head brain without and with intravenous   contrast in multiple planes.    COMPARISON:  CT head 8 26 23; MRI brain 7 9 23    FINDINGS:  Patient has undergone previous right parieto-occipital craniotomy.  There   is a subjacent resection cavity at the right parieto-occipital junction.    Susceptibility artifact along the margins of the resection cavity is   compatible with old blood products, a normal postoperative finding.    There is jersey-resectional gliosis, unchanged from MRI of 7 9 23.  Thin   marginal enhancement along the resection  cavity, as well as adjacent   meningeal enhancement, is grossly unchanged.  No enhancing mass lesion is   visualized within the brain.    There is no diffusion restriction to suggest acute cerebral ischemia.    There is no evidence of acute intracranial hemorrhage.  There is no mass-  effect or midline shift.    Periventricular, deep cerebral white matter, and pontine white matter   foci of T2 FLAIR signal hyperintensity are compatible with chronic small   vessel ischemic disease.  There is mild generalized parenchymal volume   loss.  No hydrocephalus is observed.    Patient has undergone previous bilateral lens replacement.  Sinuses and   mastoid air cells are clear.    IMPRESSION:     1.  Status post right parieto-occipital craniotomy with subjacent   resection cavity.  Expected postoperative changes as detailed above.  No   evidence of tumor recurrence.  2.  Moderate chronic small vessel ischemic changes.  3.  No specific pontine abnormality aside from chronic small vessel   ischemic disease.  Finding on reference CT likely reflected artifact.  4.  No acute infarct.  No acute hemorrhage.  Impression: Electronically signed by Justa Saini M.D. on 08-27-23 at 2029    I reviewed the patient's daily medications.  Scheduled Medications  DULoxetine, 30 mg, Oral, Daily  HYDROcodone-acetaminophen, 1 tablet, Oral, Daily  levETIRAcetam, 500 mg, Oral, BID  pantoprazole, 40 mg, Oral, Q AM  predniSONE, 10 mg, Oral, Daily  pregabalin, 75 mg, Oral, Q12H  senna-docusate sodium, 2 tablet, Oral, BID  sodium chloride, 10 mL, Intravenous, Q12H  sodium chloride, 10 mL, Intravenous, Q12H    Infusions  sodium chloride, 75 mL/hr, Last Rate: 75 mL/hr (08/27/23 1453)    Diet  Diet: Cardiac Diets; Healthy Heart (2-3 Na+); Texture: Regular Texture (IDDSI 7); Fluid Consistency: Thin (IDDSI 0)         I have personally reviewed:  [x]  Laboratory   [x]  Microbiology   [x]  Radiology   []  EKG/Telemetry   []  Cardiology/Vascular   []   Pathology   []  Records     Assessment/Plan     Active Hospital Problems    Diagnosis  POA    **Dizziness [R42]  Yes    Campylobacter diarrhea [A04.5]  Yes    Bacterial colitis [A04.9]  Yes    Diarrhea [R19.7]  Yes    Neuropathic pain [M79.2]  Yes    Rheumatoid arthritis [M06.9]  Yes    Brain mass [G93.89]  Yes    Primary lung adenocarcinoma, right [C34.91]  Yes      Resolved Hospital Problems   No resolved problems to display.       78 y.o. female admitted with Dizziness.    Campylobacter infection with sepsis (tachycardia, leukocytosis)  Colitis  -Nausea, diarrhea improving  -Discussed with patient she has tolerated azithromycin in the past  -Azithromycin 500 mg x 3 days, last day 8/29  -Continue IV fluids    Dizziness, likely secondary to above  -Continue fluid resuscitation  -Patient admits that this has been going on for about a month  -Neuro consulted    Lung cancer with brain metastases  -Status post resection with neurosurgery.  Had some postop radiation therapy.  MRI from 7/9 with no new sites of disease  -Oncology consulted-patient is to have a PET scan and follow-up with Dr. Lagunas as an outpatient to discuss any further treatment options.    -CT head with no new acute findings, but recommended MRI brain.  MRI brain with expected postoperative changes, no acute findings, no progression of disease    Neuropathic pain from left upper extremity shingles  -Patient opted not gabapentin 900 mg an outpatient and it did not help her, will discontinue  -Continue Cymbalta, Lyrica 75 mg twice daily.  Can increase to 150 mg twice daily for 7 days    Rheumatoid arthritis  -Continue prednisone 10 mg    SCDs for DVT prophylaxis.  Full code.  Discussed with patient, nursing staff, consulting provider, and care team on multidisciplinary rounds.  Anticipate discharge home with  vs SNU facility in 1-2 days.    Expected Discharge Date: 8/31/2023; Expected Discharge Time:       Todd Garcia MD  [facility]  08/29/23  14:15 EDT

## 2023-08-29 NOTE — PROGRESS NOTES
REASON FOR FOLLOWUP/CHIEF COMPLAINT:  Lung cancer    HISTORY OF PRESENT ILLNESS:   She is planning to go out of town on a motorcycle trip (riding in a side car), from September 13 through September 18.  She expects to go home in a day or 2.  Therefore, it seems she is doing well    Past Medical History, Past Surgical History, Social History, Family History have been reviewed and are without significant changes except as mentioned.    Review of Systems   Review of Systems   Constitutional:  Negative for activity change.   HENT:  Negative for nosebleeds and trouble swallowing.    Respiratory:  Negative for shortness of breath and wheezing.    Cardiovascular:  Negative for chest pain and palpitations.   Gastrointestinal:  Negative for constipation, diarrhea and nausea.   Genitourinary:  Negative for dysuria and hematuria.   Musculoskeletal:  Negative for arthralgias and myalgias.   Skin:  Negative for rash and wound.   Neurological:  Negative for seizures and syncope.   Hematological:  Negative for adenopathy. Does not bruise/bleed easily.   Psychiatric/Behavioral:  Negative for confusion.      Medications:  The current medication list was reviewed in the EMR    ALLERGIES:    Allergies   Allergen Reactions    Del-Mycin [Erythromycin] Hives    Gentamicin Hives    Ibuprofen Nausea And Vomiting    Latex Dermatitis     GLOVES    Metronidazole Hives    Ofloxacin Hives and Nausea Only    Penicillins Hives    Tetracycline Hives    Adhesive Tape Dermatitis     BANDAIDS    Aspirin GI Intolerance     Vomiting can take EC    Codeine Nausea And Vomiting              Vitals:    08/29/23 0223 08/29/23 0700 08/29/23 0819 08/29/23 1320   BP:  153/61  122/50   BP Location:  Right arm  Right arm   Patient Position:  Lying  Lying   Pulse:  91 89 84   Resp:  16 20 20   Temp:  98.8 °F (37.1 °C)  98 °F (36.7 °C)   TempSrc:  Oral  Oral   SpO2:  93% 95% 95%   Weight: 56.7 kg (125 lb)      Height:         Physical Exam     CONSTITUTIONAL:  Vital signs reviewed.  No distress, looks comfortable.  EYES:  Conjunctivae and lids unremarkable.  PERRLA  EARS, NOSE, MOUTH, THROAT:  Ears and nose appear unremarkable.  Lips, teeth, gums appear unremarkable.  RESPIRATORY:  Normal respiratory effort.  Lungs clear to auscultation bilaterally.  CARDIOVASCULAR:  Normal S1, S2.  No murmurs, rubs or gallops.  No significant lower extremity edema.  GASTROINTESTINAL: Abdomen appears unremarkable.  Nontender.  No hepatomegaly.  No splenomegaly.  NEURO: Cranial nerves 2-12 grossly intact.  No focal deficits.  Appears to have equal strength all 4 extremities.  MUSCULOSKELETAL:  Unremarkable digits/nails.  No cyanosis or clubbing.  SKIN:  Warm.  No rashes.  PSYCHIATRIC:  Normal judgment and insight.  Normal mood and affect.        RECENT LABS:  WBC   Date Value Ref Range Status   08/29/2023 10.54 3.40 - 10.80 10*3/mm3 Final   08/28/2023 10.49 3.40 - 10.80 10*3/mm3 Final   08/27/2023 12.17 (H) 3.40 - 10.80 10*3/mm3 Final   08/26/2023 19.42 (H) 3.40 - 10.80 10*3/mm3 Final     Hemoglobin   Date Value Ref Range Status   08/29/2023 8.1 (L) 12.0 - 15.9 g/dL Final   08/28/2023 8.6 (L) 12.0 - 15.9 g/dL Final   08/27/2023 8.3 (L) 12.0 - 15.9 g/dL Final   08/26/2023 10.0 (L) 12.0 - 15.9 g/dL Final     Platelets   Date Value Ref Range Status   08/29/2023 308 140 - 450 10*3/mm3 Final   08/28/2023 299 140 - 450 10*3/mm3 Final   08/27/2023 274 140 - 450 10*3/mm3 Final   08/26/2023 372 140 - 450 10*3/mm3 Final       ASSESSMENT/PLAN:  Pam Vidal E656/1        * 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  Patient is not felt to be a surgical candidate and combined chemotherapy and radiation.   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  9/20/2022 1st dose of weekly carboplatin/taxol.   She completed 6 cycles of weekly CarboTaxol 10/25/2022.  Radiation therapy completed 10/27/2022  First dose durvalumab delivered 11/28/2022.   Durvalumab  on hold since May 2023  She can discuss if there are any remaining treatment options with Dr. Lagunas in the office.  We will arrange PET scan and follow-up with Dr. Lagunas     2.  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     3.  Other comorbidities including vascular disease with previous carotid   endarterectomy surgeries.  She has no known history of stroke or myocardial infarction.      5.  Rheumatoid arthritis: Patient previously on Celebrex, but discontinued due to hemoptysis.  Patient started on prednisone 20 mg daily prescribed to manage her arthritis pain.  Prednisone has since been decreased to 10 mg daily.     6.  Development of brain metastases in May 2023.  Patient underwent resection of the dominant mass in the right occipital area by Dr. Cho of neurosurgery.  She received post op radiation therapy to the resection bed and to 2 smaller lesions with SRS under the direction of Dr. Mckenna Moreira at [facility].  Her posttreatment MRI of the brain from 7/9/2023 as noted above shows no new sites of disease, improved edema, and no definite recurrence in the resection bed.  MRI brain 8/27/2023: No evidence of tumor recurrence.     7.  Severe shingles outbreak of left upper extremity with severe pain.  The shingles outbreak has dried up and she has completed her acyclovir treatment.  She is still having pain and numbness in the left hand and is undergoing physical therapy and Occupational Therapy.  She reports this pain is finally subsiding.     8.  Concern 8/23/2023 for recurrent tumor in the right upper lobe based on CT of the cervical spine performed on an ER visit 8/4/2023.  Plans made for PET/CT and MRI brain.     9.  Campylobacter infection with sepsis  admitted 8/26/2023 with worsening equilibrium and weakness in her left leg.  Follow-up exams included normal EKG, CT  head with right occipital postoperative changes and encephalomalacia without intercranial hemorrhage or midline shift.  She met criteria for potential septicemia and patient admitted for treatment include IV antibiotic therapies, IV hydration and additional scanning.  CT studies consistent with long segment colitis from transverse to sigmoid colon, associated diverticulitis, indeterminate right renal lesions up to 5.2 cm, subtle asymmetric right basilar groundglass opacifications consistent with atypical pneumonia.  Additional studies include negative findings for C. difficile, positive for Campylobacter on GI panel, MRI of the brain pending.  Per the hospitalist note from today, this is doing well and anticipating home with home health versus skilled nursing facility in 1 to 2 days.    Plan  Sees Dr. Lagunas as an outpatient.  He last saw her in the office, 8/23/2023.  He planned MRI of the brain and PET scan and to see her again to review the above to discuss if any additional treatment options are available.  PET is scheduled on Tuesday, 8/31/2023.  MRI brain was done on 8/27/2023   Note CT abdomen pelvis with contrast done 8/26/2023 with no clear evidence of progression of disease.  She will be out of town on a motorcycle trip (riding in a side car), 9/13/2023 - 9/18/2023.  Therefore, it would likely be best to do the PET scan and appointment Dr. Lagunas sometime after that.  I let the office know    Chart reviewed and summarized including hospitalist note from today and from yesterday, continuing same plan, hoping for discharge in 1 to 2 days

## 2023-08-29 NOTE — CASE MANAGEMENT/SOCIAL WORK
Continued Stay Note  [facility]     Patient Name: Pam Vidal  MRN: [id number]  Today's Date: 8/29/2023    Admit Date: 8/26/2023    Plan: Plan home with family and [employer] .   TD Jefferson RN   Discharge Plan       Row Name 08/29/23 0940       Plan    Plan Plan home with family and [employer] .   TD Jefferson RN    Patient/Family in Agreement with Plan yes    Plan Comments FACE SHEET VERIFIED/ IM LETTER SIGNED.  Spoke with pt at bedside.  Pt's PCP is Dr. Nik Mckay .Pt lives with her niece ( Rosio Garrett [phone]) in a single story house. Pt is independent with ADLs.  Pt has a grab bar, shower chair, and rolling walker for home use. Pt gets her prescriptions at [facility]  ( [place]).  Pt does state she can have issues affording medications.  Pt's family does assist her if needed.  [phone]. Pt has not been in SNF.  Pt denies any discharge needs.  Pt states her son or niece will transport her home.  Plan home with family and [employer] .  TD Jefferson RN                   Discharge Codes    No documentation.                       Radha Jefferson RN

## 2023-08-29 NOTE — THERAPY TREATMENT NOTE
Patient Name: Pam Vidal  : 1945    MRN: [id number]                              Today's Date: 2023       Admit Date: 2023    Visit Dx:     ICD-10-CM ICD-9-CM   1. Nausea and vomiting, unspecified vomiting type  R11.2 787.01   2. Diarrhea, unspecified type  R19.7 787.91   3. Other chronic pain  G89.29 338.29   4. Altered mental status, unspecified altered mental status type  R41.82 780.97   5. Dizziness  R42 780.4   6. Generalized weakness  R53.1 780.79     Patient Active Problem List   Diagnosis    Primary lung adenocarcinoma, right    Cough with hemoptysis    Lung mass    Advanced care planning/counseling discussion    High risk medication use    Muscular deconditioning    Neoplastic malignant related fatigue    Lung cancer metastatic to brain    Brain mass    COPD (chronic obstructive pulmonary disease)    Rheumatoid arthritis    IBS (irritable bowel syndrome)    Hyperlipidemia    Abnormal CT of the chest    Shingles, left arm    HTN (hypertension)    Dizziness    Campylobacter diarrhea    Bacterial colitis    Diarrhea    Neuropathic pain     Past Medical History:   Diagnosis Date    COPD (chronic obstructive pulmonary disease)     Coughing up blood     X2 MONTHS    History of COVID-19 2022    Hyperlipidemia     IBS (irritable bowel syndrome)     Primary lung adenocarcinoma, right     Rheumatoid arthritis      Past Surgical History:   Procedure Laterality Date    APPENDECTOMY      appendix removed    BRONCHOSCOPY N/A 2022    Procedure: BRONCHOSCOPY WITH BAL,  BIOPSIES, AND BRUSHINGS WITH ENDOBRONCHIAL ULTRASOUND WITH FNA;  Surgeon: Gregor Vee MD;  Location: [facility] ENDOSCOPY;  Service: Pulmonary;  Laterality: N/A;  PRE- HILAR MASS  POST- SAME    BRONCHOSCOPY N/A 2023    Procedure: BRONCHOSCOPY with biopsy, lavage, brushing;  Surgeon: Gregor Vee MD;  Location: [facility] ENDOSCOPY;  Service: Pulmonary;  Laterality: N/A;    CAROTID ENDARTERECTOMY Right     CAROTID  ENDARTERECTOMY Left     CATARACT EXTRACTION      CERVICAL FUSION      CHOLECYSTECTOMY      CRANIOTOMY FOR TUMOR Right 5/30/2023    Procedure: RIGHT CRANIOTOMY FOR TUMOR RESECTION STEREOTACTIC WITH STEALTH;  Surgeon: Clint Ricketts MD;  Location: [facility];  Service: Neurosurgery;  Laterality: Right;    HYSTERECTOMY      SHOULDER ARTHROSCOPY Right 02/19/2019    right should scope/cuff repair     VENOUS ACCESS DEVICE (PORT) INSERTION Right 9/6/2022    Procedure: POWERPORT INSERTION;  Surgeon: Remedios Rice MD;  Location: [facility];  Service: Thoracic;  Laterality: Right;      General Information       Row Name 08/29/23 1105          Physical Therapy Time and Intention    Document Type therapy note (daily note)  -EJ     Mode of Treatment physical therapy  -EJ       Row Name 08/29/23 1105          General Information    Existing Precautions/Restrictions fall  -EJ               User Key  (r) = Recorded By, (t) = Taken By, (c) = Cosigned By      Initials Name Provider Type    EJ Katelynn Gomez, PT Physical Therapist                   Mobility       Row Name 08/29/23 1105          Bed Mobility    Supine-Sit [place] (Bed Mobility) contact guard  -EJ     Sit-Supine [place] (Bed Mobility) contact guard  -EJ     Assistive Device (Bed Mobility) head of bed elevated;bed rails  -EJ       Row Name 08/29/23 1105          Sit-Stand Transfer    Sit-Stand [place] (Transfers) verbal cues;contact guard  -EJ     Assistive Device (Sit-Stand Transfers) walker, front-wheeled  -EJ       Row Name 08/29/23 1105          Gait/Stairs (Locomotion)    [place] Level (Gait) verbal cues;contact guard  -EJ     Assistive Device (Gait) walker, front-wheeled  -EJ     Distance in Feet (Gait) 60  -EJ     Deviations/Abnormal Patterns (Gait) mariia decreased;stride length decreased  -EJ     Bilateral Gait Deviations forward flexed posture  -EJ     Comment, (Gait/Stairs) slow pace, limited by fatigue/weakness  -EJ                User Key  (r) = Recorded By, (t) = Taken By, (c) = Cosigned By      Initials Name Provider Type    Katelynn Foote, PT Physical Therapist                   Obj/Interventions    No documentation.                  Goals/Plan    No documentation.                  Clinical Impression       Row Name 08/29/23 1106          Pain    Pretreatment Pain Rating 7/10  -EJ     Posttreatment Pain Rating 7/10  -EJ     Pain Location - Side/Orientation Left  -EJ     Pain Location upper  -EJ     Pain Location - extremity  -EJ     Pain Intervention(s) Ambulation/increased activity  -EJ       Row Name 08/29/23 1106          Plan of Care Review    Plan of Care Reviewed With patient  -EJ     Outcome Evaluation Pt agreeable to PT this am. She reports feeling tired, but agreeable to activity. Pt requiring CGA for bed mobility today. She stood w CGA and Rwx and was able to increase ambulation distance today, ambulating approx 60 ft w CGA. SHe does demo slow pace and is limited by fatigue and weakness, but no unsteadiness or LOB noted. Pt back in bed at end of session. WIlll irvin to progress as tolerated.  -EJ       Row Name 08/29/23 1106          Positioning and Restraints    Pre-Treatment Position in bed  -EJ     Post Treatment Position bed  -EJ     In Bed notified nsg;supine;call light within reach;encouraged to call for assist;exit alarm on  -EJ               User Key  (r) = Recorded By, (t) = Taken By, (c) = Cosigned By      Initials Name Provider Type    Katelynn Foote, PT Physical Therapist                   Outcome Measures       Row Name 08/29/23 1109 08/29/23 0811       How much help from another person do you currently need...    Turning from your back to your side while in flat bed without using bedrails? 4  -EJ 4  -PT    Moving from lying on back to sitting on the side of a flat bed without bedrails? 3  -EJ 4  -PT    Moving to and from a bed to a chair (including a wheelchair)? 3  -EJ 3  -PT    Standing up  from a chair using your arms (e.g., wheelchair, bedside chair)? 3  -EJ 2  -PT    Climbing 3-5 steps with a railing? 2  -EJ 2  -PT    To walk in hospital room? 3  -EJ 2  -PT    AM-PAC 6 Clicks Score (PT) 18  -EJ 17  -PT    Highest level of mobility 6 --> Walked 10 steps or more  -EJ 5 --> Static standing  -PT              User Key  (r) = Recorded By, (t) = Taken By, (c) = Cosigned By      Initials Name Provider Type    Katelynn Foote, PT Physical Therapist    PT Sofy Bui RN Registered Nurse                                   PT Recommendation and Plan  Planned Therapy Interventions (PT): balance training, bed mobility training, gait training, home exercise program, stretching, strengthening, stair training, ROM (range of motion), patient/family education, transfer training  Plan of Care Reviewed With: patient  Outcome Evaluation: Pt agreeable to PT this am. She reports feeling tired, but agreeable to activity. Pt requiring CGA for bed mobility today. She stood w CGA and Rwx and was able to increase ambulation distance today, ambulating approx 60 ft w CGA. SHe does demo slow pace and is limited by fatigue and weakness, but no unsteadiness or LOB noted. Pt back in bed at end of session. WIlll conitnue to progress as tolerated.     Time Calculation:         PT Charges       Row Name 08/29/23 1110             Time Calculation    Start Time 1028  -EJ      Stop Time 1040  -EJ      Time Calculation (min) 12 min  -EJ      PT Received On 08/29/23  -EJ      PT - Next Appointment 08/30/23  -EJ                User Key  (r) = Recorded By, (t) = Taken By, (c) = Cosigned By      Initials Name Provider Type    Katelynn Foote, PT Physical Therapist                  Therapy Charges for Today       Code Description Service Date Service Provider Modifiers Qty    69033942141 HC PT EVAL MOD COMPLEXITY 3 8/28/2023 Katelynn Gomez, PT GP 1    23559676440 HC PT THER PROC EA 15 MIN 8/28/2023 Katelynn Gomez, PT GP 1     [id number]  PT THER PROC EA 15 MIN 8/29/2023 Katelynn Gomez, PT GP 1            PT G-Codes  AM-PAC 6 Clicks Score (PT): 18  PT Discharge Summary  Anticipated Discharge Disposition (PT): home with assist    Katelynn Gomez, PT  8/29/2023

## 2023-08-29 NOTE — PLAN OF CARE
Goal Outcome Evaluation:  Plan of Care Reviewed With: patient           Outcome Evaluation: Pt agreeable to PT this am. She reports feeling tired, but agreeable to activity. Pt requiring CGA for bed mobility today. She stood w CGA and Rwx and was able to increase ambulation distance today, ambulating approx 60 ft w CGA. SHe does demo slow pace and is limited by fatigue and weakness, but no unsteadiness or LOB noted. Pt back in bed at end of session. WIlll irvin to progress as tolerated.      Anticipated Discharge Disposition (PT): home with assist

## 2023-08-29 NOTE — DISCHARGE PLACEMENT REQUEST
"Shana Vidal (78 y.o. Female)       Date of Birth   1945    Social Security Number       Address   [address] [place] [address] [place] [facility]    Home Phone   [phone]    MRN   [id number]       Sabianism   Non-Mandaeism    Marital Status                               Admission Date   8/26/23    Admission Type   Emergency    Admitting Provider   Justyna Diop MD    Attending Provider   Todd Garcia MD    Department, Room/Bed   [address], E656/1       Discharge Date       Discharge Disposition       Discharge Destination                                 Attending Provider: Todd Garcia MD    Allergies: Del-mycin [Erythromycin], Gentamicin, Ibuprofen, Latex, Metronidazole, Ofloxacin, Penicillins, Tetracycline, Adhesive Tape, Aspirin, Codeine    Isolation: Contact   Infection: Campylobacter (08/27/23)   Code Status: CPR    Ht: 152.4 cm (60\")   Wt: 56.7 kg (125 lb)    Admission Cmt: None   Principal Problem: Dizziness [R42]                   Active Insurance as of 8/26/2023       Primary Coverage       Payor Plan Insurance Group Employer/Plan Group    MEDICARE MEDICARE A & B        Payor Plan Address Payor Plan Phone Number Payor Plan Fax Number Effective Dates    [address] [phone]  2/1/2012 - None Entered    [facility] [id number]         Subscriber Name Subscriber Birth Date Member ID       SHANA VIDAL 1945 [id number]               Secondary Coverage       Payor Plan Insurance Group Employer/Plan Group    [employer] SUP AAR HEALTH CARE OPTIONS        Payor Plan Address Payor Plan Phone Number Payor Plan Fax Number Effective Dates    [facility] [phone]  1/1/2019 - None Entered    [address]       [facility] [address]         Subscriber Name Subscriber Birth Date Member ID       SHANA VIDAL 1945 [id number]                     Emergency Contacts        (Rel.) Home Phone Work Phone Mobile Phone    ZIA DURANT " (Relative) [phone] -- [phone]    AlyssaMatthew pearce (Daughter) -- -- [phone]    Espinoza Vidal (Son) [phone] -- [phone]    THERESA VIDAL (Relative) -- -- [phone]

## 2023-08-29 NOTE — CONSULTS
Neurology Consult Note    Consult Date: 8/29/2023    Referring MD: Justyna Diop, *    Reason for Consult I have been asked to see the patient in neurological consultation to render advice and opinion regarding dizziness    Pam Vidal is a 78 y.o. female past medical history of stage II adenocarcinoma of the right upper lobe with metastatic brain disease is postresection of the right occipital brain met by neurosurgery, OPD, rheumatoid arthritis.  Patient is admitted on 8/26/2023 with chief complaints of dizziness and confusion, also had nausea diarrhea and was diagnosed with Campylobacter infection with sepsis and is on azithromycin right now.  Also has neuropathic pain of left upper extremity secondary to shingles.    Patient states in the last couple of months she she has been having dizziness or lightheadedness which she describes as, in the morning when she wakes up from the bed she feels dizzy and lightheaded, no double vision or no loss of consciousness episodes.  He does not feel palpitations, hotness or sweating during the spells.  No lightheadedness while urinating ,coughing or having a bowel movement.   Denies any room spinning sensation, ringing sensation in the ears or hearing loss.  Denies any recent viral upper respiratory tract infection.    She has left upper extremity numbness and tingling and neuropathy pain from her shingles infection but other than that denies any weakness numbness.     chronic vision problems from cataracts and also right occipital surgery done in the past she states she cannot see the central part of the of the vision        Past Medical History:   Diagnosis Date    COPD (chronic obstructive pulmonary disease)     Coughing up blood     X2 MONTHS    History of COVID-19 02/2022    Hyperlipidemia     IBS (irritable bowel syndrome)     Primary lung adenocarcinoma, right     Rheumatoid arthritis        Exam  /50 (BP Location: Right arm, Patient Position: Lying)   " Pulse 84   Temp 98 °F (36.7 °C) (Oral)   Resp 20   Ht 152.4 cm (60\")   Wt 56.7 kg (125 lb)   SpO2 95%   BMI 24.41 kg/m²   Gen: NAD, vitals reviewed  MS: Alert oriented x3 able to answer questions appropriately, speech articulation normal, normal comprehension repetition and fluency  CN: Central part of vision of both eyes scotoma PERRL, EOMI, no facial droop, no dysarthria  Motor: 4+ /5 both bilateral upper and lower extremities  Sensory changes in the left upper extremity but other 4 extremities intact to pinprick and light touch  2+  reflexes and upgoing toes    DATA:    Lab Results   Component Value Date    GLUCOSE 86 08/29/2023    CALCIUM 8.2 (L) 08/29/2023     (L) 08/29/2023    K 3.6 08/29/2023    CO2 24.0 08/29/2023     08/29/2023    BUN 6 (L) 08/29/2023    CREATININE 0.60 08/29/2023    BCR 10.0 08/29/2023    ANIONGAP 8.0 08/29/2023     Lab Results   Component Value Date    WBC 10.54 08/29/2023    HGB 8.1 (L) 08/29/2023    HCT 24.3 (L) 08/29/2023    MCV 88.4 08/29/2023     08/29/2023       Lab review:   Sodium 134  Creatinine 0.60  Calcium 8.2  Normal LFTs    Last A1c 5.6  TSH 2.1      WBC 10.5  Platelets 308  Hemoglobin 8.1    UA negative      Imaging review:   MRI brain with and without done this admission showed right occipital s/p craniotomy, on T2 flair there is mild to moderate small vessel disease patent and there is also postop radiation changes, there is no postcontrast enhancement    Diagnoses:  Dizziness, most likely presyncope from dehydration and hypovolemia    No concerns of central etiology    Other medical issues  Adenocarcinoma of right lung with brain mets s/p resection of the right occipital brain mass  Campylobacter sepsis  Shingles, neuropathic pain  History of carotid stenosis s/p endarterectomy        Comment: Patient is a 78-year-old with history of adenocarcinoma of the right lung s/p brain mets s/p resection she also has COPD, hypertension which she " comes in with nausea vomiting diarrhea.  Also complains of dizziness, dizziness has been ongoing since the time she started having diarrhea couple of months ago, dizziness does not have any red flags which points to a central etiology I think this is most likely related to her dehydration and hypovolemia    PLAN:   -Would be beneficial to repeat a CTA head and neck because she has a history of carotid endarterectomy which proves bad vascular disease.  Will check orthostatics  She had a 2D echo recently  Will recommend Holter monitoring at discharge

## 2023-08-29 NOTE — PLAN OF CARE
Goal Outcome Evaluation:           Progress: no change  Outcome Evaluation: Pt AOx4. On RA. NSR on monitor. Had some SOA/wheezing after a coughing fit-- relief w/ albuterol neb. C/o pain in L arm-- heating pad in place along w/ prn pain meds. Walked in room w/ PT. Did c/o dizziness. Neuro consulted-- consult pending. Pt not in acute distress. Mediport dressing C/D/I. Plan of care is going

## 2023-08-29 NOTE — CASE MANAGEMENT/SOCIAL WORK
Discharge Planning Assessment  [facility]     Patient Name: Pam Vidal  MRN: [id number]  Today's Date: 8/29/2023    Admit Date: 8/26/2023    Plan: Plan home with family and Caretenders KAVITA Jefferson RN   Discharge Needs Assessment       Row Name 08/29/23 0937       Living Environment    People in Home other relative(s)    Name(s) of People in Home Niece ( Rosio Garrett  [phone])    Current Living Arrangements home    Potentially Unsafe Housing Conditions none    Primary Care Provided by self    Provides Primary Care For no one    Family Caregiver if Needed child(kathleen), adult;other relative(s)    Family Caregiver Names Niece ( Rosio Garrett [phone]) and son  ( Gt Vidal [phone])    Quality of Family Relationships involved;supportive;helpful    Living Arrangement Comments Pt lives with her niece  ( Rosio Garrett [phone]) in a single story house.       Resource/Environmental Concerns    Resource/Environmental Concerns none    Transportation Concerns none       Transition Planning    Patient/Family Anticipates Transition to home with family    Transportation Anticipated family or friend will provide       Discharge Needs Assessment    Current Outpatient/Agency/Support Group homecare agency    Equipment Currently Used at Home grab bar;shower chair;walker, rolling    Concerns to be Addressed no discharge needs identified;denies needs/concerns at this time    Equipment Needed After Discharge grab bar, tub/shower;shower chair;walker, rolling    Discharge Facility/Level of Care Needs home with home health                   Discharge Plan       Row Name 08/29/23 0940       Plan    Plan Plan home with family and Caretenders KAVITA Jefferson RN    Patient/Family in Agreement with Plan yes    Plan Comments FACE SHEET VERIFIED/ IM LETTER SIGNED.  Spoke with pt at bedside.  Pt's PCP is Dr. Nik Mckay .Pt lives with her niece ( Rosio Garrett [phone]) in a single story house. Pt is independent with  ADLs.  Pt has a grab bar, shower chair, and rolling walker for home use. Pt gets her prescriptions at [facility]  ( [place]).  Pt does state she can have issues affording medications.  Pt's family does assist her if needed.  Pt is current with Janet [facility]  Irma  ( [phone]) called to follow. Pt has not been in SNF.  Pt denies any discharge needs.  Pt states her son or niece will transport her home.  Plan home with family and Janet Jefferson RN                  Continued Care and Services - Admitted Since 8/26/2023       Home Medical Care       Service Provider Request Status Selected Services Address Phone Fax Patient Preferred    [facility] [address], [address], [facility] [address] [phone] --                  Selected Continued Care - Prior Encounters Includes continued care and service providers with selected services from prior encounters from 5/28/2023 to 8/29/2023      Discharged on 6/14/2023 Admission date: 6/11/2023 - Discharge disposition: Home-Health Care [facility]      Home Medical Care       Service Provider Selected Services Address Phone Fax Patient Preferred    [facility] [address], [address], [facility] [address] [phone] --                      Discharged on 6/2/2023 Admission date: 5/25/2023 - Discharge disposition: Home-Health Care [facility]      Durable Medical Equipment       Service Provider Selected Services Address Phone Fax Patient Preferred    [facility] [address], [facility] [address] [phone] --              Home Medical Care       Service Provider Selected Services Address Phone Fax Patient Preferred    [facility] [address], [address], [facility] [address] [phone] --                             Demographic  Summary       Row Name 08/29/23 0937       General Information    Admission Type inpatient    Arrived From emergency department    Required Notices Provided Important Message from Medicare    Referral Source admission list    Reason for Consult discharge planning    Preferred Language English                   Functional Status       Row Name 08/29/23 0937       Functional Status    Usual Activity Tolerance moderate    Current Activity Tolerance moderate       Functional Status, IADL    Medications independent    Meal Preparation assistive person    Housekeeping assistive person    Laundry assistive person    Shopping assistive person       Mental Status    General Appearance WDL WDL                   Psychosocial    No documentation.                  Abuse/Neglect    No documentation.                  Legal    No documentation.                  Substance Abuse    No documentation.                  Patient Forms    No documentation.                     Radha Jefferson, RN

## 2023-08-30 ENCOUNTER — APPOINTMENT (OUTPATIENT)
Dept: CT IMAGING | Facility: HOSPITAL | Age: 78
DRG: 871 | End: 2023-08-30
Payer: MEDICARE

## 2023-08-30 LAB
ABO GROUP BLD: NORMAL
ANION GAP SERPL CALCULATED.3IONS-SCNC: 11 MMOL/L (ref 5–15)
BLD GP AB SCN SERPL QL: NEGATIVE
BUN SERPL-MCNC: 5 MG/DL (ref 8–23)
BUN/CREAT SERPL: 9.4 (ref 7–25)
CALCIUM SPEC-SCNC: 7.7 MG/DL (ref 8.6–10.5)
CHLORIDE SERPL-SCNC: 102 MMOL/L (ref 98–107)
CO2 SERPL-SCNC: 21 MMOL/L (ref 22–29)
CREAT SERPL-MCNC: 0.53 MG/DL (ref 0.57–1)
DEPRECATED RDW RBC AUTO: 51 FL (ref 37–54)
EGFRCR SERPLBLD CKD-EPI 2021: 96 ML/MIN/1.73
ERYTHROCYTE [DISTWIDTH] IN BLOOD BY AUTOMATED COUNT: 16.3 % (ref 12.3–15.4)
GLUCOSE SERPL-MCNC: 89 MG/DL (ref 65–99)
HCT VFR BLD AUTO: 23.5 % (ref 34–46.6)
HGB BLD-MCNC: 7.9 G/DL (ref 12–15.9)
MAGNESIUM SERPL-MCNC: 2.3 MG/DL (ref 1.6–2.4)
MCH RBC QN AUTO: 29.3 PG (ref 26.6–33)
MCHC RBC AUTO-ENTMCNC: 33.6 G/DL (ref 31.5–35.7)
MCV RBC AUTO: 87 FL (ref 79–97)
PLATELET # BLD AUTO: 309 10*3/MM3 (ref 140–450)
PMV BLD AUTO: 9.5 FL (ref 6–12)
POTASSIUM SERPL-SCNC: 3.3 MMOL/L (ref 3.5–5.2)
POTASSIUM SERPL-SCNC: 4.6 MMOL/L (ref 3.5–5.2)
RBC # BLD AUTO: 2.7 10*6/MM3 (ref 3.77–5.28)
RH BLD: POSITIVE
SODIUM SERPL-SCNC: 134 MMOL/L (ref 136–145)
T&S EXPIRATION DATE: NORMAL
WBC NRBC COR # BLD: 9.71 10*3/MM3 (ref 3.4–10.8)

## 2023-08-30 PROCEDURE — 94799 UNLISTED PULMONARY SVC/PX: CPT

## 2023-08-30 PROCEDURE — 85027 COMPLETE CBC AUTOMATED: CPT | Performed by: STUDENT IN AN ORGANIZED HEALTH CARE EDUCATION/TRAINING PROGRAM

## 2023-08-30 PROCEDURE — 36430 TRANSFUSION BLD/BLD COMPNT: CPT

## 2023-08-30 PROCEDURE — 63710000001 DIPHENHYDRAMINE PER 50 MG: Performed by: INTERNAL MEDICINE

## 2023-08-30 PROCEDURE — 70498 CT ANGIOGRAPHY NECK: CPT

## 2023-08-30 PROCEDURE — 80048 BASIC METABOLIC PNL TOTAL CA: CPT | Performed by: STUDENT IN AN ORGANIZED HEALTH CARE EDUCATION/TRAINING PROGRAM

## 2023-08-30 PROCEDURE — 25510000001 IOPAMIDOL PER 1 ML: Performed by: STUDENT IN AN ORGANIZED HEALTH CARE EDUCATION/TRAINING PROGRAM

## 2023-08-30 PROCEDURE — 86900 BLOOD TYPING SEROLOGIC ABO: CPT | Performed by: INTERNAL MEDICINE

## 2023-08-30 PROCEDURE — 84132 ASSAY OF SERUM POTASSIUM: CPT | Performed by: STUDENT IN AN ORGANIZED HEALTH CARE EDUCATION/TRAINING PROGRAM

## 2023-08-30 PROCEDURE — 99232 SBSQ HOSP IP/OBS MODERATE 35: CPT | Performed by: INTERNAL MEDICINE

## 2023-08-30 PROCEDURE — 70496 CT ANGIOGRAPHY HEAD: CPT

## 2023-08-30 PROCEDURE — 86923 COMPATIBILITY TEST ELECTRIC: CPT

## 2023-08-30 PROCEDURE — P9016 RBC LEUKOCYTES REDUCED: HCPCS

## 2023-08-30 PROCEDURE — 86901 BLOOD TYPING SEROLOGIC RH(D): CPT | Performed by: INTERNAL MEDICINE

## 2023-08-30 PROCEDURE — 63710000001 PREDNISONE PER 5 MG: Performed by: STUDENT IN AN ORGANIZED HEALTH CARE EDUCATION/TRAINING PROGRAM

## 2023-08-30 PROCEDURE — 25010000002 MORPHINE PER 10 MG: Performed by: INTERNAL MEDICINE

## 2023-08-30 PROCEDURE — 86900 BLOOD TYPING SEROLOGIC ABO: CPT

## 2023-08-30 PROCEDURE — 83735 ASSAY OF MAGNESIUM: CPT | Performed by: STUDENT IN AN ORGANIZED HEALTH CARE EDUCATION/TRAINING PROGRAM

## 2023-08-30 PROCEDURE — 97110 THERAPEUTIC EXERCISES: CPT

## 2023-08-30 PROCEDURE — 86850 RBC ANTIBODY SCREEN: CPT | Performed by: INTERNAL MEDICINE

## 2023-08-30 PROCEDURE — 99233 SBSQ HOSP IP/OBS HIGH 50: CPT | Performed by: PHYSICIAN ASSISTANT

## 2023-08-30 RX ORDER — ACETAMINOPHEN 160 MG/5ML
650 SOLUTION ORAL ONCE
Status: COMPLETED | OUTPATIENT
Start: 2023-08-30 | End: 2023-08-30

## 2023-08-30 RX ORDER — HYDROCODONE BITARTRATE AND ACETAMINOPHEN 7.5; 325 MG/1; MG/1
1 TABLET ORAL EVERY 6 HOURS PRN
Status: DISCONTINUED | OUTPATIENT
Start: 2023-08-30 | End: 2023-09-02 | Stop reason: HOSPADM

## 2023-08-30 RX ORDER — DIPHENHYDRAMINE HCL 25 MG
25 CAPSULE ORAL ONCE
Status: COMPLETED | OUTPATIENT
Start: 2023-08-30 | End: 2023-08-30

## 2023-08-30 RX ORDER — ACETAMINOPHEN 325 MG/1
650 TABLET ORAL ONCE
Status: COMPLETED | OUTPATIENT
Start: 2023-08-30 | End: 2023-08-30

## 2023-08-30 RX ORDER — DIPHENHYDRAMINE HYDROCHLORIDE 50 MG/ML
25 INJECTION INTRAMUSCULAR; INTRAVENOUS ONCE
Status: COMPLETED | OUTPATIENT
Start: 2023-08-30 | End: 2023-08-30

## 2023-08-30 RX ORDER — PREGABALIN 75 MG/1
150 CAPSULE ORAL EVERY 12 HOURS SCHEDULED
Status: DISCONTINUED | OUTPATIENT
Start: 2023-08-30 | End: 2023-09-02 | Stop reason: HOSPADM

## 2023-08-30 RX ORDER — POTASSIUM CHLORIDE 750 MG/1
40 TABLET, FILM COATED, EXTENDED RELEASE ORAL EVERY 4 HOURS
Status: COMPLETED | OUTPATIENT
Start: 2023-08-30 | End: 2023-08-30

## 2023-08-30 RX ADMIN — Medication 10 ML: at 21:26

## 2023-08-30 RX ADMIN — Medication 10 ML: at 08:35

## 2023-08-30 RX ADMIN — DULOXETINE HYDROCHLORIDE 30 MG: 30 CAPSULE, DELAYED RELEASE ORAL at 08:35

## 2023-08-30 RX ADMIN — ALBUTEROL SULFATE 2.5 MG: 2.5 SOLUTION RESPIRATORY (INHALATION) at 05:43

## 2023-08-30 RX ADMIN — DIPHENHYDRAMINE HYDROCHLORIDE 25 MG: 25 CAPSULE ORAL at 17:14

## 2023-08-30 RX ADMIN — PREDNISONE 10 MG: 10 TABLET ORAL at 08:35

## 2023-08-30 RX ADMIN — HYDROCODONE BITARTRATE AND ACETAMINOPHEN 1 TABLET: 7.5; 325 TABLET ORAL at 18:25

## 2023-08-30 RX ADMIN — PREGABALIN 150 MG: 75 CAPSULE ORAL at 21:25

## 2023-08-30 RX ADMIN — LEVETIRACETAM 500 MG: 500 TABLET, FILM COATED ORAL at 21:25

## 2023-08-30 RX ADMIN — DIPHENHYDRAMINE HYDROCHLORIDE 25 MG: 25 CAPSULE ORAL at 17:31

## 2023-08-30 RX ADMIN — POTASSIUM CHLORIDE 40 MEQ: 750 TABLET, EXTENDED RELEASE ORAL at 08:37

## 2023-08-30 RX ADMIN — PREGABALIN 75 MG: 75 CAPSULE ORAL at 08:35

## 2023-08-30 RX ADMIN — IOPAMIDOL 100 ML: 755 INJECTION, SOLUTION INTRAVENOUS at 11:20

## 2023-08-30 RX ADMIN — HYDROCODONE BITARTRATE AND ACETAMINOPHEN 1 TABLET: 7.5; 325 TABLET ORAL at 07:16

## 2023-08-30 RX ADMIN — MORPHINE SULFATE 4 MG: 2 INJECTION, SOLUTION INTRAMUSCULAR; INTRAVENOUS at 11:48

## 2023-08-30 RX ADMIN — ACETAMINOPHEN 650 MG: 325 TABLET, FILM COATED ORAL at 17:14

## 2023-08-30 RX ADMIN — MORPHINE SULFATE 4 MG: 2 INJECTION, SOLUTION INTRAMUSCULAR; INTRAVENOUS at 05:09

## 2023-08-30 RX ADMIN — MORPHINE SULFATE 4 MG: 2 INJECTION, SOLUTION INTRAMUSCULAR; INTRAVENOUS at 00:43

## 2023-08-30 RX ADMIN — LEVETIRACETAM 500 MG: 500 TABLET, FILM COATED ORAL at 08:35

## 2023-08-30 RX ADMIN — ACETAMINOPHEN 650 MG: 325 TABLET, FILM COATED ORAL at 17:31

## 2023-08-30 RX ADMIN — POTASSIUM CHLORIDE 40 MEQ: 750 TABLET, EXTENDED RELEASE ORAL at 13:21

## 2023-08-30 RX ADMIN — MORPHINE SULFATE 4 MG: 2 INJECTION, SOLUTION INTRAMUSCULAR; INTRAVENOUS at 16:16

## 2023-08-30 RX ADMIN — PANTOPRAZOLE SODIUM 40 MG: 40 TABLET, DELAYED RELEASE ORAL at 05:09

## 2023-08-30 NOTE — PLAN OF CARE
Goal Outcome Evaluation:  Plan of Care Reviewed With: patient           Outcome Evaluation: Pt agreeable to PT this afternoon. She reports feeling tired as she has had a long day and also states she is awaiting blood transfusion later today. Pt still w pain in LUE. She is able to perform all mobility w SBA/CGA. She ambulated approx 50 ft w Rwx. She does exhibit slow pace, but is steady. Pt back in bed at end of session. Will continue to progress activity as tolerated.      Anticipated Discharge Disposition (PT): home with assist

## 2023-08-30 NOTE — PROGRESS NOTES
Name: Pam Vidal ADMIT: 2023   : 1945  PCP: Nik Mckay MD    MRN: [id number] LOS: 4 days   AGE/SEX: 78 y.o. female  ROOM[facility]     Subjective   Subjective   Diarrhea continues to improve.  Patient continues to have intermittent dizziness.  Left upper extremity neuropathic pain remains.    Review of Systems   Constitutional:  Negative for chills and fever.   Respiratory:  Negative for cough and shortness of breath.    Cardiovascular:  Negative for chest pain and leg swelling.   Gastrointestinal:  Positive for diarrhea. Negative for abdominal pain and nausea.   Neurological:  Positive for dizziness.   As above     Objective   Objective   Vital Signs  Temp:  [98 °F (36.7 °C)-99.3 °F (37.4 °C)] 98.8 °F (37.1 °C)  Heart Rate:  [72-92] 87  Resp:  [16-20] 18  BP: (122-165)/(50-69) 150/57  SpO2:  [94 %-99 %] 98 %  on   ;   Device (Oxygen Therapy): room air  Body mass index is 24.71 kg/m².  Physical Exam  Constitutional:       General: She is not in acute distress.     Appearance: She is ill-appearing (Chronically).   Cardiovascular:      Rate and Rhythm: Normal rate and regular rhythm.   Pulmonary:      Effort: Pulmonary effort is normal. No respiratory distress.   Abdominal:      General: Abdomen is flat. There is no distension.      Tenderness: There is no abdominal tenderness.   Musculoskeletal:         General: No swelling or deformity. Normal range of motion.   Skin:     General: Skin is warm and dry.   Neurological:      General: No focal deficit present.      Mental Status: She is alert. Mental status is at baseline.       Results Review     I reviewed the patient's new clinical results.  Results from last 7 days   Lab Units 23  0625 23  0623  0903 23  0621   WBC 10*3/mm3 9.71 10.54 10.49 12.17*   HEMOGLOBIN g/dL 7.9* 8.1* 8.6* 8.3*   PLATELETS 10*3/mm3 309 308 299 274       Results from last 7 days   Lab Units 23  0625 23  0600 23  0903  08/27/23  0621   SODIUM mmol/L 134* 134* 135* 133*   POTASSIUM mmol/L 3.3* 3.6 3.6 3.7   CHLORIDE mmol/L 102 102 102 98   CO2 mmol/L 21.0* 24.0 23.8 23.8   BUN mg/dL 5* 6* 7* 15   CREATININE mg/dL 0.53* 0.60 0.67 0.75   GLUCOSE mg/dL 89 86 120* 92     Estimated Creatinine Clearance: 71.7 mL/min (A) (by C-G formula based on SCr of 0.53 mg/dL (L)).  Results from last 7 days   Lab Units 08/26/23  1544   ALBUMIN g/dL 3.3*   BILIRUBIN mg/dL 0.3   ALK PHOS U/L 100   AST (SGOT) U/L 31   ALT (SGPT) U/L 33       Results from last 7 days   Lab Units 08/30/23  0625 08/29/23  0600 08/28/23  0903 08/27/23  0621 08/26/23  1544   CALCIUM mg/dL 7.7* 8.2* 8.1* 8.4* 9.9   ALBUMIN g/dL  --   --   --   --  3.3*   MAGNESIUM mg/dL 2.3  --   --   --  2.2       Results from last 7 days   Lab Units 08/26/23 2008   LACTATE mmol/L 0.8       COVID19   Date Value Ref Range Status   08/26/2023 Not Detected Not Detected - Ref. Range Final   08/20/2022 Not Detected Not Detected - Ref. Range Final     No results found for: HGBA1C, POCGLU    MRI Brain With & Without Contrast  Narrative: Patient: SHANA ZAZUETA  Time Out: 20:29  Exam(s): MRI HEAD W WO Contrast IV Amt: multihance 11ml    EXAM:  MR Head Without and With Intravenous Contrast    CLINICAL HISTORY:  Reason for exam: hx of brain mass, s p resection. recent ct with rec of   MRI brain.    TECHNIQUE:  Magnetic resonance images of the head brain without and with intravenous   contrast in multiple planes.    COMPARISON:  CT head 8 26 23; MRI brain 7 9 23    FINDINGS:  Patient has undergone previous right parieto-occipital craniotomy.  There   is a subjacent resection cavity at the right parieto-occipital junction.    Susceptibility artifact along the margins of the resection cavity is   compatible with old blood products, a normal postoperative finding.    There is jersey-resectional gliosis, unchanged from MRI of 7 9 23.  Thin   marginal enhancement along the resection cavity, as well as  adjacent   meningeal enhancement, is grossly unchanged.  No enhancing mass lesion is   visualized within the brain.    There is no diffusion restriction to suggest acute cerebral ischemia.    There is no evidence of acute intracranial hemorrhage.  There is no mass-  effect or midline shift.    Periventricular, deep cerebral white matter, and pontine white matter   foci of T2 FLAIR signal hyperintensity are compatible with chronic small   vessel ischemic disease.  There is mild generalized parenchymal volume   loss.  No hydrocephalus is observed.    Patient has undergone previous bilateral lens replacement.  Sinuses and   mastoid air cells are clear.    IMPRESSION:     1.  Status post right parieto-occipital craniotomy with subjacent   resection cavity.  Expected postoperative changes as detailed above.  No   evidence of tumor recurrence.  2.  Moderate chronic small vessel ischemic changes.  3.  No specific pontine abnormality aside from chronic small vessel   ischemic disease.  Finding on reference CT likely reflected artifact.  4.  No acute infarct.  No acute hemorrhage.  Impression: Electronically signed by Justa Saini M.D. on 08-27-23 at 2029    I reviewed the patient's daily medications.  Scheduled Medications  DULoxetine, 30 mg, Oral, Daily  HYDROcodone-acetaminophen, 1 tablet, Oral, Daily  levETIRAcetam, 500 mg, Oral, BID  pantoprazole, 40 mg, Oral, Q AM  potassium chloride ER, 40 mEq, Oral, Q4H  predniSONE, 10 mg, Oral, Daily  pregabalin, 75 mg, Oral, Q12H  senna-docusate sodium, 2 tablet, Oral, BID  sodium chloride, 10 mL, Intravenous, Q12H  sodium chloride, 10 mL, Intravenous, Q12H    Infusions     Diet  Diet: Cardiac Diets; Healthy Heart (2-3 Na+); Texture: Regular Texture (IDDSI 7); Fluid Consistency: Thin (IDDSI 0)         I have personally reviewed:  [x]  Laboratory   [x]  Microbiology   [x]  Radiology   []  EKG/Telemetry   []  Cardiology/Vascular   []  Pathology   []  Records     Assessment/Plan      Active Hospital Problems    Diagnosis  POA    **Dizziness [R42]  Yes    Campylobacter diarrhea [A04.5]  Yes    Bacterial colitis [A04.9]  Yes    Diarrhea [R19.7]  Yes    Neuropathic pain [M79.2]  Yes    Rheumatoid arthritis [M06.9]  Yes    Brain mass [G93.89]  Yes    Primary lung adenocarcinoma, right [C34.91]  Yes      Resolved Hospital Problems   No resolved problems to display.       78 y.o. female admitted with Dizziness.    Campylobacter infection with sepsis (tachycardia, leukocytosis)  Colitis  -Nausea, diarrhea improving  -Discussed with patient she has tolerated azithromycin in the past  -Azithromycin 500 mg x 3 days, last day 8/29  -Continue IV fluids    Dizziness, likely secondary to above  -Continue fluid resuscitation  -Patient admits that this has been going on for about a month  -Neuro consulted-plan for CTA head and neck with her history of carotid stenosis and endarterectomy.  Orthostatics negative.  Recommend Holter on discharge.    Lung cancer with brain metastases  -Status post resection with neurosurgery.  Had some postop radiation therapy.  MRI from 7/9 with no new sites of disease  -Oncology consulted-patient is to have a PET scan and follow-up with Dr. Lagunas as an outpatient to discuss any further treatment options.    -CT head with no new acute findings, but recommended MRI brain.  MRI brain with expected postoperative changes, no acute findings, no progression of disease    Neuropathic pain from left upper extremity shingles  -Patient opted not gabapentin 900 mg an outpatient and it did not help her, will discontinue  -Continue Cymbalta, Lyrica 75 mg twice daily.  Can increase to 150 mg twice daily on 9/3    Rheumatoid arthritis  -Continue prednisone 10 mg    SCDs for DVT prophylaxis.  Full code.  Discussed with patient, nursing staff, consulting provider, and care team on multidisciplinary rounds.  Anticipate discharge home with family tomorrow.    Expected Discharge Date: 9/2/2023;  Expected Discharge Time:       Todd Garcia MD  [facility]  08/30/23  13:17 EDT

## 2023-08-30 NOTE — THERAPY TREATMENT NOTE
Patient Name: Pam Vidal  : 1945    MRN: [id number]                              Today's Date: 2023       Admit Date: 2023    Visit Dx:     ICD-10-CM ICD-9-CM   1. Nausea and vomiting, unspecified vomiting type  R11.2 787.01   2. Diarrhea, unspecified type  R19.7 787.91   3. Other chronic pain  G89.29 338.29   4. Altered mental status, unspecified altered mental status type  R41.82 780.97   5. Dizziness  R42 780.4   6. Generalized weakness  R53.1 780.79     Patient Active Problem List   Diagnosis    Primary lung adenocarcinoma, right    Cough with hemoptysis    Lung mass    Advanced care planning/counseling discussion    High risk medication use    Muscular deconditioning    Neoplastic malignant related fatigue    Lung cancer metastatic to brain    Brain mass    COPD (chronic obstructive pulmonary disease)    Rheumatoid arthritis    IBS (irritable bowel syndrome)    Hyperlipidemia    Abnormal CT of the chest    Shingles, left arm    HTN (hypertension)    Dizziness    Campylobacter diarrhea    Bacterial colitis    Diarrhea    Neuropathic pain     Past Medical History:   Diagnosis Date    COPD (chronic obstructive pulmonary disease)     Coughing up blood     X2 MONTHS    History of COVID-19 2022    Hyperlipidemia     IBS (irritable bowel syndrome)     Primary lung adenocarcinoma, right     Rheumatoid arthritis      Past Surgical History:   Procedure Laterality Date    APPENDECTOMY      appendix removed    BRONCHOSCOPY N/A 2022    Procedure: BRONCHOSCOPY WITH BAL,  BIOPSIES, AND BRUSHINGS WITH ENDOBRONCHIAL ULTRASOUND WITH FNA;  Surgeon: Gregor Vee MD;  Location: [facility] ENDOSCOPY;  Service: Pulmonary;  Laterality: N/A;  PRE- HILAR MASS  POST- SAME    BRONCHOSCOPY N/A 2023    Procedure: BRONCHOSCOPY with biopsy, lavage, brushing;  Surgeon: Gregor Vee MD;  Location: [facility] ENDOSCOPY;  Service: Pulmonary;  Laterality: N/A;    CAROTID ENDARTERECTOMY Right     CAROTID  ENDARTERECTOMY Left     CATARACT EXTRACTION      CERVICAL FUSION      CHOLECYSTECTOMY      CRANIOTOMY FOR TUMOR Right 5/30/2023    Procedure: RIGHT CRANIOTOMY FOR TUMOR RESECTION STEREOTACTIC WITH STEALTH;  Surgeon: Clint Ricketts MD;  Location: [facility];  Service: Neurosurgery;  Laterality: Right;    HYSTERECTOMY      SHOULDER ARTHROSCOPY Right 02/19/2019    right should scope/cuff repair     VENOUS ACCESS DEVICE (PORT) INSERTION Right 9/6/2022    Procedure: POWERPORT INSERTION;  Surgeon: Remedios Rice MD;  Location: [facility];  Service: Thoracic;  Laterality: Right;      General Information       Row Name 08/30/23 1450          Physical Therapy Time and Intention    Document Type therapy note (daily note)  -EJ     Mode of Treatment physical therapy  -EJ       Row Name 08/30/23 1450          General Information    Existing Precautions/Restrictions fall  -EJ               User Key  (r) = Recorded By, (t) = Taken By, (c) = Cosigned By      Initials Name Provider Type    EJ Katelynn Gomez, PT Physical Therapist                   Mobility       Row Name 08/30/23 1450          Bed Mobility    Supine-Sit [place] (Bed Mobility) standby assist;contact guard  -EJ     Sit-Supine [place] (Bed Mobility) contact guard;standby assist  -EJ     Assistive Device (Bed Mobility) head of bed elevated;bed rails  -EJ       Row Name 08/30/23 1450          Sit-Stand Transfer    Sit-Stand [place] (Transfers) verbal cues;contact guard  -EJ     Assistive Device (Sit-Stand Transfers) walker, front-wheeled  -EJ       Row Name 08/30/23 1450          Gait/Stairs (Locomotion)    [place] Level (Gait) verbal cues;contact guard  -EJ     Assistive Device (Gait) walker, front-wheeled  -EJ     Distance in Feet (Gait) 50  -EJ     Deviations/Abnormal Patterns (Gait) mariia decreased;stride length decreased  -EJ     Bilateral Gait Deviations forward flexed posture  -EJ     Comment, (Gait/Stairs) slow pace, pain in LUE  with use of walker  -EJ               User Key  (r) = Recorded By, (t) = Taken By, (c) = Cosigned By      Initials Name Provider Type    Katelynn Foote, PT Physical Therapist                   Obj/Interventions    No documentation.                  Goals/Plan    No documentation.                  Clinical Impression       Row Name 08/30/23 1450          Pain    Pretreatment Pain Rating 7/10  -EJ     Posttreatment Pain Rating 7/10  -EJ     Pain Location - Side/Orientation Left  -EJ     Pain Location - extremity  -EJ       Row Name 08/30/23 1450          Plan of Care Review    Plan of Care Reviewed With patient  -EJ     Outcome Evaluation Pt agreeable to PT this afternoon. She reports feeling tired as she has had a long day and also states she is awaiting blood transfusion later today. Pt still w pain in LUE. She is able to perform all mobility w SBA/CGA. She ambulated approx 50 ft w Rwx. She does exhibit slow pace, but is steady. Pt back in bed at end of session. Will continue to progress activity as tolerated.  -EJ       Row Name 08/30/23 1450          Positioning and Restraints    Pre-Treatment Position in bed  -EJ     Post Treatment Position bed  -EJ     In Bed notified nsg;supine;call light within reach;encouraged to call for assist;exit alarm on  -EJ               User Key  (r) = Recorded By, (t) = Taken By, (c) = Cosigned By      Initials Name Provider Type    Katelynn Foote, PT Physical Therapist                   Outcome Measures       Row Name 08/30/23 1452 08/30/23 0843       How much help from another person do you currently need...    Turning from your back to your side while in flat bed without using bedrails? 4  -EJ 4  -PT    Moving from lying on back to sitting on the side of a flat bed without bedrails? 3  -EJ 4  -PT    Moving to and from a bed to a chair (including a wheelchair)? 3  -EJ 4  -PT    Standing up from a chair using your arms (e.g., wheelchair, bedside chair)? 3  -EJ 3  -PT     Climbing 3-5 steps with a railing? 3  -EJ 3  -PT    To walk in hospital room? 3  -EJ 3  -PT    AM-PAC 6 Clicks Score (PT) 19  -EJ 21  -PT    Highest level of mobility 6 --> Walked 10 steps or more  -EJ 6 --> Walked 10 steps or more  -PT              User Key  (r) = Recorded By, (t) = Taken By, (c) = Cosigned By      Initials Name Provider Type    Katelynn Foote, PT Physical Therapist    PT Sofy Bui, RN Registered Nurse                                   PT Recommendation and Plan  Planned Therapy Interventions (PT): balance training, bed mobility training, gait training, home exercise program, stretching, strengthening, stair training, ROM (range of motion), patient/family education, transfer training  Plan of Care Reviewed With: patient  Outcome Evaluation: Pt agreeable to PT this afternoon. She reports feeling tired as she has had a long day and also states she is awaiting blood transfusion later today. Pt still w pain in LUE. She is able to perform all mobility w SBA/CGA. She ambulated approx 50 ft w Rwx. She does exhibit slow pace, but is steady. Pt back in bed at end of session. Will continue to progress activity as tolerated.     Time Calculation:         PT Charges       Row Name 08/30/23 1453             Time Calculation    Start Time 1435  -EJ      Stop Time 1450  -EJ      Time Calculation (min) 15 min  -EJ      PT Received On 08/30/23  -EJ      PT - Next Appointment 08/31/23  -EJ                User Key  (r) = Recorded By, (t) = Taken By, (c) = Cosigned By      Initials Name Provider Type    Katelynn Foote, PT Physical Therapist                  Therapy Charges for Today       Code Description Service Date Service Provider Modifiers Qty    [id number]  PT THER PROC EA 15 MIN 8/29/2023 Katelynn Gomez, PT GP 1    91056469352 HC PT THER PROC EA 15 MIN 8/30/2023 Katelynn Gomez, PT GP 1            PT G-Codes  AM-PAC 6 Clicks Score (PT): 19  PT Discharge Summary  Anticipated  Discharge Disposition (PT): home with assist    Katelynn Gomez, PT  8/30/2023

## 2023-08-30 NOTE — PLAN OF CARE
Goal Outcome Evaluation:           Progress: improving  Outcome Evaluation: Pt AOx4. On RA. NSR on monitor. On K+ protocol- redraw this evening. IVF's d/c. Oncology ordered 1 unit PRBcs. Up walking in salter with PT. Chest port dressing C/D/I. Medicated for L arm pain per MAR. CTA head and neck completed but pending. Plan of care ongoing    1 unit of PRBCs ordered and started. Pts ain not controlled--- lyrica increased and norco increased-- pt encouraged to take norco vs morphine

## 2023-08-30 NOTE — PROGRESS NOTES
"DOS: 2023  NAME: Pam Vidal   : 1945  PCP: Nik Mckay MD  Chief Complaint   Patient presents with    Dizziness       Chief complaint: dizziness  Subjective: Patient tells me she has been dizzy for the last year and a half.  Mostly she feels dizzy if she is moving about.  Sometimes just turning her head.  Mostly she is dizzy on standing.  She is excited about upcoming vacation to [facility].  No headache, double vision, problems with speech.  She describes zoster of left arm in May without a rash.  She has had painful paresthesias and weakness since    Objective:  Vital signs: /57 (BP Location: Right arm, Patient Position: Lying)   Pulse 87   Temp 98.8 °F (37.1 °C) (Oral)   Resp 18   Ht 152.4 cm (60\")   Wt 57.4 kg (126 lb 8.7 oz)   SpO2 98%   BMI 24.71 kg/m²      Gen: NAD, vitals reviewed  MS: oriented x3, recent/remote memory intact, normal attention/concentration, language intact, no neglect.  CN: visual acuity grossly normal, PERRL, EOMI, no nystagmus, no facial droop, no dysarthria  Motor: 4- L delt, 4/5 triceps/biceps, 3/5 interossei , normal tone  Sensory: intact to light touch all 4 ext. Including vibration  Reflexes: symmetric, down toes    ROS:  No weakness, numbness  No fevers, chills      Laboratory results:  Lab Results   Component Value Date    GLUCOSE 89 2023    CALCIUM 7.7 (L) 2023     (L) 2023    K 3.3 (L) 2023    CO2 21.0 (L) 2023     2023    BUN 5 (L) 2023    CREATININE 0.53 (L) 2023    BCR 9.4 2023    ANIONGAP 11.0 2023     Lab Results   Component Value Date    WBC 9.71 2023    HGB 7.9 (L) 2023    HCT 23.5 (L) 2023    MCV 87.0 2023     2023     Lab Results   Component Value Date     (H) 2023            Review of labs: CK1 25, no results for b12, folate    Review and interpretation of imaging: Personally reviewed MRI brain with " and without there is encephalomalacia of the right parietal occipital region with susceptibility artifact along these margins unchanged from previous MRI July 2023, trusted images little motion artifact but no abnormal enhancement to note    Workup to date:    Diagnoses:  1.  Nonspecific dizziness  2.  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  Neuropathic pain  4.  Campylobacter infection  5.  Bilateral carotid disease  6   Rheumatoid arthritis    Pression: 78-year-old right-handed female with past medical history of stage III lung cancer of right upper lobe with metastasis to the brain status post recent resection of the right occipital metastatic lesion radiation, chemo, and immunotherapy, COPD, rheumatoid arthritis.  She was admitted with dizziness and confusion and also had nausea and diarrhea diagnosed with Campylobacter infection and sepsis and is on azithromycin right now.  She described shingles diagnosis in May and is continued to have painful paresthesias of the left arm.  She tells of chronic dizziness described as lightheadedness consistent with orthostasis and systolic BP dropped from 143-126 last night.  Reviewed MAR-no obvious orthostatic contributors beyond her BP meds which probably cannot be changed.  She is on prednisone for her rheumatoid arthritis and keppra.  PET surveillance upcoming with her oncologist    Plan:  1.  CTA head and neck pending  2.  Nonpharmacologic management of orthostasis including compression stockings, avoid dehydration, avoid culprit medications as able  3. Zio monitor  4  For agree with Cymbalta and Lyrica or gabapentin for her postherpetic pain      Send EMR message to schedule in clinic

## 2023-08-31 ENCOUNTER — APPOINTMENT (OUTPATIENT)
Dept: CT IMAGING | Facility: HOSPITAL | Age: 78
DRG: 871 | End: 2023-08-31
Payer: MEDICARE

## 2023-08-31 ENCOUNTER — APPOINTMENT (OUTPATIENT)
Dept: GENERAL RADIOLOGY | Facility: HOSPITAL | Age: 78
DRG: 871 | End: 2023-08-31
Payer: MEDICARE

## 2023-08-31 LAB
ANION GAP SERPL CALCULATED.3IONS-SCNC: 10.7 MMOL/L (ref 5–15)
BACTERIA SPEC AEROBE CULT: NORMAL
BACTERIA SPEC AEROBE CULT: NORMAL
BH BB BLOOD EXPIRATION DATE: NORMAL
BH BB BLOOD TYPE BARCODE: 5100
BH BB DISPENSE STATUS: NORMAL
BH BB PRODUCT CODE: NORMAL
BH BB UNIT NUMBER: NORMAL
BUN SERPL-MCNC: 6 MG/DL (ref 8–23)
BUN/CREAT SERPL: 9.5 (ref 7–25)
CALCIUM SPEC-SCNC: 8.3 MG/DL (ref 8.6–10.5)
CHLORIDE SERPL-SCNC: 98 MMOL/L (ref 98–107)
CO2 SERPL-SCNC: 21.3 MMOL/L (ref 22–29)
CREAT SERPL-MCNC: 0.63 MG/DL (ref 0.57–1)
CROSSMATCH INTERPRETATION: NORMAL
DEPRECATED RDW RBC AUTO: 50.1 FL (ref 37–54)
EGFRCR SERPLBLD CKD-EPI 2021: 92.1 ML/MIN/1.73
ERYTHROCYTE [DISTWIDTH] IN BLOOD BY AUTOMATED COUNT: 15.7 % (ref 12.3–15.4)
GLUCOSE SERPL-MCNC: 92 MG/DL (ref 65–99)
HCT VFR BLD AUTO: 29.1 % (ref 34–46.6)
HGB BLD-MCNC: 9.9 G/DL (ref 12–15.9)
MCH RBC QN AUTO: 29.6 PG (ref 26.6–33)
MCHC RBC AUTO-ENTMCNC: 34 G/DL (ref 31.5–35.7)
MCV RBC AUTO: 86.9 FL (ref 79–97)
PLATELET # BLD AUTO: 344 10*3/MM3 (ref 140–450)
PMV BLD AUTO: 9.7 FL (ref 6–12)
POTASSIUM SERPL-SCNC: 3.8 MMOL/L (ref 3.5–5.2)
QT INTERVAL: 327 MS
QTC INTERVAL: 409 MS
RBC # BLD AUTO: 3.35 10*6/MM3 (ref 3.77–5.28)
SODIUM SERPL-SCNC: 130 MMOL/L (ref 136–145)
UNIT  ABO: NORMAL
UNIT  RH: NORMAL
WBC NRBC COR # BLD: 11.26 10*3/MM3 (ref 3.4–10.8)

## 2023-08-31 PROCEDURE — 85027 COMPLETE CBC AUTOMATED: CPT | Performed by: STUDENT IN AN ORGANIZED HEALTH CARE EDUCATION/TRAINING PROGRAM

## 2023-08-31 PROCEDURE — 63710000001 PREDNISONE PER 5 MG: Performed by: STUDENT IN AN ORGANIZED HEALTH CARE EDUCATION/TRAINING PROGRAM

## 2023-08-31 PROCEDURE — 93005 ELECTROCARDIOGRAM TRACING: CPT | Performed by: STUDENT IN AN ORGANIZED HEALTH CARE EDUCATION/TRAINING PROGRAM

## 2023-08-31 PROCEDURE — 71045 X-RAY EXAM CHEST 1 VIEW: CPT

## 2023-08-31 PROCEDURE — 94799 UNLISTED PULMONARY SVC/PX: CPT

## 2023-08-31 PROCEDURE — 71260 CT THORAX DX C+: CPT

## 2023-08-31 PROCEDURE — 94760 N-INVAS EAR/PLS OXIMETRY 1: CPT

## 2023-08-31 PROCEDURE — 25010000002 MORPHINE PER 10 MG: Performed by: INTERNAL MEDICINE

## 2023-08-31 PROCEDURE — 94664 DEMO&/EVAL PT USE INHALER: CPT

## 2023-08-31 PROCEDURE — 99232 SBSQ HOSP IP/OBS MODERATE 35: CPT | Performed by: STUDENT IN AN ORGANIZED HEALTH CARE EDUCATION/TRAINING PROGRAM

## 2023-08-31 PROCEDURE — 80048 BASIC METABOLIC PNL TOTAL CA: CPT | Performed by: STUDENT IN AN ORGANIZED HEALTH CARE EDUCATION/TRAINING PROGRAM

## 2023-08-31 PROCEDURE — 93010 ELECTROCARDIOGRAM REPORT: CPT | Performed by: INTERNAL MEDICINE

## 2023-08-31 PROCEDURE — 0 CEFEPIME PER 500 MG: Performed by: STUDENT IN AN ORGANIZED HEALTH CARE EDUCATION/TRAINING PROGRAM

## 2023-08-31 PROCEDURE — 94761 N-INVAS EAR/PLS OXIMETRY MLT: CPT

## 2023-08-31 PROCEDURE — 25510000001 IOPAMIDOL 61 % SOLUTION: Performed by: STUDENT IN AN ORGANIZED HEALTH CARE EDUCATION/TRAINING PROGRAM

## 2023-08-31 PROCEDURE — 99232 SBSQ HOSP IP/OBS MODERATE 35: CPT | Performed by: INTERNAL MEDICINE

## 2023-08-31 RX ORDER — SODIUM CHLORIDE, SODIUM LACTATE, POTASSIUM CHLORIDE, CALCIUM CHLORIDE 600; 310; 30; 20 MG/100ML; MG/100ML; MG/100ML; MG/100ML
75 INJECTION, SOLUTION INTRAVENOUS CONTINUOUS
Status: ACTIVE | OUTPATIENT
Start: 2023-08-31 | End: 2023-09-01

## 2023-08-31 RX ORDER — IPRATROPIUM BROMIDE AND ALBUTEROL SULFATE 2.5; .5 MG/3ML; MG/3ML
3 SOLUTION RESPIRATORY (INHALATION)
Status: DISCONTINUED | OUTPATIENT
Start: 2023-08-31 | End: 2023-09-02 | Stop reason: HOSPADM

## 2023-08-31 RX ADMIN — PREGABALIN 150 MG: 75 CAPSULE ORAL at 08:35

## 2023-08-31 RX ADMIN — Medication 10 ML: at 08:35

## 2023-08-31 RX ADMIN — MORPHINE SULFATE 4 MG: 2 INJECTION, SOLUTION INTRAMUSCULAR; INTRAVENOUS at 20:47

## 2023-08-31 RX ADMIN — IPRATROPIUM BROMIDE AND ALBUTEROL SULFATE 3 ML: .5; 2.5 SOLUTION RESPIRATORY (INHALATION) at 20:41

## 2023-08-31 RX ADMIN — IOPAMIDOL 75 ML: 612 INJECTION, SOLUTION INTRAVENOUS at 10:46

## 2023-08-31 RX ADMIN — IPRATROPIUM BROMIDE AND ALBUTEROL SULFATE 3 ML: .5; 2.5 SOLUTION RESPIRATORY (INHALATION) at 15:44

## 2023-08-31 RX ADMIN — PREGABALIN 150 MG: 75 CAPSULE ORAL at 20:47

## 2023-08-31 RX ADMIN — SODIUM CHLORIDE, POTASSIUM CHLORIDE, SODIUM LACTATE AND CALCIUM CHLORIDE 75 ML/HR: 600; 310; 30; 20 INJECTION, SOLUTION INTRAVENOUS at 13:04

## 2023-08-31 RX ADMIN — CEFEPIME 2000 MG: 2 INJECTION, POWDER, FOR SOLUTION INTRAVENOUS at 09:51

## 2023-08-31 RX ADMIN — LEVETIRACETAM 500 MG: 500 TABLET, FILM COATED ORAL at 08:35

## 2023-08-31 RX ADMIN — MORPHINE SULFATE 4 MG: 2 INJECTION, SOLUTION INTRAMUSCULAR; INTRAVENOUS at 05:59

## 2023-08-31 RX ADMIN — PANTOPRAZOLE SODIUM 40 MG: 40 TABLET, DELAYED RELEASE ORAL at 05:44

## 2023-08-31 RX ADMIN — DULOXETINE HYDROCHLORIDE 30 MG: 30 CAPSULE, DELAYED RELEASE ORAL at 08:35

## 2023-08-31 RX ADMIN — HYDROCODONE BITARTRATE AND ACETAMINOPHEN 1 TABLET: 7.5; 325 TABLET ORAL at 08:46

## 2023-08-31 RX ADMIN — LEVETIRACETAM 500 MG: 500 TABLET, FILM COATED ORAL at 20:49

## 2023-08-31 RX ADMIN — PREDNISONE 10 MG: 10 TABLET ORAL at 08:35

## 2023-08-31 RX ADMIN — IPRATROPIUM BROMIDE AND ALBUTEROL SULFATE 3 ML: .5; 2.5 SOLUTION RESPIRATORY (INHALATION) at 11:34

## 2023-08-31 RX ADMIN — ALBUTEROL SULFATE 2.5 MG: 2.5 SOLUTION RESPIRATORY (INHALATION) at 04:36

## 2023-08-31 NOTE — PLAN OF CARE
Problem: Adult Inpatient Plan of Care  Goal: Plan of Care Review  Outcome: Ongoing, Progressing  Flowsheets (Taken 8/31/2023 1425)  Progress: improving  Plan of Care Reviewed With: patient  Outcome Evaluation: Patient has been pleasant and cooperative during shift. Patient treated for pain. No nausea or SOA. Expiratory wheezes. Start cefepime. IVFs started. CT chest obtained. [facility] at discharge. AOx4, assist x1, room air, SR. Will continue to monitor and assist patient as needed.  Goal: Patient-Specific Goal (Individualized)  Outcome: Ongoing, Progressing  Goal: Absence of Hospital-Acquired Illness or Injury  Outcome: Ongoing, Progressing  Intervention: Identify and Manage Fall Risk  Recent Flowsheet Documentation  Taken 8/31/2023 1330 by Enzo Pan RN  Safety Promotion/Fall Prevention:   assistive device/personal items within reach   fall prevention program maintained   nonskid shoes/slippers when out of bed   safety round/check completed  Taken 8/31/2023 1200 by Enzo Pan RN  Safety Promotion/Fall Prevention:   assistive device/personal items within reach   fall prevention program maintained   nonskid shoes/slippers when out of bed   safety round/check completed  Taken 8/31/2023 1000 by Enzo Pan RN  Safety Promotion/Fall Prevention:   assistive device/personal items within reach   fall prevention program maintained   nonskid shoes/slippers when out of bed   safety round/check completed  Taken 8/31/2023 0838 by Enzo Pan RN  Safety Promotion/Fall Prevention:   assistive device/personal items within reach   fall prevention program maintained   nonskid shoes/slippers when out of bed   safety round/check completed  Intervention: Prevent Skin Injury  Recent Flowsheet Documentation  Taken 8/31/2023 1330 by Enzo Pan RN  Body Position:   left   tilted   position changed independently  Skin Protection:   tubing/devices free from skin contact   incontinence pads  utilized  Taken 8/31/2023 1200 by Enzo Pan RN  Body Position: supine  Taken 8/31/2023 1000 by Enzo Pan RN  Body Position:   left   tilted  Taken 8/31/2023 0838 by Enzo Pan RN  Body Position: supine  Skin Protection:   tubing/devices free from skin contact   incontinence pads utilized  Goal: Optimal Comfort and Wellbeing  Outcome: Ongoing, Progressing  Goal: Readiness for Transition of Care  Outcome: Ongoing, Progressing     Problem: Fall Injury Risk  Goal: Absence of Fall and Fall-Related Injury  Outcome: Ongoing, Progressing  Intervention: Promote Injury-Free Environment  Recent Flowsheet Documentation  Taken 8/31/2023 1330 by Enzo Pan RN  Safety Promotion/Fall Prevention:   assistive device/personal items within reach   fall prevention program maintained   nonskid shoes/slippers when out of bed   safety round/check completed  Taken 8/31/2023 1200 by Enzo Pan RN  Safety Promotion/Fall Prevention:   assistive device/personal items within reach   fall prevention program maintained   nonskid shoes/slippers when out of bed   safety round/check completed  Taken 8/31/2023 1000 by Enzo Pan RN  Safety Promotion/Fall Prevention:   assistive device/personal items within reach   fall prevention program maintained   nonskid shoes/slippers when out of bed   safety round/check completed  Taken 8/31/2023 0838 by Enzo Pan RN  Safety Promotion/Fall Prevention:   assistive device/personal items within reach   fall prevention program maintained   nonskid shoes/slippers when out of bed   safety round/check completed     Problem: COPD (Chronic Obstructive Pulmonary Disease) Comorbidity  Goal: Maintenance of COPD Symptom Control  Outcome: Ongoing, Progressing     Problem: Hypertension Comorbidity  Goal: Blood Pressure in Desired Range  Outcome: Ongoing, Progressing     Problem: Pain Chronic (Persistent) (Comorbidity Management)  Goal: Acceptable Pain Control and Functional  Ability  Outcome: Ongoing, Progressing     Problem: Coping Ineffective (Oncology Care)  Goal: Effective Coping  Outcome: Ongoing, Progressing     Problem: Fatigue (Oncology Care)  Goal: Improved Activity Tolerance  Outcome: Ongoing, Progressing     Problem: Oral Intake Altered (Oncology Care)  Goal: Optimal Oral Intake  Outcome: Ongoing, Progressing     Problem: Oral Mucositis (Oncology Care)  Goal: Improved Oral Mucous Membrane Integrity  Outcome: Ongoing, Progressing     Problem: Pain Acute (Oncology Care)  Goal: Optimal Pain Control  Outcome: Ongoing, Progressing     Problem: Pain Acute  Goal: Acceptable Pain Control and Functional Ability  Outcome: Ongoing, Progressing     Problem: Pain Chronic (Persistent)  Goal: Acceptable Pain Control and Functional Ability  Outcome: Ongoing, Progressing     Problem: Skin Injury Risk Increased  Goal: Skin Health and Integrity  Outcome: Ongoing, Progressing  Intervention: Optimize Skin Protection  Recent Flowsheet Documentation  Taken 8/31/2023 1330 by Enzo Pan RN  Pressure Reduction Techniques:   frequent weight shift encouraged   weight shift assistance provided  Head of Bed (HOB) Positioning: HOB at 30-45 degrees  Pressure Reduction Devices: pressure-redistributing mattress utilized  Skin Protection:   tubing/devices free from skin contact   incontinence pads utilized  Taken 8/31/2023 1200 by Enzo Pan RN  Head of Bed (HOB) Positioning: HOB at 30-45 degrees  Taken 8/31/2023 1000 by Enzo Pan RN  Head of Bed (HOB) Positioning: HOB at 30-45 degrees  Taken 8/31/2023 0838 by Enzo Pan RN  Pressure Reduction Techniques:   frequent weight shift encouraged   weight shift assistance provided  Head of Bed (HOB) Positioning: HOB at 60 degrees  Pressure Reduction Devices: pressure-redistributing mattress utilized  Skin Protection:   tubing/devices free from skin contact   incontinence pads utilized     Problem: Fatigue  Goal: Improved Activity  Tolerance  Outcome: Ongoing, Progressing   Goal Outcome Evaluation:  Plan of Care Reviewed With: patient        Progress: improving  Outcome Evaluation: Patient has been pleasant and cooperative during shift. Patient treated for pain. No nausea or SOA. Expiratory wheezes. Start cefepime. IVFs started. CT chest obtained. [facility] at discharge. AOx4, assist x1, room air, SR. Will continue to monitor and assist patient as needed.

## 2023-08-31 NOTE — SIGNIFICANT NOTE
08/31/23 1535   OTHER   Discipline physical therapist   Rehab Time/Intention   Session Not Performed other (see comments)  (pt off floor earlier today, attempted to see this pm but pt sleeping soundly. will follow up again tomorrow.)   Recommendation   PT - Next Appointment 09/01/23

## 2023-08-31 NOTE — PROGRESS NOTES
[facility] Clinical Pharmacy Services: Cefepime Consult    Pt Name: Pam Vidal   : 1945  Weight: 59 kg (130 lb 1.1 oz)  Antibiotic: cefepime  Indication: Pneumonia    Relevant clinical data and objective history reviewed:    Past Medical History:   Diagnosis Date    COPD (chronic obstructive pulmonary disease)     Coughing up blood     X2 MONTHS    History of COVID-19 2022    Hyperlipidemia     IBS (irritable bowel syndrome)     Primary lung adenocarcinoma, right     Rheumatoid arthritis      Creatinine   Date Value Ref Range Status   2023 0.63 0.57 - 1.00 mg/dL Final   2023 0.53 (L) 0.57 - 1.00 mg/dL Final   2023 0.60 0.57 - 1.00 mg/dL Final   2023 1.00 0.60 - 1.30 mg/dL Final     Comment:     Serial Number: [id number]:  [id number]   2023 1.40 (H) 0.60 - 1.30 mg/dL Final     Comment:     Serial Number: [id number]:  [id number]   08/15/2022 1.10 0.60 - 1.30 mg/dL Final     Comment:     Serial Number: [id number]:  [id number]     BUN   Date Value Ref Range Status   2023 6 (L) 8 - 23 mg/dL Final     Estimated Creatinine Clearance: 61 mL/min (by C-G formula based on SCr of 0.63 mg/dL).    Lab Results   Component Value Date    WBC 11.26 (H) 2023     Temp Readings from Last 3 Encounters:   23 100.2 °F (37.9 °C) (Oral)   23 98.2 °F (36.8 °C) (Temporal)   23 97.8 °F (36.6 °C)      Assessment/Plan    Ordered 2gm Q8h for a total of 7 days. Will monitor and adjust if culture data or pertinent lab values indicate this is best for the patient.     Thank you for this consult and please contact pharmacy with any questions or concerns.     Carol Ramos, PharmD  Clinical Pharmacist

## 2023-08-31 NOTE — CASE MANAGEMENT/SOCIAL WORK
Continued Stay Note  [facility]     Patient Name: Pam Vidal  MRN: [id number]  Today's Date: 8/31/2023    Admit Date: 8/26/2023    Plan: Plan home with family and [facility] .   TD Jefferson RN   Discharge Plan       Row Name 08/31/23 1239       Plan    Plan Plan home with family and [facility] .   TD Jefferson RN    Plan Comments Spoke with pt at bedside.   Pt's family does assist her if needed. [phone] Pt has not been in SNF. Pt denies any discharge needs. Pt states her son or niece will transport her home. Plan home with family and [facility] . TD Jefferson RN                   Discharge Codes    No documentation.                 Expected Discharge Date and Time       Expected Discharge Date Expected Discharge Time    Sep 2, 2023               Radha Jefferson RN

## 2023-08-31 NOTE — PROGRESS NOTES
"DOS: 2023  NAME: Pam Vidal   : 1945  PCP: Nik Mckay MD  Chief Complaint   Patient presents with    Dizziness       Chief complaint: Dizziness    Objective: Patient has been seen and evaluated today, no acute events overnight, still feels dizziness especially when she gets up from sitting or sleeping position.  With complaints of extreme painful paresthesias of the left upper extremity.      Vital signs: /54 (BP Location: Left arm, Patient Position: Sitting)   Pulse 85   Temp 98.1 °F (36.7 °C) (Oral)   Resp 18   Ht 152.4 cm (60\")   Wt 59 kg (130 lb 1.1 oz) Comment: weighed with 2 pillows 1 blanket 1 sheet  SpO2 93%   BMI 25.40 kg/m²      Gen: NAD, vitals reviewed  MS: Alert oriented x3 able to answer questions appropriately, speech articulation normal, normal comprehension repetition and fluency  CN: Central part of vision of both eyes scotoma PERRL, EOMI, no facial droop, no dysarthria  Motor: 4+ /5 in the right upper extremity, 4+/5 proximal left upper extremity 4 -/5 distal left upper extremity  Sensory changes in the left upper extremity painful to touch but other 4 extremities intact to pinprick and light touch  2+  reflexes and upgoing toes      Laboratory results:  Lab Results   Component Value Date    GLUCOSE 92 2023    CALCIUM 8.3 (L) 2023     (L) 2023    K 3.8 2023    CO2 21.3 (L) 2023    CL 98 2023    BUN 6 (L) 2023    CREATININE 0.63 2023    BCR 9.5 2023    ANIONGAP 10.7 2023     Lab Results   Component Value Date    WBC 11.26 (H) 2023    HGB 9.9 (L) 2023    HCT 29.1 (L) 2023    MCV 86.9 2023     2023     Lab Results   Component Value Date     (H) 2023            Review of labs:   Sodium 130  Creatinine 0.63  WBC 11.26  Hemoglobin 9.9  Platelets 344    Review and interpretation of imaging:   MRI brain with and without done this admission showed right " occipital s/p craniotomy, on T2 flair there is mild to moderate small vessel disease patent and there is also postop radiation changes, there is no postcontrast enhancement     CTA head and neck-severe stenosis of left subclavian artery is a moderate to severe plaque of the left common carotid origin. RV origin stenosis Radiologist also mention concerning changes in the lung which is being evaluated further by CT chest.        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    Plan:  1.  Carotid ultrasound pending  2.  Neurosurgery evaluating the patient, awaiting carotid ultrasound results, lung Cancer prognosis might be a factor in any intervention.  3.  Ensure symptomatic treatment of hypovolemia with IV fluids and adequate hydration    Management discussed with patient and hospitalist      MDM   Reviewed: Previous charts, nursing notes and vitals   Reviewed: Previous labs and CT scan    Interpretation: Labs and CT scan   Total time providing care is :30-74 minutes. This excluded time spent performing separately reportable procedures and services  Consults :Neurology/Stroke    García Alaniz MD  Neuro Hospitalist /Vascular Neurology.

## 2023-08-31 NOTE — PROGRESS NOTES
Name: Pam Vidal ADMIT: 2023   : 1945  PCP: Nik Mckay MD    MRN: [id number] LOS: 5 days   AGE/SEX: 78 y.o. female  ROOM: [facility]     Subjective   Subjective   Diarrhea resolved.  Did have some shortness of air and wheezing over night. Tmax of 100.2. Patient continues to have intermittent dizziness.  Left upper extremity neuropathic pain remains, but is much improved from admission.    Review of Systems   Constitutional:  Negative for chills and fever.   Respiratory:  Negative for cough and shortness of breath.    Cardiovascular:  Negative for chest pain and leg swelling.   Gastrointestinal:  Positive for diarrhea. Negative for abdominal pain and nausea.   Neurological:  Positive for dizziness.   As above     Objective   Objective   Vital Signs  Temp:  [97.7 °F (36.5 °C)-100.2 °F (37.9 °C)] 100.2 °F (37.9 °C)  Heart Rate:  [64-90] 84  Resp:  [18-24] 18  BP: (142-160)/(55-65) 150/62  SpO2:  [95 %-100 %] 95 %  on  Flow (L/min):  [2] 2;   Device (Oxygen Therapy): room air  Body mass index is 25.4 kg/m².  Physical Exam  Constitutional:       General: She is not in acute distress.     Appearance: She is ill-appearing (Chronically).   Cardiovascular:      Rate and Rhythm: Normal rate and regular rhythm.   Pulmonary:      Effort: Pulmonary effort is normal. No respiratory distress.   Abdominal:      General: Abdomen is flat. There is no distension.      Tenderness: There is no abdominal tenderness.   Musculoskeletal:         General: No swelling or deformity. Normal range of motion.   Skin:     General: Skin is warm and dry.   Neurological:      General: No focal deficit present.      Mental Status: She is alert. Mental status is at baseline.       Results Review     I reviewed the patient's new clinical results.  Results from last 7 days   Lab Units 23  0555 23  0625 23  0600 23  0903   WBC 10*3/mm3 11.26* 9.71 10.54 10.49   HEMOGLOBIN g/dL 9.9* 7.9* 8.1* 8.6*    PLATELETS 10*3/mm3 344 309 308 299       Results from last 7 days   Lab Units 08/31/23  0555 08/30/23  1715 08/30/23  0625 08/29/23  0600 08/28/23  0903   SODIUM mmol/L 130*  --  134* 134* 135*   POTASSIUM mmol/L 3.8 4.6 3.3* 3.6 3.6   CHLORIDE mmol/L 98  --  102 102 102   CO2 mmol/L 21.3*  --  21.0* 24.0 23.8   BUN mg/dL 6*  --  5* 6* 7*   CREATININE mg/dL 0.63  --  0.53* 0.60 0.67   GLUCOSE mg/dL 92  --  89 86 120*     Estimated Creatinine Clearance: 61 mL/min (by C-G formula based on SCr of 0.63 mg/dL).  Results from last 7 days   Lab Units 08/26/23  1544   ALBUMIN g/dL 3.3*   BILIRUBIN mg/dL 0.3   ALK PHOS U/L 100   AST (SGOT) U/L 31   ALT (SGPT) U/L 33       Results from last 7 days   Lab Units 08/31/23  0555 08/30/23  0625 08/29/23  0600 08/28/23  0903 08/27/23  0621 08/26/23  1544   CALCIUM mg/dL 8.3* 7.7* 8.2* 8.1*   < > 9.9   ALBUMIN g/dL  --   --   --   --   --  3.3*   MAGNESIUM mg/dL  --  2.3  --   --   --  2.2    < > = values in this interval not displayed.       Results from last 7 days   Lab Units 08/26/23  2008   LACTATE mmol/L 0.8       COVID19   Date Value Ref Range Status   08/26/2023 Not Detected Not Detected - Ref. Range Final   08/20/2022 Not Detected Not Detected - Ref. Range Final     No results found for: HGBA1C, POCGLU    XR Chest 1 View  Narrative: XR CHEST 1 VW-     HISTORY: Female who is 78 years-old, wheezing     TECHNIQUE: Frontal view of the chest     COMPARISON: 8/26/2023     FINDINGS: Right chest port appears stable. Heart, mediastinum and  pulmonary vasculature are unremarkable. No irregular density in the  right upper lung appears similar to prior exam. Minimal likely  atelectasis at the bases. No pleural effusion, or pneumothorax. No acute  osseous process.     Impression: No significant change. Follow-up as clinical indications  persist.     This report was finalized on 8/31/2023 8:21 AM by Dr. Prince Nicole M.D.       I reviewed the patient's daily  medications.  Scheduled Medications  cefepime, 2,000 mg, Intravenous, Q8H  DULoxetine, 30 mg, Oral, Daily  ipratropium-albuterol, 3 mL, Nebulization, 4x Daily - RT  levETIRAcetam, 500 mg, Oral, BID  pantoprazole, 40 mg, Oral, Q AM  predniSONE, 10 mg, Oral, Daily  pregabalin, 150 mg, Oral, Q12H  senna-docusate sodium, 2 tablet, Oral, BID  sodium chloride, 10 mL, Intravenous, Q12H  sodium chloride, 10 mL, Intravenous, Q12H    Infusions  Pharmacy Consult - Pharmacy to dose,     Diet  Diet: Cardiac Diets; Healthy Heart (2-3 Na+); Texture: Regular Texture (IDDSI 7); Fluid Consistency: Thin (IDDSI 0)         I have personally reviewed:  [x]  Laboratory   [x]  Microbiology   [x]  Radiology   []  EKG/Telemetry   []  Cardiology/Vascular   []  Pathology   []  Records     Assessment/Plan     Active Hospital Problems    Diagnosis  POA    **Dizziness [R42]  Yes    Campylobacter diarrhea [A04.5]  Yes    Bacterial colitis [A04.9]  Yes    Diarrhea [R19.7]  Yes    Neuropathic pain [M79.2]  Yes    Rheumatoid arthritis [M06.9]  Yes    Brain mass [G93.89]  Yes    Primary lung adenocarcinoma, right [C34.91]  Yes      Resolved Hospital Problems   No resolved problems to display.       78 y.o. female admitted with Dizziness.    Campylobacter infection with sepsis (tachycardia, leukocytosis)  Colitis  -Nausea, diarrhea resolved  -Discussed with patient she has tolerated azithromycin in the past  -Azithromycin 500 mg x 3 days, last day 8/29  -Continue IV fluids    Pneumonia  Concern for postobstructive pneumonia with increasing size of lung mass  -Seen on CTA head/neck  -CT chest pending  -Discussed with pharmacy and patient be put on cefepime for now.      Dizziness, likely secondary to above  -Continue fluid resuscitation  -Patient admits that this has been going on for about a month  -Neuro consulted-plan for CTA head and neck with extensive calcified plaques throughout the aortic arch with moderate to severe narrowing of the proximal  left subclavian artery.  Further recommendations from neurology pending.  -Orthostatics negative.  Recommend Holter on discharge.    Lung cancer with brain metastases  -Status post resection with neurosurgery.  Had some postop radiation therapy.  MRI from 7/9 with no new sites of disease  -Oncology consulted-patient is to have a PET scan and follow-up with Dr. Lagunas as an outpatient to discuss any further treatment options.    -CT head with no new acute findings, but recommended MRI brain.  MRI brain with expected postoperative changes, no acute findings, no progression of disease  -CTA head and neck showing interval growth of her known lung mass, CT chest pending    Neuropathic pain from left upper extremity shingles  -Patient opted not gabapentin 900 mg an outpatient and it did not help her, will discontinue  -Continue Cymbalta, Lyrica increased to 150 mg twice daily    Rheumatoid arthritis  -Continue prednisone 10 mg    SCDs for DVT prophylaxis.  Full code.  Discussed with patient, nursing staff, consulting provider, and care team on multidisciplinary rounds.  Anticipate discharge home with family tomorrow.    Expected Discharge Date: 9/2/2023; Expected Discharge Time:       Todd Garcia MD  [facility]  08/31/23  12:24 EDT

## 2023-08-31 NOTE — PROGRESS NOTES
REASON FOR FOLLOWUP/CHIEF COMPLAINT:  Lung cancer    HISTORY OF PRESENT ILLNESS:   CT of the chest was done which shows local growth of cancer in the mediastinum.  It appears a carotid stent is being considered and therefore we were asked about prognosis.  Patient reports no new events overnight.    Past Medical History, Past Surgical History, Social History, Family History have been reviewed and are without significant changes except as mentioned.    Review of Systems   Review of Systems   Constitutional:  Negative for activity change.   HENT:  Negative for nosebleeds and trouble swallowing.    Respiratory:  Negative for shortness of breath and wheezing.    Cardiovascular:  Negative for chest pain and palpitations.   Gastrointestinal:  Negative for constipation, diarrhea and nausea.   Genitourinary:  Negative for dysuria and hematuria.   Musculoskeletal:  Negative for arthralgias and myalgias.   Skin:  Negative for rash and wound.   Neurological:  Negative for seizures and syncope.   Hematological:  Negative for adenopathy. Does not bruise/bleed easily.   Psychiatric/Behavioral:  Negative for confusion.      Medications:  The current medication list was reviewed in the EMR    ALLERGIES:    Allergies   Allergen Reactions    Del-Mycin [Erythromycin] Hives    Gentamicin Hives    Ibuprofen Nausea And Vomiting    Latex Dermatitis     GLOVES    Metronidazole Hives    Ofloxacin Hives and Nausea Only    Penicillins Hives    Tetracycline Hives    Adhesive Tape Dermatitis     BANDAIDS    Aspirin GI Intolerance     Vomiting can take EC    Codeine Nausea And Vomiting              Vitals:    08/31/23 1311 08/31/23 1544 08/31/23 1619 08/31/23 1621   BP: 119/51  130/57 135/54   BP Location: Right arm  Right arm Left arm   Patient Position: Lying  Sitting Sitting   Pulse: 84 77 89 85   Resp: 18 18     Temp: 98.1 °F (36.7 °C)      TempSrc: Oral      SpO2: 96% 93%     Weight:       Height:         Physical Exam     CONSTITUTIONAL:  Vital signs reviewed.  No distress, looks comfortable.  EYES:  Conjunctivae and lids unremarkable.  PERRLA  EARS, NOSE, MOUTH, THROAT:  Ears and nose appear unremarkable.  Lips, teeth, gums appear unremarkable.  RESPIRATORY:  Normal respiratory effort.  Lungs clear to auscultation bilaterally.  CARDIOVASCULAR:  Normal S1, S2.  No murmurs, rubs or gallops.  No significant lower extremity edema.  GASTROINTESTINAL: Abdomen appears unremarkable.  Nontender.  No hepatomegaly.  No splenomegaly.  NEURO: Cranial nerves 2-12 grossly intact.  No focal deficits.  Appears to have equal strength all 4 extremities.  MUSCULOSKELETAL:  Unremarkable digits/nails.  No cyanosis or clubbing.  SKIN:  Warm.  No rashes.  PSYCHIATRIC:  Normal judgment and insight.  Normal mood and affect.        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  Patient is not felt to be a surgical candidate and combined chemotherapy and radiation.   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  9/20/2022 1st dose of weekly carboplatin/taxol.   She completed 6 cycles of weekly CarboTaxol 10/25/2022.  Radiation therapy completed 10/27/2022  First dose durvalumab delivered 11/28/2022.   Durvalumab on hold since May 2023  CT chest 8/31/2023, from 6/11/23: Enlarging RUL perihilar pulmonary mass with mediastinal invasion.  5.6 cm, from  3.5 cm.  Abutting the trachea and esophagus.  Mass causing new obliteration of RUL segmental pulmonary artery.  8/31/2023: Discussed CT chest with Dr. Lagunas, her usual outpatient oncologist.  We both agree: Maintain the same plan of going on her trip in 2 weeks which is very important to her before we consider any systemic therapy.  After she returns from the trip she will have a PET scan and follow-up with Dr. Lagunas.  We would not expect radiation to be an option considering she previously had chest radiation.  She understands while waiting, the cancer could progress causing significant problems, especially with the tumor already abutting the mediastinum and trachea.  However, this might occur if therapy is started right away as well.  Considering quality of life in the setting of noncurable disease, we feel it is in her best interest to go on her trip first.    *The other services seeing her are considering a carotid stent as they state her dizziness might be due to stenosis causing subclavian steal phenomenon.  Regarding cancer prognosis: She specifically asked me to not speak numbers to her but realizes I am placing this in her chart because the other services are asking for it regarding decisions about carotid stenting.  My estimate would be a life expectancy of around 1 year, a range of 6 to 18 months or so.  Of course, it could be much sooner if local invasion significantly progresses causing perforation of the trachea or esophagus.     6.  Development of brain metastases in May 2023.  Patient underwent resection of the dominant mass in the right occipital area by Dr. Cho of neurosurgery.  She received post op radiation therapy to the resection bed and to 2 smaller lesions with SRS under the direction of Dr. Mckenna Moreira at [facility].  Her posttreatment MRI of the brain from 7/9/2023 as noted above shows no new sites of disease, improved edema, and no definite recurrence in the resection  bed.  MRI brain 8/27/2023: No evidence of tumor recurrence.     *Rheumatoid arthritis  Prednisone 10 mg daily    7.  Severe shingles outbreak of left upper extremity with severe pain.  The shingles outbreak has dried up and she has completed her acyclovir treatment.  She is still having pain and numbness in the left hand and is undergoing physical therapy and Occupational Therapy.  She reports this pain is finally subsiding.     9.  Campylobacter infection with sepsis  admitted 8/26/2023 with worsening equilibrium and weakness in her left leg.  Follow-up exams included normal EKG, CT head with right occipital postoperative changes and encephalomalacia without intercranial hemorrhage or midline shift.  She met criteria for potential septicemia and patient admitted for treatment include IV antibiotic therapies, IV hydration and additional scanning.  CT studies consistent with long segment colitis from transverse to sigmoid colon, associated diverticulitis, indeterminate right renal lesions up to 5.2 cm, subtle asymmetric right basilar groundglass opacifications consistent with atypical pneumonia.  Additional studies include negative findings for C. difficile, positive for Campylobacter on GI panel, MRI of the brain pending.  Per the hospitalist note from today, this is doing well and anticipating home with home health versus skilled nursing facility in 1 to 2 days.    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    *Anemia  8/30/2023: Hb 7.9.  Knowing she will be discharged likely today or tomorrow, transfuse 1 unit PRBC.  Hb up to 9.9 after transfusion    Plan  Sees Dr. Lagunas as an outpatient.  He last saw her in the office, 8/23/2023.  He planned MRI of the brain and PET scan and to see her again to review the above to discuss if any additional treatment options are available.  Note CT abdomen pelvis with contrast done 8/26/2023 with no clear  evidence of progression of disease.  She will be out of town on a motorcycle trip (riding in a side car), 9/13/2023 - 9/18/2023.  Therefore, it would likely be best to do the PET scan and appointment Dr. Lagunas sometime after that.  I let the office know  Regarding carotid stenting, I estimate life expectancy regarding cancer of around 1 year, a range of around 6 to 18 months or so    Chart reviewed and summarized including urology note and hospitalist note

## 2023-09-01 ENCOUNTER — APPOINTMENT (OUTPATIENT)
Dept: CARDIOLOGY | Facility: HOSPITAL | Age: 78
DRG: 871 | End: 2023-09-01
Payer: MEDICARE

## 2023-09-01 PROBLEM — G45.0 VBI (VERTEBROBASILAR INSUFFICIENCY): Status: ACTIVE | Noted: 2023-09-01

## 2023-09-01 PROBLEM — J18.9 ATYPICAL PNEUMONIA: Status: ACTIVE | Noted: 2023-09-01

## 2023-09-01 LAB
ANION GAP SERPL CALCULATED.3IONS-SCNC: 9.5 MMOL/L (ref 5–15)
BH CV XLRA MEAS LEFT DIST CCA EDV: -14.1 CM/SEC
BH CV XLRA MEAS LEFT DIST CCA PSV: -125.4 CM/SEC
BH CV XLRA MEAS LEFT DIST ICA EDV: -21 CM/SEC
BH CV XLRA MEAS LEFT DIST ICA PSV: -89 CM/SEC
BH CV XLRA MEAS LEFT MID ICA EDV: -32.9 CM/SEC
BH CV XLRA MEAS LEFT MID ICA PSV: -131.7 CM/SEC
BH CV XLRA MEAS LEFT PROX CCA EDV: -25.9 CM/SEC
BH CV XLRA MEAS LEFT PROX CCA PSV: -138 CM/SEC
BH CV XLRA MEAS LEFT PROX ECA EDV: -9.7 CM/SEC
BH CV XLRA MEAS LEFT PROX ECA PSV: -149.1 CM/SEC
BH CV XLRA MEAS LEFT PROX ICA EDV: -26.1 CM/SEC
BH CV XLRA MEAS LEFT PROX ICA PSV: -143.3 CM/SEC
BH CV XLRA MEAS LEFT PROX SCLA PSV: 115.6 CM/SEC
BH CV XLRA MEAS LEFT VERTEBRAL A EDV: 16.1 CM/SEC
BH CV XLRA MEAS LEFT VERTEBRAL A PSV: 108.6 CM/SEC
BH CV XLRA MEAS RIGHT DIST CCA EDV: 22.7 CM/SEC
BH CV XLRA MEAS RIGHT DIST CCA PSV: 136.4 CM/SEC
BH CV XLRA MEAS RIGHT DIST ICA EDV: 17.4 CM/SEC
BH CV XLRA MEAS RIGHT DIST ICA PSV: 121.1 CM/SEC
BH CV XLRA MEAS RIGHT ICA/CCA RATIO: -1.04
BH CV XLRA MEAS RIGHT MID ICA EDV: -29.6 CM/SEC
BH CV XLRA MEAS RIGHT MID ICA PSV: -141.5 CM/SEC
BH CV XLRA MEAS RIGHT PROX CCA EDV: 14.3 CM/SEC
BH CV XLRA MEAS RIGHT PROX CCA PSV: 100 CM/SEC
BH CV XLRA MEAS RIGHT PROX ECA EDV: -10.2 CM/SEC
BH CV XLRA MEAS RIGHT PROX ECA PSV: -145 CM/SEC
BH CV XLRA MEAS RIGHT PROX ICA EDV: -20.4 CM/SEC
BH CV XLRA MEAS RIGHT PROX ICA PSV: -127.8 CM/SEC
BH CV XLRA MEAS RIGHT PROX SCLA PSV: 181.1 CM/SEC
BH CV XLRA MEAS RIGHT VERTEBRAL A EDV: 8.2 CM/SEC
BH CV XLRA MEAS RIGHT VERTEBRAL A PSV: 49.4 CM/SEC
BUN SERPL-MCNC: 8 MG/DL (ref 8–23)
BUN/CREAT SERPL: 13.6 (ref 7–25)
CALCIUM SPEC-SCNC: 8.7 MG/DL (ref 8.6–10.5)
CHLORIDE SERPL-SCNC: 98 MMOL/L (ref 98–107)
CO2 SERPL-SCNC: 23.5 MMOL/L (ref 22–29)
CREAT SERPL-MCNC: 0.59 MG/DL (ref 0.57–1)
DEPRECATED RDW RBC AUTO: 48.9 FL (ref 37–54)
EGFRCR SERPLBLD CKD-EPI 2021: 93.5 ML/MIN/1.73
ERYTHROCYTE [DISTWIDTH] IN BLOOD BY AUTOMATED COUNT: 15.7 % (ref 12.3–15.4)
GLUCOSE SERPL-MCNC: 82 MG/DL (ref 65–99)
HCT VFR BLD AUTO: 26.3 % (ref 34–46.6)
HGB BLD-MCNC: 8.7 G/DL (ref 12–15.9)
LEFT ARM BP: NORMAL MMHG
MCH RBC QN AUTO: 28.6 PG (ref 26.6–33)
MCHC RBC AUTO-ENTMCNC: 33.1 G/DL (ref 31.5–35.7)
MCV RBC AUTO: 86.5 FL (ref 79–97)
PLATELET # BLD AUTO: 279 10*3/MM3 (ref 140–450)
PMV BLD AUTO: 9.5 FL (ref 6–12)
POTASSIUM SERPL-SCNC: 3.9 MMOL/L (ref 3.5–5.2)
RBC # BLD AUTO: 3.04 10*6/MM3 (ref 3.77–5.28)
RIGHT ARM BP: NORMAL MMHG
SODIUM SERPL-SCNC: 131 MMOL/L (ref 136–145)
WBC NRBC COR # BLD: 8.83 10*3/MM3 (ref 3.4–10.8)

## 2023-09-01 PROCEDURE — 63710000001 PREDNISONE PER 5 MG: Performed by: STUDENT IN AN ORGANIZED HEALTH CARE EDUCATION/TRAINING PROGRAM

## 2023-09-01 PROCEDURE — 94761 N-INVAS EAR/PLS OXIMETRY MLT: CPT

## 2023-09-01 PROCEDURE — 97110 THERAPEUTIC EXERCISES: CPT

## 2023-09-01 PROCEDURE — 99232 SBSQ HOSP IP/OBS MODERATE 35: CPT

## 2023-09-01 PROCEDURE — 94799 UNLISTED PULMONARY SVC/PX: CPT

## 2023-09-01 PROCEDURE — 94664 DEMO&/EVAL PT USE INHALER: CPT

## 2023-09-01 PROCEDURE — 99232 SBSQ HOSP IP/OBS MODERATE 35: CPT | Performed by: INTERNAL MEDICINE

## 2023-09-01 PROCEDURE — 25010000002 MORPHINE PER 10 MG: Performed by: INTERNAL MEDICINE

## 2023-09-01 PROCEDURE — 93880 EXTRACRANIAL BILAT STUDY: CPT

## 2023-09-01 PROCEDURE — 80048 BASIC METABOLIC PNL TOTAL CA: CPT | Performed by: STUDENT IN AN ORGANIZED HEALTH CARE EDUCATION/TRAINING PROGRAM

## 2023-09-01 PROCEDURE — 85027 COMPLETE CBC AUTOMATED: CPT | Performed by: STUDENT IN AN ORGANIZED HEALTH CARE EDUCATION/TRAINING PROGRAM

## 2023-09-01 PROCEDURE — 0 CEFEPIME PER 500 MG: Performed by: STUDENT IN AN ORGANIZED HEALTH CARE EDUCATION/TRAINING PROGRAM

## 2023-09-01 RX ORDER — ATORVASTATIN CALCIUM 20 MG/1
20 TABLET, FILM COATED ORAL NIGHTLY
Status: DISCONTINUED | OUTPATIENT
Start: 2023-09-01 | End: 2023-09-02 | Stop reason: HOSPADM

## 2023-09-01 RX ORDER — LEVOFLOXACIN 750 MG/1
750 TABLET ORAL EVERY 24 HOURS
Status: DISCONTINUED | OUTPATIENT
Start: 2023-09-01 | End: 2023-09-02 | Stop reason: HOSPADM

## 2023-09-01 RX ORDER — DULOXETIN HYDROCHLORIDE 30 MG/1
30 CAPSULE, DELAYED RELEASE ORAL DAILY
Qty: 30 CAPSULE | Refills: 0 | Status: SHIPPED | OUTPATIENT
Start: 2023-09-01 | End: 2023-10-02

## 2023-09-01 RX ORDER — CLOPIDOGREL BISULFATE 75 MG/1
75 TABLET ORAL DAILY
Status: DISCONTINUED | OUTPATIENT
Start: 2023-09-01 | End: 2023-09-02 | Stop reason: HOSPADM

## 2023-09-01 RX ORDER — HYDROCODONE BITARTRATE AND ACETAMINOPHEN 7.5; 325 MG/1; MG/1
1 TABLET ORAL EVERY 6 HOURS PRN
Qty: 12 TABLET | Refills: 0 | Status: SHIPPED | OUTPATIENT
Start: 2023-09-01 | End: 2023-09-05

## 2023-09-01 RX ORDER — PREGABALIN 150 MG/1
150 CAPSULE ORAL EVERY 12 HOURS SCHEDULED
Qty: 60 CAPSULE | Refills: 0 | Status: SHIPPED | OUTPATIENT
Start: 2023-09-01 | End: 2023-10-01

## 2023-09-01 RX ADMIN — LEVOFLOXACIN 750 MG: 750 TABLET, FILM COATED ORAL at 17:01

## 2023-09-01 RX ADMIN — PANTOPRAZOLE SODIUM 40 MG: 40 TABLET, DELAYED RELEASE ORAL at 06:01

## 2023-09-01 RX ADMIN — PREGABALIN 150 MG: 75 CAPSULE ORAL at 10:07

## 2023-09-01 RX ADMIN — PREGABALIN 150 MG: 75 CAPSULE ORAL at 20:12

## 2023-09-01 RX ADMIN — Medication 10 ML: at 10:07

## 2023-09-01 RX ADMIN — HYDROCODONE BITARTRATE AND ACETAMINOPHEN 1 TABLET: 7.5; 325 TABLET ORAL at 20:12

## 2023-09-01 RX ADMIN — CLOPIDOGREL BISULFATE 75 MG: 75 TABLET, FILM COATED ORAL at 15:24

## 2023-09-01 RX ADMIN — MORPHINE SULFATE 4 MG: 2 INJECTION, SOLUTION INTRAMUSCULAR; INTRAVENOUS at 11:09

## 2023-09-01 RX ADMIN — CEFEPIME 2000 MG: 2 INJECTION, POWDER, FOR SOLUTION INTRAVENOUS at 10:07

## 2023-09-01 RX ADMIN — Medication 10 ML: at 20:13

## 2023-09-01 RX ADMIN — PREDNISONE 10 MG: 10 TABLET ORAL at 10:07

## 2023-09-01 RX ADMIN — IPRATROPIUM BROMIDE AND ALBUTEROL SULFATE 3 ML: .5; 2.5 SOLUTION RESPIRATORY (INHALATION) at 07:51

## 2023-09-01 RX ADMIN — MORPHINE SULFATE 4 MG: 2 INJECTION, SOLUTION INTRAMUSCULAR; INTRAVENOUS at 06:48

## 2023-09-01 RX ADMIN — HYDROCODONE BITARTRATE AND ACETAMINOPHEN 1 TABLET: 7.5; 325 TABLET ORAL at 10:07

## 2023-09-01 RX ADMIN — MORPHINE SULFATE 4 MG: 2 INJECTION, SOLUTION INTRAMUSCULAR; INTRAVENOUS at 15:24

## 2023-09-01 RX ADMIN — LEVETIRACETAM 500 MG: 500 TABLET, FILM COATED ORAL at 20:12

## 2023-09-01 RX ADMIN — IPRATROPIUM BROMIDE AND ALBUTEROL SULFATE 3 ML: .5; 2.5 SOLUTION RESPIRATORY (INHALATION) at 12:00

## 2023-09-01 RX ADMIN — ATORVASTATIN CALCIUM 20 MG: 20 TABLET, FILM COATED ORAL at 20:12

## 2023-09-01 RX ADMIN — CEFEPIME 2000 MG: 2 INJECTION, POWDER, FOR SOLUTION INTRAVENOUS at 02:20

## 2023-09-01 RX ADMIN — IPRATROPIUM BROMIDE AND ALBUTEROL SULFATE 3 ML: .5; 2.5 SOLUTION RESPIRATORY (INHALATION) at 15:09

## 2023-09-01 RX ADMIN — DULOXETINE HYDROCHLORIDE 30 MG: 30 CAPSULE, DELAYED RELEASE ORAL at 10:07

## 2023-09-01 RX ADMIN — LEVETIRACETAM 500 MG: 500 TABLET, FILM COATED ORAL at 10:07

## 2023-09-01 RX ADMIN — MORPHINE SULFATE 4 MG: 2 INJECTION, SOLUTION INTRAMUSCULAR; INTRAVENOUS at 23:18

## 2023-09-01 RX ADMIN — SENNOSIDES AND DOCUSATE SODIUM 2 TABLET: 50; 8.6 TABLET ORAL at 20:12

## 2023-09-01 RX ADMIN — Medication 10 ML: at 20:12

## 2023-09-01 NOTE — PROGRESS NOTES
REASON FOR FOLLOWUP/CHIEF COMPLAINT:  Lung cancer    HISTORY OF PRESENT ILLNESS:   No new issues overnight.  Awaiting plan regarding carotid stenosis    Past Medical History, Past Surgical History, Social History, Family History have been reviewed and are without significant changes except as mentioned.    Review of Systems   Review of Systems   Constitutional:  Negative for activity change.   HENT:  Negative for nosebleeds and trouble swallowing.    Respiratory:  Negative for shortness of breath and wheezing.    Cardiovascular:  Negative for chest pain and palpitations.   Gastrointestinal:  Negative for constipation, diarrhea and nausea.   Genitourinary:  Negative for dysuria and hematuria.   Musculoskeletal:  Negative for arthralgias and myalgias.   Skin:  Negative for rash and wound.   Neurological:  Negative for seizures and syncope.   Hematological:  Negative for adenopathy. Does not bruise/bleed easily.   Psychiatric/Behavioral:  Negative for confusion.      Medications:  The current medication list was reviewed in the EMR    ALLERGIES:    Allergies   Allergen Reactions    Del-Mycin [Erythromycin] Hives    Gentamicin Hives    Ibuprofen Nausea And Vomiting    Latex Dermatitis     GLOVES    Metronidazole Hives    Ofloxacin Hives and Nausea Only     Has tolerated Levaquin     Penicillins Hives     Tolerated rocephin and cefepime 8/2023    Tetracycline Hives    Adhesive Tape Dermatitis     BANDAIDS    Aspirin GI Intolerance     Vomiting can take EC    Codeine Nausea And Vomiting              Vitals:    09/01/23 0738 09/01/23 0751 09/01/23 1108 09/01/23 1200   BP: 150/60      BP Location: Left arm      Patient Position: Lying      Pulse: 79  103 87   Resp: 18 16  16   Temp: 99 °F (37.2 °C)  99.5 °F (37.5 °C)    TempSrc: Oral  Oral    SpO2: 97%   93%   Weight:       Height:         Physical Exam    CONSTITUTIONAL:  Vital signs reviewed.  No distress, looks comfortable.  EYES:  Conjunctivae and lids  unremarkable.  PERRLA  EARS, NOSE, MOUTH, THROAT:  Ears and nose appear unremarkable.  Lips, teeth, gums appear unremarkable.  RESPIRATORY:  Normal respiratory effort.  Lungs clear to auscultation bilaterally.  CARDIOVASCULAR:  Normal S1, S2.  No murmurs, rubs or gallops.  No significant lower extremity edema.  GASTROINTESTINAL: Abdomen appears unremarkable.  Nontender.  No hepatomegaly.  No splenomegaly.  NEURO: Cranial nerves 2-12 grossly intact.  No focal deficits.  Appears to have equal strength all 4 extremities.  MUSCULOSKELETAL:  Unremarkable digits/nails.  No cyanosis or clubbing.  SKIN:  Warm.  No rashes.  PSYCHIATRIC:  Normal judgment and insight.  Normal mood and affect.        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  Patient is not felt to be a surgical candidate and combined chemotherapy and radiation.   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  9/20/2022 1st dose of weekly carboplatin/taxol.   She completed 6 cycles of weekly CarboTaxol 10/25/2022.  Radiation therapy completed 10/27/2022  First dose durvalumab delivered 11/28/2022.   Durvalumab on hold since May 2023  CT chest 8/31/2023, from 6/11/23: Enlarging RUL perihilar pulmonary mass with mediastinal invasion.  5.6 cm, from 3.5 cm.  Abutting the trachea and esophagus.  Mass causing new obliteration of RUL segmental pulmonary  artery.  8/31/2023: Discussed CT chest with Dr. Lagunas, her usual outpatient oncologist.  We both agree: Maintain the same plan of going on her trip in 2 weeks which is very important to her before we consider any systemic therapy.  After she returns from the trip she will have a PET scan and follow-up with Dr. Lagunas.  We would not expect radiation to be an option considering she previously had chest radiation.  She understands while waiting, the cancer could progress causing significant problems, especially with the tumor already abutting the mediastinum and trachea.  However, this might occur if therapy is started right away as well.  Considering quality of life in the setting of noncurable disease, we feel it is in her best interest to go on her trip first.  No clinical signs of progression.  Continue the above plan (reimage after she returns from her trip).    *The other services seeing her are considering a carotid stent as they state her dizziness might be due to stenosis causing subclavian steal phenomenon.  Regarding cancer prognosis: She specifically asked me to not speak numbers to her but realizes I am placing this in her chart because the other services are asking for it regarding decisions about carotid stenting.  My estimate would be a life expectancy of around 1 year, a range of 6 to 18 months or so.  Of course, it could be much sooner if local invasion significantly progresses causing perforation of the trachea or esophagus.     6.  Development of brain metastases in May 2023.  Patient underwent resection of the dominant mass in the right occipital area by Dr. Cho of neurosurgery.  She received post op radiation therapy to the resection bed and to 2 smaller lesions with SRS under the direction of Dr. Mckenna Moreira at [facility].  Her posttreatment MRI of the brain from 7/9/2023 as noted above shows no new sites of disease, improved edema, and no definite recurrence in the resection  bed.  MRI brain 8/27/2023: No evidence of tumor recurrence.     *Rheumatoid arthritis  Prednisone 10 mg daily    7.  Severe shingles outbreak of left upper extremity with severe pain.  The shingles outbreak has dried up and she has completed her acyclovir treatment.  She is still having pain and numbness in the left hand and is undergoing physical therapy and Occupational Therapy.  She reports this pain is finally subsiding.     9.  Campylobacter infection with sepsis  admitted 8/26/2023 with worsening equilibrium and weakness in her left leg.  Follow-up exams included normal EKG, CT head with right occipital postoperative changes and encephalomalacia without intercranial hemorrhage or midline shift.  She met criteria for potential septicemia and patient admitted for treatment include IV antibiotic therapies, IV hydration and additional scanning.  CT studies consistent with long segment colitis from transverse to sigmoid colon, associated diverticulitis, indeterminate right renal lesions up to 5.2 cm, subtle asymmetric right basilar groundglass opacifications consistent with atypical pneumonia.  Additional studies include negative findings for C. difficile, positive for Campylobacter on GI panel, MRI of the brain pending.  Per the hospitalist note from today, this is doing well and anticipating home with home health versus skilled nursing facility in 1 to 2 days.    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    *Anemia  8/30/2023: Hb 7.9.  Knowing she will be discharged likely today or tomorrow, transfuse 1 unit PRBC.  Hb up to 9.9 after transfusion  Hb 8.7    *Hyponatremia  Sodium 131    Plan  Sees Dr. Lagunas as an outpatient.   Note CT abdomen pelvis with contrast done 8/26/2023 with no clear evidence of progression of disease.  She will be out of town on a motorcycle trip (riding in a side car), 9/13/2023 - 9/18/2023.  Therefore, it would  likely be best to do the PET scan and appointment Dr. Lagunas sometime after that.  I let the office know  Regarding carotid stenting, I estimate life expectancy regarding cancer of around 1 year, a range of around 6 to 18 months or so    Chart reviewed and summarized including Radha Rivera NP with neurology note and Yarelis Hernandez NP with neurology note.

## 2023-09-01 NOTE — PLAN OF CARE
Goal Outcome Evaluation:      Pt resting in bed. Heating pad applied. Medicated for pain per MAR. VSS No s/s of distress

## 2023-09-01 NOTE — ADDENDUM NOTE
Addended by: EDOUARD FUENTES on: 1/23/2023 10:36 AM     Modules accepted: Orders    
78y female w/ pmh of HLD presents to ED w/ vomiting. Pt accompanied by daughter. Daughter states pt has been vomiting whenever she eats or drinks for the past week. Daughter states pt has been having GI issues for the past 5 months and is to follow up with GI and has scheduled endoscopy. Daughter states pt has had decreased Po intake for past few months and has lost weight. Daughter states the symptoms have become more severe this past week including a fever of 101 today prompting her to take her to ED. Daughter states pt took tylenol at 7 pm. Daughter states pt has increased weakness and dizziness and needs assistance walking. Pt is ambulatory with assistance. Pt denies chest pain, shortness of breath, diarrhea, urinary symptoms.

## 2023-09-01 NOTE — PLAN OF CARE
Goal Outcome Evaluation:  Plan of Care Reviewed With: patient        Progress: improving  Outcome Evaluation: Pt agreed to PT session, pt vasquez amb `60ft this session w/rwx, some assist to guide rwx, pt said it felt better placing LUE on rwx this visit; pt vasquez seated LE exer x10 reps EOB, pt plans to DC home w/niece assisting, has all equipment       Anticipated Discharge Disposition (PT): home with assist, home with home health

## 2023-09-01 NOTE — PROGRESS NOTES
"DOS: 2023  NAME: Pam Vidal   : 1945  PCP: Nik Mckay MD  Chief Complaint   Patient presents with    Dizziness     Stroke    Subjective:     Interval History  Pt seen in follow up today, however the problem is new to the examiner.  Patient Complaints:  No acute events overnight.  Patient is sitting up in bed watching television.  She reports that she still is experiencing pain to her left upper extremity as well as paresthesias however has improved and is now affecting her from below the elbow to her fingers.  No family at bedside.  History taken from: patient chart    Objective:  Vital signs: /60 (BP Location: Left arm, Patient Position: Lying)   Pulse 79   Temp 99 °F (37.2 °C) (Oral)   Resp 16   Ht 152.4 cm (60\")   Wt 58.6 kg (129 lb 3 oz)   SpO2 97%   BMI 25.23 kg/m²       Physical Exam:  GENERAL: NAD, alert and cooperative  HEENT: Normocephalic, atraumatic   Resp: Even and unlabored no audible wheezes,   Skin: Warm and dry, no rashes  Psychiatric: Normal mood and affect.    Neurological:   MS: AOx3, recent/remote memory intact, normal attention/concentration, language intact, no neglect   CN: visual acuity grossly normal, PERRL, EOMI, no nystagmus, no facial droop, no dysarthria, hearing symmetric, tongue midline, shoulder shrug symmetric  Motor: 4/5 LUE. 5/5 strength in remaining extremities. Normal tone.   Sensory: LUE increased cold temperature sensation, no vibratory sensation, normal light touch.  RUE/RLE/LLE intact to light touch, cold temp, and vibratory sensation.  Coordination: No dysmetria finger to nose test  Rapid alternating movements: Normal finger to thumb tap  Gait and station: Deferred  Reflexes: 2+ patellar, achilles.  Up-going toes    Results Review: I have reviewed MRI brain and see no evidence of acute infarction   I reviewed the patient's new clinical results.  I reviewed the patient's new imaging results and agree with the interpretation.  I reviewed " the patient's other test results and agree with the interpretation  Discussed with Dr. Orellana    Current Medications:  Scheduled Medications:cefepime, 2,000 mg, Intravenous, Q8H  DULoxetine, 30 mg, Oral, Daily  ipratropium-albuterol, 3 mL, Nebulization, 4x Daily - RT  levETIRAcetam, 500 mg, Oral, BID  pantoprazole, 40 mg, Oral, Q AM  predniSONE, 10 mg, Oral, Daily  pregabalin, 150 mg, Oral, Q12H  senna-docusate sodium, 2 tablet, Oral, BID  sodium chloride, 10 mL, Intravenous, Q12H  sodium chloride, 10 mL, Intravenous, Q12H      Infusions: lactated ringers, 75 mL/hr, Last Rate: 75 mL/hr (08/31/23 1304)      PRN Medications:    acetaminophen    albuterol    senna-docusate sodium **AND** polyethylene glycol **AND** bisacodyl **AND** bisacodyl    heparin    heparin    HYDROcodone-acetaminophen    Magnesium Standard Dose Replacement - Follow Nurse / BPA Driven Protocol    melatonin    Morphine    ondansetron **OR** ondansetron    Potassium Replacement - Follow Nurse / BPA Driven Protocol    Access VAD **AND** sodium chloride    sodium chloride    sodium chloride    sodium chloride    sodium chloride    sodium chloride    sodium chloride    Laboratory results:  Lab Results   Component Value Date    GLUCOSE 82 09/01/2023    CALCIUM 8.7 09/01/2023     (L) 09/01/2023    K 3.9 09/01/2023    CO2 23.5 09/01/2023    CL 98 09/01/2023    BUN 8 09/01/2023    CREATININE 0.59 09/01/2023    BCR 13.6 09/01/2023    ANIONGAP 9.5 09/01/2023     Lab Results   Component Value Date    WBC 8.83 09/01/2023    HGB 8.7 (L) 09/01/2023    HCT 26.3 (L) 09/01/2023    MCV 86.5 09/01/2023     09/01/2023          Lab Results   Component Value Date    INR 0.91 06/11/2023    INR 1.01 05/27/2023    INR 1.03 05/26/2023    PROTIME 12.4 06/11/2023    PROTIME 13.4 05/27/2023    PROTIME 13.6 05/26/2023     Lab Results   Component Value Date    TSH 2.150 05/25/2023     No components found for: LDLCALC  Lab Results   Component Value Date    HGBA1C  5.70 (H) 05/26/2023     No components found for: B12    Review and interpretation of imaging:   CT Head Without Contrast    Result Date: 8/26/2023  CT HEAD  HISTORY: Mental status change, unknown cause  TECHNIQUE: CT scan of the head was obtained with 3 mm axial images without intravenous contrast.  Radiation dose reduction techniques were utilized, including automated exposure control and exposure modulation based on body size.  COMPARISON: Head CT 5/26/2023 and 6/11/2023  FINDINGS AND compression: Postsurgical changes from right parietal craniotomy and mass resection are present, as before. The previously seen vasogenic edema projecting from the operative bed has decreased.  The irregular hypodensity within the operative bed measures up to 3.3 cm, grossly unchanged since 8/26/2023. There is no significant midline shift. Hypodensity within the doron has a more prominent appearance when compared with prior CTs. While findings may be related to streak artifact, further evaluation with MRI of the brain is recommended to exclude acute intracranial pathology.      This report was finalized on 8/26/2023 7:04 PM by Dr. Fady Davis M.D.      CT Angiogram Neck    Result Date: 8/30/2023  CONTRAST-ENHANCED CT ANGIOGRAM OF THE HEAD AND NECK ON 08/30/2023  CLINICAL HISTORY: Patient has a history of 5 cm right parietal occipital craniotomy on 05/30/2023 to resect a 3 cm enhancing posterior superior lateral right occipital lobe mass found to be at surgical pathology poorly differentiated adenocarcinoma of lung primary. The patient currently has a chief complaint of dizziness and confusion  TECHNIQUE: Spiral CT images were obtained from the base of the skull to the vertex both pre and post intravenous contrast. The images were reformatted and submitted in 3 mm thick axial sagittal and coronal CT sections with brain algorithm additional spiral CT angio images were obtained from the top of the aortic arch up through the great  vessels the head and neck during the arterial phase of contrast and images were reformatted and submitted 1 mm thick axial sagittal and coronal CT sections with soft tissue algorithm and 3D reconstructions were performed to complete the CT angiogram of the head and neck.  This is correlated to preoperative MRI of the brain from [facility] on 05/25/2023 and 05/26/2023 and postoperative MRI of the brain 05/31/2023 and an MRI of the brain on 08/27/2023.  FINDINGS: HEAD CT: On 05/30/2023 the patient underwent a 5 cm right parietal occipital craniotomy for resection of a 3.7 x 2.9 x 2.7 cm posterior superior lateral right occipital lobe enhancing mass that had 8 x 5.4 cm with surrounding edema. There is postoperative resection cavity posterior superior lateral right occipital lobe measuring 2.3 x 1.5 x 2 cm. There is surrounding low density right parieto-occipital white matter likely surrounding gliosis and encephalomalacia. There are patchy areas of low-density in the periventricular extending subcortical white matter of the cerebral hemispheres and central pontine white matter consistent with mild-moderate small vessel disease. The remainder of the brain parenchyma is normal in attenuation. The ventricles are normal in size. I see no mass effect. No midline shift. No extra-axial fluid collections are identified. On the delayed postcontrast images there is some curvilinear enhancement in the superior midline of the cerebellum at the site of a treated metastatic lesion previously seen posterolateral left occipital cortical lesions unable to be appreciated. Otherwise no abnormal enhancement is seen in the head. Other than the 5 cm right parietal occipital craniotomy defect the calvarium and the skull base are normal in appearance. The paranasal sinuses and the mastoid air cells and middle ear cavities are clear. The orbits are unremarkable.  CT angiogram of the neck: The nasopharynx, oral pharynx,  hypopharynx, true cords and subglottic airway are normal in appearance. The thyroid gland enhances homogeneously and is normal in appearance. There is a large mass in the medial aspect of the right upper lobe and right lung apex that measures up to 5.8 x 4.7 x 6.4 cm anteroposterior lateral craniocaudal mention appears larger than on CT angio the chest on 06/11/2023 where it measured 3.8 x 3.1 x 4 cm. It appears to invade medial extrapleural fat and extends into the mediastinal fat along the right lateral edge of the esophagus right posterolateral tracheal margin. There is some airspace consolidation in the superior segment of the right lower lobe could be pneumonia.. There is small posterior layering right pleural effusion. The parotid,  parapharyngeal and submandibular spaces are symmetric and are normal in appearance. Cervical spondylosis is present at C3-4. There is severe to space narrowing. There are degenerative endplate changes prominent sclerosis throughout the C4 vertebrae and mid and superior body endplate of C5 likely degenerative sclerosis posterior spurring mild canal narrowing uncovertebral joint hypertrophy and there is moderate bilateral bony foraminal narrowing. At C5-6 there is posterior spurring mild canal narrowing uncovertebral joint spurring mild bilateral foraminal narrowing. There are extensive calcified plaques in the aortic arch. Calcified plaque extends into the origin and proximal aspect of the left subclavian artery severely narrows the left subclavian artery origin and moderate to severely narrows the proximal left subclavian artery. The mid and distal left subclavian artery is normal in caliber. The left vertebral artery origins normal appearance and no stenosis is seen in the cervical segment of the left vertebral artery. There is mixed calcified noncalcified plaque that severely narrows the left common carotid origin down to 2 mm. Beyond its origin the left common carotid  artery returns to normal caliber and no stenosis is seen. The remainder of the left common carotid arteries bifurcation and left internal and external carotid arteries is within normal limits. No stenosis is seen the cervical segment of the left internal carotid artery using the NASCET criteria. Calcified plaque moderately narrows the origin of the brachiocephalic artery. The mid and distal brachiocephalic artery is normal in caliber. Calcified plaque mildly narrows the right subclavian origin beyond its origin right subclavian artery is widely patent without stenosis. The heavily calcified plaque moderate to severely narrows the right vertebral artery origin and proximal right vertebral artery and there is calcified plaque at moderately narrows the right vertebral artery at the T1 thoracic level noncalcified plaque moderate severely narrows the right vertebral artery at the C7 cervical level and above the C7 cervical level the right vertebral artery is widely patent to the vertebrobasilar junction. The right common carotid origins normal in appearance. No stenosis is seen in the right common carotid artery bifurcation into the right internal and external carotid arteries in normal appearance. No stenosis is seen in the cervical segment of the right internal carotid artery using the NASCET criteria.  CT ANGIOGRAM OF THE HEAD: The intracranial segments of distal vertebral arteries are widely patent without stenosis to the vertebrobasilar junction. The basilar artery and basilar tip is normal in appearance. There is hypoplastic P1 segment right posterior cerebral artery and dominant right posterior communicating artery which is a normal variation and both posterior cerebral and superior cerebellar arteries are within normal limits. The upper cervical and petrous cavernous and supracavernous segments of the internal carotid arteries are normal in appearance. The A1 segments of anterior cerebral arteries and anterior  communicating artery origin A2 segments of the anterior cerebral arteries are within normal limits. The M1 segments the middle cerebral arteries middle cerebral artery bifurcations are within normal limits. There is good filling of the M2 branches in the sylvian fissure.      1. The CT of the head is without change when compared to the MRI of the brain 3 days ago on 08/27/2023. On 05/30/2023 the patient underwent a 5 cm right parietal occipital craniotomy to resect a 3.7 x 2.9 x 2.7 cm posterior superior lateral right occipital lobe enhancing mass found with surgical pathology to be poorly differentiated adenocarcinoma metastasis from a right lung primary. I see no CT evidence of recurrent enhancing lesion at this site. There is surrounding low-density likely gliosis and encephalomalacia. The 2 tiny other enhancing brain mets that were seen on prior MRIs back in 05/2023 including superior cerebellar vermian lesion that previously measured 7 mm is no longer discernible nor is the tiny posterolateral left occipital lobe enhancing met. There is mild-moderate small vessel disease in the cerebral and central pontine white matter. The remainder of the MR head CT is normal. 2. Cervical spondylosis is present and there is severe disc base narrowing degenerative endplate changes at C3-4 with sclerosis throughout the C3 and C4 vertebrae likely degenerative sclerosis. There is posterior spurring mild canal narrowing and uncovertebral hypertrophy and there is moderate bilateral bony foraminal narrowing at C3-4. C5-6 posterior spurs result in mild canal narrowing. There is mild bilateral foraminal narrowing at C5-6 otherwise cervical spine is unremarkable. 3. There is a large mass in the medial aspect of the right upper lobe that appears to invade the medial extrapleural surface and extending to the right side of the posterior mediastinal fat comes close to the right lateral margin of the upper thoracic esophagus. The mass  currently measures 5.8 x 4.7 x 6.4 cm where as on chest CT in 06/2023 it measured 3.8 x 3.1 x 4 cm and has enlarged in the interval. There is multifocal airspace consolidation in portions of the right upper lobe and the visualized superior segment of the right lower lobe that could be pneumonia and there is small posterior layering right pleural effusion. I strongly recommend a contrast-enhanced CT of the chest to further evaluate the findings and it can be correlated with most recent prior chest CT from [facility] on 06/11/2023. 4. There are extensive calcified atherosclerotic plaques throughout the aortic arch, calcified plaque extends into and severely narrows the left subclavian artery origin and moderate to severely narrows the proximal left subclavian artery. There is a calcified plaque that severely narrows the left common carotid artery origin down 2 mm in diameter and calcified plaque moderately narrows the brachiocephalic artery origin. Calcified plaque mildly narrows the right subclavian artery origin. Furthermore, there is a mixed calcified and noncalcified plaque that moderate to severely narrows the right vertebral artery origin and the proximal right vertebral artery and there is calcified plaque that moderately narrows the right vertebral artery at the T1 thoracic level and noncalcified plaque moderate to severely narrows the right vertebral artery at the C7 cervical level. The remainder of the CT angiogram of the head and neck is normal with no additional stenosis seen in the great vessels of the neck in particular no stenosis is seen in the cervical or intracranial segments of the left vertebral artery and no stenosis seen in the cervical segment of the internal carotid arteries bilaterally and no intracranial vascular stenoses or occlusions are identified. The results of the study and recommendation for contrast-enhanced CT of the chest were communicated to Dr. Alaniz from stroke  neurology by telephone on 08/30/2023 at 1:30 p.m.  Radiation dose reduction techniques were utilized, including automated exposure control and exposure modulation based on body size.   This report was finalized on 8/30/2023 6:47 PM by Dr. Song Cruz M.D.      CT Chest With Contrast Diagnostic    Result Date: 8/31/2023  CT CHEST WITH IV CONTRAST  HISTORY: Pulmonary adenocarcinoma with brain metastases status post pulmonary radiotherapy completed October 2022.  TECHNIQUE: Radiation dose reduction techniques were utilized, including automated exposure control and exposure modulation based on body size. 3 mm images were obtained through the chest after the administration of IV contrast.  COMPARISON: CT abdomen/pelvis 8/26/2023. CT chest angiogram 6/11/2023. CT the chest 6/27/2022.  FINDINGS: Perihilar right upper lobe 5.6 x 4.5 cm solid mass with wide abutment of the mediastinal pleura with mediastinal invasion evidenced by abutment of the trachea and esophagus (series 2 image 24). Large central area of hypoattenuation within the mass suggesting necrosis. Mass causes new obliteration of a right upper lobe segmental pulmonary artery. Mass measured 3.5 x 2.8 cm on 6/11/2023 CT chest with remeasurement. New mixed groundglass opacities and consolidation in the inferior aspect of the right upper lobe and superior segment of the right lower lobe. New segmental groundglass opacities in the left upper lobe. New small-moderate right pleural effusion.  Mediastinal lymph nodes remain subcentimeter. No left hilar lymph nodes. Heart is normal in size with moderate coronary artery calcifications. Nondilated thoracic aorta and main pulmonary artery. Decompressed esophagus. No focal osseous lesion..      1. Enlarging right upper lobe perihilar pulmonary mass with mediastinal invasion (5.6 cm compared to 3.5 cm on June 2023 CT chest). 2. New right greater than left mixed consolidation and groundglass opacities are concerning for  superimposed pneumonitis/pneumonia. 3. New small-moderate right pleural effusion.  This report was finalized on 8/31/2023 5:41 PM by Dr. Petey Mcknight M.D.      MRI Brain With & Without Contrast    Result Date: 8/27/2023  Patient: SHANA ZAZUETA  Time Out: 20:29 Exam(s): MRI HEAD W WO Contrast IV Amt: multihance 11ml EXAM: MR Head Without and With Intravenous Contrast CLINICAL HISTORY: Reason for exam: hx of brain mass, s p resection. recent ct with rec of MRI brain. TECHNIQUE: Magnetic resonance images of the head brain without and with intravenous contrast in multiple planes. COMPARISON: CT head 8 26 23; MRI brain 7 9 23 FINDINGS: Patient has undergone previous right parieto-occipital craniotomy.  There is a subjacent resection cavity at the right parieto-occipital junction.  Susceptibility artifact along the margins of the resection cavity is compatible with old blood products, a normal postoperative finding.  There is jersey-resectional gliosis, unchanged from MRI of 7 9 23.  Thin marginal enhancement along the resection cavity, as well as adjacent meningeal enhancement, is grossly unchanged.  No enhancing mass lesion is visualized within the brain. There is no diffusion restriction to suggest acute cerebral ischemia.  There is no evidence of acute intracranial hemorrhage.  There is no mass- effect or midline shift. Periventricular, deep cerebral white matter, and pontine white matter foci of T2 FLAIR signal hyperintensity are compatible with chronic small vessel ischemic disease.  There is mild generalized parenchymal volume loss.  No hydrocephalus is observed. Patient has undergone previous bilateral lens replacement.  Sinuses and mastoid air cells are clear. IMPRESSION:   1.  Status post right parieto-occipital craniotomy with subjacent resection cavity.  Expected postoperative changes as detailed above.  No evidence of tumor recurrence. 2.  Moderate chronic small vessel ischemic changes. 3.  No specific  pontine abnormality aside from chronic small vessel ischemic disease.  Finding on reference CT likely reflected artifact. 4.  No acute infarct.  No acute hemorrhage.     Electronically signed by Justa Saini M.D. on 08-27-23 at 2029    CT Abdomen Pelvis With Contrast    Result Date: 8/26/2023  CT ABDOMEN AND PELVIS WITH IV CONTRAST  HISTORY: Nausea vomiting, history of lung cancer with intracranial metastasis  TECHNIQUE: Radiation dose reduction techniques were utilized, including automated exposure control and exposure modulation based on body size. 3 mm images were obtained through the abdomen and pelvis after the administration of IV contrast.  COMPARISON: CT abdomen pelvis 6/7/2023  FINDINGS:  Asymmetric right basilar groundglass opacification is present, not seen on 6/7/2023. There are no findings of small bowel obstruction. The appendix is surgically absent mild to moderate intra and extrahepatic bile duct dilation is present status post cholecystectomy, as before. The pancreas, spleen and adrenal glands have an unremarkable postcontrast CT appearance. Subcentimeter renal lesions are too small to characterize. Larger hypodense lesions demonstrating density less than 15 Hounsfield units are likely benign Bosniak 2018 criteria. There are indeterminate density lesions within the right kidney. Lesion within the inferior pole of the right kidney measuring up to 5.2 cm, as before. There is no hydronephrosis. The bladder is underdistended and not well evaluated. The uterus is surgically absent.  No abdominal pelvic adenopathy by size criteria. There is colonic diverticulosis. There is a long segment of bowel wall thickening with mucosal hyperenhancement extending from the transverse through the sigmoid colon. There appears to be subtle surrounding pericolonic fat stranding as well. No free intraperitoneal air is seen.  For the purpose of this dictation, last well-formed space referred to as L5-S1. There is mild to  moderate anterolisthesis of L5 on S1.      1.  Findings of a long segment of colitis extending from the transverse through the sigmoid colon. There is associated diverticulosis. Findings are favored be infectious in etiology given the distribution with diverticulitis less likely. Correlation with patient history is recommended. Stool sample may be helpful. 2.  Indeterminate right renal lesions measuring up to 5.2 cm, as before. At least continued close interval follow-up is recommended to exclude the plasm. Findings can also be further evaluated with multiphase CT of the abdomen with and without contrast if clinically indicated. 3.  Subtle asymmetric right basilar groundglass opacification suggestive of atypical pneumonia and/or asymmetric edema in the appropriate clinical context and correlation with patient history is recommended. Underlying malignant etiology cannot be excluded given patient history and can be further evaluated with chest CT if clinically indicated. 4.  Other findings as above  This report was finalized on 8/26/2023 9:08 PM by Dr. Fady Davis M.D.      XR Chest 1 View    Result Date: 8/31/2023  XR CHEST 1 VW-  HISTORY: Female who is 78 years-old, wheezing  TECHNIQUE: Frontal view of the chest  COMPARISON: 8/26/2023  FINDINGS: Right chest port appears stable. Heart, mediastinum and pulmonary vasculature are unremarkable. No irregular density in the right upper lung appears similar to prior exam. Minimal likely atelectasis at the bases. No pleural effusion, or pneumothorax. No acute osseous process.      No significant change. Follow-up as clinical indications persist.  This report was finalized on 8/31/2023 8:21 AM by Dr. Prince Nicole M.D.      XR Chest 1 View    Result Date: 8/26/2023  Portable chest radiograph  HISTORY: Vomiting metastatic lung cancer  TECHNIQUE: Single AP portable radiograph of the chest  COMPARISON: Chest radiograph 9/6/2022      FINDINGS AND IMPRESSION: Right  Port-A-Cath tip terminates over the superior vena cava.  Asymmetric, somewhat nodular opacification overlying the right upper lung representing patient's known malignancy has improved since 9/6/2022 but is overall not well evaluated. Findings are best seen on recent chest CT 6/11/2023 please refer to this dictation for further information. Findings can be better characterized with CT chest if clinically indicated.  No pleural effusion or pneumothorax is seen. Asymmetric increased pulm opacification is present within the right lower lung suggestive of atelectasis versus pneumonia in the appropriate clinical context and correlation with patient history is recommended with at least follow-up chest radiograph in 6 to 8 weeks to ensure resolution given patient history. Findings can also be further evaluated with chest CT if clinically indicated. Wires overlie the lower cervical spine.  This report was finalized on 8/26/2023 7:08 PM by Dr. Fady Davis M.D.      CT Angiogram Head    Result Date: 8/30/2023  CONTRAST-ENHANCED CT ANGIOGRAM OF THE HEAD AND NECK ON 08/30/2023  CLINICAL HISTORY: Patient has a history of 5 cm right parietal occipital craniotomy on 05/30/2023 to resect a 3 cm enhancing posterior superior lateral right occipital lobe mass found to be at surgical pathology poorly differentiated adenocarcinoma of lung primary. The patient currently has a chief complaint of dizziness and confusion  TECHNIQUE: Spiral CT images were obtained from the base of the skull to the vertex both pre and post intravenous contrast. The images were reformatted and submitted in 3 mm thick axial sagittal and coronal CT sections with brain algorithm additional spiral CT angio images were obtained from the top of the aortic arch up through the great vessels the head and neck during the arterial phase of contrast and images were reformatted and submitted 1 mm thick axial sagittal and coronal CT sections with soft tissue algorithm and  3D reconstructions were performed to complete the CT angiogram of the head and neck.  This is correlated to preoperative MRI of the brain from [facility] on 05/25/2023 and 05/26/2023 and postoperative MRI of the brain 05/31/2023 and an MRI of the brain on 08/27/2023.  FINDINGS: HEAD CT: On 05/30/2023 the patient underwent a 5 cm right parietal occipital craniotomy for resection of a 3.7 x 2.9 x 2.7 cm posterior superior lateral right occipital lobe enhancing mass that had 8 x 5.4 cm with surrounding edema. There is postoperative resection cavity posterior superior lateral right occipital lobe measuring 2.3 x 1.5 x 2 cm. There is surrounding low density right parieto-occipital white matter likely surrounding gliosis and encephalomalacia. There are patchy areas of low-density in the periventricular extending subcortical white matter of the cerebral hemispheres and central pontine white matter consistent with mild-moderate small vessel disease. The remainder of the brain parenchyma is normal in attenuation. The ventricles are normal in size. I see no mass effect. No midline shift. No extra-axial fluid collections are identified. On the delayed postcontrast images there is some curvilinear enhancement in the superior midline of the cerebellum at the site of a treated metastatic lesion previously seen posterolateral left occipital cortical lesions unable to be appreciated. Otherwise no abnormal enhancement is seen in the head. Other than the 5 cm right parietal occipital craniotomy defect the calvarium and the skull base are normal in appearance. The paranasal sinuses and the mastoid air cells and middle ear cavities are clear. The orbits are unremarkable.  CT angiogram of the neck: The nasopharynx, oral pharynx, hypopharynx, true cords and subglottic airway are normal in appearance. The thyroid gland enhances homogeneously and is normal in appearance. There is a large mass in the medial aspect of the  right upper lobe and right lung apex that measures up to 5.8 x 4.7 x 6.4 cm anteroposterior lateral craniocaudal mention appears larger than on CT angio the chest on 06/11/2023 where it measured 3.8 x 3.1 x 4 cm. It appears to invade medial extrapleural fat and extends into the mediastinal fat along the right lateral edge of the esophagus right posterolateral tracheal margin. There is some airspace consolidation in the superior segment of the right lower lobe could be pneumonia.. There is small posterior layering right pleural effusion. The parotid,  parapharyngeal and submandibular spaces are symmetric and are normal in appearance. Cervical spondylosis is present at C3-4. There is severe to space narrowing. There are degenerative endplate changes prominent sclerosis throughout the C4 vertebrae and mid and superior body endplate of C5 likely degenerative sclerosis posterior spurring mild canal narrowing uncovertebral joint hypertrophy and there is moderate bilateral bony foraminal narrowing. At C5-6 there is posterior spurring mild canal narrowing uncovertebral joint spurring mild bilateral foraminal narrowing. There are extensive calcified plaques in the aortic arch. Calcified plaque extends into the origin and proximal aspect of the left subclavian artery severely narrows the left subclavian artery origin and moderate to severely narrows the proximal left subclavian artery. The mid and distal left subclavian artery is normal in caliber. The left vertebral artery origins normal appearance and no stenosis is seen in the cervical segment of the left vertebral artery. There is mixed calcified noncalcified plaque that severely narrows the left common carotid origin down to 2 mm. Beyond its origin the left common carotid artery returns to normal caliber and no stenosis is seen. The remainder of the left common carotid arteries bifurcation and left internal and external carotid arteries is within normal limits.  No stenosis is seen the cervical segment of the left internal carotid artery using the NASCET criteria. Calcified plaque moderately narrows the origin of the brachiocephalic artery. The mid and distal brachiocephalic artery is normal in caliber. Calcified plaque mildly narrows the right subclavian origin beyond its origin right subclavian artery is widely patent without stenosis. The heavily calcified plaque moderate to severely narrows the right vertebral artery origin and proximal right vertebral artery and there is calcified plaque at moderately narrows the right vertebral artery at the T1 thoracic level noncalcified plaque moderate severely narrows the right vertebral artery at the C7 cervical level and above the C7 cervical level the right vertebral artery is widely patent to the vertebrobasilar junction. The right common carotid origins normal in appearance. No stenosis is seen in the right common carotid artery bifurcation into the right internal and external carotid arteries in normal appearance. No stenosis is seen in the cervical segment of the right internal carotid artery using the NASCET criteria.  CT ANGIOGRAM OF THE HEAD: The intracranial segments of distal vertebral arteries are widely patent without stenosis to the vertebrobasilar junction. The basilar artery and basilar tip is normal in appearance. There is hypoplastic P1 segment right posterior cerebral artery and dominant right posterior communicating artery which is a normal variation and both posterior cerebral and superior cerebellar arteries are within normal limits. The upper cervical and petrous cavernous and supracavernous segments of the internal carotid arteries are normal in appearance. The A1 segments of anterior cerebral arteries and anterior communicating artery origin A2 segments of the anterior cerebral arteries are within normal limits. The M1 segments the middle cerebral arteries middle cerebral artery bifurcations are within  normal limits. There is good filling of the M2 branches in the sylvian fissure.      1. The CT of the head is without change when compared to the MRI of the brain 3 days ago on 08/27/2023. On 05/30/2023 the patient underwent a 5 cm right parietal occipital craniotomy to resect a 3.7 x 2.9 x 2.7 cm posterior superior lateral right occipital lobe enhancing mass found with surgical pathology to be poorly differentiated adenocarcinoma metastasis from a right lung primary. I see no CT evidence of recurrent enhancing lesion at this site. There is surrounding low-density likely gliosis and encephalomalacia. The 2 tiny other enhancing brain mets that were seen on prior MRIs back in 05/2023 including superior cerebellar vermian lesion that previously measured 7 mm is no longer discernible nor is the tiny posterolateral left occipital lobe enhancing met. There is mild-moderate small vessel disease in the cerebral and central pontine white matter. The remainder of the MR head CT is normal. 2. Cervical spondylosis is present and there is severe disc base narrowing degenerative endplate changes at C3-4 with sclerosis throughout the C3 and C4 vertebrae likely degenerative sclerosis. There is posterior spurring mild canal narrowing and uncovertebral hypertrophy and there is moderate bilateral bony foraminal narrowing at C3-4. C5-6 posterior spurs result in mild canal narrowing. There is mild bilateral foraminal narrowing at C5-6 otherwise cervical spine is unremarkable. 3. There is a large mass in the medial aspect of the right upper lobe that appears to invade the medial extrapleural surface and extending to the right side of the posterior mediastinal fat comes close to the right lateral margin of the upper thoracic esophagus. The mass currently measures 5.8 x 4.7 x 6.4 cm where as on chest CT in 06/2023 it measured 3.8 x 3.1 x 4 cm and has enlarged in the interval. There is multifocal airspace consolidation in portions of the  right upper lobe and the visualized superior segment of the right lower lobe that could be pneumonia and there is small posterior layering right pleural effusion. I strongly recommend a contrast-enhanced CT of the chest to further evaluate the findings and it can be correlated with most recent prior chest CT from [facility] on 06/11/2023. 4. There are extensive calcified atherosclerotic plaques throughout the aortic arch, calcified plaque extends into and severely narrows the left subclavian artery origin and moderate to severely narrows the proximal left subclavian artery. There is a calcified plaque that severely narrows the left common carotid artery origin down 2 mm in diameter and calcified plaque moderately narrows the brachiocephalic artery origin. Calcified plaque mildly narrows the right subclavian artery origin. Furthermore, there is a mixed calcified and noncalcified plaque that moderate to severely narrows the right vertebral artery origin and the proximal right vertebral artery and there is calcified plaque that moderately narrows the right vertebral artery at the T1 thoracic level and noncalcified plaque moderate to severely narrows the right vertebral artery at the C7 cervical level. The remainder of the CT angiogram of the head and neck is normal with no additional stenosis seen in the great vessels of the neck in particular no stenosis is seen in the cervical or intracranial segments of the left vertebral artery and no stenosis seen in the cervical segment of the internal carotid arteries bilaterally and no intracranial vascular stenoses or occlusions are identified. The results of the study and recommendation for contrast-enhanced CT of the chest were communicated to Dr. Alaniz from stroke neurology by telephone on 08/30/2023 at 1:30 p.m.  Radiation dose reduction techniques were utilized, including automated exposure control and exposure modulation based on body size.   This  report was finalized on 8/30/2023 6:47 PM by Dr. Song Cruz M.D.       Work-up to date:  MRI brain w/wo: Right occipital s/p craniotomy, mild to moderate small vessel disease, no acute infarct or hemorrhage, no evidence of tumor recurrence.  CTA head/neck: Severe stenosis of the left subclavian artery, moderate to severe plaque of left common carotid origin.  RV origin stenosis.  CUS: Right ICA with no evidence of significant stenosis.  Left ICA <50% stenosis  Chest CT: Enlarging right upper lobe perihilar pulmonary mass with mediastinal invasion (5.6 cm compared to 3.5 cm on 6/2023 CT chest)    Lab Review: , Na+ 131, Hgb 8.7, Hct 26, Plt 279    Diagnoses:  Dizziness  Neuropathic pain  Left subclavian vessel stenosis, severe   Vestibular dysfunction  Primary lung adenocarcinoma, right primary lung adenocarcinoma, right with brain metastasis    Impression: Ms. Vidal is a 78-year-old female with past medical history of stage III lung cancer of right upper lobe with metastasis to the brain s/p recent resection of the right occipital metastatic lesion, radiation, chemo and immunotherapy, COPD, rheumatoid arthritis.  She was initially admitted 8/26/2023 to [facility] with dizziness, confusion, nausea and diarrhea, was diagnosed with Campylobacter infection with sepsis.  She is currently on cefepime.  She also was diagnosed in May with shingles of the left upper extremity and continues to report painful paresthesias.  Neurology was consulted for dizziness which has been ongoing for the past several months that she describes as worse with turning her head side to side, rolling over in bed, or changing positions.  She did not endorse any visual or speech disturbances, unilateral focal weakness, or headache.  She had MR brain imaging which showed no acute infarct.  CTA head/neck revealed severe stenosis of the left subclavian artery.  A carotid ultrasound showed no significant stenosis in her bilateral carotid  arteries.  There is some concern for subclavian steal syndrome versus vertebrobasilar insufficiency due to her symptoms.  We will start her on medical management, Plavix 75 mg and atorvastatin 20 mg for VBI.  Her dizziness could be related to vestibular dysfunction and we recommend vestibular therapy as an outpatient.  Dr. Alaniz with stroke neurology spoke with Dr. Esparza who does not feel any intervention is necessary at this time.  The patient may follow up with vascular surgery outpatient.  She is neurologically stable at this time.  No further work-up planned.  We will sign off and see again upon request.  Call RRT for any acute neurologic change or concern.    Plan:  Start Plavix 75 mg, atorvastatin 20 mg   Follow-up with vascular surgery outpatient for for further evaluation for ? subclavian steal  Follow-up with vestibular therapy outpatient - order placed   Follow-up with neurology as needed    I have discussed the above with the patient, Dr. Gracia and Dr. Orellana who are in agreement with the plan.     Britney Hernandez, BARBARA  09/01/23  09:36 EDT

## 2023-09-01 NOTE — PROGRESS NOTES
Name: Pam Vidal ADMIT: 2023   : 1945  PCP: Nik Mckay MD    MRN: [id number] LOS: 6 days   AGE/SEX: 78 y.o. female  ROOM: [facility]     Subjective   Subjective   Patient resting in bed.  Left upper extremity neuropathic pain still present but much improved since admission.  Patient afebrile.  Dizziness still present and unchanged.  No further diarrhea.    Review of Systems   Constitutional:  Negative for chills and fever.   Respiratory:  Negative for cough and shortness of breath.    Cardiovascular:  Negative for chest pain and leg swelling.   Gastrointestinal:  Negative for abdominal pain, diarrhea and nausea.   Neurological:  Positive for dizziness.   As above     Objective   Objective   Vital Signs  Temp:  [98.8 °F (37.1 °C)-99.5 °F (37.5 °C)] 98.8 °F (37.1 °C)  Heart Rate:  [] 99  Resp:  [16-18] 16  BP: (125-164)/(54-71) 125/71  SpO2:  [93 %-98 %] 96 %  on  Flow (L/min):  [2] 2;   Device (Oxygen Therapy): nasal cannula  Body mass index is 25.23 kg/m².  Physical Exam  Constitutional:       General: She is not in acute distress.     Appearance: She is ill-appearing (Chronically).   Cardiovascular:      Rate and Rhythm: Normal rate and regular rhythm.   Pulmonary:      Effort: Pulmonary effort is normal. No respiratory distress.   Abdominal:      General: Abdomen is flat. There is no distension.      Tenderness: There is no abdominal tenderness.   Musculoskeletal:         General: No swelling or deformity. Normal range of motion.   Skin:     General: Skin is warm and dry.   Neurological:      General: No focal deficit present.      Mental Status: She is alert. Mental status is at baseline.       Results Review     I reviewed the patient's new clinical results.  Results from last 7 days   Lab Units 23  0600 23  0555 23  0625 23  0600   WBC 10*3/mm3 8.83 11.26* 9.71 10.54   HEMOGLOBIN g/dL 8.7* 9.9* 7.9* 8.1*   PLATELETS 10*3/mm3 279 344 309 308     Results from  last 7 days   Lab Units 09/01/23  0600 08/31/23  0555 08/30/23  1715 08/30/23  0625 08/29/23  0600   SODIUM mmol/L 131* 130*  --  134* 134*   POTASSIUM mmol/L 3.9 3.8 4.6 3.3* 3.6   CHLORIDE mmol/L 98 98  --  102 102   CO2 mmol/L 23.5 21.3*  --  21.0* 24.0   BUN mg/dL 8 6*  --  5* 6*   CREATININE mg/dL 0.59 0.63  --  0.53* 0.60   GLUCOSE mg/dL 82 92  --  89 86   Estimated Creatinine Clearance: 64.9 mL/min (by C-G formula based on SCr of 0.59 mg/dL).  Results from last 7 days   Lab Units 08/26/23  1544   ALBUMIN g/dL 3.3*   BILIRUBIN mg/dL 0.3   ALK PHOS U/L 100   AST (SGOT) U/L 31   ALT (SGPT) U/L 33     Results from last 7 days   Lab Units 09/01/23  0600 08/31/23  0555 08/30/23  0625 08/29/23  0600 08/27/23  0621 08/26/23  1544   CALCIUM mg/dL 8.7 8.3* 7.7* 8.2*   < > 9.9   ALBUMIN g/dL  --   --   --   --   --  3.3*   MAGNESIUM mg/dL  --   --  2.3  --   --  2.2    < > = values in this interval not displayed.     Results from last 7 days   Lab Units 08/26/23 2008   LACTATE mmol/L 0.8     COVID19   Date Value Ref Range Status   08/26/2023 Not Detected Not Detected - Ref. Range Final   08/20/2022 Not Detected Not Detected - Ref. Range Final     No results found for: HGBA1C, POCGLU    Duplex Carotid Ultrasound CAR    Right internal carotid artery demonstrates normal flow without evidence   of hemodynamically significant stenosis.    Left internal carotid artery demonstrates a less than 50% stenosis.    I reviewed the patient's daily medications.  Scheduled Medications  atorvastatin, 20 mg, Oral, Nightly  clopidogrel, 75 mg, Oral, Daily  DULoxetine, 30 mg, Oral, Daily  ipratropium-albuterol, 3 mL, Nebulization, 4x Daily - RT  levETIRAcetam, 500 mg, Oral, BID  levoFLOXacin, 750 mg, Oral, Q24H  pantoprazole, 40 mg, Oral, Q AM  predniSONE, 10 mg, Oral, Daily  pregabalin, 150 mg, Oral, Q12H  senna-docusate sodium, 2 tablet, Oral, BID  sodium chloride, 10 mL, Intravenous, Q12H  sodium chloride, 10 mL, Intravenous,  Q12H    Infusions     Diet  Diet: Cardiac Diets; Healthy Heart (2-3 Na+); Texture: Regular Texture (IDDSI 7); Fluid Consistency: Thin (IDDSI 0)         I have personally reviewed:  [x]  Laboratory   [x]  Microbiology   [x]  Radiology   []  EKG/Telemetry   []  Cardiology/Vascular   []  Pathology   []  Records     Assessment/Plan     Active Hospital Problems    Diagnosis  POA    **Dizziness [R42]  Yes    VBI (vertebrobasilar insufficiency) [G45.0]  Yes    Atypical pneumonia [J18.9]  Yes    Campylobacter diarrhea [A04.5]  Yes    Bacterial colitis [A04.9]  Yes    Diarrhea [R19.7]  Yes    Neuropathic pain [M79.2]  Yes    Rheumatoid arthritis [M06.9]  Yes    Brain mass [G93.89]  Yes    Primary lung adenocarcinoma, right [C34.91]  Yes      Resolved Hospital Problems   No resolved problems to display.       78 y.o. female admitted with Dizziness.    Campylobacter infection with sepsis (tachycardia, leukocytosis)  Colitis  -Nausea, diarrhea resolved  -Discussed with patient she has tolerated azithromycin in the past  -Azithromycin 500 mg x 3 days, last day 8/29    Pneumonia  Concern for postobstructive pneumonia with increasing size of lung mass  -Seen on CTA head/neck  -CT with pneumonia and increasing lung mass  -Transition to cefepime to Levaquin    Dizziness  VBI versus subclavian steal syndrome  -Patient admits that this has been going on for about a month  -Neuro consulted-plan for CTA head and neck with extensive calcified plaques throughout the aortic arch with moderate to severe narrowing of the proximal left subclavian artery.    -Neuro had discussed with Dr. Esparza who does not think is any intervention needed at this time.  Neurologist put in a request for VBI outpatient therapy.  Patient not interested in any procedures if it will not completely resolve her dizziness.  -Orthostatics negative.   -Carotid ultrasounds without significant stenosis    Lung cancer with brain metastases  -Status post resection with  neurosurgery.  Had some postop radiation therapy.  MRI from 7/9 with no new sites of disease  -Oncology consulted-patient is to have a PET scan and follow-up with Dr. Lagunas as an outpatient to discuss any further treatment options.    -CT head with no new acute findings, but recommended MRI brain.  MRI brain with expected postoperative changes, no acute findings, no progression of disease  -CT chest with lung mass increased size to 5.6 cm from 3.5 cm    Neuropathic pain from left upper extremity shingles  -Patient opted not gabapentin 900 mg an outpatient and it did not help her, will discontinue  -Continue Cymbalta, Lyrica increased to 150 mg twice daily.  Cymbalta can be further titrated as an outpatient.    Rheumatoid arthritis  -Continue prednisone 10 mg    SCDs for DVT prophylaxis.  Full code.  Discussed with patient, nursing staff, consulting provider, and care team on multidisciplinary rounds.  Anticipate discharge home with family tomorrow.    Patient requested I call her son, Gt and update him.  Discussed with him her hospital course and discharge plan for tomorrow.    Expected Discharge Date: 9/3/2023; Expected Discharge Time:       Todd Garcia MD  [facility]  09/01/23  14:39 EDT

## 2023-09-01 NOTE — PLAN OF CARE
Goal Outcome Evaluation:           Progress: improving  Outcome Evaluation: Pt AOx4. On RA. NSR on monitor. Carotid US this am- no significant stenosis noted. Treated for LUE pain- per MAR. D/c tomorrow- family to d/c home. Plavix and lipitor added per NEURO for vertebrobasilar insufficiency. IV abx changed to PO. IVFs d/c. Code status updated. Pt not in acute distress. Nursing will continue to monitor

## 2023-09-01 NOTE — THERAPY TREATMENT NOTE
Patient Name: Pam Vidal  : 1945    MRN: [id number]                              Today's Date: 2023       Admit Date: 2023    Visit Dx:     ICD-10-CM ICD-9-CM   1. Nausea and vomiting, unspecified vomiting type  R11.2 787.01   2. Diarrhea, unspecified type  R19.7 787.91   3. Other chronic pain  G89.29 338.29   4. Altered mental status, unspecified altered mental status type  R41.82 780.97   5. Dizziness  R42 780.4   6. Generalized weakness  R53.1 780.79   7. Neuropathic pain  M79.2 729.2     Patient Active Problem List   Diagnosis    Primary lung adenocarcinoma, right    Cough with hemoptysis    Lung mass    Advanced care planning/counseling discussion    High risk medication use    Muscular deconditioning    Neoplastic malignant related fatigue    Lung cancer metastatic to brain    Brain mass    COPD (chronic obstructive pulmonary disease)    Rheumatoid arthritis    IBS (irritable bowel syndrome)    Hyperlipidemia    Abnormal CT of the chest    Shingles, left arm    HTN (hypertension)    Dizziness    Campylobacter diarrhea    Bacterial colitis    Diarrhea    Neuropathic pain    VBI (vertebrobasilar insufficiency)    Atypical pneumonia     Past Medical History:   Diagnosis Date    COPD (chronic obstructive pulmonary disease)     Coughing up blood     X2 MONTHS    History of COVID-19 2022    Hyperlipidemia     IBS (irritable bowel syndrome)     Primary lung adenocarcinoma, right     Rheumatoid arthritis      Past Surgical History:   Procedure Laterality Date    APPENDECTOMY      appendix removed    BRONCHOSCOPY N/A 2022    Procedure: BRONCHOSCOPY WITH BAL,  BIOPSIES, AND BRUSHINGS WITH ENDOBRONCHIAL ULTRASOUND WITH FNA;  Surgeon: Gregor Vee MD;  Location: [facility] ENDOSCOPY;  Service: Pulmonary;  Laterality: N/A;  PRE- HILAR MASS  POST- SAME    BRONCHOSCOPY N/A 2023    Procedure: BRONCHOSCOPY with biopsy, lavage, brushing;  Surgeon: Gregor Vee MD;  Location: [facility]  ENDOSCOPY;  Service: Pulmonary;  Laterality: N/A;    CAROTID ENDARTERECTOMY Right     CAROTID ENDARTERECTOMY Left     CATARACT EXTRACTION      CERVICAL FUSION      CHOLECYSTECTOMY      CRANIOTOMY FOR TUMOR Right 5/30/2023    Procedure: RIGHT CRANIOTOMY FOR TUMOR RESECTION STEREOTACTIC WITH STEALTH;  Surgeon: Clint Ricketts MD;  Location: [facility];  Service: Neurosurgery;  Laterality: Right;    HYSTERECTOMY      SHOULDER ARTHROSCOPY Right 02/19/2019    right should scope/cuff repair     VENOUS ACCESS DEVICE (PORT) INSERTION Right 9/6/2022    Procedure: POWERPORT INSERTION;  Surgeon: Remedios Rice MD;  Location: [facility];  Service: Thoracic;  Laterality: Right;      General Information       Row Name 09/01/23 1626          Physical Therapy Time and Intention    Document Type therapy note (daily note)  -     Mode of Treatment individual therapy;physical therapy  -       Row Name 09/01/23 1626          General Information    Patient Profile Reviewed yes  -     Existing Precautions/Restrictions fall  -       Row Name 09/01/23 1626          Living Environment    People in Home other (see comments)  niece lives w/pt and can assist  -       Row Name 09/01/23 1626          Cognition    Orientation Status (Cognition) oriented x 4  -[facility] Name 09/01/23 1626          Safety Issues, Functional Mobility    Impairments Affecting Function (Mobility) endurance/activity tolerance;balance;strength  -               User Key  (r) = Recorded By, (t) = Taken By, (c) = Cosigned By      Initials Name Provider Type     Che Gutierrez PTA Physical Therapist Assistant                   Mobility       Row Name 09/01/23 1629          Bed Mobility    Sit-Supine Powell (Bed Mobility) contact [facility]  -     Assistive Device (Bed Mobility) head of bed elevated;bed rails  -       Row Name 09/01/23 1629          Sit-Stand Transfer    Sit-Stand [place] (Transfers) contact guard  -     Assistive  Device (Sit-Stand Transfers) walker, front-wheeled  -       Row Name 09/01/23 1629          Gait/Stairs (Locomotion)    [place] Level (Gait) contact guard;minimum assist (75% patient effort);verbal cues  -     Assistive Device (Gait) walker, front-wheeled  -     Distance in Feet (Gait) 60ft, some assist to guide rwx  -     Deviations/Abnormal Patterns (Gait) mariia decreased;stride length decreased;festinating/shuffling;weight shifting decreased  -     Bilateral Gait Deviations forward flexed posture  -               User Key  (r) = Recorded By, (t) = Taken By, (c) = Cosigned By      Initials Name Provider Type    Che Parsons PTA Physical Therapist Assistant                   Obj/Interventions       Row Name 09/01/23 1634          Motor Skills    Therapeutic Exercise --  LAQS x10  -               User Key  (r) = Recorded By, (t) = Taken By, (c) = Cosigned By      Initials Name Provider Type    Che Parsons PTA Physical Therapist Assistant                   Goals/Plan    No documentation.                  Clinical Impression       Row Name 09/01/23 1634          Pain    Pretreatment Pain Rating 4/10  -     Posttreatment Pain Rating 4/10  -     Pain Location - Side/Orientation Left  -     Pain Location - elbow  -     Pain Intervention(s) Repositioned;Elevated;Rest  -       Row Name 09/01/23 1634          Plan of Care Review    Plan of Care Reviewed With patient  -     Progress improving  -     Outcome Evaluation Pt agreed to PT session, pt vasquez amb `60ft this session w/rwx, some assist to guide rwx, pt said it felt better placing LUE on rwx this visit; pt vasquez seated LE exer x10 reps EOB, pt plans to DC home w/niece assisting  -       Row Name 09/01/23 1634          Therapy Assessment/Plan (PT)    Rehab Potential (PT) good, to achieve stated therapy goals  -     Criteria for Skilled Interventions Met (PT) yes  -       Row Name 09/01/23 1634          Vital Signs     O2 Delivery Pre Treatment room air  -       Row Name 09/01/23 1634          Positioning and Restraints    Pre-Treatment Position in bed  -     Post Treatment Position bed  -     In Bed fowlers;call light within reach;encouraged to call for assist;exit alarm on;LUE elevated;notified nsg  -               User Key  (r) = Recorded By, (t) = Taken By, (c) = Cosigned By      Initials Name Provider Type    Che Parsons PTA Physical Therapist Assistant                   Outcome Measures       Row Name 09/01/23 1637 09/01/23 0820       How much help from another person do you currently need...    Turning from your back to your side while in flat bed without using bedrails? 3  - 4  -PT    Moving from lying on back to sitting on the side of a flat bed without bedrails? 3  - 3  -PT    Moving to and from a bed to a chair (including a wheelchair)? 3  - 3  -PT    Standing up from a chair using your arms (e.g., wheelchair, bedside chair)? 3  - 3  -PT    Climbing 3-5 steps with a railing? 2  - 3  -PT    To walk in hospital room? 3  - 3  -PT    AM-PAC 6 Clicks Score (PT) 17  - 19  -PT    Highest level of mobility 5 --> Static standing  - 6 --> Walked 10 steps or more  -PT              User Key  (r) = Recorded By, (t) = Taken By, (c) = Cosigned By      Initials Name Provider Type    Che Parsons PTA Physical Therapist Assistant    PT Sofy Bui RN Registered Nurse                                 Physical Therapy Education       Title: PT OT SLP Therapies (Done)       Topic: Physical Therapy (Done)       Point: Mobility training (Done)       Learning Progress Summary             Patient KRISTIE Salazar TB,ALEJANDRO, VU,NR by SU at 9/1/2023 1638                         Point: Home exercise program (Done)       Learning Progress Summary             Patient KRISTIE Salazar TB,ALEJANDRO, VU,NR by SU at 9/1/2023 1638                         Point: Body mechanics (Done)       Learning Progress Summary             Patient  KRISTIE Salazar,ALEJANDRO LEE, JAZMYNE,NR by SU at 9/1/2023 1638                         Point: Precautions (Done)       Learning Progress Summary             Patient KRISTIE Salazar,ROSA,D, VU,NR by SU at 9/1/2023 1638                                         User Key       Initials Effective Dates Name Provider Type [facility] 03/07/18 -  Che Gutierrez PTA Physical Therapist Assistant PT                  PT Recommendation and Plan     Plan of Care Reviewed With: patient  Progress: improving  Outcome Evaluation: Pt agreed to PT session, pt vasquez amb `60ft this session w/rwx, some assist to guide rwx, pt said it felt better placing LUE on rwx this visit; pt vasquez seated LE exer x10 reps EOB, pt plans to DC home w/niece assisting     Time Calculation:         PT Charges       Row Name 09/01/23 1638             Time Calculation    Start Time 1349  -      Stop Time 1412  -      Time Calculation (min) 23 min  -SU      PT Received On 09/01/23  -SU      PT - Next Appointment 09/04/23  -SU                User Key  (r) = Recorded By, (t) = Taken By, (c) = Cosigned By      Initials Name Provider Type     Che Gutierrez PTA Physical Therapist Assistant                  Therapy Charges for Today       Code Description Service Date Service Provider Modifiers Qty    09749799978 HC PT THER PROC EA 15 MIN 9/1/2023 Che Gutierrez PTA GP 2            PT G-Codes  AM-PAC 6 Clicks Score (PT): 17  PT Discharge Summary  Anticipated Discharge Disposition (PT): home with assist, home with home health    Che Gutierrez PTA  9/1/2023

## 2023-09-02 ENCOUNTER — READMISSION MANAGEMENT (OUTPATIENT)
Dept: CALL CENTER | Facility: HOSPITAL | Age: 78
End: 2023-09-02
Payer: MEDICARE

## 2023-09-02 VITALS
WEIGHT: 132.06 LBS | TEMPERATURE: 98.2 F | HEIGHT: 60 IN | OXYGEN SATURATION: 92 % | SYSTOLIC BLOOD PRESSURE: 141 MMHG | RESPIRATION RATE: 18 BRPM | DIASTOLIC BLOOD PRESSURE: 61 MMHG | HEART RATE: 94 BPM | BODY MASS INDEX: 25.93 KG/M2

## 2023-09-02 LAB
ANION GAP SERPL CALCULATED.3IONS-SCNC: 9.7 MMOL/L (ref 5–15)
BUN SERPL-MCNC: 12 MG/DL (ref 8–23)
BUN/CREAT SERPL: 17.6 (ref 7–25)
CALCIUM SPEC-SCNC: 9.2 MG/DL (ref 8.6–10.5)
CHLORIDE SERPL-SCNC: 96 MMOL/L (ref 98–107)
CO2 SERPL-SCNC: 26.3 MMOL/L (ref 22–29)
CREAT SERPL-MCNC: 0.68 MG/DL (ref 0.57–1)
DEPRECATED RDW RBC AUTO: 50.5 FL (ref 37–54)
EGFRCR SERPLBLD CKD-EPI 2021: 90.4 ML/MIN/1.73
ERYTHROCYTE [DISTWIDTH] IN BLOOD BY AUTOMATED COUNT: 15.5 % (ref 12.3–15.4)
GLUCOSE SERPL-MCNC: 90 MG/DL (ref 65–99)
HCT VFR BLD AUTO: 28.7 % (ref 34–46.6)
HGB BLD-MCNC: 9.5 G/DL (ref 12–15.9)
MCH RBC QN AUTO: 29.4 PG (ref 26.6–33)
MCHC RBC AUTO-ENTMCNC: 33.1 G/DL (ref 31.5–35.7)
MCV RBC AUTO: 88.9 FL (ref 79–97)
PLATELET # BLD AUTO: 325 10*3/MM3 (ref 140–450)
PMV BLD AUTO: 9.7 FL (ref 6–12)
POTASSIUM SERPL-SCNC: 3.9 MMOL/L (ref 3.5–5.2)
RBC # BLD AUTO: 3.23 10*6/MM3 (ref 3.77–5.28)
SODIUM SERPL-SCNC: 132 MMOL/L (ref 136–145)
WBC NRBC COR # BLD: 9.22 10*3/MM3 (ref 3.4–10.8)

## 2023-09-02 PROCEDURE — 94664 DEMO&/EVAL PT USE INHALER: CPT

## 2023-09-02 PROCEDURE — 94761 N-INVAS EAR/PLS OXIMETRY MLT: CPT

## 2023-09-02 PROCEDURE — 85027 COMPLETE CBC AUTOMATED: CPT | Performed by: STUDENT IN AN ORGANIZED HEALTH CARE EDUCATION/TRAINING PROGRAM

## 2023-09-02 PROCEDURE — 25010000002 HEPARIN LOCK FLUSH PER 10 UNITS: Performed by: EMERGENCY MEDICINE

## 2023-09-02 PROCEDURE — 25010000002 MORPHINE PER 10 MG: Performed by: INTERNAL MEDICINE

## 2023-09-02 PROCEDURE — 94799 UNLISTED PULMONARY SVC/PX: CPT

## 2023-09-02 PROCEDURE — 80048 BASIC METABOLIC PNL TOTAL CA: CPT | Performed by: STUDENT IN AN ORGANIZED HEALTH CARE EDUCATION/TRAINING PROGRAM

## 2023-09-02 PROCEDURE — 63710000001 PREDNISONE PER 5 MG: Performed by: STUDENT IN AN ORGANIZED HEALTH CARE EDUCATION/TRAINING PROGRAM

## 2023-09-02 RX ORDER — ATORVASTATIN CALCIUM 20 MG/1
20 TABLET, FILM COATED ORAL NIGHTLY
Qty: 30 TABLET | Refills: 0 | Status: SHIPPED | OUTPATIENT
Start: 2023-09-02

## 2023-09-02 RX ORDER — LEVOFLOXACIN 750 MG/1
750 TABLET ORAL EVERY 24 HOURS
Qty: 6 TABLET | Refills: 0 | Status: SHIPPED | OUTPATIENT
Start: 2023-09-02 | End: 2023-09-08

## 2023-09-02 RX ORDER — CLOPIDOGREL BISULFATE 75 MG/1
75 TABLET ORAL DAILY
Qty: 30 TABLET | Refills: 0 | Status: SHIPPED | OUTPATIENT
Start: 2023-09-02

## 2023-09-02 RX ADMIN — LEVETIRACETAM 500 MG: 500 TABLET, FILM COATED ORAL at 08:06

## 2023-09-02 RX ADMIN — PREGABALIN 150 MG: 75 CAPSULE ORAL at 08:06

## 2023-09-02 RX ADMIN — MORPHINE SULFATE 4 MG: 2 INJECTION, SOLUTION INTRAMUSCULAR; INTRAVENOUS at 08:06

## 2023-09-02 RX ADMIN — IPRATROPIUM BROMIDE AND ALBUTEROL SULFATE 3 ML: .5; 2.5 SOLUTION RESPIRATORY (INHALATION) at 08:12

## 2023-09-02 RX ADMIN — Medication 500 UNITS: at 11:15

## 2023-09-02 RX ADMIN — PREDNISONE 10 MG: 10 TABLET ORAL at 08:06

## 2023-09-02 RX ADMIN — DULOXETINE HYDROCHLORIDE 30 MG: 30 CAPSULE, DELAYED RELEASE ORAL at 08:06

## 2023-09-02 RX ADMIN — HYDROCODONE BITARTRATE AND ACETAMINOPHEN 1 TABLET: 7.5; 325 TABLET ORAL at 10:30

## 2023-09-02 RX ADMIN — CLOPIDOGREL BISULFATE 75 MG: 75 TABLET, FILM COATED ORAL at 08:06

## 2023-09-02 RX ADMIN — PANTOPRAZOLE SODIUM 40 MG: 40 TABLET, DELAYED RELEASE ORAL at 06:23

## 2023-09-02 NOTE — OUTREACH NOTE
Prep Survey      Flowsheet Row Responses   [facility] facility patient discharged from? [place]   Is LACE score < 7 ? No   Eligibility Readm Mgmt   Discharge diagnosis Dizziness   Does the patient have one of the following disease processes/diagnoses(primary or secondary)? Other   Does the patient have Home health ordered? Yes   What is the Home health agency?  [facility]   Is there a DME ordered? No   Prep survey completed? Yes            Maria Luisa WAY - Registered Nurse

## 2023-09-02 NOTE — CASE MANAGEMENT/SOCIAL WORK
Case Management Discharge Note      Final Note: home w/HH         Selected Continued Care - Discharged on 9/2/2023 Admission date: 8/26/2023 - Discharge disposition: Home or Self Care      Destination    No services have been selected for the patient.                Durable Medical Equipment    No services have been selected for the patient.                Dialysis/Infusion    No services have been selected for the patient.                Home Medical Care    No services have been selected for the patient.                Therapy    No services have been selected for the patient.                Community Resources    No services have been selected for the patient.                Community & DME    No services have been selected for the patient.                    Selected Continued Care - Prior Encounters Includes continued care and service providers with selected services from prior encounters from 5/28/2023 to 9/2/2023      Discharged on 6/14/2023 Admission date: 6/11/2023 - Discharge disposition: Home-Health Care [facility]      Home Medical Care       Service Provider Selected Services Address Phone Fax Patient Preferred    [facility] [address], [address], [facility] [address] [phone] --                      Discharged on 6/2/2023 Admission date: 5/25/2023 - Discharge disposition: Home-Health Care [facility]      Durable Medical Equipment       Service Provider Selected Services Address Phone Fax Patient Preferred    [facility] [address], [facility] [id number] [phone] --              Home Medical Care       Service Provider Selected Services Address Phone Fax Patient Preferred    [facility] [address], [address], [facility] [address] [phone] --                               Final Discharge Disposition Code: 06 - home  with home health care

## 2023-09-02 NOTE — DISCHARGE SUMMARY
Patient Name: Pam Vidal  : 1945  MRN: [id number]    Date of Admission: 2023  Date of Discharge:  2023  Primary Care Physician: Nik Mckay MD      Chief Complaint:   Dizziness      Discharge Diagnoses     Active Hospital Problems    Diagnosis  POA    **Dizziness [R42]  Yes    VBI (vertebrobasilar insufficiency) [G45.0]  Yes    Atypical pneumonia [J18.9]  Yes    Campylobacter diarrhea [A04.5]  Yes    Bacterial colitis [A04.9]  Yes    Diarrhea [R19.7]  Yes    Neuropathic pain [M79.2]  Yes    Rheumatoid arthritis [M06.9]  Yes    Brain mass [G93.89]  Yes    Primary lung adenocarcinoma, right [C34.91]  Yes      Resolved Hospital Problems   No resolved problems to display.        Hospital Course     Ms. Vidal is a 78 y.o. 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  Please see the admitting history and physical for further details.  She was found to have Campylobacter infection/colitis and possible vertebrobasilar insufficiency versus subclavian steal syndrome was admitted to the hospital for further evaluation and treatment.  For the patient's Campylobacter infection with colitis she was treated with 3 days of azithromycin and supportive care with subsequent improvement.  She is not complaining of diarrhea at the time of discharge.  Neurology was consulted for evaluation of the patient's dizziness, a CTA head and neck was obtained which revealed extensive calcified plaques throughout the aortic arch with moderate to severe narrowing of the proximal left subclavian artery.  Neurology discussed with neurosurgery, who recommended medical management at this time and she has been started on Plavix and atorvastatin at discharge.  She will follow-up with vascular surgery as an outpatient for further evaluation of her subclavian steal syndrome, in order for vestibular rehab has also been  placed in case her dizziness is related to vertebrobasilar insufficiency.  While admitted, oncology also followed, they recommend continued outpatient follow-up with her primary oncologist Dr. Lagunas with plans to reimage with PET scan at some point.  She will follow-up with them sometime during the second half of September as she has an important trip/will be out of town until 9/18.  The patient also continued to complain of postherpetic neuralgia and had no improvement with her home gabapentin.  She has been transition to Lyrica with improvement, she has been instructed to follow-up with her PCP for continued titration of her duloxetine for improved pain control.  While admitted the patient was also noted to have a pneumonia visualized on CT, she was initiated on antibiotics and will discharge to complete a 7-day course.  The patient has been instructed follow-up with her PCP and keep all follow-ups with specialist.  Day of Discharge     Subjective:  Resting in bed, still having pain but current pain medications are working.  She says she feels better compared to when she arrived with gabapentin switching to Lyrica.    Physical Exam:  Temp:  [98.2 °F (36.8 °C)-98.8 °F (37.1 °C)] 98.2 °F (36.8 °C)  Heart Rate:  [81-94] 94  Resp:  [18] 18  BP: (134-144)/(58-61) 141/61  Body mass index is 25.79 kg/m².  Physical Exam  Constitutional:       General: She is not in acute distress.     Appearance: She is ill-appearing (Chronically).   Cardiovascular:      Rate and Rhythm: Normal rate and regular rhythm.   Pulmonary:      Effort: Pulmonary effort is normal. No respiratory distress.   Abdominal:      General: Abdomen is flat. There is no distension.      Tenderness: There is no abdominal tenderness.   Musculoskeletal:         General: No swelling or deformity. Normal range of motion.   Skin:     General: Skin is warm and dry.   Neurological:      General: No focal deficit present.      Mental Status: She is alert. Mental  status is at baseline.       Consultants     Consult Orders (all) (From admission, onward)       Start     Ordered    08/29/23 1058  Inpatient Neurology Consult General  Once        Specialty:  Neurology  Provider:  Mia Stephens MD    08/29/23 1057    08/26/23 1846  Hematology & Oncology Inpatient Consult  Once        Specialty:  Hematology and Oncology  Provider:  Cruzito Lagunas MD    08/26/23 1845    08/26/23 1818  [facility] (on-call MD unless specified) Details  Once,   Status:  Canceled        Specialty:  Hospitalist  Provider:  (Not yet assigned)    08/26/23 1817                  Procedures     Imaging Results (All)       Procedure Component Value Units Date/Time    CT Chest With Contrast Diagnostic [[id number]] Collected: 08/31/23 1716     Updated: 08/31/23 1744    Narrative:      CT CHEST WITH IV CONTRAST     HISTORY: Pulmonary adenocarcinoma with brain metastases status post  pulmonary radiotherapy completed October 2022.     TECHNIQUE: Radiation dose reduction techniques were utilized, including  automated exposure control and exposure modulation based on body size.   3 mm images were obtained through the chest after the administration of  IV contrast.     COMPARISON: CT abdomen/pelvis 8/26/2023. CT chest angiogram 6/11/2023.  CT the chest 6/27/2022.     FINDINGS: Perihilar right upper lobe 5.6 x 4.5 cm solid mass with wide  abutment of the mediastinal pleura with mediastinal invasion evidenced  by abutment of the trachea and esophagus (series 2 image 24). Large  central area of hypoattenuation within the mass suggesting necrosis.  Mass causes new obliteration of a right upper lobe segmental pulmonary  artery. Mass measured 3.5 x 2.8 cm on 6/11/2023 CT chest with  remeasurement. New mixed groundglass opacities and consolidation in the  inferior aspect of the right upper lobe and superior segment of the  right lower lobe. New segmental groundglass opacities in the left upper  lobe. New small-moderate right  pleural effusion.     Mediastinal lymph nodes remain subcentimeter. No left hilar lymph nodes.  Heart is normal in size with moderate coronary artery calcifications.  Nondilated thoracic aorta and main pulmonary artery. Decompressed  esophagus. No focal osseous lesion..       Impression:      1. Enlarging right upper lobe perihilar pulmonary mass with mediastinal  invasion (5.6 cm compared to 3.5 cm on June 2023 CT chest).  2. New right greater than left mixed consolidation and groundglass  opacities are concerning for superimposed pneumonitis/pneumonia.  3. New small-moderate right pleural effusion.     This report was finalized on 8/31/2023 5:41 PM by Dr. Petey Mcknight M.D.       XR Chest 1 View [[id number]] Collected: 08/31/23 0815     Updated: 08/31/23 0824    Narrative:      XR CHEST 1 VW-     HISTORY: Female who is 78 years-old, wheezing     TECHNIQUE: Frontal view of the chest     COMPARISON: 8/26/2023     FINDINGS: Right chest port appears stable. Heart, mediastinum and  pulmonary vasculature are unremarkable. No irregular density in the  right upper lung appears similar to prior exam. Minimal likely  atelectasis at the bases. No pleural effusion, or pneumothorax. No acute  osseous process.       Impression:      No significant change. Follow-up as clinical indications  persist.     This report was finalized on 8/31/2023 8:21 AM by Dr. Prince Nicole M.D.       CT Angiogram Head [[id number]] Collected: 08/30/23 1459     Updated: 08/30/23 1850    Narrative:      CONTRAST-ENHANCED CT ANGIOGRAM OF THE HEAD AND NECK ON 08/30/2023     CLINICAL HISTORY: Patient has a history of 5 cm right parietal occipital  craniotomy on 05/30/2023 to resect a 3 cm enhancing posterior superior  lateral right occipital lobe mass found to be at surgical pathology  poorly differentiated adenocarcinoma of lung primary. The patient  currently has a chief complaint of dizziness and confusion     TECHNIQUE: Spiral CT images were  obtained from the base of the skull to  the vertex both pre and post intravenous contrast. The images were  reformatted and submitted in 3 mm thick axial sagittal and coronal CT  sections with brain algorithm additional spiral CT angio images were  obtained from the top of the aortic arch up through the great vessels  the head and neck during the arterial phase of contrast and images were  reformatted and submitted 1 mm thick axial sagittal and coronal CT  sections with soft tissue algorithm and 3D reconstructions were  performed to complete the CT angiogram of the head and neck.     This is correlated to preoperative MRI of the brain [facility] on 05/25/2023 and 05/26/2023 and postoperative MRI of the  brain 05/31/2023 and an MRI of the brain on 08/27/2023.     FINDINGS:  HEAD CT: On 05/30/2023 the patient underwent a 5 cm right parietal  occipital craniotomy for resection of a 3.7 x 2.9 x 2.7 cm posterior  superior lateral right occipital lobe enhancing mass that had 8 x 5.4 cm  with surrounding edema. There is postoperative resection cavity  posterior superior lateral right occipital lobe measuring 2.3 x 1.5 x 2  cm. There is surrounding low density right parieto-occipital white  matter likely surrounding gliosis and encephalomalacia. There are patchy  areas of low-density in the periventricular extending subcortical white  matter of the cerebral hemispheres and central pontine white matter  consistent with mild-moderate small vessel disease. The remainder of the  brain parenchyma is normal in attenuation. The ventricles are normal in  size. I see no mass effect. No midline shift. No extra-axial fluid  collections are identified. On the delayed postcontrast images there is  some curvilinear enhancement in the superior midline of the cerebellum  at the site of a treated metastatic lesion previously seen  posterolateral left occipital cortical lesions unable to be appreciated.  Otherwise no  abnormal enhancement is seen in the head. Other than the 5  cm right parietal occipital craniotomy defect the calvarium and the  skull base are normal in appearance. The paranasal sinuses and the  mastoid air cells and middle ear cavities are clear. The orbits are  unremarkable.     CT angiogram of the neck: The nasopharynx, oral pharynx, hypopharynx,  true cords and subglottic airway are normal in appearance. The thyroid  gland enhances homogeneously and is normal in appearance. There is a  large mass in the medial aspect of the right upper lobe and right lung  apex that measures up to 5.8 x 4.7 x 6.4 cm anteroposterior lateral  craniocaudal mention appears larger than on CT angio the chest on  06/11/2023 where it measured 3.8 x 3.1 x 4 cm. It appears to invade  medial extrapleural fat and extends into the mediastinal fat along the  right lateral edge of the esophagus right posterolateral tracheal  margin. There is some airspace consolidation in the superior segment of  the right lower lobe could be pneumonia.. There is small posterior  layering right pleural effusion. The parotid,  parapharyngeal  and submandibular spaces are symmetric and are normal in appearance.  Cervical spondylosis is present at C3-4. There is severe to space  narrowing. There are degenerative endplate changes prominent sclerosis  throughout the C4 vertebrae and mid and superior body endplate of C5  likely degenerative sclerosis posterior spurring mild canal narrowing  uncovertebral joint hypertrophy and there is moderate bilateral bony  foraminal narrowing. At C5-6 there is posterior spurring mild canal  narrowing uncovertebral joint spurring mild bilateral foraminal  narrowing. There are extensive calcified plaques in the aortic arch.  Calcified plaque extends into the origin and proximal aspect of the left  subclavian artery severely narrows the left subclavian artery origin and  moderate to severely narrows the proximal left  subclavian artery. The  mid and distal left subclavian artery is normal in caliber. The left  vertebral artery origins normal appearance and no stenosis is seen in  the cervical segment of the left vertebral artery. There is mixed  calcified noncalcified plaque that severely narrows the left common  carotid origin down to 2 mm. Beyond its origin the left common carotid  artery returns to normal caliber and no stenosis is seen. The remainder  of the left common carotid arteries bifurcation and left internal and  external carotid arteries is within normal limits. No stenosis is seen  the cervical segment of the left internal carotid artery using the  NASCET criteria. Calcified plaque moderately narrows the origin of the  brachiocephalic artery. The mid and distal brachiocephalic artery is  normal in caliber. Calcified plaque mildly narrows the right subclavian  origin beyond its origin right subclavian artery is widely patent  without stenosis. The heavily calcified plaque moderate to severely  narrows the right vertebral artery origin and proximal right vertebral  artery and there is calcified plaque at moderately narrows the right  vertebral artery at the T1 thoracic level noncalcified plaque moderate  severely narrows the right vertebral artery at the C7 cervical level and  above the C7 cervical level the right vertebral artery is widely patent  to the vertebrobasilar junction. The right common carotid origins normal  in appearance. No stenosis is seen in the right common carotid artery  bifurcation into the right internal and external carotid arteries in  normal appearance. No stenosis is seen in the cervical segment of the  right internal carotid artery using the NASCET criteria.     CT ANGIOGRAM OF THE HEAD: The intracranial segments of distal vertebral  arteries are widely patent without stenosis to the vertebrobasilar  junction. The basilar artery and basilar tip is normal in appearance.  There is hypoplastic  P1 segment right posterior cerebral artery and  dominant right posterior communicating artery which is a normal  variation and both posterior cerebral and superior cerebellar arteries  are within normal limits. The upper cervical and petrous cavernous and  supracavernous segments of the internal carotid arteries are normal in  appearance. The A1 segments of anterior cerebral arteries and anterior  communicating artery origin A2 segments of the anterior cerebral  arteries are within normal limits. The M1 segments the middle cerebral  arteries middle cerebral artery bifurcations are within normal limits.  There is good filling of the M2 branches in the sylvian fissure.       Impression:      1. The CT of the head is without change when compared to the MRI of the  brain 3 days ago on 08/27/2023. 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 I see no CT evidence of recurrent  enhancing lesion at this site. There is surrounding low-density likely  gliosis and encephalomalacia. The 2 tiny other enhancing brain mets that  were seen on prior MRIs back in 05/2023 including superior cerebellar  vermian lesion that previously measured 7 mm is no longer discernible  nor is the tiny posterolateral left occipital lobe enhancing met. There  is mild-moderate small vessel disease in the cerebral and central  pontine white matter. The remainder of the MR head CT is normal.  2. Cervical spondylosis is present and there is severe disc base  narrowing degenerative endplate changes at C3-4 with sclerosis  throughout the C3 and C4 vertebrae likely degenerative sclerosis. There  is posterior spurring mild canal narrowing and uncovertebral hypertrophy  and there is moderate bilateral bony foraminal  narrowing at C3-4. C5-6  posterior spurs result in mild canal narrowing. There is mild bilateral  foraminal narrowing at C5-6 otherwise cervical spine is unremarkable.  3. There is a large mass in the medial aspect of the right upper lobe  that appears to invade the medial extrapleural surface and extending to  the right side of the posterior mediastinal fat comes close to the right  lateral margin of the upper thoracic esophagus. The mass currently  measures 5.8 x 4.7 x 6.4 cm where as on chest CT in 06/2023 it measured  3.8 x 3.1 x 4 cm and has enlarged in the interval. There is multifocal  airspace consolidation in portions of the right upper lobe and the  visualized superior segment of the right lower lobe that could be  pneumonia and there is small posterior layering right pleural effusion.  I strongly recommend a contrast-enhanced CT of the chest to further  evaluate the findings and it can be correlated with most recent prior  chest CT from [facility] on 06/11/2023.  4. There are extensive calcified atherosclerotic plaques throughout the  aortic arch, calcified plaque extends into and severely narrows the left  subclavian artery origin and moderate to severely narrows the proximal  left subclavian artery. There is a calcified plaque that severely  narrows the left common carotid artery origin down 2 mm in diameter and  calcified plaque moderately narrows the brachiocephalic artery origin.  Calcified plaque mildly narrows the right subclavian artery origin.  Furthermore, there is a mixed calcified and noncalcified plaque that  moderate to severely narrows the right vertebral artery origin and the  proximal right vertebral artery and there is calcified plaque that  moderately narrows the right vertebral artery at the T1 thoracic level  and noncalcified plaque moderate to severely narrows the right vertebral  artery at the C7 cervical level. The remainder of the CT angiogram of  the head and neck  is normal with no additional stenosis seen in the  great vessels of the neck in particular no stenosis is seen in the  cervical or intracranial segments of the left vertebral artery and no  stenosis seen in the cervical segment of the internal carotid arteries  bilaterally and no intracranial vascular stenoses or occlusions are  identified. The results of the study and recommendation for  contrast-enhanced CT of the chest were communicated to Dr. Alaniz  from stroke neurology by telephone on 08/30/2023 at 1:30 p.m.      Radiation dose reduction techniques were utilized, including automated  exposure control and exposure modulation based on body size.        This report was finalized on 8/30/2023 6:47 PM by Dr. Song Cruz M.D.       CT Angiogram Neck [[id number]] Collected: 08/30/23 1459     Updated: 08/30/23 1850    Narrative:      CONTRAST-ENHANCED CT ANGIOGRAM OF THE HEAD AND NECK ON 08/30/2023     CLINICAL HISTORY: Patient has a history of 5 cm right parietal occipital  craniotomy on 05/30/2023 to resect a 3 cm enhancing posterior superior  lateral right occipital lobe mass found to be at surgical pathology  poorly differentiated adenocarcinoma of lung primary. The patient  currently has a chief complaint of dizziness and confusion     TECHNIQUE: Spiral CT images were obtained from the base of the skull to  the vertex both pre and post intravenous contrast. The images were  reformatted and submitted in 3 mm thick axial sagittal and coronal CT  sections with brain algorithm additional spiral CT angio images were  obtained from the top of the aortic arch up through the great vessels  the head and neck during the arterial phase of contrast and images were  reformatted and submitted 1 mm thick axial sagittal and coronal CT  sections with soft tissue algorithm and 3D reconstructions were  performed to complete the CT angiogram of the head and neck.     This is correlated to preoperative MRI of the brain from  [facility] on 05/25/2023 and 05/26/2023 and postoperative MRI of the  brain 05/31/2023 and an MRI of the brain on 08/27/2023.     FINDINGS:  HEAD CT: On 05/30/2023 the patient underwent a 5 cm right parietal  occipital craniotomy for resection of a 3.7 x 2.9 x 2.7 cm posterior  superior lateral right occipital lobe enhancing mass that had 8 x 5.4 cm  with surrounding edema. There is postoperative resection cavity  posterior superior lateral right occipital lobe measuring 2.3 x 1.5 x 2  cm. There is surrounding low density right parieto-occipital white  matter likely surrounding gliosis and encephalomalacia. There are patchy  areas of low-density in the periventricular extending subcortical white  matter of the cerebral hemispheres and central pontine white matter  consistent with mild-moderate small vessel disease. The remainder of the  brain parenchyma is normal in attenuation. The ventricles are normal in  size. I see no mass effect. No midline shift. No extra-axial fluid  collections are identified. On the delayed postcontrast images there is  some curvilinear enhancement in the superior midline of the cerebellum  at the site of a treated metastatic lesion previously seen  posterolateral left occipital cortical lesions unable to be appreciated.  Otherwise no abnormal enhancement is seen in the head. Other than the 5  cm right parietal occipital craniotomy defect the calvarium and the  skull base are normal in appearance. The paranasal sinuses and the  mastoid air cells and middle ear cavities are clear. The orbits are  unremarkable.     CT angiogram of the neck: The nasopharynx, oral pharynx, hypopharynx,  true cords and subglottic airway are normal in appearance. The thyroid  gland enhances homogeneously and is normal in appearance. There is a  large mass in the medial aspect of the right upper lobe and right lung  apex that measures up to 5.8 x 4.7 x 6.4 cm anteroposterior lateral  craniocaudal  mention appears larger than on CT angio the chest on  06/11/2023 where it measured 3.8 x 3.1 x 4 cm. It appears to invade  medial extrapleural fat and extends into the mediastinal fat along the  right lateral edge of the esophagus right posterolateral tracheal  margin. There is some airspace consolidation in the superior segment of  the right lower lobe could be pneumonia.. There is small posterior  layering right pleural effusion. The parotid,  parapharyngeal  and submandibular spaces are symmetric and are normal in appearance.  Cervical spondylosis is present at C3-4. There is severe to space  narrowing. There are degenerative endplate changes prominent sclerosis  throughout the C4 vertebrae and mid and superior body endplate of C5  likely degenerative sclerosis posterior spurring mild canal narrowing  uncovertebral joint hypertrophy and there is moderate bilateral bony  foraminal narrowing. At C5-6 there is posterior spurring mild canal  narrowing uncovertebral joint spurring mild bilateral foraminal  narrowing. There are extensive calcified plaques in the aortic arch.  Calcified plaque extends into the origin and proximal aspect of the left  subclavian artery severely narrows the left subclavian artery origin and  moderate to severely narrows the proximal left subclavian artery. The  mid and distal left subclavian artery is normal in caliber. The left  vertebral artery origins normal appearance and no stenosis is seen in  the cervical segment of the left vertebral artery. There is mixed  calcified noncalcified plaque that severely narrows the left common  carotid origin down to 2 mm. Beyond its origin the left common carotid  artery returns to normal caliber and no stenosis is seen. The remainder  of the left common carotid arteries bifurcation and left internal and  external carotid arteries is within normal limits. No stenosis is seen  the cervical segment of the left internal carotid artery using  the  NASCET criteria. Calcified plaque moderately narrows the origin of the  brachiocephalic artery. The mid and distal brachiocephalic artery is  normal in caliber. Calcified plaque mildly narrows the right subclavian  origin beyond its origin right subclavian artery is widely patent  without stenosis. The heavily calcified plaque moderate to severely  narrows the right vertebral artery origin and proximal right vertebral  artery and there is calcified plaque at moderately narrows the right  vertebral artery at the T1 thoracic level noncalcified plaque moderate  severely narrows the right vertebral artery at the C7 cervical level and  above the C7 cervical level the right vertebral artery is widely patent  to the vertebrobasilar junction. The right common carotid origins normal  in appearance. No stenosis is seen in the right common carotid artery  bifurcation into the right internal and external carotid arteries in  normal appearance. No stenosis is seen in the cervical segment of the  right internal carotid artery using the NASCET criteria.     CT ANGIOGRAM OF THE HEAD: The intracranial segments of distal vertebral  arteries are widely patent without stenosis to the vertebrobasilar  junction. The basilar artery and basilar tip is normal in appearance.  There is hypoplastic P1 segment right posterior cerebral artery and  dominant right posterior communicating artery which is a normal  variation and both posterior cerebral and superior cerebellar arteries  are within normal limits. The upper cervical and petrous cavernous and  supracavernous segments of the internal carotid arteries are normal in  appearance. The A1 segments of anterior cerebral arteries and anterior  communicating artery origin A2 segments of the anterior cerebral  arteries are within normal limits. The M1 segments the middle cerebral  arteries middle cerebral artery bifurcations are within normal limits.  There is good filling of the M2 branches  in the sylvian fissure.       Impression:      1. The CT of the head is without change when compared to the MRI of the  brain 3 days ago on 08/27/2023. 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 I see no CT evidence of recurrent  enhancing lesion at this site. There is surrounding low-density likely  gliosis and encephalomalacia. The 2 tiny other enhancing brain mets that  were seen on prior MRIs back in 05/2023 including superior cerebellar  vermian lesion that previously measured 7 mm is no longer discernible  nor is the tiny posterolateral left occipital lobe enhancing met. There  is mild-moderate small vessel disease in the cerebral and central  pontine white matter. The remainder of the MR head CT is normal.  2. Cervical spondylosis is present and there is severe disc base  narrowing degenerative endplate changes at C3-4 with sclerosis  throughout the C3 and C4 vertebrae likely degenerative sclerosis. There  is posterior spurring mild canal narrowing and uncovertebral hypertrophy  and there is moderate bilateral bony foraminal narrowing at C3-4. C5-6  posterior spurs result in mild canal narrowing. There is mild bilateral  foraminal narrowing at C5-6 otherwise cervical spine is unremarkable.  3. There is a large mass in the medial aspect of the right upper lobe  that appears to invade the medial extrapleural surface and extending to  the right side of the posterior mediastinal fat comes close to the right  lateral margin of the upper thoracic esophagus. The mass currently  measures 5.8 x 4.7 x 6.4 cm where as on chest CT in 06/2023 it measured  3.8 x 3.1 x 4 cm and has enlarged in the interval. There is multifocal  airspace consolidation in portions of the right upper  lobe and the  visualized superior segment of the right lower lobe that could be  pneumonia and there is small posterior layering right pleural effusion.  I strongly recommend a contrast-enhanced CT of the chest to further  evaluate the findings and it can be correlated with most recent prior  chest CT from [facility] on 06/11/2023.  4. There are extensive calcified atherosclerotic plaques throughout the  aortic arch, calcified plaque extends into and severely narrows the left  subclavian artery origin and moderate to severely narrows the proximal  left subclavian artery. There is a calcified plaque that severely  narrows the left common carotid artery origin down 2 mm in diameter and  calcified plaque moderately narrows the brachiocephalic artery origin.  Calcified plaque mildly narrows the right subclavian artery origin.  Furthermore, there is a mixed calcified and noncalcified plaque that  moderate to severely narrows the right vertebral artery origin and the  proximal right vertebral artery and there is calcified plaque that  moderately narrows the right vertebral artery at the T1 thoracic level  and noncalcified plaque moderate to severely narrows the right vertebral  artery at the C7 cervical level. The remainder of the CT angiogram of  the head and neck is normal with no additional stenosis seen in the  great vessels of the neck in particular no stenosis is seen in the  cervical or intracranial segments of the left vertebral artery and no  stenosis seen in the cervical segment of the internal carotid arteries  bilaterally and no intracranial vascular stenoses or occlusions are  identified. The results of the study and recommendation for  contrast-enhanced CT of the chest were communicated to Dr. Alaniz  from stroke neurology by telephone on 08/30/2023 at 1:30 p.m.      Radiation dose reduction techniques were utilized, including automated  exposure control and exposure modulation based on  body size.        This report was finalized on 8/30/2023 6:47 PM by Dr. Song Cruz M.D.       MRI Brain With & Without Contrast [[id number]] Collected: 08/27/23 2030     Updated: 08/27/23 2030    Narrative:        Patient: SHANA ZAZUETA  Time Out: 20:29  Exam(s): MRI HEAD W WO Contrast IV Amt: multihance 11ml    EXAM:  MR Head Without and With Intravenous Contrast    CLINICAL HISTORY:  Reason for exam: hx of brain mass, s p resection. recent ct with rec of   MRI brain.    TECHNIQUE:  Magnetic resonance images of the head brain without and with intravenous   contrast in multiple planes.    COMPARISON:  CT head 8 26 23; MRI brain 7 9 23    FINDINGS:  Patient has undergone previous right parieto-occipital craniotomy.  There   is a subjacent resection cavity at the right parieto-occipital junction.    Susceptibility artifact along the margins of the resection cavity is   compatible with old blood products, a normal postoperative finding.    There is jersey-resectional gliosis, unchanged from MRI of 7 9 23.  Thin   marginal enhancement along the resection cavity, as well as adjacent   meningeal enhancement, is grossly unchanged.  No enhancing mass lesion is   visualized within the brain.    There is no diffusion restriction to suggest acute cerebral ischemia.    There is no evidence of acute intracranial hemorrhage.  There is no mass-  effect or midline shift.    Periventricular, deep cerebral white matter, and pontine white matter   foci of T2 FLAIR signal hyperintensity are compatible with chronic small   vessel ischemic disease.  There is mild generalized parenchymal volume   loss.  No hydrocephalus is observed.    Patient has undergone previous bilateral lens replacement.  Sinuses and   mastoid air cells are clear.    IMPRESSION:     1.  Status post right parieto-occipital craniotomy with subjacent   resection cavity.  Expected postoperative changes as detailed above.  No   evidence of tumor recurrence.  2.   Moderate chronic small vessel ischemic changes.  3.  No specific pontine abnormality aside from chronic small vessel   ischemic disease.  Finding on reference CT likely reflected artifact.  4.  No acute infarct.  No acute hemorrhage.      Impression:          Electronically signed by Justa Saini M.D. on 08-27-23 at 2029    CT Abdomen Pelvis With Contrast [[id number]] Collected: 08/26/23 2056     Updated: 08/26/23 2111    Narrative:      CT ABDOMEN AND PELVIS WITH IV CONTRAST     HISTORY: Nausea vomiting, history of lung cancer with intracranial  metastasis     TECHNIQUE: Radiation dose reduction techniques were utilized, including  automated exposure control and exposure modulation based on body size.   3 mm images were obtained through the abdomen and pelvis after the  administration of IV contrast.     COMPARISON: CT abdomen pelvis 6/7/2023     FINDINGS:     Asymmetric right basilar groundglass opacification is present, not seen  on 6/7/2023. There are no findings of small bowel obstruction. The  appendix is surgically absent mild to moderate intra and extrahepatic  bile duct dilation is present status post cholecystectomy, as before.  The pancreas, spleen and adrenal glands have an unremarkable  postcontrast CT appearance. Subcentimeter renal lesions are too small to  characterize. Larger hypodense lesions demonstrating density less than  15 Hounsfield units are likely benign Bosniak 2018 criteria. There are  indeterminate density lesions within the right kidney. Lesion within the  inferior pole of the right kidney measuring up to 5.2 cm, as before.  There is no hydronephrosis. The bladder is underdistended and not well  evaluated. The uterus is surgically absent.     No abdominal pelvic adenopathy by size criteria. There is colonic  diverticulosis. There is a long segment of bowel wall thickening with  mucosal hyperenhancement extending from the transverse through the  sigmoid colon. There appears to be subtle  surrounding pericolonic fat  stranding as well. No free intraperitoneal air is seen.     For the purpose of this dictation, last well-formed space referred to as  L5-S1. There is mild to moderate anterolisthesis of L5 on S1.       Impression:      1.  Findings of a long segment of colitis extending from the transverse  through the sigmoid colon. There is associated diverticulosis. Findings  are favored be infectious in etiology given the distribution with  diverticulitis less likely. Correlation with patient history is  recommended. Stool sample may be helpful.  2.  Indeterminate right renal lesions measuring up to 5.2 cm, as before.  At least continued close interval follow-up is recommended to exclude  the plasm. Findings can also be further evaluated with multiphase CT of  the abdomen with and without contrast if clinically indicated.  3.  Subtle asymmetric right basilar groundglass opacification suggestive  of atypical pneumonia and/or asymmetric edema in the appropriate  clinical context and correlation with patient history is recommended.  Underlying malignant etiology cannot be excluded given patient history  and can be further evaluated with chest CT if clinically indicated.  4.  Other findings as above     This report was finalized on 8/26/2023 9:08 PM by Dr. Fady Davis M.D.       XR Chest 1 View [[id number]] Collected: 08/26/23 1904     Updated: 08/26/23 1911    Narrative:      Portable chest radiograph     HISTORY: Vomiting metastatic lung cancer     TECHNIQUE: Single AP portable radiograph of the chest     COMPARISON: Chest radiograph 9/6/2022       Impression:      FINDINGS AND IMPRESSION:  Right Port-A-Cath tip terminates over the superior vena cava.     Asymmetric, somewhat nodular opacification overlying the right upper  lung representing patient's known malignancy has improved since 9/6/2022  but is overall not well evaluated. Findings are best seen on recent  chest CT 6/11/2023 please refer to  this dictation for further  information. Findings can be better characterized with CT chest if  clinically indicated.     No pleural effusion or pneumothorax is seen. Asymmetric increased pulm  opacification is present within the right lower lung suggestive of  atelectasis versus pneumonia in the appropriate clinical context and  correlation with patient history is recommended with at least follow-up  chest radiograph in 6 to 8 weeks to ensure resolution given patient  history. Findings can also be further evaluated with chest CT if  clinically indicated. Wires overlie the lower cervical spine.     This report was finalized on 8/26/2023 7:08 PM by Dr. Fady Davis M.D.       CT Head Without Contrast [[id number]] Collected: 08/26/23 1854     Updated: 08/26/23 1907    Narrative:      CT HEAD     HISTORY: Mental status change, unknown cause     TECHNIQUE: CT scan of the head was obtained with 3 mm axial images  without intravenous contrast.  Radiation dose reduction techniques were  utilized, including automated exposure control and exposure modulation  based on body size.     COMPARISON: Head CT 5/26/2023 and 6/11/2023     FINDINGS AND compression:  Postsurgical changes from right parietal craniotomy and mass resection  are present, as before. The previously seen vasogenic edema projecting  from the operative bed has decreased.     The irregular hypodensity within the operative bed measures up to 3.3  cm, grossly unchanged since 8/26/2023. There is no significant midline  shift. Hypodensity within the doron has a more prominent appearance when  compared with prior CTs. While findings may be related to streak  artifact, further evaluation with MRI of the brain is recommended to  exclude acute intracranial pathology.                 This report was finalized on 8/26/2023 7:04 PM by Dr. Fady Davis M.D.               Pertinent Labs     Results from last 7 days   Lab Units 09/02/23  0643 09/01/23  0600 08/31/23  0555  08/30/23  0625   WBC 10*3/mm3 9.22 8.83 11.26* 9.71   HEMOGLOBIN g/dL 9.5* 8.7* 9.9* 7.9*   PLATELETS 10*3/mm3 325 279 344 309     Results from last 7 days   Lab Units 09/02/23  0643 09/01/23  0600 08/31/23  0555 08/30/23  1715 08/30/23  0625   SODIUM mmol/L 132* 131* 130*  --  134*   POTASSIUM mmol/L 3.9 3.9 3.8 4.6 3.3*   CHLORIDE mmol/L 96* 98 98  --  102   CO2 mmol/L 26.3 23.5 21.3*  --  21.0*   BUN mg/dL 12 8 6*  --  5*   CREATININE mg/dL 0.68 0.59 0.63  --  0.53*   GLUCOSE mg/dL 90 82 92  --  89   Estimated Creatinine Clearance: 57 mL/min (by C-G formula based on SCr of 0.68 mg/dL).  Results from last 7 days   Lab Units 08/26/23  1544   ALBUMIN g/dL 3.3*   BILIRUBIN mg/dL 0.3   ALK PHOS U/L 100   AST (SGOT) U/L 31   ALT (SGPT) U/L 33     Results from last 7 days   Lab Units 09/02/23  0643 09/01/23  0600 08/31/23  0555 08/30/23  0625 08/27/23  0621 08/26/23  1544   CALCIUM mg/dL 9.2 8.7 8.3* 7.7*   < > 9.9   ALBUMIN g/dL  --   --   --   --   --  3.3*   MAGNESIUM mg/dL  --   --   --  2.3  --  2.2    < > = values in this interval not displayed.       Results from last 7 days   Lab Units 08/26/23  1544   CK TOTAL U/L 125           Invalid input(s): LDLCALC  Results from last 7 days   Lab Units 08/26/23  1717 08/26/23  1544   BLOODCX  No growth at 5 days No growth at 5 days       Test Results Pending at Discharge     Pending Labs       Order Current Status    Stool Culture, Targeted - Stool, Per Rectum Preliminary result            Discharge Details        Discharge Medications        New Medications        Instructions Start Date   atorvastatin 20 MG tablet  Commonly known as: LIPITOR   20 mg, Oral, Nightly      clopidogrel 75 MG tablet  Commonly known as: PLAVIX   75 mg, Oral, Daily      DULoxetine 30 MG capsule  Commonly known as: CYMBALTA   30 mg, Oral, Daily      levoFLOXacin 750 MG tablet  Commonly known as: LEVAQUIN   750 mg, Oral, Every 24 Hours      pregabalin 150 MG capsule  Commonly known as: LYRICA    150 mg, Oral, Every 12 Hours Scheduled             Changes to Medications        Instructions Start Date   HYDROcodone-acetaminophen 7.5-325 MG per tablet  Commonly known as: NORCO  What changed:   when to take this  reasons to take this   1 tablet, Oral, Every 6 Hours PRN             Continue These Medications        Instructions Start Date   azelastine 0.1 % nasal spray  Commonly known as: ASTELIN   1 spray, Nasal      B COMPLEX VITAMINS ER PO   Oral, Daily      esomeprazole 20 MG capsule  Commonly known as: nexIUM   20 mg, Oral, Every Morning Before Breakfast      estradiol 1 MG tablet  Commonly known as: ESTRACE   1 mg, Oral, Daily      FeroSul 325 (65 FE) MG tablet  Generic drug: ferrous sulfate   TAKE ONE TABLET BY MOUTH DAILY WITH BREAKFAST      levETIRAcetam 500 MG tablet  Commonly known as: KEPPRA   500 mg, Oral, 2 Times Daily      lidocaine 5 %  Commonly known as: LIDODERM   1 patch, Transdermal, Every 24 Hours, Remove & Discard patch within 12 hours or as directed by MD      lidocaine-prilocaine 2.5-2.5 % cream  Commonly known as: EMLA   1 application , Topical, Every 2 Hours PRN      lisinopril 20 MG tablet  Commonly known as: PRINIVIL,ZESTRIL   20 mg, Oral, Every 24 Hours Scheduled      Morphine 30 MG tablet  Commonly known as: MSIR   30 mg, Oral, Every 4 Hours PRN      ondansetron 8 MG tablet  Commonly known as: ZOFRAN   8 mg, Oral, 3 Times Daily PRN      predniSONE 10 MG tablet  Commonly known as: DELTASONE   10 mg, Oral, Daily      PRESERVISION AREDS 2+MULTI VIT PO   Oral      valACYclovir 1000 MG tablet  Commonly known as: VALTREX       vitamin D3 125 MCG (5000 UT) capsule capsule   2,000 Units, Oral, Daily             Stop These Medications      gabapentin 100 MG capsule  Commonly known as: NEURONTIN     ofloxacin 0.3 % ophthalmic solution  Commonly known as: OCUFLOX              Allergies   Allergen Reactions    Del-Mycin [Erythromycin] Hives    Gentamicin Hives    Ibuprofen Nausea And Vomiting     Latex Dermatitis     GLOVES    Metronidazole Hives    Ofloxacin Hives and Nausea Only     Has tolerated Levaquin     Penicillins Hives     Tolerated rocephin and cefepime 8/2023    Tetracycline Hives    Adhesive Tape Dermatitis     BANDAIDS    Aspirin GI Intolerance     Vomiting can take EC    Codeine Nausea And Vomiting         Discharge Disposition:  Home or Self Care    Discharge Diet:  No active diet order      Discharge Activity:       CODE STATUS:    Code Status and Medical Interventions:   Ordered at: 09/01/23 1427     Medical Intervention Limits:    NO intubation (DNI)     Code Status (Patient has no pulse and is not breathing):    No CPR (Do Not Attempt to Resuscitate)     Medical Interventions (Patient has pulse or is breathing):    Limited Support       Future Appointments   Date Time Provider Department Center   9/8/2023 11:30 AM Clint Ricketts MD [facility]     Additional Instructions for the Follow-ups that You Need to Schedule       Ambulatory Referral to Physical Therapy Vestibular   As directed      Specialty needed: Vestibular   Follow-up needed: Yes               Follow-up Information       Nik Mckay MD .    Specialty: Family Medicine  Contact information:  [address] [facility] [place] [address]  [phone]               Nik Mckay MD .    Specialty: Family Medicine  Contact information:  [address] [place] [address]  [phone]                             Additional Instructions for the Follow-ups that You Need to Schedule       Ambulatory Referral to Physical Therapy Vestibular   As directed      Specialty needed: Vestibular   Follow-up needed: Yes            Time Spent on Discharge:  I spent greater than 30 minutes on this discharge activity which included: face-to-face encounter with the patient, reviewing the data in the system, coordination of the care with the nursing staff as well as consultants, documentation, and entering orders.        Lisette Browning MD  [facility]  09/02/23  15:38 EDT

## 2023-09-06 ENCOUNTER — READMISSION MANAGEMENT (OUTPATIENT)
Dept: CALL CENTER | Facility: HOSPITAL | Age: 78
End: 2023-09-06
Payer: MEDICARE

## 2023-09-06 NOTE — OUTREACH NOTE
Medical Week 1 Survey      Flowsheet Row Responses   [facility] patient discharged from? [place]   Does the patient have one of the following disease processes/diagnoses(primary or secondary)? Other   Week 1 attempt successful? Yes   Call start time 1113   Call end time 1116   Discharge diagnosis Dizziness   Meds reviewed with patient/caregiver? Yes   Is the patient having any side effects they believe may be caused by any medication additions or changes? No   Does the patient have all medications ordered at discharge? Yes   Is the patient taking all medications as directed (includes completed medication regime)? Yes   Does the patient have a primary care provider?  Yes   Does the patient have an appointment with their PCP within 7 days of discharge? Yes   Has the patient kept scheduled appointments due by today? N/A   What is the Home health agency?  [facility]   Has home health visited the patient within 72 hours of discharge? Yes   Psychosocial issues? No   Did the patient receive a copy of their discharge instructions? Yes   Nursing interventions Reviewed instructions with patient   What is the patient's perception of their health status since discharge? Improving   Is the patient/caregiver able to teach back signs and symptoms related to disease process for when to call PCP? Yes   Is the patient/caregiver able to teach back signs and symptoms related to disease process for when to call 911? Yes   Is the patient/caregiver able to teach back the hierarchy of who to call/visit for symptoms/problems? PCP, Specialist, Home health nurse, Urgent Care, ED, 911 Yes   Week 1 call completed? Yes   Wrap up additional comments Pt reports improvement. No needs.   Call end time 1116            ZIA DHALIWAL - Registered Nurse

## 2023-09-07 ENCOUNTER — PATIENT OUTREACH (OUTPATIENT)
Dept: OTHER | Facility: HOSPITAL | Age: 78
End: 2023-09-07
Payer: MEDICARE

## 2023-09-07 NOTE — PROGRESS NOTES
Reviewed chart.    Called patient. Left message that I was just touching base to see how she was doing after her hospitalization. Wanted to know if she had any questions/concerns or resource/supportive care needs; requested cb

## 2023-09-08 ENCOUNTER — TELEPHONE (OUTPATIENT)
Dept: ONCOLOGY | Facility: CLINIC | Age: 78
End: 2023-09-08
Payer: MEDICARE

## 2023-09-08 NOTE — TELEPHONE ENCOUNTER
Called the patients son and he states he is wanting to know what the plan was for his mom. I let him know we were waiting until the PET scan was done. He asked about moving the scan sooner and I let him know that I seen in Dr. Segundo note she was going out of town until the 18th and that's why she was scheduled for the 19th. He asked if we could look into getting this r/s for Monday or Tuesday in the case that she does still go on this trip (he does not believe she will). I let him know this would be difficult but that I would ask the schedulers to look. Patients son v/u.

## 2023-09-08 NOTE — TELEPHONE ENCOUNTER
Caller: Espinoza Vidal    Relationship: Emergency Contact    Best call back number: [phone]     What is the best time to reach you: ASAP    Who are you requesting to speak with (clinical staff, provider,  specific staff member): CLINICAL    What was the call regarding: PT'S SON IS ASKING TO SPEAK TO DR FUENTES'S NURSE ABOUT HER PLAN OF CARE, HAS SOME GENERAL THINGS TO DISCUSS.  HE REPORTS SHE HAS BEEN IN AND OUT OF [facility] OVER THE LAST FEW MONTHS AND SEEMS TO BE DECLINING.  PLEASE CALL SAVAGE BACK TO DISCUSS/ADVISE.  CALLER IS NOT LISTED ON  VERBAL.

## 2023-09-11 ENCOUNTER — TELEPHONE (OUTPATIENT)
Dept: ONCOLOGY | Facility: CLINIC | Age: 78
End: 2023-09-11
Payer: MEDICARE

## 2023-09-11 NOTE — TELEPHONE ENCOUNTER
Called and spoke w/ Espinoza.  Son states pt is back and forth on going on the trip.  I have PET scheduled for next Tuesday the day she will get back and a follow up w/ Dr Lagunas the following Friday.  Told son to call us if he needs to talk to the RN or if pt has any issues.

## 2023-09-13 ENCOUNTER — READMISSION MANAGEMENT (OUTPATIENT)
Dept: CALL CENTER | Facility: HOSPITAL | Age: 78
End: 2023-09-13
Payer: MEDICARE

## 2023-09-13 NOTE — OUTREACH NOTE
Medical Week 2 Survey      Flowsheet Row Responses   [facility] patient discharged from? [place]   Does the patient have one of the following disease processes/diagnoses(primary or secondary)? Other   Week 2 attempt successful? No   Unsuccessful attempts Attempt 1            Mackenzie KNIGHT - Licensed Nurse

## 2023-09-18 ENCOUNTER — TELEPHONE (OUTPATIENT)
Dept: ONCOLOGY | Facility: CLINIC | Age: 78
End: 2023-09-18
Payer: MEDICARE

## 2023-09-18 NOTE — TELEPHONE ENCOUNTER
"  Caller: CARLEENZIA    Relationship: Emergency Contact    Best call back number: [phone]    What is the best time to reach you: ANY    Who are you requesting to speak with (clinical staff, provider,  specific staff member): CLINICAL      What was the call regarding: PATIENT'S NIECE ZIA CALLED TO REQUEST FOR DR FUENTES TO ORDER AND SCHEDULE A CT SCAN OR XRAY FOR SHANA'S CHEST ALONG WITH HER PET SCAN ON 9/19/23 (TOMORROW). ZIA SAID THAT PATIENT IS HAVING TROUBLE AND HURTING ON HER RIGHT SIDE. SHE ALSO SAID THAT SHANA TOLD HER THAT SHE FEELS LIKE SHE WAS \"HUGGED TO HARD\" AND MIGHT HAVE A BRUISED RIB AND IS COUGHING UP BLOOD OCCASIONALLY.     Is it okay if the provider responds through MyChart: NO        "

## 2023-09-18 NOTE — TELEPHONE ENCOUNTER
Caller: Espinoza Vidal    Relationship: Emergency Contact    Best call back number: [phone] (THIS IS THE PT'S DIRECT #)    What is the best time to reach you: ASAP    Who are you requesting to speak with (clinical staff, provider,  specific staff member): SCHEDULING        What was the call regarding: PT'S SON CALLED TO CHECK APPT DATE/TIME OF PET SCAN THAT WAS ORDERED BY DR. FUENTES, HE WAS NOT LISTED ON THE [facility] VERBAL AND PATIENT WAS NOT WITH HIM.  PLEASE CALL PT DIRECTLY TO VERIFY WHEN HER PET SCAN IS SCHEDULED.  LOOKS LIKE IT IS TOMORROW, BUT THE CALLER THOUGHT IT WAS THE 20TH.  THEY WILL NEED THIS INFORMATION TODAY TO BE SURE THEY DON'T MISS IT.
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
## 2023-09-19 ENCOUNTER — HOSPITAL ENCOUNTER (OUTPATIENT)
Dept: PET IMAGING | Facility: HOSPITAL | Age: 78
Discharge: HOME | End: 2023-09-19
Payer: MEDICARE

## 2023-09-19 ENCOUNTER — READMISSION MANAGEMENT (OUTPATIENT)
Dept: CALL CENTER | Facility: HOSPITAL | Age: 78
End: 2023-09-19
Payer: MEDICARE

## 2023-09-19 DIAGNOSIS — C34.91 PRIMARY LUNG ADENOCARCINOMA, RIGHT: ICD-10-CM

## 2023-09-19 DIAGNOSIS — C79.31 LUNG CANCER METASTATIC TO BRAIN: ICD-10-CM

## 2023-09-19 DIAGNOSIS — C34.90 LUNG CANCER METASTATIC TO BRAIN: ICD-10-CM

## 2023-09-19 LAB — GLUCOSE BLDC GLUCOMTR-MCNC: 86 MG/DL (ref 70–130)

## 2023-09-19 PROCEDURE — 82948 REAGENT STRIP/BLOOD GLUCOSE: CPT

## 2023-09-19 PROCEDURE — 78815 PET IMAGE W/CT SKULL-THIGH: CPT

## 2023-09-19 PROCEDURE — A9552 F18 FDG: HCPCS | Performed by: INTERNAL MEDICINE

## 2023-09-19 PROCEDURE — 0 FLUDEOXYGLUCOSE F18 SOLUTION: Performed by: INTERNAL MEDICINE

## 2023-09-19 RX ORDER — HYDROCODONE BITARTRATE AND ACETAMINOPHEN 7.5; 325 MG/1; MG/1
1 TABLET ORAL EVERY 6 HOURS PRN
Qty: 60 TABLET | Refills: 0 | Status: SHIPPED | OUTPATIENT
Start: 2023-09-19

## 2023-09-19 RX ADMIN — FLUDEOXYGLUCOSE F18 1 DOSE: 300 INJECTION INTRAVENOUS at 09:17

## 2023-09-19 NOTE — TELEPHONE ENCOUNTER
Caller: THERESA ZAZUETA (NOT ON VERBAL)    Relationship: Emergency Contact    Best call back number: [phone]    What is the best time to reach you: ANY    Who are you requesting to speak with (clinical staff, provider,  specific staff member): DR FUENTES/CLINICAL    What was the call regarding: THERESA CALLED TO REQUEST REFILL FOR HYRDOCODONE 7.5MG FOR SHANA. THERESA SAYS SHE HAS TWO LEFT. AND NEEDS THESE ASAP.    Is it okay if the provider responds through MyChart: NO

## 2023-09-19 NOTE — OUTREACH NOTE
Medical Week 3 Survey      Flowsheet Row Responses   [facility] patient discharged from? [place]   Does the patient have one of the following disease processes/diagnoses(primary or secondary)? Other   Week 3 attempt successful? Yes   Call start time 1634   Call end time 1637   Discharge diagnosis dizziness, VBI, atypical pneumonia, campylobacter diarrhea. bacterial colitis, primary lung adenocarcinoma/rihgt, brain mass   Person spoke with today (if not patient) and relationship Patient   Meds reviewed with patient/caregiver? Yes   Does the patient have all medications ordered at discharge? Yes   Is the patient taking all medications as directed (includes completed medication regime)? Yes   Does the patient have a primary care provider?  Yes   Comments regarding PCP PCP Dr Mckay.   Does the patient have an appointment with their PCP within 7 days of discharge? No   What is preventing the patient from scheduling follow up appointments within 7 days of discharge? --  [Patient reports that she just got back from vacation. ]   Nursing Interventions Educated patient on importance of making appointment, Advised patient to make appointment   Has the patient kept scheduled appointments due by today? N/A   Comments Advised to arrange needed f/u appts. She states that she had a PET scan today.   What is the Home health agency?  [facility]   Has home health visited the patient within 72 hours of discharge? No  [Patient reports that she has been gone on vacation. ]   Home health comments Advised that she may need to call her PCP to reorder HH services.   Psychosocial issues? No   Did the patient receive a copy of their discharge instructions? Yes   Nursing interventions Reviewed instructions with patient   What is the patient's perception of their health status since discharge? Improving   Is the patient/caregiver able to teach back signs and symptoms related to disease process for when to call PCP?  Yes   Is the patient/caregiver able to teach back signs and symptoms related to disease process for when to call 911? Yes   Is the patient/caregiver able to teach back the hierarchy of who to call/visit for symptoms/problems? PCP, Specialist, Home health nurse, Urgent Care, ED, 911 Yes   If the patient is a current smoker, are they able to teach back resources for cessation? Not a smoker   Week 3 Call Completed? Yes   Graduated Yes   Is the patient interested in additional calls from an ambulatory ? No   Would this patient benefit from a Referral to [facility]? No   Graduated/Revoked comments Denies any needs from nurse today. Denies any questions or new concerns.   Call end time 1637            SOILA MIRZA - Registered Nurse

## 2023-09-20 ENCOUNTER — TELEPHONE (OUTPATIENT)
Dept: ONCOLOGY | Facility: CLINIC | Age: 78
End: 2023-09-20
Payer: MEDICARE

## 2023-09-20 NOTE — TELEPHONE ENCOUNTER
Left message letting patient know the prescription requested yesterday afternoon has been sent.  Left direct phone number for call back if needed.

## 2023-09-22 ENCOUNTER — OFFICE VISIT (OUTPATIENT)
Dept: ONCOLOGY | Facility: CLINIC | Age: 78
End: 2023-09-22
Payer: MEDICARE

## 2023-09-22 ENCOUNTER — LAB (OUTPATIENT)
Dept: OTHER | Facility: HOSPITAL | Age: 78
End: 2023-09-22
Payer: MEDICARE

## 2023-09-22 VITALS
HEIGHT: 60 IN | BODY MASS INDEX: 22.46 KG/M2 | HEART RATE: 68 BPM | DIASTOLIC BLOOD PRESSURE: 63 MMHG | RESPIRATION RATE: 18 BRPM | OXYGEN SATURATION: 96 % | WEIGHT: 114.4 LBS | SYSTOLIC BLOOD PRESSURE: 126 MMHG | TEMPERATURE: 96.9 F

## 2023-09-22 DIAGNOSIS — C34.90 LUNG CANCER METASTATIC TO BRAIN: ICD-10-CM

## 2023-09-22 DIAGNOSIS — C79.31 LUNG CANCER METASTATIC TO BRAIN: ICD-10-CM

## 2023-09-22 DIAGNOSIS — C34.90 MALIGNANT NEOPLASM OF LUNG, UNSPECIFIED LATERALITY, UNSPECIFIED PART OF LUNG: ICD-10-CM

## 2023-09-22 DIAGNOSIS — C34.91 PRIMARY LUNG ADENOCARCINOMA, RIGHT: ICD-10-CM

## 2023-09-22 DIAGNOSIS — A04.9 BACTERIAL COLITIS: Primary | ICD-10-CM

## 2023-09-22 DIAGNOSIS — R53.0 NEOPLASTIC MALIGNANT RELATED FATIGUE: ICD-10-CM

## 2023-09-22 DIAGNOSIS — G89.3 CHRONIC PAIN AFTER CANCER TREATMENT: ICD-10-CM

## 2023-09-22 DIAGNOSIS — B02.29 POST HERPETIC NEURALGIA: ICD-10-CM

## 2023-09-22 LAB
ALBUMIN SERPL-MCNC: 3 G/DL (ref 3.5–5.2)
ALBUMIN/GLOB SERPL: 0.9 G/DL
ALP SERPL-CCNC: 145 U/L (ref 39–117)
ALT SERPL W P-5'-P-CCNC: 15 U/L (ref 1–33)
ANION GAP SERPL CALCULATED.3IONS-SCNC: 10.1 MMOL/L (ref 5–15)
AST SERPL-CCNC: 18 U/L (ref 1–32)
BASOPHILS # BLD AUTO: 0.04 10*3/MM3 (ref 0–0.2)
BASOPHILS NFR BLD AUTO: 0.4 % (ref 0–1.5)
BILIRUB SERPL-MCNC: 0.4 MG/DL (ref 0–1.2)
BUN SERPL-MCNC: 20 MG/DL (ref 8–23)
BUN/CREAT SERPL: 23 (ref 7–25)
CALCIUM SPEC-SCNC: 9.3 MG/DL (ref 8.6–10.5)
CHLORIDE SERPL-SCNC: 100 MMOL/L (ref 98–107)
CO2 SERPL-SCNC: 22.9 MMOL/L (ref 22–29)
CREAT SERPL-MCNC: 0.87 MG/DL (ref 0.57–1)
DEPRECATED RDW RBC AUTO: 55.9 FL (ref 37–54)
EGFRCR SERPLBLD CKD-EPI 2021: 69.1 ML/MIN/1.73
EOSINOPHIL # BLD AUTO: 0.08 10*3/MM3 (ref 0–0.4)
EOSINOPHIL NFR BLD AUTO: 0.8 % (ref 0.3–6.2)
ERYTHROCYTE [DISTWIDTH] IN BLOOD BY AUTOMATED COUNT: 16.7 % (ref 12.3–15.4)
GLOBULIN UR ELPH-MCNC: 3.5 GM/DL
GLUCOSE SERPL-MCNC: 124 MG/DL (ref 65–99)
HCT VFR BLD AUTO: 32.5 % (ref 34–46.6)
HGB BLD-MCNC: 9.8 G/DL (ref 12–15.9)
IMM GRANULOCYTES # BLD AUTO: 0.11 10*3/MM3 (ref 0–0.05)
IMM GRANULOCYTES NFR BLD AUTO: 1.1 % (ref 0–0.5)
LYMPHOCYTES # BLD AUTO: 1.34 10*3/MM3 (ref 0.7–3.1)
LYMPHOCYTES NFR BLD AUTO: 13.4 % (ref 19.6–45.3)
MCH RBC QN AUTO: 27.7 PG (ref 26.6–33)
MCHC RBC AUTO-ENTMCNC: 30.2 G/DL (ref 31.5–35.7)
MCV RBC AUTO: 91.8 FL (ref 79–97)
MONOCYTES # BLD AUTO: 0.48 10*3/MM3 (ref 0.1–0.9)
MONOCYTES NFR BLD AUTO: 4.8 % (ref 5–12)
NEUTROPHILS NFR BLD AUTO: 7.92 10*3/MM3 (ref 1.7–7)
NEUTROPHILS NFR BLD AUTO: 79.5 % (ref 42.7–76)
NRBC BLD AUTO-RTO: 0 /100 WBC (ref 0–0.2)
PLATELET # BLD AUTO: 341 10*3/MM3 (ref 140–450)
PMV BLD AUTO: 10.1 FL (ref 6–12)
POTASSIUM SERPL-SCNC: 4.7 MMOL/L (ref 3.5–5.2)
PROT SERPL-MCNC: 6.5 G/DL (ref 6–8.5)
RBC # BLD AUTO: 3.54 10*6/MM3 (ref 3.77–5.28)
SODIUM SERPL-SCNC: 133 MMOL/L (ref 136–145)
WBC NRBC COR # BLD: 9.97 10*3/MM3 (ref 3.4–10.8)

## 2023-09-22 PROCEDURE — 85025 COMPLETE CBC W/AUTO DIFF WBC: CPT | Performed by: INTERNAL MEDICINE

## 2023-09-22 PROCEDURE — 36415 COLL VENOUS BLD VENIPUNCTURE: CPT

## 2023-09-22 PROCEDURE — 80053 COMPREHEN METABOLIC PANEL: CPT | Performed by: INTERNAL MEDICINE

## 2023-09-22 RX ORDER — SODIUM CHLORIDE 9 MG/ML
250 INJECTION, SOLUTION INTRAVENOUS ONCE
OUTPATIENT
Start: 2023-10-04

## 2023-09-22 RX ORDER — SODIUM CHLORIDE 9 MG/ML
250 INJECTION, SOLUTION INTRAVENOUS ONCE
OUTPATIENT
Start: 2023-10-18

## 2023-09-22 RX ORDER — PREGABALIN 75 MG/1
75 CAPSULE ORAL 2 TIMES DAILY
Qty: 60 CAPSULE | Refills: 0 | Status: SHIPPED | OUTPATIENT
Start: 2023-09-22

## 2023-09-22 RX ORDER — PREDNISONE 10 MG/1
20 TABLET ORAL DAILY
Qty: 60 TABLET | Refills: 5 | Status: SHIPPED | OUTPATIENT
Start: 2023-09-22

## 2023-09-22 RX ORDER — SODIUM CHLORIDE 9 MG/ML
250 INJECTION, SOLUTION INTRAVENOUS ONCE
OUTPATIENT
Start: 2023-10-25

## 2023-09-22 RX ORDER — SODIUM CHLORIDE 9 MG/ML
250 INJECTION, SOLUTION INTRAVENOUS ONCE
OUTPATIENT
Start: 2023-09-27

## 2023-09-22 NOTE — PROGRESS NOTES
Subjective     REASON FOR FOLLOW UP:   1.  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  PD-L1 positive greater than 95%  3.  Primary chemoradiation with weekly carboplatin and Taxol completed 10/27/2022  4.  Imfinzi immunotherapy every 2 weeks for 12 months total.  First treatment 11/28/2022  5.  Repeat bronchoscopy 1/4/2023 due to persistent hemoptysis.  Pathology revealed no malignancy.  6.  Exophytic lesion of the kidney noted on surveillance CT scans.    HISTORY OF PRESENT ILLNESS:  The patient is a 78 y.o. year old female who is a former smoker referred to us from thoracic surgery (Dr. Remedios Rice) for evaluation and treatment of clinical stage III adenocarcinoma of the lung.  She was having persistent cough and underwent chest x-ray initially in late June which showed possible right upper lobe mass.  This was followed by CT scan of the chest confirming the presence of a right upper lobe mass.  She initially underwent a CT-guided needle biopsy on 7/22/2022 which was nondiagnostic.    She was referred to Dr. Glass for bronchoscopy and biopsy which was performed on 8/23/2022 with pathology returning as poorly differentiated adenocarcinoma.  PD-L1 stain on the tissue was positive at greater than 95%.    She underwent further staging with PET scan and MRI of the brain.  PET scan was performed on 8/12/2022 showing hypermetabolic activity in the large mass in the right upper lobe as well as mediastinal lymph nodes.  She was noted to have a mass on the kidney as well but this was not hypermetabolic.  There was no obvious distant metastatic disease.    MRI of the brain from 8/15/2022 likewise showed no evidence of metastatic disease.  She is scheduled also to see Dr. Hector Rodriguez of radiation oncology on 9/7/2022.    We recommended weekly carboplatin and Taxol concurrent with radiation therapy.  Following completion of treatment she will receive immunotherapy for maintenance   (She does have a diagnosis of rheumatoid  arthritis and is currently taking only Celebrex.  This could become an issue regarding her ability to tolerate immunotherapy in the future).    She received her final fraction of radiation 10/27/2022.  She also completed 6 weeks of carboplatin and Taxol and tolerated it well. CT scans performed 11/21/2022 showed right upper lobe mass appeared stable in size.  Her mediastinal lymphadenopathy had decreased.  There were no new sites of disease identified.    She was started on immunotherapy with Imfinzi with plans to continue immunotherapy for 1 year until December 2023.      She required several hospitalizations including hospitalization due to development of brain metastases in late May.  She had a dominant metastatic lesion in the right occipital area and was able to undergo neurosurgical resection of the dominant mass on 5/30/2023 with Dr. Ricketts.  She had 2 additional small lesions that were treated with stereotactic radiosurgery by Dr. Mckenna Moreira.  During this time her immuno therapy with Imfinzi was held (her last Imfinzi treatment in our office was 5/15/2023).    She again required repeat hospitalization from 6/11/2023 to 6/14/2023 due to development of extensive shingles outbreak on the left upper extremity.    She had a post treatment outpatient MRI of the brain from 7/9/2023 which showed the resection cavity in the right occipital area with no definite tumor recurrence in that area.  The other 2 small areas that were treated with stereotactic radiation appeared stable.  There were no new sites of disease identified.    She was seen in the emergency room 8/4/2023 for severe pain in the left arm. While she was in the ER she had a CT scan of the cervical spine to be sure there was no mechanical nerve root impingement.  There were no acute findings in the cervical spine on the CT but slices through the lung apices did show what appeared to be new density in the right upper lobe which had not been seen on her  previous CT scan from June.     INTERVAL HISTORY:  Since her last visit she was again hospitalized from 8/26/2023 through 9/2/2023 with Campylobacter and E. coli colitis.  During the hospitalization she underwent a CT scan on 8/31/2023 showing increased size of her right upper lobe mass.  (See CT scan report below).  She also had a brain MRI performed on 8/27/2023 showing stable findings.    At the time of her discharge she had a motorcycle trip planned and we were planning to follow her up in the office with a PET scan when she returns from her trip.  She now returns having undergone the PET scan on 9/19/2023 which shows intensely hypermetabolic mass in the right upper lobe which had increased in size.  She had uptake in the right sixth and seventh ribs which may have been due to trauma.    She continues to have severe pain in the left arm although during the hospitalization she was switched to Lyrica which seems to be helping a little better than gabapentin.  She has requested an increase in the dose.  We also will be planning to refer her to pain management to see if there may be a role for the nerve injection.    We discussed the results of the PET scan and I recommended initiating palliative chemotherapy with single agent Gemzar days 1 and 8 of an every 21-day cycle.    History of Present Illness     Past Medical History:   Diagnosis Date    COPD (chronic obstructive pulmonary disease)     Coughing up blood     X2 MONTHS    History of COVID-19 02/2022    Hyperlipidemia     IBS (irritable bowel syndrome)     Primary lung adenocarcinoma, right     Rheumatoid arthritis         Past Surgical History:   Procedure Laterality Date    APPENDECTOMY      appendix removed    BRONCHOSCOPY N/A 8/23/2022    Procedure: BRONCHOSCOPY WITH BAL,  BIOPSIES, AND BRUSHINGS WITH ENDOBRONCHIAL ULTRASOUND WITH FNA;  Surgeon: Gregor Vee MD;  Location: [facility] ENDOSCOPY;  Service: Pulmonary;  Laterality: N/A;  PRE- HILAR MASS  POST-  SAME    BRONCHOSCOPY N/A 1/4/2023    Procedure: BRONCHOSCOPY with biopsy, lavage, brushing;  Surgeon: Gregor Vee MD;  Location: [facility] ENDOSCOPY;  Service: Pulmonary;  Laterality: N/A;    CAROTID ENDARTERECTOMY Right     CAROTID ENDARTERECTOMY Left     CATARACT EXTRACTION      CERVICAL FUSION      CHOLECYSTECTOMY      CRANIOTOMY FOR TUMOR Right 5/30/2023    Procedure: RIGHT CRANIOTOMY FOR TUMOR RESECTION STEREOTACTIC WITH STEALTH;  Surgeon: Clint Ricketts MD;  Location: [facility] MAIN OR;  Service: Neurosurgery;  Laterality: Right;    HYSTERECTOMY      SHOULDER ARTHROSCOPY Right 02/19/2019    right should scope/cuff repair     VENOUS ACCESS DEVICE (PORT) INSERTION Right 9/6/2022    Procedure: POWERPORT INSERTION;  Surgeon: Remedios Rice MD;  Location: [facility] MAIN OR;  Service: Thoracic;  Laterality: Right;        Current Outpatient Medications on File Prior to Visit   Medication Sig Dispense Refill    atorvastatin (LIPITOR) 20 MG tablet Take 1 tablet by mouth Every Night. 30 tablet 0    azelastine (ASTELIN) 0.1 % nasal spray 1 spray into the nostril(s) as directed by provider.      B COMPLEX VITAMINS ER PO Take  by mouth Daily.      Cholecalciferol (VITAMIN D3) 5000 units capsule capsule Take 2,000 Units by mouth Daily.      clopidogrel (PLAVIX) 75 MG tablet Take 1 tablet by mouth Daily. 30 tablet 0    DULoxetine (CYMBALTA) 30 MG capsule Take 1 capsule by mouth Daily for 30 days. 30 capsule 0    esomeprazole (nexIUM) 20 MG capsule Take 1 capsule by mouth Every Morning Before Breakfast.      estradiol (ESTRACE) 1 MG tablet Take 1 tablet by mouth Daily.      FeroSul 325 (65 Fe) MG tablet TAKE ONE TABLET BY MOUTH DAILY WITH BREAKFAST 30 tablet 5    HYDROcodone-acetaminophen (NORCO) 7.5-325 MG per tablet Take 1 tablet by mouth Every 6 (Six) Hours As Needed for Moderate Pain. 60 tablet 0    levETIRAcetam (KEPPRA) 500 MG tablet Take 1 tablet by mouth 2 (Two) Times a Day for 240 days. 120 tablet 3    Multiple  Vitamins-Minerals (PRESERVISION AREDS 2+MULTI VIT PO) Take  by mouth.      ondansetron (ZOFRAN) 8 MG tablet Take 1 tablet by mouth 3 (Three) Times a Day As Needed for Nausea or Vomiting. 30 tablet 5    predniSONE (DELTASONE) 10 MG tablet Take 1 tablet by mouth Daily. 30 tablet 5    pregabalin (LYRICA) 150 MG capsule Take 1 capsule by mouth Every 12 (Twelve) Hours for 30 days. 60 capsule 0    lidocaine (LIDODERM) 5 % Place 1 patch on the skin as directed by provider Daily. Remove & Discard patch within 12 hours or as directed by MD 15 patch 1    lidocaine-prilocaine (EMLA) 2.5-2.5 % cream Apply 1 application topically to the appropriate area as directed Every 2 (Two) Hours As Needed for Mild Pain. 5 g 3    lisinopril (PRINIVIL,ZESTRIL) 20 MG tablet Take 1 tablet by mouth Daily for 30 days. 30 tablet 0    Morphine (MSIR) 30 MG tablet Take 1 tablet by mouth Every 4 (Four) Hours As Needed for Severe Pain. 60 tablet 0    valACYclovir (VALTREX) 1000 MG tablet        No current facility-administered medications on file prior to visit.        ALLERGIES:    Allergies   Allergen Reactions    Del-Mycin [Erythromycin] Hives    Gentamicin Hives    Ibuprofen Nausea And Vomiting    Latex Dermatitis     GLOVES    Metronidazole Hives    Ofloxacin Hives and Nausea Only     Has tolerated Levaquin     Penicillins Hives     Tolerated rocephin and cefepime 2023    Tetracycline Hives    Adhesive Tape Dermatitis     BANDAIDS    Aspirin GI Intolerance     Vomiting can take EC    Codeine Nausea And Vomiting        Social History     Socioeconomic History    Marital status:    Tobacco Use    Smoking status: Former     Packs/day: 0.25     Types: Cigarettes     Quit date: 2022     Years since quittin.3    Smokeless tobacco: Never    Tobacco comments:     Former some day smoker of 1-2 cigs   Vaping Use    Vaping Use: Never used   Substance and Sexual Activity    Alcohol use: Yes     Alcohol/week: 2.0 standard drinks     Types: 2  "Glasses of wine per week     Comment: occasionally    Drug use: Never    Sexual activity: Defer        Family History   Problem Relation Age of Onset    Cancer Mother     Cancer Father     Malig Hyperthermia Neg Hx         Review of Systems   Constitutional:  Negative for activity change, chills, fatigue and fever.   HENT:  Negative for mouth sores, trouble swallowing and voice change.    Eyes:  Negative for pain and visual disturbance.   Respiratory:  Positive for cough. Negative for wheezing.    Cardiovascular:  Negative for chest pain and palpitations.   Gastrointestinal:  Negative for abdominal pain, constipation, diarrhea, nausea and vomiting.   Genitourinary:  Negative for difficulty urinating, frequency and urgency.   Musculoskeletal:  Negative for arthralgias and joint swelling.   Skin:  Negative for rash.   Neurological:  Positive for weakness. Negative for dizziness, seizures and headaches.        Severe pain in the left upper arm due to postherpetic neuralgia.  She also had some weakness in the arm which she feels is improving.   Hematological:  Negative for adenopathy. Bruises/bleeds easily.   Psychiatric/Behavioral:  Negative for behavioral problems and confusion. The patient is not nervous/anxious.   09/22/2023 ROS updated     Objective     Vitals:    09/22/23 1414   BP: 126/63   Pulse: 68   Resp: 18   Temp: 96.9 °F (36.1 °C)   TempSrc: Temporal   SpO2: 96%   Weight: 51.9 kg (114 lb 6.4 oz)   Height: 152.4 cm (60\")   PainSc:   9   PainLoc: Arm  Comment: left arm from elbow all the way down to fingers and numbness         9/22/2023     2:07 PM   Current Status   ECOG score 1     Physical Exam  Vitals reviewed.   Constitutional:       General: She is not in acute distress.     Appearance: She is well-developed. She is ill-appearing.      Comments: Seated in a wheelchair.   HENT:      Head: Normocephalic and atraumatic.      Mouth/Throat:      Pharynx: No oropharyngeal exudate.   Eyes:      Pupils: Pupils " are equal, round, and reactive to light.   Neck:      Comments: Bilateral scars from carotid endarterectomies  Cardiovascular:      Rate and Rhythm: Normal rate and regular rhythm.      Heart sounds: Normal heart sounds. No murmur heard.  Pulmonary:      Effort: Pulmonary effort is normal. No respiratory distress.      Breath sounds: Decreased breath sounds present. No wheezing, rhonchi or rales.   Abdominal:      General: Bowel sounds are normal. There is no distension.      Palpations: Abdomen is soft.   Musculoskeletal:         General: Normal range of motion.      Cervical back: Normal range of motion.   Skin:     General: Skin is warm and dry.      Findings: No rash.   Neurological:      Mental Status: She is alert and oriented to person, place, and time.    I have reexamined the patient and the results are consistent with the previously documented exam. Cruzito Lagunas MD        RECENT LABS:  Results from last 7 days   Lab Units 09/22/23  1400   WBC 10*3/mm3 9.97   NEUTROS ABS 10*3/mm3 7.92*   HEMOGLOBIN g/dL 9.8*   HEMATOCRIT % 32.5*   PLATELETS 10*3/mm3 341     Results from last 7 days   Lab Units 09/22/23  1400   SODIUM mmol/L 133*   POTASSIUM mmol/L 4.7   CHLORIDE mmol/L 100   CO2 mmol/L 22.9   BUN mg/dL 20   CREATININE mg/dL 0.87   CALCIUM mg/dL 9.3   ALBUMIN g/dL 3.0*   BILIRUBIN mg/dL 0.4   ALK PHOS U/L 145*   ALT (SGPT) U/L 15   AST (SGOT) U/L 18   GLUCOSE mg/dL 124*         BRONCHOSCOPY PATHOLOGY 1/4/2023  Final Diagnosis   Fluid, Lung, Right Upper Lobe, Bronchoalveolar Lavage (BAL):  A. Negative for malignant cells- reactive atypia.  B. Reactive bronchial cells, macrophages, inflammation (mostly acute) and mucin.  C. Negative for definitive fungal organisms by GMS staining. See comment.     2. Fluid, Lung, Right Upper Lobe, Brushing:               A. Negative for malignant cells.               B. Bronchial cells, scattered inflammation and mucinous debris.      Final Diagnosis   1. Lung, Right Upper  Lobe, Biopsy:  Endobronchial mucosa and submucosa with                A. Squamous metaplasia, mild to moderate chronic active inflammation, fibrinous and necrotic debris and reactive      epithelial changes.               B. No definitive dysplasia nor malignancy identified.               C. No granulomata, diagnostic viral inclusions nor fungal organisms identified.         BRONCHOSCOPY PATHOLOGY 8/23/2022  Final Diagnosis   1. Lung, Right Upper Lobe, Endobronchial Biopsies: INVASIVE POORLY DIFFERENTIATED ADENOCARCINOMA OF PULMONARY ORIGIN.   PDL-1 POSITIVE >95%    IMAGING:  F-18 FDG PET FROM SKULL BASE TO MID THIGH WITH PET/CT FUSION 9/19/2023  IMPRESSION:  1.  Intensely FDG mass centered within the perihilar aspect of the right  upper lobe appears to have increased in size since 5/25/2023 with new  obstruction and stenosis of the right upper lobe bronchus and likely  represents recurrent malignancy. Surrounding pulmonary opacification and  consolidation throughout the right lung has increased since 5/25/2023  and there is new opacification within the left lung with differential  considerations including evolving postradiation changes, postobstructive  pneumonia and lymphangitic spread of malignancy.  2.  Segmental uptake within the right sixth and seventh ribs without  identifiable abnormality on noncontrast CT. While findings may represent  nondisplaced fractures, continued close attention on follow-up is  recommended to exclude metastatic disease.  3.  Indeterminate 5.3 cm right renal lesion, as before. At least  continued attention on follow-up is recommended. Finding can all be  further evaluated multiphase CT or MRI of the abdomen with and without  contrast to exclude neoplasm.  4.  Other findings as above.    MRI OF THE BRAIN WITH AND WITHOUT CONTRAST  8/27/2023  IMPRESSION:     1.  Status post right parieto-occipital craniotomy with subjacent   resection cavity.  Expected postoperative changes as detailed  above.  No   evidence of tumor recurrence.  2.  Moderate chronic small vessel ischemic changes.  3.  No specific pontine abnormality aside from chronic small vessel   ischemic disease.  Finding on reference CT likely reflected artifact.  4.  No acute infarct.  No acute hemorrhage.    CT CHEST W CONTRAST DIAGNOSTIC-, CT ABDOMEN PELVIS W CONTRAST-, NM BONE SCAN WHOLE BODY- 5/29/2023  1. Right upper lobe suprahilar pulmonary mass appears stable from the  recent prior exam, again with groundglass and nodular opacities in the  right upper lobe.  2. Sclerotic change at the right anterior eighth rib is new from  03/01/2023, with corresponding increased uptake on bone scan, could be  healing fracture or sclerotic metastatic disease, correlate clinically.  3. Stable appearance of the abdomen and pelvis.    CT CHEST, ABDOMEN, AND PELVIS WITH IV CONTRAST 3/1/2023  IMPRESSION:  Decreased size of the right upper lobe mass with stable  surrounding groundglass opacity and decreased size of the right lower  lobe opacity. No adenopathy seen on today's exam. Incidental findings as  Above.    F-18 FDG PET FROM SKULL BASE TO MID THIGH WITH PET/CT FUSION 8/12/2022  IMPRESSION:  1.  Large mass centered within the right upper lobe representing  patient's known malignancy. Interlobular septal thickening and ground  glass opacification projecting from the periphery of the mass throughout  the right upper lobe is highly concerning for lymphangitic spread. Of  note, the mass abuts the pleura and mediastinum over a long segment and  underlying invasion cannot be excluded.  2.  FDG avid hilar and mediastinal adenopathy concerning for metastatic  disease.  3.  A few irregular subcentimeter pulmonary nodules are present within  the right lower and left upper lobes measuring up to 0.9 cm which while  nonspecific raises concern for metastatic disease. At least close  attention on followup is recommended.   4.  Minimally hypermetabolic arnoldo hepatic  node and bilateral sub-6 mm  pulmonary nodules are indeterminate. Continued followup with chest and  abdominal CT in 3 months is recommended.  5.  Indeterminate density 4.4 cm mass arises off the right kidney.  Further evaluation with multiphase CT or MRI of the abdomen with and  without contrast is recommended to exclude neoplasm.  6.  Focal uptake over the right shoulder in the area of prior rotator  cuff repair is favored be postsurgical with metastatic disease less  likely.  7.  Other findings as above.    Assessment & Plan   1.  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  Patient is not felt to be a surgical candidate and combined chemotherapy and radiation.   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  9/20/2022 1st dose of weekly carboplatin/taxol.   She completed 6 cycles of weekly CarboTaxol 10/25/2022.  Radiation therapy completed 10/27/2022  First dose durvalumab delivered 11/28/2022.   Durvalumab on hold since May 2023  Progression of disease with enlarging hypermetabolic mass in the right upper lobe in September 2023  Plans to start single agent Gemzar palliative chemotherapy    2.  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    3.  Other comorbidities including vascular disease with previous carotid   endarterectomy surgeries.  She has no known history of stroke or myocardial infarction.     5.  Rheumatoid arthritis: Patient previously on Celebrex, but discontinued due to hemoptysis.  Patient started on prednisone 20 mg daily prescribed to manage her arthritis pain.     6.  Development of brain metastases in May 2023.  Patient underwent resection of the dominant mass in the right occipital area by Dr. Cho of neurosurgery.  She received post op radiation therapy to the resection bed and to 2 smaller lesions with SRS under the direction of Dr. Andres  Lillie at [facility].  Her most recent MRI of the brain from 8/27/2023 as noted above shows no new sites of disease, improved edema, and no definite recurrence in the resection bed.    7.  Severe shingles outbreak of left upper extremity with severe pain from postherpetic neuralgia.     8.  Recent hospitalization for bacterial colitis with stool culture growing E. coli and Campylobacter.  Her symptoms have resolved at this point.      PLAN:   1.  We reviewed the results of the PET scan as well as reviewing the records from her recent hospitalization for colitis.  We explained that the PET scan does indicate progression of her disease and discussed options for additional treatment.  2.  We have recommended single agent Gemzar 1000 mg per metered squared days 1 and 8 of an every 21-day cycle as palliative treatment that we feel she can tolerate.  3.  Now that she is no longer taking immunotherapy we will increase her prednisone dose to 20 mg daily both for control of her rheumatoid arthritis and hopefully this may help her pain from postherpetic neuralgia as well.  4.  She requested a higher dose of Lyrica for her postherpetic neuralgia and we prescribed a 70 mg capsule to be taken along with her 150 mg capsule for a total dose of 225 mg p.o. twice daily.  5.  We will also refer her to [facility] pain management to evaluate for possible nerve block procedures.  6.  The patient will undergo Gemzar chemotherapy education as a video visit with our nurse practitioners early next week.  7.  She will return for nurse practitioner assessment, lab, and first dose of Gemzar (day 1 cycle #1) at the [facility] office on Wednesday, 9/27/2023.  8.  Return for lab and day 8 cycle #1 Gemzar on Wednesday, 10/4/2023  9.  MD follow-up with lab and Gemzar treatment on Wednesday, 10/18/2023 (day 1 cycle #2).  10.  We will plan to repeat CT scan of the chest after 4 cycles of Gemzar.    This patient is on high risk drug therapy  requiring intensive monitoring for toxicity.      Cruzito Lagunas MD   09/22/2023

## 2023-09-23 NOTE — PROGRESS NOTES
TREATMENT  PREPARATION    Pam Vidal  [id number]  1945    Chief Complaint: Treatment preparation and needs assessment  Mode of Visit: Video  Location of patient: home  Location of provider: [facility] [place] [facility]  You have chosen to receive care through a telehealth visit.  Does the patient consent to use a video/audio connection for your medical care today? Yes  The visit included audio and video interaction. No technical issues occurred during this visit.     History of present illness:  Pam Vidal is a 78 y.o. year old female who is here today for treatment preparation and needs assessment.  The patient has been diagnosed with   Encounter Diagnoses   Name Primary?    Primary lung adenocarcinoma, right Yes    Lung cancer metastatic to brain     and is scheduled to begin treatment with:     Oncology History:    Oncology/Hematology History   Primary lung adenocarcinoma, right   7/22/2022 Biopsy    Final Diagnosis   1. Lung, Right Upper Lobe, CT-Guided Biopsy for a Mass:               A. Predominantly necrosis with surrounding fibrosis and atypical pneumocyte hyperplasia.               B. No viable tumor present.        8/12/2022 Imaging    F-18 FDG PET FROM SKULL BASE TO MID THIGH WITH PET/CT FUSION     HISTORY: Lung mass, staging.     TECHNIQUE: Radiation dose reduction techniques were utilized, including  automated exposure control and exposure modulation based on body size.   Blood glucose level at time of injection was 95 mg/dL.  6.8 mCi of F-18  FDG were injected and PET was performed from skull base to mid thigh. CT  was obtained for localization and attenuation correction. Time at  injection 0937. PET start time 1104.      Compared with outside CT report from 06/27/2022; no images are available  for comparison at the time of this dictation.     FINDINGS:      No cervical adenopathy demonstrating uptake significantly above that of  mediastinal blood pool is present. The thyroid, submandibular  and  parotid glands demonstrate roughly symmetric FDG uptake.     Hypermetabolic right hilar and mediastinal adenopathy is present with  index nodes as below:  *  Right hilar node measuring 1.1 cm in short axis dimension with a max  SUV of 4.4.  *  Precarinal node measuring 1.3 cm in short axis dimension with max SUV  of 3.6.     No axillary adenopathy demonstrating uptake significantly above that of  mediastinal blood pool is present. There is no significant pericardial  effusion.     There is a large intensely FDG avid mass within the right upper lobe  with surrounding pulmonary opacification. The mass measures  approximately 4.9 x 3.8 cm on pet imaging with a max SUV of 21.9.  Interlobular septal thickening and groundglass opacification extends  from the periphery of this mass throughout the right upper lobe, many  areas of which demonstrate mild to moderate hypermetabolism with a max  SUV of 3.7. The mass abuts the adjacent right hilum over a long segment.  0.9 cm irregular nodule within the right lower lobe is present. There  are a few additional sub-6 mm pulmonary nodules. Please refer to saved  images in PACS for more precise locations.     For the purposes of this dictation, the last well-formed disc space be  referred to as L5-S1. There is mild anterolisthesis of L5 on S1.  Moderate to severe degenerative changes are present at C4-5 without  significant FDG uptake in this location. Postsurgical changes from right  rotator cuff repair are present with well-corticated ghost tracks seen  throughout the right proximal humerus, a few of which demonstrate  moderate FDG uptake with a max SUV of 4.9. A arnoldo hepatic node  demonstrating FDG uptake mildly above that of mediastinal blood pool  with a max SUV of 2.7 measures 1.1 cm in short axis dimension.     Colonic diverticulosis is present.     The gallbladder is surgically absent.     No focal FDG avid abnormality is present within the liver, pancreas,  spleen,  adrenal glands or kidneys. Indeterminate density mass arises off  the inferior aspect of the right kidney measuring up to 4.4 cm. There is  no hydronephrosis.     No free intraperitoneal air or fluid is seen.     IMPRESSION:  1.  Large mass centered within the right upper lobe representing  patient's known malignancy. Interlobular septal thickening and ground  glass opacification projecting from the periphery of the mass throughout  the right upper lobe is highly concerning for lymphangitic spread. Of  note, the mass abuts the pleura and mediastinum over a long segment and  underlying invasion cannot be excluded.  2.  FDG avid hilar and mediastinal adenopathy concerning for metastatic  disease.  3.  A few irregular subcentimeter pulmonary nodules are present within  the right lower and left upper lobes measuring up to 0.9 cm which while  nonspecific raises concern for metastatic disease. At least close  attention on followup is recommended.   4.  Minimally hypermetabolic arnoldo hepatic node and bilateral sub-6 mm  pulmonary nodules are indeterminate. Continued followup with chest and  abdominal CT in 3 months is recommended.  5.  Indeterminate density 4.4 cm mass arises off the right kidney.  Further evaluation with multiphase CT or MRI of the abdomen with and  without contrast is recommended to exclude neoplasm.  6.  Focal uptake over the right shoulder in the area of prior rotator  cuff repair is favored be postsurgical with metastatic disease less  likely.  7.  Other findings as above.     8/15/2022 Imaging    MRI OF THE BRAIN WITH AND WITHOUT CONTRAST 08/15/2022     CLINICAL HISTORY: New diagnosis of lung cancer in 06/2022 evaluate for  brain metastases.     TECHNIQUE: Axial T1, FLAIR, fat-suppressed T2, axial diffusion sagittal  T1 and postcontrast axial fat-suppressed T1 sagittal and coronal T1 thin  cut 1.5 mm thick axial postcontrast spoiled gradient echo T1-weighted  images were obtained of the entire  head.     FINDINGS: There is minimal T2 high signal in the periventricular white  matter scattered tiny patchy nodular foci T2 high signal subcortical  white matter of cerebral hemispheres as well as in the central pontine  white matter consistent with mild-to-moderate small vessel disease. The  remainder of the brain parenchyma is normal in signal intensity. The  ventricles are normal in size. I see no focal mass effect. There is no  midline shift. No extra-axial fluid collections are identified. No  abnormal areas of enhancement are seen in the head. The paranasal  sinuses and the mastoid air cells and middle ear cavities are clear. The  calvarium and skull base are normal in appearance. Good flow voids are  demonstrated in the cerebral vessels and dural venous sinuses. On the  sagittal T1-weighted images there is evidence of T1 low signal  throughout the visualized portion of the C4 cervical vertebra. Patient  has had prior cervical spine surgery. Abnormality in the C4 cervical  vertebrae is nonspecific, could be degenerative in origin but along the  basis of this exam, I cannot exclude metastatic lesion of the C4  vertebra.     IMPRESSION:  1. There is mild-to-moderate small vessel disease in cerebral and  central pontine white matter. Otherwise, the MRI of the brain is normal  with no evidence of metastatic disease to the brain.   2. There are some nonspecific signal abnormality in the C4 cervical  vertebra with T1 low signal throughout the visualized portions C4  cervical vertebrae on the sagittal images. The patient has had prior  cervical spine surgery as demonstrated on prior PET scan and this argues  in favor that the T1 low signal and C4 vertebrae is related to sclerosis  from degenerative disc changes at C4-5 although an osseous metastatic  lesion in the C4 vertebra cannot be excluded on the basis of this exam.  Apparently the C4 vertebra was not hot on the recent PET scan which is  further evidence that  this is degenerative in origin and correlation  with recent PET scan is suggested     8/23/2022 Biopsy    Final Diagnosis   1. Mediastinal Lymph Node, Station 11R, Fine Needle Aspiration (FNA): METASTATIC POORLY DIFFERENTIATED NONSMALL CELL CARCINOMA, FAVOR ADENOCARCINOMA.        8/23/2022 Biopsy    Final Diagnosis   1. Lung, Right Upper Lobe, Endobronchial Biopsies: INVASIVE POORLY DIFFERENTIATED ADENOCARCINOMA OF PULMONARY ORIGIN.     PD-L1: Positive, greater than 95%     8/23/2022 Biopsy    Final Diagnosis   1. Lung, Right Upper Lobe, Brushing:               A. Positive for malignant cells, consistent with NONSMALL CELL CARCINOMA.               B. Please correlate with mediastinal FNA case [id number].     2. Lung, Right Upper Lobe, Bronchoalveolar Lavage (BAL):               A. Negative for malignant cells.  B. Macrophages, benign bronchial cells, metaplastic squamous cells, coccoid bacteria (on cellblock), and acute inflammation.               C. GMS stain is negative for fungus and pneumocystis.        8/30/2022 Initial Diagnosis    Primary lung adenocarcinoma, right (HCC)     8/30/2022 Cancer Staged    Staging form: Lung, AJCC 8th Edition  - Clinical stage from 8/30/2022: Stage IIIA (cT2b, cN2, cM0) - Signed by Cruzito Lagunas MD on 8/30/2022 8/30/2022 Biopsy    [id number]  Detected alterations/biomarkers: KRAS G12V; TP53 I195S; TP53 K132R; TP53 R158L    TMB: 7.45 mut/Mb  MSI-High: Not detected       9/20/2022 - 10/25/2022 Chemotherapy    OP LUNG PACLitaxel / CARBOplatin AUC=2 (Weekly) + XRT        9/20/2022 - 10/31/2022 Radiation    Radiation OncologyTreatment Course:  Pam Vidal received 6000 cGy in 30 fractions to right lung.  Given concurrent with chemotherapy     10/4/2022 -  Chemotherapy    OP CENTRAL VENOUS ACCESS DEVICE ACCESS, CARE, AND MAINTENANCE (CVAD)       11/21/2022 Imaging    CT CHEST, ABDOMEN, AND PELVIS WITH IV CONTRAST     HISTORY: Primary lung adenocarcinoma, follow-up      TECHNIQUE: Radiation dose reduction techniques were utilized, including  automated exposure control and exposure modulation based on body size.   3 mm images were obtained through the chest, abdomen, and pelvis with IV  contrast.      COMPARISON: PET/CT 08/12/2022     FINDINGS:      Chest:      Previously seen mediastinal and hilar adenopathy which was FDG avid on  prior PET has decreased in size, with index nodes as below:  *  Right hilar node measuring 0.5 cm in short axis dimension (previously  1.1 cm on 08/12/2022).  *  Precarinal node measuring 1 cm short axis dimension (previously 1.3  cm on 08/12/2022).     Right Port-A-Cath tip terminates in superior cavoatrial junction. No  significant pericardial effusion is present.     Large right upper lobe pulmonary mass measures 5 x 4.2 cm (previously  4.9 x 3.8 cm on 08/12/2022). There is extensive groundglass and  interlobular septal thickening extending from the periphery of of this  mass, as before. Discrete extension of the mass into the superomedial  aspect of the right lower lobe is present, as before.     Scattered bilateral subcentimeter pulmonary nodules are present, as  before, with index lesions as below:  *  Right lower lobe measuring 0.7 cm, grossly unchanged since  06/27/2022.  *  Sub-6 mm nodule within the left upper lobe (image 21)     Abdomen and pelvis:      There is colonic diverticulosis. Bladder has an unremarkable  postcontrast CT appearance for its degree of distention. The uterus is  surgically absent.     There are no findings of small bowel obstruction. The appendix and  gallbladder are surgically absent. The liver, pancreas, spleen and  adrenal glands have an unremarkable postcontrast CT appearance.  Subcentimeter renal lesions are too small to characterize. Larger  hypodense lesions demonstrating density less than 15 Hounsfield units  are likely benign per Bosniak 2019 criteria. Indeterminate exophytic  lesion arising off the inferior  aspect of the right kidney measuring 5.1  cm is present. There is no hydronephrosis. No abdominopelvic adenopathy  by size criteria. No free intraperitoneal air or fluid is seen.     Bone windows: For the purposes of this dictation, last well-formed disc  space be referred to as L5-S1. There is mild to moderate anterolisthesis  of L5-S1.     IMPRESSION:  Impression:  1.  Pulmonary mass centered within the right upper lobe with discrete  extension into the superior aspect of the right lower lobe is present  and grossly unchanged in overall size since 08/12/2022. Findings  concerning for lymphangitic spread of malignancy ascending from the  periphery of the mass are present, as before.  2.  Previously hypermetabolic mediastinal and hilar adenopathy have  decreased in size.  3.  Scattered bilateral subcentimeter pulmonary nodules are grossly  unchanged. Continued close attention on follow-up is recommended to  exclude metastatic disease.  4.  5.1 cm exophytic lesion arising off the inferior aspect of the right  kidney is indeterminate. Further evaluation multiphase CT or MRI of the  abdomen with and without contrast is recommended to exclude neoplasm.  5.  Other findings as above.     11/28/2022 - 5/15/2023 Chemotherapy    OP LUNG Durvalumab       1/4/2023 Biopsy    Final Diagnosis   1. Lung, Right Upper Lobe, Biopsy:  Endobronchial mucosa and submucosa with                A. Squamous metaplasia, mild to moderate chronic active inflammation, fibrinous and necrotic debris and reactive      epithelial changes.               B. No definitive dysplasia nor malignancy identified.               C. No granulomata, diagnostic viral inclusions nor fungal organisms identified.          1/4/2023 Biopsy    Final Diagnosis   Fluid, Lung, Right Upper Lobe, Bronchoalveolar Lavage (BAL):  A. Negative for malignant cells- reactive atypia.  B. Reactive bronchial cells, macrophages, inflammation (mostly acute) and mucin.  C. Negative for  definitive fungal organisms by GMS staining. See comment.     2. Fluid, Lung, Right Upper Lobe, Brushing:               A. Negative for malignant cells.               B. Bronchial cells, scattered inflammation and mucinous debris.         3/1/2023 Imaging    Examination: CT of the chest, abdomen, and pelvis with contrast     TECHNIQUE: CT of the chest, abdomen, and pelvis after the uneventful  administration of nonionic intravenous contrast per protocol. Radiation  dose reduction techniques were utilized, including automated exposure  control and exposure modulation based on body size.     HISTORY:Lung cancer     COMPARISON:11/21/2022     FINDINGS:Chest:     Groundglass opacities in the right upper lobe are stable. The right  upper lobe mass is decreased in size, measuring 4.3 x 3 cm on series 3,  image  72. Areas of atelectasis and/or scarring are seen. No pleural effusion  or pneumothorax are demonstrated. There is a left-sided fat-containing  Bochdalek type hernia. Right-sided paramediastinal opacity is decreased  around series 3, image 36. The central airways are patent.     No enlarged supraclavicular, axillary, mediastinal, or hilar lymph nodes  are seen. The mediastinal vasculature is normal in caliber. A right  internal jugular venous Port-A-Cath terminates in the caudal superior  vena cava. Coronary calcifications are seen. The heart is normal in size  and configuration, without pericardial effusion.     Abdomen and pelvis:     Cholecystectomy changes are seen. Stable cysts and lesions that are  technically indeterminate, likely cysts, are seen in the right kidney.  Colonic diverticula are demonstrated, without evidence of acute  diverticulitis. The uterus is absent.     The spleen, adrenal glands, left kidney, pancreas, stomach, small bowel,  urinary bladder, and abdominal vasculature are normal. No  intraperitoneal fluid collection or free gas are seen. No enlarged lymph  nodes are demonstrated.     Bone  windows demonstrate degenerative changes, without suspicious  osseous lesion seen. There is a cervical cerclage wire.     IMPRESSION:  Decreased size of the right upper lobe mass with stable  surrounding groundglass opacity and decreased size of the right lower  lobe opacity. No adenopathy seen on today's exam. Incidental findings as  above.     9/27/2023 -  Chemotherapy    OP LUNG Gemcitabine 1000 mg/m2           The current medication list and allergy list were reviewed and reconciled.     Past Medical History, Past Surgical History, Social History, Family History have been reviewed and are without significant changes except as mentioned.    Physical Exam:    Vitals:     Vitals:    09/26/23 1557   PainSc:   9   PainLoc: Arm  Comment: Left arm from elbow down to hand        ECOG score: 1             Physical Exam  HENT:      Head: Normocephalic and atraumatic.   Eyes:      Extraocular Movements: Extraocular movements intact.      Conjunctiva/sclera: Conjunctivae normal.   Pulmonary:      Effort: Pulmonary effort is normal. No respiratory distress.   Neurological:      General: No focal deficit present.      Mental Status: She is alert and oriented to person, place, and time.   Psychiatric:         Mood and Affect: Mood normal.         Behavior: Behavior normal.         NEEDS ASSESSMENTS    Genetics  The patient's new diagnosis and family history have been reviewed for genetic counseling needs. The patient will not be referred..     Psychosocial and Barriers to care  The patient has completed a PHQ-9 Depression Screening and the Distress Thermometer (DT) today.  PHQ-9 results show PHQ-2 Total Score: 0 PHQ-9 Total Score: PHQ-9 Total Score: 0     The patient scored their distress today as Distress Level: 9 on a scale of 0-10 with 0 being no distress and 10 being extreme distress. Problems marked by the patient as being an issue for them within the last week include Physical concerns  Sleep: Yes (Pain awakes her up) .       Results were reviewed along with psychosocial resources offered by our cancer center.  Our Supportive Oncology team will be flagged for a score of 4 or above, and a same day call will be made for a score of 9 or 10.  A mental health referral is offered at that time. Patients who score less than 4 have been educated on our support services and can be referred to our  upon request.  The patient will not be referred to our .       Nutrition  The patient has completed the malnutrition screening today. They scored     with a score of 0-1 meaning not at risk in a score of 2 or greater meaning at risk.  Patients with a score of 3 or higher will be referred to our oncology dietitian for support. Patients beginning at risk treatment regimens or who have dietary concerns will also be referred to our oncology dietitian. The patient will not be referred.    Functional Assessment  Persons who are age 70 or greater will be screened for qualification of a comprehensive geriatric assessment by our survivorship nurse practitioner.  Older adults with cancer face unique challenges. These may include an increased risk of drug reactions, financial burdens, and caregiver stress. The patient scored G8 Questionnaire  Has food intake declined over the past 3 months due to loss of appetite, digestive problems, chewing or swallowing difficulties?: No decrease in food intake  Weight loss during the last 3 months: Weight loss between 1 kg and 3 kg / 2.2 lbs and 6.6 lbs  Mobility: Goes out  Neuropsychological Problems: No psychological problems  Body Mass Index (BMI (weight in kg) / (height in m2)): BMI 21 to BMI < 23  Takes more than 3 medications a day: Yes, takes more than 3 prescription drugs per day  In comparison with other people of the same age, how does the patient consider his/her health status?: As good  Age: < 80  Total Score: 13 . Patients scoring 14 or lower will referred for an older adult functional  "assessment with the survivorship advanced practice registered nurse to ensure all needed support is provided as patients plan for their treatments. The patient will be referred.    Intravenous Access Assessment  The patient and I discussed planned intravenous chemo/biotherapy as well as other IV treatments that are often needed throughout the course of treatment. These may include, but are not limited to blood transfusions, antibiotics, and IV hydration. Discussed that depending on selected treatment and vein assessment, patient may require venous access device (VAD) which could include but not limited to a Mediport or PICC line. Risks and benefits of VADs reviewed. The patient will be treated via Port.    Reproductive/Sexual Activity   People should avoid becoming pregnant and should not get a partner pregnant while undergoing chemo/biotherapy.  People of childbearing age should use effective contraception during active therapy. The best recommendation for all people is to use a barrier method for a minimum of 1 week after the last infusion of chemo/biotherapy to prevent your partner being exposed to byproducts from treatment medications in bodily fluids. Effective contraception should be discussed with your oncology team to make sure it is safe to take based on your diagnosis. Possible options include oral contraceptives, barrier methods. Chemo/biotherapy can change your ability to reproduce children in the future.  There are options for fertility preservation. NOT APPLICABLE    Advanced Care Planning  Advance Care Planning   The patient and I discussed advanced care planning, \"Conversations that Matter\".   This service is offered for development of advance directives with a certified ACP facilitator.  The patient does not have an up-to-date advanced directive. This document is not on file with our office. The patient is not interested in an appointment with one of our facilitators to create or update their " advanced directives.    Have you reviewed your Advance Directive and is it valid for this stay?: No  Patient Requests Assistance on Advance Directives: Patient Declined          Smoking cessation  Tobacco Use: Medium Risk    Smoking Tobacco Use: Former    Smokeless Tobacco Use: Never    Passive Exposure: Not on file       Patient and I discussed their tobacco use history. Referral will not be made for smoking cessation.      Palliative Care  When appropriate, the patient and I discussed the availability palliative care services and when appropriate Hospice care. Palliative care is not the same as Hospice care which was explained to the patient.The patient is not interested in additional information from our  on these services.    Survivorship   When appropriate, we discussed that we will refer the patient to survivorship clinic to discuss next steps following completion of planned treatment.  Reviewed this visit will include assessment of your physical, psychological, functional, and spiritual needs as a survivor and the need at attend this visit when scheduled.    TREATMENT EDUCATION    Today I met with the patient to discuss the chemo/biotherapy regimen recommended for treatment of Primary lung adenocarcinoma, right    Lung cancer metastatic to brain  .  The patient was given explanation of treatment premed side effects including office policy that prohibits patients to drive if sedating medications are administered, MD explanation given regarding benefits, side effects, toxicities and goals of treatment.  The patient received a Chemotherapy/Biotherapy Plan Summary including diagnosis and explanation of specific treatment plan.    SIDE EFFECTS:  Common side effects were discussed with the patient and/or significant other.  Discussion included where applicable hair loss/discoloration, anemia/fatigue, infection/chills/fever, appetite, bleeding risk/precautions, constipation, diarrhea, mouth sores, taste  alteration, loss of appetite, nausea/vomiting, peripheral neuropathy, skin/nail changes, rash, muscle aches/weakness, photosensitivity, weight gain/loss, hearing loss, dizziness, menopausal symptoms, menstrual irregularity, sterility, high blood pressure, heart damage, liver damage, lung damage, kidney damage, DVT/PE risk, fluid retention, pleural/pericardial effusion, somnolence, electrolyte/LFT imbalance, vein exercises and/or the possible need for vascular access/port placement.  The patient was advised that although uncommon, leakage of an infused medication from the vein or venous access device may lead to skin breakdown and/or other tissue damage.  The patient was advised that he/she may have pain, bleeding, and/or bruising from the insertion of a needle in their vein or venous access device (port).  The patient was further advised that, in spite of proper technique, infection with redness and irritation may rarely occur at the site where the needle was inserted.  The patient was advised that if complications occur, additional medical treatment is available.  Finally, where applicable we have reviewed rare but potential immune mediated side effects including shortness of breath, cough, chest pain (pneumonitis), abdominal pain, diarrhea (colitis), thyroiditis (hypothyroid or hyperthyroid), hepatitis and liver dysfunction, nephritis and renal dysfunction.    Discussion also included side effects specific to drugs in the treatment plan, specifically:    Treatment Plans       Name Type Plan Dates Plan Provider         Active    OP LUNG Gemcitabine 1000 mg/m2 ONCOLOGY TREATMENT  9/26/2023 - Present Cruzito Lagunas MD                      Questions answered and additional information discussed on topics including:  Anemia, Thrombocytopenia, Neutropenia, Nutrition and appetite changes, Constipation, Diarrhea, Nausea & vomiting, Mouth sores, and Skin & nail changes       Assessment and Plan:    Diagnoses and all  orders for this visit:    1. Primary lung adenocarcinoma, right (Primary)    2. Lung cancer metastatic to brain      No orders of the defined types were placed in this encounter.        The patient and I have reviewed their diagnosis and scheduled treatment plan. Needs assessment was completed where applicable including genetics, psychosocial needs, barriers to care, VAD evaluation, advanced care planning, survivorship, and palliative care services where indicated. Referrals have been ordered as appropriate based upon evaluation today and patient desires.   Chemo/biotherapy teaching was completed today and consent obtained. See separate documentation for further details.  Adequate time was given to answer questions.  Patient made aware of their care team members and contact information if they have questions or problems throughout the treatment course.  Discussion held and written information provided describing frequency of office visits and ongoing monitoring throughout the treatment plan.     Reviewed with patient any prescribed medication sent to pharmacy.  Education provided regarding proper storage, safe handling, and proper disposal of unused medication.  Proper handling of body fluids and waste discussed and written information provided.  If appropriate, patient had pretreatment labs drawn today.    Learning assessment completed at initial patient encounter. See separate flowsheet. Chemo/biotherapy education comprehension assessed at today's visit.    I spent 30 minutes caring for Pam on this date of service. This time includes time spent by me in the following activities: preparing for the visit, obtaining and/or reviewing a separately obtained history, performing a medically appropriate examination and/or evaluation, counseling and educating the patient/family/caregiver, and documenting information in the medical record.     Lindsey Irvin, APRN   09/26/23

## 2023-09-23 NOTE — PROGRESS NOTES
[facility] Hematology/Oncology Treatment Plan Summary    Name: Pam Vidal  [facility]# [id number]  MD: Dr. Lagunas    Diagnosis:     ICD-10-CM ICD-9-CM   1. Primary lung adenocarcinoma, right  C34.91 162.9   2. Lung cancer metastatic to brain  C34.90 162.9    C79.31 198.3     Goal of treatment: disease control    Treatment Medication(s):   Gemcitabine (Gemzar)    Frequency: Treatment on Day 1 and Day 8 every 21 days.    Number of cycles: 6 cycles. (One cycle equals 21 days)    Starting on: 9/27/2023 at [facility] location    Items for home use: Senokot-S (for constipation), Imodium AD (for diarrhea), and Thermometer    Rx written for: [x] Nausea    [] Pre-Treatment   ondansetron 8 mg by mouth every 8 hours as needed for nausea      Completing Provider: BARBARA Martinez           Date/time: 09/26/2023      Please note: You will be seen by a provider frequently with your treatment plan. This plan may change depending on many factors, if so, this will be discussed with you by your physician.  Last update 03/2022.

## 2023-09-26 ENCOUNTER — TELEMEDICINE (OUTPATIENT)
Dept: ONCOLOGY | Facility: CLINIC | Age: 78
End: 2023-09-26
Payer: MEDICARE

## 2023-09-26 ENCOUNTER — PATIENT OUTREACH (OUTPATIENT)
Dept: OTHER | Facility: HOSPITAL | Age: 78
End: 2023-09-26
Payer: MEDICARE

## 2023-09-26 DIAGNOSIS — C34.91 PRIMARY LUNG ADENOCARCINOMA, RIGHT: Primary | ICD-10-CM

## 2023-09-26 DIAGNOSIS — C34.90 LUNG CANCER METASTATIC TO BRAIN: ICD-10-CM

## 2023-09-26 DIAGNOSIS — C79.31 LUNG CANCER METASTATIC TO BRAIN: ICD-10-CM

## 2023-09-26 NOTE — PROGRESS NOTES
Reviewed chart    Called patient. She stated she was doing ok. We discussed her visit with Dr. Lagunas last week. She is coming in later today for chemo teaching.    The patient denies questions, concerns or ongoing resource needs.    I will call in 1-2 months; encouraged patient to call as needed.

## 2023-09-27 ENCOUNTER — INFUSION (OUTPATIENT)
Dept: ONCOLOGY | Facility: HOSPITAL | Age: 78
End: 2023-09-27
Payer: MEDICARE

## 2023-09-27 ENCOUNTER — OFFICE VISIT (OUTPATIENT)
Dept: ONCOLOGY | Facility: CLINIC | Age: 78
End: 2023-09-27
Payer: MEDICARE

## 2023-09-27 ENCOUNTER — NUTRITION (OUTPATIENT)
Dept: OTHER | Facility: HOSPITAL | Age: 78
End: 2023-09-27
Payer: MEDICARE

## 2023-09-27 VITALS
HEIGHT: 60 IN | DIASTOLIC BLOOD PRESSURE: 55 MMHG | OXYGEN SATURATION: 98 % | SYSTOLIC BLOOD PRESSURE: 108 MMHG | HEART RATE: 70 BPM | WEIGHT: 116.8 LBS | BODY MASS INDEX: 22.93 KG/M2 | TEMPERATURE: 97.8 F

## 2023-09-27 DIAGNOSIS — C34.91 PRIMARY LUNG ADENOCARCINOMA, RIGHT: Primary | ICD-10-CM

## 2023-09-27 DIAGNOSIS — C34.90 LUNG CANCER METASTATIC TO BRAIN: ICD-10-CM

## 2023-09-27 DIAGNOSIS — C34.91 PRIMARY LUNG ADENOCARCINOMA, RIGHT: ICD-10-CM

## 2023-09-27 DIAGNOSIS — C79.31 LUNG CANCER METASTATIC TO BRAIN: ICD-10-CM

## 2023-09-27 LAB
ALBUMIN SERPL-MCNC: 3 G/DL (ref 3.5–5.2)
ALBUMIN/GLOB SERPL: 1 G/DL
ALP SERPL-CCNC: 135 U/L (ref 39–117)
ALT SERPL W P-5'-P-CCNC: 13 U/L (ref 1–33)
ANION GAP SERPL CALCULATED.3IONS-SCNC: 13.9 MMOL/L (ref 5–15)
AST SERPL-CCNC: 21 U/L (ref 1–32)
BASOPHILS # BLD AUTO: 0.04 10*3/MM3 (ref 0–0.2)
BASOPHILS NFR BLD AUTO: 0.3 % (ref 0–1.5)
BILIRUB SERPL-MCNC: 0.3 MG/DL (ref 0–1.2)
BUN SERPL-MCNC: 25 MG/DL (ref 8–23)
BUN/CREAT SERPL: 24.3 (ref 7–25)
CALCIUM SPEC-SCNC: 8.6 MG/DL (ref 8.6–10.5)
CHLORIDE SERPL-SCNC: 99 MMOL/L (ref 98–107)
CO2 SERPL-SCNC: 22.1 MMOL/L (ref 22–29)
CREAT SERPL-MCNC: 1.03 MG/DL (ref 0.6–1.1)
DEPRECATED RDW RBC AUTO: 56.7 FL (ref 37–54)
EGFRCR SERPLBLD CKD-EPI 2021: 56.5 ML/MIN/1.73
EOSINOPHIL # BLD AUTO: 0.26 10*3/MM3 (ref 0–0.4)
EOSINOPHIL NFR BLD AUTO: 2 % (ref 0.3–6.2)
ERYTHROCYTE [DISTWIDTH] IN BLOOD BY AUTOMATED COUNT: 16.9 % (ref 12.3–15.4)
GLOBULIN UR ELPH-MCNC: 2.9 GM/DL
GLUCOSE SERPL-MCNC: 126 MG/DL (ref 65–99)
HCT VFR BLD AUTO: 33.3 % (ref 34–46.6)
HGB BLD-MCNC: 10.3 G/DL (ref 12–15.9)
IMM GRANULOCYTES # BLD AUTO: 0.1 10*3/MM3 (ref 0–0.05)
IMM GRANULOCYTES NFR BLD AUTO: 0.8 % (ref 0–0.5)
LYMPHOCYTES # BLD AUTO: 1.54 10*3/MM3 (ref 0.7–3.1)
LYMPHOCYTES NFR BLD AUTO: 11.9 % (ref 19.6–45.3)
MCH RBC QN AUTO: 28.5 PG (ref 26.6–33)
MCHC RBC AUTO-ENTMCNC: 30.9 G/DL (ref 31.5–35.7)
MCV RBC AUTO: 92.2 FL (ref 79–97)
MONOCYTES # BLD AUTO: 1.11 10*3/MM3 (ref 0.1–0.9)
MONOCYTES NFR BLD AUTO: 8.6 % (ref 5–12)
NEUTROPHILS NFR BLD AUTO: 76.4 % (ref 42.7–76)
NEUTROPHILS NFR BLD AUTO: 9.9 10*3/MM3 (ref 1.7–7)
NRBC BLD AUTO-RTO: 0 /100 WBC (ref 0–0.2)
PLATELET # BLD AUTO: 308 10*3/MM3 (ref 140–450)
PMV BLD AUTO: 10.2 FL (ref 6–12)
POTASSIUM SERPL-SCNC: 3.6 MMOL/L (ref 3.5–5.2)
PROT SERPL-MCNC: 5.9 G/DL (ref 6–8.5)
RBC # BLD AUTO: 3.61 10*6/MM3 (ref 3.77–5.28)
SODIUM SERPL-SCNC: 135 MMOL/L (ref 136–145)
WBC NRBC COR # BLD: 12.95 10*3/MM3 (ref 3.4–10.8)

## 2023-09-27 PROCEDURE — 25010000002 HEPARIN LOCK FLUSH PER 10 UNITS: Performed by: INTERNAL MEDICINE

## 2023-09-27 PROCEDURE — 96413 CHEMO IV INFUSION 1 HR: CPT

## 2023-09-27 PROCEDURE — 25010000002 GEMCITABINE PER 200 MG: Performed by: INTERNAL MEDICINE

## 2023-09-27 PROCEDURE — 85025 COMPLETE CBC W/AUTO DIFF WBC: CPT

## 2023-09-27 PROCEDURE — 96375 TX/PRO/DX INJ NEW DRUG ADDON: CPT

## 2023-09-27 PROCEDURE — 96361 HYDRATE IV INFUSION ADD-ON: CPT

## 2023-09-27 PROCEDURE — 25010000002 DEXAMETHASONE SODIUM PHOSPHATE 100 MG/10ML SOLUTION: Performed by: INTERNAL MEDICINE

## 2023-09-27 PROCEDURE — 80053 COMPREHEN METABOLIC PANEL: CPT

## 2023-09-27 RX ORDER — SODIUM CHLORIDE 9 MG/ML
500 INJECTION, SOLUTION INTRAVENOUS ONCE
Status: COMPLETED | OUTPATIENT
Start: 2023-09-27 | End: 2023-09-27

## 2023-09-27 RX ORDER — SODIUM CHLORIDE 0.9 % (FLUSH) 0.9 %
10 SYRINGE (ML) INJECTION AS NEEDED
Status: DISCONTINUED | OUTPATIENT
Start: 2023-09-27 | End: 2023-09-27 | Stop reason: HOSPADM

## 2023-09-27 RX ORDER — SODIUM CHLORIDE 9 MG/ML
250 INJECTION, SOLUTION INTRAVENOUS ONCE
Status: COMPLETED | OUTPATIENT
Start: 2023-09-27 | End: 2023-09-27

## 2023-09-27 RX ORDER — SODIUM CHLORIDE 0.9 % (FLUSH) 0.9 %
10 SYRINGE (ML) INJECTION AS NEEDED
OUTPATIENT
Start: 2023-09-27

## 2023-09-27 RX ORDER — HEPARIN SODIUM (PORCINE) LOCK FLUSH IV SOLN 100 UNIT/ML 100 UNIT/ML
500 SOLUTION INTRAVENOUS AS NEEDED
OUTPATIENT
Start: 2023-09-27

## 2023-09-27 RX ORDER — HEPARIN SODIUM (PORCINE) LOCK FLUSH IV SOLN 100 UNIT/ML 100 UNIT/ML
500 SOLUTION INTRAVENOUS AS NEEDED
Status: DISCONTINUED | OUTPATIENT
Start: 2023-09-27 | End: 2023-09-27 | Stop reason: HOSPADM

## 2023-09-27 RX ADMIN — DEXAMETHASONE SODIUM PHOSPHATE 12 MG: 10 INJECTION, SOLUTION INTRAMUSCULAR; INTRAVENOUS at 12:00

## 2023-09-27 RX ADMIN — SODIUM CHLORIDE 250 ML: 9 INJECTION, SOLUTION INTRAVENOUS at 11:59

## 2023-09-27 RX ADMIN — GEMCITABINE HYDROCHLORIDE 1450 MG: 1 INJECTION, POWDER, LYOPHILIZED, FOR SOLUTION INTRAVENOUS at 12:26

## 2023-09-27 RX ADMIN — Medication 500 UNITS: at 13:35

## 2023-09-27 RX ADMIN — SODIUM CHLORIDE 500 ML: 900 INJECTION, SOLUTION INTRAVENOUS at 13:00

## 2023-09-27 RX ADMIN — Medication 10 ML: at 13:35

## 2023-09-27 NOTE — PROGRESS NOTES
Subjective     REASON FOR FOLLOW UP:   1.  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  PD-L1 positive greater than 95%  3.  Primary chemoradiation with weekly carboplatin and Taxol completed 10/27/2022  4.  Imfinzi immunotherapy every 2 weeks for 12 months total.  First treatment 11/28/2022  5.  Repeat bronchoscopy 1/4/2023 due to persistent hemoptysis.  Pathology revealed no malignancy.  6.  Exophytic lesion of the kidney noted on surveillance CT scans.    HISTORY OF PRESENT ILLNESS:  The patient is a 78 y.o. year old female who is a former smoker referred to us from thoracic surgery (Dr. Remedios Rice) for evaluation and treatment of clinical stage III adenocarcinoma of the lung.  She was having persistent cough and underwent chest x-ray initially in late June which showed possible right upper lobe mass.  This was followed by CT scan of the chest confirming the presence of a right upper lobe mass.  She initially underwent a CT-guided needle biopsy on 7/22/2022 which was nondiagnostic.    She was referred to Dr. lGass for bronchoscopy and biopsy which was performed on 8/23/2022 with pathology returning as poorly differentiated adenocarcinoma.  PD-L1 stain on the tissue was positive at greater than 95%.    She underwent further staging with PET scan and MRI of the brain.  PET scan was performed on 8/12/2022 showing hypermetabolic activity in the large mass in the right upper lobe as well as mediastinal lymph nodes.  She was noted to have a mass on the kidney as well but this was not hypermetabolic.  There was no obvious distant metastatic disease.    MRI of the brain from 8/15/2022 likewise showed no evidence of metastatic disease.  She is scheduled also to see Dr. Hector Rodriguez of radiation oncology on 9/7/2022.    We recommended weekly carboplatin and Taxol concurrent with radiation therapy.  Following completion of treatment she will receive immunotherapy for maintenance   (She does have a diagnosis of rheumatoid  arthritis and is currently taking only Celebrex.  This could become an issue regarding her ability to tolerate immunotherapy in the future).    She received her final fraction of radiation 10/27/2022.  She also completed 6 weeks of carboplatin and Taxol and tolerated it well. CT scans performed 11/21/2022 showed right upper lobe mass appeared stable in size.  Her mediastinal lymphadenopathy had decreased.  There were no new sites of disease identified.    She was started on immunotherapy with Imfinzi with plans to continue immunotherapy for 1 year until December 2023.      She required several hospitalizations including hospitalization due to development of brain metastases in late May.  She had a dominant metastatic lesion in the right occipital area and was able to undergo neurosurgical resection of the dominant mass on 5/30/2023 with Dr. Ricketts.  She had 2 additional small lesions that were treated with stereotactic radiosurgery by Dr. Mckenna Moreira.  During this time her immuno therapy with Imfinzi was held (her last Imfinzi treatment in our office was 5/15/2023).    She again required repeat hospitalization from 6/11/2023 to 6/14/2023 due to development of extensive shingles outbreak on the left upper extremity.    She had a post treatment outpatient MRI of the brain from 7/9/2023 which showed the resection cavity in the right occipital area with no definite tumor recurrence in that area.  The other 2 small areas that were treated with stereotactic radiation appeared stable.  There were no new sites of disease identified.    She was seen in the emergency room 8/4/2023 for severe pain in the left arm. While she was in the ER she had a CT scan of the cervical spine to be sure there was no mechanical nerve root impingement.  There were no acute findings in the cervical spine on the CT but slices through the lung apices did show what appeared to be new density in the right upper lobe which had not been seen on her  previous CT scan from June.     Since her last visit she was again hospitalized from 8/26/2023 through 9/2/2023 with Campylobacter and E. coli colitis.  During the hospitalization she underwent a CT scan on 8/31/2023 showing increased size of her right upper lobe mass.  (See CT scan report below).  She also had a brain MRI performed on 8/27/2023 showing stable findings.    At the time of her discharge she had a motorcycle trip planned and we were planning to follow her up in the office with a PET scan when she returns from her trip.  She now returns having undergone the PET scan on 9/19/2023 which shows intensely hypermetabolic mass in the right upper lobe which had increased in size.  She had uptake in the right sixth and seventh ribs which may have been due to trauma.    She continues to have severe pain in the left arm although during the hospitalization she was switched to Lyrica which seems to be helping a little better than gabapentin.  She has requested an increase in the dose.  We also will be planning to refer her to pain management to see if there may be a role for the nerve injection.    We discussed the results of the PET scan and I recommended initiating palliative chemotherapy with single agent Gemzar days 1 and 8 of an every 21-day cycle.    INTERVAL HISTORY:  Ms. Vidal is a 78-year-old female with the above-mentioned history who is here today 9/27/2023 for lab review and evaluation to start cycle 1 palliative Gemzar.  Biggest complaint is pain in LUE, from postherpetic neuralgia, which seems to be getting worse.  She continues on Lyrica a total of 225 mg twice daily, along with occasional Norco.  The pain does interfere with her sleeping.     Otherwise, she is doing okay.  She is breathing well, and eating good.  Patient is at her chemotherapy education and is ready to proceed today.       History of Present Illness     Past Medical History:   Diagnosis Date    COPD (chronic obstructive pulmonary  disease)     Coughing up blood     X2 MONTHS    History of COVID-19 02/2022    Hyperlipidemia     IBS (irritable bowel syndrome)     Primary lung adenocarcinoma, right     Rheumatoid arthritis         Past Surgical History:   Procedure Laterality Date    APPENDECTOMY      appendix removed    BRONCHOSCOPY N/A 8/23/2022    Procedure: BRONCHOSCOPY WITH BAL,  BIOPSIES, AND BRUSHINGS WITH ENDOBRONCHIAL ULTRASOUND WITH FNA;  Surgeon: Gregor Vee MD;  Location: [facility] ENDOSCOPY;  Service: Pulmonary;  Laterality: N/A;  PRE- HILAR MASS  POST- SAME    BRONCHOSCOPY N/A 1/4/2023    Procedure: BRONCHOSCOPY with biopsy, lavage, brushing;  Surgeon: Gregor Vee MD;  Location: [facility] ENDOSCOPY;  Service: Pulmonary;  Laterality: N/A;    CAROTID ENDARTERECTOMY Right     CAROTID ENDARTERECTOMY Left     CATARACT EXTRACTION      CERVICAL FUSION      CHOLECYSTECTOMY      CRANIOTOMY FOR TUMOR Right 5/30/2023    Procedure: RIGHT CRANIOTOMY FOR TUMOR RESECTION STEREOTACTIC WITH STEALTH;  Surgeon: Clint Ricketts MD;  Location: [facility] MAIN OR;  Service: Neurosurgery;  Laterality: Right;    HYSTERECTOMY      SHOULDER ARTHROSCOPY Right 02/19/2019    right should scope/cuff repair     VENOUS ACCESS DEVICE (PORT) INSERTION Right 9/6/2022    Procedure: POWERPORT INSERTION;  Surgeon: Remedios Rice MD;  Location: [facility] MAIN OR;  Service: Thoracic;  Laterality: Right;        Current Outpatient Medications on File Prior to Visit   Medication Sig Dispense Refill    atorvastatin (LIPITOR) 20 MG tablet Take 1 tablet by mouth Every Night. 30 tablet 0    azelastine (ASTELIN) 0.1 % nasal spray 1 spray into the nostril(s) as directed by provider.      B COMPLEX VITAMINS ER PO Take  by mouth Daily.      Cholecalciferol (VITAMIN D3) 5000 units capsule capsule Take 2,000 Units by mouth Daily.      clopidogrel (PLAVIX) 75 MG tablet Take 1 tablet by mouth Daily. 30 tablet 0    DULoxetine (CYMBALTA) 30 MG capsule Take 1 capsule by mouth Daily for 30  days. 30 capsule 0    esomeprazole (nexIUM) 20 MG capsule Take 1 capsule by mouth Every Morning Before Breakfast.      estradiol (ESTRACE) 1 MG tablet Take 1 tablet by mouth Daily.      FeroSul 325 (65 Fe) MG tablet TAKE ONE TABLET BY MOUTH DAILY WITH BREAKFAST 30 tablet 5    HYDROcodone-acetaminophen (NORCO) 7.5-325 MG per tablet Take 1 tablet by mouth Every 6 (Six) Hours As Needed for Moderate Pain. 60 tablet 0    levETIRAcetam (KEPPRA) 500 MG tablet Take 1 tablet by mouth 2 (Two) Times a Day for 240 days. 120 tablet 3    lidocaine (LIDODERM) 5 % Place 1 patch on the skin as directed by provider Daily. Remove & Discard patch within 12 hours or as directed by MD 15 patch 1    lidocaine-prilocaine (EMLA) 2.5-2.5 % cream Apply 1 application topically to the appropriate area as directed Every 2 (Two) Hours As Needed for Mild Pain. 5 g 3    Morphine (MSIR) 30 MG tablet Take 1 tablet by mouth Every 4 (Four) Hours As Needed for Severe Pain. 60 tablet 0    Multiple Vitamins-Minerals (PRESERVISION AREDS 2+MULTI VIT PO) Take  by mouth.      ondansetron (ZOFRAN) 8 MG tablet Take 1 tablet by mouth 3 (Three) Times a Day As Needed for Nausea or Vomiting. 30 tablet 5    predniSONE (DELTASONE) 10 MG tablet Take 2 tablets by mouth Daily. 60 tablet 5    pregabalin (LYRICA) 150 MG capsule Take 1 capsule by mouth Every 12 (Twelve) Hours for 30 days. 60 capsule 0    pregabalin (Lyrica) 75 MG capsule Take 1 capsule by mouth 2 (Two) Times a Day. Along with 150 mg tablet for total dose of 225 mg twice daily 60 capsule 0    valACYclovir (VALTREX) 1000 MG tablet       lisinopril (PRINIVIL,ZESTRIL) 20 MG tablet Take 1 tablet by mouth Daily for 30 days. 30 tablet 0     No current facility-administered medications on file prior to visit.        ALLERGIES:    Allergies   Allergen Reactions    Del-Mycin [Erythromycin] Hives    Gentamicin Hives    Ibuprofen Nausea And Vomiting    Latex Dermatitis     GLOVES    Metronidazole Hives    Ofloxacin  Hives and Nausea Only     Has tolerated Levaquin     Penicillins Hives     Tolerated rocephin and cefepime 2023    Tetracycline Hives    Adhesive Tape Dermatitis     BANDAIDS    Aspirin GI Intolerance     Vomiting can take EC    Codeine Nausea And Vomiting        Social History     Socioeconomic History    Marital status:    Tobacco Use    Smoking status: Former     Packs/day: 0.25     Types: Cigarettes     Quit date: 2022     Years since quittin.4    Smokeless tobacco: Never    Tobacco comments:     Former some day smoker of 1-2 cigs   Vaping Use    Vaping Use: Never used   Substance and Sexual Activity    Alcohol use: Yes     Alcohol/week: 2.0 standard drinks     Types: 2 Glasses of wine per week     Comment: occasionally    Drug use: Never    Sexual activity: Defer        Family History   Problem Relation Age of Onset    Cancer Mother     Cancer Father     Malig Hyperthermia Neg Hx         Review of Systems   Constitutional:  Negative for activity change, chills, fatigue and fever.   HENT:  Negative for mouth sores, trouble swallowing and voice change.    Eyes:  Negative for pain and visual disturbance.   Respiratory:  Positive for cough. Negative for wheezing.    Cardiovascular:  Negative for chest pain and palpitations.   Gastrointestinal:  Negative for abdominal pain, constipation, diarrhea, nausea and vomiting.   Genitourinary:  Negative for difficulty urinating, frequency and urgency.   Musculoskeletal:  Negative for arthralgias and joint swelling.   Skin:  Negative for rash.   Neurological:  Positive for weakness. Negative for dizziness, seizures and headaches.        Severe pain in the left upper arm due to postherpetic neuralgia.  She also had some weakness in the arm which she feels is improving.   Hematological:  Negative for adenopathy. Bruises/bleeds easily.   Psychiatric/Behavioral:  Negative for behavioral problems and confusion. The patient is not nervous/anxious.   2023 ROS  "updated     Objective     Vitals:    09/27/23 1129   BP: 108/55   Pulse: 70   Temp: 97.8 °F (36.6 °C)   TempSrc: Temporal   SpO2: 98%   Weight: 53 kg (116 lb 12.8 oz)   Height: 152.4 cm (60\")   PainSc: 10-Worst pain ever   PainLoc: Arm  Comment: left arm         9/27/2023    11:27 AM   Current Status   ECOG score 1     Physical Exam  Vitals reviewed.   Constitutional:       General: She is not in acute distress.     Appearance: She is well-developed. She is ill-appearing.      Comments: Seated in a wheelchair.   HENT:      Head: Normocephalic and atraumatic.      Mouth/Throat:      Pharynx: No oropharyngeal exudate.   Eyes:      Pupils: Pupils are equal, round, and reactive to light.   Neck:      Comments: Bilateral scars from carotid endarterectomies  Cardiovascular:      Rate and Rhythm: Normal rate and regular rhythm.      Heart sounds: Normal heart sounds. No murmur heard.  Pulmonary:      Effort: Pulmonary effort is normal. No respiratory distress.      Breath sounds: Decreased breath sounds present. No wheezing, rhonchi or rales.   Abdominal:      General: Bowel sounds are normal. There is no distension.      Palpations: Abdomen is soft.   Musculoskeletal:         General: Normal range of motion.      Cervical back: Normal range of motion.   Skin:     General: Skin is warm and dry.      Findings: No rash.   Neurological:      Mental Status: She is alert and oriented to person, place, and time.    I have reexamined the patient and the results are consistent with the previously documented exam. BARABRA Michel        RECENT LABS:  Results from last 7 days   Lab Units 09/27/23  1044 09/22/23  1400   WBC 10*3/mm3 12.95* 9.97   NEUTROS ABS 10*3/mm3 9.90* 7.92*   HEMOGLOBIN g/dL 10.3* 9.8*   HEMATOCRIT % 33.3* 32.5*   PLATELETS 10*3/mm3 308 341     Results from last 7 days   Lab Units 09/27/23  1044 09/22/23  1400   SODIUM mmol/L 135* 133*   POTASSIUM mmol/L 3.6 4.7   CHLORIDE mmol/L 99 100   CO2 mmol/L " 22.1 22.9   BUN mg/dL 25* 20   CREATININE mg/dL 1.03 0.87   CALCIUM mg/dL 8.6 9.3   ALBUMIN g/dL 3.0* 3.0*   BILIRUBIN mg/dL 0.3 0.4   ALK PHOS U/L 135* 145*   ALT (SGPT) U/L 13 15   AST (SGOT) U/L 21 18   GLUCOSE mg/dL 126* 124*         BRONCHOSCOPY PATHOLOGY 1/4/2023  Final Diagnosis   Fluid, Lung, Right Upper Lobe, Bronchoalveolar Lavage (BAL):  A. Negative for malignant cells- reactive atypia.  B. Reactive bronchial cells, macrophages, inflammation (mostly acute) and mucin.  C. Negative for definitive fungal organisms by GMS staining. See comment.     2. Fluid, Lung, Right Upper Lobe, Brushing:               A. Negative for malignant cells.               B. Bronchial cells, scattered inflammation and mucinous debris.      Final Diagnosis   1. Lung, Right Upper Lobe, Biopsy:  Endobronchial mucosa and submucosa with                A. Squamous metaplasia, mild to moderate chronic active inflammation, fibrinous and necrotic debris and reactive      epithelial changes.               B. No definitive dysplasia nor malignancy identified.               C. No granulomata, diagnostic viral inclusions nor fungal organisms identified.         BRONCHOSCOPY PATHOLOGY 8/23/2022  Final Diagnosis   1. Lung, Right Upper Lobe, Endobronchial Biopsies: INVASIVE POORLY DIFFERENTIATED ADENOCARCINOMA OF PULMONARY ORIGIN.   PDL-1 POSITIVE >95%    IMAGING:  F-18 FDG PET FROM SKULL BASE TO MID THIGH WITH PET/CT FUSION 9/19/2023  IMPRESSION:  1.  Intensely FDG mass centered within the perihilar aspect of the right  upper lobe appears to have increased in size since 5/25/2023 with new  obstruction and stenosis of the right upper lobe bronchus and likely  represents recurrent malignancy. Surrounding pulmonary opacification and  consolidation throughout the right lung has increased since 5/25/2023  and there is new opacification within the left lung with differential  considerations including evolving postradiation changes,  postobstructive  pneumonia and lymphangitic spread of malignancy.  2.  Segmental uptake within the right sixth and seventh ribs without  identifiable abnormality on noncontrast CT. While findings may represent  nondisplaced fractures, continued close attention on follow-up is  recommended to exclude metastatic disease.  3.  Indeterminate 5.3 cm right renal lesion, as before. At least  continued attention on follow-up is recommended. Finding can all be  further evaluated multiphase CT or MRI of the abdomen with and without  contrast to exclude neoplasm.  4.  Other findings as above.    MRI OF THE BRAIN WITH AND WITHOUT CONTRAST  8/27/2023  IMPRESSION:     1.  Status post right parieto-occipital craniotomy with subjacent   resection cavity.  Expected postoperative changes as detailed above.  No   evidence of tumor recurrence.  2.  Moderate chronic small vessel ischemic changes.  3.  No specific pontine abnormality aside from chronic small vessel   ischemic disease.  Finding on reference CT likely reflected artifact.  4.  No acute infarct.  No acute hemorrhage.    CT CHEST W CONTRAST DIAGNOSTIC-, CT ABDOMEN PELVIS W CONTRAST-, NM BONE SCAN WHOLE BODY- 5/29/2023  1. Right upper lobe suprahilar pulmonary mass appears stable from the  recent prior exam, again with groundglass and nodular opacities in the  right upper lobe.  2. Sclerotic change at the right anterior eighth rib is new from  03/01/2023, with corresponding increased uptake on bone scan, could be  healing fracture or sclerotic metastatic disease, correlate clinically.  3. Stable appearance of the abdomen and pelvis.    CT CHEST, ABDOMEN, AND PELVIS WITH IV CONTRAST 3/1/2023  IMPRESSION:  Decreased size of the right upper lobe mass with stable  surrounding groundglass opacity and decreased size of the right lower  lobe opacity. No adenopathy seen on today's exam. Incidental findings as  Above.    F-18 FDG PET FROM SKULL BASE TO MID THIGH WITH PET/CT FUSION  8/12/2022  IMPRESSION:  1.  Large mass centered within the right upper lobe representing  patient's known malignancy. Interlobular septal thickening and ground  glass opacification projecting from the periphery of the mass throughout  the right upper lobe is highly concerning for lymphangitic spread. Of  note, the mass abuts the pleura and mediastinum over a long segment and  underlying invasion cannot be excluded.  2.  FDG avid hilar and mediastinal adenopathy concerning for metastatic  disease.  3.  A few irregular subcentimeter pulmonary nodules are present within  the right lower and left upper lobes measuring up to 0.9 cm which while  nonspecific raises concern for metastatic disease. At least close  attention on followup is recommended.   4.  Minimally hypermetabolic arnoldo hepatic node and bilateral sub-6 mm  pulmonary nodules are indeterminate. Continued followup with chest and  abdominal CT in 3 months is recommended.  5.  Indeterminate density 4.4 cm mass arises off the right kidney.  Further evaluation with multiphase CT or MRI of the abdomen with and  without contrast is recommended to exclude neoplasm.  6.  Focal uptake over the right shoulder in the area of prior rotator  cuff repair is favored be postsurgical with metastatic disease less  likely.  7.  Other findings as above.    Assessment & Plan   1.  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  Patient is not felt to be a surgical candidate and combined chemotherapy and radiation.   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  9/20/2022 1st dose of weekly carboplatin/taxol.   She completed 6 cycles of weekly CarboTaxol 10/25/2022.  Radiation therapy completed 10/27/2022  First dose durvalumab delivered 11/28/2022.   Durvalumab on hold since May 2023  Progression of disease with enlarging hypermetabolic mass in the right upper lobe in September 2023  Plans to start single agent Gemzar palliative chemotherapy 1000 mg/m² on  days 1 and 8 of a 21-day cycle.  9/27/2023 C1D1 single agent palliative Gemzar.    2.  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    3.  Other comorbidities including vascular disease with previous carotid   endarterectomy surgeries.  She has no known history of stroke or myocardial infarction.     5.  Rheumatoid arthritis: Patient previously on Celebrex, but discontinued due to hemoptysis.  Patient started on prednisone 20 mg daily prescribed to manage her arthritis pain.     6.  Development of brain metastases in May 2023.  Patient underwent resection of the dominant mass in the right occipital area by Dr. Cho of neurosurgery.  She received post op radiation therapy to the resection bed and to 2 smaller lesions with SRS under the direction of Dr. Mckenna Moreira at [facility].  Her most recent MRI of the brain from 8/27/2023 as noted above shows no new sites of disease, improved edema, and no definite recurrence in the resection bed.    7.  Severe shingles outbreak of left upper extremity with severe pain from postherpetic neuralgia.   Pamtang Vidal reports a pain score of 10.  Given her pain assessment as noted, treatment options were discussed and the following options were decided upon as a follow-up plan to address the patient's pain: continuation of current treatment plan for pain and referral to specialist for assistance in pain treatment guidance.  Patient has an appointment to be seen in 1 week by pain management.        8.  Recent hospitalization for bacterial colitis with stool culture growing E. coli and Campylobacter.  Her symptoms have resolved at this point.      PLAN:  Proceed with C1 D1 Gemzar today.  Patient will return in 1 week with repeat labs and C 1 D8 Gemzar (patient will go to the [place] location for this treatment as she has a appointment to be  seen by pain management that morning).  Follow-up with pain management as scheduled to see about potential nerve block for ongoing left upper extremity pain, postherpetic neuralgia.  Continue prednisone 20 mg daily.  Continue Lyrica 225 mg (75+150) twice daily.  Continue Norco 7.5/325 if needed for pain control.  Follow-up in 3 weeks with Dr. Lagunas with repeat labs reassessment and consideration of C2 D1 palliative Gemzar.   We will plan to repeat CT scan of the chest after 4 cycles of Gemzar.  Call/ return sooner should the patient develop any new concerns or problems.    Patient is on a high risk medication requiring close monitoring for toxicity.      Che Khan, APRN   09/27/2023

## 2023-09-27 NOTE — NURSING NOTE
Pt arrived for C1D1 Gemzar after seeing BARBARA Davis. Per Che, Pt to receive 500 cc normal saline after Gemzar finishes. Pt tolerated infusion well and was instructed to contact the office with any questions or concerns prior to her next appt. Pt and family member verbalized understanding and was discharged in a stable condition.

## 2023-09-27 NOTE — PROGRESS NOTES
OUTPATIENT ONCOLOGY NUTRITION ASSESSMENT    Patient Name: Pam Vidal  YOB: 1945  MRN: [id number]  Assessment Date: 9/27/2023    COMMENTS:  Met patient today in the infusion area. Begins new treatment of Gemzar today.  Patient reports eating normally after discharge from the hospital. She drinks Boost plus at home and accepted a voucher to purchase it at a discount at [facility] retail pharmacy.   Encouraged intake and will be available as needed.         Reason for Assessment Follow up     Diagnosis/Problem   Stage III adenocarcinoma RUL    Treatment Plan Chemotherapy Gemzar   Frequency Day 1 and 8 every 21 days   Goal of cancer treatment Disease control   Comments:      MST = 0 or 1 Patient not at risk for malnutrition    Encounter Information        Nutrition/Diet History:     Oral Nutrition Supplements:    Factors/Symptoms Affecting Intake: No factors at this time   Comments:      Medical/Surgical History Past Medical History:   Diagnosis Date    COPD (chronic obstructive pulmonary disease)     Coughing up blood     X2 MONTHS    History of COVID-19 02/2022    Hyperlipidemia     IBS (irritable bowel syndrome)     Primary lung adenocarcinoma, right     Rheumatoid arthritis        Past Surgical History:   Procedure Laterality Date    APPENDECTOMY      appendix removed    BRONCHOSCOPY N/A 8/23/2022    Procedure: BRONCHOSCOPY WITH BAL,  BIOPSIES, AND BRUSHINGS WITH ENDOBRONCHIAL ULTRASOUND WITH FNA;  Surgeon: Gregor Vee MD;  Location: [facility] ENDOSCOPY;  Service: Pulmonary;  Laterality: N/A;  PRE- HILAR MASS  POST- SAME    BRONCHOSCOPY N/A 1/4/2023    Procedure: BRONCHOSCOPY with biopsy, lavage, brushing;  Surgeon: Gregor Vee MD;  Location: [facility] ENDOSCOPY;  Service: Pulmonary;  Laterality: N/A;    CAROTID ENDARTERECTOMY Right     CAROTID ENDARTERECTOMY Left     CATARACT EXTRACTION      CERVICAL FUSION      CHOLECYSTECTOMY      CRANIOTOMY FOR TUMOR Right 5/30/2023    Procedure: RIGHT  "CRANIOTOMY FOR TUMOR RESECTION STEREOTACTIC WITH STEALTH;  Surgeon: Clint Ricketts MD;  Location: [facility] OR;  Service: Neurosurgery;  Laterality: Right;    HYSTERECTOMY      SHOULDER ARTHROSCOPY Right 02/19/2019    right should scope/cuff repair     VENOUS ACCESS DEVICE (PORT) INSERTION Right 9/6/2022    Procedure: POWERPORT INSERTION;  Surgeon: Remedios Rice MD;  Location: [facility] OR;  Service: Thoracic;  Laterality: Right;            Anthropometrics        Current Height Ht Readings from Last 1 Encounters:   09/27/23 152.4 cm (60\")      Current Weight Wt Readings from Last 1 Encounters:   09/27/23 53 kg (116 lb 12.8 oz)      BMI  22.8   Ideal Body Weight (IBW) 100 lb   Usual Body Weight (UBW) 120 lb   Weight Change/Trend Loss   Weight History Wt Readings from Last 30 Encounters:   09/27/23 1129 53 kg (116 lb 12.8 oz)   09/22/23 1414 51.9 kg (114 lb 6.4 oz)   09/02/23 0300 59.9 kg (132 lb 0.9 oz)   09/01/23 0500 58.6 kg (129 lb 3 oz)   08/31/23 0519 59 kg (130 lb 1.1 oz)   08/30/23 0300 57.4 kg (126 lb 8.7 oz)   08/29/23 0223 56.7 kg (125 lb)   08/28/23 0500 55.2 kg (121 lb 11.1 oz)   08/27/23 0300 53.7 kg (118 lb 6.2 oz)   08/26/23 2119 53.7 kg (118 lb 6.2 oz)   08/26/23 1443 52.6 kg (116 lb)   08/23/23 1025 52.8 kg (116 lb 8 oz)   07/13/23 1100 54 kg (119 lb)   07/12/23 0807 54.2 kg (119 lb 8 oz)   06/15/23 0858 53.8 kg (118 lb 9.6 oz)   06/12/23 0913 51.7 kg (114 lb)   06/12/23 0004 52 kg (114 lb 9.6 oz)   06/11/23 1605 53.1 kg (117 lb)   05/26/23 2027 60.9 kg (134 lb 4.2 oz)   05/25/23 2045 54.3 kg (119 lb 11.4 oz)   05/25/23 1325 53.5 kg (118 lb)   05/15/23 1121 53.6 kg (118 lb 3.2 oz)   05/01/23 1118 55.5 kg (122 lb 6.4 oz)   04/17/23 1345 54.8 kg (120 lb 14.4 oz)   04/03/23 1239 54.7 kg (120 lb 9.6 oz)   04/03/23 1240 54.2 kg (119 lb 8 oz)   03/20/23 1320 55.2 kg (121 lb 12.8 oz)   03/06/23 1015 54.9 kg (121 lb)   02/20/23 1114 55.7 kg (122 lb 12.8 oz)   02/06/23 1053 57.7 kg (127 lb 3.2 oz) "   01/23/23 0955 56.8 kg (125 lb 4.8 oz)   01/09/23 1207 57.3 kg (126 lb 6.4 oz)   12/27/22 1241 57.5 kg (126 lb 12.8 oz)   12/12/22 1321 57 kg (125 lb 9.6 oz)   12/01/22 1458 57.2 kg (126 lb)   11/28/22 0940 57 kg (125 lb 9.6 oz)   11/21/22 1257 55.7 kg (122 lb 12.8 oz)   10/31/22 0906 57 kg (125 lb 9.6 oz)   10/27/22 1236 57.3 kg (126 lb 6.4 oz)   10/25/22 0915 57 kg (125 lb 9.6 oz)   10/20/22 1112 57.4 kg (126 lb 9.6 oz)   10/18/22 1109 57.1 kg (125 lb 12.8 oz)          Medications           Current medications: B Complex Vitamins, DULoxetine, HYDROcodone-acetaminophen, Morphine, Multiple Vitamins-Minerals, atorvastatin, azelastine, clopidogrel, esomeprazole, estradiol, ferrous sulfate, levETIRAcetam, lidocaine, lidocaine-prilocaine, lisinopril, ondansetron, predniSONE, pregabalin, valACYclovir, and vitamin D3                 Tests/Procedures        Tests/Procedures Chemotherapy     Labs       Pertinent Labs    Results from last 7 days   Lab Units 09/27/23  1044 09/22/23  1400   SODIUM mmol/L 135* 133*   POTASSIUM mmol/L 3.6 4.7   CHLORIDE mmol/L 99 100   CO2 mmol/L 22.1 22.9   BUN mg/dL 25* 20   CREATININE mg/dL 1.03 0.87   CALCIUM mg/dL 8.6 9.3   BILIRUBIN mg/dL 0.3 0.4   ALK PHOS U/L 135* 145*   ALT (SGPT) U/L 13 15   AST (SGOT) U/L 21 18   GLUCOSE mg/dL 126* 124*     Results from last 7 days   Lab Units 09/27/23  1044   HEMOGLOBIN g/dL 10.3*   HEMATOCRIT % 33.3*   WBC 10*3/mm3 12.95*   ALBUMIN g/dL 3.0*     Results from last 7 days   Lab Units 09/27/23  1044 09/22/23  1400   PLATELETS 10*3/mm3 308 341     COVID19   Date Value Ref Range Status   08/26/2023 Not Detected Not Detected - Ref. Range Final     Lab Results   Component Value Date    HGBA1C 5.70 (H) 05/26/2023          Physical Findings        Physical Appearance alert     Edema  no edema   Gastrointestinal None   Tubes/Drains none   Oral/Mouth Cavity WNL   Skin        Estimated/Assessed Needs        Energy Requirements    Height for Calculation       Weight for Calculation 53 kg   Method for Estimation  25 kcal/kg, 30 kcal/kg   EST Needs (kcal/day) 7228-5853 kcals/d       Protein Requirements    Weight for Calculation 53 kg   EST Protein Needs (g/kg) 1.2 gm/kg   EST Daily Needs (g/day) 64 g/d       Fluid Requirements     Method for Estimation 30 mL/kg    Estimated Needs (mL/day) 1560 ml/d           PES STATEMENT / NUTRITION DIAGNOSIS      Nutrition Dx Problem Problem:    NutritionDiagnosisProblem: Unintentional Weight Loss    Etiology:  Medical diagnosis: Lung cancer  Factors affecting nutrition: Reported/Observed By, Appetite, Reported GI Symptoms      Signs/Symptoms:  Information Deficit and Unintended Weight Change    Comment:      NUTRITION INTERVENTION / PLAN OF CARE      Intervention Goal(s) Reduce/improve symptoms, Provide information, Meet estimated needs, Disease management/therapy, Tolerate PO , Maintain intake, Maintain weight, and No significant weight loss         RD Intervention/Action Encouraged intake, Follow Tx progress, Recommended/ordered         Recommendations:       PO Diet Increase calories and protein, small frequent meals      Supplements Continue boost      Snacks       Other    --      Monitor/Evaluation PO intake, Supplement intake, Pertinent labs, Weight, Symptoms, Follow up as needed   Education Education provided   --    RD to follow     Electronically signed by:  Daisha Mcnamara RD, LD  09/27/23 14:33 EDT

## 2023-09-28 ENCOUNTER — OFFICE VISIT (OUTPATIENT)
Dept: NEUROSURGERY | Facility: CLINIC | Age: 78
End: 2023-09-28
Payer: MEDICARE

## 2023-09-28 ENCOUNTER — TELEPHONE (OUTPATIENT)
Dept: ONCOLOGY | Facility: CLINIC | Age: 78
End: 2023-09-28
Payer: MEDICARE

## 2023-09-28 VITALS
BODY MASS INDEX: 22.78 KG/M2 | DIASTOLIC BLOOD PRESSURE: 48 MMHG | HEIGHT: 60 IN | WEIGHT: 116 LBS | SYSTOLIC BLOOD PRESSURE: 142 MMHG

## 2023-09-28 DIAGNOSIS — C34.90 LUNG CANCER METASTATIC TO BRAIN: Primary | ICD-10-CM

## 2023-09-28 DIAGNOSIS — C79.31 LUNG CANCER METASTATIC TO BRAIN: Primary | ICD-10-CM

## 2023-09-28 NOTE — TELEPHONE ENCOUNTER
CALLED PATIENT LVM OF APPT DATE AND TIME FOR PT TO SEE DR FUENTES AS HE WANTS TO SEE HER AT 12:00 NOON - PT ARRIVAL AT 11:30

## 2023-09-28 NOTE — PROGRESS NOTES
Patient ID: Pam Vidal is a 78 y.o. female is here today for follow-up. She previously underwent RIGHT CRANIOTOMY FOR TUMOR RESECTION STEREOTACTIC WITH STEALTH on 05/30/2023.     She reports that she is unable to bend over.     Subjective     The patient is here in regards to   Chief Complaint   Patient presents with    Follow-up       History of Present Illness  Pam is doing well overall.  She still has some visual issues perhaps related to macular degeneration.  She is ambulating with a walker.  She is talking with normal cognitive ability and recovering well.    While in the room and during my examination of the patient I wore a mask and eye protection.  I washed my hands before and after this patient encounter.  The patient was also wearing a mask.    The following portions of the patient's history were reviewed and updated as appropriate: allergies, current medications, past family history, past medical history, past social history, past surgical history and problem list.    Review of Systems   Neurological:  Positive for dizziness. Negative for headaches.      Past Medical History:   Diagnosis Date    COPD (chronic obstructive pulmonary disease)     Coughing up blood     X2 MONTHS    History of COVID-19 02/2022    Hyperlipidemia     IBS (irritable bowel syndrome)     Primary lung adenocarcinoma, right     Rheumatoid arthritis        Allergies   Allergen Reactions    Del-Mycin [Erythromycin] Hives    Gentamicin Hives    Ibuprofen Nausea And Vomiting    Latex Dermatitis     GLOVES    Metronidazole Hives    Ofloxacin Hives and Nausea Only     Has tolerated Levaquin     Penicillins Hives     Tolerated rocephin and cefepime 8/2023    Tetracycline Hives    Adhesive Tape Dermatitis     BANDAIDS    Aspirin GI Intolerance     Vomiting can take EC    Codeine Nausea And Vomiting       Family History   Problem Relation Age of Onset    Cancer Mother     Cancer Father     Malig Hyperthermia Neg Hx        Social  History     Socioeconomic History    Marital status:    Tobacco Use    Smoking status: Former     Packs/day: 0.25     Types: Cigarettes     Quit date: 2022     Years since quittin.4    Smokeless tobacco: Never    Tobacco comments:     Former some day smoker of 1-2 cigs   Vaping Use    Vaping Use: Never used   Substance and Sexual Activity    Alcohol use: Yes     Alcohol/week: 2.0 standard drinks     Types: 2 Glasses of wine per week     Comment: occasionally    Drug use: Never    Sexual activity: Defer       Past Surgical History:   Procedure Laterality Date    APPENDECTOMY      appendix removed    BRONCHOSCOPY N/A 2022    Procedure: BRONCHOSCOPY WITH BAL,  BIOPSIES, AND BRUSHINGS WITH ENDOBRONCHIAL ULTRASOUND WITH FNA;  Surgeon: Gregor Vee MD;  Location[facility];  Service: Pulmonary;  Laterality: N/A;  PRE- HILAR MASS  POST- SAME    BRONCHOSCOPY N/A 2023    Procedure: BRONCHOSCOPY with biopsy, lavage, brushing;  Surgeon: Gregor Vee MD;  Location: [facility];  Service: Pulmonary;  Laterality: N/A;    CAROTID ENDARTERECTOMY Right     CAROTID ENDARTERECTOMY Left     CATARACT EXTRACTION      CERVICAL FUSION      CHOLECYSTECTOMY      CRANIOTOMY FOR TUMOR Right 2023    Procedure: RIGHT CRANIOTOMY FOR TUMOR RESECTION STEREOTACTIC WITH STEALTH;  Surgeon: Clint Ricketts MD;  Location: [facility] OR;  Service: Neurosurgery;  Laterality: Right;    HYSTERECTOMY      SHOULDER ARTHROSCOPY Right 2019    right should scope/cuff repair     VENOUS ACCESS DEVICE (PORT) INSERTION Right 2022    Procedure: POWERPORT INSERTION;  Surgeon: Remedios Rice MD;  Location: [facility] OR;  Service: Thoracic;  Laterality: Right;         Objective     Vitals:    23 1021   BP: 142/48     Body mass index is 22.65 kg/m².    Physical Exam  Constitutional:       Appearance: Normal appearance.   HENT:      Head: Normocephalic and atraumatic.   Eyes:      Extraocular Movements:  Extraocular movements intact.      Conjunctiva/sclera: Conjunctivae normal.      Pupils: Pupils are equal, round, and reactive to light.   Cardiovascular:      Rate and Rhythm: Normal rate and regular rhythm.      Pulses: Normal pulses.   Pulmonary:      Breath sounds: Normal breath sounds.   Abdominal:      Palpations: Abdomen is soft.   Musculoskeletal:         General: Normal range of motion.      Cervical back: Normal range of motion and neck supple.   Skin:     General: Skin is warm and dry.   Neurological:      Mental Status: She is alert and oriented to person, place, and time.      Cranial Nerves: Cranial nerves 2-12 are intact.      Motor: Motor function is intact. No weakness or atrophy.      Coordination: Coordination is intact. Romberg sign negative. Romberg Test normal.      Gait: Gait is intact. Gait normal.      Deep Tendon Reflexes: Reflexes are normal and symmetric.      Reflex Scores:       Tricep reflexes are 2+ on the right side and 2+ on the left side.       Bicep reflexes are 2+ on the right side and 2+ on the left side.       Brachioradialis reflexes are 2+ on the right side and 2+ on the left side.       Patellar reflexes are 2+ on the right side and 2+ on the left side.       Achilles reflexes are 2+ on the right side and 2+ on the left side.  Psychiatric:         Speech: Speech normal.       Neurologic Exam     Mental Status   Oriented to person, place, and time.   Attention: normal. Concentration: normal.   Speech: speech is normal   Level of consciousness: alert    Cranial Nerves   Cranial nerves II through XII intact.     CN III, IV, VI   Pupils are equal, round, and reactive to light.    Motor Exam   Muscle bulk: normal  Overall muscle tone: normal    Strength   Strength 5/5 except as noted.     Sensory Exam   Light touch normal.     Gait, Coordination, and Reflexes     Gait  Gait: normal    Coordination   Romberg: negative    Reflexes   Reflexes 2+ except as noted.   Right  brachioradialis: 2+  Left brachioradialis: 2+  Right biceps: 2+  Left biceps: 2+  Right triceps: 2+  Left triceps: 2+  Right patellar: 2+  Left patellar: 2+  Right achilles: 2+  Left achilles: 2+    Assessment & Plan   Independent Review of Radiographic Studies:      I personally reviewed the images from the following studies.    MR: MRI of the brain w/wo contrast was reviewed and shows expected postoperative changes in the right occipital parietal lobe with some very minor edema/encephalomalacia as expected.  No evidence of recurrent tumor or abnormal contrast-enhancement    Assessment/Plan: MRI looks good.  No restrictions per neurosurgery.  Would like to see her back in 6 months with repeat MRI    Medical Decision Making:      MRI of the brain with and without contrast         Diagnoses and all orders for this visit:    1. 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

## 2023-09-28 NOTE — TELEPHONE ENCOUNTER
----- Message from Lizzette Rojas sent at 9/28/2023  1:05 PM EDT -----  Regarding: RE: PLEASE HELP TO SCHEDULE  LOOK'S LIKE [facility] SCHEDULED APPT FOR PT.    THANK YOU, [facility]!  PT AWARE?  ----- Message -----  From: Lorie Nunez  Sent: 9/28/2023  10:17 AM EDT  To: Lizzette Rojas  Subject: FW: PLEASE HELP TO SCHEDULE                      You can add to [facility] on 10/18 at noon with treatment, Please place note on the line [facility] approved.  ----- Message -----  From: Cruzito Lagunas MD  Sent: 9/28/2023  10:15 AM EDT  To: Lorie Nunez  Subject: RE: PLEASE HELP TO SCHEDULE                      Yes  ----- Message -----  From: Lorie Nunez  Sent: 9/28/2023   9:18 AM EDT  To: Cruzito Lagunas MD  Subject: FW: PLEASE HELP TO SCHEDULE                      Can I add this patient on for you to see 10/18 with her treatment or schedule with NP?  ----- Message -----  From: Queenie Lamas RegSched Rep  Sent: 9/27/2023  12:28 PM EDT  To: Sara Christopher  Subject: PLEASE HELP TO SCHEDULE                          [facility]

## 2023-10-02 ENCOUNTER — TELEPHONE (OUTPATIENT)
Dept: ONCOLOGY | Facility: CLINIC | Age: 78
End: 2023-10-02
Payer: MEDICARE

## 2023-10-02 RX ORDER — HYDROCODONE BITARTRATE AND ACETAMINOPHEN 7.5; 325 MG/1; MG/1
1 TABLET ORAL EVERY 6 HOURS PRN
Qty: 60 TABLET | Refills: 0 | OUTPATIENT
Start: 2023-10-02

## 2023-10-02 NOTE — TELEPHONE ENCOUNTER
Called the patient back to get more information and it it went straight to v/m. Left my direct number

## 2023-10-03 RX ORDER — CLOPIDOGREL BISULFATE 75 MG/1
75 TABLET ORAL DAILY
Qty: 30 TABLET | Refills: 0 | Status: CANCELLED | OUTPATIENT
Start: 2023-10-03

## 2023-10-03 RX ORDER — ATORVASTATIN CALCIUM 20 MG/1
20 TABLET, FILM COATED ORAL NIGHTLY
Qty: 30 TABLET | Refills: 0 | Status: CANCELLED | OUTPATIENT
Start: 2023-10-03

## 2023-10-03 NOTE — TELEPHONE ENCOUNTER
Caller: THERESA ZAZUETA     Relationship: DAUGHTER IN [employer]     Best call back number: [phone]    Requested Prescriptions:   Requested Prescriptions     Pending Prescriptions Disp Refills    atorvastatin (LIPITOR) 20 MG tablet 30 tablet 0     Sig: Take 1 tablet by mouth Every Night.    clopidogrel (PLAVIX) 75 MG tablet 30 tablet 0     Sig: Take 1 tablet by mouth Daily.        Pharmacy where request should be sent: [facility] [id number] [facility], [place] [address] - [phone] [facility] [phone] FX     Last office visit with prescribing clinician: 9/22/2023   Last telemedicine visit with prescribing clinician: 9/26/2023   Next office visit with prescribing clinician: 10/18/2023     Additional details provided by patient:     IS COMPLETELY OUT OF THE MEDICATION AND THIS WAS PRESCRIBED BY HOSPITALIST WHILE IN HOSPITAL     NEEDING REFILL IF DR FUENTES WOULD LIKE HER TO CONTINUE ON THESE MEDICATIONS.     Does the patient have less than a 3 day supply:  [x] Yes  [] No    Would you like a call back once the refill request has been completed: [] Yes [x] No    If the office needs to give you a call back, can they leave a voicemail: [] Yes [x] No

## 2023-10-03 NOTE — TELEPHONE ENCOUNTER
Returned Rox's call and let her know she will need to reach out to Ms. Vidal's PCP Dr. Mckay re: this. We did not order these medications. She was started on these in the hospital and PCP will need to refill if appropriate. She states pt was seen by PCP recently but she did not accompany pt to this visit. Advised her to call Dr. Mckay's office re: this refills. She v/u

## 2023-10-04 ENCOUNTER — OFFICE VISIT (OUTPATIENT)
Dept: PAIN MEDICINE | Facility: CLINIC | Age: 78
End: 2023-10-04
Payer: MEDICARE

## 2023-10-04 ENCOUNTER — INFUSION (OUTPATIENT)
Dept: ONCOLOGY | Facility: HOSPITAL | Age: 78
End: 2023-10-04
Payer: MEDICARE

## 2023-10-04 ENCOUNTER — TELEPHONE (OUTPATIENT)
Dept: PAIN MEDICINE | Facility: CLINIC | Age: 78
End: 2023-10-04

## 2023-10-04 VITALS
RESPIRATION RATE: 18 BRPM | WEIGHT: 117.6 LBS | OXYGEN SATURATION: 96 % | HEIGHT: 60 IN | HEART RATE: 69 BPM | TEMPERATURE: 97.7 F | SYSTOLIC BLOOD PRESSURE: 156 MMHG | DIASTOLIC BLOOD PRESSURE: 70 MMHG | BODY MASS INDEX: 23.09 KG/M2

## 2023-10-04 VITALS
HEIGHT: 60 IN | DIASTOLIC BLOOD PRESSURE: 70 MMHG | HEART RATE: 68 BPM | BODY MASS INDEX: 23.36 KG/M2 | TEMPERATURE: 98.9 F | RESPIRATION RATE: 15 BRPM | OXYGEN SATURATION: 97 % | SYSTOLIC BLOOD PRESSURE: 162 MMHG | WEIGHT: 119 LBS

## 2023-10-04 DIAGNOSIS — M79.602 LEFT ARM PAIN: ICD-10-CM

## 2023-10-04 DIAGNOSIS — B02.29 POST HERPETIC NEURALGIA: Primary | ICD-10-CM

## 2023-10-04 DIAGNOSIS — C34.91 PRIMARY LUNG ADENOCARCINOMA, RIGHT: Primary | ICD-10-CM

## 2023-10-04 LAB
BASOPHILS # BLD AUTO: 0.01 10*3/MM3 (ref 0–0.2)
BASOPHILS NFR BLD AUTO: 0.2 % (ref 0–1.5)
DEPRECATED RDW RBC AUTO: 57.1 FL (ref 37–54)
EOSINOPHIL # BLD AUTO: 0.01 10*3/MM3 (ref 0–0.4)
EOSINOPHIL NFR BLD AUTO: 0.2 % (ref 0.3–6.2)
ERYTHROCYTE [DISTWIDTH] IN BLOOD BY AUTOMATED COUNT: 17.5 % (ref 12.3–15.4)
HCT VFR BLD AUTO: 31.8 % (ref 34–46.6)
HGB BLD-MCNC: 9.8 G/DL (ref 12–15.9)
IMM GRANULOCYTES # BLD AUTO: 0.12 10*3/MM3 (ref 0–0.05)
IMM GRANULOCYTES NFR BLD AUTO: 2.3 % (ref 0–0.5)
LYMPHOCYTES # BLD AUTO: 0.83 10*3/MM3 (ref 0.7–3.1)
LYMPHOCYTES NFR BLD AUTO: 16.1 % (ref 19.6–45.3)
MCH RBC QN AUTO: 27.8 PG (ref 26.6–33)
MCHC RBC AUTO-ENTMCNC: 30.8 G/DL (ref 31.5–35.7)
MCV RBC AUTO: 90.1 FL (ref 79–97)
MONOCYTES # BLD AUTO: 0.16 10*3/MM3 (ref 0.1–0.9)
MONOCYTES NFR BLD AUTO: 3.1 % (ref 5–12)
NEUTROPHILS NFR BLD AUTO: 4.02 10*3/MM3 (ref 1.7–7)
NEUTROPHILS NFR BLD AUTO: 78.1 % (ref 42.7–76)
NRBC BLD AUTO-RTO: 0 /100 WBC (ref 0–0.2)
PLATELET # BLD AUTO: 172 10*3/MM3 (ref 140–450)
PMV BLD AUTO: 10.3 FL (ref 6–12)
RBC # BLD AUTO: 3.53 10*6/MM3 (ref 3.77–5.28)
WBC NRBC COR # BLD: 5.15 10*3/MM3 (ref 3.4–10.8)

## 2023-10-04 PROCEDURE — 85025 COMPLETE CBC W/AUTO DIFF WBC: CPT | Performed by: INTERNAL MEDICINE

## 2023-10-04 PROCEDURE — 25010000002 HEPARIN LOCK FLUSH PER 10 UNITS: Performed by: NURSE PRACTITIONER

## 2023-10-04 PROCEDURE — 25010000002 DEXAMETHASONE SODIUM PHOSPHATE 100 MG/10ML SOLUTION: Performed by: INTERNAL MEDICINE

## 2023-10-04 PROCEDURE — 25810000003 SODIUM CHLORIDE 0.9 % SOLUTION 250 ML FLEX CONT: Performed by: INTERNAL MEDICINE

## 2023-10-04 PROCEDURE — 96413 CHEMO IV INFUSION 1 HR: CPT

## 2023-10-04 PROCEDURE — 25010000002 GEMCITABINE 1 GM/26.3ML SOLUTION 26.3 ML VIAL: Performed by: INTERNAL MEDICINE

## 2023-10-04 PROCEDURE — 25810000003 SODIUM CHLORIDE 0.9 % SOLUTION: Performed by: INTERNAL MEDICINE

## 2023-10-04 PROCEDURE — 96375 TX/PRO/DX INJ NEW DRUG ADDON: CPT

## 2023-10-04 PROCEDURE — 25010000002 GEMCITABINE 200 MG/5.26ML SOLUTION 5.26 ML VIAL: Performed by: INTERNAL MEDICINE

## 2023-10-04 RX ORDER — SODIUM CHLORIDE 0.9 % (FLUSH) 0.9 %
10 SYRINGE (ML) INJECTION AS NEEDED
OUTPATIENT
Start: 2023-10-04

## 2023-10-04 RX ORDER — SODIUM CHLORIDE 0.9 % (FLUSH) 0.9 %
10 SYRINGE (ML) INJECTION AS NEEDED
Status: DISCONTINUED | OUTPATIENT
Start: 2023-10-04 | End: 2023-10-04 | Stop reason: HOSPADM

## 2023-10-04 RX ORDER — HEPARIN SODIUM (PORCINE) LOCK FLUSH IV SOLN 100 UNIT/ML 100 UNIT/ML
500 SOLUTION INTRAVENOUS AS NEEDED
OUTPATIENT
Start: 2023-10-04

## 2023-10-04 RX ORDER — SODIUM CHLORIDE 9 MG/ML
250 INJECTION, SOLUTION INTRAVENOUS ONCE
Status: COMPLETED | OUTPATIENT
Start: 2023-10-04 | End: 2023-10-04

## 2023-10-04 RX ORDER — HEPARIN SODIUM (PORCINE) LOCK FLUSH IV SOLN 100 UNIT/ML 100 UNIT/ML
500 SOLUTION INTRAVENOUS AS NEEDED
Status: DISCONTINUED | OUTPATIENT
Start: 2023-10-04 | End: 2023-10-04 | Stop reason: HOSPADM

## 2023-10-04 RX ADMIN — Medication 500 UNITS: at 13:10

## 2023-10-04 RX ADMIN — GEMCITABINE HYDROCHLORIDE 1450 MG: 1 INJECTION, SOLUTION INTRAVENOUS at 12:35

## 2023-10-04 RX ADMIN — SODIUM CHLORIDE 250 ML: 9 INJECTION, SOLUTION INTRAVENOUS at 10:55

## 2023-10-04 RX ADMIN — Medication 10 ML: at 13:10

## 2023-10-04 RX ADMIN — DEXAMETHASONE SODIUM PHOSPHATE 12 MG: 10 INJECTION, SOLUTION INTRAMUSCULAR; INTRAVENOUS at 12:00

## 2023-10-04 NOTE — PROGRESS NOTES
CHIEF COMPLAINT   has left arm pain that started in July 2023 after having shingles. She is currently in PT 1 time a week.    Subjective   Pam Vidal is a 78 y.o. female.   She presents to the office for evaluation of left arm pain d/t shingles. She was referred here by Dr. Lagunas for post-herpetic neuralgia.    In July 2023 she developed shingles affecting her entire left arm. Since this time she has had severe arm pain.     Today her pain is 8/10VAS in severity.  She describes her left arm pain as burning, stabbing, and shooting pain with numbness in the fingers of her left  hand.  Her pain worsens if she misses her medication.  Her pain is also intermittently worsened by clothing touching her arm as well as with cold and heat.  She is currently maintained on Lyrica 225 mg in the  morning, 75 mg in the afternoon, and 225 mg at night as well Norco 7.5/325 2-3/day.     She is maintained on Plavix d/t blood clots. She was diagnosed with this     Past pain medications: MS Contin (no relief, caused confusion), lidoderm patches (no relief)     Current pain medications: Norco 7.5/325, Lyrica     Past therapies:  Physical Therapy: Yes  Neck or back surgery: Hx of Posterior cervical fusion with Amira Davis and Silvio in 1988  Past pain management: None     Previous Injections:   None    Arm Pain   The pain is present in the left hand, left wrist, left forearm and upper left arm. The quality of the pain is described as burning and aching. The pain radiates to the left arm. The pain is at a severity of 8/10. The pain is severe. Treatments tried: Norco 7.5/325, Lyrica.      PEG Assessment   What number best describes your pain on average in the past week?9  What number best describes how, during the past week, pain has interfered with your enjoyment of life?8  What number best describes how, during the past week, pain has interfered with your general activity?  7      Current Outpatient Medications:      atorvastatin (LIPITOR) 20 MG tablet, Take 1 tablet by mouth Every Night., Disp: 30 tablet, Rfl: 0    azelastine (ASTELIN) 0.1 % nasal spray, 1 spray into the nostril(s) as directed by provider., Disp: , Rfl:     B COMPLEX VITAMINS ER PO, Take  by mouth Daily., Disp: , Rfl:     Cholecalciferol (VITAMIN D3) 5000 units capsule capsule, Take 2,000 Units by mouth Daily., Disp: , Rfl:     clopidogrel (PLAVIX) 75 MG tablet, Take 1 tablet by mouth Daily., Disp: 30 tablet, Rfl: 0    esomeprazole (nexIUM) 20 MG capsule, Take 1 capsule by mouth Every Morning Before Breakfast., Disp: , Rfl:     estradiol (ESTRACE) 1 MG tablet, Take 1 tablet by mouth Daily., Disp: , Rfl:     FeroSul 325 (65 Fe) MG tablet, TAKE ONE TABLET BY MOUTH DAILY WITH BREAKFAST, Disp: 30 tablet, Rfl: 5    HYDROcodone-acetaminophen (NORCO) 7.5-325 MG per tablet, Take 1 tablet by mouth Every 6 (Six) Hours As Needed for Moderate Pain., Disp: 60 tablet, Rfl: 0    levETIRAcetam (KEPPRA) 500 MG tablet, Take 1 tablet by mouth 2 (Two) Times a Day for 240 days., Disp: 120 tablet, Rfl: 3    Multiple Vitamins-Minerals (PRESERVISION AREDS 2+MULTI VIT PO), Take  by mouth., Disp: , Rfl:     ondansetron (ZOFRAN) 8 MG tablet, Take 1 tablet by mouth 3 (Three) Times a Day As Needed for Nausea or Vomiting., Disp: 30 tablet, Rfl: 5    predniSONE (DELTASONE) 10 MG tablet, Take 2 tablets by mouth Daily., Disp: 60 tablet, Rfl: 5    pregabalin (Lyrica) 75 MG capsule, Take 1 capsule by mouth 2 (Two) Times a Day. Along with 150 mg tablet for total dose of 225 mg twice daily, Disp: 60 capsule, Rfl: 0    DULoxetine (CYMBALTA) 30 MG capsule, Take 1 capsule by mouth Daily for 30 days., Disp: 30 capsule, Rfl: 0    Gabapentin 10 %, Baclofen 2 %, lidocaine 4 %, Ketamine HCl 4 %, Apply 1-2 g topically to the appropriate area as directed 3 (Three) to 4 (Four) times daily., Disp: 90 g, Rfl: 0    lisinopril (PRINIVIL,ZESTRIL) 20 MG tablet, Take 1 tablet by mouth Daily for 30 days., Disp: 30  "tablet, Rfl: 0    pregabalin (LYRICA) 150 MG capsule, Take 1 capsule by mouth Every 12 (Twelve) Hours for 30 days., Disp: 60 capsule, Rfl: 0  No current facility-administered medications for this visit.    The following portions of the patient's history were reviewed and updated as appropriate: allergies, current medications, past family history, past medical history, past social history, past surgical history, and problem list.      REVIEW OF PERTINENT MEDICAL DATA    Office visit from 9/22/2023 with Dr. Lagunas reviewed.  Patient has a history of stage III adenocarcinoma of the lung.  She developed brain metastasis in late May and was hospitalized.  She was able to undergo surgical resection of the dominant mass on 5/30/2023 with Dr. Ricketts.  She was hospitalized in June 2023 due to development of extensive shingles outbreak on her left upper extremity.  She was then seen in the emergency room on 8/4/2023 due to severe left arm pain.  CT of cervical spine showed no acute findings.  There was a new density in the right upper lobe which had not been seen on previous CT scan from June.  Refer to pain management to see if there is a role for injection for left arm pain.    Review of Systems   Gastrointestinal:  Negative for constipation and diarrhea.   Musculoskeletal:  Positive for neck pain.   Psychiatric/Behavioral:  Positive for sleep disturbance. Negative for suicidal ideas. The patient is not nervous/anxious.      --  The aforementioned information the Chief Complaint section and above subjective data including any HPI data, and also the Review of Systems data, has been personally reviewed and affirmed.  --    Vitals:    10/04/23 0956   BP: 156/70   Pulse: 69   Resp: 18   Temp: 97.7 °F (36.5 °C)   SpO2: 96%   Weight: 53.3 kg (117 lb 9.6 oz)   Height: 152.4 cm (60\")   PainSc:   8   PainLoc: Arm  Comment: left     Objective   Physical Exam  Vitals and nursing note reviewed.   Constitutional:       Appearance: Normal " appearance. She is well-developed.   Eyes:      General: Lids are normal.   Cardiovascular:      Rate and Rhythm: Normal rate.   Pulmonary:      Effort: Pulmonary effort is normal.   Neurological:      Mental Status: She is alert and oriented to person, place, and time.   Psychiatric:         Speech: Speech normal.         Behavior: Behavior normal.         Judgment: Judgment normal.       Assessment & Plan   Diagnoses and all orders for this visit:    1. Post herpetic neuralgia (Primary)  -     Gabapentin 10 %, Baclofen 2 %, lidocaine 4 %, Ketamine HCl 4 %; Apply 1-2 g topically to the appropriate area as directed 3 (Three) to 4 (Four) times daily.  Dispense: 90 g; Refill: 0    2. Left arm pain      --- I spent 44 minutes caring for Pam on this date of service. This time includes time spent by me in the following activities: preparing for the visit, reviewing tests, obtaining and/or reviewing a separately obtained history, performing a medically appropriate examination and/or evaluation, counseling and educating the patient/family/caregiver, ordering medications, tests, or procedures, referring and communicating with other health care professionals, and documenting information in the medical record     --- Pam Vidal reports a pain score of 8.  Given her pain assessment as noted, treatment options were discussed and the following options were decided upon as a follow-up plan to address the patient's pain: steroid injections.    --- Will reach out to Dr. Mckay and Dr. Lagunas for clearance to hold Plavix x 7 days.   --- Discussed with Dr. Camarena who reviewed her Cervical CT, Plan for KELLY at approximately C4-5.   Reviewed the procedure at length with the patient.  Included in the review was expectations, complications, risk and benefits.The procedure was described in detail and the risks, benefits and alternatives were discussed with the patient (including but not limited to: bleeding, infection, nerve  damage, worsening of pain, inability to perform injection, paralysis, seizures, coma, no pain relief and death) who agreed to proceed.  Discussed the potential for sedation if warranted/wanted.  The procedure will plan to be performed at [facility] with fluoroscopic guidance(unless ultrasound is indicated) and could potentially have steroids and contrast dye used. Questions were answered and in a way the patient could understand.  Patient verbalized understanding and wishes to proceed.  This intervention will be ordered.  Discussed with patient that all procedures are part of a multimodal plan of care and include either formal PT or a home exercise program.  Patient has no evidence of coagulopathy or current infection.  --- Trial compounded pain cream.   --- Follow-up after procedure.      ANDRES REPORT    As the clinician, I personally reviewed the ANDRES from 10/4/2023 while the patient was in the office today.    Dictated utilizing Dragon dictation.

## 2023-10-04 NOTE — TELEPHONE ENCOUNTER
Please contact Dr. Nik Mckay for clearance to hold Plavix x 7 days prior to cervical epidural. This will be resumed 24 hours after procedure. Thanks, TT

## 2023-10-04 NOTE — NURSING NOTE
New consent signed and placed in bin to be scanned into EPIC.     Patient tolerated treatment without complaints. Educated patient on when to call office with any concerns. Discharged in stable condition in wheelchair with niece and sister at side.

## 2023-10-06 ENCOUNTER — TELEPHONE (OUTPATIENT)
Dept: OTHER | Facility: HOSPITAL | Age: 78
End: 2023-10-06
Payer: MEDICARE

## 2023-10-06 NOTE — TELEPHONE ENCOUNTER
Oncology Social Work  .Distress Screening Follow-up    Diagnosis: lung cancer   Treatment:  Chemotherapy - palliative [facility]    Location of Distress Screening: Medical Oncology    Distress Level: 9 (9/26/2023  3:00 PM)    Physical Concerns: Pain - wants to be off oral pain medications.  She has recently seen pain management       Practical Problems:       Emotional Concerns:       Family Concerns:       Spiritual Concerns:        Interventions:    Called and spoke to patient.  Reviewed social work services with her and ways we can assist.  Patient reports no needs at this time.  Reports good family support and no practical or home care needs.     Linda Mike, MSSW, LCSW

## 2023-10-09 ENCOUNTER — TELEPHONE (OUTPATIENT)
Dept: PAIN MEDICINE | Facility: CLINIC | Age: 78
End: 2023-10-09
Payer: MEDICARE

## 2023-10-09 NOTE — TELEPHONE ENCOUNTER
Patient stated she informed you at her OV she is not going to do the epidural,she is going to trial the pain cream first and then advise if she wants to move forward with epidural,but she understood overall.

## 2023-10-09 NOTE — TELEPHONE ENCOUNTER
----- Message from Cruzito Lagunas MD sent at 10/9/2023  7:58 AM EDT -----  Regarding: RE: mutual patient    OK to hold Plavix 7 days for procedure from my standpoint.    Thanks,  Joe Lagunas MD  ----- Message -----  From: Shani Elder APRN  Sent: 10/4/2023   5:03 PM EDT  To: Cruzito Lagunas MD  Subject: mutual patient                                   Dr. Lagunas,     I evaluated Ms. Vidal today and we are hoping to proceed with a cervical epidural to treat her post-herpetic neuralgia. I am requesting clearance to hold her plavix x 7 days prior to her procedure. She will resume this 24 hours after procedure.     Thanks,   BARBARA iFtzgerald

## 2023-10-09 NOTE — TELEPHONE ENCOUNTER
Please let Ms. Vidal know that we have clearance to hold her Plavix from Dr. Lagunas, we are still waiting to hear from Dr. Mckay. Once we have clearance from Dr. Mckay we will be able to schedule her epidural. Thanks

## 2023-10-09 NOTE — TELEPHONE ENCOUNTER
Thanks, please check on the plavix hold clearance from her primary care as well. In the event that she does want to move forward we will need that clearance available.

## 2023-10-10 NOTE — TELEPHONE ENCOUNTER
Spoke with Chani at  office he has not review clearance yet,will send another high priority message.

## 2023-10-18 ENCOUNTER — INFUSION (OUTPATIENT)
Dept: ONCOLOGY | Facility: HOSPITAL | Age: 78
End: 2023-10-18
Payer: MEDICARE

## 2023-10-18 ENCOUNTER — TELEPHONE (OUTPATIENT)
Dept: PAIN MEDICINE | Facility: CLINIC | Age: 78
End: 2023-10-18

## 2023-10-18 ENCOUNTER — OFFICE VISIT (OUTPATIENT)
Dept: ONCOLOGY | Facility: CLINIC | Age: 78
End: 2023-10-18
Payer: MEDICARE

## 2023-10-18 ENCOUNTER — PREP FOR SURGERY (OUTPATIENT)
Dept: SURGERY | Facility: SURGERY CENTER | Age: 78
End: 2023-10-18
Payer: MEDICARE

## 2023-10-18 VITALS
WEIGHT: 116.8 LBS | DIASTOLIC BLOOD PRESSURE: 64 MMHG | OXYGEN SATURATION: 96 % | BODY MASS INDEX: 22.93 KG/M2 | TEMPERATURE: 98.6 F | SYSTOLIC BLOOD PRESSURE: 108 MMHG | HEART RATE: 73 BPM | RESPIRATION RATE: 18 BRPM | HEIGHT: 60 IN

## 2023-10-18 DIAGNOSIS — B02.29 POST HERPETIC NEURALGIA: Primary | ICD-10-CM

## 2023-10-18 DIAGNOSIS — J43.9 PULMONARY EMPHYSEMA, UNSPECIFIED EMPHYSEMA TYPE: ICD-10-CM

## 2023-10-18 DIAGNOSIS — C34.91 PRIMARY LUNG ADENOCARCINOMA, RIGHT: ICD-10-CM

## 2023-10-18 DIAGNOSIS — C34.90 LUNG CANCER METASTATIC TO BRAIN: Primary | ICD-10-CM

## 2023-10-18 DIAGNOSIS — C79.31 LUNG CANCER METASTATIC TO BRAIN: Primary | ICD-10-CM

## 2023-10-18 DIAGNOSIS — C34.91 PRIMARY LUNG ADENOCARCINOMA, RIGHT: Primary | ICD-10-CM

## 2023-10-18 DIAGNOSIS — M79.602 LEFT ARM PAIN: ICD-10-CM

## 2023-10-18 LAB
ALBUMIN SERPL-MCNC: 3.2 G/DL (ref 3.5–5.2)
ALBUMIN/GLOB SERPL: 1.4 G/DL
ALP SERPL-CCNC: 114 U/L (ref 39–117)
ALT SERPL W P-5'-P-CCNC: 15 U/L (ref 1–33)
ANION GAP SERPL CALCULATED.3IONS-SCNC: 17 MMOL/L (ref 5–15)
AST SERPL-CCNC: 22 U/L (ref 1–32)
BASOPHILS # BLD AUTO: 0.03 10*3/MM3 (ref 0–0.2)
BASOPHILS NFR BLD AUTO: 0.3 % (ref 0–1.5)
BILIRUB SERPL-MCNC: 0.2 MG/DL (ref 0–1.2)
BUN SERPL-MCNC: 16 MG/DL (ref 8–23)
BUN/CREAT SERPL: 19.3 (ref 7–25)
CALCIUM SPEC-SCNC: 8.5 MG/DL (ref 8.6–10.5)
CHLORIDE SERPL-SCNC: 101 MMOL/L (ref 98–107)
CO2 SERPL-SCNC: 19 MMOL/L (ref 22–29)
CREAT SERPL-MCNC: 0.83 MG/DL (ref 0.6–1.1)
DEPRECATED RDW RBC AUTO: 69.2 FL (ref 37–54)
EGFRCR SERPLBLD CKD-EPI 2021: 73.2 ML/MIN/1.73
EOSINOPHIL # BLD AUTO: 0.05 10*3/MM3 (ref 0–0.4)
EOSINOPHIL NFR BLD AUTO: 0.5 % (ref 0.3–6.2)
ERYTHROCYTE [DISTWIDTH] IN BLOOD BY AUTOMATED COUNT: 21 % (ref 12.3–15.4)
GLOBULIN UR ELPH-MCNC: 2.3 GM/DL
GLUCOSE SERPL-MCNC: 120 MG/DL (ref 65–99)
HCT VFR BLD AUTO: 33.8 % (ref 34–46.6)
HGB BLD-MCNC: 10.5 G/DL (ref 12–15.9)
IMM GRANULOCYTES # BLD AUTO: 0.21 10*3/MM3 (ref 0–0.05)
IMM GRANULOCYTES NFR BLD AUTO: 2.1 % (ref 0–0.5)
LYMPHOCYTES # BLD AUTO: 0.65 10*3/MM3 (ref 0.7–3.1)
LYMPHOCYTES NFR BLD AUTO: 6.5 % (ref 19.6–45.3)
MCH RBC QN AUTO: 28.9 PG (ref 26.6–33)
MCHC RBC AUTO-ENTMCNC: 31.1 G/DL (ref 31.5–35.7)
MCV RBC AUTO: 93.1 FL (ref 79–97)
MONOCYTES # BLD AUTO: 0.8 10*3/MM3 (ref 0.1–0.9)
MONOCYTES NFR BLD AUTO: 8 % (ref 5–12)
NEUTROPHILS NFR BLD AUTO: 8.2 10*3/MM3 (ref 1.7–7)
NEUTROPHILS NFR BLD AUTO: 82.6 % (ref 42.7–76)
NRBC BLD AUTO-RTO: 0 /100 WBC (ref 0–0.2)
PLATELET # BLD AUTO: 353 10*3/MM3 (ref 140–450)
PMV BLD AUTO: 10 FL (ref 6–12)
POTASSIUM SERPL-SCNC: 3.8 MMOL/L (ref 3.5–5.2)
PROT SERPL-MCNC: 5.5 G/DL (ref 6–8.5)
RBC # BLD AUTO: 3.63 10*6/MM3 (ref 3.77–5.28)
SODIUM SERPL-SCNC: 137 MMOL/L (ref 136–145)
WBC NRBC COR # BLD: 9.94 10*3/MM3 (ref 3.4–10.8)

## 2023-10-18 PROCEDURE — 85025 COMPLETE CBC W/AUTO DIFF WBC: CPT

## 2023-10-18 PROCEDURE — 25810000003 SODIUM CHLORIDE 0.9 % SOLUTION: Performed by: INTERNAL MEDICINE

## 2023-10-18 PROCEDURE — 25010000002 DEXAMETHASONE SODIUM PHOSPHATE 100 MG/10ML SOLUTION: Performed by: INTERNAL MEDICINE

## 2023-10-18 PROCEDURE — 96413 CHEMO IV INFUSION 1 HR: CPT

## 2023-10-18 PROCEDURE — 80053 COMPREHEN METABOLIC PANEL: CPT

## 2023-10-18 PROCEDURE — 25010000002 GEMCITABINE 200 MG/5.26ML SOLUTION 5.26 ML VIAL: Performed by: INTERNAL MEDICINE

## 2023-10-18 PROCEDURE — 96375 TX/PRO/DX INJ NEW DRUG ADDON: CPT

## 2023-10-18 PROCEDURE — 25010000002 GEMCITABINE 1 GM/26.3ML SOLUTION 26.3 ML VIAL: Performed by: INTERNAL MEDICINE

## 2023-10-18 PROCEDURE — 25810000003 SODIUM CHLORIDE 0.9 % SOLUTION 250 ML FLEX CONT: Performed by: INTERNAL MEDICINE

## 2023-10-18 RX ORDER — SODIUM CHLORIDE 9 MG/ML
250 INJECTION, SOLUTION INTRAVENOUS ONCE
OUTPATIENT
Start: 2023-11-15

## 2023-10-18 RX ORDER — SODIUM CHLORIDE 9 MG/ML
250 INJECTION, SOLUTION INTRAVENOUS ONCE
OUTPATIENT
Start: 2023-12-06

## 2023-10-18 RX ORDER — SODIUM CHLORIDE 9 MG/ML
250 INJECTION, SOLUTION INTRAVENOUS ONCE
OUTPATIENT
Start: 2023-11-29

## 2023-10-18 RX ORDER — SODIUM CHLORIDE 9 MG/ML
250 INJECTION, SOLUTION INTRAVENOUS ONCE
OUTPATIENT
Start: 2023-11-08

## 2023-10-18 RX ORDER — SODIUM CHLORIDE 9 MG/ML
250 INJECTION, SOLUTION INTRAVENOUS ONCE
Status: COMPLETED | OUTPATIENT
Start: 2023-10-18 | End: 2023-10-18

## 2023-10-18 RX ADMIN — SODIUM CHLORIDE 250 ML: 9 INJECTION, SOLUTION INTRAVENOUS at 12:53

## 2023-10-18 RX ADMIN — DEXAMETHASONE SODIUM PHOSPHATE 12 MG: 10 INJECTION, SOLUTION INTRAMUSCULAR; INTRAVENOUS at 12:53

## 2023-10-18 RX ADMIN — SODIUM CHLORIDE 1450 MG: 900 INJECTION, SOLUTION INTRAVENOUS at 13:26

## 2023-10-18 NOTE — TELEPHONE ENCOUNTER
Ms. Vidal was informed that her doctor gave clearance to hold her blood thinner in order to get a KELLY. She states that the compound pain cream is helping her neck pain and she doesn't want to move forward with the neck injections at this time.

## 2023-10-18 NOTE — PROGRESS NOTES
Subjective     REASON FOR FOLLOW UP:   1.  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  PD-L1 positive greater than 95%  3.  Primary chemoradiation with weekly carboplatin and Taxol completed 10/27/2022  4.  Imfinzi immunotherapy every 2 weeks for 12 months total.  First treatment 11/28/2022  5.  Repeat bronchoscopy 1/4/2023 due to persistent hemoptysis.  Pathology revealed no malignancy.  6.  Exophytic lesion of the kidney noted on surveillance CT scans.    HISTORY OF PRESENT ILLNESS:  The patient is a 78 y.o. year old female who is a former smoker referred to us from thoracic surgery (Dr. Remedios Rice) for evaluation and treatment of clinical stage III adenocarcinoma of the lung.  She was having persistent cough and underwent chest x-ray initially in late June which showed possible right upper lobe mass.  This was followed by CT scan of the chest confirming the presence of a right upper lobe mass.  She initially underwent a CT-guided needle biopsy on 7/22/2022 which was nondiagnostic.    She was referred to Dr. Glass for bronchoscopy and biopsy which was performed on 8/23/2022 with pathology returning as poorly differentiated adenocarcinoma.  PD-L1 stain on the tissue was positive at greater than 95%.    She underwent further staging with PET scan and MRI of the brain.  PET scan was performed on 8/12/2022 showing hypermetabolic activity in the large mass in the right upper lobe as well as mediastinal lymph nodes.  She was noted to have a mass on the kidney as well but this was not hypermetabolic.  There was no obvious distant metastatic disease.    MRI of the brain from 8/15/2022 likewise showed no evidence of metastatic disease.  She is scheduled also to see Dr. Hector Rodriguez of radiation oncology on 9/7/2022.    We recommended weekly carboplatin and Taxol concurrent with radiation therapy.  Following completion of treatment she will receive immunotherapy for maintenance   (She does have a diagnosis of rheumatoid  arthritis and is currently taking only Celebrex.  This could become an issue regarding her ability to tolerate immunotherapy in the future).    She received her final fraction of radiation 10/27/2022.  She also completed 6 weeks of carboplatin and Taxol and tolerated it well. CT scans performed 11/21/2022 showed right upper lobe mass appeared stable in size.  Her mediastinal lymphadenopathy had decreased.  There were no new sites of disease identified.    She was started on immunotherapy with Imfinzi with plans to continue immunotherapy for 1 year until December 2023.      She required several hospitalizations including hospitalization due to development of brain metastases in late May.  She had a dominant metastatic lesion in the right occipital area and was able to undergo neurosurgical resection of the dominant mass on 5/30/2023 with Dr. Ricketts.  She had 2 additional small lesions that were treated with stereotactic radiosurgery by Dr. Mckenna Moreira.  During this time her immuno therapy with Imfinzi was held (her last Imfinzi treatment in our office was 5/15/2023).    She again required repeat hospitalization from 6/11/2023 to 6/14/2023 due to development of extensive shingles outbreak on the left upper extremity.    She had a post treatment outpatient MRI of the brain from 7/9/2023 which showed the resection cavity in the right occipital area with no definite tumor recurrence in that area.  The other 2 small areas that were treated with stereotactic radiation appeared stable.  There were no new sites of disease identified.    She was seen in the emergency room 8/4/2023 for severe pain in the left arm. While she was in the ER she had a CT scan of the cervical spine to be sure there was no mechanical nerve root impingement.  There were no acute findings in the cervical spine on the CT but slices through the lung apices did show what appeared to be new density in the right upper lobe which had not been seen on her  previous CT scan from June.     Since her last visit she was again hospitalized from 8/26/2023 through 9/2/2023 with Campylobacter and E. coli colitis.  During the hospitalization she underwent a CT scan on 8/31/2023 showing increased size of her right upper lobe mass.  (See CT scan report below).  She also had a brain MRI performed on 8/27/2023 showing stable findings.    At the time of her discharge she had a motorcycle trip planned and we were planning to follow her up in the office with a PET scan when she returns from her trip.  She now returns having undergone the PET scan on 9/19/2023 which shows intensely hypermetabolic mass in the right upper lobe which had increased in size.  She had uptake in the right sixth and seventh ribs which may have been due to trauma.    She continues to have severe pain in the left arm although during the hospitalization she was switched to Lyrica which seems to be helping a little better than gabapentin.  She has requested an increase in the dose.  We also will be planning to refer her to pain management to see if there may be a role for the nerve injection.    We discussed the results of the PET scan and I recommended initiating palliative chemotherapy with single agent Gemzar days 1 and 8 of an every 21-day cycle.    INTERVAL HISTORY:  Ms. Vidal is here today for further palliative Gemzar treatment for metastatic lung adenocarcinoma.  She is receiving her treatment on day 1 and day 8 of an every 21-day cycle.  She is due today for day 1 of cycle #2.  Her blood counts are adequate and we plan to proceed with treatment without any modifications.  We plan to repeat CT scans and MRI of the brain after 4 cycles of treatment.    She also has had a double of the time with postherpetic neuralgia in the left arm which was refractory to narcotic pain meds.  She now has been seen at the pain clinic and was started on a topical combination of ketamine and gabapentin and baclofen.  She  reports that this has helped her a great deal and she does appear to be more comfortable in the office today.    She reports she has not had any major side effects from the Gemzar thus far.      History of Present Illness     Past Medical History:   Diagnosis Date    COPD (chronic obstructive pulmonary disease)     Coughing up blood     X2 MONTHS    History of COVID-19 02/2022    Hyperlipidemia     IBS (irritable bowel syndrome)     Primary lung adenocarcinoma, right     Rheumatoid arthritis         Past Surgical History:   Procedure Laterality Date    APPENDECTOMY      appendix removed    BRONCHOSCOPY N/A 8/23/2022    Procedure: BRONCHOSCOPY WITH BAL,  BIOPSIES, AND BRUSHINGS WITH ENDOBRONCHIAL ULTRASOUND WITH FNA;  Surgeon: Gregor Vee MD;  Location: [facility] ENDOSCOPY;  Service: Pulmonary;  Laterality: N/A;  PRE- HILAR MASS  POST- SAME    BRONCHOSCOPY N/A 1/4/2023    Procedure: BRONCHOSCOPY with biopsy, lavage, brushing;  Surgeon: Gregor eVe MD;  Location: [facility] ENDOSCOPY;  Service: Pulmonary;  Laterality: N/A;    CAROTID ENDARTERECTOMY Right     CAROTID ENDARTERECTOMY Left     CATARACT EXTRACTION      CERVICAL FUSION      CHOLECYSTECTOMY      CRANIOTOMY FOR TUMOR Right 5/30/2023    Procedure: RIGHT CRANIOTOMY FOR TUMOR RESECTION STEREOTACTIC WITH STEALTH;  Surgeon: Clint Ricketts MD;  Location: [facility] OR;  Service: Neurosurgery;  Laterality: Right;    HYSTERECTOMY      SHOULDER ARTHROSCOPY Right 02/19/2019    right should scope/cuff repair     VENOUS ACCESS DEVICE (PORT) INSERTION Right 9/6/2022    Procedure: POWERPORT INSERTION;  Surgeon: Remedios Rice MD;  Location: [facility] MAIN OR;  Service: Thoracic;  Laterality: Right;        Current Outpatient Medications on File Prior to Visit   Medication Sig Dispense Refill    atorvastatin (LIPITOR) 20 MG tablet Take 1 tablet by mouth Every Night. 30 tablet 0    azelastine (ASTELIN) 0.1 % nasal spray 1 spray into the nostril(s) as directed by provider.       B COMPLEX VITAMINS ER PO Take  by mouth Daily.      Cholecalciferol (VITAMIN D3) 5000 units capsule capsule Take 2,000 Units by mouth Daily.      clopidogrel (PLAVIX) 75 MG tablet Take 1 tablet by mouth Daily. 30 tablet 0    esomeprazole (nexIUM) 20 MG capsule Take 1 capsule by mouth Every Morning Before Breakfast.      estradiol (ESTRACE) 1 MG tablet Take 1 tablet by mouth Daily.      FeroSul 325 (65 Fe) MG tablet TAKE ONE TABLET BY MOUTH DAILY WITH BREAKFAST 30 tablet 5    Gabapentin 10 %, Baclofen 2 %, lidocaine 4 %, Ketamine HCl 4 % Apply 1-2 g topically to the appropriate area as directed 3 (Three) to 4 (Four) times daily. 90 g 0    HYDROcodone-acetaminophen (NORCO) 7.5-325 MG per tablet Take 1 tablet by mouth Every 6 (Six) Hours As Needed for Moderate Pain. 60 tablet 0    levETIRAcetam (KEPPRA) 500 MG tablet Take 1 tablet by mouth 2 (Two) Times a Day for 240 days. 120 tablet 3    Multiple Vitamins-Minerals (PRESERVISION AREDS 2+MULTI VIT PO) Take  by mouth.      ondansetron (ZOFRAN) 8 MG tablet Take 1 tablet by mouth 3 (Three) Times a Day As Needed for Nausea or Vomiting. 30 tablet 5    predniSONE (DELTASONE) 10 MG tablet Take 2 tablets by mouth Daily. 60 tablet 5    pregabalin (Lyrica) 75 MG capsule Take 1 capsule by mouth 2 (Two) Times a Day. Along with 150 mg tablet for total dose of 225 mg twice daily 60 capsule 0    DULoxetine (CYMBALTA) 30 MG capsule Take 1 capsule by mouth Daily for 30 days. 30 capsule 0    lisinopril (PRINIVIL,ZESTRIL) 20 MG tablet Take 1 tablet by mouth Daily for 30 days. 30 tablet 0    pregabalin (LYRICA) 150 MG capsule Take 1 capsule by mouth Every 12 (Twelve) Hours for 30 days. 60 capsule 0     No current facility-administered medications on file prior to visit.        ALLERGIES:    Allergies   Allergen Reactions    Del-Mycin [Erythromycin] Hives    Gentamicin Hives    Ibuprofen Nausea And Vomiting    Latex Dermatitis     GLOVES    Metronidazole Hives    Ofloxacin Hives and  Nausea Only     Has tolerated Levaquin     Penicillins Hives     Tolerated rocephin and cefepime 2023    Tetracycline Hives    Adhesive Tape Dermatitis     BANDAIDS    Aspirin GI Intolerance     Vomiting can take EC    Codeine Nausea And Vomiting        Social History     Socioeconomic History    Marital status:    Tobacco Use    Smoking status: Former     Packs/day: .25     Types: Cigarettes     Quit date: 2022     Years since quittin.4    Smokeless tobacco: Never    Tobacco comments:     Former some day smoker of 1-2 cigs   Vaping Use    Vaping Use: Never used   Substance and Sexual Activity    Alcohol use: Yes     Alcohol/week: 2.0 standard drinks of alcohol     Types: 2 Glasses of wine per week     Comment: occasionally    Drug use: Never    Sexual activity: Defer        Family History   Problem Relation Age of Onset    Cancer Mother     Cancer Father     Malig Hyperthermia Neg Hx         Review of Systems   Constitutional:  Negative for activity change, chills, fatigue and fever.   HENT:  Negative for mouth sores, trouble swallowing and voice change.    Eyes:  Negative for pain and visual disturbance.   Respiratory:  Positive for cough. Negative for wheezing.    Cardiovascular:  Negative for chest pain and palpitations.   Gastrointestinal:  Negative for abdominal pain, constipation, diarrhea, nausea and vomiting.   Genitourinary:  Negative for difficulty urinating, frequency and urgency.   Musculoskeletal:  Negative for arthralgias and joint swelling.   Skin:  Negative for rash.   Neurological:  Positive for weakness. Negative for dizziness, seizures and headaches.        Severe pain in the left upper arm due to postherpetic neuralgia.  She also had some weakness in the arm which she feels is improving.   Hematological:  Negative for adenopathy. Bruises/bleeds easily.   Psychiatric/Behavioral:  Negative for behavioral problems and confusion. The patient is not nervous/anxious.     10/18/2023 ROS  "updated     Objective     Vitals:    10/18/23 1157   BP: 108/64   Pulse: 73   Resp: 18   Temp: 98.6 °F (37 °C)   TempSrc: Temporal   SpO2: 96%   Weight: 53 kg (116 lb 12.8 oz)   Height: 152.4 cm (60\")   PainSc:   8   PainLoc: Arm  Comment: pt stated left arm pain         10/18/2023    12:02 PM   Current Status   ECOG score 1     Physical Exam  Vitals reviewed.   Constitutional:       General: She is not in acute distress.     Appearance: She is well-developed. She is ill-appearing.      Comments: Seated in a wheelchair.   HENT:      Head: Normocephalic and atraumatic.      Mouth/Throat:      Pharynx: No oropharyngeal exudate.   Eyes:      Pupils: Pupils are equal, round, and reactive to light.   Neck:      Comments: Bilateral scars from carotid endarterectomies  Cardiovascular:      Rate and Rhythm: Normal rate and regular rhythm.      Heart sounds: Normal heart sounds. No murmur heard.  Pulmonary:      Effort: Pulmonary effort is normal. No respiratory distress.      Breath sounds: Decreased breath sounds present. No wheezing, rhonchi or rales.   Abdominal:      General: Bowel sounds are normal. There is no distension.      Palpations: Abdomen is soft.   Musculoskeletal:         General: Normal range of motion.      Cervical back: Normal range of motion.   Skin:     General: Skin is warm and dry.      Findings: No rash.   Neurological:      Mental Status: She is alert and oriented to person, place, and time.      I have reexamined the patient and the results are consistent with the previously documented exam. Cruzito Lagunas MD        RECENT LABS:  Results from last 7 days   Lab Units 10/18/23  1148   WBC 10*3/mm3 9.94   NEUTROS ABS 10*3/mm3 8.20*   HEMOGLOBIN g/dL 10.5*   HEMATOCRIT % 33.8*   PLATELETS 10*3/mm3 353               BRONCHOSCOPY PATHOLOGY 1/4/2023  Final Diagnosis   Fluid, Lung, Right Upper Lobe, Bronchoalveolar Lavage (BAL):  A. Negative for malignant cells- reactive atypia.  B. Reactive bronchial " cells, macrophages, inflammation (mostly acute) and mucin.  C. Negative for definitive fungal organisms by GMS staining. See comment.     2. Fluid, Lung, Right Upper Lobe, Brushing:               A. Negative for malignant cells.               B. Bronchial cells, scattered inflammation and mucinous debris.      Final Diagnosis   1. Lung, Right Upper Lobe, Biopsy:  Endobronchial mucosa and submucosa with                A. Squamous metaplasia, mild to moderate chronic active inflammation, fibrinous and necrotic debris and reactive      epithelial changes.               B. No definitive dysplasia nor malignancy identified.               C. No granulomata, diagnostic viral inclusions nor fungal organisms identified.         BRONCHOSCOPY PATHOLOGY 8/23/2022  Final Diagnosis   1. Lung, Right Upper Lobe, Endobronchial Biopsies: INVASIVE POORLY DIFFERENTIATED ADENOCARCINOMA OF PULMONARY ORIGIN.   PDL-1 POSITIVE >95%    IMAGING:  F-18 FDG PET FROM SKULL BASE TO MID THIGH WITH PET/CT FUSION 9/19/2023  IMPRESSION:  1.  Intensely FDG mass centered within the perihilar aspect of the right  upper lobe appears to have increased in size since 5/25/2023 with new  obstruction and stenosis of the right upper lobe bronchus and likely  represents recurrent malignancy. Surrounding pulmonary opacification and  consolidation throughout the right lung has increased since 5/25/2023  and there is new opacification within the left lung with differential  considerations including evolving postradiation changes, postobstructive  pneumonia and lymphangitic spread of malignancy.  2.  Segmental uptake within the right sixth and seventh ribs without  identifiable abnormality on noncontrast CT. While findings may represent  nondisplaced fractures, continued close attention on follow-up is  recommended to exclude metastatic disease.  3.  Indeterminate 5.3 cm right renal lesion, as before. At least  continued attention on follow-up is recommended.  Finding can all be  further evaluated multiphase CT or MRI of the abdomen with and without  contrast to exclude neoplasm.  4.  Other findings as above.    MRI OF THE BRAIN WITH AND WITHOUT CONTRAST  8/27/2023  IMPRESSION:     1.  Status post right parieto-occipital craniotomy with subjacent   resection cavity.  Expected postoperative changes as detailed above.  No   evidence of tumor recurrence.  2.  Moderate chronic small vessel ischemic changes.  3.  No specific pontine abnormality aside from chronic small vessel   ischemic disease.  Finding on reference CT likely reflected artifact.  4.  No acute infarct.  No acute hemorrhage.    CT CHEST W CONTRAST DIAGNOSTIC-, CT ABDOMEN PELVIS W CONTRAST-, NM BONE SCAN WHOLE BODY- 5/29/2023  1. Right upper lobe suprahilar pulmonary mass appears stable from the  recent prior exam, again with groundglass and nodular opacities in the  right upper lobe.  2. Sclerotic change at the right anterior eighth rib is new from  03/01/2023, with corresponding increased uptake on bone scan, could be  healing fracture or sclerotic metastatic disease, correlate clinically.  3. Stable appearance of the abdomen and pelvis.    CT CHEST, ABDOMEN, AND PELVIS WITH IV CONTRAST 3/1/2023  IMPRESSION:  Decreased size of the right upper lobe mass with stable  surrounding groundglass opacity and decreased size of the right lower  lobe opacity. No adenopathy seen on today's exam. Incidental findings as  Above.    F-18 FDG PET FROM SKULL BASE TO MID THIGH WITH PET/CT FUSION 8/12/2022  IMPRESSION:  1.  Large mass centered within the right upper lobe representing  patient's known malignancy. Interlobular septal thickening and ground  glass opacification projecting from the periphery of the mass throughout  the right upper lobe is highly concerning for lymphangitic spread. Of  note, the mass abuts the pleura and mediastinum over a long segment and  underlying invasion cannot be excluded.  2.  FDG avid hilar  and mediastinal adenopathy concerning for metastatic  disease.  3.  A few irregular subcentimeter pulmonary nodules are present within  the right lower and left upper lobes measuring up to 0.9 cm which while  nonspecific raises concern for metastatic disease. At least close  attention on followup is recommended.   4.  Minimally hypermetabolic arnoldo hepatic node and bilateral sub-6 mm  pulmonary nodules are indeterminate. Continued followup with chest and  abdominal CT in 3 months is recommended.  5.  Indeterminate density 4.4 cm mass arises off the right kidney.  Further evaluation with multiphase CT or MRI of the abdomen with and  without contrast is recommended to exclude neoplasm.  6.  Focal uptake over the right shoulder in the area of prior rotator  cuff repair is favored be postsurgical with metastatic disease less  likely.  7.  Other findings as above.    Assessment & Plan   1.  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  Patient is not felt to be a surgical candidate and combined chemotherapy and radiation.   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  9/20/2022 1st dose of weekly carboplatin/taxol.   She completed 6 cycles of weekly CarboTaxol 10/25/2022.  Radiation therapy completed 10/27/2022  First dose durvalumab delivered 11/28/2022.   Durvalumab on hold since May 2023  Progression of disease with enlarging hypermetabolic mass in the right upper lobe in September 2023  Plans to start single agent Gemzar palliative chemotherapy 1000 mg/m² on days 1 and 8 of a 21-day cycle.  9/27/2023 C1D1 single agent palliative Gemzar.  Day 1 cycle #2 Gemzar delivered 10/18/2023    2.  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    3.  Other comorbidities including vascular disease with previous carotid   endarterectomy surgeries.  She has no known history of stroke or  myocardial infarction.     5.  Rheumatoid arthritis: Patient previously on Celebrex, but discontinued due to hemoptysis.  Patient started on prednisone 20 mg daily prescribed to manage her arthritis pain.     6.  Development of brain metastases in May 2023.  Patient underwent resection of the dominant mass in the right occipital area by Dr. Cho of neurosurgery.  She received post op radiation therapy to the resection bed and to 2 smaller lesions with SRS under the direction of Dr. Mckenna Moreira at [facility].  Her most recent MRI of the brain from 8/27/2023 as noted above shows no new sites of disease, improved edema, and no definite recurrence in the resection bed.    7.  Severe shingles outbreak of left upper extremity with severe pain from postherpetic neuralgia.   She has been seen by pain management and is receiving topical therapy with a compound of baclofen gabapentin and ketamine with good relief.    8.  Recent hospitalization for bacterial colitis with stool culture growing E. coli and Campylobacter.  Her symptoms have resolved at this point.      PLAN:  Proceed with C2 D1 Gemzar today.  Patient will return in 1 week with repeat labs and C2 D8 Gemzar ; 3 weeks for cycle 3-day 1 Gemzar treatment and 4 weeks for cycle 3-day 8 Gemzar treatment.  Continue follow-up with pain management as scheduled for continued treatment of severe postherpetic neuralgia.  Continue prednisone but decrease the dose to 10 mg daily.  Continue Lyrica 225 mg (75+150) twice daily.  Continue Norco 7.5/325 if needed for pain control.  MD follow-up in 6 weeks when she will be due for day 1 of cycle #4 palliative Gemzar.   We will plan to repeat CT scan of the chest after 4 cycles of Gemzar.  Call/ return sooner should the patient develop any new concerns or problems.    Patient is on a high risk medication requiring close monitoring for toxicity.      Cruzito Lagunas MD   10/18/2023

## 2023-10-20 RX ORDER — HYDROCODONE BITARTRATE AND ACETAMINOPHEN 7.5; 325 MG/1; MG/1
1 TABLET ORAL EVERY 6 HOURS PRN
Qty: 60 TABLET | Refills: 0 | Status: SHIPPED | OUTPATIENT
Start: 2023-10-20

## 2023-10-27 DIAGNOSIS — B02.29 POST HERPETIC NEURALGIA: ICD-10-CM

## 2023-11-08 ENCOUNTER — INFUSION (OUTPATIENT)
Dept: ONCOLOGY | Facility: HOSPITAL | Age: 78
End: 2023-11-08
Payer: MEDICARE

## 2023-11-08 VITALS
DIASTOLIC BLOOD PRESSURE: 73 MMHG | BODY MASS INDEX: 24.61 KG/M2 | SYSTOLIC BLOOD PRESSURE: 145 MMHG | OXYGEN SATURATION: 94 % | HEART RATE: 89 BPM | TEMPERATURE: 98 F | WEIGHT: 126 LBS | RESPIRATION RATE: 16 BRPM

## 2023-11-08 DIAGNOSIS — C34.91 PRIMARY LUNG ADENOCARCINOMA, RIGHT: Primary | ICD-10-CM

## 2023-11-08 LAB
ALBUMIN SERPL-MCNC: 3.6 G/DL (ref 3.5–5.2)
ALBUMIN/GLOB SERPL: 1.4 G/DL
ALP SERPL-CCNC: 112 U/L (ref 39–117)
ALT SERPL W P-5'-P-CCNC: 33 U/L (ref 1–33)
ANION GAP SERPL CALCULATED.3IONS-SCNC: 13.2 MMOL/L (ref 5–15)
AST SERPL-CCNC: 34 U/L (ref 1–32)
BASOPHILS # BLD AUTO: 0.05 10*3/MM3 (ref 0–0.2)
BASOPHILS NFR BLD AUTO: 0.4 % (ref 0–1.5)
BILIRUB SERPL-MCNC: 0.2 MG/DL (ref 0–1.2)
BUN SERPL-MCNC: 27 MG/DL (ref 8–23)
BUN/CREAT SERPL: 26.7 (ref 7–25)
CALCIUM SPEC-SCNC: 9.1 MG/DL (ref 8.6–10.5)
CHLORIDE SERPL-SCNC: 101 MMOL/L (ref 98–107)
CO2 SERPL-SCNC: 23.8 MMOL/L (ref 22–29)
CREAT SERPL-MCNC: 1.01 MG/DL (ref 0.6–1.1)
DEPRECATED RDW RBC AUTO: 72.9 FL (ref 37–54)
EGFRCR SERPLBLD CKD-EPI 2021: 57.8 ML/MIN/1.73
EOSINOPHIL # BLD AUTO: 0.09 10*3/MM3 (ref 0–0.4)
EOSINOPHIL NFR BLD AUTO: 0.6 % (ref 0.3–6.2)
ERYTHROCYTE [DISTWIDTH] IN BLOOD BY AUTOMATED COUNT: 21.1 % (ref 12.3–15.4)
GLOBULIN UR ELPH-MCNC: 2.6 GM/DL
GLUCOSE SERPL-MCNC: 135 MG/DL (ref 65–99)
HCT VFR BLD AUTO: 35.6 % (ref 34–46.6)
HGB BLD-MCNC: 10.7 G/DL (ref 12–15.9)
IMM GRANULOCYTES # BLD AUTO: 0.23 10*3/MM3 (ref 0–0.05)
IMM GRANULOCYTES NFR BLD AUTO: 1.6 % (ref 0–0.5)
LYMPHOCYTES # BLD AUTO: 1.42 10*3/MM3 (ref 0.7–3.1)
LYMPHOCYTES NFR BLD AUTO: 10 % (ref 19.6–45.3)
MCH RBC QN AUTO: 28.3 PG (ref 26.6–33)
MCHC RBC AUTO-ENTMCNC: 30.1 G/DL (ref 31.5–35.7)
MCV RBC AUTO: 94.2 FL (ref 79–97)
MONOCYTES # BLD AUTO: 0.79 10*3/MM3 (ref 0.1–0.9)
MONOCYTES NFR BLD AUTO: 5.6 % (ref 5–12)
NEUTROPHILS NFR BLD AUTO: 11.56 10*3/MM3 (ref 1.7–7)
NEUTROPHILS NFR BLD AUTO: 81.8 % (ref 42.7–76)
NRBC BLD AUTO-RTO: 0 /100 WBC (ref 0–0.2)
PLATELET # BLD AUTO: 401 10*3/MM3 (ref 140–450)
PMV BLD AUTO: 10 FL (ref 6–12)
POTASSIUM SERPL-SCNC: 4.6 MMOL/L (ref 3.5–5.2)
PROT SERPL-MCNC: 6.2 G/DL (ref 6–8.5)
RBC # BLD AUTO: 3.78 10*6/MM3 (ref 3.77–5.28)
SODIUM SERPL-SCNC: 138 MMOL/L (ref 136–145)
WBC NRBC COR # BLD: 14.14 10*3/MM3 (ref 3.4–10.8)

## 2023-11-08 PROCEDURE — 80053 COMPREHEN METABOLIC PANEL: CPT

## 2023-11-08 PROCEDURE — 25010000002 GEMCITABINE 200 MG/5.26ML SOLUTION 5.26 ML VIAL: Performed by: INTERNAL MEDICINE

## 2023-11-08 PROCEDURE — 96375 TX/PRO/DX INJ NEW DRUG ADDON: CPT

## 2023-11-08 PROCEDURE — 25810000003 SODIUM CHLORIDE 0.9 % SOLUTION: Performed by: INTERNAL MEDICINE

## 2023-11-08 PROCEDURE — 25010000002 GEMCITABINE 1 GM/26.3ML SOLUTION 26.3 ML VIAL: Performed by: INTERNAL MEDICINE

## 2023-11-08 PROCEDURE — 25010000002 DEXAMETHASONE SODIUM PHOSPHATE 100 MG/10ML SOLUTION: Performed by: INTERNAL MEDICINE

## 2023-11-08 PROCEDURE — 85025 COMPLETE CBC W/AUTO DIFF WBC: CPT

## 2023-11-08 PROCEDURE — 96413 CHEMO IV INFUSION 1 HR: CPT

## 2023-11-08 PROCEDURE — 25810000003 SODIUM CHLORIDE 0.9 % SOLUTION 250 ML FLEX CONT: Performed by: INTERNAL MEDICINE

## 2023-11-08 RX ORDER — SODIUM CHLORIDE 9 MG/ML
250 INJECTION, SOLUTION INTRAVENOUS ONCE
Status: COMPLETED | OUTPATIENT
Start: 2023-11-08 | End: 2023-11-08

## 2023-11-08 RX ADMIN — SODIUM CHLORIDE 1450 MG: 900 INJECTION, SOLUTION INTRAVENOUS at 13:01

## 2023-11-08 RX ADMIN — DEXAMETHASONE SODIUM PHOSPHATE 12 MG: 10 INJECTION, SOLUTION INTRAMUSCULAR; INTRAVENOUS at 12:42

## 2023-11-08 RX ADMIN — SODIUM CHLORIDE 250 ML: 9 INJECTION, SOLUTION INTRAVENOUS at 12:42

## 2023-11-10 ENCOUNTER — TELEPHONE (OUTPATIENT)
Dept: ONCOLOGY | Facility: CLINIC | Age: 78
End: 2023-11-10
Payer: MEDICARE

## 2023-11-10 NOTE — TELEPHONE ENCOUNTER
Caller: ZIA DURANT    Relationship to patient: Emergency Contact    Best call back number:  [phone]    Chief complaint: GOT LETTER MISSED INFUSION 10/25 NEEDS TO RESCHEDULE     Type of visit: INFUSION     Requested date: ADD ON  AFTER LAST SCHEDULED INFUSION.     If rescheduling, when is the original appointment: 10/25

## 2023-11-10 NOTE — TELEPHONE ENCOUNTER
LM ON [employer] PHONE TO KEEP APPT THAT ARE ALREADY SCHEDULED AS IS AND WE CAN DISCUSS W/ THE RN OR MD ABOUT MISSED APPT BEING MADE UP

## 2023-11-14 ENCOUNTER — PATIENT OUTREACH (OUTPATIENT)
Dept: OTHER | Facility: HOSPITAL | Age: 78
End: 2023-11-14
Payer: MEDICARE

## 2023-11-14 DIAGNOSIS — A04.9 BACTERIAL COLITIS: ICD-10-CM

## 2023-11-14 DIAGNOSIS — C34.91 PRIMARY LUNG ADENOCARCINOMA, RIGHT: ICD-10-CM

## 2023-11-14 DIAGNOSIS — R53.0 NEOPLASTIC MALIGNANT RELATED FATIGUE: ICD-10-CM

## 2023-11-14 DIAGNOSIS — C34.90 MALIGNANT NEOPLASM OF LUNG, UNSPECIFIED LATERALITY, UNSPECIFIED PART OF LUNG: ICD-10-CM

## 2023-11-14 DIAGNOSIS — C34.90 LUNG CANCER METASTATIC TO BRAIN: ICD-10-CM

## 2023-11-14 DIAGNOSIS — C79.31 LUNG CANCER METASTATIC TO BRAIN: ICD-10-CM

## 2023-11-14 DIAGNOSIS — G89.3 CHRONIC PAIN AFTER CANCER TREATMENT: ICD-10-CM

## 2023-11-14 DIAGNOSIS — B02.29 POST HERPETIC NEURALGIA: ICD-10-CM

## 2023-11-14 RX ORDER — HEPARIN SODIUM (PORCINE) LOCK FLUSH IV SOLN 100 UNIT/ML 100 UNIT/ML
500 SOLUTION INTRAVENOUS AS NEEDED
OUTPATIENT
Start: 2023-11-14

## 2023-11-14 RX ORDER — PREGABALIN 75 MG/1
CAPSULE ORAL
Qty: 60 CAPSULE | Refills: 0 | Status: SHIPPED | OUTPATIENT
Start: 2023-11-14

## 2023-11-14 RX ORDER — SODIUM CHLORIDE 0.9 % (FLUSH) 0.9 %
10 SYRINGE (ML) INJECTION AS NEEDED
OUTPATIENT
Start: 2023-11-14

## 2023-11-14 NOTE — PROGRESS NOTES
Reviewed chart    Called patient. She states she is doing ok. She continues Gemzar without significant side effects; her next infusion is tomorrow. The patient does complain of shingles related pain in her arm; she is waiting to get her topical compound of baclofen gabapentin and ketamine cream.    The patient states she is eating well and denies weight loss.  She is drinking Boost.    The patient is not able to drive. Her family/friends transport her to/from appts.     We again [facility]ative [facility]. Patient expressed gratitude for my support and denied any additional needs at this time. I will call in 1-2 months; encouraged patient to call as needed.

## 2023-11-15 ENCOUNTER — INFUSION (OUTPATIENT)
Dept: ONCOLOGY | Facility: HOSPITAL | Age: 78
End: 2023-11-15
Payer: MEDICARE

## 2023-11-15 VITALS
BODY MASS INDEX: 24.76 KG/M2 | HEART RATE: 93 BPM | SYSTOLIC BLOOD PRESSURE: 131 MMHG | WEIGHT: 126.8 LBS | RESPIRATION RATE: 16 BRPM | OXYGEN SATURATION: 95 % | TEMPERATURE: 97.1 F | DIASTOLIC BLOOD PRESSURE: 70 MMHG

## 2023-11-15 DIAGNOSIS — C34.91 PRIMARY LUNG ADENOCARCINOMA, RIGHT: Primary | ICD-10-CM

## 2023-11-15 DIAGNOSIS — G89.3 CHRONIC PAIN AFTER CANCER TREATMENT: Primary | ICD-10-CM

## 2023-11-15 LAB
BASOPHILS # BLD AUTO: 0.05 10*3/MM3 (ref 0–0.2)
BASOPHILS NFR BLD AUTO: 0.8 % (ref 0–1.5)
DEPRECATED RDW RBC AUTO: 70.2 FL (ref 37–54)
EOSINOPHIL # BLD AUTO: 0.1 10*3/MM3 (ref 0–0.4)
EOSINOPHIL NFR BLD AUTO: 1.6 % (ref 0.3–6.2)
ERYTHROCYTE [DISTWIDTH] IN BLOOD BY AUTOMATED COUNT: 20.5 % (ref 12.3–15.4)
HCT VFR BLD AUTO: 32.7 % (ref 34–46.6)
HGB BLD-MCNC: 10.4 G/DL (ref 12–15.9)
IMM GRANULOCYTES # BLD AUTO: 0.16 10*3/MM3 (ref 0–0.05)
IMM GRANULOCYTES NFR BLD AUTO: 2.5 % (ref 0–0.5)
LYMPHOCYTES # BLD AUTO: 1.24 10*3/MM3 (ref 0.7–3.1)
LYMPHOCYTES NFR BLD AUTO: 19.4 % (ref 19.6–45.3)
MCH RBC QN AUTO: 30.1 PG (ref 26.6–33)
MCHC RBC AUTO-ENTMCNC: 31.8 G/DL (ref 31.5–35.7)
MCV RBC AUTO: 94.5 FL (ref 79–97)
MONOCYTES # BLD AUTO: 0.6 10*3/MM3 (ref 0.1–0.9)
MONOCYTES NFR BLD AUTO: 9.4 % (ref 5–12)
NEUTROPHILS NFR BLD AUTO: 4.23 10*3/MM3 (ref 1.7–7)
NEUTROPHILS NFR BLD AUTO: 66.3 % (ref 42.7–76)
NRBC BLD AUTO-RTO: 0 /100 WBC (ref 0–0.2)
PLATELET # BLD AUTO: 226 10*3/MM3 (ref 140–450)
PMV BLD AUTO: 10.1 FL (ref 6–12)
RBC # BLD AUTO: 3.46 10*6/MM3 (ref 3.77–5.28)
WBC NRBC COR # BLD: 6.38 10*3/MM3 (ref 3.4–10.8)

## 2023-11-15 PROCEDURE — 25010000002 GEMCITABINE 1 GM/26.3ML SOLUTION 26.3 ML VIAL: Performed by: INTERNAL MEDICINE

## 2023-11-15 PROCEDURE — 85025 COMPLETE CBC W/AUTO DIFF WBC: CPT

## 2023-11-15 PROCEDURE — 96375 TX/PRO/DX INJ NEW DRUG ADDON: CPT

## 2023-11-15 PROCEDURE — 25810000003 SODIUM CHLORIDE 0.9 % SOLUTION: Performed by: INTERNAL MEDICINE

## 2023-11-15 PROCEDURE — 25010000002 GEMCITABINE 200 MG/5.26ML SOLUTION 5.26 ML VIAL: Performed by: INTERNAL MEDICINE

## 2023-11-15 PROCEDURE — 96413 CHEMO IV INFUSION 1 HR: CPT

## 2023-11-15 PROCEDURE — 25010000002 DEXAMETHASONE SODIUM PHOSPHATE 100 MG/10ML SOLUTION: Performed by: INTERNAL MEDICINE

## 2023-11-15 PROCEDURE — 25810000003 SODIUM CHLORIDE 0.9 % SOLUTION 250 ML FLEX CONT: Performed by: INTERNAL MEDICINE

## 2023-11-15 RX ORDER — SODIUM CHLORIDE 9 MG/ML
250 INJECTION, SOLUTION INTRAVENOUS ONCE
Status: COMPLETED | OUTPATIENT
Start: 2023-11-15 | End: 2023-11-15

## 2023-11-15 RX ORDER — HYDROCODONE BITARTRATE AND ACETAMINOPHEN 7.5; 325 MG/1; MG/1
1 TABLET ORAL EVERY 6 HOURS PRN
Qty: 60 TABLET | Refills: 0 | Status: SHIPPED | OUTPATIENT
Start: 2023-11-15

## 2023-11-15 RX ADMIN — SODIUM CHLORIDE 1450 MG: 900 INJECTION, SOLUTION INTRAVENOUS at 14:08

## 2023-11-15 RX ADMIN — SODIUM CHLORIDE 250 ML: 9 INJECTION, SOLUTION INTRAVENOUS at 13:46

## 2023-11-15 RX ADMIN — DEXAMETHASONE SODIUM PHOSPHATE 12 MG: 10 INJECTION, SOLUTION INTRAMUSCULAR; INTRAVENOUS at 13:46

## 2023-11-15 NOTE — TELEPHONE ENCOUNTER
Caller: ZIA DURANT    Relationship: Emergency Contact    Best call back number: [phone]     Requested Prescriptions:   Requested Prescriptions     Pending Prescriptions Disp Refills    HYDROcodone-acetaminophen (NORCO) 7.5-325 MG per tablet 60 tablet 0     Sig: Take 1 tablet by mouth Every 6 (Six) Hours As Needed for Moderate Pain.        Pharmacy where request should be sent: [facility] [id number] [facility], [place] - [address] [facility] - [phone] [facility] [phone] FX     Last office visit with prescribing clinician: 10/18/2023   Last telemedicine visit with prescribing clinician: 9/26/2023   Next office visit with prescribing clinician: 11/29/2023     Additional details provided by patient: CALLER REPORTS PT IS COMPLETELY OUT.  CALLER IS NOT LISTED ON [facility] VERBAL, CALLER PROVIDED THIS INFO, SAYS IT SHOULD BE A QUANTITY OF 60 AND THAT THE PHARMACY NEEDS A NEW RX.    Does the patient have less than a 3 day supply:  [x] Yes  [] No    Would you like a call back once the refill request has been completed: [] Yes [x] No    If the office needs to give you a call back, can they leave a voicemail: [] Yes [x] No    Lizzette Dawson Rep   11/15/23 13:59 EST

## 2023-11-29 ENCOUNTER — INFUSION (OUTPATIENT)
Dept: ONCOLOGY | Facility: HOSPITAL | Age: 78
End: 2023-11-29
Payer: MEDICARE

## 2023-11-29 ENCOUNTER — OFFICE VISIT (OUTPATIENT)
Dept: ONCOLOGY | Facility: CLINIC | Age: 78
End: 2023-11-29
Payer: MEDICARE

## 2023-11-29 VITALS
TEMPERATURE: 96.8 F | BODY MASS INDEX: 25.15 KG/M2 | HEART RATE: 86 BPM | OXYGEN SATURATION: 96 % | RESPIRATION RATE: 18 BRPM | DIASTOLIC BLOOD PRESSURE: 63 MMHG | SYSTOLIC BLOOD PRESSURE: 150 MMHG | HEIGHT: 60 IN | WEIGHT: 128.1 LBS

## 2023-11-29 DIAGNOSIS — C34.91 PRIMARY LUNG ADENOCARCINOMA, RIGHT: Primary | ICD-10-CM

## 2023-11-29 DIAGNOSIS — C34.91 PRIMARY LUNG ADENOCARCINOMA, RIGHT: ICD-10-CM

## 2023-11-29 DIAGNOSIS — C79.31 LUNG CANCER METASTATIC TO BRAIN: Primary | ICD-10-CM

## 2023-11-29 DIAGNOSIS — C34.90 LUNG CANCER METASTATIC TO BRAIN: Primary | ICD-10-CM

## 2023-11-29 LAB
ALBUMIN SERPL-MCNC: 3.6 G/DL (ref 3.5–5.2)
ALBUMIN/GLOB SERPL: 1.3 G/DL
ALP SERPL-CCNC: 93 U/L (ref 39–117)
ALT SERPL W P-5'-P-CCNC: 21 U/L (ref 1–33)
ANION GAP SERPL CALCULATED.3IONS-SCNC: 14.5 MMOL/L (ref 5–15)
AST SERPL-CCNC: 26 U/L (ref 1–32)
BASOPHILS # BLD AUTO: 0.05 10*3/MM3 (ref 0–0.2)
BASOPHILS NFR BLD AUTO: 0.5 % (ref 0–1.5)
BILIRUB SERPL-MCNC: 0.3 MG/DL (ref 0–1.2)
BUN SERPL-MCNC: 24 MG/DL (ref 8–23)
BUN/CREAT SERPL: 26.1 (ref 7–25)
CALCIUM SPEC-SCNC: 9.1 MG/DL (ref 8.6–10.5)
CHLORIDE SERPL-SCNC: 103 MMOL/L (ref 98–107)
CO2 SERPL-SCNC: 25.5 MMOL/L (ref 22–29)
CREAT SERPL-MCNC: 0.92 MG/DL (ref 0.6–1.1)
DEPRECATED RDW RBC AUTO: 70.6 FL (ref 37–54)
EGFRCR SERPLBLD CKD-EPI 2021: 64.7 ML/MIN/1.73
EOSINOPHIL # BLD AUTO: 0.14 10*3/MM3 (ref 0–0.4)
EOSINOPHIL NFR BLD AUTO: 1.3 % (ref 0.3–6.2)
ERYTHROCYTE [DISTWIDTH] IN BLOOD BY AUTOMATED COUNT: 20.7 % (ref 12.3–15.4)
GLOBULIN UR ELPH-MCNC: 2.8 GM/DL
GLUCOSE SERPL-MCNC: 108 MG/DL (ref 65–99)
HCT VFR BLD AUTO: 34.6 % (ref 34–46.6)
HGB BLD-MCNC: 11 G/DL (ref 12–15.9)
IMM GRANULOCYTES # BLD AUTO: 0.11 10*3/MM3 (ref 0–0.05)
IMM GRANULOCYTES NFR BLD AUTO: 1 % (ref 0–0.5)
LYMPHOCYTES # BLD AUTO: 1.45 10*3/MM3 (ref 0.7–3.1)
LYMPHOCYTES NFR BLD AUTO: 13.3 % (ref 19.6–45.3)
MCH RBC QN AUTO: 29.9 PG (ref 26.6–33)
MCHC RBC AUTO-ENTMCNC: 31.8 G/DL (ref 31.5–35.7)
MCV RBC AUTO: 94 FL (ref 79–97)
MONOCYTES # BLD AUTO: 1.23 10*3/MM3 (ref 0.1–0.9)
MONOCYTES NFR BLD AUTO: 11.3 % (ref 5–12)
NEUTROPHILS NFR BLD AUTO: 7.9 10*3/MM3 (ref 1.7–7)
NEUTROPHILS NFR BLD AUTO: 72.6 % (ref 42.7–76)
NRBC BLD AUTO-RTO: 0 /100 WBC (ref 0–0.2)
PLATELET # BLD AUTO: 374 10*3/MM3 (ref 140–450)
PMV BLD AUTO: 10 FL (ref 6–12)
POTASSIUM SERPL-SCNC: 3.9 MMOL/L (ref 3.5–5.2)
PROT SERPL-MCNC: 6.4 G/DL (ref 6–8.5)
RBC # BLD AUTO: 3.68 10*6/MM3 (ref 3.77–5.28)
SODIUM SERPL-SCNC: 143 MMOL/L (ref 136–145)
WBC NRBC COR # BLD AUTO: 10.88 10*3/MM3 (ref 3.4–10.8)

## 2023-11-29 PROCEDURE — 96413 CHEMO IV INFUSION 1 HR: CPT

## 2023-11-29 PROCEDURE — 25010000002 GEMCITABINE 200 MG/5.26ML SOLUTION 5.26 ML VIAL: Performed by: INTERNAL MEDICINE

## 2023-11-29 PROCEDURE — 25810000003 SODIUM CHLORIDE 0.9 % SOLUTION: Performed by: INTERNAL MEDICINE

## 2023-11-29 PROCEDURE — 63710000001 ACETAMINOPHEN 325 MG TABLET: Performed by: INTERNAL MEDICINE

## 2023-11-29 PROCEDURE — 25010000002 HEPARIN LOCK FLUSH PER 10 UNITS: Performed by: INTERNAL MEDICINE

## 2023-11-29 PROCEDURE — 25010000002 GEMCITABINE 1 GM/26.3ML SOLUTION 26.3 ML VIAL: Performed by: INTERNAL MEDICINE

## 2023-11-29 PROCEDURE — A9270 NON-COVERED ITEM OR SERVICE: HCPCS | Performed by: INTERNAL MEDICINE

## 2023-11-29 PROCEDURE — 25810000003 SODIUM CHLORIDE 0.9 % SOLUTION 250 ML FLEX CONT: Performed by: INTERNAL MEDICINE

## 2023-11-29 PROCEDURE — 85025 COMPLETE CBC W/AUTO DIFF WBC: CPT

## 2023-11-29 PROCEDURE — 96375 TX/PRO/DX INJ NEW DRUG ADDON: CPT

## 2023-11-29 PROCEDURE — 25010000002 DEXAMETHASONE SODIUM PHOSPHATE 100 MG/10ML SOLUTION: Performed by: INTERNAL MEDICINE

## 2023-11-29 PROCEDURE — 80053 COMPREHEN METABOLIC PANEL: CPT

## 2023-11-29 RX ORDER — ACETAMINOPHEN 325 MG/1
650 TABLET ORAL ONCE
Status: COMPLETED | OUTPATIENT
Start: 2023-11-29 | End: 2023-11-29

## 2023-11-29 RX ORDER — SODIUM CHLORIDE 9 MG/ML
250 INJECTION, SOLUTION INTRAVENOUS ONCE
OUTPATIENT
Start: 2023-12-20

## 2023-11-29 RX ORDER — HEPARIN SODIUM (PORCINE) LOCK FLUSH IV SOLN 100 UNIT/ML 100 UNIT/ML
500 SOLUTION INTRAVENOUS AS NEEDED
Status: DISCONTINUED | OUTPATIENT
Start: 2023-11-29 | End: 2023-11-29 | Stop reason: HOSPADM

## 2023-11-29 RX ORDER — LIDOCAINE AND PRILOCAINE 25; 25 MG/G; MG/G
CREAM TOPICAL
COMMUNITY
Start: 2023-11-08

## 2023-11-29 RX ORDER — SODIUM CHLORIDE 0.9 % (FLUSH) 0.9 %
10 SYRINGE (ML) INJECTION AS NEEDED
OUTPATIENT
Start: 2023-11-29

## 2023-11-29 RX ORDER — SODIUM CHLORIDE 0.9 % (FLUSH) 0.9 %
10 SYRINGE (ML) INJECTION AS NEEDED
Status: DISCONTINUED | OUTPATIENT
Start: 2023-11-29 | End: 2023-11-29 | Stop reason: HOSPADM

## 2023-11-29 RX ORDER — HEPARIN SODIUM (PORCINE) LOCK FLUSH IV SOLN 100 UNIT/ML 100 UNIT/ML
500 SOLUTION INTRAVENOUS AS NEEDED
OUTPATIENT
Start: 2023-11-29

## 2023-11-29 RX ORDER — LEVETIRACETAM 500 MG/1
500 TABLET ORAL 2 TIMES DAILY
Qty: 120 TABLET | Refills: 0 | Status: SHIPPED | OUTPATIENT
Start: 2023-11-29

## 2023-11-29 RX ORDER — SODIUM CHLORIDE 9 MG/ML
250 INJECTION, SOLUTION INTRAVENOUS ONCE
OUTPATIENT
Start: 2023-12-27

## 2023-11-29 RX ORDER — SODIUM CHLORIDE 9 MG/ML
250 INJECTION, SOLUTION INTRAVENOUS ONCE
Status: COMPLETED | OUTPATIENT
Start: 2023-11-29 | End: 2023-11-29

## 2023-11-29 RX ADMIN — SODIUM CHLORIDE 1450 MG: 900 INJECTION, SOLUTION INTRAVENOUS at 11:50

## 2023-11-29 RX ADMIN — SODIUM CHLORIDE 250 ML: 9 INJECTION, SOLUTION INTRAVENOUS at 11:26

## 2023-11-29 RX ADMIN — Medication 500 UNITS: at 12:26

## 2023-11-29 RX ADMIN — DEXAMETHASONE SODIUM PHOSPHATE 12 MG: 10 INJECTION, SOLUTION INTRAMUSCULAR; INTRAVENOUS at 11:26

## 2023-11-29 RX ADMIN — ACETAMINOPHEN 650 MG: 325 TABLET ORAL at 11:27

## 2023-11-29 RX ADMIN — Medication 10 ML: at 12:26

## 2023-11-29 NOTE — TELEPHONE ENCOUNTER
Caller: Shana Vidal    Relationship: Self    Best call back number: [phone]    Requested Prescriptions:   Requested Prescriptions     Pending Prescriptions Disp Refills    levETIRAcetam (KEPPRA) 500 MG tablet 120 tablet 3     Sig: Take 1 tablet by mouth 2 (Two) Times a Day for 240 days.        Pharmacy where request should be sent:  [facility]    Last office visit with prescribing clinician: 11/29/2023   Last telemedicine visit with prescribing clinician: 9/26/2023   Next office visit with prescribing clinician: 1/10/2024     Additional details provided by patient: SHANA ONLY HAS ONE PILL LEFT AND THE PHARMACY WONT FILL TILL FRIDAY, SHE IS NEEDING MORE QUANTITY AND DIRECTIONS SO SHE DOES NOT RUN OUT     Does the patient have less than a 3 day supply:  [x] Yes  [] No    Would you like a call back once the refill request has been completed: [x] Yes [] No    If the office needs to give you a call back, can they leave a voicemail: [x] Yes [] No    Lizzette Mota Rep   11/29/23 15:05 EST     PLEASE ADVISE

## 2023-11-29 NOTE — PROGRESS NOTES
Subjective     REASON FOR FOLLOW UP:   1.  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  PD-L1 positive greater than 95%  3.  Primary chemoradiation with weekly carboplatin and Taxol completed 10/27/2022  4.  Imfinzi immunotherapy every 2 weeks for 12 months total.  First treatment 11/28/2022  5.  Repeat bronchoscopy 1/4/2023 due to persistent hemoptysis.  Pathology revealed no malignancy.  6.  Metastatic lesion to the brain resected 5/30/2023.  7.  Progression of disease within the chest noted on scans 9/19/2023.  Patient was started on palliative Gemzar day 1 and day 8 of an every 21-day cycle with first dose 9/27/2023.    HISTORY OF PRESENT ILLNESS:  The patient is a 78 y.o. year old female who is a former smoker referred to us from thoracic surgery (Dr. Remedios Rice) for evaluation and treatment of clinical stage III adenocarcinoma of the lung.  She was having persistent cough and underwent chest x-ray initially in late June which showed possible right upper lobe mass.  This was followed by CT scan of the chest confirming the presence of a right upper lobe mass.  She initially underwent a CT-guided needle biopsy on 7/22/2022 which was nondiagnostic.    She was referred to Dr. Glass for bronchoscopy and biopsy which was performed on 8/23/2022 with pathology returning as poorly differentiated adenocarcinoma.  PD-L1 stain on the tissue was positive at greater than 95%.    She underwent further staging with PET scan and MRI of the brain.  PET scan was performed on 8/12/2022 showing hypermetabolic activity in the large mass in the right upper lobe as well as mediastinal lymph nodes.  She was noted to have a mass on the kidney as well but this was not hypermetabolic.  There was no obvious distant metastatic disease.    MRI of the brain from 8/15/2022 likewise showed no evidence of metastatic disease.  She is scheduled also to see Dr. Hector Rodriguez of radiation oncology on 9/7/2022.    We recommended weekly carboplatin  and Taxol concurrent with radiation therapy.  Following completion of treatment she will receive immunotherapy for maintenance   (She does have a diagnosis of rheumatoid arthritis and is currently taking only Celebrex.  This could become an issue regarding her ability to tolerate immunotherapy in the future).    She received her final fraction of radiation 10/27/2022.  She also completed 6 weeks of carboplatin and Taxol and tolerated it well. CT scans performed 11/21/2022 showed right upper lobe mass appeared stable in size.  Her mediastinal lymphadenopathy had decreased.  There were no new sites of disease identified.    She was started on immunotherapy with Imfinzi with plans to continue immunotherapy for 1 year until December 2023.      She required several hospitalizations including hospitalization due to development of brain metastases in late May.  She had a dominant metastatic lesion in the right occipital area and was able to undergo neurosurgical resection of the dominant mass on 5/30/2023 with Dr. Ricketts.  She had 2 additional small lesions that were treated with stereotactic radiosurgery by Dr. Mckenna Moreira.  During this time her immuno therapy with Imfinzi was held (her last Imfinzi treatment in our office was 5/15/2023).    She again required repeat hospitalization from 6/11/2023 to 6/14/2023 due to development of extensive shingles outbreak on the left upper extremity.    She had a post treatment outpatient MRI of the brain from 7/9/2023 which showed the resection cavity in the right occipital area with no definite tumor recurrence in that area.  The other 2 small areas that were treated with stereotactic radiation appeared stable.  There were no new sites of disease identified.    She was seen in the emergency room 8/4/2023 for severe pain in the left arm. While she was in the ER she had a CT scan of the cervical spine to be sure there was no mechanical nerve root impingement.  There were no acute  findings in the cervical spine on the CT but slices through the lung apices did show what appeared to be new density in the right upper lobe which had not been seen on her previous CT scan from June.     Since her last visit she was again hospitalized from 8/26/2023 through 9/2/2023 with Campylobacter and E. coli colitis.  During the hospitalization she underwent a CT scan on 8/31/2023 showing increased size of her right upper lobe mass.  (See CT scan report below).  She also had a brain MRI performed on 8/27/2023 showing stable findings.    At the time of her discharge she had a motorcycle trip planned and we were planning to follow her up in the office with a PET scan when she returns from her trip.  She now returns having undergone the PET scan on 9/19/2023 which shows intensely hypermetabolic mass in the right upper lobe which had increased in size.  She had uptake in the right sixth and seventh ribs which may have been due to trauma.    She continues to have severe pain in the left arm although during the hospitalization she was switched to Lyrica which seems to be helping a little better than gabapentin.  She has requested an increase in the dose.  We also will be planning to refer her to pain management to see if there may be a role for the nerve injection.    We discussed the results of the PET scan and I recommended initiating palliative chemotherapy with single agent Gemzar days 1 and 8 of an every 21-day cycle.    INTERVAL HISTORY:  Ms. Vidal is here today for further palliative Gemzar treatment for metastatic lung adenocarcinoma.  She is receiving her treatment on day 1 and day 8 of an every 21-day cycle.  She is due today for day 1 of cycle #4.  Her blood counts are adequate and we plan to proceed with treatment without any modifications.  We plan to repeat CT scans and MRI of the brain after 5 cycles of treatment.    She reports she has not had any major side effects from the Gemzar thus far.  She is  planning to get a tattoo and wants to die her hair blue again.  She is hoping to take a cruise to the [facility] during one of her upcoming weeks off from treatment.      History of Present Illness     Past Medical History:   Diagnosis Date    COPD (chronic obstructive pulmonary disease)     Coughing up blood     X2 MONTHS    History of COVID-19 02/2022    Hyperlipidemia     IBS (irritable bowel syndrome)     Primary lung adenocarcinoma, right     Rheumatoid arthritis         Past Surgical History:   Procedure Laterality Date    APPENDECTOMY      appendix removed    BRONCHOSCOPY N/A 8/23/2022    Procedure: BRONCHOSCOPY WITH BAL,  BIOPSIES, AND BRUSHINGS WITH ENDOBRONCHIAL ULTRASOUND WITH FNA;  Surgeon: Gregor Vee MD;  Location: [facility] ENDOSCOPY;  Service: Pulmonary;  Laterality: N/A;  PRE- HILAR MASS  POST- SAME    BRONCHOSCOPY N/A 1/4/2023    Procedure: BRONCHOSCOPY with biopsy, lavage, brushing;  Surgeon: Gregor Vee MD;  Location: [facility] ENDOSCOPY;  Service: Pulmonary;  Laterality: N/A;    CAROTID ENDARTERECTOMY Right     CAROTID ENDARTERECTOMY Left     CATARACT EXTRACTION      CERVICAL FUSION      CHOLECYSTECTOMY      CRANIOTOMY FOR TUMOR Right 5/30/2023    Procedure: RIGHT CRANIOTOMY FOR TUMOR RESECTION STEREOTACTIC WITH STEALTH;  Surgeon: Clint Ricketts MD;  Location: [facility] MAIN OR;  Service: Neurosurgery;  Laterality: Right;    HYSTERECTOMY      SHOULDER ARTHROSCOPY Right 02/19/2019    right should scope/cuff repair     VENOUS ACCESS DEVICE (PORT) INSERTION Right 9/6/2022    Procedure: POWERPORT INSERTION;  Surgeon: Remedios Rice MD;  Location: [facility] MAIN OR;  Service: Thoracic;  Laterality: Right;        Current Outpatient Medications on File Prior to Visit   Medication Sig Dispense Refill    atorvastatin (LIPITOR) 20 MG tablet Take 1 tablet by mouth Every Night. 30 tablet 0    azelastine (ASTELIN) 0.1 % nasal spray 1 spray into the nostril(s) as directed by provider.      B COMPLEX VITAMINS ER  PO Take  by mouth Daily.      Cholecalciferol (VITAMIN D3) 5000 units capsule capsule Take 2,000 Units by mouth Daily.      clopidogrel (PLAVIX) 75 MG tablet Take 1 tablet by mouth Daily. 30 tablet 0    esomeprazole (nexIUM) 20 MG capsule Take 1 capsule by mouth Every Morning Before Breakfast.      estradiol (ESTRACE) 1 MG tablet Take 1 tablet by mouth Daily.      FeroSul 325 (65 Fe) MG tablet TAKE ONE TABLET BY MOUTH DAILY WITH BREAKFAST 30 tablet 5    Gabapentin 10 %, Baclofen 2 %, lidocaine 4 %, Ketamine HCl 4 % Apply 1-2 g topically to the appropriate area as directed 3 (Three) to 4 (Four) times daily. 90 g 0    HYDROcodone-acetaminophen (NORCO) 7.5-325 MG per tablet Take 1 tablet by mouth Every 6 (Six) Hours As Needed for Moderate Pain. 60 tablet 0    levETIRAcetam (KEPPRA) 500 MG tablet Take 1 tablet by mouth 2 (Two) Times a Day for 240 days. 120 tablet 3    lidocaine-prilocaine (EMLA) 2.5-2.5 % cream apply topically as directed for port access      Multiple Vitamins-Minerals (PRESERVISION AREDS 2+MULTI VIT PO) Take  by mouth.      ondansetron (ZOFRAN) 8 MG tablet Take 1 tablet by mouth 3 (Three) Times a Day As Needed for Nausea or Vomiting. 30 tablet 5    predniSONE (DELTASONE) 10 MG tablet Take 2 tablets by mouth Daily. 60 tablet 5    pregabalin (LYRICA) 75 MG capsule TAKE 1 CAPSULE BY MOUTH TWICE A DAY ALONG WITH 150 MG CAPSULE FOR TOTAL DOSE  MG TWICE DAILY 60 capsule 0    DULoxetine (CYMBALTA) 30 MG capsule Take 1 capsule by mouth Daily for 30 days. 30 capsule 0    lisinopril (PRINIVIL,ZESTRIL) 20 MG tablet Take 1 tablet by mouth Daily for 30 days. 30 tablet 0    pregabalin (LYRICA) 150 MG capsule Take 1 capsule by mouth Every 12 (Twelve) Hours for 30 days. 60 capsule 0     No current facility-administered medications on file prior to visit.        ALLERGIES:    Allergies   Allergen Reactions    Del-Mycin [Erythromycin] Hives    Gentamicin Hives    Ibuprofen Nausea And Vomiting    Latex Dermatitis      GLOVES    Metronidazole Hives    Ofloxacin Hives and Nausea Only     Has tolerated Levaquin     Penicillins Hives     Tolerated rocephin and cefepime 2023    Tetracycline Hives    Adhesive Tape Dermatitis     BANDAIDS    Aspirin GI Intolerance     Vomiting can take EC    Codeine Nausea And Vomiting        Social History     Socioeconomic History    Marital status:    Tobacco Use    Smoking status: Former     Packs/day: .25     Types: Cigarettes     Quit date: 2022     Years since quittin.5    Smokeless tobacco: Never    Tobacco comments:     Former some day smoker of 1-2 cigs   Vaping Use    Vaping Use: Never used   Substance and Sexual Activity    Alcohol use: Yes     Alcohol/week: 2.0 standard drinks of alcohol     Types: 2 Glasses of wine per week     Comment: occasionally    Drug use: Never    Sexual activity: Defer        Family History   Problem Relation Age of Onset    Cancer Mother     Cancer Father     Malig Hyperthermia Neg Hx         Review of Systems   Constitutional:  Negative for activity change, chills, fatigue and fever.   HENT:  Negative for mouth sores, trouble swallowing and voice change.    Eyes:  Negative for pain and visual disturbance.   Respiratory:  Positive for cough. Negative for wheezing.    Cardiovascular:  Negative for chest pain and palpitations.   Gastrointestinal:  Negative for abdominal pain, constipation, diarrhea, nausea and vomiting.   Genitourinary:  Negative for difficulty urinating, frequency and urgency.   Musculoskeletal:  Negative for arthralgias and joint swelling.   Skin:  Negative for rash.   Neurological:  Positive for weakness. Negative for dizziness, seizures and headaches.        Severe pain in the left upper arm due to postherpetic neuralgia.  She also had some weakness in the arm which she feels is improving.   Hematological:  Negative for adenopathy. Bruises/bleeds easily.   Psychiatric/Behavioral:  Negative for behavioral problems and  "confusion. The patient is not nervous/anxious.     11/29/2023 ROS updated     Objective     Vitals:    11/29/23 1015   BP: 150/63   Pulse: 86   Resp: 18   Temp: 96.8 °F (36 °C)   TempSrc: Temporal   SpO2: 96%   Weight: 58.1 kg (128 lb 1.6 oz)   Height: 152.4 cm (60\")   PainSc:   6   PainLoc: Arm  Comment: PT STATED LEFT ARM PAIN         11/29/2023    10:22 AM   Current Status   ECOG score 1     Physical Exam  Vitals reviewed.   Constitutional:       General: She is not in acute distress.     Appearance: She is well-developed. She is ill-appearing.      Comments: Seated in a wheelchair.   HENT:      Head: Normocephalic and atraumatic.      Mouth/Throat:      Pharynx: No oropharyngeal exudate.   Eyes:      Pupils: Pupils are equal, round, and reactive to light.   Neck:      Comments: Bilateral scars from carotid endarterectomies  Cardiovascular:      Rate and Rhythm: Normal rate and regular rhythm.      Heart sounds: Normal heart sounds. No murmur heard.  Pulmonary:      Effort: Pulmonary effort is normal. No respiratory distress.      Breath sounds: Decreased breath sounds present. No wheezing, rhonchi or rales.   Abdominal:      General: Bowel sounds are normal. There is no distension.      Palpations: Abdomen is soft.   Musculoskeletal:         General: Normal range of motion.      Cervical back: Normal range of motion.   Skin:     General: Skin is warm and dry.      Findings: No rash.   Neurological:      Mental Status: She is alert and oriented to person, place, and time.      I have reexamined the patient and the results are consistent with the previously documented exam. Cruzito Lagunas MD        RECENT LABS:  Results from last 7 days   Lab Units 11/29/23  1005   WBC 10*3/mm3 10.88*   NEUTROS ABS 10*3/mm3 7.90*   HEMOGLOBIN g/dL 11.0*   HEMATOCRIT % 34.6   PLATELETS 10*3/mm3 374               BRONCHOSCOPY PATHOLOGY 1/4/2023  Final Diagnosis   Fluid, Lung, Right Upper Lobe, Bronchoalveolar Lavage (BAL):  A. " Negative for malignant cells- reactive atypia.  B. Reactive bronchial cells, macrophages, inflammation (mostly acute) and mucin.  C. Negative for definitive fungal organisms by GMS staining. See comment.     2. Fluid, Lung, Right Upper Lobe, Brushing:               A. Negative for malignant cells.               B. Bronchial cells, scattered inflammation and mucinous debris.      Final Diagnosis   1. Lung, Right Upper Lobe, Biopsy:  Endobronchial mucosa and submucosa with                A. Squamous metaplasia, mild to moderate chronic active inflammation, fibrinous and necrotic debris and reactive      epithelial changes.               B. No definitive dysplasia nor malignancy identified.               C. No granulomata, diagnostic viral inclusions nor fungal organisms identified.         BRONCHOSCOPY PATHOLOGY 8/23/2022  Final Diagnosis   1. Lung, Right Upper Lobe, Endobronchial Biopsies: INVASIVE POORLY DIFFERENTIATED ADENOCARCINOMA OF PULMONARY ORIGIN.   PDL-1 POSITIVE >95%    IMAGING:  F-18 FDG PET FROM SKULL BASE TO MID THIGH WITH PET/CT FUSION 9/19/2023  IMPRESSION:  1.  Intensely FDG mass centered within the perihilar aspect of the right  upper lobe appears to have increased in size since 5/25/2023 with new  obstruction and stenosis of the right upper lobe bronchus and likely  represents recurrent malignancy. Surrounding pulmonary opacification and  consolidation throughout the right lung has increased since 5/25/2023  and there is new opacification within the left lung with differential  considerations including evolving postradiation changes, postobstructive  pneumonia and lymphangitic spread of malignancy.  2.  Segmental uptake within the right sixth and seventh ribs without  identifiable abnormality on noncontrast CT. While findings may represent  nondisplaced fractures, continued close attention on follow-up is  recommended to exclude metastatic disease.  3.  Indeterminate 5.3 cm right renal lesion, as  before. At least  continued attention on follow-up is recommended. Finding can all be  further evaluated multiphase CT or MRI of the abdomen with and without  contrast to exclude neoplasm.  4.  Other findings as above.    MRI OF THE BRAIN WITH AND WITHOUT CONTRAST  8/27/2023  IMPRESSION:     1.  Status post right parieto-occipital craniotomy with subjacent   resection cavity.  Expected postoperative changes as detailed above.  No   evidence of tumor recurrence.  2.  Moderate chronic small vessel ischemic changes.  3.  No specific pontine abnormality aside from chronic small vessel   ischemic disease.  Finding on reference CT likely reflected artifact.  4.  No acute infarct.  No acute hemorrhage.    CT CHEST W CONTRAST DIAGNOSTIC-, CT ABDOMEN PELVIS W CONTRAST-, NM BONE SCAN WHOLE BODY- 5/29/2023  1. Right upper lobe suprahilar pulmonary mass appears stable from the  recent prior exam, again with groundglass and nodular opacities in the  right upper lobe.  2. Sclerotic change at the right anterior eighth rib is new from  03/01/2023, with corresponding increased uptake on bone scan, could be  healing fracture or sclerotic metastatic disease, correlate clinically.  3. Stable appearance of the abdomen and pelvis.    CT CHEST, ABDOMEN, AND PELVIS WITH IV CONTRAST 3/1/2023  IMPRESSION:  Decreased size of the right upper lobe mass with stable  surrounding groundglass opacity and decreased size of the right lower  lobe opacity. No adenopathy seen on today's exam. Incidental findings as  Above.    F-18 FDG PET FROM SKULL BASE TO MID THIGH WITH PET/CT FUSION 8/12/2022  IMPRESSION:  1.  Large mass centered within the right upper lobe representing  patient's known malignancy. Interlobular septal thickening and ground  glass opacification projecting from the periphery of the mass throughout  the right upper lobe is highly concerning for lymphangitic spread. Of  note, the mass abuts the pleura and mediastinum over a long segment  and  underlying invasion cannot be excluded.  2.  FDG avid hilar and mediastinal adenopathy concerning for metastatic  disease.  3.  A few irregular subcentimeter pulmonary nodules are present within  the right lower and left upper lobes measuring up to 0.9 cm which while  nonspecific raises concern for metastatic disease. At least close  attention on followup is recommended.   4.  Minimally hypermetabolic arnoldo hepatic node and bilateral sub-6 mm  pulmonary nodules are indeterminate. Continued followup with chest and  abdominal CT in 3 months is recommended.  5.  Indeterminate density 4.4 cm mass arises off the right kidney.  Further evaluation with multiphase CT or MRI of the abdomen with and  without contrast is recommended to exclude neoplasm.  6.  Focal uptake over the right shoulder in the area of prior rotator  cuff repair is favored be postsurgical with metastatic disease less  likely.  7.  Other findings as above.    Assessment & Plan   1.  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  Patient is not felt to be a surgical candidate and combined chemotherapy and radiation.   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  9/20/2022 1st dose of weekly carboplatin/taxol.   She completed 6 cycles of weekly CarboTaxol 10/25/2022.  Radiation therapy completed 10/27/2022  First dose durvalumab delivered 11/28/2022.   Durvalumab on hold since May 2023  Progression of disease with enlarging hypermetabolic mass in the right upper lobe in September 2023  Plans to start single agent Gemzar palliative chemotherapy 1000 mg/m² on days 1 and 8 of a 21-day cycle.  9/27/2023 C1D1 single agent palliative Gemzar.      2.  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    3.  Other comorbidities including vascular disease with previous carotid   endarterectomy surgeries.  She has no  known history of stroke or myocardial infarction.     5.  Rheumatoid arthritis: Patient previously on Celebrex, but discontinued due to hemoptysis.  Patient started on prednisone 20 mg daily prescribed to manage her arthritis pain.     6.  Development of brain metastases in May 2023.  Patient underwent resection of the dominant mass in the right occipital area by Dr. Cho of neurosurgery.  She received post op radiation therapy to the resection bed and to 2 smaller lesions with SRS under the direction of Dr. Mckenna Moreira at [facility].  Her most recent MRI of the brain from 8/27/2023 as noted above shows no new sites of disease, improved edema, and no definite recurrence in the resection bed.    7.  Severe shingles outbreak of left upper extremity with severe pain from postherpetic neuralgia.   She has been seen by pain management and is receiving topical therapy with a compound of baclofen gabapentin and ketamine with good relief.    8.  Hospitalization for bacterial colitis with stool culture growing E. coli and Campylobacter.  Her symptoms have resolved at this point.      PLAN:  Proceed with C4 D1 Gemzar today.  Patient will return in 1 week with repeat labs and C4 D8 Gemzar ; 3 weeks for cycle 5  day 1 Gemzar treatment and 4 weeks for cycle 5 day 8 Gemzar treatment.  Continue follow-up with pain management as scheduled for continued treatment of severe postherpetic neuralgia.  Continue prednisone but decrease the dose to 10 mg daily.  Continue Lyrica 225 mg (75+150) twice daily.  Continue Norco 7.5/325 if needed for pain control.  MD follow-up in 6 weeks when she will be due for day 1 of cycle #6 palliative Gemzar.  CT scans of the chest abdomen pelvis and MRI of the brain will be ordered 1 week prior to her next MD follow-up.    Patient is on a high risk medication requiring close monitoring for toxicity.      Cruzito Lagunas MD   11/29/2023

## 2023-12-06 ENCOUNTER — INFUSION (OUTPATIENT)
Dept: ONCOLOGY | Facility: HOSPITAL | Age: 78
End: 2023-12-06
Payer: MEDICARE

## 2023-12-06 VITALS
SYSTOLIC BLOOD PRESSURE: 146 MMHG | OXYGEN SATURATION: 95 % | RESPIRATION RATE: 16 BRPM | DIASTOLIC BLOOD PRESSURE: 54 MMHG | TEMPERATURE: 98.2 F | WEIGHT: 124.6 LBS | BODY MASS INDEX: 24.33 KG/M2 | HEART RATE: 75 BPM

## 2023-12-06 DIAGNOSIS — C34.91 PRIMARY LUNG ADENOCARCINOMA, RIGHT: Primary | ICD-10-CM

## 2023-12-06 LAB
BASOPHILS # BLD AUTO: 0.03 10*3/MM3 (ref 0–0.2)
BASOPHILS NFR BLD AUTO: 0.5 % (ref 0–1.5)
DEPRECATED RDW RBC AUTO: 68.4 FL (ref 37–54)
EOSINOPHIL # BLD AUTO: 0.03 10*3/MM3 (ref 0–0.4)
EOSINOPHIL NFR BLD AUTO: 0.5 % (ref 0.3–6.2)
ERYTHROCYTE [DISTWIDTH] IN BLOOD BY AUTOMATED COUNT: 19.8 % (ref 12.3–15.4)
HCT VFR BLD AUTO: 33.6 % (ref 34–46.6)
HGB BLD-MCNC: 10.6 G/DL (ref 12–15.9)
IMM GRANULOCYTES # BLD AUTO: 0.14 10*3/MM3 (ref 0–0.05)
IMM GRANULOCYTES NFR BLD AUTO: 2.1 % (ref 0–0.5)
LYMPHOCYTES # BLD AUTO: 0.86 10*3/MM3 (ref 0.7–3.1)
LYMPHOCYTES NFR BLD AUTO: 13.1 % (ref 19.6–45.3)
MCH RBC QN AUTO: 30.1 PG (ref 26.6–33)
MCHC RBC AUTO-ENTMCNC: 31.5 G/DL (ref 31.5–35.7)
MCV RBC AUTO: 95.5 FL (ref 79–97)
MONOCYTES # BLD AUTO: 0.4 10*3/MM3 (ref 0.1–0.9)
MONOCYTES NFR BLD AUTO: 6.1 % (ref 5–12)
NEUTROPHILS NFR BLD AUTO: 5.11 10*3/MM3 (ref 1.7–7)
NEUTROPHILS NFR BLD AUTO: 77.7 % (ref 42.7–76)
NRBC BLD AUTO-RTO: 0 /100 WBC (ref 0–0.2)
PLATELET # BLD AUTO: 311 10*3/MM3 (ref 140–450)
PMV BLD AUTO: 9.8 FL (ref 6–12)
RBC # BLD AUTO: 3.52 10*6/MM3 (ref 3.77–5.28)
WBC NRBC COR # BLD AUTO: 6.57 10*3/MM3 (ref 3.4–10.8)

## 2023-12-06 PROCEDURE — 85025 COMPLETE CBC W/AUTO DIFF WBC: CPT

## 2023-12-06 PROCEDURE — 96413 CHEMO IV INFUSION 1 HR: CPT

## 2023-12-06 PROCEDURE — 25810000003 SODIUM CHLORIDE 0.9 % SOLUTION 250 ML FLEX CONT: Performed by: INTERNAL MEDICINE

## 2023-12-06 PROCEDURE — 25010000002 DEXAMETHASONE SODIUM PHOSPHATE 100 MG/10ML SOLUTION: Performed by: INTERNAL MEDICINE

## 2023-12-06 PROCEDURE — 96375 TX/PRO/DX INJ NEW DRUG ADDON: CPT

## 2023-12-06 PROCEDURE — 25810000003 SODIUM CHLORIDE 0.9 % SOLUTION: Performed by: INTERNAL MEDICINE

## 2023-12-06 PROCEDURE — 25010000002 GEMCITABINE 200 MG/5.26ML SOLUTION 5.26 ML VIAL: Performed by: INTERNAL MEDICINE

## 2023-12-06 PROCEDURE — 25010000002 GEMCITABINE 1 GM/26.3ML SOLUTION 26.3 ML VIAL: Performed by: INTERNAL MEDICINE

## 2023-12-06 PROCEDURE — 25010000002 HEPARIN LOCK FLUSH PER 10 UNITS: Performed by: INTERNAL MEDICINE

## 2023-12-06 RX ORDER — SODIUM CHLORIDE 0.9 % (FLUSH) 0.9 %
10 SYRINGE (ML) INJECTION AS NEEDED
OUTPATIENT
Start: 2023-12-06

## 2023-12-06 RX ORDER — SODIUM CHLORIDE 9 MG/ML
250 INJECTION, SOLUTION INTRAVENOUS ONCE
Status: COMPLETED | OUTPATIENT
Start: 2023-12-06 | End: 2023-12-06

## 2023-12-06 RX ORDER — HEPARIN SODIUM (PORCINE) LOCK FLUSH IV SOLN 100 UNIT/ML 100 UNIT/ML
500 SOLUTION INTRAVENOUS AS NEEDED
OUTPATIENT
Start: 2023-12-06

## 2023-12-06 RX ORDER — SODIUM CHLORIDE 0.9 % (FLUSH) 0.9 %
10 SYRINGE (ML) INJECTION AS NEEDED
Status: DISCONTINUED | OUTPATIENT
Start: 2023-12-06 | End: 2023-12-06 | Stop reason: HOSPADM

## 2023-12-06 RX ORDER — HEPARIN SODIUM (PORCINE) LOCK FLUSH IV SOLN 100 UNIT/ML 100 UNIT/ML
500 SOLUTION INTRAVENOUS AS NEEDED
Status: DISCONTINUED | OUTPATIENT
Start: 2023-12-06 | End: 2023-12-06 | Stop reason: HOSPADM

## 2023-12-06 RX ADMIN — SODIUM CHLORIDE 1450 MG: 900 INJECTION, SOLUTION INTRAVENOUS at 14:09

## 2023-12-06 RX ADMIN — SODIUM CHLORIDE 250 ML: 9 INJECTION, SOLUTION INTRAVENOUS at 13:40

## 2023-12-06 RX ADMIN — DEXAMETHASONE SODIUM PHOSPHATE 12 MG: 10 INJECTION, SOLUTION INTRAMUSCULAR; INTRAVENOUS at 13:40

## 2023-12-06 RX ADMIN — Medication 500 UNITS: at 14:39

## 2023-12-06 RX ADMIN — Medication 10 ML: at 14:39

## 2023-12-20 ENCOUNTER — INFUSION (OUTPATIENT)
Dept: ONCOLOGY | Facility: HOSPITAL | Age: 78
End: 2023-12-20
Payer: MEDICARE

## 2023-12-20 VITALS
HEART RATE: 68 BPM | WEIGHT: 127.4 LBS | TEMPERATURE: 97.7 F | OXYGEN SATURATION: 97 % | DIASTOLIC BLOOD PRESSURE: 67 MMHG | RESPIRATION RATE: 16 BRPM | BODY MASS INDEX: 24.88 KG/M2 | SYSTOLIC BLOOD PRESSURE: 155 MMHG

## 2023-12-20 DIAGNOSIS — C34.91 PRIMARY LUNG ADENOCARCINOMA, RIGHT: Primary | ICD-10-CM

## 2023-12-20 LAB
ALBUMIN SERPL-MCNC: 3.6 G/DL (ref 3.5–5.2)
ALBUMIN/GLOB SERPL: 1.2 G/DL
ALP SERPL-CCNC: 81 U/L (ref 39–117)
ALT SERPL W P-5'-P-CCNC: 28 U/L (ref 1–33)
ANION GAP SERPL CALCULATED.3IONS-SCNC: 10.8 MMOL/L (ref 5–15)
AST SERPL-CCNC: 25 U/L (ref 1–32)
BASOPHILS # BLD AUTO: 0.04 10*3/MM3 (ref 0–0.2)
BASOPHILS NFR BLD AUTO: 0.3 % (ref 0–1.5)
BILIRUB SERPL-MCNC: 0.2 MG/DL (ref 0–1.2)
BUN SERPL-MCNC: 24 MG/DL (ref 8–23)
BUN/CREAT SERPL: 24.2 (ref 7–25)
CALCIUM SPEC-SCNC: 9.2 MG/DL (ref 8.6–10.5)
CHLORIDE SERPL-SCNC: 103 MMOL/L (ref 98–107)
CO2 SERPL-SCNC: 25.2 MMOL/L (ref 22–29)
CREAT SERPL-MCNC: 0.99 MG/DL (ref 0.57–1)
DEPRECATED RDW RBC AUTO: 65.2 FL (ref 37–54)
EGFRCR SERPLBLD CKD-EPI 2021: 59.2 ML/MIN/1.73
EOSINOPHIL # BLD AUTO: 0.03 10*3/MM3 (ref 0–0.4)
EOSINOPHIL NFR BLD AUTO: 0.2 % (ref 0.3–6.2)
ERYTHROCYTE [DISTWIDTH] IN BLOOD BY AUTOMATED COUNT: 18.8 % (ref 12.3–15.4)
GLOBULIN UR ELPH-MCNC: 2.9 GM/DL
GLUCOSE SERPL-MCNC: 100 MG/DL (ref 65–99)
HCT VFR BLD AUTO: 32.7 % (ref 34–46.6)
HGB BLD-MCNC: 10.4 G/DL (ref 12–15.9)
IMM GRANULOCYTES # BLD AUTO: 0.19 10*3/MM3 (ref 0–0.05)
IMM GRANULOCYTES NFR BLD AUTO: 1.4 % (ref 0–0.5)
LYMPHOCYTES # BLD AUTO: 0.81 10*3/MM3 (ref 0.7–3.1)
LYMPHOCYTES NFR BLD AUTO: 6 % (ref 19.6–45.3)
MCH RBC QN AUTO: 30.3 PG (ref 26.6–33)
MCHC RBC AUTO-ENTMCNC: 31.8 G/DL (ref 31.5–35.7)
MCV RBC AUTO: 95.3 FL (ref 79–97)
MONOCYTES # BLD AUTO: 0.68 10*3/MM3 (ref 0.1–0.9)
MONOCYTES NFR BLD AUTO: 5.1 % (ref 5–12)
NEUTROPHILS NFR BLD AUTO: 11.64 10*3/MM3 (ref 1.7–7)
NEUTROPHILS NFR BLD AUTO: 87 % (ref 42.7–76)
NRBC BLD AUTO-RTO: 0 /100 WBC (ref 0–0.2)
PLATELET # BLD AUTO: 340 10*3/MM3 (ref 140–450)
PMV BLD AUTO: 10.1 FL (ref 6–12)
POTASSIUM SERPL-SCNC: 4.7 MMOL/L (ref 3.5–5.2)
PROT SERPL-MCNC: 6.5 G/DL (ref 6–8.5)
RBC # BLD AUTO: 3.43 10*6/MM3 (ref 3.77–5.28)
SODIUM SERPL-SCNC: 139 MMOL/L (ref 136–145)
WBC NRBC COR # BLD AUTO: 13.39 10*3/MM3 (ref 3.4–10.8)

## 2023-12-20 PROCEDURE — 25810000003 SODIUM CHLORIDE 0.9 % SOLUTION: Performed by: INTERNAL MEDICINE

## 2023-12-20 PROCEDURE — 85025 COMPLETE CBC W/AUTO DIFF WBC: CPT

## 2023-12-20 PROCEDURE — 25010000002 DEXAMETHASONE SODIUM PHOSPHATE 100 MG/10ML SOLUTION: Performed by: INTERNAL MEDICINE

## 2023-12-20 PROCEDURE — 25810000003 SODIUM CHLORIDE 0.9 % SOLUTION 250 ML FLEX CONT: Performed by: INTERNAL MEDICINE

## 2023-12-20 PROCEDURE — 25010000002 GEMCITABINE 1 GM/26.3ML SOLUTION 26.3 ML VIAL: Performed by: INTERNAL MEDICINE

## 2023-12-20 PROCEDURE — 25010000002 GEMCITABINE 200 MG/5.26ML SOLUTION 5.26 ML VIAL: Performed by: INTERNAL MEDICINE

## 2023-12-20 PROCEDURE — 96375 TX/PRO/DX INJ NEW DRUG ADDON: CPT

## 2023-12-20 PROCEDURE — 80053 COMPREHEN METABOLIC PANEL: CPT

## 2023-12-20 PROCEDURE — 96413 CHEMO IV INFUSION 1 HR: CPT

## 2023-12-20 RX ORDER — LEVETIRACETAM 500 MG/1
500 TABLET ORAL 2 TIMES DAILY
Qty: 120 TABLET | Refills: 0 | Status: SHIPPED | OUTPATIENT
Start: 2023-12-20

## 2023-12-20 RX ORDER — SODIUM CHLORIDE 9 MG/ML
250 INJECTION, SOLUTION INTRAVENOUS ONCE
Status: COMPLETED | OUTPATIENT
Start: 2023-12-20 | End: 2023-12-20

## 2023-12-20 RX ADMIN — SODIUM CHLORIDE 1450 MG: 900 INJECTION, SOLUTION INTRAVENOUS at 11:14

## 2023-12-20 RX ADMIN — SODIUM CHLORIDE 250 ML: 9 INJECTION, SOLUTION INTRAVENOUS at 10:47

## 2023-12-20 RX ADMIN — DEXAMETHASONE SODIUM PHOSPHATE 12 MG: 10 INJECTION, SOLUTION INTRAMUSCULAR; INTRAVENOUS at 10:47

## 2023-12-22 ENCOUNTER — PROCEDURE VISIT (OUTPATIENT)
Dept: NEUROLOGY | Facility: CLINIC | Age: 78
End: 2023-12-22
Payer: MEDICARE

## 2023-12-22 DIAGNOSIS — G60.9 IDIOPATHIC PERIPHERAL NEUROPATHY: Primary | ICD-10-CM

## 2023-12-22 NOTE — PROGRESS NOTES
EMG and Nerve Conduction Studies    I.      Instrument used: Neuromax 1002  II.     Please see data sheets for tabular summary of NCS and details on methods, temperatures and lab standards.   III.    EMG muscles tested for upper extremity studies include the deltoid, biceps, triceps, pronator teres, extensor digitorum communis, first dorsal interosseous and abductor pollicis brevis.    IV.   EMG muscles tested for lower extremity studies include the vastus lateralis, tibialis anterior, peroneus longus, medial gastrocnemius and extensor digitorum brevis.    V.    Additional muscles tested as needed.  Paraspinal muscles tested as needed.   VI.   Please see data sheets for tabular summary of EMG findings.   VII. The complete report includes the data sheets.      Indication: Left arm pain numbness and weakness  History: 78-year-old woman with history of a craniotomy on the right side for a cerebral metastasis from lung cancer who indicates no trouble with the left arm relating to this however July 4 she developed shingles of the left arm with persistent pain numbness and tingling with weakness in the hand.  She also states a pre-existing pinched nerve in the left shoulder.  She describes pain over the left shoulder blade.      Ht: 152.4 cm  Wt: 57.8 kg; BMI 24.88  HbA1C:   Lab Results   Component Value Date    HGBA1C 5.70 (H) 05/26/2023     TSH:   Lab Results   Component Value Date    TSH 2.150 05/25/2023       Technical summary:  Nerve conduction studies were obtained in the left arm with comparisons on the right as indicated.  The hands were warmed prior to study with temperatures at least 32 °C measured on the palms.  Needle examination was obtained on selected muscles in the left arm.    Results:  1.  Prolonged left median antidromic sensory distal latency at 4.2 ms with low amplitude of 14.3 µV.  2.  Prolonged left ulnar antidromic sensory distal latency at 4.5 ms with very low amplitude of 4.7 µV.  Normal right  ulnar antidromic sensory distal latency with mildly low amplitude of 13.7 µV.  3.  Normal left radial sensory distal latency and amplitude.  4.  Slow left median motor conduction velocity in the forearm at 44.7 m/s with normal velocity above the elbow.  The distal latency was prolonged at 4.8 ms.  The amplitude was very low at 1.3 mV from wrist stimulation, somewhat lower proximally.  Normal F-wave.  Slow right median motor conduction velocity in the forearm at 46.1 m/s with a prolonged distal latency of 5 ms.  The amplitude was at the low end of normal at 4.6 mV.  5.  Slow left ulnar motor conduction velocities below the elbow at 36.2 m/s, in the short segment across the elbow at 31.9 m/s, and above the elbow segment at 34.8 m/s.  Normal distal latency with very low amplitude of 2.3 mV from wrist stimulation somewhat lower proximally.  Normal F-wave.  Normal right ulnar motor conduction velocities with a normal distal latency and amplitudes.  6.  Needle examination of selected muscles in the left arm showed fibrillations and positive sharp waves in the abductor pollicis brevis and first dorsal interosseous and a few positive sharp waves in the flexor pollicis longus.  These muscles showed increased number of large motor units increased firing rates and reduced interference patterns clearly worse in the abductor pollicis brevis and first dorsal interosseous muscles.  All other muscles tested in the left arm showed normal insertional activities, motor units and recruitment patterns.  Cervical paraspinals at C7 showed no abnormality.    Impression:  Complex abnormal study.  There is evidence of peripheral neuropathy.  The left arm is more severely affected by far.  Some of the features suggest a proximal superimposed lower trunk brachial plexopathy or even C8 radiculopathy instead.  Clinical correlation is suggested.  Study results were discussed with the patient.    Leonard Kitchen M.D.              Dictated utilizing  Juanjose dictation.

## 2023-12-27 ENCOUNTER — INFUSION (OUTPATIENT)
Dept: ONCOLOGY | Facility: HOSPITAL | Age: 78
End: 2023-12-27
Payer: MEDICARE

## 2023-12-27 VITALS
OXYGEN SATURATION: 93 % | DIASTOLIC BLOOD PRESSURE: 55 MMHG | TEMPERATURE: 96.8 F | BODY MASS INDEX: 24.18 KG/M2 | RESPIRATION RATE: 16 BRPM | WEIGHT: 123.8 LBS | HEART RATE: 87 BPM | SYSTOLIC BLOOD PRESSURE: 94 MMHG

## 2023-12-27 DIAGNOSIS — I95.9 HYPOTENSION, UNSPECIFIED HYPOTENSION TYPE: ICD-10-CM

## 2023-12-27 DIAGNOSIS — C34.91 PRIMARY LUNG ADENOCARCINOMA, RIGHT: Primary | ICD-10-CM

## 2023-12-27 LAB
BASOPHILS # BLD AUTO: 0.01 10*3/MM3 (ref 0–0.2)
BASOPHILS NFR BLD AUTO: 0.2 % (ref 0–1.5)
DEPRECATED RDW RBC AUTO: 60.8 FL (ref 37–54)
EOSINOPHIL # BLD AUTO: 0.02 10*3/MM3 (ref 0–0.4)
EOSINOPHIL NFR BLD AUTO: 0.3 % (ref 0.3–6.2)
ERYTHROCYTE [DISTWIDTH] IN BLOOD BY AUTOMATED COUNT: 17.7 % (ref 12.3–15.4)
HCT VFR BLD AUTO: 30.9 % (ref 34–46.6)
HGB BLD-MCNC: 9.9 G/DL (ref 12–15.9)
IMM GRANULOCYTES # BLD AUTO: 0.05 10*3/MM3 (ref 0–0.05)
IMM GRANULOCYTES NFR BLD AUTO: 0.8 % (ref 0–0.5)
LYMPHOCYTES # BLD AUTO: 0.85 10*3/MM3 (ref 0.7–3.1)
LYMPHOCYTES NFR BLD AUTO: 14.4 % (ref 19.6–45.3)
MCH RBC QN AUTO: 30.3 PG (ref 26.6–33)
MCHC RBC AUTO-ENTMCNC: 32 G/DL (ref 31.5–35.7)
MCV RBC AUTO: 94.5 FL (ref 79–97)
MONOCYTES # BLD AUTO: 0.38 10*3/MM3 (ref 0.1–0.9)
MONOCYTES NFR BLD AUTO: 6.4 % (ref 5–12)
NEUTROPHILS NFR BLD AUTO: 4.61 10*3/MM3 (ref 1.7–7)
NEUTROPHILS NFR BLD AUTO: 77.9 % (ref 42.7–76)
NRBC BLD AUTO-RTO: 0 /100 WBC (ref 0–0.2)
PLATELET # BLD AUTO: 233 10*3/MM3 (ref 140–450)
PMV BLD AUTO: 9.3 FL (ref 6–12)
RBC # BLD AUTO: 3.27 10*6/MM3 (ref 3.77–5.28)
WBC NRBC COR # BLD AUTO: 5.92 10*3/MM3 (ref 3.4–10.8)

## 2023-12-27 PROCEDURE — 96361 HYDRATE IV INFUSION ADD-ON: CPT

## 2023-12-27 PROCEDURE — 96413 CHEMO IV INFUSION 1 HR: CPT

## 2023-12-27 PROCEDURE — 25810000003 SODIUM CHLORIDE 0.9 % SOLUTION: Performed by: INTERNAL MEDICINE

## 2023-12-27 PROCEDURE — 25010000002 GEMCITABINE 1 GM/26.3ML SOLUTION 26.3 ML VIAL: Performed by: INTERNAL MEDICINE

## 2023-12-27 PROCEDURE — 25010000002 GEMCITABINE 200 MG/5.26ML SOLUTION 5.26 ML VIAL: Performed by: INTERNAL MEDICINE

## 2023-12-27 PROCEDURE — 25010000002 DEXAMETHASONE SODIUM PHOSPHATE 100 MG/10ML SOLUTION: Performed by: INTERNAL MEDICINE

## 2023-12-27 PROCEDURE — 25810000003 SODIUM CHLORIDE 0.9 % SOLUTION 250 ML FLEX CONT: Performed by: INTERNAL MEDICINE

## 2023-12-27 PROCEDURE — 85025 COMPLETE CBC W/AUTO DIFF WBC: CPT

## 2023-12-27 PROCEDURE — 96375 TX/PRO/DX INJ NEW DRUG ADDON: CPT

## 2023-12-27 RX ORDER — SODIUM CHLORIDE 9 MG/ML
250 INJECTION, SOLUTION INTRAVENOUS ONCE
Status: COMPLETED | OUTPATIENT
Start: 2023-12-27 | End: 2023-12-27

## 2023-12-27 RX ADMIN — SODIUM CHLORIDE 1450 MG: 900 INJECTION, SOLUTION INTRAVENOUS at 10:43

## 2023-12-27 RX ADMIN — SODIUM CHLORIDE 500 ML: 9 INJECTION, SOLUTION INTRAVENOUS at 10:20

## 2023-12-27 RX ADMIN — SODIUM CHLORIDE 250 ML: 9 INJECTION, SOLUTION INTRAVENOUS at 10:26

## 2023-12-27 RX ADMIN — DEXAMETHASONE SODIUM PHOSPHATE 12 MG: 10 INJECTION, SOLUTION INTRAMUSCULAR; INTRAVENOUS at 10:26

## 2023-12-27 NOTE — NURSING NOTE
BP 84/48 in clinic today. Pt reports she is recovering from a cold last week. She states she did not eat/drink much while sick. S/w BARBARA Davis, verbal order given for 500cc IV bolus. Pt instructed to monitor BP at home and instructed not to take lisinopril if systolic is below 120. Pt agreeable v/u.

## 2024-01-01 ENCOUNTER — TELEPHONE (OUTPATIENT)
Dept: ONCOLOGY | Facility: CLINIC | Age: 79
End: 2024-01-01
Payer: MEDICARE

## 2024-01-01 ENCOUNTER — TELEPHONE (OUTPATIENT)
Dept: NEUROLOGY | Facility: CLINIC | Age: 79
End: 2024-01-01

## 2024-01-01 DIAGNOSIS — C79.31 LUNG CANCER METASTATIC TO BRAIN: Primary | ICD-10-CM

## 2024-01-01 DIAGNOSIS — C34.90 LUNG CANCER METASTATIC TO BRAIN: Primary | ICD-10-CM

## 2024-01-03 ENCOUNTER — OFFICE VISIT (OUTPATIENT)
Dept: NEUROLOGY | Facility: CLINIC | Age: 79
End: 2024-01-03
Payer: MEDICARE

## 2024-01-03 VITALS
HEART RATE: 76 BPM | OXYGEN SATURATION: 90 % | SYSTOLIC BLOOD PRESSURE: 110 MMHG | HEIGHT: 60 IN | BODY MASS INDEX: 24.15 KG/M2 | WEIGHT: 123 LBS | DIASTOLIC BLOOD PRESSURE: 58 MMHG

## 2024-01-03 DIAGNOSIS — M79.602 LEFT ARM PAIN: Primary | ICD-10-CM

## 2024-01-03 PROCEDURE — 99215 OFFICE O/P EST HI 40 MIN: CPT | Performed by: PHYSICIAN ASSISTANT

## 2024-01-03 PROCEDURE — 3074F SYST BP LT 130 MM HG: CPT | Performed by: PHYSICIAN ASSISTANT

## 2024-01-03 PROCEDURE — 1160F RVW MEDS BY RX/DR IN RCRD: CPT | Performed by: PHYSICIAN ASSISTANT

## 2024-01-03 PROCEDURE — 1159F MED LIST DOCD IN RCRD: CPT | Performed by: PHYSICIAN ASSISTANT

## 2024-01-03 PROCEDURE — 3078F DIAST BP <80 MM HG: CPT | Performed by: PHYSICIAN ASSISTANT

## 2024-01-03 NOTE — LETTER
January 3, 2024       No Recipients    Patient: Pam Vidal   YOB: 1945   Date of Visit: 1/3/2024     Dear Nik Mckay MD:       Thank you for referring Pam Vidal to me for evaluation. Below are the relevant portions of my assessment and plan of care.    If you have questions, please do not hesitate to call me. I look forward to following Pam along with you.         Sincerely,        PAPITO Maddox        CC:   No Recipients

## 2024-01-03 NOTE — PROGRESS NOTES
CC: hospital f/u    HPI:  Pam Vidal is a  78 y.o.  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  She was found to be orthostatic and improved with azithromycin and supportive care.  She was no longer complaining of dizziness at time of discharge.  She has had no further bouts of dizziness since discharge.  Neurology was consulted for this but also she was complaining of left arm neuropathic pain in the setting of recent shingles infection.  She denied symptoms of left arm prior to shingles.  She does have history of cervical stenosis with decompression in the 80s.  Differential for her arm pain included postherpetic neuralgia or possible claudication.  CT c spine showed infiltrate vs tumor compared to previous, no cervical tumor.  Later PET scan showed increased hypmetabolic mass as well as ribs.  She was started on palliative chemo.  CTA head and neck was repeated in light of her known carotid disease and there was extensive atherosclerosis in the aortic arch extending and severely narrowing the left subclavian artery and common carotid origin and severe right vertebral artery narrowing.  She was put on Plavix and atorvastatin.  She has since had EMG said complex abnormal study with evidence of peripheral neuropathy left more than right with features to suggest superimposed lower trunk brachial plexopathy or C8 radiculopathy.  She is on Lyrica and no longer on Cymbalta.  She is using compounded cream.  In her symptoms are at bay      Social history:    Family history:      Pain Scale:        ROS:  Review of Systems   Constitutional:  Positive for fatigue.   HENT:  Negative for ear pain,  hearing loss and tinnitus.    Respiratory:  Negative for chest tightness, shortness of breath and wheezing.    Cardiovascular:  Negative for chest pain and palpitations.   Musculoskeletal:  Positive for gait problem (walker).   Neurological:  Positive for dizziness and numbness (left hand-worsening). Negative for speech difficulty.   Psychiatric/Behavioral:  Negative for confusion and decreased concentration.        Reviewed ROS conducted by MA and deidre        Physical Exam:  There were no vitals filed for this visit.   Orthostatic BP:    There is no height or weight on file to calculate BMI.        General: pt well appearing, no distress  HEENT: Normocephalic, conjunctiva normal, external canals normal, no nasal discharge, moist mucous membranes  Neck: No lymphadenopathy, thyroid not enlarged, no JVD  CV: Regular rate and rhythm  Pulmonary: Normal respiratory effort  Extremities: no edema, bruising, or skin lesions  Pysch: good eye contact, cooperative, full affect, euthymic mood, good attention, good insight  Mental: alert, conversant, AOx3, provides history. Language fluent, names, repeats.  CN: CN II-XII intact, PERRL, EOMi, no gaze palsy or nystagmus, intact facial sensation, no facial assymetry, intact hearing, symmetric palate elevation, tongue midline, good SCM strength  Motor: no abnormal movements, no pronator drift, normal  bulk and tone, symmetric good power but 4/5 ABD and finger flexors on L  Sensory: normal sensation to crude touch, temperature, and vibration throughout  Reflexes: 2+ throughout, neg babinski  Coordination: no ataxia on finger-nose, heel-shin  Gait: in wheelchair        Results:      Lab Results   Component Value Date    GLUCOSE 100 (H) 12/20/2023    BUN 24 (H) 12/20/2023    CREATININE 0.99 12/20/2023    BCR 24.2 12/20/2023    CO2 25.2 12/20/2023    CALCIUM 9.2 12/20/2023    ALBUMIN 3.6 12/20/2023    AST 25 12/20/2023    ALT 28 12/20/2023       Lab Results   Component Value Date     "WBC 5.92 12/27/2023    HGB 9.9 (L) 12/27/2023    HCT 30.9 (L) 12/27/2023    MCV 94.5 12/27/2023     12/27/2023         .No results found for: \"RPR\"      Lab Results   Component Value Date    TSH 2.150 05/25/2023         No results found for: \"IISIEWRV70\"      No results found for: \"FOLATE\"      Lab Results   Component Value Date    HGBA1C 5.70 (H) 05/26/2023         Lab Results   Component Value Date    GLUCOSE 100 (H) 12/20/2023    BUN 24 (H) 12/20/2023    CREATININE 0.99 12/20/2023    BCR 24.2 12/20/2023    K 4.7 12/20/2023    CO2 25.2 12/20/2023    CALCIUM 9.2 12/20/2023    ALBUMIN 3.6 12/20/2023    AST 25 12/20/2023    ALT 28 12/20/2023         Diagnosis:  1 L arm neuralgia  2 Lung adenocarcinoma  3 brain metastasis  4 vertebrobasilar disease  5 subclavian steal    Impression: 78-year-old right-handed female with history of lung adeno carcinoma with brain metastasis status post resection of subdural metastatic lesion who was hospitalized in August with dizziness in the setting of Campylobacter infection consistent with orthostasis.  She was also complaining of left arm neuralgia after recent shingles infection as well as weakness in the hand.  CTA head and neck showed vertebrobasilar disease as well as concern for subclavian steal with much atherosclerotic disease burden of the aortic Arch narrowing the left subclavian artery.  She was started on medical mgmt with plavix and statin.  I am less concerned about this.  She has no unequal pulses, activity is no provoking factor, and no dizziness or other vascular symptoms to report.   She is doing well with Lyrica and compounded cream.  She continues on chemo with PET and surveillance scans later this week.  Will ensure no anatomical etiology radiculopathy or plexopathy bessie in light of her cancer hx    Plan:  1 MRI Ccpsine, brachial plexus  2 cont lyrica  3 PT can be helpful in the future    F/u in 4-6 mos    I spent at least 60 minutes interviewing, " examining, and counseling patient.  I independently reviewed documentation, laboratory and diagnostic findings, external documentation where applicable, and formulated treatment plan which was discussed with the patient.

## 2024-01-05 ENCOUNTER — HOSPITAL ENCOUNTER (OUTPATIENT)
Dept: MRI IMAGING | Facility: HOSPITAL | Age: 79
Discharge: HOME | End: 2024-01-05
Payer: MEDICARE

## 2024-01-05 ENCOUNTER — HOSPITAL ENCOUNTER (OUTPATIENT)
Dept: CT IMAGING | Facility: HOSPITAL | Age: 79
Discharge: HOME | End: 2024-01-05
Payer: MEDICARE

## 2024-01-05 DIAGNOSIS — C34.90 LUNG CANCER METASTATIC TO BRAIN: ICD-10-CM

## 2024-01-05 DIAGNOSIS — C79.31 LUNG CANCER METASTATIC TO BRAIN: ICD-10-CM

## 2024-01-05 DIAGNOSIS — C34.91 PRIMARY LUNG ADENOCARCINOMA, RIGHT: Primary | ICD-10-CM

## 2024-01-05 DIAGNOSIS — C34.91 PRIMARY LUNG ADENOCARCINOMA, RIGHT: ICD-10-CM

## 2024-01-05 LAB — CREAT BLDA-MCNC: 1 MG/DL (ref 0.6–1.3)

## 2024-01-05 PROCEDURE — 0 GADOBENATE DIMEGLUMINE 529 MG/ML SOLUTION: Performed by: INTERNAL MEDICINE

## 2024-01-05 PROCEDURE — A9577 INJ MULTIHANCE: HCPCS | Performed by: INTERNAL MEDICINE

## 2024-01-05 PROCEDURE — 70553 MRI BRAIN STEM W/O & W/DYE: CPT

## 2024-01-05 PROCEDURE — 74177 CT ABD & PELVIS W/CONTRAST: CPT

## 2024-01-05 PROCEDURE — 25010000002 HEPARIN LOCK FLUSH PER 10 UNITS: Performed by: INTERNAL MEDICINE

## 2024-01-05 PROCEDURE — 71260 CT THORAX DX C+: CPT

## 2024-01-05 PROCEDURE — 0 DIATRIZOATE MEGLUMINE & SODIUM PER 1 ML: Performed by: INTERNAL MEDICINE

## 2024-01-05 PROCEDURE — 25510000001 IOPAMIDOL 61 % SOLUTION: Performed by: INTERNAL MEDICINE

## 2024-01-05 PROCEDURE — 82565 ASSAY OF CREATININE: CPT

## 2024-01-05 RX ORDER — SODIUM CHLORIDE 0.9 % (FLUSH) 0.9 %
10 SYRINGE (ML) INJECTION AS NEEDED
OUTPATIENT
Start: 2024-01-05

## 2024-01-05 RX ORDER — HEPARIN SODIUM (PORCINE) LOCK FLUSH IV SOLN 100 UNIT/ML 100 UNIT/ML
500 SOLUTION INTRAVENOUS AS NEEDED
Status: DISCONTINUED | OUTPATIENT
Start: 2024-01-05 | End: 2024-01-06 | Stop reason: HOSPADM

## 2024-01-05 RX ORDER — SODIUM CHLORIDE 0.9 % (FLUSH) 0.9 %
10 SYRINGE (ML) INJECTION AS NEEDED
Status: DISCONTINUED | OUTPATIENT
Start: 2024-01-05 | End: 2024-01-06 | Stop reason: HOSPADM

## 2024-01-05 RX ORDER — HEPARIN SODIUM (PORCINE) LOCK FLUSH IV SOLN 100 UNIT/ML 100 UNIT/ML
500 SOLUTION INTRAVENOUS AS NEEDED
OUTPATIENT
Start: 2024-01-05

## 2024-01-05 RX ADMIN — Medication 10 ML: at 15:22

## 2024-01-05 RX ADMIN — GADOBENATE DIMEGLUMINE 11 ML: 529 INJECTION, SOLUTION INTRAVENOUS at 14:50

## 2024-01-05 RX ADMIN — IOPAMIDOL 85 ML: 612 INJECTION, SOLUTION INTRAVENOUS at 14:17

## 2024-01-05 RX ADMIN — Medication 500 UNITS: at 15:22

## 2024-01-05 RX ADMIN — DIATRIZOATE MEGLUMINE AND DIATRIZOATE SODIUM 30 ML: 600; 100 SOLUTION ORAL; RECTAL at 12:08

## 2024-01-09 DIAGNOSIS — G89.3 CHRONIC PAIN AFTER CANCER TREATMENT: ICD-10-CM

## 2024-01-09 RX ORDER — HYDROCODONE BITARTRATE AND ACETAMINOPHEN 7.5; 325 MG/1; MG/1
1 TABLET ORAL EVERY 6 HOURS PRN
Qty: 60 TABLET | Refills: 0 | Status: SHIPPED | OUTPATIENT
Start: 2024-01-09

## 2024-01-09 NOTE — TELEPHONE ENCOUNTER
Caller: ZIA DURANT    Relationship: Emergency Contact    Best call back number: [phone]  Requested Prescriptions:   Requested Prescriptions     Pending Prescriptions Disp Refills    HYDROcodone-acetaminophen (NORCO) 7.5-325 MG per tablet 60 tablet 0     Sig: Take 1 tablet by mouth Every 6 (Six) Hours As Needed for Moderate Pain.        Pharmacy where request should be sent: [facility] [id number] - [place], [place] [address] - [phone]  - [phone]      Last office visit with prescribing clinician: 11/29/2023   Last telemedicine visit with prescribing clinician: 9/26/2023   Next office visit with prescribing clinician: 1/10/2024     Additional details provided by patient:     Does the patient have less than a 3 day supply:  [] Yes  [x] No    Would you like a call back once the refill request has been completed: [x] Yes [] No    If the office needs to give you a call back, can they leave a voicemail: [x] Yes [] No

## 2024-01-10 ENCOUNTER — OFFICE VISIT (OUTPATIENT)
Dept: ONCOLOGY | Facility: CLINIC | Age: 79
End: 2024-01-10
Payer: MEDICARE

## 2024-01-10 ENCOUNTER — INFUSION (OUTPATIENT)
Dept: ONCOLOGY | Facility: HOSPITAL | Age: 79
End: 2024-01-10
Payer: MEDICARE

## 2024-01-10 VITALS
DIASTOLIC BLOOD PRESSURE: 73 MMHG | SYSTOLIC BLOOD PRESSURE: 159 MMHG | HEIGHT: 60 IN | WEIGHT: 125.6 LBS | OXYGEN SATURATION: 94 % | RESPIRATION RATE: 16 BRPM | TEMPERATURE: 96.2 F | BODY MASS INDEX: 24.66 KG/M2 | HEART RATE: 71 BPM

## 2024-01-10 DIAGNOSIS — C34.91 PRIMARY LUNG ADENOCARCINOMA, RIGHT: ICD-10-CM

## 2024-01-10 DIAGNOSIS — C34.91 PRIMARY LUNG ADENOCARCINOMA, RIGHT: Primary | ICD-10-CM

## 2024-01-10 LAB
ALBUMIN SERPL-MCNC: 3.5 G/DL (ref 3.5–5.2)
ALBUMIN/GLOB SERPL: 1.1 G/DL
ALP SERPL-CCNC: 82 U/L (ref 39–117)
ALT SERPL W P-5'-P-CCNC: 15 U/L (ref 1–33)
ANION GAP SERPL CALCULATED.3IONS-SCNC: 10.5 MMOL/L (ref 5–15)
AST SERPL-CCNC: 24 U/L (ref 1–32)
BASOPHILS # BLD AUTO: 0.03 10*3/MM3 (ref 0–0.2)
BASOPHILS NFR BLD AUTO: 0.3 % (ref 0–1.5)
BILIRUB SERPL-MCNC: 0.3 MG/DL (ref 0–1.2)
BUN SERPL-MCNC: 17 MG/DL (ref 8–23)
BUN/CREAT SERPL: 16.3 (ref 7–25)
CALCIUM SPEC-SCNC: 9.5 MG/DL (ref 8.6–10.5)
CHLORIDE SERPL-SCNC: 101 MMOL/L (ref 98–107)
CO2 SERPL-SCNC: 27.5 MMOL/L (ref 22–29)
CREAT SERPL-MCNC: 1.04 MG/DL (ref 0.57–1)
DEPRECATED RDW RBC AUTO: 58.7 FL (ref 37–54)
EGFRCR SERPLBLD CKD-EPI 2021: 55.8 ML/MIN/1.73
EOSINOPHIL # BLD AUTO: 0.08 10*3/MM3 (ref 0–0.4)
EOSINOPHIL NFR BLD AUTO: 0.9 % (ref 0.3–6.2)
ERYTHROCYTE [DISTWIDTH] IN BLOOD BY AUTOMATED COUNT: 17.7 % (ref 12.3–15.4)
GLOBULIN UR ELPH-MCNC: 3.1 GM/DL
GLUCOSE SERPL-MCNC: 80 MG/DL (ref 65–99)
HCT VFR BLD AUTO: 31.1 % (ref 34–46.6)
HGB BLD-MCNC: 9.9 G/DL (ref 12–15.9)
IMM GRANULOCYTES # BLD AUTO: 0.73 10*3/MM3 (ref 0–0.05)
IMM GRANULOCYTES NFR BLD AUTO: 8.3 % (ref 0–0.5)
LYMPHOCYTES # BLD AUTO: 1.36 10*3/MM3 (ref 0.7–3.1)
LYMPHOCYTES NFR BLD AUTO: 15.5 % (ref 19.6–45.3)
MCH RBC QN AUTO: 29.6 PG (ref 26.6–33)
MCHC RBC AUTO-ENTMCNC: 31.8 G/DL (ref 31.5–35.7)
MCV RBC AUTO: 92.8 FL (ref 79–97)
MONOCYTES # BLD AUTO: 1.33 10*3/MM3 (ref 0.1–0.9)
MONOCYTES NFR BLD AUTO: 15.1 % (ref 5–12)
NEUTROPHILS NFR BLD AUTO: 5.25 10*3/MM3 (ref 1.7–7)
NEUTROPHILS NFR BLD AUTO: 59.9 % (ref 42.7–76)
NRBC BLD AUTO-RTO: 0.3 /100 WBC (ref 0–0.2)
PLATELET # BLD AUTO: 451 10*3/MM3 (ref 140–450)
PMV BLD AUTO: 10.2 FL (ref 6–12)
POTASSIUM SERPL-SCNC: 4 MMOL/L (ref 3.5–5.2)
PROT SERPL-MCNC: 6.6 G/DL (ref 6–8.5)
RBC # BLD AUTO: 3.35 10*6/MM3 (ref 3.77–5.28)
SODIUM SERPL-SCNC: 139 MMOL/L (ref 136–145)
WBC NRBC COR # BLD AUTO: 8.78 10*3/MM3 (ref 3.4–10.8)

## 2024-01-10 PROCEDURE — 96413 CHEMO IV INFUSION 1 HR: CPT

## 2024-01-10 PROCEDURE — 85025 COMPLETE CBC W/AUTO DIFF WBC: CPT

## 2024-01-10 PROCEDURE — 25810000003 SODIUM CHLORIDE 0.9 % SOLUTION: Performed by: INTERNAL MEDICINE

## 2024-01-10 PROCEDURE — 25010000002 HEPARIN LOCK FLUSH PER 10 UNITS: Performed by: INTERNAL MEDICINE

## 2024-01-10 PROCEDURE — 25010000002 DEXAMETHASONE SODIUM PHOSPHATE 100 MG/10ML SOLUTION: Performed by: INTERNAL MEDICINE

## 2024-01-10 PROCEDURE — 80053 COMPREHEN METABOLIC PANEL: CPT

## 2024-01-10 PROCEDURE — 25810000003 SODIUM CHLORIDE 0.9 % SOLUTION 250 ML FLEX CONT: Performed by: INTERNAL MEDICINE

## 2024-01-10 PROCEDURE — 25010000002 GEMCITABINE 200 MG/5.26ML SOLUTION 5.26 ML VIAL: Performed by: INTERNAL MEDICINE

## 2024-01-10 PROCEDURE — 96375 TX/PRO/DX INJ NEW DRUG ADDON: CPT

## 2024-01-10 PROCEDURE — 25010000002 GEMCITABINE 1 GM/26.3ML SOLUTION 26.3 ML VIAL: Performed by: INTERNAL MEDICINE

## 2024-01-10 RX ORDER — SODIUM CHLORIDE 9 MG/ML
250 INJECTION, SOLUTION INTRAVENOUS ONCE
OUTPATIENT
Start: 2024-01-31

## 2024-01-10 RX ORDER — SODIUM CHLORIDE 9 MG/ML
250 INJECTION, SOLUTION INTRAVENOUS ONCE
OUTPATIENT
Start: 2024-02-07

## 2024-01-10 RX ORDER — SODIUM CHLORIDE 9 MG/ML
250 INJECTION, SOLUTION INTRAVENOUS ONCE
Status: COMPLETED | OUTPATIENT
Start: 2024-01-10 | End: 2024-01-10

## 2024-01-10 RX ORDER — SODIUM CHLORIDE 9 MG/ML
250 INJECTION, SOLUTION INTRAVENOUS ONCE
Status: CANCELLED | OUTPATIENT
Start: 2024-01-10

## 2024-01-10 RX ORDER — HEPARIN SODIUM (PORCINE) LOCK FLUSH IV SOLN 100 UNIT/ML 100 UNIT/ML
500 SOLUTION INTRAVENOUS AS NEEDED
OUTPATIENT
Start: 2024-01-10

## 2024-01-10 RX ORDER — HEPARIN SODIUM (PORCINE) LOCK FLUSH IV SOLN 100 UNIT/ML 100 UNIT/ML
500 SOLUTION INTRAVENOUS AS NEEDED
Status: DISCONTINUED | OUTPATIENT
Start: 2024-01-10 | End: 2024-01-10 | Stop reason: HOSPADM

## 2024-01-10 RX ORDER — SODIUM CHLORIDE 0.9 % (FLUSH) 0.9 %
10 SYRINGE (ML) INJECTION AS NEEDED
OUTPATIENT
Start: 2024-01-10

## 2024-01-10 RX ORDER — SODIUM CHLORIDE 9 MG/ML
250 INJECTION, SOLUTION INTRAVENOUS ONCE
OUTPATIENT
Start: 2024-01-17

## 2024-01-10 RX ORDER — SODIUM CHLORIDE 0.9 % (FLUSH) 0.9 %
10 SYRINGE (ML) INJECTION AS NEEDED
Status: DISCONTINUED | OUTPATIENT
Start: 2024-01-10 | End: 2024-01-10 | Stop reason: HOSPADM

## 2024-01-10 RX ADMIN — SODIUM CHLORIDE 250 ML: 9 INJECTION, SOLUTION INTRAVENOUS at 10:34

## 2024-01-10 RX ADMIN — Medication 500 UNITS: at 11:29

## 2024-01-10 RX ADMIN — Medication 10 ML: at 11:29

## 2024-01-10 RX ADMIN — DEXAMETHASONE SODIUM PHOSPHATE 12 MG: 10 INJECTION, SOLUTION INTRAMUSCULAR; INTRAVENOUS at 10:34

## 2024-01-10 RX ADMIN — SODIUM CHLORIDE 1450 MG: 900 INJECTION, SOLUTION INTRAVENOUS at 10:52

## 2024-01-10 NOTE — PROGRESS NOTES
Subjective     REASON FOR FOLLOW UP:   1.  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  PD-L1 positive greater than 95%  3.  Primary chemoradiation with weekly carboplatin and Taxol completed 10/27/2022  4.  Imfinzi immunotherapy every 2 weeks for 12 months total.  First treatment 11/28/2022  5.  Repeat bronchoscopy 1/4/2023 due to persistent hemoptysis.  Pathology revealed no malignancy.  6.  Metastatic lesion to the brain resected 5/30/2023.  7.  Progression of disease within the chest noted on scans 9/19/2023.  Patient was started on palliative Gemzar day 1 and day 8 of an every 21-day cycle with first dose 9/27/2023.    HISTORY OF PRESENT ILLNESS:  The patient is a 78 y.o. year old female who is a former smoker referred to us from thoracic surgery (Dr. Remedios Rice) for evaluation and treatment of clinical stage III adenocarcinoma of the lung.  She was having persistent cough and underwent chest x-ray initially in late June which showed possible right upper lobe mass.  This was followed by CT scan of the chest confirming the presence of a right upper lobe mass.  She initially underwent a CT-guided needle biopsy on 7/22/2022 which was nondiagnostic.    She was referred to Dr. Glass for bronchoscopy and biopsy which was performed on 8/23/2022 with pathology returning as poorly differentiated adenocarcinoma.  PD-L1 stain on the tissue was positive at greater than 95%.    She underwent further staging with PET scan and MRI of the brain.  PET scan was performed on 8/12/2022 showing hypermetabolic activity in the large mass in the right upper lobe as well as mediastinal lymph nodes.  She was noted to have a mass on the kidney as well but this was not hypermetabolic.  There was no obvious distant metastatic disease.    MRI of the brain from 8/15/2022 likewise showed no evidence of metastatic disease.  She is scheduled also to see Dr. Hector Rodriguez of radiation oncology on 9/7/2022.    We recommended weekly carboplatin  and Taxol concurrent with radiation therapy.  Following completion of treatment she will receive immunotherapy for maintenance   (She does have a diagnosis of rheumatoid arthritis and is currently taking only Celebrex.  This could become an issue regarding her ability to tolerate immunotherapy in the future).    She received her final fraction of radiation 10/27/2022.  She also completed 6 weeks of carboplatin and Taxol and tolerated it well. CT scans performed 11/21/2022 showed right upper lobe mass appeared stable in size.  Her mediastinal lymphadenopathy had decreased.  There were no new sites of disease identified.    She was started on immunotherapy with Imfinzi with plans to continue immunotherapy for 1 year until December 2023.      She required several hospitalizations including hospitalization due to development of brain metastases in late May.  She had a dominant metastatic lesion in the right occipital area and was able to undergo neurosurgical resection of the dominant mass on 5/30/2023 with Dr. Ricketts.  She had 2 additional small lesions that were treated with stereotactic radiosurgery by Dr. Mckenna Moreira.  During this time her immuno therapy with Imfinzi was held (her last Imfinzi treatment in our office was 5/15/2023).    She again required repeat hospitalization from 6/11/2023 to 6/14/2023 due to development of extensive shingles outbreak on the left upper extremity.    She had a post treatment outpatient MRI of the brain from 7/9/2023 which showed the resection cavity in the right occipital area with no definite tumor recurrence in that area.  The other 2 small areas that were treated with stereotactic radiation appeared stable.  There were no new sites of disease identified.    She was seen in the emergency room 8/4/2023 for severe pain in the left arm. While she was in the ER she had a CT scan of the cervical spine to be sure there was no mechanical nerve root impingement.  There were no acute  findings in the cervical spine on the CT but slices through the lung apices did show what appeared to be new density in the right upper lobe which had not been seen on her previous CT scan from June.     Since her last visit she was again hospitalized from 8/26/2023 through 9/2/2023 with Campylobacter and E. coli colitis.  During the hospitalization she underwent a CT scan on 8/31/2023 showing increased size of her right upper lobe mass.  (See CT scan report below).  She also had a brain MRI performed on 8/27/2023 showing stable findings.    At the time of her discharge she had a motorcycle trip planned and we were planning to follow her up in the office with a PET scan when she returns from her trip.  She now returns having undergone the PET scan on 9/19/2023 which shows intensely hypermetabolic mass in the right upper lobe which had increased in size.  She had uptake in the right sixth and seventh ribs which may have been due to trauma.    She continues to have severe pain in the left arm although during the hospitalization she was switched to Lyrica which seems to be helping a little better than gabapentin.  She has requested an increase in the dose.  We also will be planning to refer her to pain management to see if there may be a role for the nerve injection.    We discussed the results of the PET scan and I recommended initiating palliative chemotherapy with single agent Gemzar days 1 and 8 of an every 21-day cycle.    INTERVAL HISTORY:  Ms. Vidal is here today for further palliative Gemzar treatment for metastatic lung adenocarcinoma.  She is receiving her treatment on day 1 and day 8 of an every 21-day cycle.  She is due today for day 1 of cycle #6.      She has been fatigued but otherwise is tolerating treatment well.  She still has chronic pain in the left upper extremity due to postherpetic neuralgia.  Her blood counts are adequate for treatment today.  Her anemia is stable at 9.9 g/dL.    She underwent  CT scans to assess response on 1/5/2024 including CT scan of the chest abdomen and pelvis as well as a follow-up MRI of the brain.  Thankfully she appears to be responding in the known areas of disease in the chest.  There were no new sites of disease and her pleural effusion had resolved.    The MRI of the brain likewise did not show any evidence of disease progression.    We discussed these results with the patient in the office today and plan to continue Gemzar for now with repeat scans in 3 months.      History of Present Illness     Past Medical History:   Diagnosis Date    COPD (chronic obstructive pulmonary disease)     Coughing up blood     X2 MONTHS    History of COVID-19 02/2022    Hyperlipidemia     IBS (irritable bowel syndrome)     Primary lung adenocarcinoma, right     Rheumatoid arthritis         Past Surgical History:   Procedure Laterality Date    APPENDECTOMY      appendix removed    BRONCHOSCOPY N/A 8/23/2022    Procedure: BRONCHOSCOPY WITH BAL,  BIOPSIES, AND BRUSHINGS WITH ENDOBRONCHIAL ULTRASOUND WITH FNA;  Surgeon: Gregor Vee MD;  Location: [facility] ENDOSCOPY;  Service: Pulmonary;  Laterality: N/A;  PRE- HILAR MASS  POST- SAME    BRONCHOSCOPY N/A 1/4/2023    Procedure: BRONCHOSCOPY with biopsy, lavage, brushing;  Surgeon: Greogr Vee MD;  Location: [facility] ENDOSCOPY;  Service: Pulmonary;  Laterality: N/A;    CAROTID ENDARTERECTOMY Right     CAROTID ENDARTERECTOMY Left     CATARACT EXTRACTION      CERVICAL FUSION      CHOLECYSTECTOMY      CRANIOTOMY FOR TUMOR Right 5/30/2023    Procedure: RIGHT CRANIOTOMY FOR TUMOR RESECTION STEREOTACTIC WITH STEALTH;  Surgeon: Clint Ricketts MD;  Location: [facility] OR;  Service: Neurosurgery;  Laterality: Right;    HYSTERECTOMY      SHOULDER ARTHROSCOPY Right 02/19/2019    right should scope/cuff repair     VENOUS ACCESS DEVICE (PORT) INSERTION Right 9/6/2022    Procedure: POWERPORT INSERTION;  Surgeon: Remedios Rice MD;  Location: [facility] OR;   Service: Thoracic;  Laterality: Right;        Current Outpatient Medications on File Prior to Visit   Medication Sig Dispense Refill    atorvastatin (LIPITOR) 20 MG tablet Take 1 tablet by mouth Every Night. 30 tablet 0    azelastine (ASTELIN) 0.1 % nasal spray 1 spray into the nostril(s) as directed by provider.      B COMPLEX VITAMINS ER PO Take  by mouth Daily.      Cholecalciferol (VITAMIN D3) 5000 units capsule capsule Take 2,000 Units by mouth Daily.      clopidogrel (PLAVIX) 75 MG tablet Take 1 tablet by mouth Daily. 30 tablet 0    esomeprazole (nexIUM) 20 MG capsule Take 1 capsule by mouth Every Morning Before Breakfast.      estradiol (ESTRACE) 1 MG tablet Take 1 tablet by mouth Daily.      FeroSul 325 (65 Fe) MG tablet TAKE ONE TABLET BY MOUTH DAILY WITH BREAKFAST 30 tablet 5    Gabapentin 10 %, Baclofen 2 %, lidocaine 4 %, Ketamine HCl 4 % Apply 1-2 g topically to the appropriate area as directed 3 (Three) to 4 (Four) times daily. 90 g 0    HYDROcodone-acetaminophen (NORCO) 7.5-325 MG per tablet Take 1 tablet by mouth Every 6 (Six) Hours As Needed for Moderate Pain. 60 tablet 0    levETIRAcetam (KEPPRA) 500 MG tablet TAKE 1 TABLET BY MOUTH TWICE A  tablet 0    lidocaine-prilocaine (EMLA) 2.5-2.5 % cream       Multiple Vitamins-Minerals (PRESERVISION AREDS 2+MULTI VIT PO) Take  by mouth.      ondansetron (ZOFRAN) 8 MG tablet Take 1 tablet by mouth 3 (Three) Times a Day As Needed for Nausea or Vomiting. 30 tablet 5    predniSONE (DELTASONE) 10 MG tablet Take 2 tablets by mouth Daily. 60 tablet 5    pregabalin (LYRICA) 75 MG capsule TAKE 1 CAPSULE BY MOUTH TWICE A DAY ALONG WITH 150 MG CAPSULE FOR TOTAL DOSE  MG TWICE DAILY 60 capsule 0    lisinopril (PRINIVIL,ZESTRIL) 20 MG tablet Take 1 tablet by mouth Daily for 30 days. 30 tablet 0    pregabalin (LYRICA) 150 MG capsule Take 1 capsule by mouth Every 12 (Twelve) Hours for 30 days. 60 capsule 0     No current facility-administered medications  on file prior to visit.        ALLERGIES:    Allergies   Allergen Reactions    Del-Mycin [Erythromycin] Hives    Gentamicin Hives    Ibuprofen Nausea And Vomiting    Latex Dermatitis     GLOVES    Metronidazole Hives    Ofloxacin Hives and Nausea Only     Has tolerated Levaquin     Penicillins Hives     Tolerated rocephin and cefepime 2023    Tetracycline Hives    Adhesive Tape Dermatitis     BANDAIDS    Aspirin GI Intolerance     Vomiting can take EC    Codeine Nausea And Vomiting        Social History     Socioeconomic History    Marital status:    Tobacco Use    Smoking status: Former     Packs/day: .25     Types: Cigarettes     Quit date: 2022     Years since quittin.6    Smokeless tobacco: Never    Tobacco comments:     Former some day smoker of 1-2 cigs   Vaping Use    Vaping Use: Never used   Substance and Sexual Activity    Alcohol use: Yes     Alcohol/week: 2.0 standard drinks of alcohol     Types: 2 Glasses of wine per week     Comment: occasionally    Drug use: Never    Sexual activity: Defer        Family History   Problem Relation Age of Onset    Cancer Mother     Cancer Father     Malig Hyperthermia Neg Hx         Review of Systems   Constitutional:  Negative for activity change, chills, fatigue and fever.   HENT:  Negative for mouth sores, trouble swallowing and voice change.    Eyes:  Negative for pain and visual disturbance.   Respiratory:  Positive for cough. Negative for wheezing.    Cardiovascular:  Negative for chest pain and palpitations.   Gastrointestinal:  Negative for abdominal pain, constipation, diarrhea, nausea and vomiting.   Genitourinary:  Negative for difficulty urinating, frequency and urgency.   Musculoskeletal:  Negative for arthralgias and joint swelling.   Skin:  Negative for rash.   Neurological:  Positive for weakness. Negative for dizziness, seizures and headaches.        Severe pain in the left upper arm due to postherpetic neuralgia.  She also had some  "weakness in the arm which she feels is improving.   Hematological:  Negative for adenopathy. Bruises/bleeds easily.   Psychiatric/Behavioral:  Negative for behavioral problems and confusion. The patient is not nervous/anxious.     01/10/2024 ROS updated     Objective     Vitals:    01/10/24 0957   Resp: 16   Temp: 96.2 °F (35.7 °C)   TempSrc: Temporal   Weight: 57 kg (125 lb 9.6 oz)   Height: 152.4 cm (60\")   PainSc:   2   PainLoc: Arm  Comment: left         1/10/2024    10:00 AM   Current Status   ECOG score 1     Physical Exam  Vitals reviewed.   Constitutional:       General: She is not in acute distress.     Appearance: She is well-developed. She is ill-appearing.      Comments: Seated in a wheelchair.   HENT:      Head: Normocephalic and atraumatic.      Mouth/Throat:      Pharynx: No oropharyngeal exudate.   Eyes:      Pupils: Pupils are equal, round, and reactive to light.   Neck:      Comments: Bilateral scars from carotid endarterectomies  Cardiovascular:      Rate and Rhythm: Normal rate and regular rhythm.      Heart sounds: Normal heart sounds. No murmur heard.  Pulmonary:      Effort: Pulmonary effort is normal. No respiratory distress.      Breath sounds: Decreased breath sounds present. No wheezing, rhonchi or rales.   Abdominal:      General: Bowel sounds are normal. There is no distension.      Palpations: Abdomen is soft.   Musculoskeletal:         General: Normal range of motion.      Cervical back: Normal range of motion.   Skin:     General: Skin is warm and dry.      Findings: No rash.   Neurological:      Mental Status: She is alert and oriented to person, place, and time.      I have reexamined the patient and the results are consistent with the previously documented exam. Cruzito Lagunas MD        RECENT LABS:  Results from last 7 days   Lab Units 01/10/24  0940   WBC 10*3/mm3 8.78   NEUTROS ABS 10*3/mm3 5.25   HEMOGLOBIN g/dL 9.9*   HEMATOCRIT % 31.1*   PLATELETS 10*3/mm3 451*     Results " from last 7 days   Lab Units 01/05/24  1403   CREATININE mg/dL 1.00           BRONCHOSCOPY PATHOLOGY 1/4/2023  Final Diagnosis   Fluid, Lung, Right Upper Lobe, Bronchoalveolar Lavage (BAL):  A. Negative for malignant cells- reactive atypia.  B. Reactive bronchial cells, macrophages, inflammation (mostly acute) and mucin.  C. Negative for definitive fungal organisms by GMS staining. See comment.     2. Fluid, Lung, Right Upper Lobe, Brushing:               A. Negative for malignant cells.               B. Bronchial cells, scattered inflammation and mucinous debris.      Final Diagnosis   1. Lung, Right Upper Lobe, Biopsy:  Endobronchial mucosa and submucosa with                A. Squamous metaplasia, mild to moderate chronic active inflammation, fibrinous and necrotic debris and reactive      epithelial changes.               B. No definitive dysplasia nor malignancy identified.               C. No granulomata, diagnostic viral inclusions nor fungal organisms identified.         BRONCHOSCOPY PATHOLOGY 8/23/2022  Final Diagnosis   1. Lung, Right Upper Lobe, Endobronchial Biopsies: INVASIVE POORLY DIFFERENTIATED ADENOCARCINOMA OF PULMONARY ORIGIN.   PDL-1 POSITIVE >95%    IMAGING:  CT CHEST, ABDOMEN, AND PELVIS WITH CONTRAST 1/5/2024  IMPRESSION:  1. Interval treatment response with significant decrease in the bulk of  the medial right upper lobe lung mass which is now significantly smaller  and centrally necrotic. There has also been improvement in the  appearance of the right upper lobe and superior segment right lower lobe  compared with chest CT 08/31/2023. No evidence of metastatic disease to  the chest, abdomen or pelvis.  2. There is new patchy parenchymal volume loss and opacification in the  posterior left lung base favored to represent aspiration pneumonitis,  atelectasis, or pneumonia.  3. Right renal lesions appear similar as on recent and remote previous  exams. No new renal abnormality.      MRI OF THE  BRAIN WITH AND WITHOUT CONTRAST ON 01/05/2024   IMPRESSION:  1. There has been no significant change when compared to prior MRI of  the brain on 08/27/2023 with no convincing recurrent enhancing  metastatic disease.     2. On 05/30/2023, this patient underwent a 4.5 cm right parietal  occipital craniotomy to resect a 3.7 x 2.9 x 2.7 cm enhancing lesion  from the posterior superior lateral right occipital lobe found at  surgical pathology to be poorly differentiated adenocarcinoma metastasis  of lung primary. There is a collapsed 16 x 10 x 10 mm resection cavity  with a curvilinear 6 x 2 x 6 mm focus of enhancement along the posterior  superior margin of the resection cavity that I favor is an enhancing  scar and granulation tissue over a small focus of recurrent tumor with  no convincing recurrent metastatic lesion at this site but this will  need to be followed by follow-up MRIs of the brain with contrast. There  is 3.7 x 2 cm surrounding T2 hyperintensity likely surrounding gliosis  and posttreatment change. The treated lesion in the superior vermis of  the cerebellum has a 2 to 3 mm focus of hemosiderin deposition in it and  some minimal linear enhancement along its peripheral margins that is  likely scar tissue with no definable residual enhancing tumor at this  site and there has been resolution of the enhancement at the site of the  posterior lateral left occipital lobe lesion with no new evidence of  enhancing metastatic disease to the brain.  2. There is moderate small vessel disease in the cerebral white matter.  The remainder of the MRI of the brain is normal. Continued followup is  suggested.       F-18 FDG PET FROM SKULL BASE TO MID THIGH WITH PET/CT FUSION 9/19/2023  IMPRESSION:  1.  Intensely FDG mass centered within the perihilar aspect of the right  upper lobe appears to have increased in size since 5/25/2023 with new  obstruction and stenosis of the right upper lobe bronchus and likely  represents  recurrent malignancy. Surrounding pulmonary opacification and  consolidation throughout the right lung has increased since 5/25/2023  and there is new opacification within the left lung with differential  considerations including evolving postradiation changes, postobstructive  pneumonia and lymphangitic spread of malignancy.  2.  Segmental uptake within the right sixth and seventh ribs without  identifiable abnormality on noncontrast CT. While findings may represent  nondisplaced fractures, continued close attention on follow-up is  recommended to exclude metastatic disease.  3.  Indeterminate 5.3 cm right renal lesion, as before. At least  continued attention on follow-up is recommended. Finding can all be  further evaluated multiphase CT or MRI of the abdomen with and without  contrast to exclude neoplasm.  4.  Other findings as above.    MRI OF THE BRAIN WITH AND WITHOUT CONTRAST  8/27/2023  IMPRESSION:     1.  Status post right parieto-occipital craniotomy with subjacent   resection cavity.  Expected postoperative changes as detailed above.  No   evidence of tumor recurrence.  2.  Moderate chronic small vessel ischemic changes.  3.  No specific pontine abnormality aside from chronic small vessel   ischemic disease.  Finding on reference CT likely reflected artifact.  4.  No acute infarct.  No acute hemorrhage.    CT CHEST W CONTRAST DIAGNOSTIC-, CT ABDOMEN PELVIS W CONTRAST-, NM BONE SCAN WHOLE BODY- 5/29/2023  1. Right upper lobe suprahilar pulmonary mass appears stable from the  recent prior exam, again with groundglass and nodular opacities in the  right upper lobe.  2. Sclerotic change at the right anterior eighth rib is new from  03/01/2023, with corresponding increased uptake on bone scan, could be  healing fracture or sclerotic metastatic disease, correlate clinically.  3. Stable appearance of the abdomen and pelvis.    CT CHEST, ABDOMEN, AND PELVIS WITH IV CONTRAST 3/1/2023  IMPRESSION:  Decreased size  of the right upper lobe mass with stable  surrounding groundglass opacity and decreased size of the right lower  lobe opacity. No adenopathy seen on today's exam. Incidental findings as  Above.    F-18 FDG PET FROM SKULL BASE TO MID THIGH WITH PET/CT FUSION 8/12/2022  IMPRESSION:  1.  Large mass centered within the right upper lobe representing  patient's known malignancy. Interlobular septal thickening and ground  glass opacification projecting from the periphery of the mass throughout  the right upper lobe is highly concerning for lymphangitic spread. Of  note, the mass abuts the pleura and mediastinum over a long segment and  underlying invasion cannot be excluded.  2.  FDG avid hilar and mediastinal adenopathy concerning for metastatic  disease.  3.  A few irregular subcentimeter pulmonary nodules are present within  the right lower and left upper lobes measuring up to 0.9 cm which while  nonspecific raises concern for metastatic disease. At least close  attention on followup is recommended.   4.  Minimally hypermetabolic arnoldo hepatic node and bilateral sub-6 mm  pulmonary nodules are indeterminate. Continued followup with chest and  abdominal CT in 3 months is recommended.  5.  Indeterminate density 4.4 cm mass arises off the right kidney.  Further evaluation with multiphase CT or MRI of the abdomen with and  without contrast is recommended to exclude neoplasm.  6.  Focal uptake over the right shoulder in the area of prior rotator  cuff repair is favored be postsurgical with metastatic disease less  likely.  7.  Other findings as above.    Assessment & Plan   1.  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  Patient is not felt to be a surgical candidate and combined chemotherapy and radiation.   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  9/20/2022 1st dose of weekly carboplatin/taxol.   She completed 6 cycles of weekly CarboTaxol 10/25/2022.  Radiation therapy completed  10/27/2022  First dose durvalumab delivered 11/28/2022.   Durvalumab on hold since May 2023  Progression of disease with enlarging hypermetabolic mass in the right upper lobe in September 2023  Plans to start single agent Gemzar palliative chemotherapy 1000 mg/m² on days 1 and 8 of a 21-day cycle.  9/27/2023 C1D1 single agent palliative Gemzar.  CT scans and brain MRI 1/5/2024 show no progression of brain mets and significant improvement in her thoracic disease.      2.  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    3.  Other comorbidities including vascular disease with previous carotid   endarterectomy surgeries.  She has no known history of stroke or myocardial infarction.     5.  Rheumatoid arthritis: Patient previously on Celebrex, but discontinued due to hemoptysis.  Patient started on prednisone 20 mg daily prescribed to manage her arthritis pain.  This was later decreased to 10 mg daily.    6.  Development of brain metastases in May 2023.  Patient underwent resection of the dominant mass in the right occipital area by Dr. Cho of neurosurgery.  She received post op radiation therapy to the resection bed and to 2 smaller lesions with SRS under the direction of Dr. Mckenna Moreira at [facility].  Her most recent MRI of the brain from 8/27/2023 as noted above shows no new sites of disease, improved edema, and no definite recurrence in the resection bed.    7.  Severe shingles outbreak of left upper extremity with severe pain from postherpetic neuralgia.   She has been seen by pain management and is receiving topical therapy with a compound of baclofen gabapentin and ketamine with good relief.    8.  Hospitalization for bacterial colitis with stool culture growing E. coli and Campylobacter.  Her symptoms have resolved at this point.      PLAN:  We reviewed the results of the  brain MRI and the CT scans of the chest abdomen pelvis with the patient and provided her printed copies of the reports.  She does seem to be responding nicely to and other than fatigue and anemia as she is tolerating the Gemzar well therefore we plan to continue treatment for now.  Proceed with C6 D1 Gemzar today.  Patient will return in 1 week with repeat labs and C6 D8 Gemzar ; 3 weeks for cycle 7  day 1 Gemzar treatment and 4 weeks for cycle 7 day 8 Gemzar treatment.  Continue follow-up with pain management as scheduled for continued treatment of severe postherpetic neuralgia.  Continue prednisone at 10 mg daily.  Continue Lyrica 225 mg (75+150) twice daily.  Continue Norco 7.5/325 if needed for pain control.  MD follow-up in 6 weeks when she will be due for day 1 of cycle #8 palliative Gemzar.  CT scans of the chest abdomen pelvis and MRI of the brain will be ordered again in 3 months.    Patient is on a high risk medication requiring close monitoring for toxicity.      Cruzito Lagunas MD   01/10/2024

## 2024-01-11 ENCOUNTER — PATIENT OUTREACH (OUTPATIENT)
Dept: OTHER | Facility: HOSPITAL | Age: 79
End: 2024-01-11
Payer: MEDICARE

## 2024-01-11 ENCOUNTER — TELEPHONE (OUTPATIENT)
Dept: ONCOLOGY | Facility: CLINIC | Age: 79
End: 2024-01-11
Payer: MEDICARE

## 2024-01-11 DIAGNOSIS — B02.29 POST HERPETIC NEURALGIA: ICD-10-CM

## 2024-01-11 DIAGNOSIS — A04.9 BACTERIAL COLITIS: ICD-10-CM

## 2024-01-11 DIAGNOSIS — R53.0 NEOPLASTIC MALIGNANT RELATED FATIGUE: ICD-10-CM

## 2024-01-11 DIAGNOSIS — G89.3 CHRONIC PAIN AFTER CANCER TREATMENT: ICD-10-CM

## 2024-01-11 DIAGNOSIS — C34.90 LUNG CANCER METASTATIC TO BRAIN: ICD-10-CM

## 2024-01-11 DIAGNOSIS — C34.91 PRIMARY LUNG ADENOCARCINOMA, RIGHT: ICD-10-CM

## 2024-01-11 DIAGNOSIS — C34.90 MALIGNANT NEOPLASM OF LUNG, UNSPECIFIED LATERALITY, UNSPECIFIED PART OF LUNG: ICD-10-CM

## 2024-01-11 DIAGNOSIS — C79.31 LUNG CANCER METASTATIC TO BRAIN: ICD-10-CM

## 2024-01-11 RX ORDER — PREGABALIN 75 MG/1
CAPSULE ORAL
Qty: 60 CAPSULE | Refills: 0 | Status: SHIPPED | OUTPATIENT
Start: 2024-01-11

## 2024-01-11 RX ORDER — PREGABALIN 75 MG/1
CAPSULE ORAL
Qty: 60 CAPSULE | Refills: 0 | Status: CANCELLED | OUTPATIENT
Start: 2024-01-11

## 2024-01-11 NOTE — PROGRESS NOTES
Reviewed chart. Met with Dr. Lagunas 1/10. CT scans and brain MRI 1/5/2024 show no progression of brain mets and significant improvement in her thoracic disease.  Continue Gemzar days 1 and 8 of a 21-day cycle.  Dr. Lagunas 2/21, MRI brain 3/25    Called patient. Left message that I was just touching base to see how she was doing with treatment. Wanted to know if she had any questions/concerns or resource/supportive care needs; requested cb

## 2024-01-11 NOTE — TELEPHONE ENCOUNTER
Caller: ZIA DURANT    Relationship: Emergency Contact    Best call back number: [phone]     Requested Prescriptions:   Requested Prescriptions     Pending Prescriptions Disp Refills    pregabalin (LYRICA) 75 MG capsule 60 capsule 0        Pharmacy where request should be sent: [facility] [id number] - [place], [place] - [address] - [phone] PH - [phone] FX     Last office visit with prescribing clinician: 1/10/2024   Last telemedicine visit with prescribing clinician: 9/26/2023   Next office visit with prescribing clinician: 2/21/2024       Does the patient have less than a 3 day supply:  [x] Yes  [] No    Would you like a call back once the refill request has been completed: [] Yes [x] No    If the office needs to give you a call back, can they leave a voicemail: [] Yes [x] No    Lizzette Florence Rep   01/11/24 13:35 EST

## 2024-01-12 ENCOUNTER — DOCUMENTATION (OUTPATIENT)
Dept: ONCOLOGY | Facility: HOSPITAL | Age: 79
End: 2024-01-12
Payer: MEDICARE

## 2024-01-12 ENCOUNTER — DOCUMENTATION (OUTPATIENT)
Dept: NEUROLOGY | Facility: CLINIC | Age: 79
End: 2024-01-12
Payer: MEDICARE

## 2024-01-12 DIAGNOSIS — M79.602 LEFT ARM PAIN: Primary | ICD-10-CM

## 2024-01-12 NOTE — PROGRESS NOTES
Approved today  Your request has been approved  Drug  Pregabalin 75MG capsules  Form  [employer] Electronic PA Form (2017 NCPDP)

## 2024-01-17 ENCOUNTER — INFUSION (OUTPATIENT)
Dept: ONCOLOGY | Facility: HOSPITAL | Age: 79
End: 2024-01-17
Payer: MEDICARE

## 2024-01-17 ENCOUNTER — PATIENT OUTREACH (OUTPATIENT)
Dept: OTHER | Facility: HOSPITAL | Age: 79
End: 2024-01-17
Payer: MEDICARE

## 2024-01-17 VITALS
RESPIRATION RATE: 16 BRPM | WEIGHT: 128.6 LBS | SYSTOLIC BLOOD PRESSURE: 127 MMHG | OXYGEN SATURATION: 95 % | DIASTOLIC BLOOD PRESSURE: 71 MMHG | HEART RATE: 91 BPM | TEMPERATURE: 97 F | BODY MASS INDEX: 25.12 KG/M2

## 2024-01-17 DIAGNOSIS — C34.91 PRIMARY LUNG ADENOCARCINOMA, RIGHT: Primary | ICD-10-CM

## 2024-01-17 LAB
BASOPHILS # BLD AUTO: 0.04 10*3/MM3 (ref 0–0.2)
BASOPHILS NFR BLD AUTO: 0.4 % (ref 0–1.5)
DEPRECATED RDW RBC AUTO: 58.7 FL (ref 37–54)
EOSINOPHIL # BLD AUTO: 0.02 10*3/MM3 (ref 0–0.4)
EOSINOPHIL NFR BLD AUTO: 0.2 % (ref 0.3–6.2)
ERYTHROCYTE [DISTWIDTH] IN BLOOD BY AUTOMATED COUNT: 18 % (ref 12.3–15.4)
HCT VFR BLD AUTO: 29.1 % (ref 34–46.6)
HGB BLD-MCNC: 9.4 G/DL (ref 12–15.9)
IMM GRANULOCYTES # BLD AUTO: 0.7 10*3/MM3 (ref 0–0.05)
IMM GRANULOCYTES NFR BLD AUTO: 6.2 % (ref 0–0.5)
LYMPHOCYTES # BLD AUTO: 0.58 10*3/MM3 (ref 0.7–3.1)
LYMPHOCYTES NFR BLD AUTO: 5.1 % (ref 19.6–45.3)
MCH RBC QN AUTO: 30.4 PG (ref 26.6–33)
MCHC RBC AUTO-ENTMCNC: 32.3 G/DL (ref 31.5–35.7)
MCV RBC AUTO: 94.2 FL (ref 79–97)
MONOCYTES # BLD AUTO: 0.87 10*3/MM3 (ref 0.1–0.9)
MONOCYTES NFR BLD AUTO: 7.7 % (ref 5–12)
NEUTROPHILS NFR BLD AUTO: 80.4 % (ref 42.7–76)
NEUTROPHILS NFR BLD AUTO: 9.16 10*3/MM3 (ref 1.7–7)
NRBC BLD AUTO-RTO: 0.3 /100 WBC (ref 0–0.2)
PLATELET # BLD AUTO: 408 10*3/MM3 (ref 140–450)
PMV BLD AUTO: 10.4 FL (ref 6–12)
RBC # BLD AUTO: 3.09 10*6/MM3 (ref 3.77–5.28)
WBC NRBC COR # BLD AUTO: 11.37 10*3/MM3 (ref 3.4–10.8)

## 2024-01-17 PROCEDURE — 25810000003 SODIUM CHLORIDE 0.9 % SOLUTION: Performed by: INTERNAL MEDICINE

## 2024-01-17 PROCEDURE — 25810000003 SODIUM CHLORIDE 0.9 % SOLUTION 250 ML FLEX CONT: Performed by: INTERNAL MEDICINE

## 2024-01-17 PROCEDURE — 25010000002 DEXAMETHASONE SODIUM PHOSPHATE 100 MG/10ML SOLUTION: Performed by: INTERNAL MEDICINE

## 2024-01-17 PROCEDURE — 25010000002 GEMCITABINE 1 GM/26.3ML SOLUTION 26.3 ML VIAL: Performed by: INTERNAL MEDICINE

## 2024-01-17 PROCEDURE — 96375 TX/PRO/DX INJ NEW DRUG ADDON: CPT

## 2024-01-17 PROCEDURE — 85025 COMPLETE CBC W/AUTO DIFF WBC: CPT

## 2024-01-17 PROCEDURE — 25010000002 GEMCITABINE 200 MG/5.26ML SOLUTION 5.26 ML VIAL: Performed by: INTERNAL MEDICINE

## 2024-01-17 PROCEDURE — 96413 CHEMO IV INFUSION 1 HR: CPT

## 2024-01-17 RX ORDER — SODIUM CHLORIDE 9 MG/ML
250 INJECTION, SOLUTION INTRAVENOUS ONCE
Status: COMPLETED | OUTPATIENT
Start: 2024-01-17 | End: 2024-01-17

## 2024-01-17 RX ADMIN — SODIUM CHLORIDE 250 ML: 9 INJECTION, SOLUTION INTRAVENOUS at 13:54

## 2024-01-17 RX ADMIN — DEXAMETHASONE SODIUM PHOSPHATE 12 MG: 10 INJECTION, SOLUTION INTRAMUSCULAR; INTRAVENOUS at 13:55

## 2024-01-17 RX ADMIN — GEMCITABINE HYDROCHLORIDE 1450 MG: 38 INJECTION, SOLUTION INTRAVENOUS at 14:23

## 2024-01-17 NOTE — PROGRESS NOTES
Reviewed chart.    Met patient in infusion center; her niece is with her today.  Obtained permission to speak with niece in the room. The patient stated her shingles related pain is better. The patient is taking Lyrica and pain medication with adequate pain control. The patient is eating well and denies weight loss. She inquired about additional Boost, stating the last time she tried to get more, the pharmacy was out.  I will check with oncology nutrition and get back with her.  She also needs a new Boost card.    She denies any other questions, concerns or ongoing resource needs.     Asked Daisha and Nic about Boost. It is in stock now.  Obtained new Boost card for patient.    Provided patient with update on Boost and provided new card as requested. I will contact her in 1-2 months; encouraged patient to call as needed.

## 2024-01-31 ENCOUNTER — INFUSION (OUTPATIENT)
Dept: ONCOLOGY | Facility: HOSPITAL | Age: 79
End: 2024-01-31
Payer: MEDICARE

## 2024-01-31 VITALS
DIASTOLIC BLOOD PRESSURE: 62 MMHG | HEART RATE: 81 BPM | RESPIRATION RATE: 16 BRPM | TEMPERATURE: 98 F | SYSTOLIC BLOOD PRESSURE: 137 MMHG | OXYGEN SATURATION: 94 % | WEIGHT: 129.2 LBS | BODY MASS INDEX: 25.23 KG/M2

## 2024-01-31 DIAGNOSIS — C34.91 PRIMARY LUNG ADENOCARCINOMA, RIGHT: Primary | ICD-10-CM

## 2024-01-31 LAB
ALBUMIN SERPL-MCNC: 3.5 G/DL (ref 3.5–5.2)
ALBUMIN/GLOB SERPL: 1.2 G/DL
ALP SERPL-CCNC: 92 U/L (ref 39–117)
ALT SERPL W P-5'-P-CCNC: 6 U/L (ref 1–33)
ANION GAP SERPL CALCULATED.3IONS-SCNC: 11.4 MMOL/L (ref 5–15)
AST SERPL-CCNC: 17 U/L (ref 1–32)
BASOPHILS # BLD AUTO: 0.03 10*3/MM3 (ref 0–0.2)
BASOPHILS NFR BLD AUTO: 0.2 % (ref 0–1.5)
BILIRUB SERPL-MCNC: 0.2 MG/DL (ref 0–1.2)
BUN SERPL-MCNC: 21 MG/DL (ref 8–23)
BUN/CREAT SERPL: 21.6 (ref 7–25)
CALCIUM SPEC-SCNC: 9.2 MG/DL (ref 8.6–10.5)
CHLORIDE SERPL-SCNC: 102 MMOL/L (ref 98–107)
CO2 SERPL-SCNC: 23.6 MMOL/L (ref 22–29)
CREAT SERPL-MCNC: 0.97 MG/DL (ref 0.57–1)
DEPRECATED RDW RBC AUTO: 66.8 FL (ref 37–54)
EGFRCR SERPLBLD CKD-EPI 2021: 60.7 ML/MIN/1.73
EOSINOPHIL # BLD AUTO: 0.06 10*3/MM3 (ref 0–0.4)
EOSINOPHIL NFR BLD AUTO: 0.5 % (ref 0.3–6.2)
ERYTHROCYTE [DISTWIDTH] IN BLOOD BY AUTOMATED COUNT: 19.3 % (ref 12.3–15.4)
GLOBULIN UR ELPH-MCNC: 3 GM/DL
GLUCOSE SERPL-MCNC: 102 MG/DL (ref 65–99)
HCT VFR BLD AUTO: 28.6 % (ref 34–46.6)
HGB BLD-MCNC: 8.8 G/DL (ref 12–15.9)
IMM GRANULOCYTES # BLD AUTO: 0.27 10*3/MM3 (ref 0–0.05)
IMM GRANULOCYTES NFR BLD AUTO: 2 % (ref 0–0.5)
LYMPHOCYTES # BLD AUTO: 0.6 10*3/MM3 (ref 0.7–3.1)
LYMPHOCYTES NFR BLD AUTO: 4.6 % (ref 19.6–45.3)
MCH RBC QN AUTO: 29.9 PG (ref 26.6–33)
MCHC RBC AUTO-ENTMCNC: 30.8 G/DL (ref 31.5–35.7)
MCV RBC AUTO: 97.3 FL (ref 79–97)
MONOCYTES # BLD AUTO: 0.87 10*3/MM3 (ref 0.1–0.9)
MONOCYTES NFR BLD AUTO: 6.6 % (ref 5–12)
NEUTROPHILS NFR BLD AUTO: 11.35 10*3/MM3 (ref 1.7–7)
NEUTROPHILS NFR BLD AUTO: 86.1 % (ref 42.7–76)
NRBC BLD AUTO-RTO: 0 /100 WBC (ref 0–0.2)
PLATELET # BLD AUTO: 409 10*3/MM3 (ref 140–450)
PMV BLD AUTO: 9.9 FL (ref 6–12)
POTASSIUM SERPL-SCNC: 4.5 MMOL/L (ref 3.5–5.2)
PROT SERPL-MCNC: 6.5 G/DL (ref 6–8.5)
RBC # BLD AUTO: 2.94 10*6/MM3 (ref 3.77–5.28)
SODIUM SERPL-SCNC: 137 MMOL/L (ref 136–145)
WBC NRBC COR # BLD AUTO: 13.18 10*3/MM3 (ref 3.4–10.8)

## 2024-01-31 PROCEDURE — 25010000002 GEMCITABINE 200 MG/5.26ML SOLUTION 5.26 ML VIAL: Performed by: INTERNAL MEDICINE

## 2024-01-31 PROCEDURE — 25810000003 SODIUM CHLORIDE 0.9 % SOLUTION: Performed by: INTERNAL MEDICINE

## 2024-01-31 PROCEDURE — 25010000002 GEMCITABINE 1 GM/26.3ML SOLUTION 26.3 ML VIAL: Performed by: INTERNAL MEDICINE

## 2024-01-31 PROCEDURE — 25810000003 SODIUM CHLORIDE 0.9 % SOLUTION 250 ML FLEX CONT: Performed by: INTERNAL MEDICINE

## 2024-01-31 PROCEDURE — 85025 COMPLETE CBC W/AUTO DIFF WBC: CPT

## 2024-01-31 PROCEDURE — 96375 TX/PRO/DX INJ NEW DRUG ADDON: CPT

## 2024-01-31 PROCEDURE — 25010000002 HEPARIN LOCK FLUSH PER 10 UNITS: Performed by: INTERNAL MEDICINE

## 2024-01-31 PROCEDURE — 25010000002 DEXAMETHASONE SODIUM PHOSPHATE 100 MG/10ML SOLUTION: Performed by: INTERNAL MEDICINE

## 2024-01-31 PROCEDURE — 80053 COMPREHEN METABOLIC PANEL: CPT

## 2024-01-31 PROCEDURE — 96413 CHEMO IV INFUSION 1 HR: CPT

## 2024-01-31 RX ORDER — HEPARIN SODIUM (PORCINE) LOCK FLUSH IV SOLN 100 UNIT/ML 100 UNIT/ML
500 SOLUTION INTRAVENOUS AS NEEDED
Status: DISCONTINUED | OUTPATIENT
Start: 2024-01-31 | End: 2024-01-31 | Stop reason: HOSPADM

## 2024-01-31 RX ORDER — SODIUM CHLORIDE 0.9 % (FLUSH) 0.9 %
10 SYRINGE (ML) INJECTION AS NEEDED
Status: DISCONTINUED | OUTPATIENT
Start: 2024-01-31 | End: 2024-01-31 | Stop reason: HOSPADM

## 2024-01-31 RX ORDER — SODIUM CHLORIDE 9 MG/ML
250 INJECTION, SOLUTION INTRAVENOUS ONCE
Status: COMPLETED | OUTPATIENT
Start: 2024-01-31 | End: 2024-01-31

## 2024-01-31 RX ORDER — HEPARIN SODIUM (PORCINE) LOCK FLUSH IV SOLN 100 UNIT/ML 100 UNIT/ML
500 SOLUTION INTRAVENOUS AS NEEDED
OUTPATIENT
Start: 2024-01-31

## 2024-01-31 RX ORDER — SODIUM CHLORIDE 0.9 % (FLUSH) 0.9 %
10 SYRINGE (ML) INJECTION AS NEEDED
OUTPATIENT
Start: 2024-01-31

## 2024-01-31 RX ADMIN — Medication 10 ML: at 13:21

## 2024-01-31 RX ADMIN — GEMCITABINE HYDROCHLORIDE 1450 MG: 38 INJECTION, SOLUTION INTRAVENOUS at 12:45

## 2024-01-31 RX ADMIN — DEXAMETHASONE SODIUM PHOSPHATE 12 MG: 10 INJECTION, SOLUTION INTRAMUSCULAR; INTRAVENOUS at 12:24

## 2024-01-31 RX ADMIN — SODIUM CHLORIDE 250 ML: 9 INJECTION, SOLUTION INTRAVENOUS at 12:25

## 2024-01-31 RX ADMIN — Medication 500 UNITS: at 13:21

## 2024-02-05 DIAGNOSIS — G89.3 CHRONIC PAIN AFTER CANCER TREATMENT: ICD-10-CM

## 2024-02-05 RX ORDER — HYDROCODONE BITARTRATE AND ACETAMINOPHEN 7.5; 325 MG/1; MG/1
1 TABLET ORAL EVERY 6 HOURS PRN
Qty: 60 TABLET | Refills: 0 | Status: SHIPPED | OUTPATIENT
Start: 2024-02-05

## 2024-02-05 NOTE — TELEPHONE ENCOUNTER
Caller: ZIA DURANT    Relationship: Emergency Contact    Best call back number: [phone]    Requested Prescriptions:   Requested Prescriptions     Pending Prescriptions Disp Refills    HYDROcodone-acetaminophen (NORCO) 7.5-325 MG per tablet 60 tablet 0     Sig: Take 1 tablet by mouth Every 6 (Six) Hours As Needed for Moderate Pain.        Pharmacy where request should be sent:      [facility] [id number], [place] - [address] - [phone] [facility] [phone]  [phone]     Last office visit with prescribing clinician: 1/10/2024   Last telemedicine visit with prescribing clinician: 9/26/2023   Next office visit with prescribing clinician: 2/21/2024     Additional details provided :     WILL RUN OUT TOMORROW     Does the patient have less than a 3 day supply:  [x] Yes  [] No    Would you like a call back once the refill request has been completed: [] Yes [x] No    If the office needs to give you a call back, can they leave a voicemail: NO

## 2024-02-06 ENCOUNTER — HOSPITAL ENCOUNTER (OUTPATIENT)
Dept: MRI IMAGING | Facility: HOSPITAL | Age: 79
Discharge: HOME | End: 2024-02-06
Payer: MEDICARE

## 2024-02-06 DIAGNOSIS — M79.602 LEFT ARM PAIN: ICD-10-CM

## 2024-02-06 PROCEDURE — A9577 INJ MULTIHANCE: HCPCS | Performed by: PHYSICIAN ASSISTANT

## 2024-02-06 PROCEDURE — 72156 MRI NECK SPINE W/O & W/DYE: CPT

## 2024-02-06 PROCEDURE — 73220 MRI UPPR EXTREMITY W/O&W/DYE: CPT

## 2024-02-06 PROCEDURE — 73222 MRI JOINT UPR EXTREM W/DYE: CPT

## 2024-02-06 PROCEDURE — 0 GADOBENATE DIMEGLUMINE 529 MG/ML SOLUTION: Performed by: PHYSICIAN ASSISTANT

## 2024-02-06 RX ADMIN — GADOBENATE DIMEGLUMINE 12 ML: 529 INJECTION, SOLUTION INTRAVENOUS at 22:28

## 2024-02-07 ENCOUNTER — INFUSION (OUTPATIENT)
Dept: ONCOLOGY | Facility: HOSPITAL | Age: 79
End: 2024-02-07
Payer: MEDICARE

## 2024-02-07 VITALS
SYSTOLIC BLOOD PRESSURE: 121 MMHG | TEMPERATURE: 97.8 F | DIASTOLIC BLOOD PRESSURE: 63 MMHG | OXYGEN SATURATION: 97 % | RESPIRATION RATE: 16 BRPM | WEIGHT: 130.4 LBS | BODY MASS INDEX: 25.47 KG/M2 | HEART RATE: 84 BPM

## 2024-02-07 DIAGNOSIS — C34.91 PRIMARY LUNG ADENOCARCINOMA, RIGHT: Primary | ICD-10-CM

## 2024-02-07 LAB
ALBUMIN SERPL-MCNC: 3.5 G/DL (ref 3.5–5.2)
ALBUMIN/GLOB SERPL: 1.1 G/DL
ALP SERPL-CCNC: 93 U/L (ref 39–117)
ALT SERPL W P-5'-P-CCNC: 20 U/L (ref 1–33)
ANION GAP SERPL CALCULATED.3IONS-SCNC: 12.3 MMOL/L (ref 5–15)
AST SERPL-CCNC: 22 U/L (ref 1–32)
BASOPHILS # BLD AUTO: 0.02 10*3/MM3 (ref 0–0.2)
BASOPHILS NFR BLD AUTO: 0.3 % (ref 0–1.5)
BILIRUB SERPL-MCNC: 0.3 MG/DL (ref 0–1.2)
BUN SERPL-MCNC: 26 MG/DL (ref 8–23)
BUN/CREAT SERPL: 25.2 (ref 7–25)
CALCIUM SPEC-SCNC: 9.5 MG/DL (ref 8.6–10.5)
CHLORIDE SERPL-SCNC: 100 MMOL/L (ref 98–107)
CO2 SERPL-SCNC: 24.7 MMOL/L (ref 22–29)
CREAT SERPL-MCNC: 1.03 MG/DL (ref 0.57–1)
DEPRECATED RDW RBC AUTO: 66.2 FL (ref 37–54)
EGFRCR SERPLBLD CKD-EPI 2021: 56.5 ML/MIN/1.73
EOSINOPHIL # BLD AUTO: 0.04 10*3/MM3 (ref 0–0.4)
EOSINOPHIL NFR BLD AUTO: 0.5 % (ref 0.3–6.2)
ERYTHROCYTE [DISTWIDTH] IN BLOOD BY AUTOMATED COUNT: 19 % (ref 12.3–15.4)
GLOBULIN UR ELPH-MCNC: 3.1 GM/DL
GLUCOSE SERPL-MCNC: 99 MG/DL (ref 65–99)
HCT VFR BLD AUTO: 27 % (ref 34–46.6)
HGB BLD-MCNC: 8.2 G/DL (ref 12–15.9)
IMM GRANULOCYTES # BLD AUTO: 0.15 10*3/MM3 (ref 0–0.05)
IMM GRANULOCYTES NFR BLD AUTO: 1.9 % (ref 0–0.5)
LYMPHOCYTES # BLD AUTO: 0.47 10*3/MM3 (ref 0.7–3.1)
LYMPHOCYTES NFR BLD AUTO: 6 % (ref 19.6–45.3)
MCH RBC QN AUTO: 28.9 PG (ref 26.6–33)
MCHC RBC AUTO-ENTMCNC: 30.4 G/DL (ref 31.5–35.7)
MCV RBC AUTO: 95.1 FL (ref 79–97)
MONOCYTES # BLD AUTO: 0.69 10*3/MM3 (ref 0.1–0.9)
MONOCYTES NFR BLD AUTO: 8.8 % (ref 5–12)
NEUTROPHILS NFR BLD AUTO: 6.46 10*3/MM3 (ref 1.7–7)
NEUTROPHILS NFR BLD AUTO: 82.5 % (ref 42.7–76)
NRBC BLD AUTO-RTO: 0.3 /100 WBC (ref 0–0.2)
PLATELET # BLD AUTO: 320 10*3/MM3 (ref 140–450)
PMV BLD AUTO: 9.2 FL (ref 6–12)
POTASSIUM SERPL-SCNC: 4.4 MMOL/L (ref 3.5–5.2)
PROT SERPL-MCNC: 6.6 G/DL (ref 6–8.5)
RBC # BLD AUTO: 2.84 10*6/MM3 (ref 3.77–5.28)
SODIUM SERPL-SCNC: 137 MMOL/L (ref 136–145)
WBC NRBC COR # BLD AUTO: 7.83 10*3/MM3 (ref 3.4–10.8)

## 2024-02-07 PROCEDURE — 80053 COMPREHEN METABOLIC PANEL: CPT

## 2024-02-07 PROCEDURE — 96375 TX/PRO/DX INJ NEW DRUG ADDON: CPT

## 2024-02-07 PROCEDURE — 25810000003 SODIUM CHLORIDE 0.9 % SOLUTION: Performed by: INTERNAL MEDICINE

## 2024-02-07 PROCEDURE — 96413 CHEMO IV INFUSION 1 HR: CPT

## 2024-02-07 PROCEDURE — 25010000002 GEMCITABINE 1 GM/26.3ML SOLUTION 26.3 ML VIAL: Performed by: INTERNAL MEDICINE

## 2024-02-07 PROCEDURE — 25810000003 SODIUM CHLORIDE 0.9 % SOLUTION 250 ML FLEX CONT: Performed by: INTERNAL MEDICINE

## 2024-02-07 PROCEDURE — 25010000002 GEMCITABINE 200 MG/5.26ML SOLUTION 5.26 ML VIAL: Performed by: INTERNAL MEDICINE

## 2024-02-07 PROCEDURE — 85025 COMPLETE CBC W/AUTO DIFF WBC: CPT

## 2024-02-07 PROCEDURE — 25010000002 DEXAMETHASONE SODIUM PHOSPHATE 100 MG/10ML SOLUTION: Performed by: INTERNAL MEDICINE

## 2024-02-07 RX ORDER — SODIUM CHLORIDE 9 MG/ML
250 INJECTION, SOLUTION INTRAVENOUS ONCE
Status: COMPLETED | OUTPATIENT
Start: 2024-02-07 | End: 2024-02-07

## 2024-02-07 RX ADMIN — GEMCITABINE HYDROCHLORIDE 1450 MG: 38 INJECTION, SOLUTION INTRAVENOUS at 13:06

## 2024-02-07 RX ADMIN — DEXAMETHASONE SODIUM PHOSPHATE 12 MG: 10 INJECTION, SOLUTION INTRAMUSCULAR; INTRAVENOUS at 12:46

## 2024-02-07 RX ADMIN — SODIUM CHLORIDE 250 ML: 9 INJECTION, SOLUTION INTRAVENOUS at 12:46

## 2024-02-07 NOTE — NURSING NOTE
Reviewed of Hbg 8.2 with JUAN Banda.  Per Veronika pt should have recheck of Hgb before next appt with Dr. Lagunas on the 21st.  While talking with pt she does now describe sob and fatigue that may have been going on longer than just one day.  Pt felt initially that fatigue was related to late night and scans yesterday.  Pt does not wish to have a transfusion at this time.  Also spoke with Clinical RN Joyce regarding RN review scheduling.  Pt would like to be checked at end of the week to see if transfusion is necessary.  Request for lab and RN review on Friday at 10am to fit pt transportation needs.  Pt agreeable to this plan.

## 2024-02-09 ENCOUNTER — TELEPHONE (OUTPATIENT)
Dept: NEUROLOGY | Facility: CLINIC | Age: 79
End: 2024-02-09
Payer: MEDICARE

## 2024-02-09 ENCOUNTER — APPOINTMENT (OUTPATIENT)
Dept: ONCOLOGY | Facility: HOSPITAL | Age: 79
End: 2024-02-09
Payer: MEDICARE

## 2024-02-09 ENCOUNTER — LAB (OUTPATIENT)
Dept: ONCOLOGY | Facility: HOSPITAL | Age: 79
End: 2024-02-09
Payer: MEDICARE

## 2024-02-09 DIAGNOSIS — Z79.899 HIGH RISK MEDICATION USE: ICD-10-CM

## 2024-02-09 DIAGNOSIS — C34.91 PRIMARY LUNG ADENOCARCINOMA, RIGHT: Primary | ICD-10-CM

## 2024-02-09 DIAGNOSIS — D50.9 IRON DEFICIENCY ANEMIA, UNSPECIFIED IRON DEFICIENCY ANEMIA TYPE: ICD-10-CM

## 2024-02-09 LAB
ABO GROUP BLD: NORMAL
BASOPHILS # BLD AUTO: 0.01 10*3/MM3 (ref 0–0.2)
BASOPHILS NFR BLD AUTO: 0.1 % (ref 0–1.5)
BLD GP AB SCN SERPL QL: NEGATIVE
DEPRECATED RDW RBC AUTO: 66 FL (ref 37–54)
EOSINOPHIL # BLD AUTO: 0.03 10*3/MM3 (ref 0–0.4)
EOSINOPHIL NFR BLD AUTO: 0.4 % (ref 0.3–6.2)
ERYTHROCYTE [DISTWIDTH] IN BLOOD BY AUTOMATED COUNT: 19.2 % (ref 12.3–15.4)
FERRITIN SERPL-MCNC: 967 NG/ML (ref 13–150)
HCT VFR BLD AUTO: 24.6 % (ref 34–46.6)
HGB BLD-MCNC: 7.7 G/DL (ref 12–15.9)
HGB RETIC QN AUTO: 28.1 PG (ref 29.8–36.1)
IMM GRANULOCYTES # BLD AUTO: 0.03 10*3/MM3 (ref 0–0.05)
IMM GRANULOCYTES NFR BLD AUTO: 0.4 % (ref 0–0.5)
IMM RETICS NFR: 25.9 % (ref 3–15.8)
IRON 24H UR-MRATE: 95 MCG/DL (ref 37–145)
IRON SATN MFR SERPL: 40 % (ref 20–50)
LYMPHOCYTES # BLD AUTO: 0.19 10*3/MM3 (ref 0.7–3.1)
LYMPHOCYTES NFR BLD AUTO: 2.3 % (ref 19.6–45.3)
MCH RBC QN AUTO: 29.8 PG (ref 26.6–33)
MCHC RBC AUTO-ENTMCNC: 31.3 G/DL (ref 31.5–35.7)
MCV RBC AUTO: 95.3 FL (ref 79–97)
MONOCYTES # BLD AUTO: 0.17 10*3/MM3 (ref 0.1–0.9)
MONOCYTES NFR BLD AUTO: 2.1 % (ref 5–12)
NEUTROPHILS NFR BLD AUTO: 7.69 10*3/MM3 (ref 1.7–7)
NEUTROPHILS NFR BLD AUTO: 94.7 % (ref 42.7–76)
NRBC BLD AUTO-RTO: 0 /100 WBC (ref 0–0.2)
PLATELET # BLD AUTO: 256 10*3/MM3 (ref 140–450)
PMV BLD AUTO: 9.9 FL (ref 6–12)
RBC # BLD AUTO: 2.58 10*6/MM3 (ref 3.77–5.28)
RETICS # AUTO: 0.06 10*6/MM3 (ref 0.02–0.13)
RETICS/RBC NFR AUTO: 2.42 % (ref 0.7–1.9)
RH BLD: POSITIVE
T&S EXPIRATION DATE: NORMAL
TIBC SERPL-MCNC: 239 MCG/DL (ref 298–536)
TRANSFERRIN SERPL-MCNC: 171 MG/DL (ref 200–360)
VIT B12 BLD-MCNC: >2000 PG/ML (ref 211–946)
WBC NRBC COR # BLD AUTO: 8.12 10*3/MM3 (ref 3.4–10.8)

## 2024-02-09 PROCEDURE — 36591 DRAW BLOOD OFF VENOUS DEVICE: CPT

## 2024-02-09 PROCEDURE — 82728 ASSAY OF FERRITIN: CPT

## 2024-02-09 PROCEDURE — 25010000002 HEPARIN LOCK FLUSH PER 10 UNITS: Performed by: INTERNAL MEDICINE

## 2024-02-09 PROCEDURE — 85046 RETICYTE/HGB CONCENTRATE: CPT

## 2024-02-09 PROCEDURE — 85025 COMPLETE CBC W/AUTO DIFF WBC: CPT

## 2024-02-09 PROCEDURE — 83540 ASSAY OF IRON: CPT

## 2024-02-09 PROCEDURE — 36415 COLL VENOUS BLD VENIPUNCTURE: CPT

## 2024-02-09 PROCEDURE — 84466 ASSAY OF TRANSFERRIN: CPT

## 2024-02-09 PROCEDURE — 86901 BLOOD TYPING SEROLOGIC RH(D): CPT | Performed by: INTERNAL MEDICINE

## 2024-02-09 PROCEDURE — 82607 VITAMIN B-12: CPT | Performed by: INTERNAL MEDICINE

## 2024-02-09 PROCEDURE — 86900 BLOOD TYPING SEROLOGIC ABO: CPT | Performed by: INTERNAL MEDICINE

## 2024-02-09 PROCEDURE — 86923 COMPATIBILITY TEST ELECTRIC: CPT

## 2024-02-09 PROCEDURE — 86850 RBC ANTIBODY SCREEN: CPT | Performed by: INTERNAL MEDICINE

## 2024-02-09 RX ORDER — SODIUM CHLORIDE 9 MG/ML
250 INJECTION, SOLUTION INTRAVENOUS AS NEEDED
Status: CANCELLED | OUTPATIENT
Start: 2024-02-12

## 2024-02-09 RX ORDER — ACETAMINOPHEN 325 MG/1
650 TABLET ORAL ONCE
Status: CANCELLED | OUTPATIENT
Start: 2024-02-12 | End: 2024-02-09

## 2024-02-09 RX ORDER — SODIUM CHLORIDE 0.9 % (FLUSH) 0.9 %
10 SYRINGE (ML) INJECTION AS NEEDED
Status: DISCONTINUED | OUTPATIENT
Start: 2024-02-09 | End: 2024-02-09 | Stop reason: HOSPADM

## 2024-02-09 RX ORDER — FUROSEMIDE 10 MG/ML
20 INJECTION INTRAMUSCULAR; INTRAVENOUS ONCE
Status: CANCELLED | OUTPATIENT
Start: 2024-02-12 | End: 2024-02-09

## 2024-02-09 RX ORDER — HEPARIN SODIUM (PORCINE) LOCK FLUSH IV SOLN 100 UNIT/ML 100 UNIT/ML
500 SOLUTION INTRAVENOUS AS NEEDED
Status: CANCELLED | OUTPATIENT
Start: 2024-02-09

## 2024-02-09 RX ORDER — DIPHENHYDRAMINE HCL 25 MG
25 CAPSULE ORAL ONCE
Status: CANCELLED | OUTPATIENT
Start: 2024-02-12 | End: 2024-02-09

## 2024-02-09 RX ORDER — SODIUM CHLORIDE 0.9 % (FLUSH) 0.9 %
10 SYRINGE (ML) INJECTION AS NEEDED
OUTPATIENT
Start: 2024-02-09

## 2024-02-09 RX ORDER — HEPARIN SODIUM (PORCINE) LOCK FLUSH IV SOLN 100 UNIT/ML 100 UNIT/ML
500 SOLUTION INTRAVENOUS AS NEEDED
Status: DISCONTINUED | OUTPATIENT
Start: 2024-02-09 | End: 2024-02-09 | Stop reason: HOSPADM

## 2024-02-09 RX ADMIN — Medication 500 UNITS: at 10:29

## 2024-02-09 RX ADMIN — Medication 10 ML: at 10:29

## 2024-02-09 NOTE — TELEPHONE ENCOUNTER
----- Message from PAPITO Maddox sent at 2/8/2024 10:49 AM EST -----  MRI did not show anything pressing or concerning.  There is evidence of her prior surgery, some degenerative findings that her pain becomes more problematic it may be worthwhile seeing neurosurgery again.  I am happy to refer if she wants to explore that route now, otherwise we can continue with conservative treatment and follow

## 2024-02-12 ENCOUNTER — HOSPITAL ENCOUNTER (OUTPATIENT)
Dept: INFUSION THERAPY | Facility: HOSPITAL | Age: 79
Discharge: HOME | End: 2024-02-12
Payer: MEDICARE

## 2024-02-12 ENCOUNTER — TELEPHONE (OUTPATIENT)
Dept: ONCOLOGY | Facility: CLINIC | Age: 79
End: 2024-02-12
Payer: MEDICARE

## 2024-02-12 VITALS
OXYGEN SATURATION: 94 % | RESPIRATION RATE: 16 BRPM | TEMPERATURE: 97.9 F | SYSTOLIC BLOOD PRESSURE: 119 MMHG | DIASTOLIC BLOOD PRESSURE: 47 MMHG | HEART RATE: 72 BPM

## 2024-02-12 DIAGNOSIS — Z79.899 HIGH RISK MEDICATION USE: ICD-10-CM

## 2024-02-12 DIAGNOSIS — C34.91 PRIMARY LUNG ADENOCARCINOMA, RIGHT: Primary | ICD-10-CM

## 2024-02-12 DIAGNOSIS — D50.9 IRON DEFICIENCY ANEMIA, UNSPECIFIED IRON DEFICIENCY ANEMIA TYPE: ICD-10-CM

## 2024-02-12 PROCEDURE — 96374 THER/PROPH/DIAG INJ IV PUSH: CPT

## 2024-02-12 PROCEDURE — 25010000002 HEPARIN LOCK FLUSH PER 10 UNITS: Performed by: INTERNAL MEDICINE

## 2024-02-12 PROCEDURE — 25010000002 FUROSEMIDE PER 20 MG: Performed by: INTERNAL MEDICINE

## 2024-02-12 PROCEDURE — 63710000001 DIPHENHYDRAMINE PER 50 MG: Performed by: INTERNAL MEDICINE

## 2024-02-12 PROCEDURE — 86900 BLOOD TYPING SEROLOGIC ABO: CPT

## 2024-02-12 PROCEDURE — P9016 RBC LEUKOCYTES REDUCED: HCPCS

## 2024-02-12 PROCEDURE — 36430 TRANSFUSION BLD/BLD COMPNT: CPT

## 2024-02-12 RX ORDER — HYDROCODONE BITARTRATE AND ACETAMINOPHEN 7.5; 325 MG/1; MG/1
1 TABLET ORAL ONCE
Status: COMPLETED | OUTPATIENT
Start: 2024-02-12 | End: 2024-02-12

## 2024-02-12 RX ORDER — HEPARIN SODIUM (PORCINE) LOCK FLUSH IV SOLN 100 UNIT/ML 100 UNIT/ML
500 SOLUTION INTRAVENOUS AS NEEDED
Status: DISCONTINUED | OUTPATIENT
Start: 2024-02-12 | End: 2024-02-14 | Stop reason: HOSPADM

## 2024-02-12 RX ORDER — HEPARIN SODIUM (PORCINE) LOCK FLUSH IV SOLN 100 UNIT/ML 100 UNIT/ML
500 SOLUTION INTRAVENOUS AS NEEDED
OUTPATIENT
Start: 2024-02-12

## 2024-02-12 RX ORDER — ACETAMINOPHEN 325 MG/1
650 TABLET ORAL ONCE
Status: COMPLETED | OUTPATIENT
Start: 2024-02-12 | End: 2024-02-12

## 2024-02-12 RX ORDER — DIPHENHYDRAMINE HCL 25 MG
25 CAPSULE ORAL ONCE
Status: COMPLETED | OUTPATIENT
Start: 2024-02-12 | End: 2024-02-12

## 2024-02-12 RX ORDER — SODIUM CHLORIDE 0.9 % (FLUSH) 0.9 %
10 SYRINGE (ML) INJECTION AS NEEDED
OUTPATIENT
Start: 2024-02-12

## 2024-02-12 RX ORDER — FUROSEMIDE 10 MG/ML
20 INJECTION INTRAMUSCULAR; INTRAVENOUS ONCE
Status: COMPLETED | OUTPATIENT
Start: 2024-02-12 | End: 2024-02-12

## 2024-02-12 RX ORDER — SODIUM CHLORIDE 9 MG/ML
250 INJECTION, SOLUTION INTRAVENOUS AS NEEDED
Status: DISCONTINUED | OUTPATIENT
Start: 2024-02-12 | End: 2024-02-14 | Stop reason: HOSPADM

## 2024-02-12 RX ADMIN — HYDROCODONE BITARTRATE AND ACETAMINOPHEN 1 TABLET: 7.5; 325 TABLET ORAL at 14:20

## 2024-02-12 RX ADMIN — ACETAMINOPHEN 325MG 650 MG: 325 TABLET ORAL at 10:18

## 2024-02-12 RX ADMIN — HEPARIN 500 UNITS: 100 SYRINGE at 16:04

## 2024-02-12 RX ADMIN — FUROSEMIDE 20 MG: 10 INJECTION, SOLUTION INTRAMUSCULAR; INTRAVENOUS at 13:15

## 2024-02-12 RX ADMIN — DIPHENHYDRAMINE HYDROCHLORIDE 25 MG: 25 CAPSULE ORAL at 10:18

## 2024-02-13 LAB
BH BB BLOOD EXPIRATION DATE: NORMAL
BH BB BLOOD EXPIRATION DATE: NORMAL
BH BB BLOOD TYPE BARCODE: 5100
BH BB BLOOD TYPE BARCODE: 5100
BH BB DISPENSE STATUS: NORMAL
BH BB DISPENSE STATUS: NORMAL
BH BB PRODUCT CODE: NORMAL
BH BB PRODUCT CODE: NORMAL
BH BB UNIT NUMBER: NORMAL
BH BB UNIT NUMBER: NORMAL
CROSSMATCH INTERPRETATION: NORMAL
CROSSMATCH INTERPRETATION: NORMAL
UNIT  ABO: NORMAL
UNIT  ABO: NORMAL
UNIT  RH: NORMAL
UNIT  RH: NORMAL

## 2024-02-17 ENCOUNTER — APPOINTMENT (OUTPATIENT)
Dept: CT IMAGING | Facility: HOSPITAL | Age: 79
DRG: 392 | End: 2024-02-17
Payer: MEDICARE

## 2024-02-17 ENCOUNTER — HOSPITAL ENCOUNTER (INPATIENT)
Facility: HOSPITAL | Age: 79
LOS: 1 days | Discharge: HOME HEALTH | DRG: 392 | End: 2024-02-19
Attending: EMERGENCY MEDICINE | Admitting: STUDENT IN AN ORGANIZED HEALTH CARE EDUCATION/TRAINING PROGRAM
Payer: MEDICARE

## 2024-02-17 DIAGNOSIS — A08.31 GASTROENTERITIS DUE TO SAPOVIRUS: Primary | ICD-10-CM

## 2024-02-17 DIAGNOSIS — C34.91 PRIMARY LUNG ADENOCARCINOMA, RIGHT: ICD-10-CM

## 2024-02-17 DIAGNOSIS — R19.7 NAUSEA VOMITING AND DIARRHEA: ICD-10-CM

## 2024-02-17 DIAGNOSIS — R11.2 NAUSEA VOMITING AND DIARRHEA: ICD-10-CM

## 2024-02-17 DIAGNOSIS — M79.2 NEUROPATHIC PAIN: ICD-10-CM

## 2024-02-17 PROBLEM — N17.9 AKI (ACUTE KIDNEY INJURY): Status: ACTIVE | Noted: 2024-02-17

## 2024-02-17 PROBLEM — N18.31 STAGE 3A CHRONIC KIDNEY DISEASE (CKD): Status: ACTIVE | Noted: 2024-02-17

## 2024-02-17 LAB
ADV 40+41 DNA STL QL NAA+NON-PROBE: NOT DETECTED
ALBUMIN SERPL-MCNC: 3.5 G/DL (ref 3.5–5.2)
ALBUMIN/GLOB SERPL: 1.1 G/DL
ALP SERPL-CCNC: 104 U/L (ref 39–117)
ALT SERPL W P-5'-P-CCNC: 29 U/L (ref 1–33)
ANION GAP SERPL CALCULATED.3IONS-SCNC: 14.9 MMOL/L (ref 5–15)
AST SERPL-CCNC: 34 U/L (ref 1–32)
ASTRO TYP 1-8 RNA STL QL NAA+NON-PROBE: NOT DETECTED
BASOPHILS # BLD AUTO: 0.03 10*3/MM3 (ref 0–0.2)
BASOPHILS NFR BLD AUTO: 0.3 % (ref 0–1.5)
BILIRUB SERPL-MCNC: 0.4 MG/DL (ref 0–1.2)
BUN SERPL-MCNC: 27 MG/DL (ref 8–23)
BUN/CREAT SERPL: 18.8 (ref 7–25)
C CAYETANENSIS DNA STL QL NAA+NON-PROBE: NOT DETECTED
C COLI+JEJ+UPSA DNA STL QL NAA+NON-PROBE: NOT DETECTED
C DIFF TOX GENS STL QL NAA+PROBE: NEGATIVE
CALCIUM SPEC-SCNC: 8.8 MG/DL (ref 8.6–10.5)
CHLORIDE SERPL-SCNC: 102 MMOL/L (ref 98–107)
CO2 SERPL-SCNC: 22.1 MMOL/L (ref 22–29)
CREAT SERPL-MCNC: 1.44 MG/DL (ref 0.57–1)
CRYPTOSP DNA STL QL NAA+NON-PROBE: NOT DETECTED
DEPRECATED RDW RBC AUTO: 48 FL (ref 37–54)
E HISTOLYT DNA STL QL NAA+NON-PROBE: NOT DETECTED
EAEC PAA PLAS AGGR+AATA ST NAA+NON-PRB: NOT DETECTED
EC STX1+STX2 GENES STL QL NAA+NON-PROBE: NOT DETECTED
EGFRCR SERPLBLD CKD-EPI 2021: 37.5 ML/MIN/1.73
EOSINOPHIL # BLD AUTO: 0.08 10*3/MM3 (ref 0–0.4)
EOSINOPHIL NFR BLD AUTO: 0.8 % (ref 0.3–6.2)
EPEC EAE GENE STL QL NAA+NON-PROBE: NOT DETECTED
ERYTHROCYTE [DISTWIDTH] IN BLOOD BY AUTOMATED COUNT: 15.7 % (ref 12.3–15.4)
ETEC LTA+ST1A+ST1B TOX ST NAA+NON-PROBE: NOT DETECTED
G LAMBLIA DNA STL QL NAA+NON-PROBE: NOT DETECTED
GLOBULIN UR ELPH-MCNC: 3.1 GM/DL
GLUCOSE SERPL-MCNC: 80 MG/DL (ref 65–99)
HCT VFR BLD AUTO: 35.6 % (ref 34–46.6)
HGB BLD-MCNC: 11.6 G/DL (ref 12–15.9)
IMM GRANULOCYTES # BLD AUTO: 0.37 10*3/MM3 (ref 0–0.05)
IMM GRANULOCYTES NFR BLD AUTO: 3.8 % (ref 0–0.5)
LIPASE SERPL-CCNC: 16 U/L (ref 13–60)
LYMPHOCYTES # BLD AUTO: 0.78 10*3/MM3 (ref 0.7–3.1)
LYMPHOCYTES NFR BLD AUTO: 8.1 % (ref 19.6–45.3)
MCH RBC QN AUTO: 28.2 PG (ref 26.6–33)
MCHC RBC AUTO-ENTMCNC: 32.6 G/DL (ref 31.5–35.7)
MCV RBC AUTO: 86.4 FL (ref 79–97)
MONOCYTES # BLD AUTO: 1.32 10*3/MM3 (ref 0.1–0.9)
MONOCYTES NFR BLD AUTO: 13.6 % (ref 5–12)
NEUTROPHILS NFR BLD AUTO: 7.1 10*3/MM3 (ref 1.7–7)
NEUTROPHILS NFR BLD AUTO: 73.4 % (ref 42.7–76)
NOROVIRUS GI+II RNA STL QL NAA+NON-PROBE: NOT DETECTED
NRBC BLD AUTO-RTO: 0 /100 WBC (ref 0–0.2)
P SHIGELLOIDES DNA STL QL NAA+NON-PROBE: NOT DETECTED
PLATELET # BLD AUTO: 251 10*3/MM3 (ref 140–450)
PMV BLD AUTO: 10.7 FL (ref 6–12)
POTASSIUM SERPL-SCNC: 3.9 MMOL/L (ref 3.5–5.2)
PROT SERPL-MCNC: 6.6 G/DL (ref 6–8.5)
RBC # BLD AUTO: 4.12 10*6/MM3 (ref 3.77–5.28)
RVA RNA STL QL NAA+NON-PROBE: NOT DETECTED
S ENT+BONG DNA STL QL NAA+NON-PROBE: NOT DETECTED
SAPO I+II+IV+V RNA STL QL NAA+NON-PROBE: DETECTED
SHIGELLA SP+EIEC IPAH ST NAA+NON-PROBE: NOT DETECTED
SODIUM SERPL-SCNC: 139 MMOL/L (ref 136–145)
V CHOL+PARA+VUL DNA STL QL NAA+NON-PROBE: NOT DETECTED
V CHOLERAE DNA STL QL NAA+NON-PROBE: NOT DETECTED
WBC NRBC COR # BLD AUTO: 9.68 10*3/MM3 (ref 3.4–10.8)
Y ENTEROCOL DNA STL QL NAA+NON-PROBE: NOT DETECTED

## 2024-02-17 PROCEDURE — 99285 EMERGENCY DEPT VISIT HI MDM: CPT

## 2024-02-17 PROCEDURE — 83690 ASSAY OF LIPASE: CPT | Performed by: EMERGENCY MEDICINE

## 2024-02-17 PROCEDURE — 85025 COMPLETE CBC W/AUTO DIFF WBC: CPT | Performed by: EMERGENCY MEDICINE

## 2024-02-17 PROCEDURE — 74177 CT ABD & PELVIS W/CONTRAST: CPT

## 2024-02-17 PROCEDURE — 25810000003 LACTATED RINGERS SOLUTION: Performed by: EMERGENCY MEDICINE

## 2024-02-17 PROCEDURE — G0378 HOSPITAL OBSERVATION PER HR: HCPCS

## 2024-02-17 PROCEDURE — 25510000001 IOPAMIDOL 61 % SOLUTION: Performed by: EMERGENCY MEDICINE

## 2024-02-17 PROCEDURE — 87493 C DIFF AMPLIFIED PROBE: CPT | Performed by: EMERGENCY MEDICINE

## 2024-02-17 PROCEDURE — 25010000002 ONDANSETRON PER 1 MG: Performed by: EMERGENCY MEDICINE

## 2024-02-17 PROCEDURE — 87507 IADNA-DNA/RNA PROBE TQ 12-25: CPT | Performed by: EMERGENCY MEDICINE

## 2024-02-17 PROCEDURE — 80053 COMPREHEN METABOLIC PANEL: CPT | Performed by: EMERGENCY MEDICINE

## 2024-02-17 RX ORDER — ONDANSETRON 4 MG/1
4 TABLET, ORALLY DISINTEGRATING ORAL EVERY 6 HOURS PRN
Status: DISCONTINUED | OUTPATIENT
Start: 2024-02-17 | End: 2024-02-19 | Stop reason: HOSPADM

## 2024-02-17 RX ORDER — HEPARIN SODIUM 5000 [USP'U]/ML
5000 INJECTION, SOLUTION INTRAVENOUS; SUBCUTANEOUS EVERY 8 HOURS SCHEDULED
Status: DISCONTINUED | OUTPATIENT
Start: 2024-02-17 | End: 2024-02-19 | Stop reason: HOSPADM

## 2024-02-17 RX ORDER — SODIUM CHLORIDE, SODIUM LACTATE, POTASSIUM CHLORIDE, CALCIUM CHLORIDE 600; 310; 30; 20 MG/100ML; MG/100ML; MG/100ML; MG/100ML
75 INJECTION, SOLUTION INTRAVENOUS CONTINUOUS
Status: DISCONTINUED | OUTPATIENT
Start: 2024-02-17 | End: 2024-02-19 | Stop reason: HOSPADM

## 2024-02-17 RX ORDER — ACETAMINOPHEN 325 MG/1
650 TABLET ORAL EVERY 4 HOURS PRN
Status: DISCONTINUED | OUTPATIENT
Start: 2024-02-17 | End: 2024-02-19 | Stop reason: HOSPADM

## 2024-02-17 RX ORDER — UREA 10 %
3 LOTION (ML) TOPICAL NIGHTLY PRN
Status: DISCONTINUED | OUTPATIENT
Start: 2024-02-17 | End: 2024-02-19 | Stop reason: HOSPADM

## 2024-02-17 RX ORDER — ONDANSETRON 2 MG/ML
4 INJECTION INTRAMUSCULAR; INTRAVENOUS ONCE
Status: COMPLETED | OUTPATIENT
Start: 2024-02-17 | End: 2024-02-17

## 2024-02-17 RX ORDER — ONDANSETRON 2 MG/ML
4 INJECTION INTRAMUSCULAR; INTRAVENOUS EVERY 6 HOURS PRN
Status: DISCONTINUED | OUTPATIENT
Start: 2024-02-17 | End: 2024-02-19 | Stop reason: HOSPADM

## 2024-02-17 RX ORDER — LOPERAMIDE HYDROCHLORIDE 2 MG/1
2 CAPSULE ORAL 4 TIMES DAILY PRN
Status: DISCONTINUED | OUTPATIENT
Start: 2024-02-17 | End: 2024-02-19 | Stop reason: HOSPADM

## 2024-02-17 RX ADMIN — ONDANSETRON 4 MG: 2 INJECTION INTRAMUSCULAR; INTRAVENOUS at 20:16

## 2024-02-17 RX ADMIN — IOPAMIDOL 85 ML: 612 INJECTION, SOLUTION INTRAVENOUS at 21:07

## 2024-02-17 RX ADMIN — SODIUM CHLORIDE, POTASSIUM CHLORIDE, SODIUM LACTATE AND CALCIUM CHLORIDE 1000 ML: 600; 310; 30; 20 INJECTION, SOLUTION INTRAVENOUS at 20:16

## 2024-02-18 LAB
ANION GAP SERPL CALCULATED.3IONS-SCNC: 15 MMOL/L (ref 5–15)
BUN SERPL-MCNC: 24 MG/DL (ref 8–23)
BUN/CREAT SERPL: 20.5 (ref 7–25)
CALCIUM SPEC-SCNC: 9.1 MG/DL (ref 8.6–10.5)
CHLORIDE SERPL-SCNC: 102 MMOL/L (ref 98–107)
CO2 SERPL-SCNC: 20 MMOL/L (ref 22–29)
CREAT SERPL-MCNC: 1.17 MG/DL (ref 0.57–1)
DEPRECATED RDW RBC AUTO: 52.2 FL (ref 37–54)
EGFRCR SERPLBLD CKD-EPI 2021: 48.2 ML/MIN/1.73
ERYTHROCYTE [DISTWIDTH] IN BLOOD BY AUTOMATED COUNT: 16 % (ref 12.3–15.4)
GLUCOSE SERPL-MCNC: 71 MG/DL (ref 65–99)
HCT VFR BLD AUTO: 36.4 % (ref 34–46.6)
HGB BLD-MCNC: 11.8 G/DL (ref 12–15.9)
MCH RBC QN AUTO: 28.9 PG (ref 26.6–33)
MCHC RBC AUTO-ENTMCNC: 32.4 G/DL (ref 31.5–35.7)
MCV RBC AUTO: 89 FL (ref 79–97)
PLATELET # BLD AUTO: 235 10*3/MM3 (ref 140–450)
PMV BLD AUTO: 10 FL (ref 6–12)
POTASSIUM SERPL-SCNC: 3.7 MMOL/L (ref 3.5–5.2)
RBC # BLD AUTO: 4.09 10*6/MM3 (ref 3.77–5.28)
SODIUM SERPL-SCNC: 137 MMOL/L (ref 136–145)
WBC NRBC COR # BLD AUTO: 10.63 10*3/MM3 (ref 3.4–10.8)

## 2024-02-18 PROCEDURE — 25810000003 LACTATED RINGERS PER 1000 ML: Performed by: EMERGENCY MEDICINE

## 2024-02-18 PROCEDURE — 36415 COLL VENOUS BLD VENIPUNCTURE: CPT | Performed by: STUDENT IN AN ORGANIZED HEALTH CARE EDUCATION/TRAINING PROGRAM

## 2024-02-18 PROCEDURE — 25010000002 HEPARIN (PORCINE) PER 1000 UNITS: Performed by: STUDENT IN AN ORGANIZED HEALTH CARE EDUCATION/TRAINING PROGRAM

## 2024-02-18 PROCEDURE — 85027 COMPLETE CBC AUTOMATED: CPT | Performed by: STUDENT IN AN ORGANIZED HEALTH CARE EDUCATION/TRAINING PROGRAM

## 2024-02-18 PROCEDURE — 97162 PT EVAL MOD COMPLEX 30 MIN: CPT | Performed by: PHYSICAL THERAPIST

## 2024-02-18 PROCEDURE — 80048 BASIC METABOLIC PNL TOTAL CA: CPT | Performed by: STUDENT IN AN ORGANIZED HEALTH CARE EDUCATION/TRAINING PROGRAM

## 2024-02-18 PROCEDURE — 63710000001 PREDNISONE PER 1 MG: Performed by: INTERNAL MEDICINE

## 2024-02-18 PROCEDURE — 25810000003 LACTATED RINGERS PER 1000 ML: Performed by: INTERNAL MEDICINE

## 2024-02-18 RX ORDER — PANTOPRAZOLE SODIUM 40 MG/1
40 TABLET, DELAYED RELEASE ORAL
Status: DISCONTINUED | OUTPATIENT
Start: 2024-02-18 | End: 2024-02-19 | Stop reason: HOSPADM

## 2024-02-18 RX ORDER — LEVETIRACETAM 500 MG/1
500 TABLET ORAL 2 TIMES DAILY
Status: DISCONTINUED | OUTPATIENT
Start: 2024-02-18 | End: 2024-02-19 | Stop reason: HOSPADM

## 2024-02-18 RX ORDER — MELATONIN
5000 DAILY
Status: DISCONTINUED | OUTPATIENT
Start: 2024-02-18 | End: 2024-02-19 | Stop reason: HOSPADM

## 2024-02-18 RX ORDER — DIPHENOXYLATE HYDROCHLORIDE AND ATROPINE SULFATE 2.5; .025 MG/1; MG/1
2 TABLET ORAL ONCE
Status: COMPLETED | OUTPATIENT
Start: 2024-02-18 | End: 2024-02-18

## 2024-02-18 RX ORDER — DIPHENOXYLATE HYDROCHLORIDE AND ATROPINE SULFATE 2.5; .025 MG/1; MG/1
1 TABLET ORAL 4 TIMES DAILY PRN
Qty: 10 TABLET | Refills: 0 | Status: ON HOLD | OUTPATIENT
Start: 2024-02-18

## 2024-02-18 RX ORDER — PREGABALIN 100 MG/1
100 CAPSULE ORAL EVERY 12 HOURS SCHEDULED
Status: DISCONTINUED | OUTPATIENT
Start: 2024-02-18 | End: 2024-02-19 | Stop reason: HOSPADM

## 2024-02-18 RX ORDER — PREDNISONE 20 MG/1
20 TABLET ORAL ONCE
Status: COMPLETED | OUTPATIENT
Start: 2024-02-18 | End: 2024-02-18

## 2024-02-18 RX ORDER — ATORVASTATIN CALCIUM 20 MG/1
20 TABLET, FILM COATED ORAL NIGHTLY
Status: DISCONTINUED | OUTPATIENT
Start: 2024-02-18 | End: 2024-02-19 | Stop reason: HOSPADM

## 2024-02-18 RX ORDER — PREDNISONE 20 MG/1
20 TABLET ORAL DAILY
Status: DISCONTINUED | OUTPATIENT
Start: 2024-02-18 | End: 2024-02-18

## 2024-02-18 RX ORDER — AZELASTINE 1 MG/ML
1 SPRAY, METERED NASAL DAILY PRN
Status: DISCONTINUED | OUTPATIENT
Start: 2024-02-18 | End: 2024-02-19 | Stop reason: HOSPADM

## 2024-02-18 RX ORDER — CLOPIDOGREL BISULFATE 75 MG/1
75 TABLET ORAL DAILY
Status: DISCONTINUED | OUTPATIENT
Start: 2024-02-18 | End: 2024-02-19 | Stop reason: HOSPADM

## 2024-02-18 RX ORDER — DIPHENOXYLATE HYDROCHLORIDE AND ATROPINE SULFATE 2.5; .025 MG/1; MG/1
1 TABLET ORAL 4 TIMES DAILY
Status: DISCONTINUED | OUTPATIENT
Start: 2024-02-18 | End: 2024-02-19 | Stop reason: HOSPADM

## 2024-02-18 RX ORDER — LOPERAMIDE HYDROCHLORIDE 2 MG/1
2 CAPSULE ORAL 4 TIMES DAILY PRN
Start: 2024-02-18 | End: 2024-02-18 | Stop reason: HOSPADM

## 2024-02-18 RX ORDER — FERROUS SULFATE 325(65) MG
325 TABLET ORAL
Status: DISCONTINUED | OUTPATIENT
Start: 2024-02-18 | End: 2024-02-19 | Stop reason: HOSPADM

## 2024-02-18 RX ORDER — HYDROCODONE BITARTRATE AND ACETAMINOPHEN 7.5; 325 MG/1; MG/1
1 TABLET ORAL EVERY 6 HOURS PRN
Status: DISCONTINUED | OUTPATIENT
Start: 2024-02-18 | End: 2024-02-19 | Stop reason: HOSPADM

## 2024-02-18 RX ORDER — PREDNISONE 20 MG/1
40 TABLET ORAL DAILY
Status: DISCONTINUED | OUTPATIENT
Start: 2024-02-19 | End: 2024-02-19 | Stop reason: HOSPADM

## 2024-02-18 RX ADMIN — FERROUS SULFATE TAB 325 MG (65 MG ELEMENTAL FE) 325 MG: 325 (65 FE) TAB at 08:40

## 2024-02-18 RX ADMIN — HEPARIN SODIUM 5000 UNITS: 5000 INJECTION INTRAVENOUS; SUBCUTANEOUS at 05:08

## 2024-02-18 RX ADMIN — Medication 5000 UNITS: at 08:40

## 2024-02-18 RX ADMIN — HYDROCODONE BITARTRATE AND ACETAMINOPHEN 1 TABLET: 7.5; 325 TABLET ORAL at 18:07

## 2024-02-18 RX ADMIN — HEPARIN SODIUM 5000 UNITS: 5000 INJECTION INTRAVENOUS; SUBCUTANEOUS at 22:03

## 2024-02-18 RX ADMIN — ACETAMINOPHEN 325MG 650 MG: 325 TABLET ORAL at 22:03

## 2024-02-18 RX ADMIN — ACETAMINOPHEN 325MG 650 MG: 325 TABLET ORAL at 05:08

## 2024-02-18 RX ADMIN — DIPHENOXYLATE HYDROCHLORIDE AND ATROPINE SULFATE 1 TABLET: 2.5; .025 TABLET ORAL at 18:01

## 2024-02-18 RX ADMIN — PANTOPRAZOLE SODIUM 40 MG: 40 TABLET, DELAYED RELEASE ORAL at 07:44

## 2024-02-18 RX ADMIN — LEVETIRACETAM 500 MG: 500 TABLET, FILM COATED ORAL at 08:41

## 2024-02-18 RX ADMIN — PREDNISONE 20 MG: 20 TABLET ORAL at 08:41

## 2024-02-18 RX ADMIN — ACETAMINOPHEN 325MG 650 MG: 325 TABLET ORAL at 14:17

## 2024-02-18 RX ADMIN — PREGABALIN 100 MG: 100 CAPSULE ORAL at 20:29

## 2024-02-18 RX ADMIN — DIPHENOXYLATE HYDROCHLORIDE AND ATROPINE SULFATE 2 TABLET: 2.5; .025 TABLET ORAL at 12:04

## 2024-02-18 RX ADMIN — PREGABALIN 100 MG: 100 CAPSULE ORAL at 08:40

## 2024-02-18 RX ADMIN — SODIUM CHLORIDE, POTASSIUM CHLORIDE, SODIUM LACTATE AND CALCIUM CHLORIDE 125 ML/HR: 600; 310; 30; 20 INJECTION, SOLUTION INTRAVENOUS at 04:29

## 2024-02-18 RX ADMIN — SODIUM CHLORIDE, POTASSIUM CHLORIDE, SODIUM LACTATE AND CALCIUM CHLORIDE 75 ML/HR: 600; 310; 30; 20 INJECTION, SOLUTION INTRAVENOUS at 18:10

## 2024-02-18 RX ADMIN — LOPERAMIDE HYDROCHLORIDE 2 MG: 2 CAPSULE ORAL at 04:29

## 2024-02-18 RX ADMIN — PREDNISONE 20 MG: 20 TABLET ORAL at 14:17

## 2024-02-18 RX ADMIN — DIPHENOXYLATE HYDROCHLORIDE AND ATROPINE SULFATE 1 TABLET: 2.5; .025 TABLET ORAL at 20:29

## 2024-02-18 RX ADMIN — LOPERAMIDE HYDROCHLORIDE 2 MG: 2 CAPSULE ORAL at 10:36

## 2024-02-18 RX ADMIN — LEVETIRACETAM 500 MG: 500 TABLET, FILM COATED ORAL at 22:02

## 2024-02-18 RX ADMIN — CLOPIDOGREL BISULFATE 75 MG: 75 TABLET, FILM COATED ORAL at 08:41

## 2024-02-18 RX ADMIN — HYDROCODONE BITARTRATE AND ACETAMINOPHEN 1 TABLET: 7.5; 325 TABLET ORAL at 08:50

## 2024-02-18 RX ADMIN — ATORVASTATIN CALCIUM 20 MG: 20 TABLET, FILM COATED ORAL at 20:29

## 2024-02-18 RX ADMIN — HEPARIN SODIUM 5000 UNITS: 5000 INJECTION INTRAVENOUS; SUBCUTANEOUS at 14:16

## 2024-02-18 NOTE — ED NOTES
"Nursing report ED to floor  Pam Vidal  79 y.o.  female    HPI :   Chief Complaint   Patient presents with    Diarrhea     Pam Vidal is a 79 y.o. female who presents to the ED via EMS for vomiting and diarrhea with weakness and decreased p.o. intake for the past 24 hours.  The patient states she has had subjective fevers and chills but denies abdominal pain.  She denies recent antibiotics.  She denies known ill contacts.  The patient states initially she thought maybe she got a bad tomato yesterday.  The patient states she is on chemotherapy for her lung cancer and her last treatment was approximately 1 week ago.     Admitting doctor:   Albaro Montgomery MD    Admitting diagnosis:   The primary encounter diagnosis was Gastroenteritis due to sapovirus. Diagnoses of Nausea vomiting and diarrhea and Primary lung adenocarcinoma, right were also pertinent to this visit.    Code status:   Current Code Status       Date Active Code Status Order ID Comments User Context       Prior            Allergies:   Del-mycin [erythromycin], Gentamicin, Ibuprofen, Latex, Metronidazole, Ofloxacin, Penicillins, Tetracycline, Adhesive tape, Aspirin, and Codeine    Isolation:   No active isolations    Intake and Output    Intake/Output Summary (Last 24 hours) at 2/17/2024 2323  Last data filed at 2/17/2024 2244  Gross per 24 hour   Intake 1000 ml   Output --   Net 1000 ml       Weight:       02/17/24 1922   Weight: 59 kg (130 lb)       Most recent vitals:   Vitals:    02/17/24 1922 02/17/24 2322   BP: 140/80    BP Location: Right arm    Patient Position: Sitting    Pulse: 64 79   Resp: 16    Temp: 98.5 °F (36.9 °C)    TempSrc: Tympanic    SpO2: 96% 94%   Weight: 59 kg (130 lb)    Height: 152.4 cm (60\")        Active LDAs/IV Access:   Lines, Drains & Airways       Active LDAs       Name Placement date Placement time Site Days    Peripheral IV 02/17/24 1950 Posterior;Right Forearm 02/17/24 1950  Forearm  less than 1    Single " Lumen Implantable Port 08/31/22 Right Subclavian 08/31/22  1200  Subclavian  Powerport  Vortex 6f  model [id number]; Lot [id number]  535                    Labs (abnormal labs have a star):   Labs Reviewed   GASTROINTESTINAL PANEL, PCR (PREFERRED) DOES NOT INCLUDE CDIFF - Abnormal; Notable for the following components:       Result Value    Sapovirus (I, II, IV or V) Detected (*)     All other components within normal limits   COMPREHENSIVE METABOLIC PANEL - Abnormal; Notable for the following components:    BUN 27 (*)     Creatinine 1.44 (*)     AST (SGOT) 34 (*)     eGFR 37.5 (*)     All other components within normal limits    Narrative:     GFR Normal >60  Chronic Kidney Disease <60  Kidney Failure <15    The GFR formula is only valid for adults with stable renal function between ages 18 and 70.   CBC WITH AUTO DIFFERENTIAL - Abnormal; Notable for the following components:    Hemoglobin 11.6 (*)     RDW 15.7 (*)     Lymphocyte % 8.1 (*)     Monocyte % 13.6 (*)     Immature Grans % 3.8 (*)     Neutrophils, Absolute 7.10 (*)     Monocytes, Absolute 1.32 (*)     Immature Grans, Absolute 0.37 (*)     All other components within normal limits   CLOSTRIDIOIDES DIFFICILE TOXIN, PCR - Normal    Narrative:     The result indicates the absence of toxigenic C. difficile from stool specimen.    LIPASE - Normal   CLOSTRIDIOIDES DIFFICILE TOXIN    Narrative:     The following orders were created for panel order Clostridioides difficile Toxin - Stool, Per Rectum.  Procedure                               Abnormality         Status                     ---------                               -----------         ------                     Clostridioides difficile...[[id number]]  Normal              Final result                 Please view results for these tests on the individual orders.   BASIC METABOLIC PANEL   CBC (NO DIFF)   CBC AND DIFFERENTIAL    Narrative:     The following orders were created for panel order CBC &  Differential.  Procedure                               Abnormality         Status                     ---------                               -----------         ------                     CBC Auto Differential[[id number]]        Abnormal            Final result                 Please view results for these tests on the individual orders.       EKG:   No orders to display       Meds given in ED:   Medications   lactated ringers infusion (has no administration in time range)   acetaminophen (TYLENOL) tablet 650 mg (has no administration in time range)   ondansetron ODT (ZOFRAN-ODT) disintegrating tablet 4 mg (has no administration in time range)     Or   ondansetron (ZOFRAN) injection 4 mg (has no administration in time range)   melatonin tablet 3 mg (has no administration in time range)   heparin (porcine) 5000 UNIT/ML injection 5,000 Units (has no administration in time range)   loperamide (IMODIUM) capsule 2 mg (has no administration in time range)   lactated ringers bolus 1,000 mL (0 mL Intravenous Stopped 2/17/24 2244)   ondansetron (ZOFRAN) injection 4 mg (4 mg Intravenous Given 2/17/24 2016)   iopamidol (ISOVUE-300) 61 % injection 100 mL (85 mL Intravenous Given 2/17/24 2107)       Imaging results:  CT Abdomen Pelvis With Contrast    Result Date: 2/17/2024   1. Liquid stool noted throughout the colon, as well as patulous loops of small bowel. Appearance is most in keeping with enterocolitis. 2. Somewhat thick-walled appearance to the urinary bladder. Correlation with urinalysis and urine cultures is recommended. 3. Indeterminate right renal lesion. As is been previously discussed, this would be better assessed with renal protocol CT or MRI.   Radiation dose reduction techniques were utilized, including automated exposure control and exposure modulation based on body size.   This report was finalized on 2/17/2024 9:30 PM by Dr. Kady Barrios M.D on Workstation: [id number]       Ambulatory status:   -      Social issues:   Social History     Socioeconomic History    Marital status:    Tobacco Use    Smoking status: Former     Packs/day: .25     Types: Cigarettes     Quit date: 2022     Years since quittin.8    Smokeless tobacco: Never    Tobacco comments:     Former some day smoker of 1-2 cigs   Vaping Use    Vaping Use: Never used   Substance and Sexual Activity    Alcohol use: Yes     Alcohol/week: 2.0 standard drinks of alcohol     Types: 2 Glasses of wine per week     Comment: occasionally    Drug use: Never    Sexual activity: Defer       NIH Stroke Scale:         Jazmin Yarbrough RN  24 23:23 EST

## 2024-02-18 NOTE — H&P
Patient Name:  Pam Vidal  YOB: 1945  MRN:  [id number]  Admit Date:  2/17/2024  Patient Care Team:  Nik Mckay MD as PCP - General  Nik Mckay MD as PCP - Family Medicine  Remedios Rice MD as Surgeon (Thoracic Surgery)  Jannet Chauhan, RN as Nurse Navigator  Remedios Rice MD as Referring Physician (Thoracic Surgery)  Cruzito Lagunas MD as Consulting Physician (Hematology and Oncology)  Mckenna Moreira MD as Consulting Physician (Radiation Oncology)      Subjective   History Present Illness     Chief Complaint   Patient presents with    Diarrhea       Ms. Vidal is a 79 y.o. woman with hypertension, hyperlipidemia, rheumatoid arthritis, carotid artery disease s/p prior endarterectomy, ckd 3a, adenocarcinoma of the lung s/p radiation and on chemotherapy with mets to the brain s/p resection and radiation therapy to the brain who presents with vomiting and diarrhea since Thursday. She states that the diarrhea has been non-bloody, but continuous. She hasn't really had any abdominal pain except when she vomits. She hasn't any sick contacts. She also hasn't been eating and drinking as much as usual.     History of Present Illness  Review of Systems   Constitutional:  Positive for fatigue and fever. Negative for chills and diaphoresis.   HENT:  Negative for postnasal drip and rhinorrhea.    Eyes: Negative.    Respiratory:  Negative for cough and shortness of breath.    Cardiovascular:  Negative for chest pain and palpitations.   Gastrointestinal:  Positive for diarrhea, nausea and vomiting. Negative for abdominal pain and blood in stool.   Endocrine: Negative.    Genitourinary:  Negative for dysuria, frequency and urgency.   Musculoskeletal:  Negative for arthralgias and myalgias.   Skin:  Negative for rash and wound.   Allergic/Immunologic: Negative.    Neurological:  Negative for light-headedness and headaches.   Hematological: Negative.    Psychiatric/Behavioral:   Negative for confusion and decreased concentration.         Personal History     Past Medical History:   Diagnosis Date    COPD (chronic obstructive pulmonary disease)     Coughing up blood     X2 MONTHS    History of COVID-19 2022    Hyperlipidemia     IBS (irritable bowel syndrome)     Primary lung adenocarcinoma, right     Rheumatoid arthritis      Past Surgical History:   Procedure Laterality Date    APPENDECTOMY      appendix removed    BRONCHOSCOPY N/A 2022    Procedure: BRONCHOSCOPY WITH BAL,  BIOPSIES, AND BRUSHINGS WITH ENDOBRONCHIAL ULTRASOUND WITH FNA;  Surgeon: Gregor Vee MD;  Location: [facility] ENDOSCOPY;  Service: Pulmonary;  Laterality: N/A;  PRE- HILAR MASS  POST- SAME    BRONCHOSCOPY N/A 2023    Procedure: BRONCHOSCOPY with biopsy, lavage, brushing;  Surgeon: Gregor Vee MD;  Location: [facility] ENDOSCOPY;  Service: Pulmonary;  Laterality: N/A;    CAROTID ENDARTERECTOMY Right     CAROTID ENDARTERECTOMY Left     CATARACT EXTRACTION      CERVICAL FUSION      CHOLECYSTECTOMY      CRANIOTOMY FOR TUMOR Right 2023    Procedure: RIGHT CRANIOTOMY FOR TUMOR RESECTION STEREOTACTIC WITH STEALTH;  Surgeon: Clint Ricketts MD;  Location: [facility] MAIN OR;  Service: Neurosurgery;  Laterality: Right;    HYSTERECTOMY      SHOULDER ARTHROSCOPY Right 2019    right should scope/cuff repair     VENOUS ACCESS DEVICE (PORT) INSERTION Right 2022    Procedure: POWERPORT INSERTION;  Surgeon: Remedios Rice MD;  Location: [facility] MAIN OR;  Service: Thoracic;  Laterality: Right;     Family History   Problem Relation Age of Onset    Cancer Mother     Cancer Father     Malig Hyperthermia Neg Hx      Social History     Tobacco Use    Smoking status: Former     Packs/day: .25     Types: Cigarettes     Quit date: 2022     Years since quittin.8    Smokeless tobacco: Never    Tobacco comments:     Former some day smoker of 1-2 cigs   Vaping Use    Vaping Use: Never used   Substance Use Topics     Alcohol use: Yes     Alcohol/week: 2.0 standard drinks of alcohol     Types: 2 Glasses of wine per week     Comment: occasionally    Drug use: Never     No current facility-administered medications on file prior to encounter.     Current Outpatient Medications on File Prior to Encounter   Medication Sig Dispense Refill    atorvastatin (LIPITOR) 20 MG tablet Take 1 tablet by mouth Every Night. 30 tablet 0    azelastine (ASTELIN) 0.1 % nasal spray 1 spray into the nostril(s) as directed by provider.      B COMPLEX VITAMINS ER PO Take  by mouth Daily.      Cholecalciferol (VITAMIN D3) 5000 units capsule capsule Take 2,000 Units by mouth Daily.      clopidogrel (PLAVIX) 75 MG tablet Take 1 tablet by mouth Daily. 30 tablet 0    esomeprazole (nexIUM) 20 MG capsule Take 1 capsule by mouth Every Morning Before Breakfast.      estradiol (ESTRACE) 1 MG tablet Take 1 tablet by mouth Daily.      FeroSul 325 (65 Fe) MG tablet TAKE ONE TABLET BY MOUTH DAILY WITH BREAKFAST 30 tablet 5    Gabapentin 10 %, Baclofen 2 %, lidocaine 4 %, Ketamine HCl 4 % Apply 1-2 g topically to the appropriate area as directed 3 (Three) to 4 (Four) times daily. 90 g 0    HYDROcodone-acetaminophen (NORCO) 7.5-325 MG per tablet Take 1 tablet by mouth Every 6 (Six) Hours As Needed for Moderate Pain. 60 tablet 0    levETIRAcetam (KEPPRA) 500 MG tablet TAKE 1 TABLET BY MOUTH TWICE A  tablet 0    lidocaine-prilocaine (EMLA) 2.5-2.5 % cream       lisinopril (PRINIVIL,ZESTRIL) 20 MG tablet Take 1 tablet by mouth Daily for 30 days. 30 tablet 0    Multiple Vitamins-Minerals (PRESERVISION AREDS 2+MULTI VIT PO) Take  by mouth.      ondansetron (ZOFRAN) 8 MG tablet Take 1 tablet by mouth 3 (Three) Times a Day As Needed for Nausea or Vomiting. 30 tablet 5    predniSONE (DELTASONE) 10 MG tablet Take 2 tablets by mouth Daily. 60 tablet 5    pregabalin (LYRICA) 150 MG capsule Take 1 capsule by mouth Every 12 (Twelve) Hours for 30 days. 60 capsule 0     pregabalin (LYRICA) 75 MG capsule TAKE 1 CAPSULE BY MOUTH TWICE A DAY ALONG WITH 150 MG CAPSULE FOR TOTAL DOSE  MG TWICE DAILY 60 capsule 0     Allergies   Allergen Reactions    Del-Mycin [Erythromycin] Hives    Gentamicin Hives    Ibuprofen Nausea And Vomiting    Latex Dermatitis     GLOVES    Metronidazole Hives    Ofloxacin Hives and Nausea Only     Has tolerated Levaquin     Penicillins Hives     Tolerated rocephin and cefepime 8/2023    Tetracycline Hives    Adhesive Tape Dermatitis     BANDAIDS    Aspirin GI Intolerance     Vomiting can take EC    Codeine Nausea And Vomiting       Objective    Objective     Vital Signs  Temp:  [98.5 °F (36.9 °C)] 98.5 °F (36.9 °C)  Heart Rate:  [64] 64  Resp:  [16] 16  BP: (140)/(80) 140/80  SpO2:  [96 %] 96 %  on  Flow (L/min):  [2] 2;   Device (Oxygen Therapy): nasal cannula  Body mass index is 25.39 kg/m².    Physical Exam  Vitals and nursing note reviewed.   Constitutional:       General: She is not in acute distress.     Appearance: She is not toxic-appearing.   HENT:      Head: Normocephalic and atraumatic.      Mouth/Throat:      Mouth: Mucous membranes are moist.      Pharynx: Oropharynx is clear. No oropharyngeal exudate.   Eyes:      Extraocular Movements: Extraocular movements intact.      Conjunctiva/sclera: Conjunctivae normal.   Cardiovascular:      Rate and Rhythm: Normal rate and regular rhythm.      Heart sounds: Normal heart sounds.   Pulmonary:      Effort: Pulmonary effort is normal. No respiratory distress.      Breath sounds: Normal breath sounds. No wheezing, rhonchi or rales.   Abdominal:      General: Bowel sounds are normal. There is no distension.      Palpations: Abdomen is soft.      Tenderness: There is no abdominal tenderness. There is no guarding or rebound.   Musculoskeletal:         General: No tenderness.      Cervical back: Normal range of motion and neck supple.      Right lower leg: No edema.      Left lower leg: No edema.   Skin:      General: Skin is warm and dry.      Findings: No erythema or rash.   Neurological:      General: No focal deficit present.      Mental Status: She is alert and oriented to person, place, and time.   Psychiatric:         Mood and Affect: Mood normal.         Behavior: Behavior normal.         Results Review:  I reviewed the patient's new clinical results.  I reviewed the patient's new imaging results and agree with the interpretation.  I reviewed the patient's other test results and agree with the interpretation  I personally viewed and interpreted the patient's EKG/Telemetry data  Discussed with ED provider.    Lab Results (last 24 hours)       Procedure Component Value Units Date/Time    Gastrointestinal Panel, PCR - Stool, Per Rectum [[id number]]  (Abnormal) Collected: 02/17/24 1941    Specimen: Stool from Per Rectum Updated: 02/17/24 2119     Campylobacter Not Detected     Plesiomonas shigelloides Not Detected     Salmonella Not Detected     Vibrio Not Detected     Vibrio cholerae Not Detected     Yersinia enterocolitica Not Detected     Enteroaggregative E. coli (EAEC) Not Detected     Enteropathogenic E. coli (EPEC) Not Detected     Enterotoxigenic E. coli (ETEC) lt/st Not Detected     Shiga-like toxin-producing E. coli (STEC) stx1/stx2 Not Detected     Shigella/Enteroinvasive E. coli (EIEC) Not Detected     Cryptosporidium Not Detected     Cyclospora cayetanensis Not Detected     Entamoeba histolytica Not Detected     Giardia lamblia Not Detected     Adenovirus F40/41 Not Detected     Astrovirus Not Detected     Norovirus GI/GII Not Detected     Rotavirus A Not Detected     Sapovirus (I, II, IV or V) Detected    Clostridioides difficile Toxin - Stool, Per Rectum [[id number]]  (Normal) Collected: 02/17/24 1941    Specimen: Stool from Per Rectum Updated: 02/17/24 2035    Narrative:      The following orders were created for panel order Clostridioides difficile Toxin - Stool, Per Rectum.  Procedure                                Abnormality         Status                     ---------                               -----------         ------                     Clostridioides difficile...[[id number]]  Normal              Final result                 Please view results for these tests on the individual orders.    Clostridioides difficile Toxin, PCR - Stool, Per Rectum [[id number]]  (Normal) Collected: 02/17/24 1941    Specimen: Stool from Per Rectum Updated: 02/17/24 2035     Toxigenic C. difficile by PCR Negative    Narrative:      The result indicates the absence of toxigenic C. difficile from stool specimen.     CBC & Differential [[id number]]  (Abnormal) Collected: 02/17/24 1949    Specimen: Blood Updated: 02/17/24 2030    Narrative:      The following orders were created for panel order CBC & Differential.  Procedure                               Abnormality         Status                     ---------                               -----------         ------                     CBC Auto Differential[[id number]]        Abnormal            Final result                 Please view results for these tests on the individual orders.    Comprehensive Metabolic Panel [[id number]]  (Abnormal) Collected: 02/17/24 1949    Specimen: Blood Updated: 02/17/24 2047     Glucose 80 mg/dL      BUN 27 mg/dL      Creatinine 1.44 mg/dL      Sodium 139 mmol/L      Potassium 3.9 mmol/L      Chloride 102 mmol/L      CO2 22.1 mmol/L      Calcium 8.8 mg/dL      Total Protein 6.6 g/dL      Albumin 3.5 g/dL      ALT (SGPT) 29 U/L      AST (SGOT) 34 U/L      Alkaline Phosphatase 104 U/L      Total Bilirubin 0.4 mg/dL      Globulin 3.1 gm/dL      A/G Ratio 1.1 g/dL      BUN/Creatinine Ratio 18.8     Anion Gap 14.9 mmol/L      eGFR 37.5 mL/min/1.73     Narrative:      GFR Normal >60  Chronic Kidney Disease <60  Kidney Failure <15    The GFR formula is only valid for adults with stable renal function between ages 18 and 70.    Lipase [[id number]]  (Normal)  Collected: 02/17/24 1949    Specimen: Blood Updated: 02/17/24 2047     Lipase 16 U/L     CBC Auto Differential [[id number]]  (Abnormal) Collected: 02/17/24 1949    Specimen: Blood Updated: 02/17/24 2030     WBC 9.68 10*3/mm3      RBC 4.12 10*6/mm3      Hemoglobin 11.6 g/dL      Hematocrit 35.6 %      MCV 86.4 fL      MCH 28.2 pg      MCHC 32.6 g/dL      RDW 15.7 %      RDW-SD 48.0 fl      MPV 10.7 fL      Platelets 251 10*3/mm3      Neutrophil % 73.4 %      Lymphocyte % 8.1 %      Monocyte % 13.6 %      Eosinophil % 0.8 %      Basophil % 0.3 %      Immature Grans % 3.8 %      Neutrophils, Absolute 7.10 10*3/mm3      Lymphocytes, Absolute 0.78 10*3/mm3      Monocytes, Absolute 1.32 10*3/mm3      Eosinophils, Absolute 0.08 10*3/mm3      Basophils, Absolute 0.03 10*3/mm3      Immature Grans, Absolute 0.37 10*3/mm3      nRBC 0.0 /100 WBC             Imaging Results (Last 24 Hours)       Procedure Component Value Units Date/Time    CT Abdomen Pelvis With Contrast [[id number]] Collected: 02/17/24 2122     Updated: 02/17/24 2133    Narrative:      CT OF THE ABDOMEN AND PELVIS WITH CONTRAST     HISTORY: Abdominal pain. Nausea, vomiting, and diarrhea     COMPARISON: January 5, 2024     TECHNIQUE: Axial CT imaging was obtained through the abdomen and pelvis.  IV contrast was administered.     FINDINGS:  Images through the lung bases are clear. No suspicious hepatic lesions  are seen. There are dystrophic calcifications of the mitral valve  annulus. The stomach, duodenum, adrenal glands, spleen, and pancreas are  normal. Gallbladder is absent. No hydronephrosis is seen on either side.  The patient has multiple right-sided renal cysts. One exophytic lesion  arising from the right kidney measures higher in density than simple  cyst. It measures 2.1 x 1.5 cm. This area did appear photopenic on PET,  which would argue for a benign lesion. However, it would be better  characterized with renal protocol CT or MRI. Urinary bladder  appears  mildly thick-walled. Correlation with urinalysis and urine cultures is  recommended. There is colonic diverticulosis. Liquid stool is noted  within the colon, and there are multiple patulous loops of small bowel.  Appearance suggests enterocolitis. There are aortoiliac calcifications.  There is free fluid noted within the pelvis. There is no evidence of  bowel obstruction. Appendix and uterus are absent. No acute osseous  abnormalities are seen. There are bilateral pars defects noted at L5-S1,  with associated mild to moderate spondylolisthesis.       Impression:         1. Liquid stool noted throughout the colon, as well as patulous loops of  small bowel. Appearance is most in keeping with enterocolitis.  2. Somewhat thick-walled appearance to the urinary bladder. Correlation  with urinalysis and urine cultures is recommended.  3. Indeterminate right renal lesion. As is been previously discussed,  this would be better assessed with renal protocol CT or MRI.        Radiation dose reduction techniques were utilized, including automated  exposure control and exposure modulation based on body size.        This report was finalized on 2/17/2024 9:30 PM by Dr. Kady Barrios M.D on Workstation: [id number]               Results for orders placed during the hospital encounter of 06/11/23    Adult Transthoracic Echo Complete W/ Cont if Necessary Per Protocol    Interpretation Summary    Left ventricular systolic function is normal. Calculated left ventricular EF = 66.7%    Left ventricular outflow tract peak flow gradient at rest is 16 mmHg. Left ventricular outflow tract peak gradient with valsalva is 29 mmHg.    Left ventricular diastolic function was indeterminate.    Estimated right ventricular systolic pressure from tricuspid regurgitation is mildly elevated (35-45 mmHg).      No orders to display        Assessment/Plan     Active Hospital Problems    Diagnosis  POA    **Gastroenteritis due to sapovirus  [A08.31]  Yes    Stage 3a chronic kidney disease (CKD) [N18.31]  Yes    EMMANUEL (acute kidney injury) [N17.9]  Yes    VBI (vertebrobasilar insufficiency) [G45.0]  Yes    HTN (hypertension) [I10]  Yes    Rheumatoid arthritis [M06.9]  Yes    Hyperlipidemia [E78.5]  Yes    Lung cancer metastatic to brain [C34.90, C79.31]  Yes    Primary lung adenocarcinoma, right [C34.91]  Yes      Resolved Hospital Problems   No resolved problems to display.       Ms. Vidal is a 79 y.o. woman with hypertension, hyperlipidemia, rheumatoid arthritis, carotid artery disease s/p prior endarterectomy, ckd 3a, adenocarcinoma of the lung s/p radiation and on chemotherapy with mets to the brain s/p resection and radiation therapy to the brain who presents with vomiting and diarrhea and is found to have sapovirus induced enterocolitis    Sapovirus enterocolitis-supportive care only. Will make available some antidiarrheal medication and antiemetics.   Emmanuel on ckd 3a-almost certainly prerenal from the above. continue IVF  Restart home medications once reconciled.   I discussed the patient's findings and my recommendations with patient and ED provider.    VTE Prophylaxis - Heparin SC.  Code Status - Full code.       Albaro Montgomery MD  [facility]  02/17/24  23:02 EST

## 2024-02-18 NOTE — ED PROVIDER NOTES
EMERGENCY DEPARTMENT ENCOUNTER    Room Number:  19/19  Date seen:  2/17/2024  PCP: Nik Mckay MD  Historian: Patient and EMS      HPI:  Chief Complaint: Vomiting and diarrhea  Context: Pam Vidal is a 79 y.o. female who presents to the ED via EMS for vomiting and diarrhea with weakness and decreased p.o. intake for the past 24 hours.  The patient states she has had subjective fevers and chills but denies abdominal pain.  She denies recent antibiotics.  She denies known ill contacts.  The patient states initially she thought maybe she got a bad tomato yesterday.  The patient states she is on chemotherapy for her lung cancer and her last treatment was approximately 1 week ago.      PAST MEDICAL HISTORY  Active Ambulatory Problems     Diagnosis Date Noted    Primary lung adenocarcinoma, right 08/30/2022    Cough with hemoptysis 08/30/2022    Lung mass 08/31/2022    Advanced care planning/counseling discussion 10/03/2022    High risk medication use 10/31/2022    Muscular deconditioning 04/03/2023    Neoplastic malignant related fatigue 04/03/2023    Lung cancer metastatic to brain 05/25/2023    Brain mass 05/27/2023    COPD (chronic obstructive pulmonary disease) 05/27/2023    Rheumatoid arthritis 05/27/2023    IBS (irritable bowel syndrome) 05/27/2023    Hyperlipidemia 05/27/2023    Abnormal CT of the chest 06/11/2023    Shingles, left arm 06/11/2023    HTN (hypertension) 06/11/2023    Dizziness 08/26/2023    Campylobacter diarrhea 08/27/2023    Bacterial colitis 08/27/2023    Diarrhea 08/27/2023    Neuropathic pain 08/27/2023    VBI (vertebrobasilar insufficiency) 09/01/2023    Atypical pneumonia 09/01/2023    Post herpetic neuralgia 09/22/2023    Chronic pain after cancer treatment 09/22/2023    Left arm pain 10/04/2023     Resolved Ambulatory Problems     Diagnosis Date Noted    Leukocytosis 05/27/2023     Past Medical History:   Diagnosis Date    Coughing up blood     History of COVID-19 02/2022          REVIEW OF SYSTEMS  All systems reviewed and negative except for those discussed in HPI.       PAST SURGICAL HISTORY  Past Surgical History:   Procedure Laterality Date    APPENDECTOMY      appendix removed    BRONCHOSCOPY N/A 2022    Procedure: BRONCHOSCOPY WITH BAL,  BIOPSIES, AND BRUSHINGS WITH ENDOBRONCHIAL ULTRASOUND WITH FNA;  Surgeon: Gregor Vee MD;  Location: [facility] ENDOSCOPY;  Service: Pulmonary;  Laterality: N/A;  PRE- HILAR MASS  POST- SAME    BRONCHOSCOPY N/A 2023    Procedure: BRONCHOSCOPY with biopsy, lavage, brushing;  Surgeon: Gregor Vee MD;  Location: [facility] ENDOSCOPY;  Service: Pulmonary;  Laterality: N/A;    CAROTID ENDARTERECTOMY Right     CAROTID ENDARTERECTOMY Left     CATARACT EXTRACTION      CERVICAL FUSION      CHOLECYSTECTOMY      CRANIOTOMY FOR TUMOR Right 2023    Procedure: RIGHT CRANIOTOMY FOR TUMOR RESECTION STEREOTACTIC WITH STEALTH;  Surgeon: Clint Ricketts MD;  Location: [facility] OR;  Service: Neurosurgery;  Laterality: Right;    HYSTERECTOMY      SHOULDER ARTHROSCOPY Right 2019    right should scope/cuff repair     VENOUS ACCESS DEVICE (PORT) INSERTION Right 2022    Procedure: POWERPORT INSERTION;  Surgeon: Remedios Rice MD;  Location: [facility] MAIN OR;  Service: Thoracic;  Laterality: Right;         FAMILY HISTORY  Family History   Problem Relation Age of Onset    Cancer Mother     Cancer Father     Malig Hyperthermia Neg Hx          SOCIAL HISTORY  Social History     Socioeconomic History    Marital status:    Tobacco Use    Smoking status: Former     Packs/day: .25     Types: Cigarettes     Quit date: 2022     Years since quittin.8    Smokeless tobacco: Never    Tobacco comments:     Former some day smoker of 1-2 cigs   Vaping Use    Vaping Use: Never used   Substance and Sexual Activity    Alcohol use: Yes     Alcohol/week: 2.0 standard drinks of alcohol     Types: 2 Glasses of wine per week     Comment: occasionally     Drug use: Never    Sexual activity: Defer         ALLERGIES  Del-mycin [erythromycin], Gentamicin, Ibuprofen, Latex, Metronidazole, Ofloxacin, Penicillins, Tetracycline, Adhesive tape, Aspirin, and Codeine      PHYSICAL EXAM  ED Triage Vitals [02/17/24 1922]   Temp Heart Rate Resp BP SpO2   98.5 °F (36.9 °C) 64 16 140/80 96 %      Temp src Heart Rate Source Patient Position BP Location FiO2 (%)   Tympanic Monitor Sitting Right arm --       Physical Exam      GENERAL: 79-year-old female in mild distress  HENT: NCAT: nares patent: Neck supple: Dry mucous membranes  EYES: no scleral icterus: Pale conjunctiva  CV: regular rhythm, normal rate  RESPIRATORY: normal effort  ABDOMEN: soft, NTND: Bowel sounds positive: The patient was having active yellow foul-smelling diarrhea on arrival  MUSCULOSKELETAL: no deformity  NEURO: alert with nonfocal neuro exam  PSYCH:  calm, cooperative  SKIN: warm, dry    Vital signs and nursing notes reviewed.      LAB RESULTS  Recent Results (from the past 24 hour(s))   Gastrointestinal Panel, PCR - Stool, Per Rectum    Collection Time: 02/17/24  7:41 PM    Specimen: Per Rectum; Stool   Result Value Ref Range    Campylobacter Not Detected Not Detected    Plesiomonas shigelloides Not Detected Not Detected    Salmonella Not Detected Not Detected    Vibrio Not Detected Not Detected    Vibrio cholerae Not Detected Not Detected    Yersinia enterocolitica Not Detected Not Detected    Enteroaggregative E. coli (EAEC) Not Detected Not Detected    Enteropathogenic E. coli (EPEC) Not Detected Not Detected    Enterotoxigenic E. coli (ETEC) lt/st Not Detected Not Detected    Shiga-like toxin-producing E. coli (STEC) stx1/stx2 Not Detected Not Detected    Shigella/Enteroinvasive E. coli (EIEC) Not Detected Not Detected    Cryptosporidium Not Detected Not Detected    Cyclospora cayetanensis Not Detected Not Detected    Entamoeba histolytica Not Detected Not Detected    Giardia lamblia Not Detected Not  Detected    Adenovirus F40/41 Not Detected Not Detected    Astrovirus Not Detected Not Detected    Norovirus GI/GII Not Detected Not Detected    Rotavirus A Not Detected Not Detected    Sapovirus (I, II, IV or V) Detected (A) Not Detected   Clostridioides difficile Toxin, PCR - Stool, Per Rectum    Collection Time: 02/17/24  7:41 PM    Specimen: Per Rectum; Stool   Result Value Ref Range    Toxigenic C. difficile by PCR Negative Negative   Comprehensive Metabolic Panel    Collection Time: 02/17/24  7:49 PM    Specimen: Blood   Result Value Ref Range    Glucose 80 65 - 99 mg/dL    BUN 27 (H) 8 - 23 mg/dL    Creatinine 1.44 (H) 0.57 - 1.00 mg/dL    Sodium 139 136 - 145 mmol/L    Potassium 3.9 3.5 - 5.2 mmol/L    Chloride 102 98 - 107 mmol/L    CO2 22.1 22.0 - 29.0 mmol/L    Calcium 8.8 8.6 - 10.5 mg/dL    Total Protein 6.6 6.0 - 8.5 g/dL    Albumin 3.5 3.5 - 5.2 g/dL    ALT (SGPT) 29 1 - 33 U/L    AST (SGOT) 34 (H) 1 - 32 U/L    Alkaline Phosphatase 104 39 - 117 U/L    Total Bilirubin 0.4 0.0 - 1.2 mg/dL    Globulin 3.1 gm/dL    A/G Ratio 1.1 g/dL    BUN/Creatinine Ratio 18.8 7.0 - 25.0    Anion Gap 14.9 5.0 - 15.0 mmol/L    eGFR 37.5 (L) >60.0 mL/min/1.73   Lipase    Collection Time: 02/17/24  7:49 PM    Specimen: Blood   Result Value Ref Range    Lipase 16 13 - 60 U/L   CBC Auto Differential    Collection Time: 02/17/24  7:49 PM    Specimen: Blood   Result Value Ref Range    WBC 9.68 3.40 - 10.80 10*3/mm3    RBC 4.12 3.77 - 5.28 10*6/mm3    Hemoglobin 11.6 (L) 12.0 - 15.9 g/dL    Hematocrit 35.6 34.0 - 46.6 %    MCV 86.4 79.0 - 97.0 fL    MCH 28.2 26.6 - 33.0 pg    MCHC 32.6 31.5 - 35.7 g/dL    RDW 15.7 (H) 12.3 - 15.4 %    RDW-SD 48.0 37.0 - 54.0 fl    MPV 10.7 6.0 - 12.0 fL    Platelets 251 140 - 450 10*3/mm3    Neutrophil % 73.4 42.7 - 76.0 %    Lymphocyte % 8.1 (L) 19.6 - 45.3 %    Monocyte % 13.6 (H) 5.0 - 12.0 %    Eosinophil % 0.8 0.3 - 6.2 %    Basophil % 0.3 0.0 - 1.5 %    Immature Grans % 3.8 (H) 0.0 - 0.5  %    Neutrophils, Absolute 7.10 (H) 1.70 - 7.00 10*3/mm3    Lymphocytes, Absolute 0.78 0.70 - 3.10 10*3/mm3    Monocytes, Absolute 1.32 (H) 0.10 - 0.90 10*3/mm3    Eosinophils, Absolute 0.08 0.00 - 0.40 10*3/mm3    Basophils, Absolute 0.03 0.00 - 0.20 10*3/mm3    Immature Grans, Absolute 0.37 (H) 0.00 - 0.05 10*3/mm3    nRBC 0.0 0.0 - 0.2 /100 WBC       Ordered the above labs and reviewed the results.        RADIOLOGY  CT Abdomen Pelvis With Contrast    Result Date: 2/17/2024  CT OF THE ABDOMEN AND PELVIS WITH CONTRAST  HISTORY: Abdominal pain. Nausea, vomiting, and diarrhea  COMPARISON: January 5, 2024  TECHNIQUE: Axial CT imaging was obtained through the abdomen and pelvis. IV contrast was administered.  FINDINGS: Images through the lung bases are clear. No suspicious hepatic lesions are seen. There are dystrophic calcifications of the mitral valve annulus. The stomach, duodenum, adrenal glands, spleen, and pancreas are normal. Gallbladder is absent. No hydronephrosis is seen on either side. The patient has multiple right-sided renal cysts. One exophytic lesion arising from the right kidney measures higher in density than simple cyst. It measures 2.1 x 1.5 cm. This area did appear photopenic on PET, which would argue for a benign lesion. However, it would be better characterized with renal protocol CT or MRI. Urinary bladder appears mildly thick-walled. Correlation with urinalysis and urine cultures is recommended. There is colonic diverticulosis. Liquid stool is noted within the colon, and there are multiple patulous loops of small bowel. Appearance suggests enterocolitis. There are aortoiliac calcifications. There is free fluid noted within the pelvis. There is no evidence of bowel obstruction. Appendix and uterus are absent. No acute osseous abnormalities are seen. There are bilateral pars defects noted at L5-S1, with associated mild to moderate spondylolisthesis.       1. Liquid stool noted throughout the  colon, as well as patulous loops of small bowel. Appearance is most in keeping with enterocolitis. 2. Somewhat thick-walled appearance to the urinary bladder. Correlation with urinalysis and urine cultures is recommended. 3. Indeterminate right renal lesion. As is been previously discussed, this would be better assessed with renal protocol CT or MRI.   Radiation dose reduction techniques were utilized, including automated exposure control and exposure modulation based on body size.   This report was finalized on 2/17/2024 9:30 PM by Dr. Kady Barrios M.D on Workstation: [id number]       Ordered the above noted radiological studies. Reviewed by me in PACS.            PROCEDURES  Procedures          MEDICATIONS GIVEN IN ER  Medications   lactated ringers infusion (has no administration in time range)   acetaminophen (TYLENOL) tablet 650 mg (has no administration in time range)   ondansetron ODT (ZOFRAN-ODT) disintegrating tablet 4 mg (has no administration in time range)     Or   ondansetron (ZOFRAN) injection 4 mg (has no administration in time range)   melatonin tablet 3 mg (has no administration in time range)   heparin (porcine) 5000 UNIT/ML injection 5,000 Units (has no administration in time range)   loperamide (IMODIUM) capsule 2 mg (has no administration in time range)   lactated ringers bolus 1,000 mL (0 mL Intravenous Stopped 2/17/24 2244)   ondansetron (ZOFRAN) injection 4 mg (4 mg Intravenous Given 2/17/24 2016)   iopamidol (ISOVUE-300) 61 % injection 100 mL (85 mL Intravenous Given 2/17/24 2107)             MEDICAL DECISION MAKING, PROGRESS, and CONSULTS    All labs have been independently reviewed by me.  All radiology studies have been reviewed by me and I have also reviewed the radiology report.   EKG's independently viewed and interpreted by me.  Discussion below represents my analysis of pertinent findings related to patient's condition, differential diagnosis, treatment plan and final  disposition.      Additional sources:  - Discussed/ obtained information from independent historians: EMS states the patient has been having yellow diarrhea almost continuously in route    - External (non-ED) record review: I reviewed the patient's office note with Dr. Giordano from oncology who saw her on 1/10/2024 for stage III adenocarcinoma of the right upper lobe with brain mets in May 2023.  States she also had hospitalization for E. coli and Campylobacter colitis    - Chronic or social conditions impacting care: Patient lives at home with family    - Shared decision making: After shared decision-making discussion we agree she needs to be admitted to the hospital for further evaluation and care      Orders placed during this visit:  Orders Placed This Encounter   Procedures    Gastrointestinal Panel, PCR - Stool, Per Rectum    Clostridioides difficile Toxin - Stool, Per Rectum    Clostridioides difficile Toxin, PCR - Stool, Per Rectum    CT Abdomen Pelvis With Contrast    Comprehensive Metabolic Panel    Lipase    CBC Auto Differential    Basic Metabolic Panel    CBC (No Diff)    Diet: Liquid Diets; Clear Liquid; Fluid Consistency: Thin (IDDSI 0)    Monitor Blood Pressure    Pulse Oximetry, Continuous    Discontinue Cardiac Monitoring    Vital Signs    Up with assistance    Daily Weights    Strict Intake & Output    Oral Care    Advance Diet As Tolerated -    [facility] (on-call MD unless specified) Details    PT Consult: Eval & Treat as tolerated    Initiate Observation Status    CBC & Differential         Differential diagnosis:  My differential diagnosis includes but is not limited to gastritis, pancreatitis, cholecystitis, appendicitis, diverticulitis, urinary tract infection, kidney stone, or bowel obstruction.        Independent interpretation of labs, radiology studies, and discussions with consultants:  ED Course as of 02/17/24 2779   Sat Feb 17, 2024 1944 We will treat the patient with IV fluids and Zofran  while obtaining labs, stool cultures and CT scan for further evaluation [GP]   2156 The patient's abdominal CT scan is consistent with enterocolitis.  Her C. difficile is negative but her viral panel is positive for Sapovirus [GP]   2233 On repeat examination the patient had no further vomiting but has had diarrhea.  The patient still does not feel like she can tolerate p.o. and thus we will admit her for further evaluation and care. [GP]   6185 I discussed the case with Dr. Montgomery from [facility].  He is aware of the patient's diarrhea and need for admission to the hospital for hydration and further evaluation and care. [GP]      ED Course User Index  [GP] Alex Rivas MD               DIAGNOSIS  Final diagnoses:   Nausea vomiting and diarrhea   Primary lung adenocarcinoma, right   Gastroenteritis due to sapovirus         DISPOSITION  ADMISSION    Discussed treatment plan and reason for admission with pt/family and admitting physician.  Pt/family voiced understanding of the plan for admission for further testing/treatment as needed.            Latest Documented Vital Signs:  As of 23:55 EST  BP- 140/80 HR- 81 Temp- 98.5 °F (36.9 °C) (Tympanic) O2 sat- 93%--      --------------------  Please note that portions of this were completed with a voice recognition program.       Note Disclaimer: At [facility], we believe that sharing information builds trust and better relationships. You are receiving this note because you are receiving care at [facility] or recently visited. It is possible you will see health information before a provider has talked with you about it. This kind of information can be easy to misunderstand. To help you fully understand what it means for your health, we urge you to discuss this note with your provider.             Alex Rivas MD  02/17/24 6460

## 2024-02-18 NOTE — THERAPY EVALUATION
Patient Name: Pam Vidal  : 1945    MRN: [id number]                              Today's Date: 2024       Admit Date: 2024    Visit Dx:     ICD-10-CM ICD-9-CM   1. Gastroenteritis due to sapovirus  A08.31 008.65   2. Nausea vomiting and diarrhea  R11.2 787.91    R19.7 787.01   3. Primary lung adenocarcinoma, right  C34.91 162.9     Patient Active Problem List   Diagnosis    Primary lung adenocarcinoma, right    Cough with hemoptysis    Lung mass    Advanced care planning/counseling discussion    High risk medication use    Muscular deconditioning    Neoplastic malignant related fatigue    Lung cancer metastatic to brain    Brain mass    COPD (chronic obstructive pulmonary disease)    Rheumatoid arthritis    IBS (irritable bowel syndrome)    Hyperlipidemia    Abnormal CT of the chest    Shingles, left arm    HTN (hypertension)    Dizziness    Campylobacter diarrhea    Bacterial colitis    Diarrhea    Neuropathic pain    VBI (vertebrobasilar insufficiency)    Atypical pneumonia    Post herpetic neuralgia    Chronic pain after cancer treatment    Left arm pain    Gastroenteritis due to sapovirus    Stage 3a chronic kidney disease (CKD)    ZACH (acute kidney injury)     Past Medical History:   Diagnosis Date    COPD (chronic obstructive pulmonary disease)     Coughing up blood     X2 MONTHS    History of COVID-19 2022    Hyperlipidemia     IBS (irritable bowel syndrome)     Primary lung adenocarcinoma, right     Rheumatoid arthritis      Past Surgical History:   Procedure Laterality Date    APPENDECTOMY      appendix removed    BRONCHOSCOPY N/A 2022    Procedure: BRONCHOSCOPY WITH BAL,  BIOPSIES, AND BRUSHINGS WITH ENDOBRONCHIAL ULTRASOUND WITH FNA;  Surgeon: Gregor Vee MD;  Location: [facility] ENDOSCOPY;  Service: Pulmonary;  Laterality: N/A;  PRE- HILAR MASS  POST- SAME    BRONCHOSCOPY N/A 2023    Procedure: BRONCHOSCOPY with biopsy, lavage, brushing;  Surgeon: Gregor Vee,  MD;  Location: [facility] ENDOSCOPY;  Service: Pulmonary;  Laterality: N/A;    CAROTID ENDARTERECTOMY Right     CAROTID ENDARTERECTOMY Left     CATARACT EXTRACTION      CERVICAL FUSION      CHOLECYSTECTOMY      CRANIOTOMY FOR TUMOR Right 5/30/2023    Procedure: RIGHT CRANIOTOMY FOR TUMOR RESECTION STEREOTACTIC WITH STEALTH;  Surgeon: Clint Ricketts MD;  Location: [facility] MAIN OR;  Service: Neurosurgery;  Laterality: Right;    HYSTERECTOMY      SHOULDER ARTHROSCOPY Right 02/19/2019    right should scope/cuff repair     VENOUS ACCESS DEVICE (PORT) INSERTION Right 9/6/2022    Procedure: POWERPORT INSERTION;  Surgeon: Remedios Rice MD;  Location: [facility] MAIN OR;  Service: Thoracic;  Laterality: Right;      General Information       Row Name 02/18/24 1434          Physical Therapy Time and Intention    Document Type evaluation  -     Mode of Treatment individual therapy;physical therapy  -       Row Name 02/18/24 1434          General Information    Patient Profile Reviewed yes  -     Prior Level of Function independent:  with Rwx  -     Existing Precautions/Restrictions fall  -     Barriers to Rehab none identified  -       Row Name 02/18/24 1434          Living Environment    People in Home other (see comments)  niece  -       Row Name 02/18/24 1434          Home Main Entrance    Number of Stairs, Main Entrance none  -       Row Name 02/18/24 1434          Cognition    Orientation Status (Cognition) oriented x 4  -               User Key  (r) = Recorded By, (t) = Taken By, (c) = Cosigned By      Initials Name Provider Type     Judie Che, PT Physical Therapist                   Mobility       Row Name 02/18/24 1436          Bed Mobility    Bed Mobility supine-sit;sit-supine  -     Supine-Sit [place] (Bed Mobility) contact guard  -     Sit-Supine [place] (Bed Mobility) supervision  -     Assistive Device (Bed Mobility) bed rails;head of bed elevated  -       Row Name  02/18/24 1436          Sit-Stand Transfer    Sit-Stand [place] (Transfers) contact guard  -     Assistive Device (Sit-Stand Transfers) walker, front-wheeled  -       Row Name 02/18/24 1436          Gait/Stairs (Locomotion)    [place] Level (Gait) contact guard  -     Assistive Device (Gait) walker, front-wheeled  -     Distance in Feet (Gait) 20 ft  -     Deviations/Abnormal Patterns (Gait) gait speed decreased  -     Bilateral Gait Deviations forward flexed posture;heel strike decreased  -     Comment, (Gait/Stairs) dizziness upon standing, unsteady initially, but improved slightly. distance limited by dizziness  -               User Key  (r) = Recorded By, (t) = Taken By, (c) = Cosigned By      Initials Name Provider Type    Judie Ambrocio, PT Physical Therapist                   Obj/Interventions       Row Name 02/18/24 1437          Range of Motion Comprehensive    Comment, General Range of Motion WFL  -[facility] Name 02/18/24 1437          Strength Comprehensive (MMT)    Comment, General Manual Muscle Testing (MMT) Assessment generalized weakness, functionally at 3/5  -[facility] Name 02/18/24 1437          Balance    Balance Assessment sitting static balance;sitting dynamic balance;standing static balance;standing dynamic balance  -     Static Sitting Balance contact guard  -     Dynamic Sitting Balance minimal assist;contact guard  -KH     Position, Sitting Balance unsupported;sitting edge of bed  -     Static Standing Balance standby assist  -     Dynamic Standing Balance contact guard  -     Position/Device Used, Standing Balance walker, rolling  -               User Key  (r) = Recorded By, (t) = Taken By, (c) = Cosigned By      Initials Name Provider Type    Judie Ambrocio PT Physical Therapist                   Goals/Plan       Row Name 02/18/24 1446          Bed Mobility Goal 1 (PT)    Activity/Assistive Device (Bed Mobility Goal 1, PT)  bed mobility activities, all  -KH     [place] Level/Cues Needed (Bed Mobility Goal 1, PT) independent  -KH     Time Frame (Bed Mobility Goal 1, PT) 1 week  -       Row Name 02/18/24 1446          Transfer Goal 1 (PT)    Activity/Assistive Device (Transfer Goal 1, PT) transfers, all;walker, rolling  -KH     [place] Level/Cues Needed (Transfer Goal 1, PT) standby assist  -KH     Time Frame (Transfer Goal 1, PT) 1 week  -       Row Name 02/18/24 1446          Gait Training Goal 1 (PT)    Activity/Assistive Device (Gait Training Goal 1, PT) gait (walking locomotion);walker, rolling  -KH     [place] Level (Gait Training Goal 1, PT) standby assist  -KH     Distance (Gait Training Goal 1, PT) 100 ft  -KH     Time Frame (Gait Training Goal 1, PT) 1 week  -[facility] Name 02/18/24 1446          Patient Education Goal (PT)    Activity (Patient Education Goal, PT) HEP  -     [place]/Cues/Accuracy (Memory Goal 2, PT) demonstrates adequately  -KH     Time Frame (Patient Education Goal, PT) 1 week  -[facility] Name 02/18/24 1446          Therapy Assessment/Plan (PT)    Planned Therapy Interventions (PT) balance training;gait training;bed mobility training;home exercise program;patient/family education;transfer training  -               User Key  (r) = Recorded By, (t) = Taken By, (c) = Cosigned By      Initials Name Provider Type    Judie Ambrocio, PT Physical Therapist                   Clinical Impression       Row Name 02/18/24 1439          Pain    Pretreatment Pain Rating 9/10  -     Posttreatment Pain Rating 9/10  -     Pain Location - Side/Orientation Left  -     Pain Location upper  -     Pain Location - extremity  -     Pre/Posttreatment Pain Comment from elbow to fingertips  -     Pain Intervention(s) Nursing Notified  -       Row Name 02/18/24 1439          Plan of Care Review    Plan of Care Reviewed With patient  -     Outcome Evaluation Pt admitted from  home with N/V, diarrhea and diagnosed with gastroenteritis. Pt lives with her niece, ambulates independently with a Rwx. Today, pt presents with generalized weakness, dizziness upon standing, impaired balance, unsteady gait and limited activity tolerance. Pt would benefit from PT to address these impairments. Recommend  PT if pt is discharged. PT will follow up in the hospital if pt is not discharged today. Encouraged pt to be up more with nursing staff if she isn't discharged.  -       Row Name 02/18/24 1436          Therapy Assessment/Plan (PT)    Patient/Family Therapy Goals Statement (PT) return home to [facility]  -     Rehab Potential (PT) good, to achieve stated therapy goals  -     Criteria for Skilled Interventions Met (PT) yes  -     Therapy Frequency (PT) 6 times/wk  -       Row Name 02/18/24 1431          Positioning and Restraints    Pre-Treatment Position in bed  -     Post Treatment Position bed  -     In Bed fowlers;call light within reach;encouraged to call for assist;exit alarm on;notified [facility]               User Key  (r) = Recorded By, (t) = Taken By, (c) = Cosigned By      Initials Name Provider Type    Judie Ambrocio, PT Physical Therapist                   Outcome Measures       Row Name 02/18/24 8493          How much help from another person do you currently need...    Turning from your back to your side while in flat bed without using bedrails? 3  -KH     Moving from lying on back to sitting on the side of a flat bed without bedrails? 3  -KH     Moving to and from a bed to a chair (including a wheelchair)? 3  -KH     Standing up from a chair using your arms (e.g., wheelchair, bedside chair)? 3  -KH     Climbing 3-5 steps with a railing? 2  -KH     To walk in hospital room? 3  -KH     AM-PAC 6 Clicks Score (PT) 17  -     Highest Level of Mobility Goal 5 --> Static standing  -       Row Name 02/18/24 7200          Functional Assessment    Outcome Measure Options  AM-PAC 6 Clicks Basic Mobility (PT)  -               User Key  (r) = Recorded By, (t) = Taken By, (c) = Cosigned By      Initials Name Provider Type    Judie Ambrocio PT Physical Therapist                                 Physical Therapy Education       Title: PT OT SLP Therapies (In Progress)       Topic: Physical Therapy (Not Started)       Point: Mobility training (Not Started)       Learner Progress:  Not documented in this visit.              Point: Home exercise program (Not Started)       Learner Progress:  Not documented in this visit.              Point: Body mechanics (Not Started)       Learner Progress:  Not documented in this visit.              Point: Precautions (Not Started)       Learner Progress:  Not documented in this visit.                                  PT Recommendation and Plan  Planned Therapy Interventions (PT): balance training, gait training, bed mobility training, home exercise program, patient/family education, transfer training  Plan of Care Reviewed With: patient  Outcome Evaluation: Pt admitted from home with N/V, diarrhea and diagnosed with gastroenteritis. Pt lives with her niece, ambulates independently with a Rwx. Today, pt presents with generalized weakness, dizziness upon standing, impaired balance, unsteady gait and limited activity tolerance. Pt would benefit from PT to address these impairments. Recommend  PT if pt is discharged. PT will follow up in the hospital if pt is not discharged today. Encouraged pt to be up more with nursing staff if she isn't discharged.     Time Calculation:         PT Charges       Row Name 02/18/24 1448             Time Calculation    Start Time 1415  -KH      Stop Time 1430  -KH      Time Calculation (min) 15 min  -KH      PT Received On 02/18/24  -      PT - Next Appointment 02/19/24  -      PT Goal Re-Cert Due Date 02/25/24  -         Untimed Charges    PT Eval/Re-eval Minutes 15  -KH         Total Minutes    Untimed  Charges Total Minutes 15  -KH       Total Minutes 15  -KH                User Key  (r) = Recorded By, (t) = Taken By, (c) = Cosigned By      Initials Name Provider Type    Judie Ambrocio, PT Physical Therapist                  Therapy Charges for Today       Code Description Service Date Service Provider Modifiers Qty    77675875953 HC PT EVAL MOD COMPLEXITY 2 2/18/2024 Judie Che, PT GP 1            PT G-Codes  Outcome Measure Options: AM-PAC 6 Clicks Basic Mobility (PT)  AM-PAC 6 Clicks Score (PT): 17  PT Discharge Summary  Anticipated Discharge Disposition (PT): home, home with assist, home with home health    Judie hCe, PT  2/18/2024

## 2024-02-18 NOTE — PROGRESS NOTES
Name: Pam Vidal ADMIT: 2024   : 1945  PCP: Nik Mckay MD    MRN: [id number] LOS: 0 days   AGE/SEX: 79 y.o. female  ROOM: [facility]   Subjective   Chief Complaint   Patient presents with    Diarrhea     Still with diarrhea  On IVFs  H/o cancer  H/o RA  On prednisone    ROS  No f/c  No n/v  No cp/palp  No soa/cough    Objective   Vital Signs  Temp:  [98.2 °F (36.8 °C)-99 °F (37.2 °C)] 99 °F (37.2 °C)  Heart Rate:  [] 76  Resp:  [16-18] 18  BP: (140-185)/(53-80) 142/53  SpO2:  [91 %-96 %] 91 %  on  Flow (L/min):  [2] 2;   Device (Oxygen Therapy): room air  Body mass index is 24.93 kg/m².    Physical Exam  Constitutional:       General: She is not in acute distress.  HENT:      Head: Normocephalic and atraumatic.   Eyes:      General: No scleral icterus.  Pulmonary:      Effort: Pulmonary effort is normal. No respiratory distress.   Abdominal:      General: There is no distension.      Palpations: Abdomen is soft.   Musculoskeletal:      Cervical back: Neck supple.   Neurological:      Mental Status: She is alert.   Psychiatric:         Behavior: Behavior normal.         Results Review:       I reviewed the patient's new clinical results.  Results from last 7 days   Lab Units 24  0502 24   WBC 10*3/mm3 10.63 9.68   HEMOGLOBIN g/dL 11.8* 11.6*   PLATELETS 10*3/mm3 235 251     Results from last 7 days   Lab Units 24  0502 24   SODIUM mmol/L 137 139   POTASSIUM mmol/L 3.7 3.9   CHLORIDE mmol/L 102 102   CO2 mmol/L 20.0* 22.1   BUN mg/dL 24* 27*   CREATININE mg/dL 1.17* 1.44*   GLUCOSE mg/dL 71 80   Estimated Creatinine Clearance: 32.1 mL/min (A) (by C-G formula based on SCr of 1.17 mg/dL (H)).  Results from last 7 days   Lab Units 24   ALBUMIN g/dL 3.5   BILIRUBIN mg/dL 0.4   ALK PHOS U/L 104   AST (SGOT) U/L 34*   ALT (SGPT) U/L 29     Results from last 7 days   Lab Units 24  0502 24   CALCIUM mg/dL 9.1 8.8   ALBUMIN g/dL   "--  3.5         Coag     HbA1C   Lab Results   Component Value Date    HGBA1C 5.70 (H) 05/26/2023     Infection     Radiology(recent) CT Abdomen Pelvis With Contrast    Result Date: 2/17/2024   1. Liquid stool noted throughout the colon, as well as patulous loops of small bowel. Appearance is most in keeping with enterocolitis. 2. Somewhat thick-walled appearance to the urinary bladder. Correlation with urinalysis and urine cultures is recommended. 3. Indeterminate right renal lesion. As is been previously discussed, this would be better assessed with renal protocol CT or MRI.   Radiation dose reduction techniques were utilized, including automated exposure control and exposure modulation based on body size.   This report was finalized on 2/17/2024 9:30 PM by Dr. Kady Barrios M.D on Workstation: [id number]     No results found for: \"TROPONINT\", \"TROPONINI\", \"BNP\"  No components found for: \"TSH;2\"    atorvastatin, 20 mg, Oral, Nightly  cholecalciferol, 5,000 Units, Oral, Daily  clopidogrel, 75 mg, Oral, Daily  ferrous sulfate, 325 mg, Oral, Daily With Breakfast  heparin (porcine), 5,000 Units, Subcutaneous, Q8H  levETIRAcetam, 500 mg, Oral, BID  pantoprazole, 40 mg, Oral, Q AM  predniSONE, 20 mg, Oral, Daily  pregabalin, 100 mg, Oral, Q12H      lactated ringers, 125 mL/hr, Last Rate: 125 mL/hr (02/18/24 0656)    Diet: Liquid Diets; Full Liquid; Fluid Consistency: Thin (IDDSI 0)      Assessment & Plan      Active Hospital Problems    Diagnosis  POA    **Gastroenteritis due to sapovirus [A08.31]  Yes    Stage 3a chronic kidney disease (CKD) [N18.31]  Yes    ZACH (acute kidney injury) [N17.9]  Yes    VBI (vertebrobasilar insufficiency) [G45.0]  Yes    HTN (hypertension) [I10]  Yes    Rheumatoid arthritis [M06.9]  Yes    Hyperlipidemia [E78.5]  Yes    Lung cancer metastatic to brain [C34.90, C79.31]  Yes    Primary lung adenocarcinoma, right [C34.91]  Yes      Resolved Hospital Problems   No resolved problems to " display.       Ms. Vidal is a 79 y.o. woman with hypertension, hyperlipidemia, rheumatoid arthritis, carotid artery disease s/p prior endarterectomy, ckd 3a, adenocarcinoma of the lung s/p radiation and on chemotherapy with mets to the brain s/p resection and radiation therapy to the brain who presents with vomiting and diarrhea and is found to have sapovirus induced enterocolitis      Will have her on IVFs  Add lomotil  Increase steroids as she is on chronically and now with acute illness   Still weak and not safe for DC today will monitor another day     VALERIE SALAZAR  Reviewed records    Mike Pemberton MD  [facility]  02/18/24  15:06 EST

## 2024-02-18 NOTE — PLAN OF CARE
Goal Outcome Evaluation:  Plan of Care Reviewed With: patient           Outcome Evaluation: Pt admitted from home with N/V, diarrhea and diagnosed with gastroenteritis. Pt lives with her niece, ambulates independently with a Rwx. Today, pt presents with generalized weakness, dizziness upon standing, impaired balance, unsteady gait and limited activity tolerance. Pt would benefit from PT to address these impairments. Recommend  PT if pt is discharged. PT will follow up in the hospital if pt is not discharged today. Encouraged pt to be up more with nursing staff if she isn't discharged.      Anticipated Discharge Disposition (PT): home, home with assist, home with home health

## 2024-02-18 NOTE — DISCHARGE SUMMARY
NAME: Pam Vidal ADMIT: 2024   : 1945  PCP: Nik Mckay MD    MRN: [id number] LOS: 1 days   AGE/SEX: 79 y.o. female  ROOM: [facility]     Date of Admission:  2024  Date of Discharge:  2024    PCP: Nik Mckay MD    CHIEF COMPLAINT  Diarrhea      DISCHARGE DIAGNOSIS  Active Hospital Problems    Diagnosis  POA    **Gastroenteritis due to sapovirus [A08.31]  Yes    Stage 3a chronic kidney disease (CKD) [N18.31]  Yes    ZACH (acute kidney injury) [N17.9]  Yes    VBI (vertebrobasilar insufficiency) [G45.0]  Yes    HTN (hypertension) [I10]  Yes    Rheumatoid arthritis [M06.9]  Yes    Hyperlipidemia [E78.5]  Yes    Lung cancer metastatic to brain [C34.90, C79.31]  Yes    Primary lung adenocarcinoma, right [C34.91]  Yes      Resolved Hospital Problems   No resolved problems to display.       SECONDARY DIAGNOSES  Past Medical History:   Diagnosis Date    COPD (chronic obstructive pulmonary disease)     Coughing up blood     X2 MONTHS    History of COVID-19 2022    Hyperlipidemia     IBS (irritable bowel syndrome)     Primary lung adenocarcinoma, right     Rheumatoid arthritis          HOSPITAL COURSE  Ms. Vidal is a 79 y.o. woman with hypertension, hyperlipidemia, rheumatoid arthritis, carotid artery disease s/p prior endarterectomy, ckd 3a, adenocarcinoma of the lung s/p radiation and on chemotherapy with mets to the brain s/p resection and radiation therapy to the brain who presents with vomiting and diarrhea and is found to have sapovirus induced enterocolitis     She improved with IVFs and anti-diarrheals and wished for discharge with outpatient follow up. Incidental indeterminant renal lesion can be followed up with Nik Mckay MD or her Oncologist.     DIAGNOSTICS    ected Updated Procedure Result Status    2024 0502 2024 0620 Basic Metabolic Panel [[id number]]    (Abnormal)   Blood    Final result Component Value Units   Glucose 71 mg/dL   BUN 24 High  mg/dL    Creatinine 1.17 High  mg/dL   Sodium 137 mmol/L   Potassium 3.7 mmol/L   Chloride 102 mmol/L   CO2 20.0 Low  mmol/L   Calcium 9.1 mg/dL   BUN/Creatinine Ratio 20.5    Anion Gap 15.0 mmol/L   eGFR 48.2 Low  mL/min/1.73          02/18/2024 0502 02/18/2024 0602 CBC (No Diff) [[id number]]   (Abnormal)   Blood    Final result Component Value Units   WBC 10.63 10*3/mm3   RBC 4.09 10*6/mm3   Hemoglobin 11.8 Low  g/dL   Hematocrit 36.4 %   MCV 89.0 fL   MCH 28.9 pg   MCHC 32.4 g/dL   RDW 16.0 High  %   RDW-SD 52.2 fl   MPV 10.0 fL   Platelets 235 10*3/mm3          02/17/2024 1949 02/17/2024 2047 Comprehensive Metabolic Panel [[id number]]    (Abnormal)   Blood    Final result Component Value Units   Glucose 80 mg/dL   BUN 27 High  mg/dL   Creatinine 1.44 High  mg/dL   Sodium 139 mmol/L   Potassium 3.9 mmol/L   Chloride 102 mmol/L   CO2 22.1 mmol/L   Calcium 8.8 mg/dL   Total Protein 6.6 g/dL   Albumin 3.5 g/dL   ALT (SGPT) 29 U/L   AST (SGOT) 34 High  U/L   Alkaline Phosphatase 104 U/L   Total Bilirubin 0.4 mg/dL   Globulin 3.1 gm/dL   A/G Ratio 1.1 g/dL   BUN/Creatinine Ratio 18.8    Anion Gap 14.9 mmol/L   eGFR 37.5 Low  mL/min/1.73          02/17/2024 1949 02/17/2024 2047 Lipase [[id number]]   Blood    Final result Component Value Units   Lipase 16 U/L          02/17/2024 1949 02/17/2024 2030 CBC Auto Differential [[id number]]   (Abnormal)   Blood    Final result Component Value Units   WBC 9.68 10*3/mm3   RBC 4.12 10*6/mm3   Hemoglobin 11.6 Low  g/dL   Hematocrit 35.6 %   MCV 86.4 fL   MCH 28.2 pg   MCHC 32.6 g/dL   RDW 15.7 High  %   RDW-SD 48.0 fl   MPV 10.7 fL   Platelets 251 10*3/mm3   Neutrophil % 73.4 %   Lymphocyte % 8.1 Low  %   Monocyte % 13.6 High  %   Eosinophil % 0.8 %   Basophil % 0.3 %   Immature Grans % 3.8 High  %   Neutrophils, Absolute 7.10 High  10*3/mm3   Lymphocytes, Absolute 0.78 10*3/mm3   Monocytes, Absolute 1.32 High  10*3/mm3   Eosinophils, Absolute 0.08 10*3/mm3   Basophils, Absolute 0.03  "10*3/mm3   Immature Grans, Absolute 0.37 High  10*3/mm3   nRBC 0.0 /100 WBC          02/17/2024 1941 02/17/2024 2119 Gastrointestinal Panel, PCR - Stool, Per Rectum [[id number]]   (Abnormal)   Stool from Per Rectum    Final result Component Value   Campylobacter Not Detected   Plesiomonas shigelloides Not Detected   Salmonella Not Detected   Vibrio Not Detected   Vibrio cholerae Not Detected   Yersinia enterocolitica Not Detected   Enteroaggregative E. coli (EAEC) Not Detected   Enteropathogenic E. coli (EPEC) Not Detected   Enterotoxigenic E. coli (ETEC) lt/st Not Detected   Shiga-like toxin-producing E. coli (STEC) stx1/stx2 Not Detected   Shigella/Enteroinvasive E. coli (EIEC) Not Detected   Cryptosporidium Not Detected   Cyclospora cayetanensis Not Detected   Entamoeba histolytica Not Detected   Giardia lamblia Not Detected   Adenovirus F40/41 Not Detected   Astrovirus Not Detected   Norovirus GI/GII Not Detected   Rotavirus A Not Detected   Sapovirus (I, II, IV or V) Detected Abnormal              02/17/2024 1941 02/17/2024 2035 Clostridioides difficile Toxin - Stool, Per Rectum [[id number]]    Stool from Per Rectum    Final result Component Value   No component results             02/17/2024 1941 02/17/2024 2035 Clostridioides difficile Toxin, PCR - Stool, Per Rectum [[id number]]    Stool from Per Rectum    Final result Component Value   Toxigenic C. difficile by PCR Negative          PHYSICAL EXAM  Objective:  Vital signs: (most recent): Blood pressure 148/48, pulse 61, temperature 97.7 °F (36.5 °C), temperature source Oral, resp. rate 18, height 152.4 cm (60\"), weight 62.8 kg (138 lb 7.2 oz), SpO2 95%.                Alert  nad  No resp distress  Soft, nt    CONDITION ON DISCHARGE  Stable.      DISCHARGE DISPOSITION   Home or Self Care      DISCHARGE MEDICATIONS       Your medication list        START taking these medications        Instructions Last Dose Given Next Dose Due   diphenoxylate-atropine 2.5-0.025 " MG per tablet  Commonly known as: LOMOTIL      Take 1 tablet by mouth 4 (Four) Times a Day As Needed for Diarrhea.              CONTINUE taking these medications        Instructions Last Dose Given Next Dose Due   atorvastatin 20 MG tablet  Commonly known as: LIPITOR      Take 1 tablet by mouth Every Night.       azelastine 0.1 % nasal spray  Commonly known as: ASTELIN      1 spray into the nostril(s) as directed by provider Daily As Needed for Allergies.       B COMPLEX VITAMINS ER PO      Take  by mouth Daily.       clopidogrel 75 MG tablet  Commonly known as: PLAVIX      Take 1 tablet by mouth Daily.       esomeprazole 20 MG capsule  Commonly known as: nexIUM      Take 1 capsule by mouth Every Morning Before Breakfast.       estradiol 1 MG tablet  Commonly known as: ESTRACE      Take 1 tablet by mouth Daily.       FeroSul 325 (65 Fe) MG tablet  Generic drug: ferrous sulfate      TAKE ONE TABLET BY MOUTH DAILY WITH BREAKFAST       Gabapentin 10 %, Baclofen 2 %, lidocaine 4 %, Ketamine HCl 4 %      Apply 1-2 g topically to the appropriate area as directed 3 (Three) to 4 (Four) times daily.       HYDROcodone-acetaminophen 7.5-325 MG per tablet  Commonly known as: NORCO      Take 1 tablet by mouth Every 6 (Six) Hours As Needed for Moderate Pain.       levETIRAcetam 500 MG tablet  Commonly known as: KEPPRA      TAKE 1 TABLET BY MOUTH TWICE A DAY       lidocaine-prilocaine 2.5-2.5 % cream  Commonly known as: EMLA           lisinopril 20 MG tablet  Commonly known as: PRINIVIL,ZESTRIL      Take 1 tablet by mouth Daily for 30 days.       ondansetron 8 MG tablet  Commonly known as: ZOFRAN      Take 1 tablet by mouth 3 (Three) Times a Day As Needed for Nausea or Vomiting.       predniSONE 10 MG tablet  Commonly known as: DELTASONE      Take 2 tablets by mouth Daily.       pregabalin 150 MG capsule  Commonly known as: LYRICA      Take 1 capsule by mouth Every 12 (Twelve) Hours for 30 days.       pregabalin 75 MG  capsule  Commonly known as: LYRICA      TAKE 1 CAPSULE BY MOUTH TWICE A DAY ALONG WITH 150 MG CAPSULE FOR TOTAL DOSE  MG TWICE DAILY       PRESERVISION AREDS 2+MULTI VIT PO      Take  by mouth.       vitamin D3 125 MCG (5000 UT) capsule capsule      Take 1 capsule by mouth Daily.                 Where to Get Your Medications        These medications were sent to [facility] [id number] - [place], [place] - [address] - [phone]  - [phone] FX  [address], [place] [address]      Phone: [phone]   diphenoxylate-atropine 2.5-0.025 MG per tablet          Future Appointments   Date Time Provider Department Center   2/21/2024 10:45 AM [facility] CBC KRE PORT CHAIR  [facility]AG   2/21/2024 11:20 AM Cruzito Lagunas MD [facility] CBC KRES [facility]   2/21/2024 12:00 PM CHAIR 05 CBC KRE - LG  [facility] LAG   3/25/2024 11:45 AM [facility] MRI 2  [facility] MRI [facility]   3/28/2024 10:00 AM Clint Ricketts MD [facility] NS SH[facility]NA [facility]   6/26/2024 11:00 AM Chelo Rivera PA [facility] N KRESGE [facility]      Follow-up Information       Nik Mckay MD .    Specialty: Family Medicine  Contact information:  [address] [place] [address] [phone]               Nik Mckay MD .    Specialty: Family Medicine  Contact information:  [address] [place] [address] [phone]                             TEST  RESULTS PENDING AT DISCHARGE         Mike Pemberton MD  [facility]  02/19/24  08:47 EST      Time: greater than 32 minutes on discharge  It was a pleasure taking care of this patient while in the hospital.

## 2024-02-19 ENCOUNTER — READMISSION MANAGEMENT (OUTPATIENT)
Dept: CALL CENTER | Facility: HOSPITAL | Age: 79
End: 2024-02-19
Payer: MEDICARE

## 2024-02-19 VITALS
SYSTOLIC BLOOD PRESSURE: 150 MMHG | TEMPERATURE: 97.9 F | BODY MASS INDEX: 27.18 KG/M2 | HEART RATE: 65 BPM | RESPIRATION RATE: 18 BRPM | DIASTOLIC BLOOD PRESSURE: 49 MMHG | WEIGHT: 138.45 LBS | HEIGHT: 60 IN | OXYGEN SATURATION: 95 %

## 2024-02-19 PROCEDURE — 25010000002 HEPARIN (PORCINE) PER 1000 UNITS: Performed by: STUDENT IN AN ORGANIZED HEALTH CARE EDUCATION/TRAINING PROGRAM

## 2024-02-19 PROCEDURE — 97110 THERAPEUTIC EXERCISES: CPT | Performed by: PHYSICAL THERAPIST

## 2024-02-19 PROCEDURE — 25810000003 LACTATED RINGERS PER 1000 ML: Performed by: INTERNAL MEDICINE

## 2024-02-19 PROCEDURE — 63710000001 PREDNISONE PER 1 MG: Performed by: INTERNAL MEDICINE

## 2024-02-19 RX ADMIN — Medication 5000 UNITS: at 08:39

## 2024-02-19 RX ADMIN — DIPHENOXYLATE HYDROCHLORIDE AND ATROPINE SULFATE 1 TABLET: 2.5; .025 TABLET ORAL at 12:06

## 2024-02-19 RX ADMIN — HYDROCODONE BITARTRATE AND ACETAMINOPHEN 1 TABLET: 7.5; 325 TABLET ORAL at 12:06

## 2024-02-19 RX ADMIN — LEVETIRACETAM 500 MG: 500 TABLET, FILM COATED ORAL at 08:39

## 2024-02-19 RX ADMIN — DIPHENOXYLATE HYDROCHLORIDE AND ATROPINE SULFATE 1 TABLET: 2.5; .025 TABLET ORAL at 08:39

## 2024-02-19 RX ADMIN — CLOPIDOGREL BISULFATE 75 MG: 75 TABLET, FILM COATED ORAL at 08:39

## 2024-02-19 RX ADMIN — PANTOPRAZOLE SODIUM 40 MG: 40 TABLET, DELAYED RELEASE ORAL at 06:09

## 2024-02-19 RX ADMIN — HYDROCODONE BITARTRATE AND ACETAMINOPHEN 1 TABLET: 7.5; 325 TABLET ORAL at 06:09

## 2024-02-19 RX ADMIN — PREDNISONE 40 MG: 20 TABLET ORAL at 08:39

## 2024-02-19 RX ADMIN — SODIUM CHLORIDE, POTASSIUM CHLORIDE, SODIUM LACTATE AND CALCIUM CHLORIDE 75 ML/HR: 600; 310; 30; 20 INJECTION, SOLUTION INTRAVENOUS at 06:10

## 2024-02-19 RX ADMIN — FERROUS SULFATE TAB 325 MG (65 MG ELEMENTAL FE) 325 MG: 325 (65 FE) TAB at 08:39

## 2024-02-19 RX ADMIN — HEPARIN SODIUM 5000 UNITS: 5000 INJECTION INTRAVENOUS; SUBCUTANEOUS at 06:09

## 2024-02-19 RX ADMIN — PREGABALIN 100 MG: 100 CAPSULE ORAL at 08:39

## 2024-02-19 NOTE — PLAN OF CARE
Goal Outcome Evaluation:  Plan of Care Reviewed With: patient           Outcome Evaluation: Pt in bed and appears more alert today. Pt plans to d/c home later today if mobiltiy is improved. Pt was agreeable. Pt demonstrated independent bed mobiltiy. She stood with SBA and ambualted increased distance with CGA and Rwx. Mobiltiy is imroved today and pt is safe to d/c home with niece to assist. Would benefit from  PT.      Anticipated Discharge Disposition (PT): home with home health, home with assist

## 2024-02-19 NOTE — CASE MANAGEMENT/SOCIAL WORK
Case Management Discharge Note      Final Note: Home with [facility]          Selected Continued Care - Discharged on 2/19/2024 Admission date: 2/17/2024 - Discharge disposition: Home or Self Care      Destination    No services have been selected for the patient.                Durable Medical Equipment    No services have been selected for the patient.                Dialysis/Infusion    No services have been selected for the patient.                Home Medical Care       Service Provider Selected Services Address Phone Fax Patient Preferred    [facility] [address], [address], [facility] [address] [phone] --              Therapy    No services have been selected for the patient.                Community Resources    No services have been selected for the patient.                Community & DME    No services have been selected for the patient.                    Transportation Services  Private: Car    Final Discharge Disposition Code: 06 - home with home health care

## 2024-02-19 NOTE — PLAN OF CARE
Goal Outcome Evaluation:  Plan of Care Reviewed With: patient        Progress: no change  Outcome Evaluation: c/o pain and prn pain meds given. no diarrhea noted. no vomiting noted. contact precautions maintained for sapovirus. NAD. Will endorse to day shift.

## 2024-02-19 NOTE — CASE MANAGEMENT/SOCIAL WORK
Continued Stay Note  [facility]     Patient Name: Pam Vidal  MRN: [id number]  Today's Date: 2/19/2024    Admit Date: 2/17/2024    Plan: home with HH and niece   Discharge Plan       Row Name 02/19/24 1403       Plan    Plan home with HH and niece    Patient/Family in Agreement with Plan yes    Plan Comments Spoke with patient at bedside, noted orders for d/c, she is agreeable to use [facility] , referral sent and accepted. Per primary RN pt niece will transport. PT in room to see pt, denies further d/c needs. -Linda SILVEIRA                   Discharge Codes    No documentation.                 Expected Discharge Date and Time       Expected Discharge Date Expected Discharge Time    Feb 19, 2024               Linda Flores RN

## 2024-02-19 NOTE — DISCHARGE PLACEMENT REQUEST
"Shana Vidal (79 y.o. Female)       Date of Birth   1945    Social Security Number       Address   [address] [place] [address] [place] [facility]    Home Phone   [phone]    MRN   [id number]       Caodaism   Non-Lutheran    Marital Status                               Admission Date   2/17/24    Admission Type   Emergency    Admitting Provider   Albaro Montgomery MD    Attending Provider   Mike Pemberton MD    Department, Room/Bed   [address], [address]1       Discharge Date       Discharge Disposition   Home or Self Care    Discharge Destination                                 Attending Provider: Mike Pemberton MD    Allergies: Del-mycin [Erythromycin], Gentamicin, Ibuprofen, Latex, Metronidazole, Ofloxacin, Penicillins, Tetracycline, Adhesive Tape, Aspirin, Codeine    Isolation: None   Infection: Other (02/19/24)   Code Status: CPR    Ht: 152.4 cm (60\")   Wt: 62.8 kg (138 lb 7.2 oz)    Admission Cmt: None   Principal Problem: Gastroenteritis due to sapovirus [A08.31]                   Active Insurance as of 2/17/2024       Primary Coverage       Payor Plan Insurance Group Employer/Plan Group    MEDICARE MEDICARE A & B        Payor Plan Address Payor Plan Phone Number Payor Plan Fax Number Effective Dates    [address] [phone]  2/1/2012 - None Entered    [facility] [id number]         Subscriber Name Subscriber Birth Date Member ID       SHANA VIDAL 1945 [id number]               Secondary Coverage       Payor Plan Insurance Group Employer/Plan Group    AAR [employer] SUP AAR HE[employer] C[employer] OPTIONS        Payor Plan Address Payor Plan Phone Number Payor Plan Fax Number Effective Dates    [facility] [phone]  1/1/2019 - None Entered    [address]       [facility] [address]         Subscriber Name Subscriber Birth Date Member ID       SHANA VIDAL 1945 [id number]                     Emergency Contacts        (Rel.) Home " Phone Work Phone Mobile Phone    ZIA DURANT (Relative) [phone] -- [phone]    Matthew Shah (Daughter) -- -- [phone]    Espinoza Vidal (Son) [phone] -- [phone]    THERESA VIDAL (Relative) -- -- [phone]

## 2024-02-19 NOTE — THERAPY TREATMENT NOTE
Patient Name: Pam Vidal  : 1945    MRN: [id number]                              Today's Date: 2024       Admit Date: 2024    Visit Dx:     ICD-10-CM ICD-9-CM   1. Gastroenteritis due to sapovirus  A08.31 008.65   2. Nausea vomiting and diarrhea  R11.2 787.91    R19.7 787.01   3. Primary lung adenocarcinoma, right  C34.91 162.9   4. Neuropathic pain  M79.2 729.2     Patient Active Problem List   Diagnosis    Primary lung adenocarcinoma, right    Cough with hemoptysis    Lung mass    Advanced care planning/counseling discussion    High risk medication use    Muscular deconditioning    Neoplastic malignant related fatigue    Lung cancer metastatic to brain    Brain mass    COPD (chronic obstructive pulmonary disease)    Rheumatoid arthritis    IBS (irritable bowel syndrome)    Hyperlipidemia    Abnormal CT of the chest    Shingles, left arm    HTN (hypertension)    Dizziness    Campylobacter diarrhea    Bacterial colitis    Diarrhea    Neuropathic pain    VBI (vertebrobasilar insufficiency)    Atypical pneumonia    Post herpetic neuralgia    Chronic pain after cancer treatment    Left arm pain    Gastroenteritis due to sapovirus    Stage 3a chronic kidney disease (CKD)    ZACH (acute kidney injury)     Past Medical History:   Diagnosis Date    COPD (chronic obstructive pulmonary disease)     Coughing up blood     X2 MONTHS    History of COVID-19 2022    Hyperlipidemia     IBS (irritable bowel syndrome)     Primary lung adenocarcinoma, right     Rheumatoid arthritis      Past Surgical History:   Procedure Laterality Date    APPENDECTOMY      appendix removed    BRONCHOSCOPY N/A 2022    Procedure: BRONCHOSCOPY WITH BAL,  BIOPSIES, AND BRUSHINGS WITH ENDOBRONCHIAL ULTRASOUND WITH FNA;  Surgeon: Gregor Vee MD;  Location: [facility] ENDOSCOPY;  Service: Pulmonary;  Laterality: N/A;  PRE- HILAR MASS  POST- SAME    BRONCHOSCOPY N/A 2023    Procedure: BRONCHOSCOPY with biopsy, lavage,  brushing;  Surgeon: Gregor Vee MD;  Location: [facility] ENDOSCOPY;  Service: Pulmonary;  Laterality: N/A;    CAROTID ENDARTERECTOMY Right     CAROTID ENDARTERECTOMY Left     CATARACT EXTRACTION      CERVICAL FUSION      CHOLECYSTECTOMY      CRANIOTOMY FOR TUMOR Right 5/30/2023    Procedure: RIGHT CRANIOTOMY FOR TUMOR RESECTION STEREOTACTIC WITH STEALTH;  Surgeon: Clint Ricketts MD;  Location: [facility] MAIN OR;  Service: Neurosurgery;  Laterality: Right;    HYSTERECTOMY      SHOULDER ARTHROSCOPY Right 02/19/2019    right should scope/cuff repair     VENOUS ACCESS DEVICE (PORT) INSERTION Right 9/6/2022    Procedure: POWERPORT INSERTION;  Surgeon: Remedios Rice MD;  Location: [facility] MAIN OR;  Service: Thoracic;  Laterality: Right;      General Information       Row Name 02/19/24 1519          Physical Therapy Time and Intention    Document Type therapy note (daily note)  -     Mode of Treatment individual therapy;physical therapy  -               User Key  (r) = Recorded By, (t) = Taken By, (c) = Cosigned By      Initials Name Provider Type     Judie Che, PT Physical Therapist                   Mobility       Row Name 02/19/24 1519          Bed Mobility    Bed Mobility supine-sit;sit-supine  -     Supine-Sit Isabella (Bed Mobility) standby assist  -     Sit-Supine Isabella (Bed Mobility) standby assist  -     Assistive Device (Bed Mobility) bed rails;head of bed elevated  -       Row Name 02/19/24 1519          Sit-Stand Transfer    Sit-Stand Isabella (Transfers) standby assist  -     Assistive Device (Sit-Stand Transfers) walker, front-wheeled  -       Row Name 02/19/24 1519          Gait/Stairs (Locomotion)    Isabella Level (Gait) contact guard  -     Assistive Device (Gait) walker, front-wheeled  -     Distance in Feet (Gait) 125 ft  -     Deviations/Abnormal Patterns (Gait) gait speed decreased  -     Bilateral Gait Deviations heel strike decreased  -      Comment, (Gait/Stairs) firther distance limited by fatigue  -ARYAN               User Key  (r) = Recorded By, (t) = Taken By, (c) = Cosigned By      Initials Name Provider Type    Judie Ambrocio, PT Physical Therapist                   Obj/Interventions    No documentation.                  Goals/Plan    No documentation.                  Clinical Impression       Row Name 02/19/24 1521          Pain    Pretreatment Pain Rating 0/10 - no pain  -     Posttreatment Pain Rating 0/10 - no pain  -       Row Name 02/19/24 1521          Plan of Care Review    Plan of Care Reviewed With patient  -     Outcome Evaluation Pt in bed and appears more alert today. Pt plans to d/c home later today if mobiltiy is improved. Pt was agreeable. Pt demonstrated independent bed mobiltiy. She stood with SBA and ambualted increased distance with CGA and Rwx. Mobiltiy is imroved today and pt is safe to d/c home with niece to assist. Would benefit from  PT.  -       Row Name 02/19/24 1521          Positioning and Restraints    Pre-Treatment Position in bed  -     Post Treatment Position bed  -     In Bed fowlers;call light within reach;encouraged to call for assist;exit alarm on;notified nsg  -               User Key  (r) = Recorded By, (t) = Taken By, (c) = Cosigned By      Initials Name Provider Type    Judie Ambrocio, PT Physical Therapist                   Outcome Measures       Row Name 02/19/24 1522 02/19/24 0840       How much help from another person do you currently need...    Turning from your back to your side while in flat bed without using bedrails? 4  -KH 4  -AMBER    Moving from lying on back to sitting on the side of a flat bed without bedrails? 4  -KH 3  -AMBER    Moving to and from a bed to a chair (including a wheelchair)? 3  -KH 3  -AMBER    Standing up from a chair using your arms (e.g., wheelchair, bedside chair)? 3  -KH 3  -AMBER    Climbing 3-5 steps with a railing? 3  -KH 2  -AMBER     To walk in hospital room? 3  -KH 3  -AMBER    AM-PAC 6 Clicks Score (PT) 20  -KH 18  -AMBER    Highest Level of Mobility Goal 6 --> Walk 10 steps or more  -KH 6 --> Walk 10 steps or more  -AMBER      Row Name 02/19/24 1522          Functional Assessment    Outcome Measure Options AM-PAC 6 Clicks Basic Mobility (PT)  -               User Key  (r) = Recorded By, (t) = Taken By, (c) = Cosigned By      Initials Name Provider Type    Judie Ambrocio, PT Physical Therapist    Margy Zendejas RN Registered Nurse                                 Physical Therapy Education       Title: PT OT SLP Therapies (In Progress)       Topic: Physical Therapy (Not Started)       Point: Mobility training (Not Started)       Learner Progress:  Not documented in this visit.              Point: Home exercise program (Not Started)       Learner Progress:  Not documented in this visit.              Point: Body mechanics (Not Started)       Learner Progress:  Not documented in this visit.              Point: Precautions (Not Started)       Learner Progress:  Not documented in this visit.                                  PT Recommendation and Plan  Planned Therapy Interventions (PT): balance training, gait training, bed mobility training, home exercise program, patient/family education, transfer training  Plan of Care Reviewed With: patient  Outcome Evaluation: Pt in bed and appears more alert today. Pt plans to d/c home later today if mobiltiy is improved. Pt was agreeable. Pt demonstrated independent bed mobiltiy. She stood with SBA and ambualted increased distance with CGA and Rwx. Mobiltiy is imroved today and pt is safe to d/c home with niece to assist. Would benefit from  PT.     Time Calculation:         PT Charges       Row Name 02/19/24 1523             Time Calculation    Start Time 1340  -KH      Stop Time 1349  -KH      Time Calculation (min) 9 min  -KH      PT Received On 02/19/24  -      PT - Next Appointment  02/20/24  -         Time Calculation- PT    Total Timed Code Minutes- PT 9 minute(s)  -KH         Timed Charges    27119 - PT Therapeutic Exercise Minutes 9  -KH         Total Minutes    Timed Charges Total Minutes 9  -KH       Total Minutes 9  -KH                User Key  (r) = Recorded By, (t) = Taken By, (c) = Cosigned By      Initials Name Provider Type    Judie Ambrocio, PT Physical Therapist                  Therapy Charges for Today       Code Description Service Date Service Provider Modifiers Qty    51901187165 HC PT EVAL MOD COMPLEXITY 2 2/18/2024 Judie Che, PT GP 1    49371919412 HC PT THER PROC EA 15 MIN 2/19/2024 Judie Che, PT GP 1            PT G-Codes  Outcome Measure Options: AM-PAC 6 Clicks Basic Mobility (PT)  AM-PAC 6 Clicks Score (PT): 20  PT Discharge Summary  Anticipated Discharge Disposition (PT): home with home health, home with assist    Judie Che, PT  2/19/2024

## 2024-02-20 ENCOUNTER — READMISSION MANAGEMENT (OUTPATIENT)
Dept: CALL CENTER | Facility: HOSPITAL | Age: 79
End: 2024-02-20
Payer: MEDICARE

## 2024-02-20 NOTE — OUTREACH NOTE
Prep Survey      Flowsheet Row Responses   [facility] facility patient discharged from? [facility]   Is LACE score < 7 ? No   Eligibility Readm Mgmt   Discharge diagnosis Gastroenteritis due to sapovirus   Does the patient have one of the following disease processes/diagnoses(primary or secondary)? Other   Does the patient have Home health ordered? Yes   What is the Home health agency?  [facility]   Is there a DME ordered? No   Prep survey completed? Yes            Maria Luisa ALLEN - Registered Nurse

## 2024-02-20 NOTE — OUTREACH NOTE
Medical Week 1 Survey      Flowsheet Row Responses   [facility] patient discharged from? [place]   Does the patient have one of the following disease processes/diagnoses(primary or secondary)? Other   Week 1 attempt successful? No   Unsuccessful attempts Attempt 1            Mackenzie KNIGHT - Licensed Nurse

## 2024-02-21 ENCOUNTER — INFUSION (OUTPATIENT)
Dept: ONCOLOGY | Facility: HOSPITAL | Age: 79
End: 2024-02-21
Payer: MEDICARE

## 2024-02-21 ENCOUNTER — OFFICE VISIT (OUTPATIENT)
Dept: ONCOLOGY | Facility: CLINIC | Age: 79
End: 2024-02-21
Payer: MEDICARE

## 2024-02-21 VITALS
SYSTOLIC BLOOD PRESSURE: 149 MMHG | HEART RATE: 80 BPM | DIASTOLIC BLOOD PRESSURE: 75 MMHG | RESPIRATION RATE: 18 BRPM | BODY MASS INDEX: 25.62 KG/M2 | OXYGEN SATURATION: 93 % | TEMPERATURE: 97.8 F | HEIGHT: 60 IN | WEIGHT: 130.5 LBS

## 2024-02-21 DIAGNOSIS — A08.31 GASTROENTERITIS DUE TO SAPOVIRUS: ICD-10-CM

## 2024-02-21 DIAGNOSIS — C34.90 LUNG CANCER METASTATIC TO BRAIN: Primary | ICD-10-CM

## 2024-02-21 DIAGNOSIS — C34.91 PRIMARY LUNG ADENOCARCINOMA, RIGHT: ICD-10-CM

## 2024-02-21 DIAGNOSIS — C34.91 PRIMARY LUNG ADENOCARCINOMA, RIGHT: Primary | ICD-10-CM

## 2024-02-21 DIAGNOSIS — Z79.899 HIGH RISK MEDICATION USE: ICD-10-CM

## 2024-02-21 DIAGNOSIS — C79.31 LUNG CANCER METASTATIC TO BRAIN: Primary | ICD-10-CM

## 2024-02-21 LAB
ALBUMIN SERPL-MCNC: 3.5 G/DL (ref 3.5–5.2)
ALBUMIN/GLOB SERPL: 1.3 G/DL
ALP SERPL-CCNC: 97 U/L (ref 39–117)
ALT SERPL W P-5'-P-CCNC: 24 U/L (ref 1–33)
ANION GAP SERPL CALCULATED.3IONS-SCNC: 9.1 MMOL/L (ref 5–15)
AST SERPL-CCNC: 41 U/L (ref 1–32)
BASOPHILS # BLD AUTO: 0.02 10*3/MM3 (ref 0–0.2)
BASOPHILS NFR BLD AUTO: 0.1 % (ref 0–1.5)
BILIRUB SERPL-MCNC: 0.3 MG/DL (ref 0–1.2)
BUN SERPL-MCNC: 21 MG/DL (ref 8–23)
BUN/CREAT SERPL: 22.6 (ref 7–25)
CALCIUM SPEC-SCNC: 9.2 MG/DL (ref 8.6–10.5)
CHLORIDE SERPL-SCNC: 98 MMOL/L (ref 98–107)
CO2 SERPL-SCNC: 26.9 MMOL/L (ref 22–29)
CREAT SERPL-MCNC: 0.93 MG/DL (ref 0.57–1)
DEPRECATED RDW RBC AUTO: 57.2 FL (ref 37–54)
EGFRCR SERPLBLD CKD-EPI 2021: 63.4 ML/MIN/1.73
EOSINOPHIL # BLD AUTO: 0.06 10*3/MM3 (ref 0–0.4)
EOSINOPHIL NFR BLD AUTO: 0.4 % (ref 0.3–6.2)
ERYTHROCYTE [DISTWIDTH] IN BLOOD BY AUTOMATED COUNT: 17.2 % (ref 12.3–15.4)
GLOBULIN UR ELPH-MCNC: 2.8 GM/DL
GLUCOSE SERPL-MCNC: 110 MG/DL (ref 65–99)
HCT VFR BLD AUTO: 36.4 % (ref 34–46.6)
HGB BLD-MCNC: 11.3 G/DL (ref 12–15.9)
IMM GRANULOCYTES # BLD AUTO: 0.28 10*3/MM3 (ref 0–0.05)
IMM GRANULOCYTES NFR BLD AUTO: 1.8 % (ref 0–0.5)
LYMPHOCYTES # BLD AUTO: 0.43 10*3/MM3 (ref 0.7–3.1)
LYMPHOCYTES NFR BLD AUTO: 2.8 % (ref 19.6–45.3)
MCH RBC QN AUTO: 28.9 PG (ref 26.6–33)
MCHC RBC AUTO-ENTMCNC: 31 G/DL (ref 31.5–35.7)
MCV RBC AUTO: 93.1 FL (ref 79–97)
MONOCYTES # BLD AUTO: 1.26 10*3/MM3 (ref 0.1–0.9)
MONOCYTES NFR BLD AUTO: 8.2 % (ref 5–12)
NEUTROPHILS NFR BLD AUTO: 13.31 10*3/MM3 (ref 1.7–7)
NEUTROPHILS NFR BLD AUTO: 86.7 % (ref 42.7–76)
NRBC BLD AUTO-RTO: 0 /100 WBC (ref 0–0.2)
PLATELET # BLD AUTO: 376 10*3/MM3 (ref 140–450)
PMV BLD AUTO: 10.2 FL (ref 6–12)
POTASSIUM SERPL-SCNC: 4.1 MMOL/L (ref 3.5–5.2)
PROT SERPL-MCNC: 6.3 G/DL (ref 6–8.5)
RBC # BLD AUTO: 3.91 10*6/MM3 (ref 3.77–5.28)
SODIUM SERPL-SCNC: 134 MMOL/L (ref 136–145)
WBC NRBC COR # BLD AUTO: 15.36 10*3/MM3 (ref 3.4–10.8)

## 2024-02-21 PROCEDURE — 80053 COMPREHEN METABOLIC PANEL: CPT

## 2024-02-21 PROCEDURE — G0463 HOSPITAL OUTPT CLINIC VISIT: HCPCS

## 2024-02-21 PROCEDURE — 85025 COMPLETE CBC W/AUTO DIFF WBC: CPT

## 2024-02-21 RX ORDER — OFLOXACIN 3 MG/ML
SOLUTION/ DROPS OPHTHALMIC
COMMUNITY
Start: 2024-02-05

## 2024-02-21 RX ORDER — OXYCODONE AND ACETAMINOPHEN 10; 325 MG/1; MG/1
TABLET ORAL
COMMUNITY
Start: 2024-02-16

## 2024-02-21 NOTE — PROGRESS NOTES
Subjective     REASON FOR FOLLOW UP:   1.  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  PD-L1 positive greater than 95%  3.  Primary chemoradiation with weekly carboplatin and Taxol completed 10/27/2022  4.  Imfinzi immunotherapy every 2 weeks for 12 months total.  First treatment 11/28/2022  5.  Repeat bronchoscopy 1/4/2023 due to persistent hemoptysis.  Pathology revealed no malignancy.  6.  Metastatic lesion to the brain resected 5/30/2023.  7.  Progression of disease within the chest noted on scans 9/19/2023.  Patient was started on palliative Gemzar day 1 and day 8 of an every 21-day cycle with first dose 9/27/2023.    HISTORY OF PRESENT ILLNESS:  The patient is a 79 y.o. year old female who is a former smoker referred to us from thoracic surgery (Dr. Remedios Rice) for evaluation and treatment of clinical stage III adenocarcinoma of the lung.  She was having persistent cough and underwent chest x-ray initially in late June which showed possible right upper lobe mass.  This was followed by CT scan of the chest confirming the presence of a right upper lobe mass.  She initially underwent a CT-guided needle biopsy on 7/22/2022 which was nondiagnostic.    She was referred to Dr. Glass for bronchoscopy and biopsy which was performed on 8/23/2022 with pathology returning as poorly differentiated adenocarcinoma.  PD-L1 stain on the tissue was positive at greater than 95%.    She underwent further staging with PET scan and MRI of the brain.  PET scan was performed on 8/12/2022 showing hypermetabolic activity in the large mass in the right upper lobe as well as mediastinal lymph nodes.  She was noted to have a mass on the kidney as well but this was not hypermetabolic.  There was no obvious distant metastatic disease.    MRI of the brain from 8/15/2022 likewise showed no evidence of metastatic disease.  She is scheduled also to see Dr. Hector Rodriguez of radiation oncology on 9/7/2022.    We recommended weekly carboplatin  and Taxol concurrent with radiation therapy.  Following completion of treatment she will receive immunotherapy for maintenance   (She does have a diagnosis of rheumatoid arthritis and is currently taking only Celebrex.  This could become an issue regarding her ability to tolerate immunotherapy in the future).    She received her final fraction of radiation 10/27/2022.  She also completed 6 weeks of carboplatin and Taxol and tolerated it well. CT scans performed 11/21/2022 showed right upper lobe mass appeared stable in size.  Her mediastinal lymphadenopathy had decreased.  There were no new sites of disease identified.    She was started on immunotherapy with Imfinzi with plans to continue immunotherapy for 1 year until December 2023.      She required several hospitalizations including hospitalization due to development of brain metastases in late May.  She had a dominant metastatic lesion in the right occipital area and was able to undergo neurosurgical resection of the dominant mass on 5/30/2023 with Dr. Ricketts.  She had 2 additional small lesions that were treated with stereotactic radiosurgery by Dr. Mckenna Moreira.  During this time her immuno therapy with Imfinzi was held (her last Imfinzi treatment in our office was 5/15/2023).    She again required repeat hospitalization from 6/11/2023 to 6/14/2023 due to development of extensive shingles outbreak on the left upper extremity.    She had a post treatment outpatient MRI of the brain from 7/9/2023 which showed the resection cavity in the right occipital area with no definite tumor recurrence in that area.  The other 2 small areas that were treated with stereotactic radiation appeared stable.  There were no new sites of disease identified.    She was seen in the emergency room 8/4/2023 for severe pain in the left arm. While she was in the ER she had a CT scan of the cervical spine to be sure there was no mechanical nerve root impingement.  There were no acute  findings in the cervical spine on the CT but slices through the lung apices did show what appeared to be new density in the right upper lobe which had not been seen on her previous CT scan from June.     Since her last visit she was again hospitalized from 8/26/2023 through 9/2/2023 with Campylobacter and E. coli colitis.  During the hospitalization she underwent a CT scan on 8/31/2023 showing increased size of her right upper lobe mass.  (See CT scan report below).  She also had a brain MRI performed on 8/27/2023 showing stable findings.    At the time of her discharge she had a motorcycle trip planned and we were planning to follow her up in the office with a PET scan when she returns from her trip.  She now returns having undergone the PET scan on 9/19/2023 which shows intensely hypermetabolic mass in the right upper lobe which had increased in size.  She had uptake in the right sixth and seventh ribs which may have been due to trauma.    She continues to have severe pain in the left arm although during the hospitalization she was switched to Lyrica which seems to be helping a little better than gabapentin.  She has requested an increase in the dose.  We also will be planning to refer her to pain management to see if there may be a role for the nerve injection.    We discussed the results of the PET scan and I recommended initiating palliative chemotherapy with single agent Gemzar days 1 and 8 of an every 21-day cycle.    INTERVAL HISTORY:  Ms. Vidal is here today for reevaluation and possible further palliative Gemzar treatment for metastatic lung adenocarcinoma.  She has been receiving her treatment on day 1 and day 8 of an every 21-day cycle.  Recent scans on 1/5/2024 including CT scan of the chest abdomen and pelvis as well as a follow-up MRI of the brain showed evidence of response in the known areas of disease in the chest.  There were no new sites of disease and her pleural effusion had resolved.    The  MRI of the brain likewise did not show any evidence of disease progression.    She recently required hospitalization from 2/17/2024 through 2/19/2024 due to viral gastroenteritis.  This is her first visit back to the office since discharge from the hospital.  She was more anemic during the hospitalization and required transfusion of 2 units packed red blood cells.  Her iron studies seem to be most consistent with anemia of chronic disease.  Her hemoglobin in the office today is 11.3 g/dL.    History of Present Illness     Past Medical History:   Diagnosis Date    COPD (chronic obstructive pulmonary disease)     Coughing up blood     X2 MONTHS    History of COVID-19 02/2022    Hyperlipidemia     IBS (irritable bowel syndrome)     Primary lung adenocarcinoma, right     Rheumatoid arthritis         Past Surgical History:   Procedure Laterality Date    APPENDECTOMY      appendix removed    BRONCHOSCOPY N/A 8/23/2022    Procedure: BRONCHOSCOPY WITH BAL,  BIOPSIES, AND BRUSHINGS WITH ENDOBRONCHIAL ULTRASOUND WITH FNA;  Surgeon: Gregor Vee MD;  Location: [facility] ENDOSCOPY;  Service: Pulmonary;  Laterality: N/A;  PRE- HILAR MASS  POST- SAME    BRONCHOSCOPY N/A 1/4/2023    Procedure: BRONCHOSCOPY with biopsy, lavage, brushing;  Surgeon: Gregor Vee MD;  Location: [facility] ENDOSCOPY;  Service: Pulmonary;  Laterality: N/A;    CAROTID ENDARTERECTOMY Right     CAROTID ENDARTERECTOMY Left     CATARACT EXTRACTION      CERVICAL FUSION      CHOLECYSTECTOMY      CRANIOTOMY FOR TUMOR Right 5/30/2023    Procedure: RIGHT CRANIOTOMY FOR TUMOR RESECTION STEREOTACTIC WITH STEALTH;  Surgeon: Clint Ricketts MD;  Location: [facility] OR;  Service: Neurosurgery;  Laterality: Right;    HYSTERECTOMY      SHOULDER ARTHROSCOPY Right 02/19/2019    right should scope/cuff repair     VENOUS ACCESS DEVICE (PORT) INSERTION Right 9/6/2022    Procedure: POWERPORT INSERTION;  Surgeon: Remedios Rice MD;  Location: [facility] OR;  Service:  Thoracic;  Laterality: Right;        Current Outpatient Medications on File Prior to Visit   Medication Sig Dispense Refill    atorvastatin (LIPITOR) 20 MG tablet Take 1 tablet by mouth Every Night. 30 tablet 0    azelastine (ASTELIN) 0.1 % nasal spray 1 spray into the nostril(s) as directed by provider Daily As Needed for Allergies.      B COMPLEX VITAMINS ER PO Take  by mouth Daily.      Cholecalciferol (VITAMIN D3) 5000 units capsule capsule Take 1 capsule by mouth Daily.      clopidogrel (PLAVIX) 75 MG tablet Take 1 tablet by mouth Daily. 30 tablet 0    diphenoxylate-atropine (LOMOTIL) 2.5-0.025 MG per tablet Take 1 tablet by mouth 4 (Four) Times a Day As Needed for Diarrhea. 10 tablet 0    esomeprazole (nexIUM) 20 MG capsule Take 1 capsule by mouth Every Morning Before Breakfast.      estradiol (ESTRACE) 1 MG tablet Take 1 tablet by mouth Daily.      FeroSul 325 (65 Fe) MG tablet TAKE ONE TABLET BY MOUTH DAILY WITH BREAKFAST 30 tablet 5    Gabapentin 10 %, Baclofen 2 %, lidocaine 4 %, Ketamine HCl 4 % Apply 1-2 g topically to the appropriate area as directed 3 (Three) to 4 (Four) times daily. 90 g 0    HYDROcodone-acetaminophen (NORCO) 7.5-325 MG per tablet Take 1 tablet by mouth Every 6 (Six) Hours As Needed for Moderate Pain. 60 tablet 0    levETIRAcetam (KEPPRA) 500 MG tablet TAKE 1 TABLET BY MOUTH TWICE A  tablet 0    lidocaine-prilocaine (EMLA) 2.5-2.5 % cream       lisinopril (PRINIVIL,ZESTRIL) 20 MG tablet Take 1 tablet by mouth Daily for 30 days. 30 tablet 0    Multiple Vitamins-Minerals (PRESERVISION AREDS 2+MULTI VIT PO) Take  by mouth.      ondansetron (ZOFRAN) 8 MG tablet Take 1 tablet by mouth 3 (Three) Times a Day As Needed for Nausea or Vomiting. 30 tablet 5    predniSONE (DELTASONE) 10 MG tablet Take 2 tablets by mouth Daily. 60 tablet 5    pregabalin (LYRICA) 150 MG capsule Take 1 capsule by mouth Every 12 (Twelve) Hours for 30 days. 60 capsule 0    pregabalin (LYRICA) 75 MG capsule TAKE  1 CAPSULE BY MOUTH TWICE A DAY ALONG WITH 150 MG CAPSULE FOR TOTAL DOSE  MG TWICE DAILY 60 capsule 0     No current facility-administered medications on file prior to visit.        ALLERGIES:    Allergies   Allergen Reactions    Del-Mycin [Erythromycin] Hives    Gentamicin Hives    Ibuprofen Nausea And Vomiting    Latex Dermatitis     GLOVES    Metronidazole Hives    Ofloxacin Hives and Nausea Only     Has tolerated Levaquin     Penicillins Hives     Tolerated rocephin and cefepime 2023    Tetracycline Hives    Adhesive Tape Dermatitis     BANDAIDS    Aspirin GI Intolerance     Vomiting can take EC    Codeine Nausea And Vomiting        Social History     Socioeconomic History    Marital status:    Tobacco Use    Smoking status: Former     Packs/day: .25     Types: Cigarettes     Quit date: 2022     Years since quittin.8    Smokeless tobacco: Never    Tobacco comments:     Former some day smoker of 1-2 cigs   Vaping Use    Vaping Use: Never used   Substance and Sexual Activity    Alcohol use: Yes     Alcohol/week: 2.0 standard drinks of alcohol     Types: 2 Glasses of wine per week     Comment: occasionally    Drug use: Yes     Frequency: 2.0 times per week     Types: Marijuana    Sexual activity: Defer        Family History   Problem Relation Age of Onset    Cancer Mother     Cancer Father     Malig Hyperthermia Neg Hx         Review of Systems   Constitutional:  Negative for activity change, chills, fatigue and fever.   HENT:  Negative for mouth sores, trouble swallowing and voice change.    Eyes:  Negative for pain and visual disturbance.   Respiratory:  Positive for cough. Negative for wheezing.    Cardiovascular:  Negative for chest pain and palpitations.   Gastrointestinal:  Negative for abdominal pain, constipation, diarrhea, nausea and vomiting.   Genitourinary:  Negative for difficulty urinating, frequency and urgency.   Musculoskeletal:  Negative for arthralgias and joint swelling.    Skin:  Negative for rash.   Neurological:  Positive for weakness. Negative for dizziness, seizures and headaches.        Severe pain in the left upper arm due to postherpetic neuralgia.  She also had some weakness in the arm which she feels is improving.   Hematological:  Negative for adenopathy. Bruises/bleeds easily.   Psychiatric/Behavioral:  Negative for behavioral problems and confusion. The patient is not nervous/anxious.     02/21/2024 ROS updated     Objective     There were no vitals filed for this visit.        2/7/2024    12:26 PM   Current Status   ECOG score 0     Physical Exam  Vitals reviewed.   Constitutional:       General: She is not in acute distress.     Appearance: She is well-developed. She is ill-appearing.      Comments: Seated in a wheelchair.   HENT:      Head: Normocephalic and atraumatic.      Mouth/Throat:      Pharynx: No oropharyngeal exudate.   Eyes:      Pupils: Pupils are equal, round, and reactive to light.   Neck:      Comments: Bilateral scars from carotid endarterectomies  Cardiovascular:      Rate and Rhythm: Normal rate and regular rhythm.      Heart sounds: Normal heart sounds. No murmur heard.  Pulmonary:      Effort: Pulmonary effort is normal. No respiratory distress.      Breath sounds: Decreased breath sounds present. No wheezing, rhonchi or rales.   Abdominal:      General: Bowel sounds are normal. There is no distension.      Palpations: Abdomen is soft.   Musculoskeletal:         General: Normal range of motion.      Cervical back: Normal range of motion.   Skin:     General: Skin is warm and dry.      Findings: No rash.   Neurological:      Mental Status: She is alert and oriented to person, place, and time.      I have reexamined the patient and the results are consistent with the previously documented exam. Cruzito Lagunas MD        RECENT LABS:  Results from last 7 days   Lab Units 02/18/24  0502 02/17/24 1949   WBC 10*3/mm3 10.63 9.68   NEUTROS ABS 10*3/mm3   --  7.10*   HEMOGLOBIN g/dL 11.8* 11.6*   HEMATOCRIT % 36.4 35.6   PLATELETS 10*3/mm3 235 251     Results from last 7 days   Lab Units 02/18/24  0502 02/17/24  1949   SODIUM mmol/L 137 139   POTASSIUM mmol/L 3.7 3.9   CHLORIDE mmol/L 102 102   CO2 mmol/L 20.0* 22.1   BUN mg/dL 24* 27*   CREATININE mg/dL 1.17* 1.44*   CALCIUM mg/dL 9.1 8.8   ALBUMIN g/dL  --  3.5   BILIRUBIN mg/dL  --  0.4   ALK PHOS U/L  --  104   ALT (SGPT) U/L  --  29   AST (SGOT) U/L  --  34*   GLUCOSE mg/dL 71 80           BRONCHOSCOPY PATHOLOGY 1/4/2023  Final Diagnosis   Fluid, Lung, Right Upper Lobe, Bronchoalveolar Lavage (BAL):  A. Negative for malignant cells- reactive atypia.  B. Reactive bronchial cells, macrophages, inflammation (mostly acute) and mucin.  C. Negative for definitive fungal organisms by GMS staining. See comment.     2. Fluid, Lung, Right Upper Lobe, Brushing:               A. Negative for malignant cells.               B. Bronchial cells, scattered inflammation and mucinous debris.      Final Diagnosis   1. Lung, Right Upper Lobe, Biopsy:  Endobronchial mucosa and submucosa with                A. Squamous metaplasia, mild to moderate chronic active inflammation, fibrinous and necrotic debris and reactive      epithelial changes.               B. No definitive dysplasia nor malignancy identified.               C. No granulomata, diagnostic viral inclusions nor fungal organisms identified.         BRONCHOSCOPY PATHOLOGY 8/23/2022  Final Diagnosis   1. Lung, Right Upper Lobe, Endobronchial Biopsies: INVASIVE POORLY DIFFERENTIATED ADENOCARCINOMA OF PULMONARY ORIGIN.   PDL-1 POSITIVE >95%    IMAGING:  CT OF THE ABDOMEN AND PELVIS WITH CONTRAST  2/17/2024  IMPRESSION:     1. Liquid stool noted throughout the colon, as well as patulous loops of  small bowel. Appearance is most in keeping with enterocolitis.  2. Somewhat thick-walled appearance to the urinary bladder. Correlation  with urinalysis and urine cultures is  recommended.  3. Indeterminate right renal lesion. As is been previously discussed,  this would be better assessed with renal protocol CT or MRI.      CT CHEST, ABDOMEN, AND PELVIS WITH CONTRAST 1/5/2024  IMPRESSION:  1. Interval treatment response with significant decrease in the bulk of  the medial right upper lobe lung mass which is now significantly smaller  and centrally necrotic. There has also been improvement in the  appearance of the right upper lobe and superior segment right lower lobe  compared with chest CT 08/31/2023. No evidence of metastatic disease to  the chest, abdomen or pelvis.  2. There is new patchy parenchymal volume loss and opacification in the  posterior left lung base favored to represent aspiration pneumonitis,  atelectasis, or pneumonia.  3. Right renal lesions appear similar as on recent and remote previous  exams. No new renal abnormality.      MRI OF THE BRAIN WITH AND WITHOUT CONTRAST ON 01/05/2024   IMPRESSION:  1. There has been no significant change when compared to prior MRI of  the brain on 08/27/2023 with no convincing recurrent enhancing  metastatic disease.     2. On 05/30/2023, this patient underwent a 4.5 cm right parietal  occipital craniotomy to resect a 3.7 x 2.9 x 2.7 cm enhancing lesion  from the posterior superior lateral right occipital lobe found at  surgical pathology to be poorly differentiated adenocarcinoma metastasis  of lung primary. There is a collapsed 16 x 10 x 10 mm resection cavity  with a curvilinear 6 x 2 x 6 mm focus of enhancement along the posterior  superior margin of the resection cavity that I favor is an enhancing  scar and granulation tissue over a small focus of recurrent tumor with  no convincing recurrent metastatic lesion at this site but this will  need to be followed by follow-up MRIs of the brain with contrast. There  is 3.7 x 2 cm surrounding T2 hyperintensity likely surrounding gliosis  and posttreatment change. The treated lesion in  the superior vermis of  the cerebellum has a 2 to 3 mm focus of hemosiderin deposition in it and  some minimal linear enhancement along its peripheral margins that is  likely scar tissue with no definable residual enhancing tumor at this  site and there has been resolution of the enhancement at the site of the  posterior lateral left occipital lobe lesion with no new evidence of  enhancing metastatic disease to the brain.  2. There is moderate small vessel disease in the cerebral white matter.  The remainder of the MRI of the brain is normal. Continued followup is  suggested.       F-18 FDG PET FROM SKULL BASE TO MID THIGH WITH PET/CT FUSION 9/19/2023  IMPRESSION:  1.  Intensely FDG mass centered within the perihilar aspect of the right  upper lobe appears to have increased in size since 5/25/2023 with new  obstruction and stenosis of the right upper lobe bronchus and likely  represents recurrent malignancy. Surrounding pulmonary opacification and  consolidation throughout the right lung has increased since 5/25/2023  and there is new opacification within the left lung with differential  considerations including evolving postradiation changes, postobstructive  pneumonia and lymphangitic spread of malignancy.  2.  Segmental uptake within the right sixth and seventh ribs without  identifiable abnormality on noncontrast CT. While findings may represent  nondisplaced fractures, continued close attention on follow-up is  recommended to exclude metastatic disease.  3.  Indeterminate 5.3 cm right renal lesion, as before. At least  continued attention on follow-up is recommended. Finding can all be  further evaluated multiphase CT or MRI of the abdomen with and without  contrast to exclude neoplasm.  4.  Other findings as above.    MRI OF THE BRAIN WITH AND WITHOUT CONTRAST  8/27/2023  IMPRESSION:     1.  Status post right parieto-occipital craniotomy with subjacent   resection cavity.  Expected postoperative changes as  detailed above.  No   evidence of tumor recurrence.  2.  Moderate chronic small vessel ischemic changes.  3.  No specific pontine abnormality aside from chronic small vessel   ischemic disease.  Finding on reference CT likely reflected artifact.  4.  No acute infarct.  No acute hemorrhage.    CT CHEST W CONTRAST DIAGNOSTIC-, CT ABDOMEN PELVIS W CONTRAST-, NM BONE SCAN WHOLE BODY- 5/29/2023  1. Right upper lobe suprahilar pulmonary mass appears stable from the  recent prior exam, again with groundglass and nodular opacities in the  right upper lobe.  2. Sclerotic change at the right anterior eighth rib is new from  03/01/2023, with corresponding increased uptake on bone scan, could be  healing fracture or sclerotic metastatic disease, correlate clinically.  3. Stable appearance of the abdomen and pelvis.    CT CHEST, ABDOMEN, AND PELVIS WITH IV CONTRAST 3/1/2023  IMPRESSION:  Decreased size of the right upper lobe mass with stable  surrounding groundglass opacity and decreased size of the right lower  lobe opacity. No adenopathy seen on today's exam. Incidental findings as  Above.    F-18 FDG PET FROM SKULL BASE TO MID THIGH WITH PET/CT FUSION 8/12/2022  IMPRESSION:  1.  Large mass centered within the right upper lobe representing  patient's known malignancy. Interlobular septal thickening and ground  glass opacification projecting from the periphery of the mass throughout  the right upper lobe is highly concerning for lymphangitic spread. Of  note, the mass abuts the pleura and mediastinum over a long segment and  underlying invasion cannot be excluded.  2.  FDG avid hilar and mediastinal adenopathy concerning for metastatic  disease.  3.  A few irregular subcentimeter pulmonary nodules are present within  the right lower and left upper lobes measuring up to 0.9 cm which while  nonspecific raises concern for metastatic disease. At least close  attention on followup is recommended.   4.  Minimally hypermetabolic  arnoldo hepatic node and bilateral sub-6 mm  pulmonary nodules are indeterminate. Continued followup with chest and  abdominal CT in 3 months is recommended.  5.  Indeterminate density 4.4 cm mass arises off the right kidney.  Further evaluation with multiphase CT or MRI of the abdomen with and  without contrast is recommended to exclude neoplasm.  6.  Focal uptake over the right shoulder in the area of prior rotator  cuff repair is favored be postsurgical with metastatic disease less  likely.  7.  Other findings as above.    Assessment & Plan   1.  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  Patient is not felt to be a surgical candidate and combined chemotherapy and radiation.   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  9/20/2022 1st dose of weekly carboplatin/taxol.   She completed 6 cycles of weekly CarboTaxol 10/25/2022.  Radiation therapy completed 10/27/2022  First dose durvalumab delivered 11/28/2022.   Durvalumab on hold since May 2023  Progression of disease with enlarging hypermetabolic mass in the right upper lobe in September 2023  Plans to start single agent Gemzar palliative chemotherapy 1000 mg/m² on days 1 and 8 of a 21-day cycle.  9/27/2023 C1D1 single agent palliative Gemzar.  CT scans and brain MRI 1/5/2024 show no progression of brain mets and significant improvement in her thoracic disease.      2.  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    3.  Other comorbidities including vascular disease with previous carotid   endarterectomy surgeries.  She has no known history of stroke or myocardial infarction.     5.  Rheumatoid arthritis: Patient previously on Celebrex, but discontinued due to hemoptysis.  Patient started on prednisone 20 mg daily prescribed to manage her arthritis pain.  This was later decreased to 10 mg daily.    6.  Development  of brain metastases in May 2023.  Patient underwent resection of the dominant mass in the right occipital area by Dr. Cho of neurosurgery.  She received post op radiation therapy to the resection bed and to 2 smaller lesions with SRS under the direction of Dr. Mckenna Moreira at [facility].  Her most recent MRI of the brain from 8/27/2023 as noted above shows no new sites of disease, improved edema, and no definite recurrence in the resection bed.    7.  Severe shingles outbreak of left upper extremity with severe pain from postherpetic neuralgia.   She has been seen by pain management and is receiving topical therapy with a compound of baclofen gabapentin and ketamine with good relief.    8.  Hospitalization for bacterial colitis with stool culture growing E. coli and Campylobacter.  Her symptoms have resolved at this point.    9.  Hospitalization 2/17/2024 to 2/19/2024 for Sapovirus viral gastroenteritis.      PLAN:  We reviewed the records from the hospitalization.  Although she is feeling better she is still very weak and we felt that she needs at least another 2 weeks to recuperate prior to resuming chemotherapy.  Continue follow-up with pain management as scheduled for continued treatment of severe postherpetic neuralgia.  Continue prednisone at 10 mg daily.  Continue Lyrica 225 mg (75+150) twice daily.  Continue Norco 7.5/325 if needed for pain control.  MD follow-up in 2 weeks with lab and possibly resume Gemzar at that time.  We may consider reducing her dose to 800 mg/m² for better tolerance.      Patient is on a high risk medication requiring close monitoring for toxicity.      Cruzito Lagunas MD   02/21/2024

## 2024-02-22 ENCOUNTER — READMISSION MANAGEMENT (OUTPATIENT)
Dept: CALL CENTER | Facility: HOSPITAL | Age: 79
End: 2024-02-22
Payer: MEDICARE

## 2024-02-22 NOTE — OUTREACH NOTE
Medical Week 1 Survey      Flowsheet Row Responses   [facility] patient discharged from? [place]   Does the patient have one of the following disease processes/diagnoses(primary or secondary)? Other   Week 1 attempt successful? Yes   Call start time 1016   Call end time 1030   Meds reviewed with patient/caregiver? Yes   Is the patient having any side effects they believe may be caused by any medication additions or changes? No   Does the patient have all medications ordered at discharge? Yes   Is the patient taking all medications as directed (includes completed medication regime)? Yes   Comments regarding appointments Patient states has seen her oncologist already.   Does the patient have a primary care provider?  Yes   Does the patient have an appointment with their PCP within 7 days of discharge? No   What is preventing the patient from scheduling follow up appointments within 7 days of discharge? Haven't had time   Nursing Interventions Advised patient to make appointment, Educated patient on importance of making appointment   Has the patient kept scheduled appointments due by today? Yes   Has home health visited the patient within 72 hours of discharge? No   Home health interventions Called Home Health agency  [[facility] states patient is current with them, and transferred call to [facility] office. Voicemail left with [facility] office to call if needs anything prior to resuming care.]   Psychosocial issues? No   Did the patient receive a copy of their discharge instructions? Yes   Nursing interventions Reviewed instructions with patient   What is the patient's perception of their health status since discharge? Same   Is the patient/caregiver able to teach back signs and symptoms related to disease process for when to call PCP? Yes   Is the patient/caregiver able to teach back signs and symptoms related to disease process for when to call 911? Yes   Is the  patient/caregiver able to teach back the hierarchy of who to call/visit for symptoms/problems? PCP, Specialist, Home health nurse, Urgent Care, ED, 911 Yes   If the patient is a current smoker, are they able to teach back resources for cessation? Not a smoker   Week 1 call completed? Yes   Would this patient benefit from a Referral to Amb Social Work? No   Is the patient interested in additional calls from an ambulatory ? No   Wrap up additional comments Patient states is feeling about the same. States no further diarrhea or N/V. States oncologist will be holding her chemo for 2 weeks due to her weakness. Using walker to ambulate. Denies any needs today.   Call end time 1030            Aimee ALLEN - Registered Nurse

## 2024-02-27 ENCOUNTER — TELEPHONE (OUTPATIENT)
Dept: ONCOLOGY | Facility: CLINIC | Age: 79
End: 2024-02-27
Payer: MEDICARE

## 2024-02-27 NOTE — TELEPHONE ENCOUNTER
Caller: CLAUS    Relationship: Other    Best call back number: [phone]    What is the best time to reach you: ANYTIME    Who are you requesting to speak with (clinical staff, provider,  specific staff member): CLINICAL    What was the call regarding: CLAUS STATED PATIENT'S PORT IS STILL ACCESSED AND SHE DOESN'T HAVE AN APPT IN OUR OFFICE UNTIL 3/6. PLEASE CALL TO ADVISE IF ANYTHING SHOULD BE DONE ABOUT THIS OR NOT.

## 2024-02-27 NOTE — TELEPHONE ENCOUNTER
Called Rosa back and she stated that she was sent out by PT to see the patient and she was still accessed from her 2/21 appt with us. I let her know that we would facilitate getting this de accessed. I called the patient and she stated she realized it was accessed when she got home and didn't think about it. She stated she could come in tomorrow at 10am for this to be de accessed. Scheduling notified and patient v/u.

## 2024-03-01 ENCOUNTER — READMISSION MANAGEMENT (OUTPATIENT)
Dept: CALL CENTER | Facility: HOSPITAL | Age: 79
End: 2024-03-01
Payer: MEDICARE

## 2024-03-01 NOTE — OUTREACH NOTE
Medical Week 2 Survey      Flowsheet Row Responses   [facility] patient discharged from? [place]   Does the patient have one of the following disease processes/diagnoses(primary or secondary)? Other   Week 2 attempt successful? Yes   Call start time 0850   Discharge diagnosis Gastroenteritis due to sapovirus   Call end time 0855   Is patient permission given to speak with other caregiver? Yes   List who call center can speak with Gt elizabeth   Person spoke with today (if not patient) and relationship Gt elizabeth   Meds reviewed with patient/caregiver? Yes   Is the patient taking all medications as directed (includes completed medication regime)? Yes   Does the patient have a primary care provider?  Yes   Has the patient kept scheduled appointments due by today? Yes  [2/21/24 hema/onc]   What is the Home health agency?  [facility] [address]   Home health comments HH still visits per son   What is the patient's perception of their health status since discharge? Improving   Week 2 Call Completed? Yes   Graduated Yes   Did the patient feel the follow up calls were helpful during their recovery period? Yes   Graduated/Revoked comments Pt states pt is improving   Call end time 0855            Vy DHALIWAL - Registered Nurse

## 2024-03-03 ENCOUNTER — APPOINTMENT (OUTPATIENT)
Dept: GENERAL RADIOLOGY | Facility: HOSPITAL | Age: 79
DRG: 872 | End: 2024-03-03
Payer: MEDICARE

## 2024-03-03 ENCOUNTER — HOSPITAL ENCOUNTER (INPATIENT)
Facility: HOSPITAL | Age: 79
LOS: 4 days | Discharge: NURSING, REHAB OR LONG-TERM CARE | DRG: 872 | End: 2024-03-07
Attending: EMERGENCY MEDICINE | Admitting: STUDENT IN AN ORGANIZED HEALTH CARE EDUCATION/TRAINING PROGRAM
Payer: MEDICARE

## 2024-03-03 ENCOUNTER — APPOINTMENT (OUTPATIENT)
Dept: CT IMAGING | Facility: HOSPITAL | Age: 79
DRG: 872 | End: 2024-03-03
Payer: MEDICARE

## 2024-03-03 ENCOUNTER — DOCUMENTATION (OUTPATIENT)
Dept: ONCOLOGY | Facility: CLINIC | Age: 79
End: 2024-03-03
Payer: MEDICARE

## 2024-03-03 DIAGNOSIS — N17.0 ACUTE KIDNEY INJURY (AKI) WITH ACUTE TUBULAR NECROSIS (ATN): ICD-10-CM

## 2024-03-03 DIAGNOSIS — D72.829 LEUKOCYTOSIS, UNSPECIFIED TYPE: ICD-10-CM

## 2024-03-03 DIAGNOSIS — D64.9 ACUTE ON CHRONIC ANEMIA: ICD-10-CM

## 2024-03-03 DIAGNOSIS — R65.10 SIRS (SYSTEMIC INFLAMMATORY RESPONSE SYNDROME): ICD-10-CM

## 2024-03-03 DIAGNOSIS — N10 ACUTE PYELONEPHRITIS: Primary | ICD-10-CM

## 2024-03-03 DIAGNOSIS — C34.91 PRIMARY MALIGNANT NEOPLASM OF RIGHT LUNG METASTATIC TO OTHER SITE: ICD-10-CM

## 2024-03-03 DIAGNOSIS — B02.29 POST HERPETIC NEURALGIA: ICD-10-CM

## 2024-03-03 LAB
ALBUMIN SERPL-MCNC: 3 G/DL (ref 3.5–5.2)
ALBUMIN/GLOB SERPL: 0.9 G/DL
ALP SERPL-CCNC: 289 U/L (ref 39–117)
ALT SERPL W P-5'-P-CCNC: 26 U/L (ref 1–33)
ANION GAP SERPL CALCULATED.3IONS-SCNC: 15 MMOL/L (ref 5–15)
APTT PPP: 30.2 SECONDS (ref 22.7–35.4)
AST SERPL-CCNC: 25 U/L (ref 1–32)
B PARAPERT DNA SPEC QL NAA+PROBE: NOT DETECTED
B PERT DNA SPEC QL NAA+PROBE: NOT DETECTED
BACTERIA UR QL AUTO: ABNORMAL /HPF
BASOPHILS # BLD AUTO: 0.05 10*3/MM3 (ref 0–0.2)
BASOPHILS NFR BLD AUTO: 0.2 % (ref 0–1.5)
BILIRUB SERPL-MCNC: 0.5 MG/DL (ref 0–1.2)
BILIRUB UR QL STRIP: NEGATIVE
BUN SERPL-MCNC: 31 MG/DL (ref 8–23)
BUN/CREAT SERPL: 20.5 (ref 7–25)
C PNEUM DNA NPH QL NAA+NON-PROBE: NOT DETECTED
CALCIUM SPEC-SCNC: 9.2 MG/DL (ref 8.6–10.5)
CHLORIDE SERPL-SCNC: 99 MMOL/L (ref 98–107)
CLARITY UR: CLEAR
CO2 SERPL-SCNC: 19 MMOL/L (ref 22–29)
COLOR UR: YELLOW
CREAT SERPL-MCNC: 1.51 MG/DL (ref 0.57–1)
D-LACTATE SERPL-SCNC: 0.7 MMOL/L (ref 0.5–2)
DEPRECATED RDW RBC AUTO: 47.8 FL (ref 37–54)
EGFRCR SERPLBLD CKD-EPI 2021: 35.5 ML/MIN/1.73
EOSINOPHIL # BLD AUTO: 0.02 10*3/MM3 (ref 0–0.4)
EOSINOPHIL NFR BLD AUTO: 0.1 % (ref 0.3–6.2)
ERYTHROCYTE [DISTWIDTH] IN BLOOD BY AUTOMATED COUNT: 15.5 % (ref 12.3–15.4)
FLUAV SUBTYP SPEC NAA+PROBE: NOT DETECTED
FLUBV RNA ISLT QL NAA+PROBE: NOT DETECTED
GLOBULIN UR ELPH-MCNC: 3.4 GM/DL
GLUCOSE SERPL-MCNC: 105 MG/DL (ref 65–99)
GLUCOSE UR STRIP-MCNC: NEGATIVE MG/DL
HADV DNA SPEC NAA+PROBE: NOT DETECTED
HCOV 229E RNA SPEC QL NAA+PROBE: NOT DETECTED
HCOV HKU1 RNA SPEC QL NAA+PROBE: NOT DETECTED
HCOV NL63 RNA SPEC QL NAA+PROBE: NOT DETECTED
HCOV OC43 RNA SPEC QL NAA+PROBE: NOT DETECTED
HCT VFR BLD AUTO: 30.4 % (ref 34–46.6)
HGB BLD-MCNC: 9.7 G/DL (ref 12–15.9)
HGB UR QL STRIP.AUTO: NEGATIVE
HMPV RNA NPH QL NAA+NON-PROBE: NOT DETECTED
HPIV1 RNA ISLT QL NAA+PROBE: NOT DETECTED
HPIV2 RNA SPEC QL NAA+PROBE: NOT DETECTED
HPIV3 RNA NPH QL NAA+PROBE: NOT DETECTED
HPIV4 P GENE NPH QL NAA+PROBE: NOT DETECTED
HYALINE CASTS UR QL AUTO: ABNORMAL /LPF
IMM GRANULOCYTES # BLD AUTO: 0.59 10*3/MM3 (ref 0–0.05)
IMM GRANULOCYTES NFR BLD AUTO: 2.5 % (ref 0–0.5)
INR PPP: 1.21 (ref 0.9–1.1)
KETONES UR QL STRIP: ABNORMAL
LEUKOCYTE ESTERASE UR QL STRIP.AUTO: NEGATIVE
LIPASE SERPL-CCNC: 12 U/L (ref 13–60)
LYMPHOCYTES # BLD AUTO: 0.63 10*3/MM3 (ref 0.7–3.1)
LYMPHOCYTES NFR BLD AUTO: 2.7 % (ref 19.6–45.3)
M PNEUMO IGG SER IA-ACNC: NOT DETECTED
MCH RBC QN AUTO: 27.2 PG (ref 26.6–33)
MCHC RBC AUTO-ENTMCNC: 31.9 G/DL (ref 31.5–35.7)
MCV RBC AUTO: 85.4 FL (ref 79–97)
MONOCYTES # BLD AUTO: 1.3 10*3/MM3 (ref 0.1–0.9)
MONOCYTES NFR BLD AUTO: 5.5 % (ref 5–12)
NEUTROPHILS NFR BLD AUTO: 21.01 10*3/MM3 (ref 1.7–7)
NEUTROPHILS NFR BLD AUTO: 89 % (ref 42.7–76)
NITRITE UR QL STRIP: NEGATIVE
NRBC BLD AUTO-RTO: 0 /100 WBC (ref 0–0.2)
PH UR STRIP.AUTO: <=5 [PH] (ref 5–8)
PLATELET # BLD AUTO: 316 10*3/MM3 (ref 140–450)
PMV BLD AUTO: 11.3 FL (ref 6–12)
POTASSIUM SERPL-SCNC: 4.7 MMOL/L (ref 3.5–5.2)
PROCALCITONIN SERPL-MCNC: 6.4 NG/ML (ref 0–0.25)
PROT SERPL-MCNC: 6.4 G/DL (ref 6–8.5)
PROT UR QL STRIP: ABNORMAL
PROTHROMBIN TIME: 15.5 SECONDS (ref 11.7–14.2)
RBC # BLD AUTO: 3.56 10*6/MM3 (ref 3.77–5.28)
RBC # UR STRIP: ABNORMAL /HPF
REF LAB TEST METHOD: ABNORMAL
RHINOVIRUS RNA SPEC NAA+PROBE: NOT DETECTED
RSV RNA NPH QL NAA+NON-PROBE: NOT DETECTED
SARS-COV-2 RNA NPH QL NAA+NON-PROBE: NOT DETECTED
SODIUM SERPL-SCNC: 133 MMOL/L (ref 136–145)
SP GR UR STRIP: 1.01 (ref 1–1.03)
SQUAMOUS #/AREA URNS HPF: ABNORMAL /HPF
UROBILINOGEN UR QL STRIP: ABNORMAL
WBC # UR STRIP: ABNORMAL /HPF
WBC NRBC COR # BLD AUTO: 23.6 10*3/MM3 (ref 3.4–10.8)

## 2024-03-03 PROCEDURE — 87186 SC STD MICRODIL/AGAR DIL: CPT | Performed by: EMERGENCY MEDICINE

## 2024-03-03 PROCEDURE — 25510000001 IOPAMIDOL PER 1 ML: Performed by: EMERGENCY MEDICINE

## 2024-03-03 PROCEDURE — 25810000003 LACTATED RINGERS SOLUTION: Performed by: EMERGENCY MEDICINE

## 2024-03-03 PROCEDURE — 99284 EMERGENCY DEPT VISIT MOD MDM: CPT

## 2024-03-03 PROCEDURE — 83690 ASSAY OF LIPASE: CPT | Performed by: EMERGENCY MEDICINE

## 2024-03-03 PROCEDURE — 87086 URINE CULTURE/COLONY COUNT: CPT | Performed by: EMERGENCY MEDICINE

## 2024-03-03 PROCEDURE — 85730 THROMBOPLASTIN TIME PARTIAL: CPT | Performed by: EMERGENCY MEDICINE

## 2024-03-03 PROCEDURE — 99285 EMERGENCY DEPT VISIT HI MDM: CPT

## 2024-03-03 PROCEDURE — P9612 CATHETERIZE FOR URINE SPEC: HCPCS

## 2024-03-03 PROCEDURE — 87077 CULTURE AEROBIC IDENTIFY: CPT | Performed by: EMERGENCY MEDICINE

## 2024-03-03 PROCEDURE — 87154 CUL TYP ID BLD PTHGN 6+ TRGT: CPT | Performed by: EMERGENCY MEDICINE

## 2024-03-03 PROCEDURE — 0202U NFCT DS 22 TRGT SARS-COV-2: CPT | Performed by: EMERGENCY MEDICINE

## 2024-03-03 PROCEDURE — 87040 BLOOD CULTURE FOR BACTERIA: CPT | Performed by: EMERGENCY MEDICINE

## 2024-03-03 PROCEDURE — 71275 CT ANGIOGRAPHY CHEST: CPT

## 2024-03-03 PROCEDURE — 85025 COMPLETE CBC W/AUTO DIFF WBC: CPT | Performed by: EMERGENCY MEDICINE

## 2024-03-03 PROCEDURE — 71045 X-RAY EXAM CHEST 1 VIEW: CPT

## 2024-03-03 PROCEDURE — 84145 PROCALCITONIN (PCT): CPT | Performed by: EMERGENCY MEDICINE

## 2024-03-03 PROCEDURE — 85610 PROTHROMBIN TIME: CPT | Performed by: EMERGENCY MEDICINE

## 2024-03-03 PROCEDURE — 74177 CT ABD & PELVIS W/CONTRAST: CPT

## 2024-03-03 PROCEDURE — 81001 URINALYSIS AUTO W/SCOPE: CPT | Performed by: EMERGENCY MEDICINE

## 2024-03-03 PROCEDURE — 25810000003 LACTATED RINGERS PER 1000 ML: Performed by: EMERGENCY MEDICINE

## 2024-03-03 PROCEDURE — 80053 COMPREHEN METABOLIC PANEL: CPT | Performed by: EMERGENCY MEDICINE

## 2024-03-03 PROCEDURE — 83605 ASSAY OF LACTIC ACID: CPT | Performed by: EMERGENCY MEDICINE

## 2024-03-03 PROCEDURE — 25010000002 CEFTRIAXONE PER 250 MG: Performed by: EMERGENCY MEDICINE

## 2024-03-03 RX ORDER — FERROUS SULFATE 325(65) MG
325 TABLET ORAL
Status: DISCONTINUED | OUTPATIENT
Start: 2024-03-04 | End: 2024-03-07 | Stop reason: HOSPADM

## 2024-03-03 RX ORDER — SODIUM CHLORIDE 0.9 % (FLUSH) 0.9 %
10 SYRINGE (ML) INJECTION AS NEEDED
Status: DISCONTINUED | OUTPATIENT
Start: 2024-03-03 | End: 2024-03-07 | Stop reason: HOSPADM

## 2024-03-03 RX ORDER — UREA 10 %
3 LOTION (ML) TOPICAL NIGHTLY PRN
Status: DISCONTINUED | OUTPATIENT
Start: 2024-03-03 | End: 2024-03-07 | Stop reason: HOSPADM

## 2024-03-03 RX ORDER — DIPHENOXYLATE HYDROCHLORIDE AND ATROPINE SULFATE 2.5; .025 MG/1; MG/1
1 TABLET ORAL 4 TIMES DAILY PRN
Status: DISCONTINUED | OUTPATIENT
Start: 2024-03-03 | End: 2024-03-07 | Stop reason: HOSPADM

## 2024-03-03 RX ORDER — ONDANSETRON 4 MG/1
4 TABLET, ORALLY DISINTEGRATING ORAL EVERY 6 HOURS PRN
Status: DISCONTINUED | OUTPATIENT
Start: 2024-03-03 | End: 2024-03-07 | Stop reason: HOSPADM

## 2024-03-03 RX ORDER — PANTOPRAZOLE SODIUM 40 MG/1
40 TABLET, DELAYED RELEASE ORAL
Status: DISCONTINUED | OUTPATIENT
Start: 2024-03-04 | End: 2024-03-07 | Stop reason: HOSPADM

## 2024-03-03 RX ORDER — BISACODYL 5 MG/1
5 TABLET, DELAYED RELEASE ORAL DAILY PRN
Status: DISCONTINUED | OUTPATIENT
Start: 2024-03-03 | End: 2024-03-07 | Stop reason: HOSPADM

## 2024-03-03 RX ORDER — OFLOXACIN 3 MG/ML
4 SOLUTION/ DROPS OPHTHALMIC DAILY
Status: DISCONTINUED | OUTPATIENT
Start: 2024-03-04 | End: 2024-03-07 | Stop reason: HOSPADM

## 2024-03-03 RX ORDER — LISINOPRIL 20 MG/1
20 TABLET ORAL
Status: DISCONTINUED | OUTPATIENT
Start: 2024-03-04 | End: 2024-03-07 | Stop reason: HOSPADM

## 2024-03-03 RX ORDER — HYDROCODONE BITARTRATE AND ACETAMINOPHEN 7.5; 325 MG/1; MG/1
1 TABLET ORAL EVERY 6 HOURS PRN
Status: DISCONTINUED | OUTPATIENT
Start: 2024-03-03 | End: 2024-03-07 | Stop reason: HOSPADM

## 2024-03-03 RX ORDER — LEVETIRACETAM 500 MG/1
500 TABLET ORAL 2 TIMES DAILY
Status: DISCONTINUED | OUTPATIENT
Start: 2024-03-04 | End: 2024-03-07 | Stop reason: HOSPADM

## 2024-03-03 RX ORDER — CLOPIDOGREL BISULFATE 75 MG/1
75 TABLET ORAL DAILY
Status: DISCONTINUED | OUTPATIENT
Start: 2024-03-04 | End: 2024-03-07 | Stop reason: HOSPADM

## 2024-03-03 RX ORDER — PREDNISONE 10 MG/1
10 TABLET ORAL DAILY
Status: DISCONTINUED | OUTPATIENT
Start: 2024-03-04 | End: 2024-03-07 | Stop reason: HOSPADM

## 2024-03-03 RX ORDER — POLYETHYLENE GLYCOL 3350 17 G/17G
17 POWDER, FOR SOLUTION ORAL DAILY PRN
Status: DISCONTINUED | OUTPATIENT
Start: 2024-03-03 | End: 2024-03-07 | Stop reason: HOSPADM

## 2024-03-03 RX ORDER — ESTRADIOL 1 MG/1
1 TABLET ORAL DAILY
Status: DISCONTINUED | OUTPATIENT
Start: 2024-03-04 | End: 2024-03-07 | Stop reason: HOSPADM

## 2024-03-03 RX ORDER — SODIUM CHLORIDE, SODIUM LACTATE, POTASSIUM CHLORIDE, CALCIUM CHLORIDE 600; 310; 30; 20 MG/100ML; MG/100ML; MG/100ML; MG/100ML
125 INJECTION, SOLUTION INTRAVENOUS CONTINUOUS
Status: DISCONTINUED | OUTPATIENT
Start: 2024-03-03 | End: 2024-03-06

## 2024-03-03 RX ORDER — BISACODYL 10 MG
10 SUPPOSITORY, RECTAL RECTAL DAILY PRN
Status: DISCONTINUED | OUTPATIENT
Start: 2024-03-03 | End: 2024-03-07 | Stop reason: HOSPADM

## 2024-03-03 RX ORDER — ATORVASTATIN CALCIUM 20 MG/1
20 TABLET, FILM COATED ORAL NIGHTLY
Status: DISCONTINUED | OUTPATIENT
Start: 2024-03-03 | End: 2024-03-07 | Stop reason: HOSPADM

## 2024-03-03 RX ORDER — ONDANSETRON 2 MG/ML
4 INJECTION INTRAMUSCULAR; INTRAVENOUS EVERY 6 HOURS PRN
Status: DISCONTINUED | OUTPATIENT
Start: 2024-03-03 | End: 2024-03-07 | Stop reason: HOSPADM

## 2024-03-03 RX ORDER — PREGABALIN 75 MG/1
150 CAPSULE ORAL EVERY 12 HOURS SCHEDULED
Status: DISCONTINUED | OUTPATIENT
Start: 2024-03-03 | End: 2024-03-07 | Stop reason: HOSPADM

## 2024-03-03 RX ORDER — AMOXICILLIN 250 MG
2 CAPSULE ORAL 2 TIMES DAILY PRN
Status: DISCONTINUED | OUTPATIENT
Start: 2024-03-03 | End: 2024-03-07 | Stop reason: HOSPADM

## 2024-03-03 RX ORDER — ACETAMINOPHEN 325 MG/1
650 TABLET ORAL EVERY 4 HOURS PRN
Status: DISCONTINUED | OUTPATIENT
Start: 2024-03-03 | End: 2024-03-07 | Stop reason: HOSPADM

## 2024-03-03 RX ADMIN — IOPAMIDOL 95 ML: 755 INJECTION, SOLUTION INTRAVENOUS at 17:54

## 2024-03-03 RX ADMIN — PREGABALIN 150 MG: 75 CAPSULE ORAL at 23:03

## 2024-03-03 RX ADMIN — SODIUM CHLORIDE, POTASSIUM CHLORIDE, SODIUM LACTATE AND CALCIUM CHLORIDE 125 ML/HR: 600; 310; 30; 20 INJECTION, SOLUTION INTRAVENOUS at 21:09

## 2024-03-03 RX ADMIN — CEFTRIAXONE 2000 MG: 2 INJECTION, POWDER, FOR SOLUTION INTRAMUSCULAR; INTRAVENOUS at 19:16

## 2024-03-03 RX ADMIN — ATORVASTATIN CALCIUM 20 MG: 20 TABLET, FILM COATED ORAL at 23:03

## 2024-03-03 RX ADMIN — SODIUM CHLORIDE, POTASSIUM CHLORIDE, SODIUM LACTATE AND CALCIUM CHLORIDE 500 ML: 600; 310; 30; 20 INJECTION, SOLUTION INTRAVENOUS at 16:29

## 2024-03-03 RX ADMIN — SODIUM CHLORIDE, POTASSIUM CHLORIDE, SODIUM LACTATE AND CALCIUM CHLORIDE 125 ML/HR: 600; 310; 30; 20 INJECTION, SOLUTION INTRAVENOUS at 16:29

## 2024-03-03 NOTE — PROGRESS NOTES
On-call note: Patient was due for chemotherapy 2 weeks ago however this was held secondary to her recent hospitalization to give her time to get stronger.  Patient's family member called in today reporting patient is more weak.  They are unable to get her up and down to the bathroom without her son-in-law's assistance.  She is having periods of confusion.  She is not eating or drinking well.  They do not think they can get her in the car by themselves and therefore we discussed calling EMS and having her transported to the hospital for evaluation today.  We will adjust appointments as needed regarding chemotherapy this week.  Lindsey JIMENEZ

## 2024-03-03 NOTE — ED PROVIDER NOTES
EMERGENCY DEPARTMENT ENCOUNTER  Room Number:  15/15  PCP: Nik Mckay MD  Independent Historians: Patient      HPI:  Chief Complaint: had concerns including Abdominal Pain and Coughing Up Blood.     A complete HPI/ROS/PMH/PSH/SH/FH are unobtainable due to: None    Chronic or social conditions impacting patient care (Social Determinants of Health): None      Context: Pam Vidal is a 79 y.o. female with a medical history of lung cancer with metastases to his brain, COPD and CKD who presents to the ED c/o acute fever and general malaise developing over the last 5 days.  Patient reports diminished appetite over this time.  She has had some chronic cough now hemoptysis.  She has had diffuse abdominal discomfort with no bowel movement x 3 days.  Some dysuria noted as well.  No clear exacerbating or relieving factors identified.  Patient feels moderately severe late ill at this time.  Last Tylenol earlier this morning.      Review of prior external notes (non-ED) -and- Review of prior external test results outside of this encounter:  Followed by Dr. 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  Patient had development of brain mets in May 2023.  =================  Hospital discharge summary 2/19/2024: Patient was just admitted for gastroenteritis found to be secondary to Courtney virus.      PAST MEDICAL HISTORY  Active Ambulatory Problems     Diagnosis Date Noted    Primary lung adenocarcinoma, right 08/30/2022    Cough with hemoptysis 08/30/2022    Lung mass 08/31/2022    Advanced care planning/counseling discussion 10/03/2022    High risk medication use 10/31/2022    Muscular deconditioning 04/03/2023    Neoplastic malignant related fatigue 04/03/2023    Lung cancer metastatic to brain 05/25/2023    Brain mass 05/27/2023    COPD (chronic obstructive pulmonary disease) 05/27/2023    Rheumatoid arthritis 05/27/2023    IBS (irritable bowel syndrome) 05/27/2023    Hyperlipidemia  05/27/2023    Abnormal CT of the chest 06/11/2023    Shingles, left arm 06/11/2023    HTN (hypertension) 06/11/2023    Dizziness 08/26/2023    Campylobacter diarrhea 08/27/2023    Bacterial colitis 08/27/2023    Diarrhea 08/27/2023    Neuropathic pain 08/27/2023    VBI (vertebrobasilar insufficiency) 09/01/2023    Atypical pneumonia 09/01/2023    Post herpetic neuralgia 09/22/2023    Chronic pain after cancer treatment 09/22/2023    Left arm pain 10/04/2023    Gastroenteritis due to sapovirus 02/17/2024    Stage 3a chronic kidney disease (CKD) 02/17/2024    ZACH (acute kidney injury) 02/17/2024     Resolved Ambulatory Problems     Diagnosis Date Noted    Leukocytosis 05/27/2023     Past Medical History:   Diagnosis Date    Coughing up blood     History of COVID-19 02/2022         PAST SURGICAL HISTORY  Past Surgical History:   Procedure Laterality Date    APPENDECTOMY      appendix removed    BRONCHOSCOPY N/A 8/23/2022    Procedure: BRONCHOSCOPY WITH BAL,  BIOPSIES, AND BRUSHINGS WITH ENDOBRONCHIAL ULTRASOUND WITH FNA;  Surgeon: Gregor Vee MD;  Location: [facility] ENDOSCOPY;  Service: Pulmonary;  Laterality: N/A;  PRE- HILAR MASS  POST- SAME    BRONCHOSCOPY N/A 1/4/2023    Procedure: BRONCHOSCOPY with biopsy, lavage, brushing;  Surgeon: Gregor Vee MD;  Location: [facility] ENDOSCOPY;  Service: Pulmonary;  Laterality: N/A;    CAROTID ENDARTERECTOMY Right     CAROTID ENDARTERECTOMY Left     CATARACT EXTRACTION      CERVICAL FUSION      CHOLECYSTECTOMY      CRANIOTOMY FOR TUMOR Right 5/30/2023    Procedure: RIGHT CRANIOTOMY FOR TUMOR RESECTION STEREOTACTIC WITH STEALTH;  Surgeon: Clint Ricketts MD;  Location: [facility] OR;  Service: Neurosurgery;  Laterality: Right;    HYSTERECTOMY      SHOULDER ARTHROSCOPY Right 02/19/2019    right should scope/cuff repair     VENOUS ACCESS DEVICE (PORT) INSERTION Right 9/6/2022    Procedure: POWERPORT INSERTION;  Surgeon: Remedios Rice MD;  Location: [facility] MAIN OR;  Service:  Thoracic;  Laterality: Right;         FAMILY HISTORY  Family History   Problem Relation Age of Onset    Cancer Mother     Cancer Father     Malig Hyperthermia Neg Hx          SOCIAL HISTORY  Social History     Socioeconomic History    Marital status:    Tobacco Use    Smoking status: Former     Current packs/day: 0.00     Types: Cigarettes     Quit date: 2022     Years since quittin.8    Smokeless tobacco: Never    Tobacco comments:     Former some day smoker of 1-2 cigs   Vaping Use    Vaping status: Never Used   Substance and Sexual Activity    Alcohol use: Yes     Alcohol/week: 2.0 standard drinks of alcohol     Types: 2 Glasses of wine per week     Comment: occasionally    Drug use: Yes     Frequency: 2.0 times per week     Types: Marijuana    Sexual activity: Defer         ALLERGIES  Del-mycin [erythromycin], Gentamicin, Ibuprofen, Latex, Metronidazole, Ofloxacin, Penicillins, Tetracycline, Adhesive tape, Aspirin, and Codeine      REVIEW OF SYSTEMS  Review of Systems  Included in HPI  All systems reviewed and negative except for those discussed in HPI.      PHYSICAL EXAM    I have reviewed the triage vital signs and nursing notes.    ED Triage Vitals   Temp Heart Rate Resp BP SpO2   24 1513 24 1512 24 1512 24 1512 24 1512   96.1 °F (35.6 °C) 97 16 99/52 95 %      Temp src Heart Rate Source Patient Position BP Location FiO2 (%)   24 1513 -- -- -- --   Tympanic           Physical Exam    Physical Exam   Constitutional: No distress.  Potentially toxic appearing  HENT:  Head: Normocephalic and atraumatic.   Oropharynx: Mucous membranes are moist.   Eyes: . No scleral icterus. No conjunctival pallor.  Bluish tent noted to bilateral conjunctiva.  Neck: Normal range of motion. Neck supple.   Cardiovascular: Pink warm and well perfused throughout.  Regular but tachycardic  Pulmonary/Chest: Very mild tachypnea with no increased work of breathing appreciated on  examination.  Diminished bilaterally.  Abdominal: Soft.  There is mild diffuse discomfort palpation without focal tenderness.  The abdomen is somewhat distended without rigidity.  Musculoskeletal: Moves all extremities equally.    Neurological: Alert and oriented.  No acute focal deficit appreciated.  Skin: Skin is diaphoretic and somewhat pale.  Psychiatric: Mood and affect normal.   Nursing note and vitals reviewed.             LAB RESULTS  Recent Results (from the past 24 hour(s))   Comprehensive Metabolic Panel    Collection Time: 03/03/24  4:22 PM    Specimen: Blood   Result Value Ref Range    Glucose 105 (H) 65 - 99 mg/dL    BUN 31 (H) 8 - 23 mg/dL    Creatinine 1.51 (H) 0.57 - 1.00 mg/dL    Sodium 133 (L) 136 - 145 mmol/L    Potassium 4.7 3.5 - 5.2 mmol/L    Chloride 99 98 - 107 mmol/L    CO2 19.0 (L) 22.0 - 29.0 mmol/L    Calcium 9.2 8.6 - 10.5 mg/dL    Total Protein 6.4 6.0 - 8.5 g/dL    Albumin 3.0 (L) 3.5 - 5.2 g/dL    ALT (SGPT) 26 1 - 33 U/L    AST (SGOT) 25 1 - 32 U/L    Alkaline Phosphatase 289 (H) 39 - 117 U/L    Total Bilirubin 0.5 0.0 - 1.2 mg/dL    Globulin 3.4 gm/dL    A/G Ratio 0.9 g/dL    BUN/Creatinine Ratio 20.5 7.0 - 25.0    Anion Gap 15.0 5.0 - 15.0 mmol/L    eGFR 35.5 (L) >60.0 mL/min/1.73   Protime-INR    Collection Time: 03/03/24  4:22 PM    Specimen: Blood   Result Value Ref Range    Protime 15.5 (H) 11.7 - 14.2 Seconds    INR 1.21 (H) 0.90 - 1.10   aPTT    Collection Time: 03/03/24  4:22 PM    Specimen: Blood   Result Value Ref Range    PTT 30.2 22.7 - 35.4 seconds   Lipase    Collection Time: 03/03/24  4:22 PM    Specimen: Blood   Result Value Ref Range    Lipase 12 (L) 13 - 60 U/L   Procalcitonin    Collection Time: 03/03/24  4:22 PM    Specimen: Blood   Result Value Ref Range    Procalcitonin 6.40 (H) 0.00 - 0.25 ng/mL   Lactic Acid, Plasma    Collection Time: 03/03/24  4:22 PM    Specimen: Blood   Result Value Ref Range    Lactate 0.7 0.5 - 2.0 mmol/L   CBC Auto Differential     Collection Time: 03/03/24  4:22 PM    Specimen: Blood   Result Value Ref Range    WBC 23.60 (H) 3.40 - 10.80 10*3/mm3    RBC 3.56 (L) 3.77 - 5.28 10*6/mm3    Hemoglobin 9.7 (L) 12.0 - 15.9 g/dL    Hematocrit 30.4 (L) 34.0 - 46.6 %    MCV 85.4 79.0 - 97.0 fL    MCH 27.2 26.6 - 33.0 pg    MCHC 31.9 31.5 - 35.7 g/dL    RDW 15.5 (H) 12.3 - 15.4 %    RDW-SD 47.8 37.0 - 54.0 fl    MPV 11.3 6.0 - 12.0 fL    Platelets 316 140 - 450 10*3/mm3    Neutrophil % 89.0 (H) 42.7 - 76.0 %    Lymphocyte % 2.7 (L) 19.6 - 45.3 %    Monocyte % 5.5 5.0 - 12.0 %    Eosinophil % 0.1 (L) 0.3 - 6.2 %    Basophil % 0.2 0.0 - 1.5 %    Immature Grans % 2.5 (H) 0.0 - 0.5 %    Neutrophils, Absolute 21.01 (H) 1.70 - 7.00 10*3/mm3    Lymphocytes, Absolute 0.63 (L) 0.70 - 3.10 10*3/mm3    Monocytes, Absolute 1.30 (H) 0.10 - 0.90 10*3/mm3    Eosinophils, Absolute 0.02 0.00 - 0.40 10*3/mm3    Basophils, Absolute 0.05 0.00 - 0.20 10*3/mm3    Immature Grans, Absolute 0.59 (H) 0.00 - 0.05 10*3/mm3    nRBC 0.0 0.0 - 0.2 /100 WBC   Respiratory Panel PCR w/COVID-19(SARS-CoV-2) SHAWNA/MADELINE/ZAY/PAD/COR/TIMBO In-House, NP Swab in [facility] 2 HR TAT - Swab, Nasopharynx    Collection Time: 03/03/24  4:23 PM    Specimen: Nasopharynx; Swab   Result Value Ref Range    ADENOVIRUS, PCR Not Detected Not Detected    Coronavirus 229E Not Detected Not Detected    Coronavirus HKU1 Not Detected Not Detected    Coronavirus NL63 Not Detected Not Detected    Coronavirus OC43 Not Detected Not Detected    COVID19 Not Detected Not Detected - Ref. Range    Human Metapneumovirus Not Detected Not Detected    Human Rhinovirus/Enterovirus Not Detected Not Detected    Influenza A PCR Not Detected Not Detected    Influenza B PCR Not Detected Not Detected    Parainfluenza Virus 1 Not Detected Not Detected    Parainfluenza Virus 2 Not Detected Not Detected    Parainfluenza Virus 3 Not Detected Not Detected    Parainfluenza Virus 4 Not Detected Not Detected    RSV, PCR Not Detected Not Detected     Bordetella pertussis pcr Not Detected Not Detected    Bordetella parapertussis PCR Not Detected Not Detected    Chlamydophila pneumoniae PCR Not Detected Not Detected    Mycoplasma pneumo by PCR Not Detected Not Detected   Urinalysis With Culture If Indicated - Urine, Catheter    Collection Time: 03/03/24  4:23 PM    Specimen: Urine, Catheter   Result Value Ref Range    Color, UA Yellow Yellow, Straw    Appearance, UA Clear Clear    pH, UA <=5.0 5.0 - 8.0    Specific Gravity, UA 1.014 1.005 - 1.030    Glucose, UA Negative Negative    Ketones, UA Trace (A) Negative    Bilirubin, UA Negative Negative    Blood, UA Negative Negative    Protein, UA 30 mg/dL (1+) (A) Negative    Leuk Esterase, UA Negative Negative    Nitrite, UA Negative Negative    Urobilinogen, UA 0.2 E.U./dL 0.2 - 1.0 E.U./dL   Urinalysis, Microscopic Only - Urine, Catheter    Collection Time: 03/03/24  4:23 PM    Specimen: Urine, Catheter   Result Value Ref Range    RBC, UA 0-2 None Seen, 0-2 /HPF    WBC, UA 0-2 None Seen, 0-2 /HPF    Bacteria, UA 4+ (A) None Seen /HPF    Squamous Epithelial Cells, UA 0-2 None Seen, 0-2 /HPF    Hyaline Casts, UA None Seen None Seen /LPF    Methodology Automated Microscopy          RADIOLOGY  CT Angiogram Chest Pulmonary Embolism, CT Abdomen Pelvis With Contrast    Result Date: 3/3/2024   CT ANGIOGRAM OF THE CHEST. MULTIPLE CORONAL, SAGITTAL, AND 3-D RECONSTRUCTIONS. CT ABDOMEN AND PELVIS WITH IV CONTRAST  HISTORY: 79-year-old female with cough and fever. Hemoptysis. Abdominal pain. Metastatic breast cancer. Brain metastases.  TECHNIQUE: Radiation dose reduction techniques were utilized, including automated exposure control and exposure modulation based on body size. CT angiogram of the chest was performed. Multiple coronal, sagittal, and 3-D reconstruction images were obtained. Subsequently, 3 mm images were obtained through the abdomen and pelvis after the administration of IV contrast. Compared with CT abdomen  and pelvis 02/17/2024 and chest CT 01/05/2024.  FINDINGS:  CHEST CTA: 1. There is no convincing evidence for pulmonary thromboemboli.  2. There has been significant increase in size of the centrally cavitary masslike consolidation pleural-based at the posterior medial aspect of the right upper lobe measuring 6.8 x 4.4 cm, previously approximately 4.7 x 3.3 cm. The centrally liquefied/necrotic region containing air measures 4.0 x 3.3 cm, previously smaller. There is more prominent abnormal soft tissue encasing the right hilum, but there is no mediastinal or left hilar lymphadenopathy. No significant change in the very mild pleural thickening at the posterior aspect of the right upper lobe.  3. There are no pleural or pericardial effusions. There is less atelectasis at the left lung base, currently mild.  ABDOMEN/PELVIS: 1. There is a subtle striated enhancement pattern of the right kidney and there is more prominent right perinephric stranding than on the left. Urinary bladder wall appears thickened, but the bladder is nearly collapsed. Acute right pyelonephritis is suspected and pyelonephritis on the left cannot be excluded.  2. No significant change is seen in the right renal cysts. The largest measures 5.5 cm and has internal density measurements of 36 Hounsfield units. Likely proteinaceous cysts and conservative surveillance is recommended.  3. There is no acute abnormality at the liver, spleen, pancreas, or adrenals. There is no acute bowel abnormality. There is moderate sigmoid diverticulosis without evidence for diverticulitis. The appendix is surgically absent. Uterus is surgically absent. No lymphadenopathy.      XR Chest 1 View    Result Date: 3/3/2024  XR CHEST 1 VW-   INDICATION: Hemoptysis  COMPARISON: Chest radiograph August 31, 2023  TECHNIQUE: 1 view chest  FINDINGS:  Right suprahilar masslike opacity, with volume loss. Linear opacities of the left lung base, consistent with subsegmental atelectasis.  No effusions. Stable mediastinum. Heart is normal in size. Right Port-A-Cath with the catheter tip near the caval atrial junction. Cholecystectomy clips. Cervical spine cerclage wires.      Masslike right suprahilar opacity with volume loss. Suspect radiated tumor  This report was finalized on 3/3/2024 3:56 PM by Dr. Enzo Soria M.D on Workstation: [id number]         MEDICATIONS GIVEN IN ER  Medications   sodium chloride 0.9 % flush 10 mL (has no administration in time range)   sodium chloride 0.9 % flush 10 mL (has no administration in time range)   lactated ringers infusion (125 mL/hr Intravenous New Bag 3/3/24 1629)   lactated ringers bolus 500 mL ( Intravenous Currently Infusing 3/3/24 1847)   cefTRIAXone (ROCEPHIN) 2,000 mg in sodium chloride 0.9 % 100 mL IVPB-VTB (2,000 mg Intravenous New Bag 3/3/24 1916)   lactated ringers bolus 500 mL (500 mL Intravenous New Bag 3/3/24 1629)   iopamidol (ISOVUE-370) 76 % injection 100 mL (95 mL Intravenous Given by Other 3/3/24 1754)         ORDERS PLACED DURING THIS VISIT:  Orders Placed This Encounter   Procedures    Respiratory Panel PCR w/COVID-19(SARS-CoV-2) SHAWNA/MADELINE/ZAY/PAD/COR/TIMBO In-House, NP Swab in UTM/VTM, 2 HR TAT - Swab, Nasopharynx    Blood Culture - Blood,    Blood Culture - Blood,    XR Chest 1 View    CT Angiogram Chest Pulmonary Embolism    CT Abdomen Pelvis With Contrast    Comprehensive Metabolic Panel    Protime-INR    aPTT    Lipase    Procalcitonin    Lactic Acid, Plasma    CBC Auto Differential    Urinalysis With Culture If Indicated - Urine, Catheter    Urinalysis, Microscopic Only - Urine, Clean Catch    Monitor Blood Pressure    Pulse Oximetry, Continuous    Straight Cath    Please obtain core temperature as if patient feels very hot and is likely septic.  [facility]Specify[facility]    [facility] (on-call MD unless specified) Details    Insert Peripheral IV    Access VAD    Inpatient Admission    CBC & Differential         OUTPATIENT MEDICATION  MANAGEMENT:  Current Facility-Administered Medications Ordered in Epic   Medication Dose Route Frequency Provider Last Rate Last Admin    cefTRIAXone (ROCEPHIN) 2,000 mg in sodium chloride 0.9 % 100 mL IVPB-VTB  2,000 mg Intravenous Once Simone Kitchen  mL/hr at 03/03/24 1916 2,000 mg at 03/03/24 1916    heparin injection 500 Units  500 Units Intravenous PRN Cruzito Lagunas MD        lactated ringers bolus 500 mL  500 mL Intravenous Once Simone Kitchen MD 1,000 mL/hr at 03/03/24 1847 Currently Infusing at 03/03/24 1847    lactated ringers infusion  125 mL/hr Intravenous Continuous Simone Kitchen  mL/hr at 03/03/24 1629 125 mL/hr at 03/03/24 1629    sodium chloride 0.9 % flush 10 mL  10 mL Intravenous PRN Cruzito Lagunas MD        sodium chloride 0.9 % flush 10 mL  10 mL Intravenous PRN Simone Kitchen MD        sodium chloride 0.9 % flush 10 mL  10 mL Intravenous PRN Simone Kitchen MD         Current Outpatient Medications Ordered in Epic   Medication Sig Dispense Refill    atorvastatin (LIPITOR) 20 MG tablet Take 1 tablet by mouth Every Night. 30 tablet 0    azelastine (ASTELIN) 0.1 % nasal spray 1 spray into the nostril(s) as directed by provider Daily As Needed for Allergies.      B COMPLEX VITAMINS ER PO Take  by mouth Daily.      Cholecalciferol (VITAMIN D3) 5000 units capsule capsule Take 1 capsule by mouth Daily.      clopidogrel (PLAVIX) 75 MG tablet Take 1 tablet by mouth Daily. 30 tablet 0    diphenoxylate-atropine (LOMOTIL) 2.5-0.025 MG per tablet Take 1 tablet by mouth 4 (Four) Times a Day As Needed for Diarrhea. 10 tablet 0    esomeprazole (nexIUM) 20 MG capsule Take 1 capsule by mouth Every Morning Before Breakfast.      estradiol (ESTRACE) 1 MG tablet Take 1 tablet by mouth Daily.      FeroSul 325 (65 Fe) MG tablet TAKE ONE TABLET BY MOUTH DAILY WITH BREAKFAST 30 tablet 5    Gabapentin 10 %, Baclofen 2 %, lidocaine 4 %, Ketamine HCl 4 % Apply 1-2 g topically to the appropriate area as  directed 3 (Three) to 4 (Four) times daily. 90 g 0    HYDROcodone-acetaminophen (NORCO) 7.5-325 MG per tablet Take 1 tablet by mouth Every 6 (Six) Hours As Needed for Moderate Pain. 60 tablet 0    levETIRAcetam (KEPPRA) 500 MG tablet TAKE 1 TABLET BY MOUTH TWICE A  tablet 0    lidocaine-prilocaine (EMLA) 2.5-2.5 % cream       lisinopril (PRINIVIL,ZESTRIL) 20 MG tablet Take 1 tablet by mouth Daily for 30 days. 30 tablet 0    Multiple Vitamins-Minerals (PRESERVISION AREDS 2+MULTI VIT PO) Take  by mouth.      ofloxacin (OCUFLOX) 0.3 % ophthalmic solution       ondansetron (ZOFRAN) 8 MG tablet Take 1 tablet by mouth 3 (Three) Times a Day As Needed for Nausea or Vomiting. 30 tablet 5    oxyCODONE-acetaminophen (PERCOCET)  MG per tablet       predniSONE (DELTASONE) 10 MG tablet Take 2 tablets by mouth Daily. 60 tablet 5    pregabalin (LYRICA) 150 MG capsule Take 1 capsule by mouth Every 12 (Twelve) Hours for 30 days. 60 capsule 0    pregabalin (LYRICA) 75 MG capsule TAKE 1 CAPSULE BY MOUTH TWICE A DAY ALONG WITH 150 MG CAPSULE FOR TOTAL DOSE  MG TWICE DAILY 60 capsule 0         PROCEDURES  Procedures      Total critical care time: Approximately 45 minutes    Due to a high probability of clinically significant, life threatening deterioration, the patient required my highest level of preparedness to intervene emergently and I personally spent this critical care time directly and personally managing the patient. This critical care time included obtaining a history; examining the patient; vital sign monitoring; ordering and review of studies; arranging urgent treatment with development of a management plan; evaluation of patient's response to treatment; frequent reassessment; and, discussions with other providers.    This critical care time was performed to assess and manage the high probability of imminent, life-threatening deterioration that could result in multi-organ failure. It was exclusive of  separately billable procedures and treating other patients and teaching time.    Please see MDM section and the rest of the note for further information on patient assessment and treatment.        PROGRESS, DATA ANALYSIS, CONSULTS, AND MEDICAL DECISION MAKING  All labs have been independently interpreted by me.  All radiology studies have been reviewed by me. All EKG's have been independently viewed and interpreted by me.  Discussion below represents my analysis of pertinent findings related to patient's condition, differential diagnosis, treatment plan and final disposition.    Differential diagnosis:   My differential diagnosis for fever includes but is not limited:  To viral infections including COVID-19, bacterial infections, fungal infections, fever of unknown origin, auto regulatory dysfunction, hyperthermia, heat exhaustion, heat stroke, malignant neuroleptic syndrome and others.      Clinical Scores:                  ED Course as of 03/03/24 1924   Sun Mar 03, 2024   1543 RADIOLOGY      Study: Single view chest  Findings: Port in right chest; mass right upper lobe  I independently viewed and interpreted these images contemporaneously with treatment.    [RS]   1710 Leukocytes, UA: Negative [RS]   1710 Nitrite, UA: Negative [RS]   1710 WBC(!): 23.60  Increase from 11 days ago [RS]   1710 Hemoglobin(!): 9.7  Decreased from 11 days ago [RS]   1710 Immature Grans, Absolute(!): 0.59 [RS]   1734 Procalcitonin(!): 6.40 [RS]   1734 Lipase(!): 12 [RS]   1734 Lactate: 0.7 [RS]   1734 INR(!): 1.21 [RS]   1734 BUN(!): 31 [RS]   1734 Creatinine(!): 1.51  Represents ZACH as compared to prior [RS]   1734 ALT (SGPT): 26 [RS]   1734 AST (SGOT): 25 [RS]   1734 Total Bilirubin: 0.5 [RS]   1734 Nurse reports 98.9 rectal temp [RS]   1735 Specific Gravity, UA: 1.014 [RS]   1735 WBC, UA: 0-2 [RS]   1735 Bacteria, UA(!): 4+ [RS]   1735 Squamous Epithelial Cells, UA: 0-2 [RS]   1800 COVID19: Not Detected [RS]   1801 Influenza A PCR:  Not Detected [RS]   1801 Influenza B PCR: Not Detected [RS]   1900 I reviewed all findings with the patient was feeling a bit improved at this time.  Recommend admission.  She is agreeable. [RS]   1923 CONSULT        Provider: Dr. Nelson - [facility]    Discussion: Reviewed patient history, ED presentation and evaluation as well as therapies initiated in ED.  He is able to accept this patient for admission on his service.    Agreeable c treatment and planned disposition.         [RS]      ED Course User Index  [RS] Simone Kitchen MD         Prescription drug monitoring program review: NA    AS OF 19:24 EST VITALS:    BP - 112/50  HR - 80  TEMP - 96.1 °F (35.6 °C) (Tympanic)  O2 SATS - 96%    COMPLEXITY OF CARE  The patient requires admission.      DIAGNOSIS  Final diagnoses:   Acute pyelonephritis   SIRS (systemic inflammatory response syndrome)   Primary malignant neoplasm of right lung metastatic to other site   Acute kidney injury (ZACH) with acute tubular necrosis (ATN)   Leukocytosis, unspecified type   Acute on chronic anemia         DISPOSITION  ED Disposition       ED Disposition   Decision to Admit    Condition   --    Comment   Level of Care: Telemetry [5]   Diagnosis: Acute pyelonephritis [338507]   Admitting Physician: SIMONE NELSON [[id number]]   Certification: I Certify That Inpatient Hospital Services Are Medically Necessary For Greater Than 2 Midnights                    ADMISSION    Discussed treatment plan and reason for admission with pt/family and admitting physician.  Pt/family voiced understanding of the plan for admission for further testing/treatment as needed.       Please note that portions of this document were completed with a voice recognition program.    Note Disclaimer: At [facility], we believe that sharing information builds trust and better relationships. You are receiving this note because you recently visited [facility]. It is possible you will see health information before a  provider has talked with you about it. This kind of information can be easy to misunderstand. To help you fully understand what it means for your health, we urge you to discuss this note with your provider.         Simone Kitchen MD  03/03/24 1923

## 2024-03-04 LAB
ANION GAP SERPL CALCULATED.3IONS-SCNC: 12.1 MMOL/L (ref 5–15)
BACTERIA BLD CULT: ABNORMAL
BOTTLE TYPE: ABNORMAL
BUN SERPL-MCNC: 27 MG/DL (ref 8–23)
BUN/CREAT SERPL: 23.3 (ref 7–25)
CALCIUM SPEC-SCNC: 8.5 MG/DL (ref 8.6–10.5)
CHLORIDE SERPL-SCNC: 102 MMOL/L (ref 98–107)
CO2 SERPL-SCNC: 22.9 MMOL/L (ref 22–29)
CREAT SERPL-MCNC: 1.16 MG/DL (ref 0.57–1)
DEPRECATED RDW RBC AUTO: 52.2 FL (ref 37–54)
EGFRCR SERPLBLD CKD-EPI 2021: 48.7 ML/MIN/1.73
ERYTHROCYTE [DISTWIDTH] IN BLOOD BY AUTOMATED COUNT: 16 % (ref 12.3–15.4)
GLUCOSE SERPL-MCNC: 189 MG/DL (ref 65–99)
HCT VFR BLD AUTO: 28.5 % (ref 34–46.6)
HGB BLD-MCNC: 9.1 G/DL (ref 12–15.9)
MCH RBC QN AUTO: 28.2 PG (ref 26.6–33)
MCHC RBC AUTO-ENTMCNC: 31.9 G/DL (ref 31.5–35.7)
MCV RBC AUTO: 88.2 FL (ref 79–97)
PLATELET # BLD AUTO: 304 10*3/MM3 (ref 140–450)
PMV BLD AUTO: 11.1 FL (ref 6–12)
POTASSIUM SERPL-SCNC: 4.3 MMOL/L (ref 3.5–5.2)
RBC # BLD AUTO: 3.23 10*6/MM3 (ref 3.77–5.28)
SODIUM SERPL-SCNC: 137 MMOL/L (ref 136–145)
WBC NRBC COR # BLD AUTO: 21.55 10*3/MM3 (ref 3.4–10.8)

## 2024-03-04 PROCEDURE — 25810000003 LACTATED RINGERS PER 1000 ML: Performed by: STUDENT IN AN ORGANIZED HEALTH CARE EDUCATION/TRAINING PROGRAM

## 2024-03-04 PROCEDURE — 80048 BASIC METABOLIC PNL TOTAL CA: CPT | Performed by: STUDENT IN AN ORGANIZED HEALTH CARE EDUCATION/TRAINING PROGRAM

## 2024-03-04 PROCEDURE — 63710000001 PREDNISONE PER 5 MG: Performed by: STUDENT IN AN ORGANIZED HEALTH CARE EDUCATION/TRAINING PROGRAM

## 2024-03-04 PROCEDURE — 97530 THERAPEUTIC ACTIVITIES: CPT

## 2024-03-04 PROCEDURE — 85027 COMPLETE CBC AUTOMATED: CPT | Performed by: STUDENT IN AN ORGANIZED HEALTH CARE EDUCATION/TRAINING PROGRAM

## 2024-03-04 PROCEDURE — 97162 PT EVAL MOD COMPLEX 30 MIN: CPT

## 2024-03-04 PROCEDURE — 99222 1ST HOSP IP/OBS MODERATE 55: CPT | Performed by: INTERNAL MEDICINE

## 2024-03-04 PROCEDURE — 99223 1ST HOSP IP/OBS HIGH 75: CPT | Performed by: STUDENT IN AN ORGANIZED HEALTH CARE EDUCATION/TRAINING PROGRAM

## 2024-03-04 PROCEDURE — 87040 BLOOD CULTURE FOR BACTERIA: CPT | Performed by: STUDENT IN AN ORGANIZED HEALTH CARE EDUCATION/TRAINING PROGRAM

## 2024-03-04 PROCEDURE — 97166 OT EVAL MOD COMPLEX 45 MIN: CPT

## 2024-03-04 PROCEDURE — 25010000002 CEFTRIAXONE PER 250 MG: Performed by: STUDENT IN AN ORGANIZED HEALTH CARE EDUCATION/TRAINING PROGRAM

## 2024-03-04 RX ADMIN — SODIUM CHLORIDE, POTASSIUM CHLORIDE, SODIUM LACTATE AND CALCIUM CHLORIDE 125 ML/HR: 600; 310; 30; 20 INJECTION, SOLUTION INTRAVENOUS at 13:53

## 2024-03-04 RX ADMIN — FERROUS SULFATE TAB 325 MG (65 MG ELEMENTAL FE) 325 MG: 325 (65 FE) TAB at 09:06

## 2024-03-04 RX ADMIN — LEVETIRACETAM 500 MG: 500 TABLET, FILM COATED ORAL at 20:00

## 2024-03-04 RX ADMIN — LEVETIRACETAM 500 MG: 500 TABLET, FILM COATED ORAL at 09:06

## 2024-03-04 RX ADMIN — ESTRADIOL 1 MG: 1 TABLET ORAL at 09:06

## 2024-03-04 RX ADMIN — PREGABALIN 150 MG: 75 CAPSULE ORAL at 09:05

## 2024-03-04 RX ADMIN — SODIUM CHLORIDE, POTASSIUM CHLORIDE, SODIUM LACTATE AND CALCIUM CHLORIDE 125 ML/HR: 600; 310; 30; 20 INJECTION, SOLUTION INTRAVENOUS at 22:42

## 2024-03-04 RX ADMIN — ATORVASTATIN CALCIUM 20 MG: 20 TABLET, FILM COATED ORAL at 20:00

## 2024-03-04 RX ADMIN — PREDNISONE 10 MG: 10 TABLET ORAL at 09:06

## 2024-03-04 RX ADMIN — PREGABALIN 150 MG: 75 CAPSULE ORAL at 20:00

## 2024-03-04 RX ADMIN — PANTOPRAZOLE SODIUM 40 MG: 40 TABLET, DELAYED RELEASE ORAL at 06:01

## 2024-03-04 RX ADMIN — CLOPIDOGREL BISULFATE 75 MG: 75 TABLET, FILM COATED ORAL at 09:06

## 2024-03-04 RX ADMIN — SODIUM CHLORIDE, POTASSIUM CHLORIDE, SODIUM LACTATE AND CALCIUM CHLORIDE 125 ML/HR: 600; 310; 30; 20 INJECTION, SOLUTION INTRAVENOUS at 06:00

## 2024-03-04 RX ADMIN — CEFTRIAXONE 2000 MG: 2 INJECTION, POWDER, FOR SOLUTION INTRAMUSCULAR; INTRAVENOUS at 17:19

## 2024-03-04 RX ADMIN — OFLOXACIN 4 DROP: 3 SOLUTION OPHTHALMIC at 09:10

## 2024-03-04 NOTE — PLAN OF CARE
Problem: Adult Inpatient Plan of Care  Goal: Plan of Care Review  Flowsheets (Taken 3/4/2024 1621)  Outcome Evaluation: [employer] of B&B, Q2 turn, Bed alarm on. IV fluids, IV abx, No c/o pain or SOA. Room air  Goal: Absence of Hospital-Acquired Illness or Injury  Intervention: Identify and Manage Fall Risk  Recent Flowsheet Documentation  Taken 3/4/2024 1400 by Gilma Blanca RN  Safety Promotion/Fall Prevention:   safety round/check completed   nonskid shoes/slippers when out of bed   fall prevention program maintained   clutter free environment maintained  Taken 3/4/2024 1200 by Gilma Blanca RN  Safety Promotion/Fall Prevention:   safety round/check completed   nonskid shoes/slippers when out of bed   fall prevention program maintained  Taken 3/4/2024 1000 by Gilma Blanca RN  Safety Promotion/Fall Prevention:   nonskid shoes/slippers when out of bed   safety round/check completed   fall prevention program maintained  Taken 3/4/2024 0800 by Gilma Blanca RN  Safety Promotion/Fall Prevention:   safety round/check completed   nonskid shoes/slippers when out of bed   fall prevention program maintained   clutter free environment maintained  Intervention: Prevent Skin Injury  Recent Flowsheet Documentation  Taken 3/4/2024 1400 by Gilma Blanca RN  Body Position:   turned   right  Taken 3/4/2024 1200 by Gilma Blanca RN  Body Position:   left   turned  Taken 3/4/2024 1000 by Gilma Blanca RN  Body Position:   tilted   right  Taken 3/4/2024 0800 by Gilma Blanca RN  Body Position: position changed independently  Intervention: Prevent and Manage VTE (Venous Thromboembolism) Risk  Recent Flowsheet Documentation  Taken 3/4/2024 1400 by Gilma Blanca RN  Activity Management: activity encouraged  Taken 3/4/2024 1200 by Gilma Blanca RN  Activity Management: activity encouraged  Taken 3/4/2024 1000 by Gilma Blanca RN  Activity Management: activity encouraged  Taken 3/4/2024 0800 by  Gilma Blanca, RN  Activity Management: activity encouraged  VTE Prevention/Management:   bilateral   sequential compression devices on  Intervention: Prevent Infection  Recent Flowsheet Documentation  Taken 3/4/2024 0800 by Gilma Blanca RN  Infection Prevention:   rest/sleep promoted   environmental surveillance performed  Goal: Optimal Comfort and Wellbeing  Intervention: Provide Person-Centered Care  Recent Flowsheet Documentation  Taken 3/4/2024 1400 by Gilma Blanca RN  Trust Relationship/Rapport:   choices provided   care explained  Taken 3/4/2024 0800 by Gilma Blanca RN  Trust Relationship/Rapport:   care explained   choices provided   Goal Outcome Evaluation:              Outcome Evaluation: [employer] of B&B, Q2 turn, Bed alarm on. IV fluids, IV abx, No c/o pain or SOA. Room air

## 2024-03-04 NOTE — H&P
Patient Name:  Pam Vidal  YOB: 1945  MRN:  [id number]  Admit Date:  3/3/2024  Patient Care Team:  Nik Mckay MD as PCP - General  Nik Mckay MD as PCP - Family Medicine  Remedios Rice MD as Surgeon (Thoracic Surgery)  Jannet Chauhan, RN as Nurse Navigator  Remedios Rice MD as Referring Physician (Thoracic Surgery)  Cruzito Lagunas MD as Consulting Physician (Hematology and Oncology)  Mckenna Moreira MD as Consulting Physician (Radiation Oncology)  Cruzito Lagunas MD as Consulting Physician (Hematology and Oncology)      Subjective   History Present Illness     Chief Complaint   Patient presents with    Abdominal Pain    Coughing Up Blood       Ms. Vidal is a 79 y.o. female with NSCLC (metastatic to brain, status post resection and XRT), HTN, HLD, RA, CAD presenting with abdominal pain, fevers.  She says that for the last 7 days she has had intermittent fevers as well as intermittent diarrhea.  Also has had about 3 days worth of dysuria and vague abdominal pain, mainly in the lower quadrants.  She has had decreased p.o. intake due to low appetite for the past couple days.    Patient recently hospitalized 2/17-2/19 on our service and diagnosed with Sapovirus gastroenteritis.  She says that when she was discharged she is feeling much better.    Review of Systems   Constitutional:  Positive for appetite change, fatigue and fever. Negative for activity change and diaphoresis.   HENT:  Negative for congestion, ear discharge, mouth sores and tinnitus.    Eyes:  Negative for pain, discharge and redness.   Respiratory:  Negative for cough, choking, chest tightness, shortness of breath and wheezing.    Cardiovascular:  Negative for chest pain and palpitations.   Gastrointestinal:  Positive for abdominal pain, diarrhea and nausea. Negative for constipation.   Endocrine: Negative for cold intolerance and heat intolerance.   Genitourinary:  Positive for dysuria and  flank pain. Negative for frequency, hematuria, urgency and vaginal discharge.   Musculoskeletal:  Negative for back pain, joint swelling and neck pain.   Skin:  Negative for color change and rash.   Neurological:  Positive for weakness. Negative for syncope, speech difficulty and headaches.   Psychiatric/Behavioral:  Negative for agitation, behavioral problems and hallucinations.         Personal History     Past Medical History:   Diagnosis Date    COPD (chronic obstructive pulmonary disease)     Coughing up blood     X2 MONTHS    History of COVID-19 02/2022    Hyperlipidemia     IBS (irritable bowel syndrome)     Primary lung adenocarcinoma, right     Rheumatoid arthritis      Past Surgical History:   Procedure Laterality Date    APPENDECTOMY      appendix removed    BRONCHOSCOPY N/A 8/23/2022    Procedure: BRONCHOSCOPY WITH BAL,  BIOPSIES, AND BRUSHINGS WITH ENDOBRONCHIAL ULTRASOUND WITH FNA;  Surgeon: Gregor Vee MD;  Location: [facility] ENDOSCOPY;  Service: Pulmonary;  Laterality: N/A;  PRE- HILAR MASS  POST- SAME    BRONCHOSCOPY N/A 1/4/2023    Procedure: BRONCHOSCOPY with biopsy, lavage, brushing;  Surgeon: Gregor Vee MD;  Location: [facility] ENDOSCOPY;  Service: Pulmonary;  Laterality: N/A;    CAROTID ENDARTERECTOMY Right     CAROTID ENDARTERECTOMY Left     CATARACT EXTRACTION      CERVICAL FUSION      CHOLECYSTECTOMY      CRANIOTOMY FOR TUMOR Right 5/30/2023    Procedure: RIGHT CRANIOTOMY FOR TUMOR RESECTION STEREOTACTIC WITH STEALTH;  Surgeon: Clint Ricketts MD;  Location: [facility] MAIN OR;  Service: Neurosurgery;  Laterality: Right;    HYSTERECTOMY      SHOULDER ARTHROSCOPY Right 02/19/2019    right should scope/cuff repair     VENOUS ACCESS DEVICE (PORT) INSERTION Right 9/6/2022    Procedure: POWERPORT INSERTION;  Surgeon: Remedios Rice MD;  Location: [facility] MAIN OR;  Service: Thoracic;  Laterality: Right;     Family History   Problem Relation Age of Onset    Cancer Mother     Cancer Father      Malig Hyperthermia Neg Hx      Social History     Tobacco Use    Smoking status: Former     Current packs/day: 0.00     Types: Cigarettes     Quit date: 2022     Years since quittin.8    Smokeless tobacco: Never    Tobacco comments:     Former some day smoker of 1-2 cigs   Vaping Use    Vaping status: Never Used   Substance Use Topics    Alcohol use: Yes     Alcohol/week: 2.0 standard drinks of alcohol     Types: 2 Glasses of wine per week     Comment: occasionally    Drug use: Yes     Frequency: 2.0 times per week     Types: Marijuana     Current Facility-Administered Medications on File Prior to Encounter   Medication Dose Route Frequency Provider Last Rate Last Admin    heparin injection 500 Units  500 Units Intravenous PRN Cruzito Lagunas MD        sodium chloride 0.9 % flush 10 mL  10 mL Intravenous PRN Cruzito Lagunas MD         Current Outpatient Medications on File Prior to Encounter   Medication Sig Dispense Refill    atorvastatin (LIPITOR) 20 MG tablet Take 1 tablet by mouth Every Night. 30 tablet 0    azelastine (ASTELIN) 0.1 % nasal spray 1 spray into the nostril(s) as directed by provider Daily As Needed for Allergies.      B COMPLEX VITAMINS ER PO Take  by mouth Daily.      Cholecalciferol (VITAMIN D3) 5000 units capsule capsule Take 1 capsule by mouth Daily.      clopidogrel (PLAVIX) 75 MG tablet Take 1 tablet by mouth Daily. 30 tablet 0    diphenoxylate-atropine (LOMOTIL) 2.5-0.025 MG per tablet Take 1 tablet by mouth 4 (Four) Times a Day As Needed for Diarrhea. 10 tablet 0    esomeprazole (nexIUM) 20 MG capsule Take 1 capsule by mouth Every Morning Before Breakfast.      estradiol (ESTRACE) 1 MG tablet Take 1 tablet by mouth Daily.      FeroSul 325 (65 Fe) MG tablet TAKE ONE TABLET BY MOUTH DAILY WITH BREAKFAST 30 tablet 5    Gabapentin 10 %, Baclofen 2 %, lidocaine 4 %, Ketamine HCl 4 % Apply 1-2 g topically to the appropriate area as directed 3 (Three) to 4 (Four) times daily. (Patient  taking differently: Apply 1-2 g topically to the appropriate area as directed 3 (Three) to 4 (Four) times daily. Lt arm/ lt shoulder) 90 g 0    HYDROcodone-acetaminophen (NORCO) 7.5-325 MG per tablet Take 1 tablet by mouth Every 6 (Six) Hours As Needed for Moderate Pain. 60 tablet 0    levETIRAcetam (KEPPRA) 500 MG tablet TAKE 1 TABLET BY MOUTH TWICE A  tablet 0    lisinopril (PRINIVIL,ZESTRIL) 20 MG tablet Take 1 tablet by mouth Daily for 30 days. 30 tablet 0    Multiple Vitamins-Minerals (PRESERVISION AREDS 2+MULTI VIT PO) Take  by mouth.      ofloxacin (OCUFLOX) 0.3 % ophthalmic solution Administer 4 drops to both eyes Daily. For 4 days before injection and 4 days after injection      ondansetron (ZOFRAN) 8 MG tablet Take 1 tablet by mouth 3 (Three) Times a Day As Needed for Nausea or Vomiting. 30 tablet 5    predniSONE (DELTASONE) 10 MG tablet Take 2 tablets by mouth Daily. (Patient taking differently: Take 1 tablet by mouth Daily. 1 tab (10mg) daily) 60 tablet 5    pregabalin (LYRICA) 150 MG capsule Take 1 capsule by mouth Every 12 (Twelve) Hours for 30 days. 60 capsule 0    pregabalin (LYRICA) 75 MG capsule TAKE 1 CAPSULE BY MOUTH TWICE A DAY ALONG WITH 150 MG CAPSULE FOR TOTAL DOSE  MG TWICE DAILY 60 capsule 0    [DISCONTINUED] lidocaine-prilocaine (EMLA) 2.5-2.5 % cream       [DISCONTINUED] oxyCODONE-acetaminophen (PERCOCET)  MG per tablet        Allergies   Allergen Reactions    Del-Mycin [Erythromycin] Hives    Gentamicin Hives    Ibuprofen Nausea And Vomiting    Latex Dermatitis     GLOVES    Metronidazole Hives    Ofloxacin Hives and Nausea Only     Has tolerated Levaquin     Penicillins Hives     Tolerated rocephin and cefepime 8/2023    Tetracycline Hives    Adhesive Tape Dermatitis     BANDAIDS    Aspirin GI Intolerance     Vomiting can take EC    Codeine Nausea And Vomiting       Objective    Objective     Vital Signs  Temp:  [96.1 °F (35.6 °C)-97.9 °F (36.6 °C)] 97.9 °F (36.6  °C)  Heart Rate:  [74-97] 78  Resp:  [16-18] 18  BP: ()/(48-90) 123/90  SpO2:  [94 %-97 %] 97 %  on   ;   Device (Oxygen Therapy): room air  Body mass index is 28.03 kg/m².    Physical Exam  Constitutional:       General: She is not in acute distress.     Appearance: Normal appearance. She is ill-appearing. She is not toxic-appearing.   Cardiovascular:      Rate and Rhythm: Normal rate and regular rhythm.   Pulmonary:      Effort: Pulmonary effort is normal.   Abdominal:      General: Abdomen is flat. Bowel sounds are normal.      Palpations: Abdomen is soft.      Tenderness: There is no abdominal tenderness. There is left CVA tenderness.   Musculoskeletal:      Right lower leg: No edema.      Left lower leg: No edema.   Skin:     General: Skin is warm.      Comments: Right chest port   Neurological:      General: No focal deficit present.      Mental Status: She is alert and oriented to person, place, and time.   Psychiatric:         Mood and Affect: Mood normal.         Behavior: Behavior normal.         Results Review:    I have personally reviewed:  [x]  Laboratory  [x]  Old records  [x]  Radiology   []  EKG/Telemetry   [x]  Microbiology    [x]  Cardiology/Vascular   []  Pathology     Lab Results (last 24 hours)       Procedure Component Value Units Date/Time    CBC & Differential [[id number]]  (Abnormal) Collected: 03/03/24 1622    Specimen: Blood Updated: 03/03/24 3058    Narrative:      The following orders were created for panel order CBC & Differential.  Procedure                               Abnormality         Status                     ---------                               -----------         ------                     CBC Auto Differential[[id number]]        Abnormal            Final result                 Please view results for these tests on the individual orders.    Comprehensive Metabolic Panel [[id number]]  (Abnormal) Collected: 03/03/24 1622    Specimen: Blood Updated: 03/03/24 2847      "Glucose 105 mg/dL      BUN 31 mg/dL      Creatinine 1.51 mg/dL      Sodium 133 mmol/L      Potassium 4.7 mmol/L      Chloride 99 mmol/L      CO2 19.0 mmol/L      Calcium 9.2 mg/dL      Total Protein 6.4 g/dL      Albumin 3.0 g/dL      ALT (SGPT) 26 U/L      AST (SGOT) 25 U/L      Alkaline Phosphatase 289 U/L      Total Bilirubin 0.5 mg/dL      Globulin 3.4 gm/dL      A/G Ratio 0.9 g/dL      BUN/Creatinine Ratio 20.5     Anion Gap 15.0 mmol/L      eGFR 35.5 mL/min/1.73     Narrative:      GFR Normal >60  Chronic Kidney Disease <60  Kidney Failure <15    The GFR formula is only valid for adults with stable renal function between ages 18 and 70.    Protime-INR [[id number]]  (Abnormal) Collected: 03/03/24 1622    Specimen: Blood Updated: 03/03/24 1719     Protime 15.5 Seconds      INR 1.21    aPTT [[id number]]  (Normal) Collected: 03/03/24 1622    Specimen: Blood Updated: 03/03/24 1719     PTT 30.2 seconds     Lipase [[id number]]  (Abnormal) Collected: 03/03/24 1622    Specimen: Blood Updated: 03/03/24 1717     Lipase 12 U/L     Procalcitonin [[id number]]  (Abnormal) Collected: 03/03/24 1622    Specimen: Blood Updated: 03/03/24 1722     Procalcitonin 6.40 ng/mL     Narrative:      As a Marker for Sepsis (Non-Neonates):    1. <0.5 ng/mL represents a low risk of severe sepsis and/or septic shock.  2. >2 ng/mL represents a high risk of severe sepsis and/or septic shock.    As a Marker for Lower Respiratory Tract Infections that require antibiotic therapy:    PCT on Admission    Antibiotic Therapy       6-12 Hrs later    >0.5                Strongly Recommended  >0.25 - <0.5        Recommended   0.1 - 0.25          Discouraged              Remeasure/reassess PCT  <0.1                Strongly Discouraged     Remeasure/reassess PCT    As 28 day mortality risk marker: \"Change in Procalcitonin Result\" (>80% or <=80%) if Day 0 (or Day 1) and Day 4 values are available. Refer to http://www.brahms-pct-calculator.com    Change in PCT " <=80%  A decrease of PCT levels below or equal to 80% defines a positive change in PCT test result representing a higher risk for 28-day all-cause mortality of patients diagnosed with severe sepsis for septic shock.    Change in PCT >80%  A decrease of PCT levels of more than 80% defines a negative change in PCT result representing a lower risk for 28-day all-cause mortality of patients diagnosed with severe sepsis or septic shock.       Lactic Acid, Plasma [[id number]]  (Normal) Collected: 03/03/24 1622    Specimen: Blood Updated: 03/03/24 1713     Lactate 0.7 mmol/L     CBC Auto Differential [[id number]]  (Abnormal) Collected: 03/03/24 1622    Specimen: Blood Updated: 03/03/24 1659     WBC 23.60 10*3/mm3      RBC 3.56 10*6/mm3      Hemoglobin 9.7 g/dL      Hematocrit 30.4 %      MCV 85.4 fL      MCH 27.2 pg      MCHC 31.9 g/dL      RDW 15.5 %      RDW-SD 47.8 fl      MPV 11.3 fL      Platelets 316 10*3/mm3      Neutrophil % 89.0 %      Lymphocyte % 2.7 %      Monocyte % 5.5 %      Eosinophil % 0.1 %      Basophil % 0.2 %      Immature Grans % 2.5 %      Neutrophils, Absolute 21.01 10*3/mm3      Lymphocytes, Absolute 0.63 10*3/mm3      Monocytes, Absolute 1.30 10*3/mm3      Eosinophils, Absolute 0.02 10*3/mm3      Basophils, Absolute 0.05 10*3/mm3      Immature Grans, Absolute 0.59 10*3/mm3      nRBC 0.0 /100 WBC     Blood Culture - Blood, Blood, Central Line [[id number]] Collected: 03/03/24 1622    Specimen: Blood, Central Line Updated: 03/03/24 1647    Respiratory Panel PCR w/COVID-19(SARS-CoV-2) SHAWNA/MADELINE/ZAY/PAD/COR/TIMBO In-House, NP Swab in [facility], 2 HR TAT - Swab, Nasopharynx [[id number]]  (Normal) Collected: 03/03/24 1623    Specimen: Swab from Nasopharynx Updated: 03/03/24 1749     ADENOVIRUS, PCR Not Detected     Coronavirus 229E Not Detected     Coronavirus HKU1 Not Detected     Coronavirus NL63 Not Detected     Coronavirus OC43 Not Detected     COVID19 Not Detected     Human Metapneumovirus Not Detected      Human Rhinovirus/Enterovirus Not Detected     Influenza A PCR Not Detected     Influenza B PCR Not Detected     Parainfluenza Virus 1 Not Detected     Parainfluenza Virus 2 Not Detected     Parainfluenza Virus 3 Not Detected     Parainfluenza Virus 4 Not Detected     RSV, PCR Not Detected     Bordetella pertussis pcr Not Detected     Bordetella parapertussis PCR Not Detected     Chlamydophila pneumoniae PCR Not Detected     Mycoplasma pneumo by PCR Not Detected    Narrative:      In the setting of a positive respiratory panel with a viral infection PLUS a negative procalcitonin without other underlying concern for bacterial infection, consider observing off antibiotics or discontinuation of antibiotics and continue supportive care. If the respiratory panel is positive for atypical bacterial infection (Bordetella pertussis, Chlamydophila pneumoniae, or Mycoplasma pneumoniae), consider antibiotic de-escalation to target atypical bacterial infection.    Urinalysis With Culture If Indicated - Urine, Catheter [[id number]]  (Abnormal) Collected: 03/03/24 1623    Specimen: Urine, Catheter Updated: 03/03/24 2142     Color, UA Yellow     Appearance, UA Clear     pH, UA <=5.0     Specific Gravity, UA 1.014     Glucose, UA Negative     Ketones, UA Trace     Bilirubin, UA Negative     Blood, UA Negative     Protein, UA 30 mg/dL (1+)     Leuk Esterase, UA Negative     Nitrite, UA Negative     Urobilinogen, UA 0.2 E.U./dL    Narrative:      In absence of clinical symptoms, the presence of pyuria, bacteria, and/or nitrites on the urinalysis result does not correlate with infection.    Urinalysis, Microscopic Only - Urine, Catheter [[id number]]  (Abnormal) Collected: 03/03/24 1623    Specimen: Urine, Catheter Updated: 03/03/24 1654     RBC, UA 0-2 /HPF      WBC, UA 0-2 /HPF      Comment: Urine culture not indicated.        Bacteria, UA 4+ /HPF      Squamous Epithelial Cells, UA 0-2 /HPF      Hyaline Casts, UA None Seen /LPF       Methodology Automated Microscopy    Urine Culture - Urine, Urine, Catheter [[id number]] Collected: 03/03/24 1623    Specimen: Urine, Catheter Updated: 03/03/24 2003    Blood Culture - Blood, Arm, Left [[id number]] Collected: 03/03/24 1638    Specimen: Blood from Arm, Left Updated: 03/03/24 1647            Imaging Results (Last 24 Hours)       Procedure Component Value Units Date/Time    CT Angiogram Chest Pulmonary Embolism [[id number]] Collected: 03/03/24 1838     Updated: 03/03/24 1838    Narrative:         CT ANGIOGRAM OF THE CHEST. MULTIPLE CORONAL, SAGITTAL, AND 3-D  RECONSTRUCTIONS. CT ABDOMEN AND PELVIS WITH IV CONTRAST     HISTORY: 79-year-old female with cough and fever. Hemoptysis. Abdominal  pain. Metastatic breast cancer. Brain metastases.     TECHNIQUE: Radiation dose reduction techniques were utilized, including  automated exposure control and exposure modulation based on body size.  CT angiogram of the chest was performed. Multiple coronal, sagittal, and  3-D reconstruction images were obtained. Subsequently, 3 mm images were  obtained through the abdomen and pelvis after the administration of IV  contrast. Compared with CT abdomen and pelvis 02/17/2024 and chest CT  01/05/2024.     FINDINGS:     CHEST CTA:  1. There is no convincing evidence for pulmonary thromboemboli.     2. There has been significant increase in size of the centrally cavitary  masslike consolidation pleural-based at the posterior medial aspect of  the right upper lobe measuring 6.8 x 4.4 cm, previously approximately  4.7 x 3.3 cm. The centrally liquefied/necrotic region containing air  measures 4.0 x 3.3 cm, previously smaller. There is more prominent  abnormal soft tissue encasing the right hilum, but there is no  mediastinal or left hilar lymphadenopathy. No significant change in the  very mild pleural thickening at the posterior aspect of the right upper  lobe.     3. There are no pleural or pericardial effusions. There is  less  atelectasis at the left lung base, currently mild.     ABDOMEN/PELVIS:  1. There is a subtle striated enhancement pattern of the right kidney  and there is more prominent right perinephric stranding than on the  left. Urinary bladder wall appears thickened, but the bladder is nearly  collapsed. Acute right pyelonephritis is suspected and pyelonephritis on  the left cannot be excluded.     2. No significant change is seen in the right renal cysts. The largest  measures 5.5 cm and has internal density measurements of 36 Hounsfield  units. Likely proteinaceous cysts and conservative surveillance is  recommended.     3. There is no acute abnormality at the liver, spleen, pancreas, or  adrenals. There is no acute bowel abnormality. There is moderate sigmoid  diverticulosis without evidence for diverticulitis. The appendix is  surgically absent. Uterus is surgically absent. No lymphadenopathy.       CT Abdomen Pelvis With Contrast [[id number]] Collected: 03/03/24 1838     Updated: 03/03/24 1838    Narrative:         CT ANGIOGRAM OF THE CHEST. MULTIPLE CORONAL, SAGITTAL, AND 3-D  RECONSTRUCTIONS. CT ABDOMEN AND PELVIS WITH IV CONTRAST     HISTORY: 79-year-old female with cough and fever. Hemoptysis. Abdominal  pain. Metastatic breast cancer. Brain metastases.     TECHNIQUE: Radiation dose reduction techniques were utilized, including  automated exposure control and exposure modulation based on body size.  CT angiogram of the chest was performed. Multiple coronal, sagittal, and  3-D reconstruction images were obtained. Subsequently, 3 mm images were  obtained through the abdomen and pelvis after the administration of IV  contrast. Compared with CT abdomen and pelvis 02/17/2024 and chest CT  01/05/2024.     FINDINGS:     CHEST CTA:  1. There is no convincing evidence for pulmonary thromboemboli.     2. There has been significant increase in size of the centrally cavitary  masslike consolidation pleural-based at the  posterior medial aspect of  the right upper lobe measuring 6.8 x 4.4 cm, previously approximately  4.7 x 3.3 cm. The centrally liquefied/necrotic region containing air  measures 4.0 x 3.3 cm, previously smaller. There is more prominent  abnormal soft tissue encasing the right hilum, but there is no  mediastinal or left hilar lymphadenopathy. No significant change in the  very mild pleural thickening at the posterior aspect of the right upper  lobe.     3. There are no pleural or pericardial effusions. There is less  atelectasis at the left lung base, currently mild.     ABDOMEN/PELVIS:  1. There is a subtle striated enhancement pattern of the right kidney  and there is more prominent right perinephric stranding than on the  left. Urinary bladder wall appears thickened, but the bladder is nearly  collapsed. Acute right pyelonephritis is suspected and pyelonephritis on  the left cannot be excluded.     2. No significant change is seen in the right renal cysts. The largest  measures 5.5 cm and has internal density measurements of 36 Hounsfield  units. Likely proteinaceous cysts and conservative surveillance is  recommended.     3. There is no acute abnormality at the liver, spleen, pancreas, or  adrenals. There is no acute bowel abnormality. There is moderate sigmoid  diverticulosis without evidence for diverticulitis. The appendix is  surgically absent. Uterus is surgically absent. No lymphadenopathy.       XR Chest 1 View [[id number]] Collected: 03/03/24 1550     Updated: 03/03/24 1559    Narrative:      XR CHEST 1 VW-        INDICATION: Hemoptysis     COMPARISON: Chest radiograph August 31, 2023     TECHNIQUE: 1 view chest     FINDINGS:      Right suprahilar masslike opacity, with volume loss. Linear opacities of  the left lung base, consistent with subsegmental atelectasis. No  effusions. Stable mediastinum. Heart is normal in size. Right  Port-A-Cath with the catheter tip near the caval atrial  junction.  Cholecystectomy clips. Cervical spine cerclage wires.       Impression:      Masslike right suprahilar opacity with volume loss. Suspect radiated  tumor      This report was finalized on 3/3/2024 3:56 PM by Dr. Enzo Soria M.D  on Workstation: [id number]               Results for orders placed during the hospital encounter of 06/11/23    Adult Transthoracic Echo Complete W/ Cont if Necessary Per Protocol    Interpretation Summary    Left ventricular systolic function is normal. Calculated left ventricular EF = 66.7%    Left ventricular outflow tract peak flow gradient at rest is 16 mmHg. Left ventricular outflow tract peak gradient with valsalva is 29 mmHg.    Left ventricular diastolic function was indeterminate.    Estimated right ventricular systolic pressure from tricuspid regurgitation is mildly elevated (35-45 mmHg).      No orders to display        Assessment/Plan     Active Hospital Problems    Diagnosis  POA    **Acute pyelonephritis [N10]  Yes    ZACH (acute kidney injury) [N17.9]  Yes    HTN (hypertension) [I10]  Yes    COPD (chronic obstructive pulmonary disease) [J44.9]  Yes    Rheumatoid arthritis [M06.9]  Yes    Hyperlipidemia [E78.5]  Yes    Leukocytosis [D72.829]  Yes    Lung cancer metastatic to brain [C34.90, C79.31]  Yes    Neoplastic malignant related fatigue [R53.0]  Yes    Primary lung adenocarcinoma, right [C34.91]  Yes      Resolved Hospital Problems   No resolved problems to display.       Ms. Vidal is a 79 y.o. female with NSCLC (metastatic to brain, status post resection and XRT), HTN, HLD, RA, CAD presenting with abdominal pain, found to have UTI/pyelonephritis.      Pyelonephritis  Leukocytosis  -CT PE/A/P with no PE; increase in size of cavitary masslike consolidation; suspected pyelonephritis on the right, cannot be excluded on the left  -WBC 23  -procal 6.4  -Interestingly UA only shows 4+ bacteria, protein and ketones  -d/w Lab and will add on urine cx  -full  infectious workup pending  -cont Rocephin    ZACH  -Crt 1.51 OA (b/l ~1)  -Likely prerenal  -IVF    Adenocarcinoma of RUL, metastatic to brain  -follows with Dr Lagunas  -s/p resection of brain mass 5/23  -consult Onc in a.m.    RA  -prednisone 10mg daily    HLD  -Statin    Chronic pain  -Resume home Norco  -Decrease home Lyrica given ZACH    I discussed the patient's findings and my recommendations with patient and ED provider.    VTE Prophylaxis - SCDs.  Code Status - Full code.       Simone Nelson MD  [facility]  03/03/24  22:03 EST

## 2024-03-04 NOTE — PROGRESS NOTES
Discharge Planning Assessment  [facility]     Patient Name: Pam Vidal  MRN: [id number]  Today's Date: 3/4/2024    Admit Date: 3/3/2024    Plan: return home with 24/7 family support and HH vs. SNF   Discharge Needs Assessment       Row Name 03/04/24 1606       Living Environment    People in Home other (see comments)    Unique Family Situation Rosio perry    Current Living Arrangements home    Potentially Unsafe Housing Conditions none    In the past 12 months has the electric, gas, oil, or water company threatened to shut off services in your home? No    Primary Care Provided by other (see comments)  Rosio perry    Family Caregiver if Needed other relative(s)    Family Caregiver Names Rosio Perry and sonGt    Quality of Family Relationships helpful;involved;supportive       Resource/Environmental Concerns    Resource/Environmental Concerns none       Transportation Needs    In the past 12 months, has lack of transportation kept you from medical appointments or from getting medications? no    In the past 12 months, has lack of transportation kept you from meetings, work, or from getting things needed for daily living? No       Food Insecurity    Within the past 12 months, you worried that your food would run out before you got the money to buy more. Never true    Within the past 12 months, the food you bought just didn't last and you didn't have money to get more. Never true       Transition Planning    Patient/Family Anticipates Transition to home with family;inpatient rehabilitation facility    Patient/Family Anticipated Services at Transition home health care;skilled nursing    Transportation Anticipated family or friend will provide       Discharge Needs Assessment    Readmission Within the Last 30 Days other (see comments)  see EMR    Equipment Currently Used at Home walker, rolling    Concerns to be Addressed adjustment to diagnosis/illness    Anticipated Changes Related to Illness none     Equipment Needed After Discharge none    Outpatient/Agency/Support Group Needs inpatient rehabilitation facility;homecare agency    Discharge Facility/Level of Care Needs home with home health;rehabilitation facility    Provided Post Acute Provider List? Yes    Post Acute Provider List Inpatient Rehab;Home Health    Provided Post Acute Provider Quality & Resource List? Yes    Post Acute Provider Quality and Resource List Inpatient Rehab;Home Health    Delivered To Patient    Method of Delivery In person    Patient's Choice of Community Agency(s) [facility]                    Discharge Plan       Row Name 03/04/24 1610       Plan    Plan return home with 24/7 family support and HH vs. SNF    Patient/Family in Agreement with Plan yes    Plan Comments Met with patient at the bedside, verified facesheet. Pt lives with her niece ( Rosio Garrett [phone]). Patient is dependent with most ADL. She uses a walker, grab bar and shower chair. Niece provides 24/7 care as needed. [facility] is current with [facility] . Patient's son, Gt is also involved and will assist as needed. Patient has never been to rehab. Patient is agreeable with a referral to [facility] rehab. RTR and SNF list left at the bedside, patient will discuss back-up rehab choices with her family. Referral sent to [facility] with [facility] and Irma Gonzales. CCP will follow up.                  Continued Care and Services - Admitted Since 3/3/2024       Destination       Service Provider Request Status Selected Services Address Phone Fax Patient Preferred    [facility] Pending - Request Sent N/A [address] [facility] [place] [address] [phone] --              Home Medical Care       Service Provider Request Status Selected Services Address Phone Fax Patient Preferred    [facility] Accepted N/A [address], [address], [facility] [address] [phone] --                  Selected  Continued Care - Prior Encounters Includes continued care and service providers with selected services from prior encounters from 12/4/2023 to 3/4/2024      Discharged on 2/19/2024 Admission date: 2/17/2024 - Discharge disposition: Home-Health Care c      Home Medical Care       Service Provider Selected Services Address Phone Fax Patient Preferred    [facility] [address], [address], [facility] [address] [phone] --                          Expected Discharge Date and Time       Expected Discharge Date Expected Discharge Time    Mar 7, 2024            Demographic Summary    No documentation.                  Functional Status       Row Name 03/04/24 1610       Functional Status    Usual Activity Tolerance moderate    Current Activity Tolerance fair       Assessment of Health Literacy    Health Literacy Fair       Functional Status, IADL    Medications assistive person    Meal Preparation assistive person    Housekeeping assistive equipment and person    Laundry assistive person    Shopping assistive equipment and person       Mental Status    General Appearance WDL WDL       Mental Status Summary    Recent Changes in Mental Status/Cognitive Functioning no changes       Employment/    Employment Status retired                   Psychosocial    No documentation.                  Abuse/Neglect    No documentation.                  Legal    No documentation.                  Substance Abuse    No documentation.                  Patient Forms    No documentation.                     Jayda Evans RN

## 2024-03-04 NOTE — THERAPY EVALUATION
Patient Name: Pam Vidal  : 1945    MRN: [id number]                              Today's Date: 3/4/2024       Admit Date: 3/3/2024    Visit Dx:     ICD-10-CM ICD-9-CM   1. Acute pyelonephritis  N10 590.10   2. SIRS (systemic inflammatory response syndrome)  R65.10 995.90   3. Primary malignant neoplasm of right lung metastatic to other site  C34.91 162.9   4. Acute kidney injury (ZACH) with acute tubular necrosis (ATN)  N17.0 584.5   5. Leukocytosis, unspecified type  D72.829 288.60   6. Acute on chronic anemia  D64.9 285.9     Patient Active Problem List   Diagnosis    Primary lung adenocarcinoma, right    Cough with hemoptysis    Lung mass    Advanced care planning/counseling discussion    High risk medication use    Muscular deconditioning    Neoplastic malignant related fatigue    Lung cancer metastatic to brain    Leukocytosis    Brain mass    COPD (chronic obstructive pulmonary disease)    Rheumatoid arthritis    IBS (irritable bowel syndrome)    Hyperlipidemia    Abnormal CT of the chest    Shingles, left arm    HTN (hypertension)    Dizziness    Campylobacter diarrhea    Bacterial colitis    Diarrhea    Neuropathic pain    VBI (vertebrobasilar insufficiency)    Atypical pneumonia    Post herpetic neuralgia    Chronic pain after cancer treatment    Left arm pain    Gastroenteritis due to sapovirus    Stage 3a chronic kidney disease (CKD)    ZACH (acute kidney injury)    Acute pyelonephritis     Past Medical History:   Diagnosis Date    COPD (chronic obstructive pulmonary disease)     Coughing up blood     X2 MONTHS    History of COVID-19 2022    Hyperlipidemia     IBS (irritable bowel syndrome)     Primary lung adenocarcinoma, right     Rheumatoid arthritis      Past Surgical History:   Procedure Laterality Date    APPENDECTOMY      appendix removed    BRONCHOSCOPY N/A 2022    Procedure: BRONCHOSCOPY WITH BAL,  BIOPSIES, AND BRUSHINGS WITH ENDOBRONCHIAL ULTRASOUND WITH FNA;  Surgeon:  Gregor Vee MD;  Location: [facility] ENDOSCOPY;  Service: Pulmonary;  Laterality: N/A;  PRE- HILAR MASS  POST- SAME    BRONCHOSCOPY N/A 1/4/2023    Procedure: BRONCHOSCOPY with biopsy, lavage, brushing;  Surgeon: Gregor Vee MD;  Location: [facility] ENDOSCOPY;  Service: Pulmonary;  Laterality: N/A;    CAROTID ENDARTERECTOMY Right     CAROTID ENDARTERECTOMY Left     CATARACT EXTRACTION      CERVICAL FUSION      CHOLECYSTECTOMY      CRANIOTOMY FOR TUMOR Right 5/30/2023    Procedure: RIGHT CRANIOTOMY FOR TUMOR RESECTION STEREOTACTIC WITH STEALTH;  Surgeon: Clint Ricketts MD;  Location: [facility] MAIN OR;  Service: Neurosurgery;  Laterality: Right;    HYSTERECTOMY      SHOULDER ARTHROSCOPY Right 02/19/2019    right should scope/cuff repair     VENOUS ACCESS DEVICE (PORT) INSERTION Right 9/6/2022    Procedure: POWERPORT INSERTION;  Surgeon: Remedios Rice MD;  Location: [facility] MAIN OR;  Service: Thoracic;  Laterality: Right;      General Information       Row Name 03/04/24 1513          OT Time and Intention    Document Type evaluation  -     Mode of Treatment co-treatment;physical therapy;occupational therapy  -       Row Name 03/04/24 1513          General Information    Patient Profile Reviewed yes  -     Prior Level of Function independent:  -     Existing Precautions/Restrictions fall  -     Barriers to Rehab medically complex  -       Row Name 03/04/24 1513          Living Environment    People in Home other relative(s)  -       Row Name 03/04/24 1513          Home Main Entrance    Number of Stairs, Main Entrance none  -     Stair Railings, Main Entrance none  -       Row Name 03/04/24 1513          Stairs Within Home, Primary    Number of Stairs, Within Home, Primary none  -     Stair Railings, Within Home, Primary none  -       Row Name 03/04/24 1513          Cognition    Orientation Status (Cognition) oriented x 3  -       Row Name 03/04/24 1513          Safety Issues, Functional  Mobility    Safety Issues Affecting Function (Mobility) insight into deficits/self-awareness;awareness of need for assistance;safety precautions follow-through/compliance  -     Impairments Affecting Function (Mobility) balance;endurance/activity tolerance;strength;postural/trunk control;pain  -               User Key  (r) = Recorded By, (t) = Taken By, (c) = Cosigned By      Initials Name Provider Type     Beth Muller OT Occupational Therapist                     Mobility/ADL's       Row Name 03/04/24 1513          Bed Mobility    Bed Mobility supine-sit;sit-supine  -     Supine-Sit [facility] (Bed Mobility) verbal cues;moderate assist (50% patient effort);2 person assist  -     Sit-Supine [facility] (Bed Mobility) moderate assist (50% patient effort);2 person assist;verbal cues  -     Assistive Device (Bed Mobility) bed rails;head of bed elevated  -       Row Name 03/04/24 1513          Sit-Stand Transfer    Sit-Stand [facility] (Transfers) moderate assist (50% patient effort);2 person assist;maximum assist (25% patient effort);verbal cues;nonverbal cues (demo/gesture)  -     Comment, (Sit-Stand Transfer) HHA x2  -Critical access hospital Name 03/04/24 1513          Functional Mobility    Patient was able to Ambulate no, other medical factors prevent ambulation  -     Reason Patient was unable to Ambulate Non-Ambulatory at Baseline  -Critical access hospital Name 03/04/24 1513          Activities of Daily Living    BADL Assessment/Intervention lower body dressing  -Critical access hospital Name 03/04/24 1513          Lower Body Dressing Assessment/Training    [facility] Level (Lower Body Dressing) lower body dressing skills;don;doff;socks;maximum assist (25% patient effort)  -     Position (Lower Body Dressing) sitting up in bed  -               User Key  (r) = Recorded By, (t) = Taken By, (c) = Cosigned By      Initials Name Provider Type     Beth Muller OT Occupational Therapist                    Obj/Interventions       Row Name 03/04/24 1514          Vision Assessment/Intervention    Visual Impairment/Limitations WFL  -LH       Row Name 03/04/24 1514          Range of Motion Comprehensive    General Range of Motion bilateral upper extremity ROM WFL  -LH       Row Name 03/04/24 1514          Strength Comprehensive (MMT)    General Manual Muscle Testing (MMT) Assessment upper extremity strength deficits identified  -     Comment, General Manual Muscle Testing (MMT) Assessment generalized weakness and deconditioning MMT BUE 3/5  -LH       Row Name 03/04/24 1514          Motor Skills    Motor Skills functional endurance  -     Functional Endurance Poor  -LH       Row Name 03/04/24 1514          Balance    Balance Assessment sitting static balance;sitting dynamic balance;sit to stand dynamic balance;standing static balance;standing dynamic balance  -     Static Sitting Balance moderate assist  -     Dynamic Sitting Balance moderate assist  -     Position, Sitting Balance unsupported;sitting edge of bed  -     Sit to Stand Dynamic Balance moderate assist;2-person assist  -     Static Standing Balance maximum assist;2-person assist  -     Dynamic Standing Balance maximum assist;2-person assist  -     Position/Device Used, Standing Balance supported  -     Balance Interventions sitting;standing;occupation based/functional task  -               User Key  (r) = Recorded By, (t) = Taken By, (c) = Cosigned By      Initials Name Provider Type     Beth Muller OT Occupational Therapist                   Goals/Plan       Row Name 03/04/24 1522          Bed Mobility Goal 1 (OT)    Activity/Assistive Device (Bed Mobility Goal 1, OT) bed mobility activities, all  -     [place] Level/Cues Needed (Bed Mobility Goal 1, OT) standby assist  -     Time Frame (Bed Mobility Goal 1, OT) short term goal (STG);2 weeks  -LH       Row Name 03/04/24 1522          Transfer Goal 1 (OT)    Activity/Assistive  Device (Transfer Goal 1, OT) transfers, all  -     [place] Level/Cues Needed (Transfer Goal 1, OT) contact guard required  -     Time Frame (Transfer Goal 1, OT) short term goal (STG);2 weeks  -     Progress/Outcome (Transfer Goal 1, OT) new [facility]  -LH       Row Name 03/04/24 1522          Dressing Goal 1 (OT)    Activity/Device (Dressing Goal 1, OT) dressing skills, all;upper body dressing;lower body dressing  -     [place]/Cues Needed (Dressing Goal 1, OT) contact guard required  -     Time Frame (Dressing Goal 1, OT) short term goal (STG);2 weeks  -     Progress/Outcome (Dressing Goal 1, OT) new goal  -[facility] Name 03/04/24 1522          Toileting Goal 1 (OT)    Activity/Device (Toileting Goal 1, OT) toileting skills, all  -     [place] Level/Cues Needed (Toileting Goal 1, OT) contact guard required  -     Time Frame (Toileting Goal 1, OT) short term goal (STG);2 weeks  -     Progress/Outcome (Toileting Goal 1, OT) new goal  -[facility] Name 03/04/24 1522          Grooming Goal 1 (OT)    Activity/Device (Grooming Goal 1, OT) grooming skills, all  -     [place] (Grooming Goal 1, OT) contact guard required  -     Time Frame (Grooming Goal 1, OT) short term goal (STG);2 weeks  -     Progress/Outcome (Grooming Goal 1, OT) new [facility] Name 03/04/24 1522          Therapy Assessment/Plan (OT)    Planned Therapy Interventions (OT) activity tolerance training;BADL retraining;functional balance retraining;occupation/activity based interventions;patient/caregiver education/training;strengthening exercise;transfer/mobility retraining  -               User Key  (r) = Recorded By, (t) = Taken By, (c) = Cosigned By      Initials Name Provider Type     Beth Muller, OT Occupational Therapist                   Clinical Impression       [facility] Name 03/04/24 1515          Pain Assessment    Pretreatment Pain Rating 5/10  -     Posttreatment Pain Rating 5/10  -      Pain Location - Side/Orientation Left  -     Pain Location upper  -     Pain Location - extremity  -     Pain Intervention(s) Repositioned  -       Row Name 03/04/24 2024          Plan of Care Review    Plan of Care Reviewed With patient  -     Outcome Evaluation Patient is a 79 year old female admitted to [facility] with progressive generalized weakness and fever. Patient has hx of lung cancer with mets to brain, COPD, and CKD. Patient reports living with niece, was able to perform self care and functional mobility without AD at home. Patient was also hospitalized last month folloing chemo treatment. Patient presents today with severe generalized weakness, unable to sit EOB unsupported, and deconditioning which hinders her ability to participate in ADLs, or functional mobility. Patient has posterior lean in sitting and standing, requiring mod A in sitting unsupported, and max Ax2 to maintain standing balance, unable to perform stand pivot transfer today. Patient also requires max A with LB dressing. Patient reports that she does not want to go to rehab and has family who can support her at home, however patient requires max assist of 2 for standing, functional transfers, toileting, LB dressing today and unsure if family is equipped to care for patient at her current functional level. Patient will continue to beenfit from skilled OT to address current functional deficits, recommend SNF at [facility].  -       Row Name 03/04/24 3233          Therapy Assessment/Plan (OT)    Rehab Potential (OT) good, to achieve stated therapy goals  -     Therapy Frequency (OT) 5 times/wk  -       Row Name 03/04/24 8133          Therapy Plan Review/Discharge Plan (OT)    Anticipated Discharge Disposition (OT) skilled nursing facility  -       Row Name 03/04/24 3301          Positioning and Restraints    Pre-Treatment Position in bed  -     Post Treatment Position bed  -     In Bed notified nsg;call light within  reach;encouraged to call for assist;exit alarm on;fowlers  -               User Key  (r) = Recorded By, (t) = Taken By, (c) = Cosigned By      Initials Name Provider Type     Beth Muller OT Occupational Therapist                   Outcome Measures       Row Name 03/04/24 1523          How much help from another is currently needed...    Putting on and taking off regular lower body clothing? 1  -LH     Bathing (including washing, rinsing, and drying) 1  -LH     Toileting (which includes using toilet bed pan or urinal) 1  -LH     Putting on and taking off regular upper body clothing 2  -LH     Taking care of personal grooming (such as brushing teeth) 2  -LH     Eating meals 3  -     AM-PAC 6 Clicks Score (OT) 10  -       Row Name 03/04/24 1346 03/04/24 0800       How much help from another person do you currently need...    Turning from your back to your side while in flat bed without using bedrails? 3  -CW 3  -MM    Moving from lying on back to sitting on the side of a flat bed without bedrails? 2  -CW 3  -MM    Moving to and from a bed to a chair (including a wheelchair)? 2  -CW 2  -MM    Standing up from a chair using your arms (e.g., wheelchair, bedside chair)? 2  -CW 2  -MM    Climbing 3-5 steps with a railing? 1  -CW 1  -MM    To walk in hospital room? 1  -CW 1  -MM    AM-PAC 6 Clicks Score (PT) 11  -CW 12  -MM    Highest Level of Mobility Goal 4 --> Transfer to chair/commode  -CW 4 --> Transfer to chair/commode  -MM      Row Name 03/04/24 1523 03/04/24 1346       Functional Assessment    Outcome Measure Options AM-PAC 6 Clicks Daily Activity (OT)  - AM-PAC 6 Clicks Basic Mobility (PT)  -CW              User Key  (r) = Recorded By, (t) = Taken By, (c) = Cosigned By      Initials Name Provider Type    Gilma Pennington RN Registered Nurse    CW Nubia Cesar, PT Physical Therapist     Beth Muller OT Occupational Therapist                    Occupational Therapy Education       Title: PT OT  SLP Therapies (In Progress)       Topic: Occupational Therapy (Done)       Point: ADL training (Done)       Description:   Instruct learner(s) on proper safety adaptation and remediation techniques during self care or transfers.   Instruct in proper use of assistive devices.                  Learning Progress Summary             Patient Acceptance, E,TB, VU by  at 3/4/2024 1523    Comment: Instructed in role of OT, discharge planning, home safety, fall prevention, current deficit education                         Point: Home exercise program (Done)       Description:   Instruct learner(s) on appropriate technique for monitoring, assisting and/or progressing therapeutic exercises/activities.                  Learning Progress Summary             Patient Acceptance, E,TB, VU by  at 3/4/2024 1523    Comment: Instructed in role of OT, discharge planning, home safety, fall prevention, current deficit education                         Point: Precautions (Done)       Description:   Instruct learner(s) on prescribed precautions during self-care and functional transfers.                  Learning Progress Summary             Patient Acceptance, E,TB, VU by  at 3/4/2024 1523    Comment: Instructed in role of OT, discharge planning, home safety, fall prevention, current deficit education                         Point: Body mechanics (Done)       Description:   Instruct learner(s) on proper positioning and spine alignment during self-care, functional mobility activities and/or exercises.                  Learning Progress Summary             Patient Acceptance, E,TB, VU by  at 3/4/2024 1523    Comment: Instructed in role of OT, discharge planning, home safety, fall prevention, current deficit education                                         User Key       Initials Effective Dates Name Provider Type Discipline     08/31/23 -  Beth Muller OT Occupational Therapist OT                  OT Recommendation and  Plan  Planned Therapy Interventions (OT): activity tolerance training, BADL retraining, functional balance retraining, occupation/activity based interventions, patient/caregiver education/training, strengthening exercise, transfer/mobility retraining  Therapy Frequency (OT): 5 times/wk  Plan of Care Review  Plan of Care Reviewed With: patient  Outcome Evaluation: Patient is a 79 year old female admitted to [facility] with progressive generalized weakness and fever. Patient has hx of lung cancer with mets to brain, COPD, and CKD. Patient reports living with niece, was able to perform self care and functional mobility without AD at home. Patient was also hospitalized last month folloing chemo treatment. Patient presents today with severe generalized weakness, unable to sit EOB unsupported, and deconditioning which hinders her ability to participate in ADLs, or functional mobility. Patient has posterior lean in sitting and standing, requiring mod A in sitting unsupported, and max Ax2 to maintain standing balance, unable to perform stand pivot transfer today. Patient also requires max A with LB dressing. Patient reports that she does not want to go to rehab and has family who can support her at home, however patient requires max assist of 2 for standing, functional transfers, toileting, LB dressing today and unsure if family is equipped to care for patient at her current functional level. Patient will continue to beenfit from skilled OT to address current functional deficits, recommend SNF at acute [place].     Time Calculation:   Evaluation Complexity (OT)  Review Occupational Profile/Medical/Therapy History Complexity: expanded/moderate complexity  Assessment, Occupational Performance/Identification of Deficit Complexity: 3-5 performance deficits  Clinical Decision Making Complexity (OT): detailed assessment/moderate complexity  Overall Complexity of Evaluation (OT): moderate complexity     Time Calculation- OT       Row Name  03/04/24 1525             Time Calculation- OT    OT Start Time 1315  -      OT Stop Time 1339  -      OT Time Calculation (min) 24 min  -      Total Timed Code Minutes- OT 15 minute(s)  -      OT Non-Billable Time (min) 9 min  -      OT Received On 03/04/24  -      OT - Next Appointment 03/05/24  -      OT Goal Re-Cert Due Date 03/18/24  -         Timed Charges    15642 - OT Therapeutic Activity Minutes 15  -LH         Untimed Charges    OT Eval/Re-eval Minutes 9  -LH         Total Minutes    Timed Charges Total Minutes 15  -LH      Untimed Charges Total Minutes 9  -LH       Total Minutes 24  -LH                User Key  (r) = Recorded By, (t) = Taken By, (c) = Cosigned By      Initials Name Provider Type     Beth Muller OT Occupational Therapist                  Therapy Charges for Today       Code Description Service Date Service Provider Modifiers Qty    13268307261  OT THERAPEUTIC ACT EA 15 MIN 3/4/2024 Beth Muller OT GO 1    30667982376 HC OT EVAL MOD COMPLEXITY 3 3/4/2024 Beth Muller OT GO 1                 Beth Muller OT  3/4/2024

## 2024-03-04 NOTE — PLAN OF CARE
Goal Outcome Evaluation:  Plan of Care Reviewed With: patient        Progress: improving  Outcome Evaluation: Admit to floor and orient to unit. Pt states she feels better. afebrile. LR at 125 per orders. Denies pain/needs. VSS

## 2024-03-04 NOTE — DISCHARGE PLACEMENT REQUEST
"Shana Vidal (79 y.o. Female)       Date of Birth   1945    Social Security Number       Address   [address] [place] [address] [place] [facility]    Home Phone   [phone]    MRN   [id number]       Anabaptism   None    Marital Status                               Admission Date   3/3/24    Admission Type   Emergency    Admitting Provider   Simone Nelson MD    Attending Provider   Lisette Browning MD    Department, Room/Bed   [address], [address]/1       Discharge Date       Discharge Disposition       Discharge Destination                                 Attending Provider: Lisette Browning MD    Allergies: Del-mycin [Erythromycin], Gentamicin, Ibuprofen, Latex, Metronidazole, Ofloxacin, Penicillins, Tetracycline, Adhesive Tape, Aspirin, Codeine    Isolation: None   Infection: None   Code Status: CPR    Ht: 152.4 cm (60\")   Wt: 63 kg (138 lb 14.2 oz)    Admission Cmt: None   Principal Problem: Acute pyelonephritis [N10]                   Active Insurance as of 3/3/2024       Primary Coverage       Payor Plan Insurance Group Employer/Plan Group    MEDICARE MEDICARE A & B        Payor Plan Address Payor Plan Phone Number Payor Plan Fax Number Effective Dates    [address] [phone]  2/1/2012 - None Entered    [facility] [id number]         Subscriber Name Subscriber Birth Date Member ID       SHANA VIDAL 1945 [id number]               Secondary Coverage       Payor Plan Insurance Group Employer/Plan Group    [facility] [employer]        Payor Plan Address Payor Plan Phone Number Payor Plan Fax Number Effective Dates    [facility] [phone]  1/1/2019 - None Entered    [address]       [facility] [id number]         Subscriber Name Subscriber Birth Date Member ID       SHANA VIDAL 1945 [id number]                     Emergency Contacts        (Rel.) Home Phone Work Phone Mobile Phone    Matthew Shah (Daughter) " -- -- [phone]    Espinoza Vidal (Son) [phone] -- [phone]    THERESA VIDAL (Relative) -- -- [phone]    ZIA DURANT (Relative) [phone] -- [phone]

## 2024-03-04 NOTE — CONSULTS
Subjective     REASON FOR CONSULTATION:    .  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  PD-L1 positive greater than 95%  3.  Primary chemoradiation with weekly carboplatin and Taxol completed 10/27/2022  4.  Imfinzi immunotherapy every 2 weeks for 12 months total.  First treatment 11/28/2022  5.  Repeat bronchoscopy 1/4/2023 due to persistent hemoptysis.  Pathology revealed no malignancy.  6.  Metastatic lesion to the brain resected 5/30/2023.  7.  Progression of disease within the chest noted on scans 9/19/2023.  Patient was started on palliative Gemzar day 1 and day 8 of an every 21-day cycle with first dose 9/27/2023.  Provide an opinion on any further workup or treatment                             REQUESTING PHYSICIAN:      RECORDS OBTAINED:  Records of the patients history including those obtained from the referring provider were reviewed and summarized in detail.    HISTORY OF PRESENT ILLNESS:  The patient is a 79 y.o. year old female who is here for an opinion about the above issue.    History of Present Illness     Patient is a 79-year-old female with history of stage III adenocarcinoma right upper lobe not felt to be a surgical candidate and received combined chemotherapy and radiation.  She was showing K-amanda mutation and T p53 mutation.  On [id number].  She was started on Taxol carboplatin and following completion of 6 cycles she had radiation.  Subsequently she was treated with durvalumab.  She had PD-L1 which was 95%.  More recently she had progressive disease she is in the right upper lobe in September 2023 and started on single agent palliative Gemzar..    Patient did not receive Keytruda because of rheumatoid arthritis even though PD-L1 was 95%.  Patient had MRI brain January 2024 which did not show any progression of brain mets and there was significant improvement in the thoracic disease    Currently he is on Gemzar showing improvement in the lung malignancy.    October of saw him February  21, 2024 with plans to bring him back in 2 weeks with Gemzar but at reduced dose as he had a recent hospitalization where he became very weak    Patient called on March 3, 2024 and reported to the on- that he was very weak.  They were unable to get her up and down to the bathroom without her son-in-law's assistance.  She also had some features of confusion.  They discussed EMS transportation as it was difficult for them to bring her to the hospital yesterday.      In the ER patient had a hemoglobin of 9.7 white count of 23.6And platelet of 316.  Respiratory viral panel was negative.  Blood cultures were drawn.  Urine analysis showed trace ketones and 30 mg of protein.  CT angiogram of the chest showed no evidence of pulmonary thromboembolism.  But there was significant increase in the cavitary masslike consolidation pleural-based at the medial aspect of the right upper lobe 6.8 x 4.4 cm previously 4.7 x 3.3 cm.  The necrotic containing air centrally was 4 x 3.3 cm and previously was smaller.  There is more prominent abnormal soft tissue encasing of the right hilum.  There is no mediastinal or left hilar adenopathy.  No change in the mild pleural thickening in the right upper lobe.  No pleural or pericardial effusions.  CT abdomen pelvis showed striated enhancement of the right kidney and prominent right perinephric stranding compared to the left.  Could not rule out acute right pyelonephritis.    Send had echo which showed his ejection fraction of 66.7%.  Patient also on chronic pain.    In the past patient had resection of the brain mass in May 2023.    Past Medical History:   Diagnosis Date    COPD (chronic obstructive pulmonary disease)     Coughing up blood     X2 MONTHS    History of COVID-19 02/2022    Hyperlipidemia     IBS (irritable bowel syndrome)     Primary lung adenocarcinoma, right     Rheumatoid arthritis         Past Surgical History:   Procedure Laterality Date    APPENDECTOMY       appendix removed    BRONCHOSCOPY N/A 8/23/2022    Procedure: BRONCHOSCOPY WITH BAL,  BIOPSIES, AND BRUSHINGS WITH ENDOBRONCHIAL ULTRASOUND WITH FNA;  Surgeon: Gregor Vee MD;  Location: [facility] ENDOSCOPY;  Service: Pulmonary;  Laterality: N/A;  PRE- HILAR MASS  POST- SAME    BRONCHOSCOPY N/A 1/4/2023    Procedure: BRONCHOSCOPY with biopsy, lavage, brushing;  Surgeon: Gregor Vee MD;  Location[facility] ENDOSCOPY;  Service: Pulmonary;  Laterality: N/A;    CAROTID ENDARTERECTOMY Right     CAROTID ENDARTERECTOMY Left     CATARACT EXTRACTION      CERVICAL FUSION      CHOLECYSTECTOMY      CRANIOTOMY FOR TUMOR Right 5/30/2023    Procedure: RIGHT CRANIOTOMY FOR TUMOR RESECTION STEREOTACTIC WITH STEALTH;  Surgeon: Clint Ricketts MD;  Location: [facility] MAIN OR;  Service: Neurosurgery;  Laterality: Right;    HYSTERECTOMY      SHOULDER ARTHROSCOPY Right 02/19/2019    right should scope/cuff repair     VENOUS ACCESS DEVICE (PORT) INSERTION Right 9/6/2022    Procedure: POWERPORT INSERTION;  Surgeon: Remedios Rice MD;  Location: [facility] MAIN OR;  Service: Thoracic;  Laterality: Right;        Current Facility-Administered Medications on File Prior to Encounter   Medication Dose Route Frequency Provider Last Rate Last Admin    heparin injection 500 Units  500 Units Intravenous PRN Cruzito Lagunas MD        sodium chloride 0.9 % flush 10 mL  10 mL Intravenous PRN Cruzito Lagunas MD         Current Outpatient Medications on File Prior to Encounter   Medication Sig Dispense Refill    atorvastatin (LIPITOR) 20 MG tablet Take 1 tablet by mouth Every Night. 30 tablet 0    azelastine (ASTELIN) 0.1 % nasal spray 1 spray into the nostril(s) as directed by provider Daily As Needed for Allergies.      B COMPLEX VITAMINS ER PO Take  by mouth Daily.      Cholecalciferol (VITAMIN D3) 5000 units capsule capsule Take 1 capsule by mouth Daily.      clopidogrel (PLAVIX) 75 MG tablet Take 1 tablet by mouth Daily. 30 tablet 0     diphenoxylate-atropine (LOMOTIL) 2.5-0.025 MG per tablet Take 1 tablet by mouth 4 (Four) Times a Day As Needed for Diarrhea. 10 tablet 0    esomeprazole (nexIUM) 20 MG capsule Take 1 capsule by mouth Every Morning Before Breakfast.      estradiol (ESTRACE) 1 MG tablet Take 1 tablet by mouth Daily.      FeroSul 325 (65 Fe) MG tablet TAKE ONE TABLET BY MOUTH DAILY WITH BREAKFAST 30 tablet 5    Gabapentin 10 %, Baclofen 2 %, lidocaine 4 %, Ketamine HCl 4 % Apply 1-2 g topically to the appropriate area as directed 3 (Three) to 4 (Four) times daily. (Patient taking differently: Apply 1-2 g topically to the appropriate area as directed 3 (Three) to 4 (Four) times daily. Lt arm/ lt shoulder) 90 g 0    HYDROcodone-acetaminophen (NORCO) 7.5-325 MG per tablet Take 1 tablet by mouth Every 6 (Six) Hours As Needed for Moderate Pain. 60 tablet 0    levETIRAcetam (KEPPRA) 500 MG tablet TAKE 1 TABLET BY MOUTH TWICE A  tablet 0    lisinopril (PRINIVIL,ZESTRIL) 20 MG tablet Take 1 tablet by mouth Daily for 30 days. 30 tablet 0    Multiple Vitamins-Minerals (PRESERVISION AREDS 2+MULTI VIT PO) Take  by mouth.      ofloxacin (OCUFLOX) 0.3 % ophthalmic solution Administer 4 drops to both eyes Daily. For 4 days before injection and 4 days after injection      ondansetron (ZOFRAN) 8 MG tablet Take 1 tablet by mouth 3 (Three) Times a Day As Needed for Nausea or Vomiting. 30 tablet 5    predniSONE (DELTASONE) 10 MG tablet Take 2 tablets by mouth Daily. (Patient taking differently: Take 1 tablet by mouth Daily. 1 tab (10mg) daily) 60 tablet 5    pregabalin (LYRICA) 150 MG capsule Take 1 capsule by mouth Every 12 (Twelve) Hours for 30 days. 60 capsule 0    pregabalin (LYRICA) 75 MG capsule TAKE 1 CAPSULE BY MOUTH TWICE A DAY ALONG WITH 150 MG CAPSULE FOR TOTAL DOSE  MG TWICE DAILY 60 capsule 0        ALLERGIES:    Allergies   Allergen Reactions    Del-Mycin [Erythromycin] Hives    Gentamicin Hives    Ibuprofen Nausea And Vomiting     Latex Dermatitis     GLOVES    Metronidazole Hives    Ofloxacin Hives and Nausea Only     Has tolerated Levaquin     Penicillins Hives     Tolerated rocephin and cefepime 2023    Tetracycline Hives    Adhesive Tape Dermatitis     BANDAIDS    Aspirin GI Intolerance     Vomiting can take EC    Codeine Nausea And Vomiting        Social History     Socioeconomic History    Marital status:    Tobacco Use    Smoking status: Former     Current packs/day: 0.00     Types: Cigarettes     Quit date: 2022     Years since quittin.8    Smokeless tobacco: Never    Tobacco comments:     Former some day smoker of 1-2 cigs   Vaping Use    Vaping status: Never Used   Substance and Sexual Activity    Alcohol use: Yes     Alcohol/week: 2.0 standard drinks of alcohol     Types: 2 Glasses of wine per week     Comment: occasionally    Drug use: Yes     Frequency: 2.0 times per week     Types: Marijuana    Sexual activity: Defer        Family History   Problem Relation Age of Onset    Cancer Mother     Cancer Father     Malig Hyperthermia Neg Hx         Review of Systems   Constitutional:  Negative for appetite change, chills, diaphoresis, fatigue, fever and unexpected weight change.   HENT:  Negative for hearing loss, sore throat and trouble swallowing.    Respiratory:  Negative for cough, chest tightness, shortness of breath and wheezing.    Cardiovascular:  Negative for chest pain, palpitations and leg swelling.   Gastrointestinal:  Negative for abdominal distention, abdominal pain, constipation, diarrhea, nausea and vomiting.   Genitourinary:  Negative for dysuria, frequency, hematuria and urgency.   Musculoskeletal:  Negative for joint swelling.        No muscle weakness.   Skin:  Negative for rash and wound.   Neurological:  Negative for seizures, syncope, speech difficulty, weakness, numbness and headaches.   Hematological:  Negative for adenopathy. Does not bruise/bleed easily.   Psychiatric/Behavioral:  Negative  for behavioral problems, confusion and suicidal ideas.    Not had significant weakness      Objective     Vitals:    03/03/24 2100 03/03/24 2350 03/04/24 0616 03/04/24 0750   BP: 123/90 125/51  148/52   BP Location: Right arm Right arm  Right arm   Patient Position: Lying Lying  Lying   Pulse: 78 82  90   Resp: 18 18  18   Temp: 97.9 °F (36.6 °C) 98.2 °F (36.8 °C)  98 °F (36.7 °C)   TempSrc: Oral Oral  Oral   SpO2: 97% 95%     Weight:   63 kg (138 lb 14.2 oz)    Height:             2/21/2024    11:49 AM   Current Status   ECOG score 1       Physical Exam        This patient's ACP documentation is up to date, and there's nothing further left to document.    CONSTITUTIONAL:  Vital signs reviewed.  Alert and oriented x3  No distress, looks comfortable.    RESPIRATORY:  Normal respiratory effort.  No rales  or wheezing, clear.   CARDIOVASCULAR:  Regular rate and rhythm, no murmur  No significant lower extremity edema.  Abdomen: Soft nontender positive bowel sounds no hepatosplenomegaly  SKIN: No wounds.  No rashes.  MUSCULOSKELETAL/EXTREMITIES: No clubbing or cyanosis.  No apparent unilateral weakness.  NEURO: CN 2-12 appear intact. No focal neurological deficits noted.  PSYCHIATRIC:  Normal judgment and insight.  Normal mood and affect.      RECENT LABS:  Hematology WBC   Date Value Ref Range Status   03/04/2024 21.55 (H) 3.40 - 10.80 10*3/mm3 Final     RBC   Date Value Ref Range Status   03/04/2024 3.23 (L) 3.77 - 5.28 10*6/mm3 Final     Hemoglobin   Date Value Ref Range Status   03/04/2024 9.1 (L) 12.0 - 15.9 g/dL Final     Hematocrit   Date Value Ref Range Status   03/04/2024 28.5 (L) 34.0 - 46.6 % Final     Platelets   Date Value Ref Range Status   03/04/2024 304 140 - 450 10*3/mm3 Final          Assessment & Plan   #. .  Right-sided pyelonephritis admitted with significant weakness  Pancultured yesterday  Started on IV antibiotics, ceftriaxone    #.  Progressive lung metastasis with significant increase in the  cavitary masslike consolidation pleural-based in the posterior medial aspect of the right upper lobe measuring 6.8 x 4.4 compared to 4.7 x 3.3 cm.  The centrally necrotic region also measures 4 x 3.3 and previously was smaller.  There is more prominent soft tissue encasing the right hilum.  There is mild pleural thickening on the posterior aspect of the right upper lobe.    Will discuss with Dr. Lagunas    *.   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  Patient is not felt to be a surgical candidate and combined chemotherapy and radiation.   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  9/20/2022 1st dose of weekly carboplatin/taxol.   She completed 6 cycles of weekly CarboTaxol 10/25/2022.  Radiation therapy completed 10/27/2022  First dose durvalumab delivered 11/28/2022.   Durvalumab on hold since May 2023  Progression of disease with enlarging hypermetabolic mass in the right upper lobe in September 2023  Plans to start single agent Gemzar palliative chemotherapy 1000 mg/m² on days 1 and 8 of a 21-day cycle.  9/27/2023 C1D1 single agent palliative Gemzar.  CT scans and brain MRI 1/5/2024 show no progression of brain mets and significant improvement in her thoracic disease.        2.  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     3.  Other comorbidities including vascular disease with previous carotid   endarterectomy surgeries.  She has no known history of stroke or myocardial infarction.      5.  Rheumatoid arthritis: Patient previously on Celebrex, but discontinued due to hemoptysis.  Patient started on prednisone 20 mg daily prescribed to manage her arthritis pain.  This was later decreased to 10 mg daily.     6.  Development of brain metastases in May 2023.  Patient underwent resection of the dominant mass in the right occipital area by Dr. Cho of  neurosurgery.  She received post op radiation therapy to the resection bed and to 2 smaller lesions with SRS under the direction of Dr. Mckenna Moreira at [facility].  Her most recent MRI of the brain from 8/27/2023 as noted above shows no new sites of disease, improved edema, and no definite recurrence in the resection bed.     7.  Severe shingles outbreak of left upper extremity with severe pain from postherpetic neuralgia.   She has been seen by pain management and is receiving topical therapy with a compound of baclofen gabapentin and ketamine with good relief.     8.  Hospitalization for bacterial colitis with stool culture growing E. coli and Campylobacter.  Her symptoms have resolved at this point.     9.  Hospitalization 2/17/2024 to 2/19/2024 for Sapovirus viral gastroenteritis.        PLAN:  Patient admitted with right pyelonephritis  Currently panculture and IV ceftriaxone  Respiratory viral panel negative  CT chest showing no pulmonary embolism but progressive lung disease lung cancer 6.8 x 4.4 cm compared to 4.7 x 3.3 cm.  Will discuss with Dr. Lagunas  Will follow    Darleen Kim MD

## 2024-03-04 NOTE — NURSING NOTE
"Nursing report ED to floor  Pam Vidal  79 y.o.  female    HPI :  HPI (Adult)  Stated Reason for Visit: abd pain x 3 days, coughing up blood x 2 days.  hx lung ca  History Obtained From: EMS    Chief Complaint  Chief Complaint   Patient presents with    Abdominal Pain    Coughing Up Blood       Admitting doctor:   Simone Nelson MD    Admitting diagnosis:   The primary encounter diagnosis was Acute pyelonephritis. 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    Code status:   Current Code Status       Date Active Code Status Order ID Comments User Context       3/3/2024 1929 CPR (Attempt to Resuscitate) [id number]  Simone Nelson MD ED        Question Answer    Code Status (Patient has no pulse and is not breathing) CPR (Attempt to Resuscitate)    Medical Interventions (Patient has pulse or is breathing) Full    Level Of Support Discussed With Patient                    Allergies:   Del-mycin [erythromycin], Gentamicin, Ibuprofen, Latex, Metronidazole, Ofloxacin, Penicillins, Tetracycline, Adhesive tape, Aspirin, and Codeine    Isolation:   No active isolations    Intake and Output    Intake/Output Summary (Last 24 hours) at 3/3/2024 2017  Last data filed at 3/3/2024 1629  Gross per 24 hour   Intake 500 ml   Output --   Net 500 ml       Weight:       03/03/24  1640   Weight: 65.1 kg (143 lb 8.3 oz)       Most recent vitals:   Vitals:    03/03/24 1513 03/03/24 1640 03/03/24 1640 03/03/24 1841   BP:   108/48 112/50   Pulse:  85  80   Resp:       Temp: 96.1 °F (35.6 °C)      TempSrc: Tympanic      SpO2:  95%  96%   Weight:   65.1 kg (143 lb 8.3 oz)    Height:   152.4 cm (60\")        Active LDAs/IV Access:   Lines, Drains & Airways       Active LDAs       Name " "Placement date Placement time Site Days    Peripheral IV 02/17/24 1950 Posterior;Right Forearm 02/17/24 1950  Forearm  15    Single Lumen Implantable Port 08/31/22 Right Subclavian 08/31/22  1200  Subclavian  Powerport  Vortex 6f  model [id number]; Lot [id number]  550                    Labs (abnormal labs have a star):   Labs Reviewed   COMPREHENSIVE METABOLIC PANEL - Abnormal; Notable for the following components:       Result Value    Glucose 105 (*)     BUN 31 (*)     Creatinine 1.51 (*)     Sodium 133 (*)     CO2 19.0 (*)     Albumin 3.0 (*)     Alkaline Phosphatase 289 (*)     eGFR 35.5 (*)     All other components within normal limits    Narrative:     GFR Normal >60  Chronic Kidney Disease <60  Kidney Failure <15    The GFR formula is only valid for adults with stable renal function between ages 18 and 70.   PROTIME-INR - Abnormal; Notable for the following components:    Protime 15.5 (*)     INR 1.21 (*)     All other components within normal limits   LIPASE - Abnormal; Notable for the following components:    Lipase 12 (*)     All other components within normal limits   PROCALCITONIN - Abnormal; Notable for the following components:    Procalcitonin 6.40 (*)     All other components within normal limits    Narrative:     As a Marker for Sepsis (Non-Neonates):    1. <0.5 ng/mL represents a low risk of severe sepsis and/or septic shock.  2. >2 ng/mL represents a high risk of severe sepsis and/or septic shock.    As a Marker for Lower Respiratory Tract Infections that require antibiotic therapy:    PCT on Admission    Antibiotic Therapy       6-12 Hrs later    >0.5                Strongly Recommended  >0.25 - <0.5        Recommended   0.1 - 0.25          Discouraged              Remeasure/reassess PCT  <0.1                Strongly Discouraged     Remeasure/reassess PCT    As 28 day mortality risk marker: \"Change in Procalcitonin Result\" (>80% or <=80%) if Day 0 (or Day 1) and Day 4 values are available. Refer " to [email or web address]-[email or web address]    Change in PCT <=80%  A decrease of PCT levels below or equal to 80% defines a positive change in PCT test result representing a higher risk for 28-day all-cause mortality of patients diagnosed with severe sepsis for septic shock.    Change in PCT >80%  A decrease of PCT levels of more than 80% defines a negative change in PCT result representing a lower risk for 28-day all-cause mortality of patients diagnosed with severe sepsis or septic shock.      CBC WITH AUTO DIFFERENTIAL - Abnormal; Notable for the following components:    WBC 23.60 (*)     RBC 3.56 (*)     Hemoglobin 9.7 (*)     Hematocrit 30.4 (*)     RDW 15.5 (*)     Neutrophil % 89.0 (*)     Lymphocyte % 2.7 (*)     Eosinophil % 0.1 (*)     Immature Grans % 2.5 (*)     Neutrophils, Absolute 21.01 (*)     Lymphocytes, Absolute 0.63 (*)     Monocytes, Absolute 1.30 (*)     Immature Grans, Absolute 0.59 (*)     All other components within normal limits   URINALYSIS W/ CULTURE IF INDICATED - Abnormal; Notable for the following components:    Ketones, UA Trace (*)     Protein, UA 30 mg/dL (1+) (*)     All other components within normal limits    Narrative:     In absence of clinical symptoms, the presence of pyuria, bacteria, and/or nitrites on the urinalysis result does not correlate with infection.   URINALYSIS, MICROSCOPIC ONLY - Abnormal; Notable for the following components:    Bacteria, UA 4+ (*)     All other components within normal limits   RESPIRATORY PANEL PCR W/ COVID-19 (SARS-COV-2), NP SWAB IN [facility], 2 HR TAT - Normal    Narrative:     In the setting of a positive respiratory panel with a viral infection PLUS a negative procalcitonin without other underlying concern for bacterial infection, consider observing off antibiotics or discontinuation of antibiotics and continue supportive care. If the respiratory panel is positive for atypical bacterial infection (Bordetella pertussis, Chlamydophila  pneumoniae, or Mycoplasma pneumoniae), consider antibiotic de-escalation to target atypical bacterial infection.   APTT - Normal   LACTIC ACID, PLASMA - Normal   BLOOD CULTURE   BLOOD CULTURE   URINE CULTURE   CBC AND DIFFERENTIAL    Narrative:     The following orders were created for panel order CBC & Differential.  Procedure                               Abnormality         Status                     ---------                               -----------         ------                     CBC Auto Differential[[id number]]        Abnormal            Final result                 Please view results for these tests on the individual orders.       EKG:   No orders to display       Meds given in ED:   Medications   sodium chloride 0.9 % flush 10 mL (has no administration in time range)   sodium chloride 0.9 % flush 10 mL (has no administration in time range)   lactated ringers infusion (125 mL/hr Intravenous New Bag 3/3/24 1629)   lactated ringers bolus 500 mL ( Intravenous Currently Infusing 3/3/24 1847)   acetaminophen (TYLENOL) tablet 650 mg (has no administration in time range)   ondansetron ODT (ZOFRAN-ODT) disintegrating tablet 4 mg (has no administration in time range)     Or   ondansetron (ZOFRAN) injection 4 mg (has no administration in time range)   melatonin tablet 3 mg (has no administration in time range)   sennosides-docusate (PERICOLACE) 8.6-50 MG per tablet 2 tablet (has no administration in time range)     And   polyethylene glycol (MIRALAX) packet 17 g (has no administration in time range)     And   bisacodyl (DULCOLAX) EC tablet 5 mg (has no administration in time range)     And   bisacodyl (DULCOLAX) suppository 10 mg (has no administration in time range)   cefTRIAXone (ROCEPHIN) 2,000 mg in sodium chloride 0.9 % 100 mL IVPB-VTB (has no administration in time range)   lactated ringers bolus 500 mL (500 mL Intravenous New Bag 3/3/24 1629)   iopamidol (ISOVUE-370) 76 % injection 100 mL (95 mL Intravenous  Given by Other 3/3/24 4189)   cefTRIAXone (ROCEPHIN) 2,000 mg in sodium chloride 0.9 % 100 mL IVPB-VTB (2,000 mg Intravenous New Bag 3/3/24 191)       Imaging results:  XR Chest 1 View    Result Date: 3/3/2024  Masslike right suprahilar opacity with volume loss. Suspect radiated tumor  This report was finalized on 3/3/2024 3:56 PM by Dr. Enzo Soria M.D on Workstation: [id number]       Ambulatory status:   - br    Social issues:   Social History     Socioeconomic History    Marital status:    Tobacco Use    Smoking status: Former     Current packs/day: 0.00     Types: Cigarettes     Quit date: 2022     Years since quittin.8    Smokeless tobacco: Never    Tobacco comments:     Former some day smoker of 1-2 cigs   Vaping Use    Vaping status: Never Used   Substance and Sexual Activity    Alcohol use: Yes     Alcohol/week: 2.0 standard drinks of alcohol     Types: 2 Glasses of wine per week     Comment: occasionally    Drug use: Yes     Frequency: 2.0 times per week     Types: Marijuana    Sexual activity: Defer       Peripheral Neurovascular  Peripheral Neurovascular (Adult)  Peripheral Neurovascular WDL: WDL    Neuro Cognitive  Neuro Cognitive (Adult)  Cognitive/Neuro/Behavioral WDL: WDL    Learning  Learning Assessment (Adult)  Learning Readiness and Ability: no barriers identified    Respiratory  Respiratory WDL  Respiratory WDL: WDL    Abdominal Pain       Pain Assessments  Pain (Adult)  (0-10) Pain Rating: Rest: 5  Pain Location: abdomen    NIH Stroke Scale       Clint Turner RN  24 20:17 EST

## 2024-03-04 NOTE — PROGRESS NOTES
Name: Pam Vidal ADMIT: 3/3/2024   : 1945  PCP: Nik Mckay MD    MRN: [id number] LOS: 1 days   AGE/SEX: 79 y.o. female  ROOM: [facility]     Subjective   Subjective   Resting in bed, she is complaining of still feeling poorly but not worse than when she came in. Also reports tenderness to bilateral LE. Otherwise doing ok. We discussed + blood culture and plan for infectious disease to see her.        Objective   Objective   Vital Signs  Temp:  [97.9 °F (36.6 °C)-98.4 °F (36.9 °C)] 98.4 °F (36.9 °C)  Heart Rate:  [74-93] 93  Resp:  [18] 18  BP: (108-148)/(48-90) 132/54  SpO2:  [94 %-97 %] 95 %  on   ;   Device (Oxygen Therapy): room air  Body mass index is 27.13 kg/m².  Physical Exam  Constitutional:       General: She is not in acute distress.     Appearance: She is ill-appearing.   Cardiovascular:      Rate and Rhythm: Normal rate and regular rhythm.   Pulmonary:      Effort: Pulmonary effort is normal. No respiratory distress.      Breath sounds: No wheezing.      Comments: Decreased breath sounds bilterally  Abdominal:      General: Abdomen is flat.      Palpations: Abdomen is soft.      Tenderness: There is no abdominal tenderness.   Musculoskeletal:         General: No swelling or tenderness.      Right lower leg: No edema.      Left lower leg: No edema.   Skin:     General: Skin is warm and dry.   Neurological:      General: No focal deficit present.      Mental Status: She is alert and oriented to person, place, and time. Mental status is at baseline.         Results Review     I reviewed the patient's new clinical results.  Results from last 7 days   Lab Units 24  0254 24  1622   WBC 10*3/mm3 21.55* 23.60*   HEMOGLOBIN g/dL 9.1* 9.7*   PLATELETS 10*3/mm3 304 316     Results from last 7 days   Lab Units 24  0254 24  1622   SODIUM mmol/L 137 133*   POTASSIUM mmol/L 4.3 4.7   CHLORIDE mmol/L 102 99   CO2 mmol/L 22.9 19.0*   BUN mg/dL 27* 31*   CREATININE mg/dL 1.16*  "1.51*   GLUCOSE mg/dL 189* 105*   Estimated Creatinine Clearance: 33.7 mL/min (A) (by C-G formula based on SCr of 1.16 mg/dL (H)).  Results from last 7 days   Lab Units 03/03/24  1622   ALBUMIN g/dL 3.0*   BILIRUBIN mg/dL 0.5   ALK PHOS U/L 289*   AST (SGOT) U/L 25   ALT (SGPT) U/L 26     Results from last 7 days   Lab Units 03/04/24  0254 03/03/24  1622   CALCIUM mg/dL 8.5* 9.2   ALBUMIN g/dL  --  3.0*     Results from last 7 days   Lab Units 03/03/24  1622   PROCALCITONIN ng/mL 6.40*   LACTATE mmol/L 0.7     COVID19   Date Value Ref Range Status   03/03/2024 Not Detected Not Detected - Ref. Range Final   08/26/2023 Not Detected Not Detected - Ref. Range Final     No results found for: \"HGBA1C\", \"POCGLU\"    CT Angiogram Chest Pulmonary Embolism, CT Abdomen Pelvis With Contrast     CT ANGIOGRAM OF THE CHEST. MULTIPLE CORONAL, SAGITTAL, AND 3-D  RECONSTRUCTIONS. CT ABDOMEN AND PELVIS WITH IV CONTRAST     HISTORY: 79-year-old female with cough and fever. Hemoptysis. Abdominal  pain. Metastatic breast cancer. Brain metastases.     TECHNIQUE: Radiation dose reduction techniques were utilized, including  automated exposure control and exposure modulation based on body size.  CT angiogram of the chest was performed. Multiple coronal, sagittal, and  3-D reconstruction images were obtained. Subsequently, 3 mm images were  obtained through the abdomen and pelvis after the administration of IV  contrast. Compared with CT abdomen and pelvis 02/17/2024 and chest CT  01/05/2024.     FINDINGS:     CHEST CTA:  1. There is no convincing evidence for pulmonary thromboemboli.     2. There has been significant increase in size of the centrally cavitary  masslike consolidation pleural-based at the posterior medial aspect of  the right upper lobe measuring 6.8 x 4.4 cm, previously approximately  4.7 x 3.3 cm. The centrally liquefied/necrotic region containing air  measures 4.0 x 3.3 cm, previously smaller. There is more " prominent  abnormal soft tissue encasing the right hilum, but there is no  mediastinal or left hilar lymphadenopathy. No significant change in the  very mild pleural thickening at the posterior aspect of the right upper  lobe.     3. There are no pleural or pericardial effusions. There is less  atelectasis at the left lung base, currently mild.     ABDOMEN/PELVIS:  1. There is a subtle striated enhancement pattern of the right kidney  and there is more prominent right perinephric stranding than on the  left. Urinary bladder wall appears thickened, but the bladder is nearly  collapsed. Acute right pyelonephritis is suspected and pyelonephritis on  the left cannot be excluded.     2. No significant change is seen in the right renal cysts. The largest  measures 5.5 cm and has internal density measurements of 36 Hounsfield  units. Likely proteinaceous cysts and conservative surveillance is  recommended.     3. There is no acute abnormality at the liver, spleen, pancreas, or  adrenals. There is no acute bowel abnormality. There is moderate sigmoid  diverticulosis without evidence for diverticulitis. The appendix is  surgically absent. Uterus is surgically absent. No lymphadenopathy.     This report was finalized on 3/4/2024 7:22 AM by Dr. Rianna Meeks M.D on  Workstation: [id number]       Scheduled Medications  atorvastatin, 20 mg, Oral, Nightly  cefTRIAXone, 2,000 mg, Intravenous, Q24H  clopidogrel, 75 mg, Oral, Daily  estradiol, 1 mg, Oral, Daily  ferrous sulfate, 325 mg, Oral, Daily With Breakfast  Gabapentin 10 %, Baclofen 2 %, lidocaine 4 %, Ketamine HCl 4 %, , Topical, 4x Daily  lactated ringers, 500 mL, Intravenous, Once  levETIRAcetam, 500 mg, Oral, BID  [Held by provider] lisinopril, 20 mg, Oral, Q24H  ofloxacin, 4 drop, Both Eyes, Daily  pantoprazole, 40 mg, Oral, Q AM  predniSONE, 10 mg, Oral, Daily  pregabalin, 150 mg, Oral, Q12H    Infusions  lactated ringers, 125 mL/hr, Last Rate: 125 mL/hr (03/04/24  1353)    Diet  Diet: Regular/House; Fluid Consistency: Thin (IDDSI 0)         I have personally reviewed:  [x]  Laboratory   [x]  Microbiology   [x]  Radiology   [x]  EKG/Telemetry   []  Cardiology/Vascular   []  Pathology   []  Records    Assessment/Plan     Active Hospital Problems    Diagnosis  POA    **Acute pyelonephritis [N10]  Yes    ZACH (acute kidney injury) [N17.9]  Yes    HTN (hypertension) [I10]  Yes    COPD (chronic obstructive pulmonary disease) [J44.9]  Yes    Rheumatoid arthritis [M06.9]  Yes    Hyperlipidemia [E78.5]  Yes    Leukocytosis [D72.829]  Yes    Lung cancer metastatic to brain [C34.90, C79.31]  Yes    Neoplastic malignant related fatigue [R53.0]  Yes    Primary lung adenocarcinoma, right [C34.91]  Yes      Resolved Hospital Problems   No resolved problems to display.     Ms. Vidal is a 79 y.o. female with NSCLC (metastatic to brain, status post resection and XRT), HTN, HLD, RA, CAD presenting with abdominal pain, found to have UTI/pyelonephritis.     E coli septicemia  Right sided pyelonephritis  Leukocytosis  -CT PE/A/P with no PE; increase in size of cavitary masslike consolidation; suspected pyelonephritis on the right, cannot be excluded on the left  -WBC 23; procal 6.4  - blood cx + for e coli, ID consulted; likely  source but with port in place- also should be considered       ZACH  -Crt 1.51 OA (b/l ~1)  -Likely prerenal  -IVF  - improving to 1.1 this AM     Adenocarcinoma of RUL, metastatic to brain  -follows with Dr Lagunas  -s/p resection of brain mass 5/23  -oncology consulted, CT noted with increase in right lung mass     RA  -prednisone 10mg daily     HLD  -Statin     Chronic pain  -Resume home Norco  -Decrease home Lyrica given ZACH- hopefully can resume home dose in the next day or so    SCDs for DVT prophylaxis.  Full code.  Discussed with patient.  Anticipate discharge home with family timing yet to be determined.      Lisette Browning MD  [facility]  03/04/24  16:24 EST    I wore protective equipment throughout this patient encounter including gloves.  Hand hygiene was performed before donning protective equipment and after removal when leaving the room.    Expected Discharge Date and Time       Expected Discharge Date Expected Discharge Time    Mar 7, 2024              Copied text in this note has been reviewed and is accurate as of 03/04/24.

## 2024-03-04 NOTE — PLAN OF CARE
Goal Outcome Evaluation:  Plan of Care Reviewed With: patient           Outcome Evaluation: Pt is a 78 yo female seen for PT eval this PM. Pt admit with progressive generalized weakness and acute fever. Pt with hx of lung cancer with metastases to the brain, COPD and CKD. Pt reports living with her niece and typically is ambulatory without AD. Per chart review, pt was hospitalized last month and chemo has been held for a few weeks in order for her to regain her strength, however she has become weaker. Today, pt demo significant LE weakness, impaired balance, decreased activity tolerance and general functional mobility deficits below her baseline. Pt required mod A for bed mobility, Mod/max A x2 for STS and max A x2 for standing balance. Pt demo strong posterior lean in sitting and standing, would  be unsafe to t/f to [facility] due to sitting balance deficits. PT strongly recommending d/c to SNF to address mobility deficits as she is a high falls risk and below her baseline level of function.      Anticipated Discharge Disposition (PT): skilled nursing facility

## 2024-03-04 NOTE — CONSULTS
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  ID consulted for bacteremia.    On admission patient was afebrile with leukocytosis of 23.  Procalcitonin elevated at 6.4 in the setting of increased creatinine of 1.5.  Blood cultures have been obtained with 1 out of 2 gram-negative bacilli.  Started on ceftriaxone 2 g.  UA with 4+ bacteria however no pyuria.  Positive imaging of the abdomen and chest with evidence of prior resected lung cavity and right perinephric stranding and enhancement of the right kidney concerning for pyelonephritis.    Patient states she is feeling slightly better on antibiotics.  Denies any antibiotic side effects today.  States she is very tired.  Continues to have some abdominal pain but states it is slightly better.  Pain is also on the right side.  Has remained afebrile and denies any fevers or chills.    Past Medical History:   Diagnosis Date    COPD (chronic obstructive pulmonary disease)     Coughing up blood     X2 MONTHS    History of COVID-19 02/2022    Hyperlipidemia     IBS (irritable bowel syndrome)     Primary lung adenocarcinoma, right     Rheumatoid arthritis        Past Surgical History:   Procedure Laterality Date    APPENDECTOMY      appendix removed    BRONCHOSCOPY N/A 8/23/2022    Procedure: BRONCHOSCOPY WITH BAL,  BIOPSIES, AND BRUSHINGS WITH ENDOBRONCHIAL ULTRASOUND WITH FNA;  Surgeon: Gregor Vee MD;  Location: [facility] ENDOSCOPY;  Service: Pulmonary;  Laterality: N/A;  PRE- HILAR MASS  POST- SAME    BRONCHOSCOPY N/A 1/4/2023     Procedure: BRONCHOSCOPY with biopsy, lavage, brushing;  Surgeon: Gregor Vee MD;  Location: [facility] ENDOSCOPY;  Service: Pulmonary;  Laterality: N/A;    CAROTID ENDARTERECTOMY Right     CAROTID ENDARTERECTOMY Left     CATARACT EXTRACTION      CERVICAL FUSION      CHOLECYSTECTOMY      CRANIOTOMY FOR TUMOR Right 5/30/2023    Procedure: RIGHT CRANIOTOMY FOR TUMOR RESECTION STEREOTACTIC WITH STEALTH;  Surgeon: Clint Ricketts MD;  Location: [facility] MAIN OR;  Service: Neurosurgery;  Laterality: Right;    HYSTERECTOMY      SHOULDER ARTHROSCOPY Right 02/19/2019    right should scope/cuff repair     VENOUS ACCESS DEVICE (PORT) INSERTION Right 9/6/2022    Procedure: POWERPORT INSERTION;  Surgeon: Remedios Rice MD;  Location: [facility] MAIN OR;  Service: Thoracic;  Laterality: Right;       family history includes Cancer in her father and mother.     reports that she quit smoking about 22 months ago. Her smoking use included cigarettes. She has never used smokeless tobacco. She reports current alcohol use of about 2.0 standard drinks of alcohol per week. She reports current drug use. Frequency: 2.00 times per week. Drug: Marijuana.     Allergies   Allergen Reactions    Del-Mycin [Erythromycin] Hives    Gentamicin Hives    Ibuprofen Nausea And Vomiting    Latex Dermatitis     GLOVES    Metronidazole Hives    Ofloxacin Hives and Nausea Only     Has tolerated Levaquin     Penicillins Hives     Tolerated rocephin and cefepime 8/2023    Tetracycline Hives    Adhesive Tape Dermatitis     BANDAIDS    Aspirin GI Intolerance     Vomiting can take EC    Codeine Nausea And Vomiting       Medication:  Antibiotics:  Anti-Infectives (From admission, onward)      Ordered     Dose/Rate Route Frequency Start Stop    03/03/24 1930  cefTRIAXone (ROCEPHIN) 2,000 mg in sodium chloride 0.9 % 100 mL IVPB-VTB        Ordering Provider: Simone Nelson MD    2,000 mg  200 mL/hr over 30 Minutes Intravenous Every 24 Hours 03/04/24 1800 03/10/24  "1759    03/03/24 1906  cefTRIAXone (ROCEPHIN) 2,000 mg in sodium chloride 0.9 % 100 mL IVPB-VTB        Ordering Provider: Simone Kitchen MD    2,000 mg  200 mL/hr over 30 Minutes Intravenous Once 03/03/24 1922 03/03/24 1946              Objective     Physical Exam:   Vital Signs   Temp:  [96.1 °F (35.6 °C)-98.2 °F (36.8 °C)] 98 °F (36.7 °C)  Heart Rate:  [74-97] 90  Resp:  [16-18] 18  BP: ()/(48-90) 148/52    GENERAL: Awake and alert, in no acute distress.   HEENT: Oropharynx is clear. Hearing is grossly normal.   EYES: PERRL. No conjunctival injection. No lid lag.   LUNGS: Normal work of breathing  GI: Tender in the right upper and lower quadrants  SKIN: Petechial changes of the right upper extremity  PSYCHIATRIC: Appropriate mood, affect    Results Review:   I reviewed the patient's new clinical results.  I reviewed the patient's new imaging results and agree with the interpretation.  I reviewed the patient's other test results and agree with the interpretation    Lab Results   Component Value Date    WBC 21.55 (H) 03/04/2024    HGB 9.1 (L) 03/04/2024    HCT 28.5 (L) 03/04/2024    MCV 88.2 03/04/2024     03/04/2024       No results found for: \"VANCOPEAK\", \"VANCOTROUGH\", \"VANCORANDOM\"    Lab Results   Component Value Date    GLUCOSE 189 (H) 03/04/2024    BUN 27 (H) 03/04/2024    CREATININE 1.16 (H) 03/04/2024    BCR 23.3 03/04/2024    CO2 22.9 03/04/2024    CALCIUM 8.5 (L) 03/04/2024    ALBUMIN 3.0 (L) 03/03/2024    AST 25 03/03/2024    ALT 26 03/03/2024         Estimated Creatinine Clearance: 33.7 mL/min (A) (by C-G formula based on SCr of 1.16 mg/dL (H)).      Microbiology:  3/3 blood cultures 1 out of 2 from the central line positive for E. coli  3/3 respiratory panel negative  3/3 urine culture in process    Radiology:  3/3 CT chest abdomen pelvis report reviewed.  Right upper lobe cavitary masslike consolidation at the area of prior metastasis.  Right kidney with prominent right perinephric " stranding concerning for acute right pyelonephritis.    Assessment     # E. coli septicemia  #Possible right pyelonephritis  #Chest port in place  #Non-small cell lung cancer status post resection and radiation with brain metastasis  #ZACH  #Rheumatoid arthritis on chronic prednisone 10 mg daily     Blood culture with 1 out of 2 E. coli from her chest port.  Will need to repeat blood cultures to document sterility with at least 1 from central line.  Seems the most likely source would be pyelonephritis at this point however remains some concern for the port.  Plan to follow-up urine culture to correlate.  Continue ceftriaxone 2 g daily.    Thank you for this consult.  We will continue to follow along and tailor antibiotics as the patient's clinical course evolves.

## 2024-03-04 NOTE — THERAPY EVALUATION
Patient Name: Pam Vidal  : 1945    MRN: [id number]                              Today's Date: 3/4/2024       Admit Date: 3/3/2024    Visit Dx:     ICD-10-CM ICD-9-CM   1. Acute pyelonephritis  N10 590.10   2. SIRS (systemic inflammatory response syndrome)  R65.10 995.90   3. Primary malignant neoplasm of right lung metastatic to other site  C34.91 162.9   4. Acute kidney injury (ZACH) with acute tubular necrosis (ATN)  N17.0 584.5   5. Leukocytosis, unspecified type  D72.829 288.60   6. Acute on chronic anemia  D64.9 285.9     Patient Active Problem List   Diagnosis    Primary lung adenocarcinoma, right    Cough with hemoptysis    Lung mass    Advanced care planning/counseling discussion    High risk medication use    Muscular deconditioning    Neoplastic malignant related fatigue    Lung cancer metastatic to brain    Leukocytosis    Brain mass    COPD (chronic obstructive pulmonary disease)    Rheumatoid arthritis    IBS (irritable bowel syndrome)    Hyperlipidemia    Abnormal CT of the chest    Shingles, left arm    HTN (hypertension)    Dizziness    Campylobacter diarrhea    Bacterial colitis    Diarrhea    Neuropathic pain    VBI (vertebrobasilar insufficiency)    Atypical pneumonia    Post herpetic neuralgia    Chronic pain after cancer treatment    Left arm pain    Gastroenteritis due to sapovirus    Stage 3a chronic kidney disease (CKD)    ZACH (acute kidney injury)    Acute pyelonephritis     Past Medical History:   Diagnosis Date    COPD (chronic obstructive pulmonary disease)     Coughing up blood     X2 MONTHS    History of COVID-19 2022    Hyperlipidemia     IBS (irritable bowel syndrome)     Primary lung adenocarcinoma, right     Rheumatoid arthritis      Past Surgical History:   Procedure Laterality Date    APPENDECTOMY      appendix removed    BRONCHOSCOPY N/A 2022    Procedure: BRONCHOSCOPY WITH BAL,  BIOPSIES, AND BRUSHINGS WITH ENDOBRONCHIAL ULTRASOUND WITH FNA;  Surgeon:  Gregor Vee MD;  Location: [facility] ENDOSCOPY;  Service: Pulmonary;  Laterality: N/A;  PRE- HILAR MASS  POST- SAME    BRONCHOSCOPY N/A 1/4/2023    Procedure: BRONCHOSCOPY with biopsy, lavage, brushing;  Surgeon: Gregor Vee MD;  Location: [facility] ENDOSCOPY;  Service: Pulmonary;  Laterality: N/A;    CAROTID ENDARTERECTOMY Right     CAROTID ENDARTERECTOMY Left     CATARACT EXTRACTION      CERVICAL FUSION      CHOLECYSTECTOMY      CRANIOTOMY FOR TUMOR Right 5/30/2023    Procedure: RIGHT CRANIOTOMY FOR TUMOR RESECTION STEREOTACTIC WITH STEALTH;  Surgeon: Clint Ricketts MD;  Location: [facility] MAIN OR;  Service: Neurosurgery;  Laterality: Right;    HYSTERECTOMY      SHOULDER ARTHROSCOPY Right 02/19/2019    right should scope/cuff repair     VENOUS ACCESS DEVICE (PORT) INSERTION Right 9/6/2022    Procedure: POWERPORT INSERTION;  Surgeon: Remedios Rice MD;  Location: [facility] MAIN OR;  Service: Thoracic;  Laterality: Right;      General Information       Row Name 03/04/24 1345          Physical Therapy Time and Intention    Document Type evaluation  -CW     Mode of Treatment co-treatment;physical therapy;occupational therapy;other (see comments)  -CW       Row Name 03/04/24 1345          General Information    Patient Profile Reviewed yes  -CW     Prior Level of Function independent:;transfer;all household mobility;bed mobility  reports ind with household ambulation prior to admission, denies use of AD  -CW     Existing Precautions/Restrictions fall  -CW     Barriers to Rehab medically complex  -CW       Row Name 03/04/24 1345          Living Environment    People in Home other (see comments)  niece  -CW       Row Name 03/04/24 1345          Home Main Entrance    Number of Stairs, Main Entrance none  -CW       Row Name 03/04/24 1345          Stairs Within Home, Primary    Number of Stairs, Within Home, Primary none  -CW       Row Name 03/04/24 1345          Cognition    Orientation Status (Cognition) oriented x 3  -CW        Row Name 03/04/24 1345          Safety Issues, Functional Mobility    Safety Issues Affecting Function (Mobility) insight into deficits/self-awareness;awareness of need for assistance;safety precautions follow-through/compliance  -CW     Impairments Affecting Function (Mobility) balance;endurance/activity tolerance;strength;postural/trunk control;pain  -CW     Comment, Safety Issues/Impairments (Mobility) Co treatment medically appropriate and necessary due to patient acuity level, activity tolerance and safety of patient and staff. Evaluation established to achieve all goals in POC.  -CW               User Key  (r) = Recorded By, (t) = Taken By, (c) = Cosigned By      Initials Name Provider Type    CW Nubia Cesar, PT Physical Therapist                   Mobility       Row Name 03/04/24 1347          Bed Mobility    Bed Mobility supine-sit;sit-supine  -CW     Supine-Sit [place] (Bed Mobility) verbal cues;moderate assist (50% patient effort);2 person assist  -CW     Sit-Supine [place] (Bed Mobility) moderate assist (50% patient effort);2 person assist;verbal cues  -CW     Assistive Device (Bed Mobility) bed rails;head of bed elevated  -CW     Comment, (Bed Mobility) poor sitting balance at EOB - posterior lean requiring min to mod A to maintain upright sitting  -CW       Row Name 03/04/24 1347          Sit-Stand Transfer    Sit-Stand [place] (Transfers) moderate assist (50% patient effort);2 person assist;maximum assist (25% patient effort);verbal cues;nonverbal cues (demo/gesture)  -     Assistive Device (Sit-Stand Transfers) other (see comments)  HHA x2  -CW     Comment, (Sit-Stand Transfer) strong posterior lean in standing, leaning back onto bed. B knees either hyperextended or slightly buckling  -CW       Row Name 03/04/24 1347          Gait/Stairs (Locomotion)    [place] Level (Gait) maximum assist (25% patient effort);2 person assist;verbal cues  -CW     Assistive Device  (Gait) other (see comments)  HH Ax2  -CW     Distance in Feet (Gait) 3 small lateral steps towards HOB  -CW     Deviations/Abnormal Patterns (Gait) mariia decreased;gait speed decreased;stride length decreased  -CW     Bilateral Gait Deviations forward flexed posture  -CW     Comment, (Gait/Stairs) Strong posterior lean back onto bed, very unsteady  -CW               User Key  (r) = Recorded By, (t) = Taken By, (c) = Cosigned By      Initials Name Provider Type    Nubia Gilbert PT Physical Therapist                   Obj/Interventions       Row Name 03/04/24 1349          Range of Motion Comprehensive    General Range of Motion bilateral lower extremity ROM WFL  -CW       Row Name 03/04/24 1349          Strength Comprehensive (MMT)    Comment, General Manual Muscle Testing (MMT) Assessment BLE generally weak, L quad 3-/5, all others 3/5  -CW       Row Name 03/04/24 1349          Balance    Balance Assessment standing static balance;standing dynamic balance;sitting static balance  -CW     Static Sitting Balance moderate assist;verbal cues  -CW     Position, Sitting Balance sitting edge of bed  -CW     Static Standing Balance maximum assist;2-person assist  -CW     Dynamic Standing Balance maximum assist;2-person assist  -CW     Position/Device Used, Standing Balance supported;other (see comments)  HHA  -CW     Comment, Balance posterior lean with sitting and standing activities  -CW               User Key  (r) = Recorded By, (t) = Taken By, (c) = Cosigned By      Initials Name Provider Type    Nubia Gilbert PT Physical Therapist                   Goals/Plan    No documentation.                  Clinical Impression       Row Name 03/04/24 1350          Pain    Pretreatment Pain Rating 5/10  -CW     Posttreatment Pain Rating 5/10  -CW     Pain Location - Side/Orientation Left  -CW     Pain Location upper  -CW     Pain Location - extremity  -CW     Pre/Posttreatment Pain Comment reports L forearm pain,  describes as sharp pain  -CW     Pain Intervention(s) Repositioned;Ambulation/increased activity;Rest  -CW       Row Name 03/04/24 1350          Plan of Care Review    Plan of Care Reviewed With patient  -CW     Outcome Evaluation Pt is a 80 yo female seen for PT eval this PM. Pt admit with progressive generalized weakness and acute fever. Pt with hx of lung cancer with metastases to the brain, COPD and CKD. Pt reports living with her niece and typically is ambulatory without AD. Per chart review, pt was hospitalized last month and chemo has been held for a few weeks in order for her to regain her strength, however she has become weaker. Today, pt demo significant LE weakness, impaired balance, decreased activity tolerance and general functional mobility deficits below her baseline. Pt required mod A for bed mobility, Mod/max A x2 for STS and max A x2 for standing balance. Pt demo strong posterior lean in sitting and standing, would  be unsafe to t/f to BSC due to sitting balance deficits. PT strongly recommending d/c to SNF to address mobility deficits as she is a high falls risk and below her baseline level of function.  -CW       Row Name 03/04/24 1350          Therapy Assessment/Plan (PT)    Rehab Potential (PT) good, to achieve stated therapy goals  -CW     Criteria for Skilled Interventions Met (PT) yes  -CW     Therapy Frequency (PT) 5 times/wk  -CW       Row Name 03/04/24 1350          Vital Signs    O2 Delivery Pre Treatment room air  -CW     O2 Delivery Intra Treatment room air  -CW       Row Name 03/04/24 1350          Positioning and Restraints    Pre-Treatment Position in bed  -CW     Post Treatment Position bed  -CW     In Bed notified nsg;call light within reach;encouraged to call for assist;exit alarm on;fowlers  -CW               User Key  (r) = Recorded By, (t) = Taken By, (c) = Cosigned By      Initials Name Provider Type    Nubia Gilbert, PT Physical Therapist                   Outcome  Measures       Row Name 03/04/24 1346          How much help from another person do you currently need...    Turning from your back to your side while in flat bed without using bedrails? 3  -CW     Moving from lying on back to sitting on the side of a flat bed without bedrails? 2  -CW     Moving to and from a bed to a chair (including a wheelchair)? 2  -CW     Standing up from a chair using your arms (e.g., wheelchair, bedside chair)? 2  -CW     Climbing 3-5 steps with a railing? 1  -CW     To walk in hospital room? 1  -CW     AM-PAC 6 Clicks Score (PT) 11  -CW     Highest Level of Mobility Goal 4 --> Transfer to chair/commode  -CW       Row Name 03/04/24 1346          Functional Assessment    Outcome Measure Options AM-PAC 6 Clicks Basic Mobility (PT)  -CW               User Key  (r) = Recorded By, (t) = Taken By, (c) = Cosigned By      Initials Name Provider Type    CW Nubia Cesar PT Physical Therapist                                 Physical Therapy Education       Title: PT OT SLP Therapies (In Progress)       Topic: Physical Therapy (In Progress)       Point: Mobility training (Done)       Learning Progress Summary             Patient Acceptance, E, VU,NR by CW at 3/4/2024 1347                         Point: Home exercise program (Not Started)       Learner Progress:  Not documented in this visit.              Point: Body mechanics (Done)       Learning Progress Summary             Patient Acceptance, E, VU,NR by CW at 3/4/2024 1347                         Point: Precautions (Done)       Learning Progress Summary             Patient Acceptance, E, VU,NR by CW at 3/4/2024 1347                                         User Key       Initials Effective Dates Name Provider Type Discipline     12/13/22 -  Nubia Cesar PT Physical Therapist PT                  PT Recommendation and Plan     Plan of Care Reviewed With: patient  Outcome Evaluation: Pt is a 80 yo female seen for PT oliviaal this PM. Pt admit  with progressive generalized weakness and acute fever. Pt with hx of lung cancer with metastases to the brain, COPD and CKD. Pt reports living with her niece and typically is ambulatory without AD. Per chart review, pt was hospitalized last month and chemo has been held for a few weeks in order for her to regain her strength, however she has become weaker. Today, pt demo significant LE weakness, impaired balance, decreased activity tolerance and general functional mobility deficits below her baseline. Pt required mod A for bed mobility, Mod/max A x2 for STS and max A x2 for standing balance. Pt demo strong posterior lean in sitting and standing, would  be unsafe to t/f to BSC due to sitting balance deficits. PT strongly recommending d/c to SNF to address mobility deficits as she is a high falls risk and below her baseline level of function.     Time Calculation:         PT Charges       Row Name 03/04/24 1344             Time Calculation    Start Time 1315  -CW      Stop Time 1339  -CW      Time Calculation (min) 24 min  -CW      PT Received On 03/04/24  -CW      PT - Next Appointment 03/05/24  -CW      PT Goal Re-Cert Due Date 03/18/24  -CW         Time Calculation- PT    Total Timed Code Minutes- PT 15 minute(s)  -CW         Timed Charges    48403 - PT Therapeutic Activity Minutes 15  -CW         Total Minutes    Timed Charges Total Minutes 15  -CW       Total Minutes 15  -CW                User Key  (r) = Recorded By, (t) = Taken By, (c) = Cosigned By      Initials Name Provider Type    CW Nubia Cesar, PT Physical Therapist                  Therapy Charges for Today       Code Description Service Date Service Provider Modifiers Qty    61625394205 HC PT EVAL MOD COMPLEXITY 3 3/4/2024 Nubia Cesar, PT GP 1    [id number]  PT THERAPEUTIC ACT EA 15 MIN 3/4/2024 Nubia Cesar, PT GP 1            PT G-Codes  Outcome Measure Options: AM-PAC 6 Clicks Basic Mobility (PT)  AM-PAC 6 Clicks Score (PT):  11  PT Discharge Summary  Anticipated Discharge Disposition (PT): skilled nursing facility    Nubia Cesar, PT  3/4/2024

## 2024-03-04 NOTE — PLAN OF CARE
Goal Outcome Evaluation:  Plan of Care Reviewed With: patient           Outcome Evaluation: Patient is a 79 year old female admitted to [facility] with progressive generalized weakness and fever. Patient has hx of lung cancer with mets to brain, COPD, and CKD. Patient reports living with niece, was able to perform self care and functional mobility without AD at home. Patient was also hospitalized last month folloing chemo treatment. Patient presents today with severe generalized weakness, unable to sit EOB unsupported, and deconditioning which hinders her ability to participate in ADLs, or functional mobility. Patient has posterior lean in sitting and standing, requiring mod A in sitting unsupported, and max Ax2 to maintain standing balance, unable to perform stand pivot transfer today. Patient also requires max A with LB dressing. Patient reports that she does not want to go to rehab and has family who can support her at home, however patient requires max assist of 2 for standing, functional transfers, toileting, LB dressing today and unsure if family is equipped to care for patient at her current functional level. Patient will continue to beenfit from skilled OT to address current functional deficits, recommend SNF at [facility].      Anticipated Discharge Disposition (OT): skilled nursing facility

## 2024-03-05 LAB
ALBUMIN SERPL-MCNC: 2.5 G/DL (ref 3.5–5.2)
ALBUMIN/GLOB SERPL: 0.8 G/DL
ALP SERPL-CCNC: 163 U/L (ref 39–117)
ALT SERPL W P-5'-P-CCNC: 16 U/L (ref 1–33)
ANION GAP SERPL CALCULATED.3IONS-SCNC: 9.9 MMOL/L (ref 5–15)
AST SERPL-CCNC: 14 U/L (ref 1–32)
BILIRUB SERPL-MCNC: 0.2 MG/DL (ref 0–1.2)
BUN SERPL-MCNC: 19 MG/DL (ref 8–23)
BUN/CREAT SERPL: 19 (ref 7–25)
CALCIUM SPEC-SCNC: 8.4 MG/DL (ref 8.6–10.5)
CHLORIDE SERPL-SCNC: 102 MMOL/L (ref 98–107)
CO2 SERPL-SCNC: 23.1 MMOL/L (ref 22–29)
CREAT SERPL-MCNC: 1 MG/DL (ref 0.57–1)
DEPRECATED RDW RBC AUTO: 46.3 FL (ref 37–54)
EGFRCR SERPLBLD CKD-EPI 2021: 58.1 ML/MIN/1.73
ERYTHROCYTE [DISTWIDTH] IN BLOOD BY AUTOMATED COUNT: 15.4 % (ref 12.3–15.4)
GLOBULIN UR ELPH-MCNC: 3 GM/DL
GLUCOSE SERPL-MCNC: 175 MG/DL (ref 65–99)
HCT VFR BLD AUTO: 26 % (ref 34–46.6)
HGB BLD-MCNC: 8.3 G/DL (ref 12–15.9)
HYPOCHROMIA BLD QL: ABNORMAL
LYMPHOCYTES # BLD MANUAL: 0.19 10*3/MM3 (ref 0.7–3.1)
LYMPHOCYTES NFR BLD MANUAL: 9 % (ref 5–12)
MCH RBC QN AUTO: 27.1 PG (ref 26.6–33)
MCHC RBC AUTO-ENTMCNC: 31.9 G/DL (ref 31.5–35.7)
MCV RBC AUTO: 85 FL (ref 79–97)
MONOCYTES # BLD: 1.67 10*3/MM3 (ref 0.1–0.9)
NEUTROPHILS # BLD AUTO: 16.71 10*3/MM3 (ref 1.7–7)
NEUTROPHILS NFR BLD MANUAL: 90 % (ref 42.7–76)
OVALOCYTES BLD QL SMEAR: ABNORMAL
PLAT MORPH BLD: NORMAL
PLATELET # BLD AUTO: 304 10*3/MM3 (ref 140–450)
PMV BLD AUTO: 10.8 FL (ref 6–12)
POTASSIUM SERPL-SCNC: 4.2 MMOL/L (ref 3.5–5.2)
PROT SERPL-MCNC: 5.5 G/DL (ref 6–8.5)
RBC # BLD AUTO: 3.06 10*6/MM3 (ref 3.77–5.28)
SODIUM SERPL-SCNC: 135 MMOL/L (ref 136–145)
VARIANT LYMPHS NFR BLD MANUAL: 1 % (ref 19.6–45.3)
WBC MORPH BLD: NORMAL
WBC NRBC COR # BLD AUTO: 18.57 10*3/MM3 (ref 3.4–10.8)

## 2024-03-05 PROCEDURE — 97530 THERAPEUTIC ACTIVITIES: CPT

## 2024-03-05 PROCEDURE — 99232 SBSQ HOSP IP/OBS MODERATE 35: CPT | Performed by: INTERNAL MEDICINE

## 2024-03-05 PROCEDURE — 63710000001 PREDNISONE PER 5 MG: Performed by: STUDENT IN AN ORGANIZED HEALTH CARE EDUCATION/TRAINING PROGRAM

## 2024-03-05 PROCEDURE — 25010000002 CEFTRIAXONE PER 250 MG: Performed by: STUDENT IN AN ORGANIZED HEALTH CARE EDUCATION/TRAINING PROGRAM

## 2024-03-05 PROCEDURE — 85027 COMPLETE CBC AUTOMATED: CPT | Performed by: INTERNAL MEDICINE

## 2024-03-05 PROCEDURE — 85007 BL SMEAR W/DIFF WBC COUNT: CPT | Performed by: INTERNAL MEDICINE

## 2024-03-05 PROCEDURE — 80053 COMPREHEN METABOLIC PANEL: CPT | Performed by: INTERNAL MEDICINE

## 2024-03-05 PROCEDURE — 97112 NEUROMUSCULAR REEDUCATION: CPT

## 2024-03-05 PROCEDURE — 99232 SBSQ HOSP IP/OBS MODERATE 35: CPT | Performed by: STUDENT IN AN ORGANIZED HEALTH CARE EDUCATION/TRAINING PROGRAM

## 2024-03-05 PROCEDURE — 25810000003 LACTATED RINGERS PER 1000 ML: Performed by: STUDENT IN AN ORGANIZED HEALTH CARE EDUCATION/TRAINING PROGRAM

## 2024-03-05 RX ADMIN — LEVETIRACETAM 500 MG: 500 TABLET, FILM COATED ORAL at 08:28

## 2024-03-05 RX ADMIN — LEVETIRACETAM 500 MG: 500 TABLET, FILM COATED ORAL at 20:18

## 2024-03-05 RX ADMIN — ESTRADIOL 1 MG: 1 TABLET ORAL at 08:28

## 2024-03-05 RX ADMIN — PREDNISONE 10 MG: 10 TABLET ORAL at 08:28

## 2024-03-05 RX ADMIN — FERROUS SULFATE TAB 325 MG (65 MG ELEMENTAL FE) 325 MG: 325 (65 FE) TAB at 08:28

## 2024-03-05 RX ADMIN — SODIUM CHLORIDE, POTASSIUM CHLORIDE, SODIUM LACTATE AND CALCIUM CHLORIDE 125 ML/HR: 600; 310; 30; 20 INJECTION, SOLUTION INTRAVENOUS at 06:30

## 2024-03-05 RX ADMIN — HYDROCODONE BITARTRATE AND ACETAMINOPHEN 1 TABLET: 7.5; 325 TABLET ORAL at 03:39

## 2024-03-05 RX ADMIN — PREGABALIN 150 MG: 75 CAPSULE ORAL at 08:28

## 2024-03-05 RX ADMIN — OFLOXACIN 4 DROP: 3 SOLUTION OPHTHALMIC at 08:28

## 2024-03-05 RX ADMIN — CLOPIDOGREL BISULFATE 75 MG: 75 TABLET, FILM COATED ORAL at 08:28

## 2024-03-05 RX ADMIN — CEFTRIAXONE 2000 MG: 2 INJECTION, POWDER, FOR SOLUTION INTRAMUSCULAR; INTRAVENOUS at 17:10

## 2024-03-05 RX ADMIN — PREGABALIN 150 MG: 75 CAPSULE ORAL at 20:18

## 2024-03-05 RX ADMIN — ATORVASTATIN CALCIUM 20 MG: 20 TABLET, FILM COATED ORAL at 20:18

## 2024-03-05 RX ADMIN — PANTOPRAZOLE SODIUM 40 MG: 40 TABLET, DELAYED RELEASE ORAL at 06:29

## 2024-03-05 RX ADMIN — SODIUM CHLORIDE, POTASSIUM CHLORIDE, SODIUM LACTATE AND CALCIUM CHLORIDE 125 ML/HR: 600; 310; 30; 20 INJECTION, SOLUTION INTRAVENOUS at 15:09

## 2024-03-05 NOTE — PLAN OF CARE
Goal Outcome Evaluation:  Plan of Care Reviewed With: patient           Outcome Evaluation: Pt participated with PT this morning. Continues to demo sitting and standing balance deficits. Improved standing balance today requiring mod A x2 for STS and to maintain standing posture. Mod A required for sitting balance with more pronounced R lateral lean with sitting today. Pt is aware of balance deficits  but is unable to correct without assist. PT recommending SNF.      Anticipated Discharge Disposition (PT): skilled nursing facility

## 2024-03-05 NOTE — PLAN OF CARE
Goal Outcome Evaluation:  Plan of Care Reviewed With: patient        Progress: improving  Outcome Evaluation: Patient more able to tolerate therapeutic intervention today, more alert and able to follow commands, but does continue to require support with sitting and standing balance today. Patient reports she has been eating better, and is feeling a bit better. Patient performed dynamic sitting balance exercises requires cues for positioning and mod A for sitting upright. Patient performed sit to stand transfer x3, coming to stand at rwx with mod Ax2, and requires mod Ax2 to maintain static standing balance. Patient will continue to benefit from skilled OT to address current functional deficits, anticipate need for SNF at acute [place], though patient remains adamant that she plans to return home.      Anticipated Discharge Disposition (OT): skilled nursing facility

## 2024-03-05 NOTE — PROGRESS NOTES
Name: Pam Vidal ADMIT: 3/3/2024   : 1945  PCP: Nik Mckay MD    MRN: [id number] LOS: 2 days   AGE/SEX: 79 y.o. female  ROOM: [facility]     Subjective   Subjective   Resting in bed, no new complaints today. Still complaining of fatigue/feeling poorly.        Objective   Objective   Vital Signs  Temp:  [97.5 °F (36.4 °C)-98.4 °F (36.9 °C)] 98.1 °F (36.7 °C)  Heart Rate:  [78-93] 78  Resp:  [18] 18  BP: (112-132)/(49-58) 126/54  SpO2:  [94 %-96 %] 94 %  on   ;   Device (Oxygen Therapy): room air  Body mass index is 25.7 kg/m².  Physical Exam  Constitutional:       General: She is not in acute distress.     Appearance: She is ill-appearing.   Cardiovascular:      Rate and Rhythm: Normal rate and regular rhythm.   Pulmonary:      Effort: Pulmonary effort is normal. No respiratory distress.      Breath sounds: No wheezing.      Comments: Decreased breath sounds bilterally  Abdominal:      General: Abdomen is flat.      Palpations: Abdomen is soft.      Tenderness: There is no abdominal tenderness.   Musculoskeletal:         General: No swelling or tenderness.      Right lower leg: No edema.      Left lower leg: No edema.   Skin:     General: Skin is warm and dry.   Neurological:      General: No focal deficit present.      Mental Status: She is alert and oriented to person, place, and time. Mental status is at baseline.         Results Review     I reviewed the patient's new clinical results.  Results from last 7 days   Lab Units 24  0959 24  0254 24  1622   WBC 10*3/mm3 18.57* 21.55* 23.60*   HEMOGLOBIN g/dL 8.3* 9.1* 9.7*   PLATELETS 10*3/mm3 304 304 316     Results from last 7 days   Lab Units 24  0959 24  0254 24  1622   SODIUM mmol/L 135* 137 133*   POTASSIUM mmol/L 4.2 4.3 4.7   CHLORIDE mmol/L 102 102 99   CO2 mmol/L 23.1 22.9 19.0*   BUN mg/dL 19 27* 31*   CREATININE mg/dL 1.00 1.16* 1.51*   GLUCOSE mg/dL 175* 189* 105*   Estimated Creatinine Clearance:  "38.1 mL/min (by C-G formula based on SCr of 1 mg/dL).  Results from last 7 days   Lab Units 03/05/24  0959 03/03/24  1622   ALBUMIN g/dL 2.5* 3.0*   BILIRUBIN mg/dL 0.2 0.5   ALK PHOS U/L 163* 289*   AST (SGOT) U/L 14 25   ALT (SGPT) U/L 16 26     Results from last 7 days   Lab Units 03/05/24  0959 03/04/24  0254 03/03/24  1622   CALCIUM mg/dL 8.4* 8.5* 9.2   ALBUMIN g/dL 2.5*  --  3.0*     Results from last 7 days   Lab Units 03/03/24  1622   PROCALCITONIN ng/mL 6.40*   LACTATE mmol/L 0.7     COVID19   Date Value Ref Range Status   03/03/2024 Not Detected Not Detected - Ref. Range Final   08/26/2023 Not Detected Not Detected - Ref. Range Final     No results found for: \"HGBA1C\", \"POCGLU\"    CT Angiogram Chest Pulmonary Embolism, CT Abdomen Pelvis With Contrast     CT ANGIOGRAM OF THE CHEST. MULTIPLE CORONAL, SAGITTAL, AND 3-D  RECONSTRUCTIONS. CT ABDOMEN AND PELVIS WITH IV CONTRAST     HISTORY: 79-year-old female with cough and fever. Hemoptysis. Abdominal  pain. Metastatic breast cancer. Brain metastases.     TECHNIQUE: Radiation dose reduction techniques were utilized, including  automated exposure control and exposure modulation based on body size.  CT angiogram of the chest was performed. Multiple coronal, sagittal, and  3-D reconstruction images were obtained. Subsequently, 3 mm images were  obtained through the abdomen and pelvis after the administration of IV  contrast. Compared with CT abdomen and pelvis 02/17/2024 and chest CT  01/05/2024.     FINDINGS:     CHEST CTA:  1. There is no convincing evidence for pulmonary thromboemboli.     2. There has been significant increase in size of the centrally cavitary  masslike consolidation pleural-based at the posterior medial aspect of  the right upper lobe measuring 6.8 x 4.4 cm, previously approximately  4.7 x 3.3 cm. The centrally liquefied/necrotic region containing air  measures 4.0 x 3.3 cm, previously smaller. There is more prominent  abnormal soft tissue " encasing the right hilum, but there is no  mediastinal or left hilar lymphadenopathy. No significant change in the  very mild pleural thickening at the posterior aspect of the right upper  lobe.     3. There are no pleural or pericardial effusions. There is less  atelectasis at the left lung base, currently mild.     ABDOMEN/PELVIS:  1. There is a subtle striated enhancement pattern of the right kidney  and there is more prominent right perinephric stranding than on the  left. Urinary bladder wall appears thickened, but the bladder is nearly  collapsed. Acute right pyelonephritis is suspected and pyelonephritis on  the left cannot be excluded.     2. No significant change is seen in the right renal cysts. The largest  measures 5.5 cm and has internal density measurements of 36 Hounsfield  units. Likely proteinaceous cysts and conservative surveillance is  recommended.     3. There is no acute abnormality at the liver, spleen, pancreas, or  adrenals. There is no acute bowel abnormality. There is moderate sigmoid  diverticulosis without evidence for diverticulitis. The appendix is  surgically absent. Uterus is surgically absent. No lymphadenopathy.     This report was finalized on 3/4/2024 7:22 AM by Dr. Rianna Meeks M.D on  Workstation: [id number]       Scheduled Medications  atorvastatin, 20 mg, Oral, Nightly  cefTRIAXone, 2,000 mg, Intravenous, Q24H  clopidogrel, 75 mg, Oral, Daily  estradiol, 1 mg, Oral, Daily  ferrous sulfate, 325 mg, Oral, Daily With Breakfast  Gabapentin 10 %, Baclofen 2 %, lidocaine 4 %, Ketamine HCl 4 %, , Topical, 4x Daily  lactated ringers, 500 mL, Intravenous, Once  levETIRAcetam, 500 mg, Oral, BID  [Held by provider] lisinopril, 20 mg, Oral, Q24H  ofloxacin, 4 drop, Both Eyes, Daily  pantoprazole, 40 mg, Oral, Q AM  predniSONE, 10 mg, Oral, Daily  pregabalin, 150 mg, Oral, Q12H    Infusions  lactated ringers, 125 mL/hr, Last Rate: 125 mL/hr (03/05/24 0630)    Diet  Diet: Regular/House;  Fluid Consistency: Thin (IDDSI 0)         I have personally reviewed:  [x]  Laboratory   [x]  Microbiology   [x]  Radiology   [x]  EKG/Telemetry   []  Cardiology/Vascular   []  Pathology   []  Records    Assessment/Plan     Active Hospital Problems    Diagnosis  POA    **Acute pyelonephritis [N10]  Yes    ZACH (acute kidney injury) [N17.9]  Yes    HTN (hypertension) [I10]  Yes    COPD (chronic obstructive pulmonary disease) [J44.9]  Yes    Rheumatoid arthritis [M06.9]  Yes    Hyperlipidemia [E78.5]  Yes    Leukocytosis [D72.829]  Yes    Lung cancer metastatic to brain [C34.90, C79.31]  Yes    Neoplastic malignant related fatigue [R53.0]  Yes    Primary lung adenocarcinoma, right [C34.91]  Yes      Resolved Hospital Problems   No resolved problems to display.     Ms. Vidal is a 79 y.o. female with NSCLC (metastatic to brain, status post resection and XRT), HTN, HLD, RA, CAD presenting with abdominal pain, found to have UTI/pyelonephritis.     E coli septicemia  Right sided pyelonephritis  Leukocytosis  -CT PE/A/P with no PE; increase in size of cavitary masslike consolidation; suspected pyelonephritis on the right, cannot be excluded on the left  -WBC 23; procal 6.4  - blood cx + for e coli, ID consulted; likely  source but with port in place- also should be considered  - repeat blood cx remain negative; sensitivities pending    ZACH- resolved  -Crt 1.51 OA (b/l ~1)  -Likely prerenal  -IVF  - improving to 1 this AM     Adenocarcinoma of RUL, metastatic to brain  -follows with Dr Lagunas  -s/p resection of brain mass 5/23  -oncology consulted, CT noted with increase in right lung mass     RA  -prednisone 10mg daily     HLD  -Statin     Chronic pain  -Resume home Norco  -change lyrica back to home dose    SCDs for DVT prophylaxis.  Full code.  Discussed with patient and RN  Anticipate discharge home with family timing yet to be determined.      Lisette Browning MD  [facility]  03/05/24  12:48  EST    I wore protective equipment throughout this patient encounter including gloves.  Hand hygiene was performed before donning protective equipment and after removal when leaving the room.    Expected Discharge Date and Time       Expected Discharge Date Expected Discharge Time    Mar 7, 2024              Copied text in this note has been reviewed and is accurate as of 03/05/24.

## 2024-03-05 NOTE — PROGRESS NOTES
LOS: 2 days     Chief Complaint: Bacteremia    Interval History: Patient reports he is feeling much better today.  Denies any fevers or chills.  Tolerating antibiotics.    Vital Signs  Temp:  [97.5 °F (36.4 °C)-98.4 °F (36.9 °C)] 98.1 °F (36.7 °C)  Heart Rate:  [78-93] 78  Resp:  [18] 18  BP: (112-132)/(49-58) 126/54    Physical Exam:  General: In no acute distress  HEENT: Oropharynx clear, moist mucous membranes  Respiratory: Normal work of breathing  GI: Soft, nondistended  Extremities: No edema, cyanosis  Access: Peripheral IV    Antibiotics:  Anti-Infectives (From admission, onward)      Ordered     Dose/Rate Route Frequency Start Stop    03/03/24 1930  cefTRIAXone (ROCEPHIN) 2,000 mg in sodium chloride 0.9 % 100 mL IVPB-VTB        Ordering Provider: Simone Nelson MD    2,000 mg  200 mL/hr over 30 Minutes Intravenous Every 24 Hours 03/04/24 1800 03/10/24 1759    03/03/24 1906  cefTRIAXone (ROCEPHIN) 2,000 mg in sodium chloride 0.9 % 100 mL IVPB-VTB        Ordering Provider: Simone Kitchen MD    2,000 mg  200 mL/hr over 30 Minutes Intravenous Once 03/03/24 1922 03/03/24 1946             Results Review:     I reviewed the patient's new clinical results.    Lab Results   Component Value Date    WBC 21.55 (H) 03/04/2024    HGB 9.1 (L) 03/04/2024    HCT 28.5 (L) 03/04/2024    MCV 88.2 03/04/2024     03/04/2024     Lab Results   Component Value Date    GLUCOSE 189 (H) 03/04/2024    BUN 27 (H) 03/04/2024    CREATININE 1.16 (H) 03/04/2024    BCR 23.3 03/04/2024    CO2 22.9 03/04/2024    CALCIUM 8.5 (L) 03/04/2024    ALBUMIN 3.0 (L) 03/03/2024    AST 25 03/03/2024    ALT 26 03/03/2024       Microbiology:  3/3 blood cultures 1 out of 2 from the central line positive for E. coli  3/3 respiratory panel negative  3/3 urine culture greater than 100,000 gram-negative bacilli  3/4 blood cultures in process     Radiology:  3/3 CT chest abdomen pelvis report reviewed.  Right upper lobe cavitary masslike  consolidation at the area of prior metastasis.  Right kidney with prominent right perinephric stranding concerning for acute right pyelonephritis.    Assessment    # E. coli septicemia  #Possible right pyelonephritis  #Chest port in place  #Non-small cell lung cancer status post resection and radiation with brain metastasis  #ZACH  #Rheumatoid arthritis on chronic prednisone 10 mg daily    Urine culture with gram-negative bacilli.  Repeat blood cultures pending.  Suspect urinary source of her bacteremia.  Continue ceftriaxone 2 g daily and follow-up susceptibilities.  Will likely need course for pyelonephritis with final duration and agent based on susceptibilities.    ID will follow.

## 2024-03-05 NOTE — THERAPY TREATMENT NOTE
Patient Name: Pam Vidal  : 1945    MRN: [id number]                              Today's Date: 3/5/2024       Admit Date: 3/3/2024    Visit Dx:     ICD-10-CM ICD-9-CM   1. Acute pyelonephritis  N10 590.10   2. SIRS (systemic inflammatory response syndrome)  R65.10 995.90   3. Primary malignant neoplasm of right lung metastatic to other site  C34.91 162.9   4. Acute kidney injury (ZACH) with acute tubular necrosis (ATN)  N17.0 584.5   5. Leukocytosis, unspecified type  D72.829 288.60   6. Acute on chronic anemia  D64.9 285.9     Patient Active Problem List   Diagnosis    Primary lung adenocarcinoma, right    Cough with hemoptysis    Lung mass    Advanced care planning/counseling discussion    High risk medication use    Muscular deconditioning    Neoplastic malignant related fatigue    Lung cancer metastatic to brain    Leukocytosis    Brain mass    COPD (chronic obstructive pulmonary disease)    Rheumatoid arthritis    IBS (irritable bowel syndrome)    Hyperlipidemia    Abnormal CT of the chest    Shingles, left arm    HTN (hypertension)    Dizziness    Campylobacter diarrhea    Bacterial colitis    Diarrhea    Neuropathic pain    VBI (vertebrobasilar insufficiency)    Atypical pneumonia    Post herpetic neuralgia    Chronic pain after cancer treatment    Left arm pain    Gastroenteritis due to sapovirus    Stage 3a chronic kidney disease (CKD)    ZACH (acute kidney injury)    Acute pyelonephritis     Past Medical History:   Diagnosis Date    COPD (chronic obstructive pulmonary disease)     Coughing up blood     X2 MONTHS    History of COVID-19 2022    Hyperlipidemia     IBS (irritable bowel syndrome)     Primary lung adenocarcinoma, right     Rheumatoid arthritis      Past Surgical History:   Procedure Laterality Date    APPENDECTOMY      appendix removed    BRONCHOSCOPY N/A 2022    Procedure: BRONCHOSCOPY WITH BAL,  BIOPSIES, AND BRUSHINGS WITH ENDOBRONCHIAL ULTRASOUND WITH FNA;  Surgeon:  Gregor Vee MD;  Location: [facility] ENDOSCOPY;  Service: Pulmonary;  Laterality: N/A;  PRE- HILAR MASS  POST- SAME    BRONCHOSCOPY N/A 1/4/2023    Procedure: BRONCHOSCOPY with biopsy, lavage, brushing;  Surgeon: Gregor Vee MD;  Location: [facility] ENDOSCOPY;  Service: Pulmonary;  Laterality: N/A;    CAROTID ENDARTERECTOMY Right     CAROTID ENDARTERECTOMY Left     CATARACT EXTRACTION      CERVICAL FUSION      CHOLECYSTECTOMY      CRANIOTOMY FOR TUMOR Right 5/30/2023    Procedure: RIGHT CRANIOTOMY FOR TUMOR RESECTION STEREOTACTIC WITH STEALTH;  Surgeon: Clint Ricketts MD;  Location: [facility] MAIN OR;  Service: Neurosurgery;  Laterality: Right;    HYSTERECTOMY      SHOULDER ARTHROSCOPY Right 02/19/2019    right should scope/cuff repair     VENOUS ACCESS DEVICE (PORT) INSERTION Right 9/6/2022    Procedure: POWERPORT INSERTION;  Surgeon: Remedios Rice MD;  Location: [facility] MAIN OR;  Service: Thoracic;  Laterality: Right;      General Information       Row Name 03/05/24 1135          Physical Therapy Time and Intention    Document Type therapy note (daily note)  -CW     Mode of Treatment co-treatment;occupational therapy;physical therapy;other (see comments)  -CW       Row Name 03/05/24 1135          General Information    Existing Precautions/Restrictions fall  -CW       Row Name 03/05/24 1135          Cognition    Orientation Status (Cognition) oriented x 3  -CW       Row Name 03/05/24 1135          Safety Issues, Functional Mobility    Impairments Affecting Function (Mobility) balance;endurance/activity tolerance;strength;postural/trunk control;pain  -CW     Comment, Safety Issues/Impairments (Mobility) Co treatment medically appropriate and necessary due to patient acuity level, activity tolerance and safety of patient and staff. Treatment is focusing on progression of care and goals established in the POC.  -CW               User Key  (r) = Recorded By, (t) = Taken By, (c) = Cosigned By      Initials Name  Provider Type    Nubia Gilbert PT Physical Therapist                   Mobility       Row Name 03/05/24 1135          Bed Mobility    Bed Mobility supine-sit;sit-supine  -CW     Supine-Sit [place] (Bed Mobility) minimum assist (75% patient effort);verbal cues  -CW     Sit-Supine [place] (Bed Mobility) verbal cues;moderate assist (50% patient effort);2 person assist  -CW     Assistive Device (Bed Mobility) bed rails;head of bed elevated  -CW     Comment, (Bed Mobility) R lateral and posterior lean sitting EOB, mod A required, encouraged to put UE on walker to assist with sitting balance  -CW       Row Name 03/05/24 1135          Sit-Stand Transfer    Sit-Stand [place] (Transfers) verbal cues;moderate assist (50% patient effort);2 person assist  -CW     Assistive Device (Sit-Stand Transfers) walker, front-wheeled  -CW     Comment, (Sit-Stand Transfer) STS x3  -CW       Row Name 03/05/24 1135          Gait/Stairs (Locomotion)    [place] Level (Gait) moderate assist (50% patient effort);2 person assist;verbal cues  -CW     Assistive Device (Gait) walker, front-wheeled  -CW     Distance in Feet (Gait) 3 lateral steps towards HOB  -CW     Deviations/Abnormal Patterns (Gait) mariia decreased;gait speed decreased;stride length decreased  -CW     Bilateral Gait Deviations forward flexed posture  -CW               User Key  (r) = Recorded By, (t) = Taken By, (c) = Cosigned By      Initials Name Provider Type    Nubia Gilbert PT Physical Therapist                   Obj/Interventions       Row Name 03/05/24 1138          Balance    Balance Assessment standing static balance;standing dynamic balance  -CW     Static Standing Balance moderate assist;2-person assist  -CW     Dynamic Standing Balance moderate assist;2-person assist  -CW     Comment, Balance posterior lean in standing  -CW               User Key  (r) = Recorded By, (t) = Taken By, (c) = Cosigned By      Initials Name Provider Type     Nubia Gilbert, IRIS Physical Therapist                   Goals/Plan    No documentation.                  Clinical Impression       Row Name 03/05/24 1139          Pain    Pretreatment Pain Rating 5/10  -CW     Posttreatment Pain Rating 5/10  -CW     Pain Location - Side/Orientation Left  -CW     Pain Location upper  -CW     Pain Location - extremity  -CW     Pain Intervention(s) Repositioned;Rest;Ambulation/increased activity  -CW       Row Name 03/05/24 1139          Plan of Care Review    Plan of Care Reviewed With patient  -CW     Outcome Evaluation Pt participated with PT this morning. Continues to demo sitting and standing balance deficits. Improved standing balance today requiring mod A x2 for STS and to maintain standing posture. Mod A required for sitting balance with more pronounced R lateral lean with sitting today. Pt is aware of balance deficits  but is unable to correct without assist. PT recommending SNF.  -CW       Row Name 03/05/24 1139          Vital Signs    O2 Delivery Pre Treatment room air  -CW       Row Name 03/05/24 1139          Positioning and Restraints    Pre-Treatment Position in bed  -CW     Post Treatment Position bed  -CW     In Bed encouraged to call for assist;notified nsg;call light within reach;exit alarm on;fowlers  -CW               User Key  (r) = Recorded By, (t) = Taken By, (c) = Cosigned By      Initials Name Provider Type    CW Nubia Cesar PT Physical Therapist                   Outcome Measures       Row Name 03/05/24 1142          How much help from another person do you currently need...    Turning from your back to your side while in flat bed without using bedrails? 3  -CW     Moving from lying on back to sitting on the side of a flat bed without bedrails? 2  -CW     Moving to and from a bed to a chair (including a wheelchair)? 2  -CW     Standing up from a chair using your arms (e.g., wheelchair, bedside chair)? 2  -CW     Climbing 3-5 steps with a  railing? 1  -CW     To walk in hospital room? 2  -CW     AM-PAC 6 Clicks Score (PT) 12  -CW     Highest Level of Mobility Goal 4 --> Transfer to chair/commode  -CW       Row Name 03/05/24 1142          Functional Assessment    Outcome Measure Options AM-PAC 6 Clicks Basic Mobility (PT)  -CW               User Key  (r) = Recorded By, (t) = Taken By, (c) = Cosigned By      Initials Name Provider Type    CW Nubia Cesar PT Physical Therapist                                 Physical Therapy Education       Title: PT OT SLP Therapies (In Progress)       Topic: Physical Therapy (In Progress)       Point: Mobility training (Done)       Learning Progress Summary             Patient Acceptance, E, VU,NR by CW at 3/5/2024 1142    Acceptance, E, VU,NR by CW at 3/4/2024 1347                         Point: Home exercise program (Not Started)       Learner Progress:  Not documented in this visit.              Point: Body mechanics (Done)       Learning Progress Summary             Patient Acceptance, E, VU,NR by CW at 3/5/2024 1142    Acceptance, E, VU,NR by CW at 3/4/2024 1347                         Point: Precautions (Done)       Learning Progress Summary             Patient Acceptance, E, VU,NR by CW at 3/4/2024 1347                                         User Key       Initials Effective Dates Name Provider Type Discipline     12/13/22 -  Nubia Cesar PT Physical Therapist PT                  PT Recommendation and Plan     Plan of Care Reviewed With: patient  Outcome Evaluation: Pt participated with PT this morning. Continues to demo sitting and standing balance deficits. Improved standing balance today requiring mod A x2 for STS and to maintain standing posture. Mod A required for sitting balance with more pronounced R lateral lean with sitting today. Pt is aware of balance deficits  but is unable to correct without assist. PT recommending SNF.     Time Calculation:         PT Charges       Row Name 03/05/24  1135             Time Calculation    Start Time 1034  -CW      Stop Time 1050  -CW      Time Calculation (min) 16 min  -CW      PT Received On 03/05/24  -CW      PT - Next Appointment 03/06/24  -CW                User Key  (r) = Recorded By, (t) = Taken By, (c) = Cosigned By      Initials Name Provider Type    CW Nubia Cesar, PT Physical Therapist                  Therapy Charges for Today       Code Description Service Date Service Provider Modifiers Qty    13165229942 HC PT EVAL MOD COMPLEXITY 3 3/4/2024 Nubia Cesar, PT GP 1    79898049564 HC PT THERAPEUTIC ACT EA 15 MIN 3/4/2024 Nubia Cesar, PT GP 1    56186452615 HC PT THERAPEUTIC ACT EA 15 MIN 3/5/2024 Nubia Cesar, PT GP 1            PT G-Codes  Outcome Measure Options: AM-PAC 6 Clicks Basic Mobility (PT)  AM-PAC 6 Clicks Score (PT): 12  AM-PAC 6 Clicks Score (OT): 10  PT Discharge Summary  Anticipated Discharge Disposition (PT): skilled nursing facility    Nubia Cesar PT  3/5/2024

## 2024-03-05 NOTE — PROGRESS NOTES
Continued Stay Note  [facility]     Patient Name: Pam Vidal  MRN: [id number]  Today's Date: 3/5/2024    Admit Date: 3/3/2024    Plan: [facility] considering   Discharge Plan       Row Name 03/05/24 1525       Plan    Plan [facility] considering    Plan Comments Spoke with [facility], [facility] will accept pending oncology plan and med costs. Patient and family are agreeable with [facility] for short-term rehab.                   Discharge Codes    No documentation.                 Expected Discharge Date and Time       Expected Discharge Date Expected Discharge Time    Mar 8, 2024               Jayda Evans RN

## 2024-03-05 NOTE — PLAN OF CARE
Problem: Adult Inpatient Plan of Care  Goal: Plan of Care Review  3/5/2024 1452 by Gilma Blanca RN  Flowsheets (Taken 3/5/2024 1452)  Outcome Evaluation: VSS, IV fluids, IV ABX, No c/o pain .Room air, CHOCO @ D/C. Bed alarm on. Call light within reach  3/5/2024 1319 by Gilma Blanca RN  Outcome: Ongoing, Not Progressing  Goal: Patient-Specific Goal (Individualized)  Outcome: Ongoing, Not Progressing  Goal: Absence of Hospital-Acquired Illness or Injury  Outcome: Ongoing, Not Progressing  Intervention: Identify and Manage Fall Risk  Recent Flowsheet Documentation  Taken 3/5/2024 1400 by Gilma Blanca RN  Safety Promotion/Fall Prevention: safety round/check completed  Taken 3/5/2024 1200 by Gilma Blanca RN  Safety Promotion/Fall Prevention:   nonskid shoes/slippers when out of bed   safety round/check completed   fall prevention program maintained  Taken 3/5/2024 1000 by Gilma Blanca RN  Safety Promotion/Fall Prevention:   safety round/check completed   nonskid shoes/slippers when out of bed   fall prevention program maintained  Taken 3/5/2024 0800 by Gilma Blanca RN  Safety Promotion/Fall Prevention:   safety round/check completed   nonskid shoes/slippers when out of bed   fall prevention program maintained  Intervention: Prevent Skin Injury  Recent Flowsheet Documentation  Taken 3/5/2024 1400 by Gilma Blanca RN  Body Position:   turned   right  Skin Protection:   adhesive use limited   tubing/devices free from skin contact  Taken 3/5/2024 1200 by Gilma Blanca RN  Body Position: left  Taken 3/5/2024 1000 by Gilma Blanca RN  Body Position:   weight shifting   right  Taken 3/5/2024 0800 by Gilma Blanca RN  Body Position: supine  Skin Protection:   adhesive use limited   tubing/devices free from skin contact  Intervention: Prevent and Manage VTE (Venous Thromboembolism) Risk  Recent Flowsheet Documentation  Taken 3/5/2024 1400 by Gilma Blanca RN  Activity Management:  activity encouraged  VTE Prevention/Management:   bilateral   sequential compression devices on  Taken 3/5/2024 1200 by Gilma Blanca RN  Activity Management: activity encouraged  Taken 3/5/2024 1000 by Gilma Blanca RN  Activity Management: activity encouraged  Taken 3/5/2024 0800 by Gilma Blanca RN  Activity Management: activity encouraged  Intervention: Prevent Infection  Recent Flowsheet Documentation  Taken 3/5/2024 0800 by Gilma Blanca RN  Infection Prevention:   single patient room provided   hand hygiene promoted   environmental surveillance performed  Goal: Optimal Comfort and Wellbeing  Outcome: Ongoing, Not Progressing  Intervention: Provide Person-Centered Care  Recent Flowsheet Documentation  Taken 3/5/2024 1400 by Gilma Blanca RN  Trust Relationship/Rapport:   choices provided   care explained  Taken 3/5/2024 0800 by Gilma Blanca RN  Trust Relationship/Rapport:   choices provided   care explained  Goal: Readiness for Transition of Care  Outcome: Ongoing, Not Progressing   Goal Outcome Evaluation:              Outcome Evaluation: VSS, IV fluids, IV ABX, No c/o pain .Room air, CHOCO @ D/C. Bed alarm on. Call light within reach

## 2024-03-05 NOTE — THERAPY TREATMENT NOTE
Patient Name: Pam Vidal  : 1945    MRN: [id number]                              Today's Date: 3/5/2024       Admit Date: 3/3/2024    Visit Dx:     ICD-10-CM ICD-9-CM   1. Acute pyelonephritis  N10 590.10   2. SIRS (systemic inflammatory response syndrome)  R65.10 995.90   3. Primary malignant neoplasm of right lung metastatic to other site  C34.91 162.9   4. Acute kidney injury (ZACH) with acute tubular necrosis (ATN)  N17.0 584.5   5. Leukocytosis, unspecified type  D72.829 288.60   6. Acute on chronic anemia  D64.9 285.9     Patient Active Problem List   Diagnosis    Primary lung adenocarcinoma, right    Cough with hemoptysis    Lung mass    Advanced care planning/counseling discussion    High risk medication use    Muscular deconditioning    Neoplastic malignant related fatigue    Lung cancer metastatic to brain    Leukocytosis    Brain mass    COPD (chronic obstructive pulmonary disease)    Rheumatoid arthritis    IBS (irritable bowel syndrome)    Hyperlipidemia    Abnormal CT of the chest    Shingles, left arm    HTN (hypertension)    Dizziness    Campylobacter diarrhea    Bacterial colitis    Diarrhea    Neuropathic pain    VBI (vertebrobasilar insufficiency)    Atypical pneumonia    Post herpetic neuralgia    Chronic pain after cancer treatment    Left arm pain    Gastroenteritis due to sapovirus    Stage 3a chronic kidney disease (CKD)    ZACH (acute kidney injury)    Acute pyelonephritis     Past Medical History:   Diagnosis Date    COPD (chronic obstructive pulmonary disease)     Coughing up blood     X2 MONTHS    History of COVID-19 2022    Hyperlipidemia     IBS (irritable bowel syndrome)     Primary lung adenocarcinoma, right     Rheumatoid arthritis      Past Surgical History:   Procedure Laterality Date    APPENDECTOMY      appendix removed    BRONCHOSCOPY N/A 2022    Procedure: BRONCHOSCOPY WITH BAL,  BIOPSIES, AND BRUSHINGS WITH ENDOBRONCHIAL ULTRASOUND WITH FNA;  Surgeon:  Gregor Vee MD;  Location: [facility] ENDOSCOPY;  Service: Pulmonary;  Laterality: N/A;  PRE- HILAR MASS  POST- SAME    BRONCHOSCOPY N/A 1/4/2023    Procedure: BRONCHOSCOPY with biopsy, lavage, brushing;  Surgeon: Gregor Vee MD;  Location: [facility] ENDOSCOPY;  Service: Pulmonary;  Laterality: N/A;    CAROTID ENDARTERECTOMY Right     CAROTID ENDARTERECTOMY Left     CATARACT EXTRACTION      CERVICAL FUSION      CHOLECYSTECTOMY      CRANIOTOMY FOR TUMOR Right 5/30/2023    Procedure: RIGHT CRANIOTOMY FOR TUMOR RESECTION STEREOTACTIC WITH STEALTH;  Surgeon: Clint Ricketts MD;  Location: [facility] MAIN OR;  Service: Neurosurgery;  Laterality: Right;    HYSTERECTOMY      SHOULDER ARTHROSCOPY Right 02/19/2019    right should scope/cuff repair     VENOUS ACCESS DEVICE (PORT) INSERTION Right 9/6/2022    Procedure: POWERPORT INSERTION;  Surgeon: Remedios Rice MD;  Location: [facility] MAIN OR;  Service: Thoracic;  Laterality: Right;      General Information       Row Name 03/05/24 1609          OT Time and Intention    Document Type therapy note (daily note)  -     Mode of Treatment co-treatment;occupational therapy;physical therapy  -       Row Name 03/05/24 1609          General Information    Patient Profile Reviewed yes  -     Prior Level of Function --  required some assistance from niece, per niece report  -     Existing Precautions/Restrictions fall  -     Barriers to Rehab medically complex  -       Row Name 03/05/24 1609          Living Environment    People in Home other relative(s)  -       Row Name 03/05/24 1609          Cognition    Orientation Status (Cognition) oriented x 3  -       Row Name 03/05/24 1609          Safety Issues, Functional Mobility    Safety Issues Affecting Function (Mobility) awareness of need for assistance;insight into deficits/self-awareness;safety precautions follow-through/compliance  -     Impairments Affecting Function (Mobility) balance;endurance/activity  tolerance;strength;postural/trunk control;pain  -     Comment, Safety Issues/Impairments (Mobility) PT/OT cotreatment medically appropriate and necessary due to patient acuity level, to maximize therapeutic benefit due to impaired act tolerance, and for safety of patient and staff. Treatment focused on progression of care and goals established in POC.  -               User Key  (r) = Recorded By, (t) = Taken By, (c) = Cosigned By      Initials Name Provider Type     Beth Muller OT Occupational Therapist                     Mobility/ADL's       Row Name 03/05/24 1610          Bed Mobility    Bed Mobility supine-sit;sit-supine  -     Supine-Sit [place] (Bed Mobility) minimum assist (75% patient effort);verbal cues  -     Sit-Supine [place] (Bed Mobility) verbal cues;moderate assist (50% patient effort);2 person assist  -     Assistive Device (Bed Mobility) bed rails;head of bed elevated  -       Row Name 03/05/24 1610          Sit-Stand Transfer    Sit-Stand [place] (Transfers) verbal cues;moderate assist (50% patient effort);2 person assist  -     Assistive Device (Sit-Stand Transfers) walker, front-wheeled  -     Comment, (Sit-Stand Transfer) STS x3  -       Row Name 03/05/24 1610          Functional Mobility    Patient was able to Ambulate no, other medical factors prevent ambulation  -     Reason Patient was unable to Ambulate Excessive Weakness  -               User Key  (r) = Recorded By, (t) = Taken By, (c) = Cosigned By      Initials Name Provider Type     Beth Muller OT Occupational Therapist                   Obj/Interventions       Row Name 03/05/24 1611          Vision Assessment/Intervention    Visual Impairment/Limitations WFL  -       Row Name 03/05/24 1611          Motor Skills    Motor Skills functional endurance  -     Functional Endurance Poor, patient more awake, attentive today.  -       Row Name 03/05/24 1611          Balance    Balance  Assessment sitting static balance;sitting dynamic balance;sit to stand dynamic balance;standing static balance  -     Static Sitting Balance moderate assist  -     Dynamic Sitting Balance moderate assist  -     Position, Sitting Balance unsupported;sitting edge of bed  -     Sit to Stand Dynamic Balance moderate assist;2-person assist  -     Static Standing Balance moderate assist;2-person assist  -     Dynamic Standing Balance moderate assist;2-person assist  -     Position/Device Used, Standing Balance supported;walker, front-wheeled  -     Balance Interventions sitting;standing;occupation based/functional task;sit to stand  -     Comment, Balance Posterior R lateral lean in sitting, posterior lean in standing  -               User Key  (r) = Recorded By, (t) = Taken By, (c) = Cosigned By      Initials Name Provider Type     Beth Muller OT Occupational Therapist                   Goals/Plan    No documentation.                  Clinical Impression       Row Name 03/05/24 1612          Pain Assessment    Pretreatment Pain Rating 5/10  -     Posttreatment Pain Rating 5/10  -     Pain Location - Side/Orientation Left  -     Pain Location upper  -     Pain Location - extremity  -     Pain Intervention(s) Repositioned;Ambulation/increased activity  -       Row Name 03/05/24 1612          Plan of Care Review    Plan of Care Reviewed With patient  -     Progress improving  -     Outcome Evaluation Patient more able to tolerate therapeutic intervention today, more alert and able to follow commands, but does continue to require support with sitting and standing balance today. Patient reports she has been eating better, and is feeling a bit better. Patient performed dynamic sitting balance exercises requires cues for positioning and mod A for sitting upright. Patient performed sit to stand transfer x3, coming to stand at rwx with mod Ax2, and requires mod Ax2 to maintain static  standing balance. Patient will continue to benefit from skilled OT to address current functional deficits, anticipate need for SNF at [facility], though patient remains adamant that she plans to return home.  -       Row Name 03/05/24 1612          Therapy Assessment/Plan (OT)    Rehab Potential (OT) good, to achieve stated therapy goals  -     Therapy Frequency (OT) 5 times/wk  -       Row Name 03/05/24 1612          Therapy Plan Review/Discharge Plan (OT)    Anticipated Discharge Disposition (OT) skilled nursing facility  -       Row Name 03/05/24 1612          Positioning and Restraints    Pre-Treatment Position in bed  -     Post Treatment Position bed  -     In Bed call light within reach;encouraged to call for assist;exit alarm on;fowlers  -               User Key  (r) = Recorded By, (t) = Taken By, (c) = Cosigned By      Initials Name Provider Type     Beth Muller, OT Occupational Therapist                   Outcome Measures       Row Name 03/05/24 1615          How much help from another is currently needed...    Putting on and taking off regular lower body clothing? 1  -LH     Bathing (including washing, rinsing, and drying) 1  -LH     Toileting (which includes using toilet bed pan or urinal) 1  -LH     Putting on and taking off regular upper body clothing 2  -LH     Taking care of personal grooming (such as brushing teeth) 2  -LH     Eating meals 3  -LH     AM-PAC 6 Clicks Score (OT) 10  -[facility] Name 03/05/24 1142          How much help from another person do you currently need...    Turning from your back to your side while in flat bed without using bedrails? 3  -CW     Moving from lying on back to sitting on the side of a flat bed without bedrails? 2  -CW     Moving to and from a bed to a chair (including a wheelchair)? 2  -CW     Standing up from a chair using your arms (e.g., wheelchair, bedside chair)? 2  -CW     Climbing 3-5 steps with a railing? 1  -CW     To walk in hospital  room? 2  -CW     AM-PAC 6 Clicks Score (PT) 12  -CW     Highest Level of Mobility Goal 4 --> Transfer to chair/commode  -CW       Row Name 03/05/24 1615 03/05/24 1142       Functional Assessment    Outcome Measure Options AM-PAC 6 Clicks Daily Activity (OT)  - AM-PAC 6 Clicks Basic Mobility (PT)  -CW              User Key  (r) = Recorded By, (t) = Taken By, (c) = Cosigned By      Initials Name Provider Type    CW Nubia Cesar, PT Physical Therapist     Beth Muller, OT Occupational Therapist                    Occupational Therapy Education       Title: PT OT SLP Therapies (In Progress)       Topic: Occupational Therapy (Done)       Point: ADL training (Done)       Description:   Instruct learner(s) on proper safety adaptation and remediation techniques during self care or transfers.   Instruct in proper use of assistive devices.                  Learning Progress Summary             Patient Acceptance, E,TB, VU by  at 3/4/2024 1523    Comment: Instructed in role of OT, discharge planning, home safety, fall prevention, current deficit education                         Point: Home exercise program (Done)       Description:   Instruct learner(s) on appropriate technique for monitoring, assisting and/or progressing therapeutic exercises/activities.                  Learning Progress Summary             Patient Acceptance, E,TB, VU by  at 3/4/2024 1523    Comment: Instructed in role of OT, discharge planning, home safety, fall prevention, current deficit education                         Point: Precautions (Done)       Description:   Instruct learner(s) on prescribed precautions during self-care and functional transfers.                  Learning Progress Summary             Patient Acceptance, E,TB, VU by  at 3/4/2024 1523    Comment: Instructed in role of OT, discharge planning, home safety, fall prevention, current deficit education                         Point: Body mechanics (Done)        Description:   Instruct learner(s) on proper positioning and spine alignment during self-care, functional mobility activities and/or exercises.                  Learning Progress Summary             Patient Acceptance, E,TB, VU by  at 3/4/2024 2886    Comment: Instructed in role of OT, discharge planning, home safety, fall prevention, current deficit education                                         User Key       Initials Effective Dates Name Provider Type Discipline     08/31/23 -  Beth Muller, OT Occupational Therapist OT                  OT Recommendation and Plan  Planned Therapy Interventions (OT): activity tolerance training, BADL retraining, functional balance retraining, occupation/activity based interventions, patient/caregiver education/training, strengthening exercise, transfer/mobility retraining  Therapy Frequency (OT): 5 times/wk  Plan of Care Review  Plan of Care Reviewed With: patient  Progress: improving  Outcome Evaluation: Patient more able to tolerate therapeutic intervention today, more alert and able to follow commands, but does continue to require support with sitting and standing balance today. Patient reports she has been eating better, and is feeling a bit better. Patient performed dynamic sitting balance exercises requires cues for positioning and mod A for sitting upright. Patient performed sit to stand transfer x3, coming to stand at rwx with mod Ax2, and requires mod Ax2 to maintain static standing balance. Patient will continue to benefit from skilled OT to address current functional deficits, anticipate need for SNF at acute [place], though patient remains adamant that she plans to return home.     Time Calculation:   Evaluation Complexity (OT)  Review Occupational Profile/Medical/Therapy History Complexity: expanded/moderate complexity  Assessment, Occupational Performance/Identification of Deficit Complexity: 3-5 performance deficits  Clinical Decision Making Complexity (OT):  detailed assessment/moderate complexity  Overall Complexity of Evaluation (OT): moderate complexity     Time Calculation- OT       Row Name 03/05/24 1615             Time Calculation- OT    OT Start Time 1034  -      OT Stop Time 1050  -      OT Time Calculation (min) 16 min  -      Total Timed Code Minutes- OT 16 minute(s)  -      OT Received On 03/05/24  -      OT - Next Appointment 03/06/24  -      OT Goal Re-Cert Due Date 03/19/24  -         Timed Charges    89229 -  OT Neuromuscular Reeducation Minutes 16  -         Total Minutes    Timed Charges Total Minutes 16  -       Total Minutes 16  -                User Key  (r) = Recorded By, (t) = Taken By, (c) = Cosigned By      Initials Name Provider Type     Beth Muller OT Occupational Therapist                  Therapy Charges for Today       Code Description Service Date Service Provider Modifiers Qty    34093278582 HC OT THERAPEUTIC ACT EA 15 MIN 3/4/2024 Beth Muller, OT GO 1    [id number]  OT EVAL MOD COMPLEXITY 3 3/4/2024 Beth Muller OT GO 1    [id number]  OT NEUROMUSC RE EDUCATION EA 15 MIN 3/5/2024 Beth Muller OT GO 1                 Beth Muller OT  3/5/2024

## 2024-03-05 NOTE — PLAN OF CARE
Problem: Adult Inpatient Plan of Care  Goal: Plan of Care Review  Outcome: Ongoing, Progressing  Flowsheets (Taken 3/5/2024 0500)  Progress: improving  Outcome Evaluation: Pt is AOx4. All vs stable. Pain treated per MAR. @2 turned and Safety maintained. Cont IVF. No distress noted overnight.  Goal: Patient-Specific Goal (Individualized)  Outcome: Ongoing, Progressing  Goal: Absence of Hospital-Acquired Illness or Injury  Outcome: Ongoing, Progressing  Intervention: Identify and Manage Fall Risk  Description: Perform standard risk assessment on admission using a validated tool or comprehensive approach appropriate to the patient; reassess fall risk frequently, with change in status or transfer to another level of care.  Communicate fall injury risk to interprofessional healthcare team.  Determine need for increased observation, equipment and environmental modification, such as low bed, signage and supportive, nonskid footwear.  Adjust safety measures to individual developmental age, stage and identified risk factors.  Reinforce the importance of safety and physical activity with patient and family.  Perform regular intentional rounding to assess need for position change, pain assessment and personal needs, including assistance with toileting.  Recent Flowsheet Documentation  Taken 3/5/2024 0409 by Oz Tomlinson, RN  Safety Promotion/Fall Prevention:   activity supervised   assistive device/personal items within reach   fall prevention program maintained   clutter free environment maintained   nonskid shoes/slippers when out of bed   muscle strengthening facilitated   mobility aid in reach   safety round/check completed   room organization consistent  Taken 3/5/2024 0020 by Oz Tomlinson, RN  Safety Promotion/Fall Prevention:   activity supervised   assistive device/personal items within reach   mobility aid in reach   lighting adjusted   fall prevention program maintained   safety round/check completed  Taken  3/4/2024 2255 by Oz Tomlinson RN  Safety Promotion/Fall Prevention:   assistive device/personal items within reach   activity supervised   fall prevention program maintained   clutter free environment maintained   nonskid shoes/slippers when out of bed   muscle strengthening facilitated   room organization consistent   safety round/check completed  Taken 3/4/2024 1950 by Oz Tomlinson RN  Safety Promotion/Fall Prevention:   assistive device/personal items within reach   activity supervised   fall prevention program maintained   clutter free environment maintained   nonskid shoes/slippers when out of bed   muscle strengthening facilitated   safety round/check completed  Intervention: Prevent Skin Injury  Description: Perform a screening for skin injury risk, such as pressure or moisture associated skin damage on admission and at regular intervals throughout hospital stay.  Keep all areas of skin (especially folds) clean and dry.  Maintain adequate skin hydration.  Relieve and redistribute pressure and protect bony prominences; implement measures based on patient-specific risk factors.  Match turning and repositioning schedule to clinical condition.  Encourage weight shift frequently; assist with reposition if unable to complete independently.  Float heels off bed; avoid pressure on the Achilles tendon.  Keep skin free from extended contact with medical devices.  Encourage functional activity and mobility, as early as tolerated.  Use aids (e.g., slide boards, mechanical lift) during transfer.  Recent Flowsheet Documentation  Taken 3/5/2024 0409 by Oz Tomlinson RN  Body Position:   turned   supine, legs elevated  Taken 3/5/2024 0020 by Oz Tomlinson RN  Body Position:   turned   right   upper extremity elevated   weight shifting  Taken 3/4/2024 2255 by Oz Tomlinson RN  Body Position:   turned   supine, legs elevated   upper extremity elevated   weight shifting  Taken 3/4/2024 1950 by Oz Tomlinson RN  Body  Position:   turned   left   head facing, left   upper extremity elevated   weight shifting  Intervention: Prevent and Manage VTE (Venous Thromboembolism) Risk  Description: Assess for VTE (venous thromboembolism) risk.  Encourage and assist with early ambulation.  Initiate and maintain compression or other therapy, as indicated, based on identified risk in accordance with organizational protocol and provider order.  Encourage both active and passive leg exercises while in bed, if unable to ambulate.  Recent Flowsheet Documentation  Taken 3/5/2024 0409 by Oz Tomlinson RN  Activity Management: activity encouraged  Taken 3/5/2024 0020 by Oz Tomlinson RN  Activity Management: activity encouraged  Taken 3/4/2024 2255 by Oz Tomlinson RN  Activity Management: activity encouraged  Taken 3/4/2024 1950 by Oz Tomlinson RN  Activity Management: activity encouraged  Intervention: Prevent Infection  Description: Maintain skin and mucous membrane integrity; promote hand, oral and pulmonary hygiene.  Optimize fluid balance, nutrition, sleep and glycemic control to maximize infection resistance.  Identify potential sources of infection early to prevent or mitigate progression of infection (e.g., wound, lines, devices).  Evaluate ongoing need for invasive devices; remove promptly when no longer indicated.  Recent Flowsheet Documentation  Taken 3/5/2024 0409 by Oz Tomlinson RN  Infection Prevention:   visitors restricted/screened   rest/sleep promoted   personal protective equipment utilized   hand hygiene promoted   equipment surfaces disinfected   environmental surveillance performed   cohorting utilized   single patient room provided  Taken 3/5/2024 0020 by Oz Tomlinson RN  Infection Prevention:   visitors restricted/screened   single patient room provided   rest/sleep promoted   personal protective equipment utilized   hand hygiene promoted   equipment surfaces disinfected   environmental surveillance performed   cohorting  utilized  Taken 3/4/2024 2255 by Oz Tomlinson RN  Infection Prevention:   visitors restricted/screened   single patient room provided   personal protective equipment utilized   hand hygiene promoted   equipment surfaces disinfected   environmental surveillance performed   cohorting utilized   rest/sleep promoted  Taken 3/4/2024 1950 by Oz Tomlinson RN  Infection Prevention:   visitors restricted/screened   single patient room provided   personal protective equipment utilized   hand hygiene promoted   equipment surfaces disinfected   environmental surveillance performed   cohorting utilized   rest/sleep promoted  Goal: Optimal Comfort and Wellbeing  Outcome: Ongoing, Progressing  Intervention: Provide Person-Centered Care  Description: Use a family-focused approach to care.  Develop trust and rapport by proactively providing information, encouraging questions, addressing concerns and offering reassurance.  Acknowledge emotional response to hospitalization.  Recognize and utilize personal coping strategies.  Honor spiritual and cultural preferences.  Recent Flowsheet Documentation  Taken 3/5/2024 0020 by Oz Tomlinson RN  Trust Relationship/Rapport:   thoughts/feelings acknowledged   reassurance provided   questions answered   empathic listening provided   emotional support provided   choices provided   care explained   questions encouraged  Taken 3/4/2024 1950 by Oz Tomlinson RN  Trust Relationship/Rapport:   thoughts/feelings acknowledged   reassurance provided   questions encouraged   questions answered   empathic listening provided   emotional support provided   choices provided   care explained  Goal: Readiness for Transition of Care  Outcome: Ongoing, Progressing   Goal Outcome Evaluation:           Progress: improving  Outcome Evaluation: Pt is AOx4. All vs stable. Pain treated per MAR. @2 turned and Safety maintained. Cont IVF. No distress noted overnight.

## 2024-03-05 NOTE — PROGRESS NOTES
Subjective .     REASONS FOR FOLLOWUP:  .  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  PD-L1 positive greater than 95%  3.  Primary chemoradiation with weekly carboplatin and Taxol completed 10/27/2022  4.  Imfinzi immunotherapy every 2 weeks for 12 months total.  First treatment 11/28/2022  5.  Repeat bronchoscopy 1/4/2023 due to persistent hemoptysis.  Pathology revealed no malignancy.  6.  Metastatic lesion to the brain resected 5/30/2023.  7.  Progression of disease within the chest noted on scans 9/19/2023.  Patient was started on palliative Gemzar day 1 and day 8 of an every 21-day cycle with first dose 9/27/2023.    Interval history:  March 5, 2024: Patient is with E. coli sepsis.  Currently on antibiotics followed by infectious disease.  WBC today 21.51 of 2 blood cultures positive for E. coli respiratory viral panel -3 of 3 urine cultures positive for gram-negative bacilli    HISTORY OF PRESENT ILLNESS:    Patient is a 79-year-old female with history of stage III adenocarcinoma right upper lobe not felt to be a surgical candidate and received combined chemotherapy and radiation.  She was showing K-amanda mutation and T p53 mutation.  On [id number].  She was started on Taxol carboplatin and following completion of 6 cycles she had radiation.  Subsequently she was treated with durvalumab.  She had PD-L1 which was 95%.  More recently she had progressive disease she is in the right upper lobe in September 2023 and started on single agent palliative Gemzar..     Patient did not receive Keytruda because of rheumatoid arthritis even though PD-L1 was 95%.  Patient had MRI brain January 2024 which did not show any progression of brain mets and there was significant improvement in the thoracic disease     Currently he is on Gemzar showing improvement in the lung malignancy.     October of saw him February 21, 2024 with plans to bring him back in 2 weeks with Gemzar but at reduced dose as he had a recent  hospitalization where he became very weak     Patient called on March 3, 2024 and reported to the on- that he was very weak.  They were unable to get her up and down to the bathroom without her son-in-law's assistance.  She also had some features of confusion.  They discussed EMS transportation as it was difficult for them to bring her to the hospital yesterday.        In the ER patient had a hemoglobin of 9.7 white count of 23.6And platelet of 316.  Respiratory viral panel was negative.  Blood cultures were drawn.  Urine analysis showed trace ketones and 30 mg of protein.  CT angiogram of the chest showed no evidence of pulmonary thromboembolism.  But there was significant increase in the cavitary masslike consolidation pleural-based at the medial aspect of the right upper lobe 6.8 x 4.4 cm previously 4.7 x 3.3 cm.  The necrotic containing air centrally was 4 x 3.3 cm and previously was smaller.  There is more prominent abnormal soft tissue encasing of the right hilum.  There is no mediastinal or left hilar adenopathy.  No change in the mild pleural thickening in the right upper lobe.  No pleural or pericardial effusions.  CT abdomen pelvis showed striated enhancement of the right kidney and prominent right perinephric stranding compared to the left.  Could not rule out acute right pyelonephritis.     Send had echo which showed his ejection fraction of 66.7%.  Patient also on chronic pain.     In the past patient had resection of the brain mass in May 2023.             Past Medical History:   Diagnosis Date    COPD (chronic obstructive pulmonary disease)     Coughing up blood     X2 MONTHS    History of COVID-19 02/2022    Hyperlipidemia     IBS (irritable bowel syndrome)     Primary lung adenocarcinoma, right     Rheumatoid arthritis        ONCOLOGIC HISTORY:  (History from previous dates can be found in the separate document.)    Current Facility-Administered Medications on File Prior to Encounter    Medication Dose Route Frequency Provider Last Rate Last Admin    heparin injection 500 Units  500 Units Intravenous PRN Cruzito Lagunas MD        sodium chloride 0.9 % flush 10 mL  10 mL Intravenous PRN Cruzito Lagunas MD         Current Outpatient Medications on File Prior to Encounter   Medication Sig Dispense Refill    atorvastatin (LIPITOR) 20 MG tablet Take 1 tablet by mouth Every Night. 30 tablet 0    azelastine (ASTELIN) 0.1 % nasal spray 1 spray into the nostril(s) as directed by provider Daily As Needed for Allergies.      B COMPLEX VITAMINS ER PO Take  by mouth Daily.      Cholecalciferol (VITAMIN D3) 5000 units capsule capsule Take 1 capsule by mouth Daily.      clopidogrel (PLAVIX) 75 MG tablet Take 1 tablet by mouth Daily. 30 tablet 0    diphenoxylate-atropine (LOMOTIL) 2.5-0.025 MG per tablet Take 1 tablet by mouth 4 (Four) Times a Day As Needed for Diarrhea. 10 tablet 0    esomeprazole (nexIUM) 20 MG capsule Take 1 capsule by mouth Every Morning Before Breakfast.      estradiol (ESTRACE) 1 MG tablet Take 1 tablet by mouth Daily.      FeroSul 325 (65 Fe) MG tablet TAKE ONE TABLET BY MOUTH DAILY WITH BREAKFAST 30 tablet 5    Gabapentin 10 %, Baclofen 2 %, lidocaine 4 %, Ketamine HCl 4 % Apply 1-2 g topically to the appropriate area as directed 3 (Three) to 4 (Four) times daily. (Patient taking differently: Apply 1-2 g topically to the appropriate area as directed 3 (Three) to 4 (Four) times daily. Lt arm/ lt shoulder) 90 g 0    HYDROcodone-acetaminophen (NORCO) 7.5-325 MG per tablet Take 1 tablet by mouth Every 6 (Six) Hours As Needed for Moderate Pain. 60 tablet 0    levETIRAcetam (KEPPRA) 500 MG tablet TAKE 1 TABLET BY MOUTH TWICE A  tablet 0    lisinopril (PRINIVIL,ZESTRIL) 20 MG tablet Take 1 tablet by mouth Daily for 30 days. 30 tablet 0    Multiple Vitamins-Minerals (PRESERVISION AREDS 2+MULTI VIT PO) Take  by mouth.      ofloxacin (OCUFLOX) 0.3 % ophthalmic solution Administer 4  drops to both eyes Daily. For 4 days before injection and 4 days after injection      ondansetron (ZOFRAN) 8 MG tablet Take 1 tablet by mouth 3 (Three) Times a Day As Needed for Nausea or Vomiting. 30 tablet 5    predniSONE (DELTASONE) 10 MG tablet Take 2 tablets by mouth Daily. (Patient taking differently: Take 1 tablet by mouth Daily. 1 tab (10mg) daily) 60 tablet 5    pregabalin (LYRICA) 150 MG capsule Take 1 capsule by mouth Every 12 (Twelve) Hours for 30 days. 60 capsule 0    pregabalin (LYRICA) 75 MG capsule TAKE 1 CAPSULE BY MOUTH TWICE A DAY ALONG WITH 150 MG CAPSULE FOR TOTAL DOSE  MG TWICE DAILY 60 capsule 0       ALLERGIES:     Allergies   Allergen Reactions    Del-Mycin [Erythromycin] Hives    Gentamicin Hives    Ibuprofen Nausea And Vomiting    Latex Dermatitis     GLOVES    Metronidazole Hives    Ofloxacin Hives and Nausea Only     Has tolerated Levaquin     Penicillins Hives     Tolerated rocephin and cefepime 2023    Tetracycline Hives    Adhesive Tape Dermatitis     BANDAIDS    Aspirin GI Intolerance     Vomiting can take EC    Codeine Nausea And Vomiting       Social History     Socioeconomic History    Marital status:    Tobacco Use    Smoking status: Former     Current packs/day: 0.00     Types: Cigarettes     Quit date: 2022     Years since quittin.8    Smokeless tobacco: Never    Tobacco comments:     Former some day smoker of 1-2 cigs   Vaping Use    Vaping status: Never Used   Substance and Sexual Activity    Alcohol use: Yes     Alcohol/week: 2.0 standard drinks of alcohol     Types: 2 Glasses of wine per week     Comment: occasionally    Drug use: Yes     Frequency: 2.0 times per week     Types: Marijuana    Sexual activity: Defer         Cancer-related family history includes Cancer in her father and mother.     Review of Systems  A comprehensive 14 point review of systems was performed and was negative except as mentioned.    Objective      Vitals:    24 5206  03/05/24 0500 03/05/24 0739 03/05/24 1327   BP: 129/49  126/54 141/64   BP Location: Right arm  Right arm Right arm   Patient Position: Lying  Lying Lying   Pulse: 78      Resp: 18  18 18   Temp: 97.9 °F (36.6 °C)  98.1 °F (36.7 °C) 98.2 °F (36.8 °C)   TempSrc: Oral  Oral Oral   SpO2: 94%      Weight:  59.7 kg (131 lb 9.8 oz)     Height:             2/21/2024    11:49 AM   Current Status   ECOG score 1       Physical Exam      This patient's ACP documentation is up to date, and there's nothing further left to document.    CONSTITUTIONAL:  Vital signs reviewed.  Alert and oriented x3  No distress, looks comfortable.  EYES:  Conjunctivae and lids unremarkable.  Extraocular eye movements intact.  HEENT: No evidence of lymphadenopathy, no thyromegaly  EARS,NOSE,MOUTH,THROAT:  Ears and nose appear unremarkable.  Lips, teeth, gums appear unremarkable.  RESPIRATORY:  Normal respiratory effort.  No rales  or wheezing, clear.   CARDIOVASCULAR:  Regular rate and rhythm, no murmur  No significant lower extremity edema.  Abdomen: Soft nontender positive bowel sounds no hepatosplenomegaly  SKIN: No wounds.  No rashes.  MUSCULOSKELETAL/EXTREMITIES: No clubbing or cyanosis.  No apparent unilateral weakness.  NEURO: CN 2-12 appear intact. No focal neurological deficits noted.  PSYCHIATRIC:  Normal judgment and insight.  Normal mood and affect.      RECENT LABS:  Hematology WBC   Date Value Ref Range Status   03/05/2024 18.57 (H) 3.40 - 10.80 10*3/mm3 Final     RBC   Date Value Ref Range Status   03/05/2024 3.06 (L) 3.77 - 5.28 10*6/mm3 Final     Hemoglobin   Date Value Ref Range Status   03/05/2024 8.3 (L) 12.0 - 15.9 g/dL Final     Hematocrit   Date Value Ref Range Status   03/05/2024 26.0 (L) 34.0 - 46.6 % Final     Platelets   Date Value Ref Range Status   03/05/2024 304 140 - 450 10*3/mm3 Final        Assessment & Plan     #. .  Right-sided pyelonephritis admitted with significant weakness  Pancultured yesterday  Started on IV  antibiotics, ceftriaxone     #.  Progressive lung metastasis with significant increase in the cavitary masslike consolidation pleural-based in the posterior medial aspect of the right upper lobe measuring 6.8 x 4.4 compared to 4.7 x 3.3 cm.  The centrally necrotic region also measures 4 x 3.3 and previously was smaller.  There is more prominent soft tissue encasing the right hilum.  There is mild pleural thickening on the posterior aspect of the right upper lobe.     Will discuss with Dr. Lagunas     *.   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  Patient is not felt to be a surgical candidate and combined chemotherapy and radiation.   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  9/20/2022 1st dose of weekly carboplatin/taxol.   She completed 6 cycles of weekly CarboTaxol 10/25/2022.  Radiation therapy completed 10/27/2022  First dose durvalumab delivered 11/28/2022.   Durvalumab on hold since May 2023  Progression of disease with enlarging hypermetabolic mass in the right upper lobe in September 2023  Plans to start single agent Gemzar palliative chemotherapy 1000 mg/m² on days 1 and 8 of a 21-day cycle.  9/27/2023 C1D1 single agent palliative Gemzar.  CT scans and brain MRI 1/5/2024 show no progression of brain mets and significant improvement in her thoracic disease.        2.  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     3.  Other comorbidities including vascular disease with previous carotid   endarterectomy surgeries.  She has no known history of stroke or myocardial infarction.      5.  Rheumatoid arthritis: Patient previously on Celebrex, but discontinued due to hemoptysis.  Patient started on prednisone 20 mg daily prescribed to manage her arthritis pain.  This was later decreased to 10 mg daily.     6.  Development of brain metastases in May 2023.  Patient  underwent resection of the dominant mass in the right occipital area by Dr. Cho of neurosurgery.  She received post op radiation therapy to the resection bed and to 2 smaller lesions with SRS under the direction of Dr. Mckenna Moreira at [facility].  Her most recent MRI of the brain from 8/27/2023 as noted above shows no new sites of disease, improved edema, and no definite recurrence in the resection bed.     7.  Severe shingles outbreak of left upper extremity with severe pain from postherpetic neuralgia.   She has been seen by pain management and is receiving topical therapy with a compound of baclofen gabapentin and ketamine with good relief.     8.  Hospitalization for bacterial colitis with stool culture growing E. coli and Campylobacter.  Her symptoms have resolved at this point.     9.  Hospitalization 2/17/2024 to 2/19/2024 for Sapovirus viral gastroenteritis.        PLAN:  Patient admitted with right pyelonephritis  Currently with E. coli sepsis and on antibiotics per infectious disease  Respiratory viral panel negative  CT chest showing no pulmonary embolism but progressive lung disease lung cancer 6.8 x 4.4 cm compared to 4.7 x 3.3 cm.  Leukocytosis secondary to underlying infection    Given the lung lesion has increased may need to consider switching chemo once patient better from the pyelonephritis.    Patient needs to follow-up with Dr. Lagunas as outpatient.  Patient had appointment March 6, 2024 but will need to postpone it for next week.    [facility]    Will sign off and follow-up in the office with Dr. Lagunas  in 1 week       Darleen Kim MD               Cc:  Simone Nelson, *

## 2024-03-06 LAB
ANION GAP SERPL CALCULATED.3IONS-SCNC: 9.7 MMOL/L (ref 5–15)
BACTERIA SPEC AEROBE CULT: ABNORMAL
BACTERIA SPEC AEROBE CULT: ABNORMAL
BUN SERPL-MCNC: 15 MG/DL (ref 8–23)
BUN/CREAT SERPL: 13.6 (ref 7–25)
CALCIUM SPEC-SCNC: 8.5 MG/DL (ref 8.6–10.5)
CHLORIDE SERPL-SCNC: 101 MMOL/L (ref 98–107)
CO2 SERPL-SCNC: 24.3 MMOL/L (ref 22–29)
CREAT SERPL-MCNC: 1.1 MG/DL (ref 0.57–1)
EGFRCR SERPLBLD CKD-EPI 2021: 51.9 ML/MIN/1.73
GLUCOSE SERPL-MCNC: 216 MG/DL (ref 65–99)
GRAM STN SPEC: ABNORMAL
ISOLATED FROM: ABNORMAL
POTASSIUM SERPL-SCNC: 4.1 MMOL/L (ref 3.5–5.2)
SODIUM SERPL-SCNC: 135 MMOL/L (ref 136–145)

## 2024-03-06 PROCEDURE — 97535 SELF CARE MNGMENT TRAINING: CPT

## 2024-03-06 PROCEDURE — 25010000002 CEFTRIAXONE PER 250 MG: Performed by: STUDENT IN AN ORGANIZED HEALTH CARE EDUCATION/TRAINING PROGRAM

## 2024-03-06 PROCEDURE — 97530 THERAPEUTIC ACTIVITIES: CPT

## 2024-03-06 PROCEDURE — 25810000003 LACTATED RINGERS PER 1000 ML: Performed by: STUDENT IN AN ORGANIZED HEALTH CARE EDUCATION/TRAINING PROGRAM

## 2024-03-06 PROCEDURE — 63710000001 PREDNISONE PER 5 MG: Performed by: STUDENT IN AN ORGANIZED HEALTH CARE EDUCATION/TRAINING PROGRAM

## 2024-03-06 PROCEDURE — 99232 SBSQ HOSP IP/OBS MODERATE 35: CPT | Performed by: STUDENT IN AN ORGANIZED HEALTH CARE EDUCATION/TRAINING PROGRAM

## 2024-03-06 PROCEDURE — 80048 BASIC METABOLIC PNL TOTAL CA: CPT | Performed by: STUDENT IN AN ORGANIZED HEALTH CARE EDUCATION/TRAINING PROGRAM

## 2024-03-06 RX ORDER — LEVOFLOXACIN 750 MG/1
750 TABLET, FILM COATED ORAL EVERY OTHER DAY
Status: DISCONTINUED | OUTPATIENT
Start: 2024-03-06 | End: 2024-03-07 | Stop reason: HOSPADM

## 2024-03-06 RX ADMIN — CLOPIDOGREL BISULFATE 75 MG: 75 TABLET, FILM COATED ORAL at 08:17

## 2024-03-06 RX ADMIN — LEVETIRACETAM 500 MG: 500 TABLET, FILM COATED ORAL at 21:23

## 2024-03-06 RX ADMIN — PANTOPRAZOLE SODIUM 40 MG: 40 TABLET, DELAYED RELEASE ORAL at 06:04

## 2024-03-06 RX ADMIN — LEVOFLOXACIN 750 MG: 750 TABLET, FILM COATED ORAL at 12:04

## 2024-03-06 RX ADMIN — OFLOXACIN 4 DROP: 3 SOLUTION OPHTHALMIC at 08:18

## 2024-03-06 RX ADMIN — LEVETIRACETAM 500 MG: 500 TABLET, FILM COATED ORAL at 08:17

## 2024-03-06 RX ADMIN — PREGABALIN 150 MG: 75 CAPSULE ORAL at 08:17

## 2024-03-06 RX ADMIN — PREGABALIN 150 MG: 75 CAPSULE ORAL at 21:23

## 2024-03-06 RX ADMIN — HYDROCODONE BITARTRATE AND ACETAMINOPHEN 1 TABLET: 7.5; 325 TABLET ORAL at 10:08

## 2024-03-06 RX ADMIN — FERROUS SULFATE TAB 325 MG (65 MG ELEMENTAL FE) 325 MG: 325 (65 FE) TAB at 08:17

## 2024-03-06 RX ADMIN — ESTRADIOL 1 MG: 1 TABLET ORAL at 08:17

## 2024-03-06 RX ADMIN — HYDROCODONE BITARTRATE AND ACETAMINOPHEN 1 TABLET: 7.5; 325 TABLET ORAL at 18:30

## 2024-03-06 RX ADMIN — PREDNISONE 10 MG: 10 TABLET ORAL at 08:17

## 2024-03-06 RX ADMIN — CEFTRIAXONE 2000 MG: 2 INJECTION, POWDER, FOR SOLUTION INTRAMUSCULAR; INTRAVENOUS at 17:16

## 2024-03-06 RX ADMIN — SODIUM CHLORIDE, POTASSIUM CHLORIDE, SODIUM LACTATE AND CALCIUM CHLORIDE 125 ML/HR: 600; 310; 30; 20 INJECTION, SOLUTION INTRAVENOUS at 00:30

## 2024-03-06 RX ADMIN — ATORVASTATIN CALCIUM 20 MG: 20 TABLET, FILM COATED ORAL at 21:23

## 2024-03-06 NOTE — PLAN OF CARE
Goal Outcome Evaluation:  Plan of Care Reviewed With: patient        Progress: improving  Outcome Evaluation: Pt agreed to PT/OT session, pt vasquez sup-sit w/mod 2, STS w/min/mod 2, pt vasquez sitting EOB CGA/SBA >10 min, pt perf amb 2 steps forw and back  and 3 side steps toward HOB w/RWX assist of 2, pt fatigued quickly, but very motivated today, plans SNU , then back home w/fam caregiving for her, pt realizes she is not strong enough now to go straight home even w/help there, will continue to progress until medically ready for DC      Anticipated Discharge Disposition (PT): skilled nursing facility

## 2024-03-06 NOTE — PLAN OF CARE
Goal Outcome Evaluation:  Plan of Care Reviewed With: patient        Progress: improving  Outcome Evaluation: Patient more able to tolerate therapeutic intervention today, more alert and able to follow commands, but does continue to require support with sitting and standing balance today. Patient reports she has been eating better, and is feeling a bit better. Patient performed dynamic sitting balance exercises requires cues for positioning and mod A for sitting upright. Patient performed sit to stand transfer x3, coming to stand at rwx with mod Ax2, and requires mod Ax2 to maintain static standing balance. Patient will continue to benefit from skilled OT to address current functional deficits, anticipate need for SNF at acute OR, though patient remains adamant that she plans to return home.      Anticipated Discharge Disposition (OT): skilled nursing facility

## 2024-03-06 NOTE — THERAPY TREATMENT NOTE
Patient Name: Pam Vidal  : 1945    MRN: [id number]                              Today's Date: 3/6/2024       Admit Date: 3/3/2024    Visit Dx:     ICD-10-CM ICD-9-CM   1. Acute pyelonephritis  N10 590.10   2. SIRS (systemic inflammatory response syndrome)  R65.10 995.90   3. Primary malignant neoplasm of right lung metastatic to other site  C34.91 162.9   4. Acute kidney injury (ZACH) with acute tubular necrosis (ATN)  N17.0 584.5   5. Leukocytosis, unspecified type  D72.829 288.60   6. Acute on chronic anemia  D64.9 285.9     Patient Active Problem List   Diagnosis    Primary lung adenocarcinoma, right    Cough with hemoptysis    Lung mass    Advanced care planning/counseling discussion    High risk medication use    Muscular deconditioning    Neoplastic malignant related fatigue    Lung cancer metastatic to brain    Leukocytosis    Brain mass    COPD (chronic obstructive pulmonary disease)    Rheumatoid arthritis    IBS (irritable bowel syndrome)    Hyperlipidemia    Abnormal CT of the chest    Shingles, left arm    HTN (hypertension)    Dizziness    Campylobacter diarrhea    Bacterial colitis    Diarrhea    Neuropathic pain    VBI (vertebrobasilar insufficiency)    Atypical pneumonia    Post herpetic neuralgia    Chronic pain after cancer treatment    Left arm pain    Gastroenteritis due to sapovirus    Stage 3a chronic kidney disease (CKD)    ZACH (acute kidney injury)    Acute pyelonephritis     Past Medical History:   Diagnosis Date    COPD (chronic obstructive pulmonary disease)     Coughing up blood     X2 MONTHS    History of COVID-19 2022    Hyperlipidemia     IBS (irritable bowel syndrome)     Primary lung adenocarcinoma, right     Rheumatoid arthritis      Past Surgical History:   Procedure Laterality Date    APPENDECTOMY      appendix removed    BRONCHOSCOPY N/A 2022    Procedure: BRONCHOSCOPY WITH BAL,  BIOPSIES, AND BRUSHINGS WITH ENDOBRONCHIAL ULTRASOUND WITH FNA;  Surgeon:  Gregor Vee MD;  Location: [facility] ENDOSCOPY;  Service: Pulmonary;  Laterality: N/A;  PRE- HILAR MASS  POST- SAME    BRONCHOSCOPY N/A 1/4/2023    Procedure: BRONCHOSCOPY with biopsy, lavage, brushing;  Surgeon: Gregor Vee MD;  Location: [facility] ENDOSCOPY;  Service: Pulmonary;  Laterality: N/A;    CAROTID ENDARTERECTOMY Right     CAROTID ENDARTERECTOMY Left     CATARACT EXTRACTION      CERVICAL FUSION      CHOLECYSTECTOMY      CRANIOTOMY FOR TUMOR Right 5/30/2023    Procedure: RIGHT CRANIOTOMY FOR TUMOR RESECTION STEREOTACTIC WITH STEALTH;  Surgeon: Clint Ricketts MD;  Location: [facility] MAIN OR;  Service: Neurosurgery;  Laterality: Right;    HYSTERECTOMY      SHOULDER ARTHROSCOPY Right 02/19/2019    right should scope/cuff repair     VENOUS ACCESS DEVICE (PORT) INSERTION Right 9/6/2022    Procedure: POWERPORT INSERTION;  Surgeon: Remedios Rice MD;  Location: [facility] MAIN OR;  Service: Thoracic;  Laterality: Right;      General Information       Row Name 03/06/24 1530          Physical Therapy Time and Intention    Document Type therapy note (daily note)  -     Mode of Treatment co-treatment;occupational therapy;physical therapy  -       Row Name 03/06/24 1530          General Information    Patient Profile Reviewed yes  -     Existing Precautions/Restrictions fall  -       Row Name 03/06/24 1530          Cognition    Orientation Status (Cognition) oriented x 3  -       Row Name 03/06/24 1530          Safety Issues, Functional Mobility    Impairments Affecting Function (Mobility) balance;endurance/activity tolerance;strength;postural/trunk control;pain  -     Comment, Safety Issues/Impairments (Mobility) PT/OT cotreatment appropriate due to pt acuity level and to maxmize therapeutic benefit and for safety of patient and staff  -               User Key  (r) = Recorded By, (t) = Taken By, (c) = Cosigned By      Initials Name Provider Type    Che Parsons, ADRIA Physical Therapist Assistant  "                  Mobility       Row Name 03/06/24 1530          Bed Mobility    Bed Mobility supine-sit;sit-supine  -     Supine-Sit [place] (Bed Mobility) minimum assist (75% patient effort);verbal cues  -     Sit-Supine [place] (Bed Mobility) verbal cues;moderate assist (50% patient effort);2 person assist  -     Assistive Device (Bed Mobility) bed rails;head of bed elevated  -       Row Name 03/06/24 1530          Sit-Stand Transfer    Sit-Stand [place] (Transfers) verbal cues;2 person assist;minimum assist (75% patient effort);moderate assist (50% patient effort)  -     Assistive Device (Sit-Stand Transfers) walker, front-wheeled  -       Row Name 03/06/24 1530          Gait/Stairs (Locomotion)    [place] Level (Gait) 2 person assist;minimum assist (75% patient effort)  -     Assistive Device (Gait) walker, front-wheeled  -     Distance in Feet (Gait) --  3 side-step, 2 steps forw and back  -               User Key  (r) = Recorded By, (t) = Taken By, (c) = Cosigned By      Initials Name Provider Type    Che Parsons PTA Physical Therapist Assistant                   Obj/Interventions    No documentation.                  Goals/Plan    No documentation.                  Clinical Impression       Row Name 03/06/24 1547          Pain    Pretreatment Pain Rating 5/10  -     Posttreatment Pain Rating 5/10  -     Pain Location - Side/Orientation Left  -     Pain Location upper  -     Pain Location - extremity  -     Pre/Posttreatment Pain Comment reports its from \"shingles\"  -     Pain Intervention(s) Repositioned;Rest  -       Row Name 03/06/24 1547          Plan of Care Review    Plan of Care Reviewed With patient  -     Progress improving  -     Outcome Evaluation Pt agreed to PT/OT session, pt vasquez sup-sit w/mod 2, STS w/min/mod 2, pt vasquez sitting EOB CGA/SBA >10 min, pt perf amb 2 steps forw and back  and 3 side steps toward HOB w/RWX assist of 2, pt " fatigued quickly, but very motivated today, plans SNU , then back home w/fam caregiving for her, pt realizes she is not strong enough now to go straight home even w/help there, will continue to progress until medically ready for DC  -       Row Name 03/06/24 1547          Vital Signs    O2 Delivery Pre Treatment room air  -       Row Name 03/06/24 1547          Positioning and Restraints    Pre-Treatment Position in bed  -     Post Treatment Position bed  -               User Key  (r) = Recorded By, (t) = Taken By, (c) = Cosigned By      Initials Name Provider Type    Che Parsons PTA Physical Therapist Assistant                   Outcome Measures       Row Name 03/06/24 1548 03/06/24 0818       How much help from another person do you currently need...    Turning from your back to your side while in flat bed without using bedrails? 3  -JM 3  -KE    Moving from lying on back to sitting on the side of a flat bed without bedrails? 2  - 2  -KE    Moving to and from a bed to a chair (including a wheelchair)? 1  - 2  -KE    Standing up from a chair using your arms (e.g., wheelchair, bedside chair)? 2  - 2  -KE    Climbing 3-5 steps with a railing? 1  - 1  -KE    To walk in hospital room? 1  - 2  -KE    AM-PAC 6 Clicks Score (PT) 10  -JM 12  -KE    Highest Level of Mobility Goal 4 --> Transfer to chair/commode  - 4 --> Transfer to chair/commode  -KE      Row Name 03/06/24 1539          Functional Assessment    Outcome Measure Options AM-PAC 6 Clicks Daily Activity (OT)  -               User Key  (r) = Recorded By, (t) = Taken By, (c) = Cosigned By      Initials Name Provider Type    Che Parsons PTA Physical Therapist Assistant    Shahnaz Gonzalez, RN Registered Nurse    Beth Castillo OT Occupational Therapist                                 Physical Therapy Education       Title: PT OT SLP Therapies (Done)       Topic: Physical Therapy (Done)       Point: Mobility training  (Done)       Learning Progress Summary             Patient Acceptance, E,TB,D, VU,NR by  at 3/6/2024 1548    Acceptance, E, VU,NR by CW at 3/5/2024 1142    Acceptance, E, VU,NR by CW at 3/4/2024 1347                         Point: Home exercise program (Done)       Learning Progress Summary             Patient Acceptance, E,TB,D, VU,NR by  at 3/6/2024 1548                         Point: Body mechanics (Done)       Learning Progress Summary             Patient Acceptance, E,TB,D, VU,NR by  at 3/6/2024 1548    Acceptance, E, VU,NR by CW at 3/5/2024 1142    Acceptance, E, VU,NR by CW at 3/4/2024 1347                         Point: Precautions (Done)       Learning Progress Summary             Patient Acceptance, E,TB,D, VU,NR by  at 3/6/2024 1548    Acceptance, E, VU,NR by CW at 3/4/2024 1347                                         User Key       Initials Effective Dates Name Provider Type Discipline     03/07/18 -  Che Gutierrez PTA Physical Therapist Assistant PT     12/13/22 -  Nubia Cesar PT Physical Therapist PT                  PT Recommendation and Plan     Plan of Care Reviewed With: patient  Progress: improving  Outcome Evaluation: Pt agreed to PT/OT session, pt vasquez sup-sit w/mod 2, STS w/min/mod 2, pt vasquez sitting EOB CGA/SBA >10 min, pt perf amb 2 steps forw and back  and 3 side steps toward HOB w/RWX assist of 2, pt fatigued quickly, but very motivated today, plans SNU , then back home w/fam caregiving for her, pt realizes she is not strong enough now to go straight home even w/help there, will continue to progress until medically ready for DC     Time Calculation:         PT Charges       Row Name 03/06/24 1548             Time Calculation    Start Time 1444  -      Stop Time 1507  -      Time Calculation (min) 23 min  -      PT Received On 03/06/24  -      PT - Next Appointment 03/07/24  -                User Key  (r) = Recorded By, (t) = Taken By, (c) = Cosigned By       Initials Name Provider Type     Che Gutierrez PTA Physical Therapist Assistant                  Therapy Charges for Today       Code Description Service Date Service Provider Modifiers Qty    95005854394 HC PT THERAPEUTIC ACT EA 15 MIN 3/6/2024 Che Gutierrez PTA GP 2    19990205678 HC PT THER SUPP EA 15 MIN 3/6/2024 Che Gutierrez PTA GP 2            PT G-Codes  Outcome Measure Options: AM-PAC 6 Clicks Daily Activity (OT)  AM-PAC 6 Clicks Score (PT): 10  AM-PAC 6 Clicks Score (OT): 10  PT Discharge Summary  Anticipated Discharge Disposition (PT): skilled nursing facility    Che Gutierrez PTA  3/6/2024

## 2024-03-06 NOTE — PROGRESS NOTES
Name: Pam Vidal ADMIT: 3/3/2024   : 1945  PCP: Nik Mckay MD    MRN: [id number] LOS: 3 days   AGE/SEX: 79 y.o. female  ROOM: [facility]     Subjective   Subjective   Resting in bed, eating lunch without difficulty. Planning for discharge tomorrow.       Objective   Objective   Vital Signs  Temp:  [98 °F (36.7 °C)-99.7 °F (37.6 °C)] 99.7 °F (37.6 °C)  Heart Rate:  [80-95] 95  Resp:  [18] 18  BP: (132-146)/(44-64) 140/47  SpO2:  [96 %-98 %] 96 %  on   ;   Device (Oxygen Therapy): room air  Body mass index is 25.53 kg/m².  Physical Exam  Constitutional:       General: She is not in acute distress.     Appearance: She is ill-appearing.   Cardiovascular:      Rate and Rhythm: Normal rate and regular rhythm.   Pulmonary:      Effort: Pulmonary effort is normal. No respiratory distress.      Breath sounds: No wheezing.      Comments: Decreased breath sounds bilterally  Abdominal:      General: Abdomen is flat.      Palpations: Abdomen is soft.      Tenderness: There is no abdominal tenderness.   Musculoskeletal:         General: No swelling or tenderness.      Right lower leg: No edema.      Left lower leg: No edema.   Skin:     General: Skin is warm and dry.   Neurological:      General: No focal deficit present.      Mental Status: She is alert and oriented to person, place, and time. Mental status is at baseline.         Results Review     I reviewed the patient's new clinical results.  Results from last 7 days   Lab Units 24  0959 24  0254 24  1622   WBC 10*3/mm3 18.57* 21.55* 23.60*   HEMOGLOBIN g/dL 8.3* 9.1* 9.7*   PLATELETS 10*3/mm3 304 304 316     Results from last 7 days   Lab Units 24  0959 24  0254 24  1622   SODIUM mmol/L 135* 137 133*   POTASSIUM mmol/L 4.2 4.3 4.7   CHLORIDE mmol/L 102 102 99   CO2 mmol/L 23.1 22.9 19.0*   BUN mg/dL 19 27* 31*   CREATININE mg/dL 1.00 1.16* 1.51*   GLUCOSE mg/dL 175* 189* 105*   Estimated Creatinine Clearance: 37.9  "mL/min (by C-G formula based on SCr of 1 mg/dL).  Results from last 7 days   Lab Units 03/05/24  0959 03/03/24  1622   ALBUMIN g/dL 2.5* 3.0*   BILIRUBIN mg/dL 0.2 0.5   ALK PHOS U/L 163* 289*   AST (SGOT) U/L 14 25   ALT (SGPT) U/L 16 26     Results from last 7 days   Lab Units 03/05/24  0959 03/04/24  0254 03/03/24  1622   CALCIUM mg/dL 8.4* 8.5* 9.2   ALBUMIN g/dL 2.5*  --  3.0*     Results from last 7 days   Lab Units 03/03/24  1622   PROCALCITONIN ng/mL 6.40*   LACTATE mmol/L 0.7     COVID19   Date Value Ref Range Status   03/03/2024 Not Detected Not Detected - Ref. Range Final   08/26/2023 Not Detected Not Detected - Ref. Range Final     No results found for: \"HGBA1C\", \"POCGLU\"    CT Angiogram Chest Pulmonary Embolism, CT Abdomen Pelvis With Contrast     CT ANGIOGRAM OF THE CHEST. MULTIPLE CORONAL, SAGITTAL, AND 3-D  RECONSTRUCTIONS. CT ABDOMEN AND PELVIS WITH IV CONTRAST     HISTORY: 79-year-old female with cough and fever. Hemoptysis. Abdominal  pain. Metastatic breast cancer. Brain metastases.     TECHNIQUE: Radiation dose reduction techniques were utilized, including  automated exposure control and exposure modulation based on body size.  CT angiogram of the chest was performed. Multiple coronal, sagittal, and  3-D reconstruction images were obtained. Subsequently, 3 mm images were  obtained through the abdomen and pelvis after the administration of IV  contrast. Compared with CT abdomen and pelvis 02/17/2024 and chest CT  01/05/2024.     FINDINGS:     CHEST CTA:  1. There is no convincing evidence for pulmonary thromboemboli.     2. There has been significant increase in size of the centrally cavitary  masslike consolidation pleural-based at the posterior medial aspect of  the right upper lobe measuring 6.8 x 4.4 cm, previously approximately  4.7 x 3.3 cm. The centrally liquefied/necrotic region containing air  measures 4.0 x 3.3 cm, previously smaller. There is more prominent  abnormal soft tissue " encasing the right hilum, but there is no  mediastinal or left hilar lymphadenopathy. No significant change in the  very mild pleural thickening at the posterior aspect of the right upper  lobe.     3. There are no pleural or pericardial effusions. There is less  atelectasis at the left lung base, currently mild.     ABDOMEN/PELVIS:  1. There is a subtle striated enhancement pattern of the right kidney  and there is more prominent right perinephric stranding than on the  left. Urinary bladder wall appears thickened, but the bladder is nearly  collapsed. Acute right pyelonephritis is suspected and pyelonephritis on  the left cannot be excluded.     2. No significant change is seen in the right renal cysts. The largest  measures 5.5 cm and has internal density measurements of 36 Hounsfield  units. Likely proteinaceous cysts and conservative surveillance is  recommended.     3. There is no acute abnormality at the liver, spleen, pancreas, or  adrenals. There is no acute bowel abnormality. There is moderate sigmoid  diverticulosis without evidence for diverticulitis. The appendix is  surgically absent. Uterus is surgically absent. No lymphadenopathy.     This report was finalized on 3/4/2024 7:22 AM by Dr. Rianna Meeks M.D on  Workstation: [id number]       Scheduled Medications  atorvastatin, 20 mg, Oral, Nightly  cefTRIAXone, 2,000 mg, Intravenous, Q24H  clopidogrel, 75 mg, Oral, Daily  estradiol, 1 mg, Oral, Daily  ferrous sulfate, 325 mg, Oral, Daily With Breakfast  Gabapentin 10 %, Baclofen 2 %, lidocaine 4 %, Ketamine HCl 4 %, , Topical, 4x Daily  lactated ringers, 500 mL, Intravenous, Once  levETIRAcetam, 500 mg, Oral, BID  levoFLOXacin, 750 mg, Oral, Every Other Day  [Held by provider] lisinopril, 20 mg, Oral, Q24H  ofloxacin, 4 drop, Both Eyes, Daily  pantoprazole, 40 mg, Oral, Q AM  predniSONE, 10 mg, Oral, Daily  pregabalin, 150 mg, Oral, Q12H    Infusions  lactated ringers, 125 mL/hr, Last Rate: 125 mL/hr  (03/06/24 0030)    Diet  Diet: Regular/House; Fluid Consistency: Thin (IDDSI 0)         I have personally reviewed:  [x]  Laboratory   [x]  Microbiology   []  Radiology   [x]  EKG/Telemetry   []  Cardiology/Vascular   []  Pathology   []  Records    Assessment/Plan     Active Hospital Problems    Diagnosis  POA    **Acute pyelonephritis [N10]  Yes    ZACH (acute kidney injury) [N17.9]  Yes    HTN (hypertension) [I10]  Yes    COPD (chronic obstructive pulmonary disease) [J44.9]  Yes    Rheumatoid arthritis [M06.9]  Yes    Hyperlipidemia [E78.5]  Yes    Leukocytosis [D72.829]  Yes    Lung cancer metastatic to brain [C34.90, C79.31]  Yes    Neoplastic malignant related fatigue [R53.0]  Yes    Primary lung adenocarcinoma, right [C34.91]  Yes      Resolved Hospital Problems   No resolved problems to display.     Ms. Vidal is a 79 y.o. female with NSCLC (metastatic to brain, status post resection and XRT), HTN, HLD, RA, CAD presenting with abdominal pain, found to have UTI/pyelonephritis.     E coli septicemia  Right sided pyelonephritis  Leukocytosis  -CT PE/A/P with no PE; increase in size of cavitary masslike consolidation; suspected pyelonephritis on the right, cannot be excluded on the left  -WBC 23; procal 6.4  - blood cx + for e coli, ID consulted  - repeat blood cx remain negative; plan for discharge on renally dosed levaquin to complete 7d course per ID    ZACH- resolved  -Crt 1.51 OA (b/l ~1)  -Likely prerenal  -IVF  - improving to 1      Adenocarcinoma of RUL, metastatic to brain  -follows with Dr Lagunas  -s/p resection of brain mass 5/23  -oncology consulted, CT noted with increase in right lung mass  - plan foro/p f/u with Dr Lagunas to discuss chemo options     RA  -prednisone 10mg daily     HLD  -Statin     Chronic pain  -Resume home Norco  -change lyrica back to home dose    SCDs for DVT prophylaxis.  Full code.  Discussed with patient and RN  Anticipate discharge  ANN MARIE  tomorrow.      Lisette Browning,  MD [facility]  03/06/24  13:16 EST    I wore protective equipment throughout this patient encounter including gloves.  Hand hygiene was performed before donning protective equipment and after removal when leaving the room.    Expected Discharge Date and Time       Expected Discharge Date Expected Discharge Time    Mar 7, 2024              Copied text in this note has been reviewed and is accurate as of 03/06/24.

## 2024-03-06 NOTE — THERAPY TREATMENT NOTE
Patient Name: Pam Vidal  : 1945    MRN: [id number]                              Today's Date: 3/6/2024       Admit Date: 3/3/2024    Visit Dx:     ICD-10-CM ICD-9-CM   1. Acute pyelonephritis  N10 590.10   2. SIRS (systemic inflammatory response syndrome)  R65.10 995.90   3. Primary malignant neoplasm of right lung metastatic to other site  C34.91 162.9   4. Acute kidney injury (ZACH) with acute tubular necrosis (ATN)  N17.0 584.5   5. Leukocytosis, unspecified type  D72.829 288.60   6. Acute on chronic anemia  D64.9 285.9     Patient Active Problem List   Diagnosis    Primary lung adenocarcinoma, right    Cough with hemoptysis    Lung mass    Advanced care planning/counseling discussion    High risk medication use    Muscular deconditioning    Neoplastic malignant related fatigue    Lung cancer metastatic to brain    Leukocytosis    Brain mass    COPD (chronic obstructive pulmonary disease)    Rheumatoid arthritis    IBS (irritable bowel syndrome)    Hyperlipidemia    Abnormal CT of the chest    Shingles, left arm    HTN (hypertension)    Dizziness    Campylobacter diarrhea    Bacterial colitis    Diarrhea    Neuropathic pain    VBI (vertebrobasilar insufficiency)    Atypical pneumonia    Post herpetic neuralgia    Chronic pain after cancer treatment    Left arm pain    Gastroenteritis due to sapovirus    Stage 3a chronic kidney disease (CKD)    ZACH (acute kidney injury)    Acute pyelonephritis     Past Medical History:   Diagnosis Date    COPD (chronic obstructive pulmonary disease)     Coughing up blood     X2 MONTHS    History of COVID-19 2022    Hyperlipidemia     IBS (irritable bowel syndrome)     Primary lung adenocarcinoma, right     Rheumatoid arthritis      Past Surgical History:   Procedure Laterality Date    APPENDECTOMY      appendix removed    BRONCHOSCOPY N/A 2022    Procedure: BRONCHOSCOPY WITH BAL,  BIOPSIES, AND BRUSHINGS WITH ENDOBRONCHIAL ULTRASOUND WITH FNA;  Surgeon:  Gregor Vee MD;  Location: [facility] ENDOSCOPY;  Service: Pulmonary;  Laterality: N/A;  PRE- HILAR MASS  POST- SAME    BRONCHOSCOPY N/A 1/4/2023    Procedure: BRONCHOSCOPY with biopsy, lavage, brushing;  Surgeon: Gregor Vee MD;  Location: [facility] ENDOSCOPY;  Service: Pulmonary;  Laterality: N/A;    CAROTID ENDARTERECTOMY Right     CAROTID ENDARTERECTOMY Left     CATARACT EXTRACTION      CERVICAL FUSION      CHOLECYSTECTOMY      CRANIOTOMY FOR TUMOR Right 5/30/2023    Procedure: RIGHT CRANIOTOMY FOR TUMOR RESECTION STEREOTACTIC WITH STEALTH;  Surgeon: Clnit Ricketts MD;  Location: [facility] MAIN OR;  Service: Neurosurgery;  Laterality: Right;    HYSTERECTOMY      SHOULDER ARTHROSCOPY Right 02/19/2019    right should scope/cuff repair     VENOUS ACCESS DEVICE (PORT) INSERTION Right 9/6/2022    Procedure: POWERPORT INSERTION;  Surgeon: Remedios Rice MD;  Location: [facility] MAIN OR;  Service: Thoracic;  Laterality: Right;      General Information       Row Name 03/06/24 1532          OT Time and Intention    Document Type therapy note (daily note)  -     Mode of Treatment co-treatment;occupational therapy;physical therapy  -       Row Name 03/06/24 1532          General Information    Patient Profile Reviewed yes  -     Existing Precautions/Restrictions fall  -     Barriers to Rehab medically complex  -       Row Name 03/06/24 1532          Living Environment    People in Home other relative(s)  -       Row Name 03/06/24 1532          Home Main Entrance    Number of Stairs, Main Entrance none  -     Stair Railings, Main Entrance none  -       Row Name 03/06/24 1532          Cognition    Orientation Status (Cognition) oriented x 3  -       Row Name 03/06/24 1532          Safety Issues, Functional Mobility    Safety Issues Affecting Function (Mobility) insight into deficits/self-awareness  -     Impairments Affecting Function (Mobility) balance;endurance/activity tolerance;strength;postural/trunk  control;pain  -     Comment, Safety Issues/Impairments (Mobility) PT/OT cotreatment medically appropriate and necessary due to patient acuity level, to maximize therapeutic benefit due to impaired act tolerance, and for safety of patient and staff. Treatment focused on progression of care and goals established in POC.  -               User Key  (r) = Recorded By, (t) = Taken By, (c) = Cosigned By      Initials Name Provider Type     Beth Muller OT Occupational Therapist                     Mobility/ADL's       Row Name 03/06/24 1533          Bed Mobility    Bed Mobility supine-sit;sit-supine  -     Supine-Sit [place] (Bed Mobility) minimum assist (75% patient effort);verbal cues  -     Sit-Supine [place] (Bed Mobility) verbal cues;moderate assist (50% patient effort);2 person assist  -     Assistive Device (Bed Mobility) bed rails;head of bed elevated  -[facility] Name 03/06/24 1533          Sit-Stand Transfer    Sit-Stand [place] (Transfers) verbal cues;2 person assist;minimum assist (75% patient effort);moderate assist (50% patient effort)  -     Assistive Device (Sit-Stand Transfers) walker, front-wheeled  -[facility] Name 03/06/24 1533          Functional Mobility    Functional Mobility- Comment able to take a few steps forward and backward  -[facility] Name 03/06/24 1533          Activities of Daily Living    BADL Assessment/Intervention upper body dressing;grooming  -[facility] Name 03/06/24 [facility]          Upper Body Dressing Assessment/Training    [place] Level (Upper Body Dressing) upper body dressing skills;doff;don;minimum assist (75% patient effort)  -     Position (Upper Body Dressing) sitting up in bed  -[facility] Name 03/06/24 1533          Grooming Assessment/Training    [place] Level (Grooming) grooming skills;wash face, hands;set up  -     Position (Grooming) edge of bed sitting  -               User Key  (r) = Recorded By, (t) = Taken By, (c)  = Cosigned By      Initials Name Provider Type     Beth Muller OT Occupational Therapist                   Obj/Interventions       [facility] Name 03/06/24 1536          Vision Assessment/Intervention    Visual Impairment/Limitations WFL  -[facility] Name 03/06/24 1536          Range of Motion Comprehensive    General Range of Motion bilateral upper extremity ROM WFL  -[facility] Name 03/06/24 1536          Strength Comprehensive (MMT)    General Manual Muscle Testing (MMT) Assessment upper extremity strength deficits identified  -[facility] Name 03/06/24 1536          Motor Skills    Motor Skills functional endurance  -     Functional Endurance Poor+ significant improvement today  -[facility] Name 03/06/24 1536          Balance    Balance Assessment sitting static balance;sitting dynamic balance;sit to stand dynamic balance;standing dynamic balance;standing static balance  -     Static Sitting Balance moderate assist  -     Dynamic Sitting Balance moderate assist  -     Position, Sitting Balance unsupported;sitting edge of bed  -     Sit to Stand Dynamic Balance moderate assist;2-person assist  -     Static Standing Balance moderate assist;2-person assist  -     Dynamic Standing Balance moderate assist;2-person assist  -     Position/Device Used, Standing Balance supported;walker, front-wheeled  -     Balance Interventions sitting;standing;occupation based/functional task;sit to stand  -               User Key  (r) = Recorded By, (t) = Taken By, (c) = Cosigned By      Initials Name Provider Type     Beth Muller OT Occupational Therapist                   Goals/Plan    No documentation.                  Clinical Impression       [facility] Name 03/06/24 1537          Pain Assessment    Pretreatment Pain Rating 5/10  -     Posttreatment Pain Rating 5/10  -     Pain Location - Side/Orientation Left  -     Pain Location upper  -     Pain Location - extremity  -     Pain Intervention(s)  Repositioned  -       Row Name 03/06/24 1537          Plan of Care Review    Plan of Care Reviewed With patient  -     Progress improving  -       Row Name 03/06/24 1537          Therapy Assessment/Plan (OT)    Rehab Potential (OT) good, to achieve stated therapy goals  -     Therapy Frequency (OT) 5 times/wk  -       Row Name 03/06/24 1537          Therapy Plan Review/Discharge Plan (OT)    Anticipated Discharge Disposition (OT) skilled nursing facility  -       Row Name 03/06/24 1537          Positioning and Restraints    Pre-Treatment Position in bed  -     Post Treatment Position bed  -     In Bed call light within reach;encouraged to call for assist;exit alarm on;fowlers  -               User Key  (r) = Recorded By, (t) = Taken By, (c) = Cosigned By      Initials Name Provider Type     Beth Muller, OT Occupational Therapist                   Outcome Measures       Row Name 03/06/24 1539          How much help from another is currently needed...    Putting on and taking off regular lower body clothing? 1  -LH     Bathing (including washing, rinsing, and drying) 1  -LH     Toileting (which includes using toilet bed pan or urinal) 1  -LH     Putting on and taking off regular upper body clothing 2  -LH     Taking care of personal grooming (such as brushing teeth) 2  -LH     Eating meals 3  -LH     AM-PAC 6 Clicks Score (OT) 10  -       Row Name 03/06/24 0818          How much help from another person do you currently need...    Turning from your back to your side while in flat bed without using bedrails? 3  -KE     Moving from lying on back to sitting on the side of a flat bed without bedrails? 2  -KE     Moving to and from a bed to a chair (including a wheelchair)? 2  -KE     Standing up from a chair using your arms (e.g., wheelchair, bedside chair)? 2  -KE     Climbing 3-5 steps with a railing? 1  -KE     To walk in hospital room? 2  -KE     AM-PAC 6 Clicks Score (PT) 12  -KE     Highest  Level of Mobility Goal 4 --> Transfer to chair/commode  -[facility]       Row Name 03/06/24 1539          Functional Assessment    Outcome Measure Options AM-PAC 6 Clicks Daily Activity (OT)  -               User Key  (r) = Recorded By, (t) = Taken By, (c) = Cosigned By      Initials Name Provider Type    Shahnaz Gonzalez, RN Registered Nurse     Beth Muller OT Occupational Therapist                    Occupational Therapy Education       Title: PT OT SLP Therapies (In Progress)       Topic: Occupational Therapy (Done)       Point: ADL training (Done)       Description:   Instruct learner(s) on proper safety adaptation and remediation techniques during self care or transfers.   Instruct in proper use of assistive devices.                  Learning Progress Summary             Patient Acceptance, E,TB, VU by  at 3/4/2024 1523    Comment: Instructed in role of OT, discharge planning, home safety, fall prevention, current deficit education                         Point: Home exercise program (Done)       Description:   Instruct learner(s) on appropriate technique for monitoring, assisting and/or progressing therapeutic exercises/activities.                  Learning Progress Summary             Patient Acceptance, E,TB, VU by  at 3/4/2024 1523    Comment: Instructed in role of OT, discharge planning, home safety, fall prevention, current deficit education                         Point: Precautions (Done)       Description:   Instruct learner(s) on prescribed precautions during self-care and functional transfers.                  Learning Progress Summary             Patient Acceptance, E,TB, VU by  at 3/4/2024 1523    Comment: Instructed in role of OT, discharge planning, home safety, fall prevention, current deficit education                         Point: Body mechanics (Done)       Description:   Instruct learner(s) on proper positioning and spine alignment during self-care, functional mobility activities  and/or exercises.                  Learning Progress Summary             Patient Acceptance, E,TB, VU by  at 3/4/2024 0087    Comment: Instructed in role of OT, discharge planning, home safety, fall prevention, current deficit education                                         User Key       Initials Effective Dates Name Provider Type Discipline     08/31/23 -  Beth Muller, OT Occupational Therapist OT                  OT Recommendation and Plan  Planned Therapy Interventions (OT): activity tolerance training, BADL retraining, functional balance retraining, occupation/activity based interventions, patient/caregiver education/training, strengthening exercise, transfer/mobility retraining  Therapy Frequency (OT): 5 times/wk  Plan of Care Review  Plan of Care Reviewed With: patient  Progress: improving  Outcome Evaluation: Patient more able to tolerate therapeutic intervention today, more alert and able to follow commands, but does continue to require support with sitting and standing balance today. Patient reports she has been eating better, and is feeling a bit better. Patient performed dynamic sitting balance exercises requires cues for positioning and mod A for sitting upright. Patient performed sit to stand transfer x3, coming to stand at rwx with mod Ax2, and requires mod Ax2 to maintain static standing balance. Patient will continue to benefit from skilled OT to address current functional deficits, anticipate need for SNF at acute NE, though patient remains adamant that she plans to return home.     Time Calculation:   Evaluation Complexity (OT)  Review Occupational Profile/Medical/Therapy History Complexity: expanded/moderate complexity  Assessment, Occupational Performance/Identification of Deficit Complexity: 3-5 performance deficits  Clinical Decision Making Complexity (OT): detailed assessment/moderate complexity  Overall Complexity of Evaluation (OT): moderate complexity     Time Calculation- OT        Row Name 03/06/24 1541             Time Calculation- OT    OT Start Time 1444  -      OT Stop Time 1507  -      OT Time Calculation (min) 23 min  -      Total Timed Code Minutes- OT 23 minute(s)  -      OT Received On 03/06/24  -      OT - Next Appointment 03/07/24  -         Timed Charges    40429 - OT Therapeutic Activity Minutes 10  -LH      87217 - OT Self Care/Mgmt Minutes 13  -         Total Minutes    Timed Charges Total Minutes 23  -       Total Minutes 23  -                User Key  (r) = Recorded By, (t) = Taken By, (c) = Cosigned By      Initials Name Provider Type     Beth Muller OT Occupational Therapist                  Therapy Charges for Today       Code Description Service Date Service Provider Modifiers Qty    19848430452 HC OT NEUROMUSC RE EDUCATION EA 15 MIN 3/5/2024 Beth Muller OT GO 1    33093158066 HC OT THERAPEUTIC ACT EA 15 MIN 3/6/2024 Beth Muller OT GO 1    05273536862 HC OT SELF CARE/MGMT/TRAIN EA 15 MIN 3/6/2024 Beth Muller OT GO 1                 Beth Muller OT  3/6/2024

## 2024-03-06 NOTE — PLAN OF CARE
Goal Outcome Evaluation:  Plan of Care Reviewed With: patient        Progress: improving  Outcome Evaluation: IVF continues, Q2 turns. Resting well. VSS.

## 2024-03-06 NOTE — PROGRESS NOTES
LOS: 3 days     Chief Complaint: Bacteremia    Interval History: Patient reports she is feeling well this morning.  Denies any acute complaints.    Vital Signs  Temp:  [98 °F (36.7 °C)-99.7 °F (37.6 °C)] 99.7 °F (37.6 °C)  Heart Rate:  [80-95] 95  Resp:  [18] 18  BP: (132-146)/(44-64) 140/47    Physical Exam:  General: In no acute distress  HEENT: Oropharynx clear, moist mucous membranes  Respiratory: Normal work of breathing  GI: Soft, nondistended  Extremities: No edema, cyanosis  Access: Peripheral IV    Antibiotics:  Anti-Infectives (From admission, onward)      Ordered     Dose/Rate Route Frequency Start Stop    03/03/24 1930  cefTRIAXone (ROCEPHIN) 2,000 mg in sodium chloride 0.9 % 100 mL IVPB-VTB        Ordering Provider: Simone Nelson MD    2,000 mg  200 mL/hr over 30 Minutes Intravenous Every 24 Hours 03/04/24 1800 03/10/24 1759    03/03/24 1906  cefTRIAXone (ROCEPHIN) 2,000 mg in sodium chloride 0.9 % 100 mL IVPB-VTB        Ordering Provider: Simone Kitchen MD    2,000 mg  200 mL/hr over 30 Minutes Intravenous Once 03/03/24 1922 03/03/24 1946             Results Review:     I reviewed the patient's new clinical results.    Lab Results   Component Value Date    WBC 18.57 (H) 03/05/2024    HGB 8.3 (L) 03/05/2024    HCT 26.0 (L) 03/05/2024    MCV 85.0 03/05/2024     03/05/2024     Lab Results   Component Value Date    GLUCOSE 175 (H) 03/05/2024    BUN 19 03/05/2024    CREATININE 1.00 03/05/2024    BCR 19.0 03/05/2024    CO2 23.1 03/05/2024    CALCIUM 8.4 (L) 03/05/2024    ALBUMIN 2.5 (L) 03/05/2024    AST 14 03/05/2024    ALT 16 03/05/2024       Microbiology:  3/3 blood cultures 1 out of 2 from the central line positive for E. coli  3/3 respiratory panel negative  3/3 urine culture greater than 100,000 gram-negative bacilli  3/4 blood cultures no growth to date     Radiology:  3/3 CT chest abdomen pelvis report reviewed.  Right upper lobe cavitary masslike consolidation at the area of prior  metastasis.  Right kidney with prominent right perinephric stranding concerning for acute right pyelonephritis.    Assessment    # E. coli septicemia  #Possible right pyelonephritis  #Chest port in place  #Non-small cell lung cancer status post resection and radiation with brain metastasis  #ZACH  #Rheumatoid arthritis on chronic prednisone 10 mg daily    Urine culture correlating with blood cultures.  Repeat blood cultures have remained no growth.   Currently on day 3 of therapy.  Plan to de-escalate to oral therapy with levofloxacin 750 mg every other day (renally adjusted) for further 7-day course to treat pyelonephritis and bacteremia.  Telemetry reviewed today with normal QTc.    Thank you for allowing me to be involved in the care of this patient. Infectious diseases will sign off at this time with antibiotics plan in place, but please call me at [phone] if any further ID questions or new ID concerns.

## 2024-03-07 VITALS
SYSTOLIC BLOOD PRESSURE: 116 MMHG | OXYGEN SATURATION: 95 % | HEART RATE: 87 BPM | DIASTOLIC BLOOD PRESSURE: 53 MMHG | BODY MASS INDEX: 25.66 KG/M2 | TEMPERATURE: 97.9 F | RESPIRATION RATE: 18 BRPM | WEIGHT: 130.69 LBS | HEIGHT: 60 IN

## 2024-03-07 LAB
BASOPHILS # BLD AUTO: 0.02 10*3/MM3 (ref 0–0.2)
BASOPHILS NFR BLD AUTO: 0.1 % (ref 0–1.5)
DEPRECATED RDW RBC AUTO: 47.7 FL (ref 37–54)
EOSINOPHIL # BLD AUTO: 0.06 10*3/MM3 (ref 0–0.4)
EOSINOPHIL NFR BLD AUTO: 0.4 % (ref 0.3–6.2)
ERYTHROCYTE [DISTWIDTH] IN BLOOD BY AUTOMATED COUNT: 15.5 % (ref 12.3–15.4)
HCT VFR BLD AUTO: 22.7 % (ref 34–46.6)
HCT VFR BLD AUTO: 26.8 % (ref 34–46.6)
HGB BLD-MCNC: 7.4 G/DL (ref 12–15.9)
HGB BLD-MCNC: 8.4 G/DL (ref 12–15.9)
IMM GRANULOCYTES # BLD AUTO: 0.42 10*3/MM3 (ref 0–0.05)
IMM GRANULOCYTES NFR BLD AUTO: 2.5 % (ref 0–0.5)
LYMPHOCYTES # BLD AUTO: 0.79 10*3/MM3 (ref 0.7–3.1)
LYMPHOCYTES NFR BLD AUTO: 4.7 % (ref 19.6–45.3)
MCH RBC QN AUTO: 28.1 PG (ref 26.6–33)
MCHC RBC AUTO-ENTMCNC: 32.6 G/DL (ref 31.5–35.7)
MCV RBC AUTO: 86.3 FL (ref 79–97)
MONOCYTES # BLD AUTO: 1.24 10*3/MM3 (ref 0.1–0.9)
MONOCYTES NFR BLD AUTO: 7.4 % (ref 5–12)
NEUTROPHILS NFR BLD AUTO: 14.25 10*3/MM3 (ref 1.7–7)
NEUTROPHILS NFR BLD AUTO: 84.9 % (ref 42.7–76)
NRBC BLD AUTO-RTO: 0 /100 WBC (ref 0–0.2)
PLATELET # BLD AUTO: 326 10*3/MM3 (ref 140–450)
PMV BLD AUTO: 10.7 FL (ref 6–12)
RBC # BLD AUTO: 2.63 10*6/MM3 (ref 3.77–5.28)
WBC NRBC COR # BLD AUTO: 16.78 10*3/MM3 (ref 3.4–10.8)

## 2024-03-07 PROCEDURE — 25010000002 HEPARIN LOCK FLUSH PER 10 UNITS: Performed by: STUDENT IN AN ORGANIZED HEALTH CARE EDUCATION/TRAINING PROGRAM

## 2024-03-07 PROCEDURE — 85014 HEMATOCRIT: CPT | Performed by: STUDENT IN AN ORGANIZED HEALTH CARE EDUCATION/TRAINING PROGRAM

## 2024-03-07 PROCEDURE — 85018 HEMOGLOBIN: CPT | Performed by: STUDENT IN AN ORGANIZED HEALTH CARE EDUCATION/TRAINING PROGRAM

## 2024-03-07 PROCEDURE — 85025 COMPLETE CBC W/AUTO DIFF WBC: CPT | Performed by: STUDENT IN AN ORGANIZED HEALTH CARE EDUCATION/TRAINING PROGRAM

## 2024-03-07 PROCEDURE — 63710000001 PREDNISONE PER 5 MG: Performed by: STUDENT IN AN ORGANIZED HEALTH CARE EDUCATION/TRAINING PROGRAM

## 2024-03-07 RX ORDER — LEVOFLOXACIN 750 MG/1
750 TABLET, FILM COATED ORAL EVERY OTHER DAY
Start: 2024-03-08 | End: 2024-03-11

## 2024-03-07 RX ORDER — HEPARIN SODIUM (PORCINE) LOCK FLUSH IV SOLN 100 UNIT/ML 100 UNIT/ML
5 SOLUTION INTRAVENOUS AS NEEDED
Status: DISCONTINUED | OUTPATIENT
Start: 2024-03-07 | End: 2024-03-07 | Stop reason: HOSPADM

## 2024-03-07 RX ORDER — PREDNISONE 10 MG/1
10 TABLET ORAL DAILY
Start: 2024-03-07

## 2024-03-07 RX ADMIN — PREGABALIN 150 MG: 75 CAPSULE ORAL at 08:39

## 2024-03-07 RX ADMIN — CLOPIDOGREL BISULFATE 75 MG: 75 TABLET, FILM COATED ORAL at 08:39

## 2024-03-07 RX ADMIN — LEVETIRACETAM 500 MG: 500 TABLET, FILM COATED ORAL at 08:40

## 2024-03-07 RX ADMIN — ESTRADIOL 1 MG: 1 TABLET ORAL at 08:39

## 2024-03-07 RX ADMIN — OFLOXACIN 4 DROP: 3 SOLUTION OPHTHALMIC at 08:40

## 2024-03-07 RX ADMIN — HEPARIN 500 UNITS: 100 SYRINGE at 13:46

## 2024-03-07 RX ADMIN — HYDROCODONE BITARTRATE AND ACETAMINOPHEN 1 TABLET: 7.5; 325 TABLET ORAL at 02:25

## 2024-03-07 RX ADMIN — PANTOPRAZOLE SODIUM 40 MG: 40 TABLET, DELAYED RELEASE ORAL at 06:20

## 2024-03-07 RX ADMIN — PREDNISONE 10 MG: 10 TABLET ORAL at 08:39

## 2024-03-07 RX ADMIN — HYDROCODONE BITARTRATE AND ACETAMINOPHEN 1 TABLET: 7.5; 325 TABLET ORAL at 08:39

## 2024-03-07 RX ADMIN — FERROUS SULFATE TAB 325 MG (65 MG ELEMENTAL FE) 325 MG: 325 (65 FE) TAB at 08:40

## 2024-03-07 NOTE — PROGRESS NOTES
Continued Stay Note  [facility]     Patient Name: Pam Vidal  MRN: [id number]  Today's Date: 3/7/2024    Admit Date: 3/3/2024    Plan: [facility] @ 1500 via [facility] stretcher   Discharge Plan       Row Name 03/07/24 1142       Plan    Plan [facility] today @ 1500 via [facility] stretcher    Plan Comments Spoke with [facility], bed is ready at [facility] today; [facility] is scheduled to transport @ 1500. Patient updated at the bedside and left [facility] for pt's niece,Rosio Garrett [phone] s/w pt's daughter, Matthew [phone]; daughter is agreeable with dc today to [facility] via [facility]. Transfer packet given to ns.                   Discharge Codes    No documentation.                 Expected Discharge Date and Time       Expected Discharge Date Expected Discharge Time    Mar 7, 2024               Jayda Evans RN

## 2024-03-07 NOTE — DISCHARGE SUMMARY
Patient Name: Pam Vidal  : 1945  MRN: [id number]    Date of Admission: 3/3/2024  Date of Discharge:  3/7/2024  Primary Care Physician: Nik Mckay MD      Chief Complaint:   Abdominal Pain and Coughing Up Blood      Discharge Diagnoses     Active Hospital Problems    Diagnosis  POA    **Acute pyelonephritis [N10]  Yes    ZACH (acute kidney injury) [N17.9]  Yes    HTN (hypertension) [I10]  Yes    COPD (chronic obstructive pulmonary disease) [J44.9]  Yes    Rheumatoid arthritis [M06.9]  Yes    Hyperlipidemia [E78.5]  Yes    Leukocytosis [D72.829]  Yes    Lung cancer metastatic to brain [C34.90, C79.31]  Yes    Neoplastic malignant related fatigue [R53.0]  Yes    Primary lung adenocarcinoma, right [C34.91]  Yes      Resolved Hospital Problems   No resolved problems to display.        Hospital Course     Ms. Vidal is a 79 y.o. female with a history of NSCLC (metastatic to brain, status post resection and XRT), HTN, HLD, RA, CAD who presented to [facility] initially complaining of intermittent fever and diarrhea, dysuria and abdominal pain.  Please see the admitting history and physical for further details.  She was found to have right-sided pyelonephritis with E. coli bacteremia and was admitted to the hospital for further evaluation and treatment.  She was started on broad-spectrum antibiotics and infectious disease was consulted.  She was continued on Rocephin, repeat blood cultures were collected and remained negative.  The patient will discharge with a course of levofloxacin 750 mg every other day (renally adjusted) to complete a 7-day course for her pyelonephritis/bacteremia.  On admission the patient was also noted to have an increase in the size of her malignancy on CT imaging for which oncology was consulted.  Per oncology, the patient should follow-up with her primary oncologist Dr. Lagunas in 1 week to discuss chemotherapy/the possibility of switching therapies.  The patient  worked with PT prior to discharge and determined that she would benefit from discharge to rehab prior to returning home.  She should follow-up with PCP in a week for posthospital follow-up visit.    Day of Discharge     Subjective:  Resting in bed, she tells me she would like to nap before going to rehab this afternoon.  Otherwise no complaints and no events overnight.    Physical Exam:  Temp:  [97.5 °F (36.4 °C)-98.6 °F (37 °C)] 97.5 °F (36.4 °C)  Heart Rate:  [78-81] 81  Resp:  [18] 18  BP: (119-126)/(50-63) 119/63  Body mass index is 25.52 kg/m².  Physical Exam  Constitutional:       General: She is not in acute distress.     Appearance: She is ill-appearing.   Cardiovascular:      Rate and Rhythm: Normal rate and regular rhythm.   Pulmonary:      Effort: Pulmonary effort is normal. No respiratory distress.      Breath sounds: No wheezing.      Comments: Decreased breath sounds bilterally  Abdominal:      General: Abdomen is flat.      Palpations: Abdomen is soft.      Tenderness: There is no abdominal tenderness.   Musculoskeletal:         General: No swelling or tenderness.      Right lower leg: No edema.      Left lower leg: No edema.   Skin:     General: Skin is warm and dry.   Neurological:      General: No focal deficit present.      Mental Status: She is alert and oriented to person, place, and time. Mental status is at baseline.         Consultants     Consult Orders (all) (From admission, onward)       Start     Ordered    03/04/24 1034  Inpatient Infectious Diseases Consult  Once        Specialty:  Infectious Diseases  Provider:  Sathish Esteban,     03/04/24 1033    03/04/24 0702  Inpatient Hematology & Oncology Consult  IN AM        Specialty:  Hematology and Oncology  Provider:  Cruzito aLgunas MD    03/03/24 1929    03/04/24 0657  Inpatient Infection Control Nurse Consult  Once        Provider:  (Not yet assigned)    03/04/24 0656    03/03/24 1908  [facility] (on-call MD unless specified) Details   Once        Specialty:  Hospitalist  Provider:  (Not yet assigned)    03/03/24 1907                  Procedures     Imaging Results (All)       Procedure Component Value Units Date/Time    CT Angiogram Chest Pulmonary Embolism [[id number]] Collected: 03/03/24 1838     Updated: 03/04/24 0725    Narrative:         CT ANGIOGRAM OF THE CHEST. MULTIPLE CORONAL, SAGITTAL, AND 3-D  RECONSTRUCTIONS. CT ABDOMEN AND PELVIS WITH IV CONTRAST     HISTORY: 79-year-old female with cough and fever. Hemoptysis. Abdominal  pain. Metastatic breast cancer. Brain metastases.     TECHNIQUE: Radiation dose reduction techniques were utilized, including  automated exposure control and exposure modulation based on body size.  CT angiogram of the chest was performed. Multiple coronal, sagittal, and  3-D reconstruction images were obtained. Subsequently, 3 mm images were  obtained through the abdomen and pelvis after the administration of IV  contrast. Compared with CT abdomen and pelvis 02/17/2024 and chest CT  01/05/2024.     FINDINGS:     CHEST CTA:  1. There is no convincing evidence for pulmonary thromboemboli.     2. There has been significant increase in size of the centrally cavitary  masslike consolidation pleural-based at the posterior medial aspect of  the right upper lobe measuring 6.8 x 4.4 cm, previously approximately  4.7 x 3.3 cm. The centrally liquefied/necrotic region containing air  measures 4.0 x 3.3 cm, previously smaller. There is more prominent  abnormal soft tissue encasing the right hilum, but there is no  mediastinal or left hilar lymphadenopathy. No significant change in the  very mild pleural thickening at the posterior aspect of the right upper  lobe.     3. There are no pleural or pericardial effusions. There is less  atelectasis at the left lung base, currently mild.     ABDOMEN/PELVIS:  1. There is a subtle striated enhancement pattern of the right kidney  and there is more prominent right perinephric stranding  than on the  left. Urinary bladder wall appears thickened, but the bladder is nearly  collapsed. Acute right pyelonephritis is suspected and pyelonephritis on  the left cannot be excluded.     2. No significant change is seen in the right renal cysts. The largest  measures 5.5 cm and has internal density measurements of 36 Hounsfield  units. Likely proteinaceous cysts and conservative surveillance is  recommended.     3. There is no acute abnormality at the liver, spleen, pancreas, or  adrenals. There is no acute bowel abnormality. There is moderate sigmoid  diverticulosis without evidence for diverticulitis. The appendix is  surgically absent. Uterus is surgically absent. No lymphadenopathy.     This report was finalized on 3/4/2024 7:22 AM by Dr. Rianna Meeks M.D on  Workstation: [id number]       CT Abdomen Pelvis With Contrast [[id number]] Collected: 03/03/24 1838     Updated: 03/04/24 0725    Narrative:         CT ANGIOGRAM OF THE CHEST. MULTIPLE CORONAL, SAGITTAL, AND 3-D  RECONSTRUCTIONS. CT ABDOMEN AND PELVIS WITH IV CONTRAST     HISTORY: 79-year-old female with cough and fever. Hemoptysis. Abdominal  pain. Metastatic breast cancer. Brain metastases.     TECHNIQUE: Radiation dose reduction techniques were utilized, including  automated exposure control and exposure modulation based on body size.  CT angiogram of the chest was performed. Multiple coronal, sagittal, and  3-D reconstruction images were obtained. Subsequently, 3 mm images were  obtained through the abdomen and pelvis after the administration of IV  contrast. Compared with CT abdomen and pelvis 02/17/2024 and chest CT  01/05/2024.     FINDINGS:     CHEST CTA:  1. There is no convincing evidence for pulmonary thromboemboli.     2. There has been significant increase in size of the centrally cavitary  masslike consolidation pleural-based at the posterior medial aspect of  the right upper lobe measuring 6.8 x 4.4 cm, previously approximately  4.7 x 3.3  cm. The centrally liquefied/necrotic region containing air  measures 4.0 x 3.3 cm, previously smaller. There is more prominent  abnormal soft tissue encasing the right hilum, but there is no  mediastinal or left hilar lymphadenopathy. No significant change in the  very mild pleural thickening at the posterior aspect of the right upper  lobe.     3. There are no pleural or pericardial effusions. There is less  atelectasis at the left lung base, currently mild.     ABDOMEN/PELVIS:  1. There is a subtle striated enhancement pattern of the right kidney  and there is more prominent right perinephric stranding than on the  left. Urinary bladder wall appears thickened, but the bladder is nearly  collapsed. Acute right pyelonephritis is suspected and pyelonephritis on  the left cannot be excluded.     2. No significant change is seen in the right renal cysts. The largest  measures 5.5 cm and has internal density measurements of 36 Hounsfield  units. Likely proteinaceous cysts and conservative surveillance is  recommended.     3. There is no acute abnormality at the liver, spleen, pancreas, or  adrenals. There is no acute bowel abnormality. There is moderate sigmoid  diverticulosis without evidence for diverticulitis. The appendix is  surgically absent. Uterus is surgically absent. No lymphadenopathy.     This report was finalized on 3/4/2024 7:22 AM by Dr. Rianna Meeks M.D on  Workstation: [id number]       XR Chest 1 View [[id number]] Collected: 03/03/24 1550     Updated: 03/03/24 1559    Narrative:      XR CHEST 1 VW-        INDICATION: Hemoptysis     COMPARISON: Chest radiograph August 31, 2023     TECHNIQUE: 1 view chest     FINDINGS:      Right suprahilar masslike opacity, with volume loss. Linear opacities of  the left lung base, consistent with subsegmental atelectasis. No  effusions. Stable mediastinum. Heart is normal in size. Right  Port-A-Cath with the catheter tip near the caval atrial junction.  Cholecystectomy  "clips. Cervical spine cerclage wires.       Impression:      Masslike right suprahilar opacity with volume loss. Suspect radiated  tumor      This report was finalized on 3/3/2024 3:56 PM by Dr. Enzo Soria M.D  on Workstation: [id number]               Pertinent Labs     Results from last 7 days   Lab Units 03/07/24  0842 03/07/24  0621 03/05/24  0959 03/04/24  0254 03/03/24  1622   WBC 10*3/mm3  --  16.78* 18.57* 21.55* 23.60*   HEMOGLOBIN g/dL 8.4* 7.4* 8.3* 9.1* 9.7*   PLATELETS 10*3/mm3  --  326 304 304 316     Results from last 7 days   Lab Units 03/06/24  1341 03/05/24  0959 03/04/24  0254 03/03/24  1622   SODIUM mmol/L 135* 135* 137 133*   POTASSIUM mmol/L 4.1 4.2 4.3 4.7   CHLORIDE mmol/L 101 102 102 99   CO2 mmol/L 24.3 23.1 22.9 19.0*   BUN mg/dL 15 19 27* 31*   CREATININE mg/dL 1.10* 1.00 1.16* 1.51*   GLUCOSE mg/dL 216* 175* 189* 105*   Estimated Creatinine Clearance: 34.5 mL/min (A) (by C-G formula based on SCr of 1.1 mg/dL (H)).  Results from last 7 days   Lab Units 03/05/24  0959 03/03/24  1622   ALBUMIN g/dL 2.5* 3.0*   BILIRUBIN mg/dL 0.2 0.5   ALK PHOS U/L 163* 289*   AST (SGOT) U/L 14 25   ALT (SGPT) U/L 16 26     Results from last 7 days   Lab Units 03/06/24  1341 03/05/24  0959 03/04/24  0254 03/03/24  1622   CALCIUM mg/dL 8.5* 8.4* 8.5* 9.2   ALBUMIN g/dL  --  2.5*  --  3.0*     Results from last 7 days   Lab Units 03/03/24  1622   LIPASE U/L 12*             Invalid input(s): \"LDLCALC\"  Results from last 7 days   Lab Units 03/04/24  1432 03/04/24  1352 03/03/24  1638 03/03/24  1623 03/03/24  1622   BLOODCX  No growth at 2 days No growth at 2 days No growth at 3 days  --  Escherichia coli*   URINECX   --   --   --  >100,000 CFU/mL Escherichia coli*  --    BCIDPCR   --   --   --   --  Escherichia coli. Identification by BCID2 PCR.*       Test Results Pending at Discharge     Pending Labs       Order Current Status    Blood Culture - Blood, Arm, Left Preliminary result    Blood Culture - " Blood, Arm, Left Preliminary result    Blood Culture - Blood, Blood, Central Line Preliminary result            Discharge Details        Discharge Medications        New Medications        Instructions Start Date   levoFLOXacin 750 MG tablet  Commonly known as: LEVAQUIN   750 mg, Oral, Every Other Day   Start Date: March 8, 2024            Changes to Medications        Instructions Start Date   pregabalin 150 MG capsule  Commonly known as: LYRICA  What changed: Another medication with the same name was removed. Continue taking this medication, and follow the directions you see here.   150 mg, Oral, Every 12 Hours Scheduled             Continue These Medications        Instructions Start Date   atorvastatin 20 MG tablet  Commonly known as: LIPITOR   20 mg, Oral, Nightly      azelastine 0.1 % nasal spray  Commonly known as: ASTELIN   1 spray, Nasal, Daily PRN      B COMPLEX VITAMINS ER PO   Oral, Daily      clopidogrel 75 MG tablet  Commonly known as: PLAVIX   75 mg, Oral, Daily      diphenoxylate-atropine 2.5-0.025 MG per tablet  Commonly known as: LOMOTIL   1 tablet, Oral, 4 Times Daily PRN      esomeprazole 20 MG capsule  Commonly known as: nexIUM   20 mg, Oral, Every Morning Before Breakfast      estradiol 1 MG tablet  Commonly known as: ESTRACE   1 mg, Oral, Daily      FeroSul 325 (65 Fe) MG tablet  Generic drug: ferrous sulfate   TAKE ONE TABLET BY MOUTH DAILY WITH BREAKFAST      Gabapentin 10 %, Baclofen 2 %, lidocaine 4 %, Ketamine HCl 4 %   1-2 g, Topical, 3 to 4 Times Daily, Lt arm/ lt shoulder      HYDROcodone-acetaminophen 7.5-325 MG per tablet  Commonly known as: NORCO   1 tablet, Oral, Every 6 Hours PRN      levETIRAcetam 500 MG tablet  Commonly known as: KEPPRA   500 mg, Oral, 2 Times Daily      ofloxacin 0.3 % ophthalmic solution  Commonly known as: OCUFLOX   Administer 4 drops to both eyes Daily. For 4 days before injection and 4 days after injection      ondansetron 8 MG tablet  Commonly known as:  ZOFRAN   8 mg, Oral, 3 Times Daily PRN      predniSONE 10 MG tablet  Commonly known as: DELTASONE   10 mg, Oral, Daily, 1 tab (10mg) daily      PRESERVISION AREDS 2+MULTI VIT PO   Oral      vitamin D3 125 MCG (5000 UT) capsule capsule   5,000 Units, Oral, Daily             Stop These Medications      lisinopril 20 MG tablet  Commonly known as: PRINIVIL,ZESTRIL              Allergies   Allergen Reactions    Del-Mycin [Erythromycin] Hives    Gentamicin Hives    Ibuprofen Nausea And Vomiting    Latex Dermatitis     GLOVES    Metronidazole Hives    Ofloxacin Hives and Nausea Only     Has tolerated Levaquin     Penicillins Hives     Tolerated rocephin and cefepime 8/2023    Tetracycline Hives    Adhesive Tape Dermatitis     BANDAIDS    Aspirin GI Intolerance     Vomiting can take EC    Codeine Nausea And Vomiting         Discharge Disposition:  Rehab Facility or Unit (DC - External)    Discharge Diet:  Diet Order   Procedures    Diet: Regular/House; Fluid Consistency: Thin (IDDSI 0)       Discharge Activity:   Activity Instructions       Activity as Tolerated              CODE STATUS:    Code Status and Medical Interventions:   Ordered at: 03/03/24 1929     Level Of Support Discussed With:    Patient     Code Status (Patient has no pulse and is not breathing):    CPR (Attempt to Resuscitate)     Medical Interventions (Patient has pulse or is breathing):    Full       Future Appointments   Date Time Provider Department Center   3/13/2024  7:30 AM LAB CHAIR 6 [facility]  [facility]   3/13/2024  8:00 AM Cruzito Lagunas MD [facility]   3/25/2024 11:45 AM [facility] 2 [facility]   3/28/2024 10:00 AM Clint Ricketts MD [facility]   6/26/2024 11:00 AM Chelo Rivera PA [facility]     Additional Instructions for the Follow-ups that You Need to Schedule       Discharge Follow-up with PCP   As directed       Currently Documented PCP:    Nik Mckay MD    PCP Phone Number:    [phone]      Follow Up Details: post-hospital follow up        Discharge Follow-up with Specified Provider: Dr. Matheus elliott   As directed      To: Dr. Matheus elliott               Contact information for follow-up providers       Nik Mckay MD .    Specialty: Family Medicine  Why: post-hospital follow up  Contact information:  [address] [facility] [place] [address] [phone]               Nik Mckay MD .    Specialty: Family Medicine  Why: post-hospital follow up  Contact information:  [address] [facility]  [address]  [place] [address] [phone]               Cruzito Lagunas MD. Schedule an appointment as soon as possible for a visit in 1 week(s).    Specialties: Hematology and Oncology, Oncology, Hematology  Contact information:  [address] [facility] [address]  [place] [address]  [phone]                       Contact information for after-discharge care       Destination       [facility] .    Service: Inpatient Rehabilitation  Contact information:  [facility] [facility] [address] [phone]                                   Additional Instructions for the Follow-ups that You Need to Schedule       Discharge Follow-up with PCP   As directed       Currently Documented PCP:    Nik Mckay MD    PCP Phone Number:    [phone]     Follow Up Details: post-hospital follow up        Discharge Follow-up with Specified Provider: Dr. Matheus Martino week   As directed      To: Dr. Matheus elliott            Time Spent on Discharge:  I spent greater than 30 minutes on this discharge activity which included: face-to-face encounter with the patient, reviewing the data in the system, coordination of the care with the nursing staff as well as consultants, documentation, and entering orders.       Lisette Browning MD  [facility]  03/07/24  11:22 EST

## 2024-03-07 NOTE — PLAN OF CARE
Goal Outcome Evaluation:  Plan of Care Reviewed With: patient        Progress: no change  Outcome Evaluation: Pt on RA, incontinence care provided. PRN pain med given as requested. Q2 turns. Rested well. Discharge to rehab today. VSS.

## 2024-03-07 NOTE — PLAN OF CARE
Problem: Adult Inpatient Plan of Care  Goal: Plan of Care Review  Outcome: Met  Flowsheets  Taken 3/7/2024 1207 by Isha Kitchen RN  Progress: improving  Outcome Evaluation: stable for dc today  Taken 3/7/2024 0527 by Vikki Parsons RN  Plan of Care Reviewed With: patient  Goal: Patient-Specific Goal (Individualized)  Outcome: Met  Goal: Absence of Hospital-Acquired Illness or Injury  Outcome: Met  Intervention: Identify and Manage Fall Risk  Recent Flowsheet Documentation  Taken 3/7/2024 1203 by Isha Kitchen RN  Safety Promotion/Fall Prevention:   assistive device/personal items within reach   clutter free environment maintained   nonskid shoes/slippers when out of bed   safety round/check completed  Taken 3/7/2024 1002 by Isha Kitchen RN  Safety Promotion/Fall Prevention:   assistive device/personal items within reach   clutter free environment maintained   nonskid shoes/slippers when out of bed   safety round/check completed  Taken 3/7/2024 0835 by Isha Kitchen RN  Safety Promotion/Fall Prevention:   assistive device/personal items within reach   clutter free environment maintained   nonskid shoes/slippers when out of bed   safety round/check completed  Intervention: Prevent and Manage VTE (Venous Thromboembolism) Risk  Recent Flowsheet Documentation  Taken 3/7/2024 1203 by Isha Kitchen RN  Activity Management: activity encouraged  Taken 3/7/2024 1002 by Isha Kitchen RN  Activity Management: activity encouraged  Taken 3/7/2024 0835 by Isha Kitchen RN  Activity Management: activity encouraged  Intervention: Prevent Infection  Recent Flowsheet Documentation  Taken 3/7/2024 1203 by Isha Kitchen RN  Infection Prevention:   hand hygiene promoted   rest/sleep promoted  Taken 3/7/2024 1002 by Isha Kitchen RN  Infection Prevention:   hand hygiene promoted   rest/sleep promoted  Taken 3/7/2024 0835 by Isha Kitchen RN  Infection Prevention:   hand hygiene promoted   rest/sleep promoted  Goal:  Optimal Comfort and Wellbeing  Outcome: Met  Goal: Readiness for Transition of Care  Outcome: Met     Problem: Fluid and Electrolyte Imbalance (Acute Kidney Injury/Impairment)  Goal: Fluid and Electrolyte Balance  Outcome: Met     Problem: Oral Intake Inadequate (Acute Kidney Injury/Impairment)  Goal: Optimal Nutrition Intake  Outcome: Met     Problem: Renal Function Impairment (Acute Kidney Injury/Impairment)  Goal: Effective Renal Function  Outcome: Met     Problem: UTI (Urinary Tract Infection)  Goal: Improved Infection Symptoms  Outcome: Met     Problem: Skin Injury Risk Increased  Goal: Skin Health and Integrity  Outcome: Met     Problem: Fall Injury Risk  Goal: Absence of Fall and Fall-Related Injury  Outcome: Met  Intervention: Promote Injury-Free Environment  Recent Flowsheet Documentation  Taken 3/7/2024 1203 by Isha Kitchen, RN  Safety Promotion/Fall Prevention:   assistive device/personal items within reach   clutter free environment maintained   nonskid shoes/slippers when out of bed   safety round/check completed  Taken 3/7/2024 1002 by Isha Kitchen, RN  Safety Promotion/Fall Prevention:   assistive device/personal items within reach   clutter free environment maintained   nonskid shoes/slippers when out of bed   safety round/check completed  Taken 3/7/2024 0835 by Isha Kitchen, RN  Safety Promotion/Fall Prevention:   assistive device/personal items within reach   clutter free environment maintained   nonskid shoes/slippers when out of bed   safety round/check completed   Goal Outcome Evaluation:           Progress: improving  Outcome Evaluation: stable for dc today

## 2024-03-08 LAB — BACTERIA SPEC AEROBE CULT: NORMAL

## 2024-03-09 LAB
BACTERIA SPEC AEROBE CULT: NORMAL
BACTERIA SPEC AEROBE CULT: NORMAL

## 2024-03-11 ENCOUNTER — PATIENT OUTREACH (OUTPATIENT)
Dept: OTHER | Facility: HOSPITAL | Age: 79
End: 2024-03-11
Payer: MEDICARE

## 2024-03-11 NOTE — PROGRESS NOTES
Reviewed chart. IP 3/3-3/7/24 for right-sided pyelonephritis with E. coli bacteremia and was admitted to the hospital for further evaluation and treatment.  Scans revealed progressive lung metastasis with significant increase in the cavitary masslike consolidation pleural-based in the posterior medial aspect of the right upper lobe measuring 6.8 x 4.4 compared to 4.7 x 3.3 cm.  Discharged to [facility]. Meets with Dr. Lagunas 3/13    Called patient. Left message that I was just touching base to see how she was doing after her hospitalization. Wanted to know if she had any questions/concerns or resource/supportive care needs; requested cb. If we don't connect, I will try back in a few weeks.

## 2024-03-16 DIAGNOSIS — G89.3 CHRONIC PAIN AFTER CANCER TREATMENT: ICD-10-CM

## 2024-03-16 RX ORDER — HYDROCODONE BITARTRATE AND ACETAMINOPHEN 7.5; 325 MG/1; MG/1
1 TABLET ORAL EVERY 6 HOURS PRN
Qty: 60 TABLET | Refills: 0 | Status: CANCELLED | OUTPATIENT
Start: 2024-03-16

## 2024-03-19 DIAGNOSIS — G89.3 CHRONIC PAIN AFTER CANCER TREATMENT: ICD-10-CM

## 2024-03-19 DIAGNOSIS — M79.2 NEUROPATHIC PAIN: ICD-10-CM

## 2024-03-19 RX ORDER — HYDROCODONE BITARTRATE AND ACETAMINOPHEN 7.5; 325 MG/1; MG/1
1 TABLET ORAL EVERY 6 HOURS PRN
Qty: 60 TABLET | Refills: 0 | Status: SHIPPED | OUTPATIENT
Start: 2024-03-19

## 2024-03-19 RX ORDER — PREGABALIN 150 MG/1
150 CAPSULE ORAL EVERY 12 HOURS SCHEDULED
Qty: 60 CAPSULE | Refills: 0 | Status: CANCELLED | OUTPATIENT
Start: 2024-03-19 | End: 2024-04-18

## 2024-03-19 RX ORDER — PREDNISONE 10 MG/1
10 TABLET ORAL DAILY
Status: CANCELLED
Start: 2024-03-19

## 2024-03-19 RX ORDER — LEVETIRACETAM 500 MG/1
500 TABLET ORAL 2 TIMES DAILY
Qty: 120 TABLET | Refills: 0 | Status: SHIPPED | OUTPATIENT
Start: 2024-03-19

## 2024-03-19 NOTE — TELEPHONE ENCOUNTER
"Caller: ZIA DURANT    Relationship: Emergency Contact  NOT ON  VERBAL BUT PT GAVE PERMISSION FOR ME TO TALK WITH ZIA.    Best call back number: [phone]    Requested Prescriptions:   Requested Prescriptions     Pending Prescriptions Disp Refills    predniSONE (DELTASONE) 10 MG tablet       Sig: Take 1 tablet by mouth Daily. 1 tab (10mg) daily    pregabalin (LYRICA) 150 MG capsule 60 capsule 0     Sig: Take 1 capsule by mouth Every 12 (Twelve) Hours for 30 days.    levETIRAcetam (KEPPRA) 500 MG tablet 120 tablet 0     Sig: Take 1 tablet by mouth 2 (Two) Times a Day.    HYDROcodone-acetaminophen (NORCO) 7.5-325 MG per tablet 60 tablet 0     Sig: Take 1 tablet by mouth Every 6 (Six) Hours As Needed for Moderate Pain.        Pharmacy where request should be sent: [facility] [id number], [place] - [address] [place], [place] [address]Z - [phone]  - [phone] FX     Last office visit with prescribing clinician: 2024   Last telemedicine visit with prescribing clinician: 2023   Next office visit with prescribing clinician: Visit date not found     Additional details provided by patient:  DR. FUENTES PRESCRIBED PREGABALIN BUT CHART STATES \"\"  SHOULD SHE CONTINUE TO TAKE? PT ALSO STATES THEY UPPED HER HYDROCODONE TO 10 TO 3.25 BUT IT STATES 7.5 - 325 IN CHART, THE INCREASE TO 10 DOES WORK.    Does the patient have less than a 3 day supply:  [x] Yes  [] No    Would you like a call back once the refill request has been completed: [] Yes [x] No    If the office needs to give you a call back, can they leave a voicemail: [] Yes [x] No      "

## 2024-03-28 ENCOUNTER — OFFICE VISIT (OUTPATIENT)
Dept: ONCOLOGY | Facility: CLINIC | Age: 79
End: 2024-03-28
Payer: MEDICARE

## 2024-03-28 ENCOUNTER — INFUSION (OUTPATIENT)
Dept: ONCOLOGY | Facility: HOSPITAL | Age: 79
End: 2024-03-28
Payer: MEDICARE

## 2024-03-28 ENCOUNTER — LAB (OUTPATIENT)
Dept: ONCOLOGY | Facility: HOSPITAL | Age: 79
End: 2024-03-28
Payer: MEDICARE

## 2024-03-28 VITALS
HEIGHT: 60 IN | SYSTOLIC BLOOD PRESSURE: 107 MMHG | DIASTOLIC BLOOD PRESSURE: 63 MMHG | WEIGHT: 122.1 LBS | BODY MASS INDEX: 23.97 KG/M2 | HEART RATE: 41 BPM | OXYGEN SATURATION: 80 %

## 2024-03-28 DIAGNOSIS — C79.31 LUNG CANCER METASTATIC TO BRAIN: ICD-10-CM

## 2024-03-28 DIAGNOSIS — C34.91 PRIMARY LUNG ADENOCARCINOMA, RIGHT: ICD-10-CM

## 2024-03-28 DIAGNOSIS — C34.91 PRIMARY LUNG ADENOCARCINOMA, RIGHT: Primary | ICD-10-CM

## 2024-03-28 DIAGNOSIS — B02.29 POST HERPETIC NEURALGIA: ICD-10-CM

## 2024-03-28 DIAGNOSIS — M06.9 RHEUMATOID ARTHRITIS, INVOLVING UNSPECIFIED SITE, UNSPECIFIED WHETHER RHEUMATOID FACTOR PRESENT: ICD-10-CM

## 2024-03-28 DIAGNOSIS — C34.90 LUNG CANCER METASTATIC TO BRAIN: ICD-10-CM

## 2024-03-28 LAB
ALBUMIN SERPL-MCNC: 3.6 G/DL (ref 3.5–5.2)
ALBUMIN/GLOB SERPL: 1 G/DL
ALP SERPL-CCNC: 88 U/L (ref 39–117)
ALT SERPL W P-5'-P-CCNC: <5 U/L (ref 1–33)
ANION GAP SERPL CALCULATED.3IONS-SCNC: 13.3 MMOL/L (ref 5–15)
AST SERPL-CCNC: 14 U/L (ref 1–32)
BASOPHILS # BLD AUTO: 0.05 10*3/MM3 (ref 0–0.2)
BASOPHILS NFR BLD AUTO: 0.3 % (ref 0–1.5)
BILIRUB SERPL-MCNC: 0.4 MG/DL (ref 0–1.2)
BUN SERPL-MCNC: 20 MG/DL (ref 8–23)
BUN/CREAT SERPL: 19.2 (ref 7–25)
CALCIUM SPEC-SCNC: 9.1 MG/DL (ref 8.6–10.5)
CHLORIDE SERPL-SCNC: 101 MMOL/L (ref 98–107)
CO2 SERPL-SCNC: 22.7 MMOL/L (ref 22–29)
CREAT SERPL-MCNC: 1.04 MG/DL (ref 0.57–1)
DEPRECATED RDW RBC AUTO: 58 FL (ref 37–54)
EGFRCR SERPLBLD CKD-EPI 2021: 55.5 ML/MIN/1.73
EOSINOPHIL # BLD AUTO: 0.27 10*3/MM3 (ref 0–0.4)
EOSINOPHIL NFR BLD AUTO: 1.8 % (ref 0.3–6.2)
ERYTHROCYTE [DISTWIDTH] IN BLOOD BY AUTOMATED COUNT: 17.7 % (ref 12.3–15.4)
GLOBULIN UR ELPH-MCNC: 3.5 GM/DL
GLUCOSE SERPL-MCNC: 99 MG/DL (ref 65–99)
HCT VFR BLD AUTO: 32 % (ref 34–46.6)
HGB BLD-MCNC: 9.5 G/DL (ref 12–15.9)
IMM GRANULOCYTES # BLD AUTO: 0.14 10*3/MM3 (ref 0–0.05)
IMM GRANULOCYTES NFR BLD AUTO: 0.9 % (ref 0–0.5)
LYMPHOCYTES # BLD AUTO: 1.83 10*3/MM3 (ref 0.7–3.1)
LYMPHOCYTES NFR BLD AUTO: 12 % (ref 19.6–45.3)
MCH RBC QN AUTO: 26.7 PG (ref 26.6–33)
MCHC RBC AUTO-ENTMCNC: 29.7 G/DL (ref 31.5–35.7)
MCV RBC AUTO: 89.9 FL (ref 79–97)
MONOCYTES # BLD AUTO: 0.9 10*3/MM3 (ref 0.1–0.9)
MONOCYTES NFR BLD AUTO: 5.9 % (ref 5–12)
NEUTROPHILS NFR BLD AUTO: 12.11 10*3/MM3 (ref 1.7–7)
NEUTROPHILS NFR BLD AUTO: 79.1 % (ref 42.7–76)
NRBC BLD AUTO-RTO: 0 /100 WBC (ref 0–0.2)
PLATELET # BLD AUTO: 339 10*3/MM3 (ref 140–450)
PMV BLD AUTO: 10 FL (ref 6–12)
POTASSIUM SERPL-SCNC: 3.9 MMOL/L (ref 3.5–5.2)
PROT SERPL-MCNC: 7.1 G/DL (ref 6–8.5)
RBC # BLD AUTO: 3.56 10*6/MM3 (ref 3.77–5.28)
SODIUM SERPL-SCNC: 137 MMOL/L (ref 136–145)
WBC NRBC COR # BLD AUTO: 15.3 10*3/MM3 (ref 3.4–10.8)

## 2024-03-28 PROCEDURE — 25010000002 HEPARIN LOCK FLUSH PER 10 UNITS: Performed by: INTERNAL MEDICINE

## 2024-03-28 PROCEDURE — 36591 DRAW BLOOD OFF VENOUS DEVICE: CPT

## 2024-03-28 PROCEDURE — 85025 COMPLETE CBC W/AUTO DIFF WBC: CPT

## 2024-03-28 PROCEDURE — 80053 COMPREHEN METABOLIC PANEL: CPT

## 2024-03-28 RX ORDER — HEPARIN SODIUM (PORCINE) LOCK FLUSH IV SOLN 100 UNIT/ML 100 UNIT/ML
500 SOLUTION INTRAVENOUS AS NEEDED
Status: DISCONTINUED | OUTPATIENT
Start: 2024-03-28 | End: 2024-03-28 | Stop reason: HOSPADM

## 2024-03-28 RX ORDER — SODIUM CHLORIDE 0.9 % (FLUSH) 0.9 %
10 SYRINGE (ML) INJECTION AS NEEDED
OUTPATIENT
Start: 2024-03-28

## 2024-03-28 RX ORDER — HEPARIN SODIUM (PORCINE) LOCK FLUSH IV SOLN 100 UNIT/ML 100 UNIT/ML
500 SOLUTION INTRAVENOUS AS NEEDED
OUTPATIENT
Start: 2024-03-28

## 2024-03-28 RX ORDER — SODIUM CHLORIDE 0.9 % (FLUSH) 0.9 %
10 SYRINGE (ML) INJECTION AS NEEDED
Status: DISCONTINUED | OUTPATIENT
Start: 2024-03-28 | End: 2024-03-28 | Stop reason: HOSPADM

## 2024-03-28 RX ADMIN — Medication 500 UNITS: at 09:27

## 2024-03-28 RX ADMIN — Medication 10 ML: at 09:27

## 2024-03-28 NOTE — PROGRESS NOTES
Subjective     REASON FOR FOLLOW UP:   1.  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  PD-L1 positive greater than 95%  3.  Primary chemoradiation with weekly carboplatin and Taxol completed 10/27/2022  4.  Imfinzi immunotherapy every 2 weeks for 12 months total.  First treatment 11/28/2022  5.  Repeat bronchoscopy 1/4/2023 due to persistent hemoptysis.  Pathology revealed no malignancy.  6.  Metastatic lesion to the brain resected 5/30/2023.  7.  Progression of disease within the chest noted on scans 9/19/2023.  Patient was started on palliative Gemzar day 1 and day 8 of an every 21-day cycle with first dose 9/27/2023.    HISTORY OF PRESENT ILLNESS:  The patient is a 79 y.o. year old female who is a former smoker referred to us from thoracic surgery (Dr. Remedios Rice) for evaluation and treatment of clinical stage III adenocarcinoma of the lung.  She was having persistent cough and underwent chest x-ray initially in late June which showed possible right upper lobe mass.  This was followed by CT scan of the chest confirming the presence of a right upper lobe mass.  She initially underwent a CT-guided needle biopsy on 7/22/2022 which was nondiagnostic.    She was referred to Dr. Glass for bronchoscopy and biopsy which was performed on 8/23/2022 with pathology returning as poorly differentiated adenocarcinoma.  PD-L1 stain on the tissue was positive at greater than 95%.    She underwent further staging with PET scan and MRI of the brain.  PET scan was performed on 8/12/2022 showing hypermetabolic activity in the large mass in the right upper lobe as well as mediastinal lymph nodes.  She was noted to have a mass on the kidney as well but this was not hypermetabolic.  There was no obvious distant metastatic disease.    MRI of the brain from 8/15/2022 likewise showed no evidence of metastatic disease.  She is scheduled also to see Dr. Hector Rodriguez of radiation oncology on 9/7/2022.    We recommended weekly carboplatin  and Taxol concurrent with radiation therapy.  Following completion of treatment she will receive immunotherapy for maintenance   (She does have a diagnosis of rheumatoid arthritis and is currently taking only Celebrex.  This could become an issue regarding her ability to tolerate immunotherapy in the future).    She received her final fraction of radiation 10/27/2022.  She also completed 6 weeks of carboplatin and Taxol and tolerated it well. CT scans performed 11/21/2022 showed right upper lobe mass appeared stable in size.  Her mediastinal lymphadenopathy had decreased.  There were no new sites of disease identified.    She was started on immunotherapy with Imfinzi with plans to continue immunotherapy for 1 year until December 2023.      She required several hospitalizations including hospitalization due to development of brain metastases in late May.  She had a dominant metastatic lesion in the right occipital area and was able to undergo neurosurgical resection of the dominant mass on 5/30/2023 with Dr. Ricketts.  She had 2 additional small lesions that were treated with stereotactic radiosurgery by Dr. Mckenna Moreira.  During this time her immuno therapy with Imfinzi was held (her last Imfinzi treatment in our office was 5/15/2023).    She again required repeat hospitalization from 6/11/2023 to 6/14/2023 due to development of extensive shingles outbreak on the left upper extremity.    She had a post treatment outpatient MRI of the brain from 7/9/2023 which showed the resection cavity in the right occipital area with no definite tumor recurrence in that area.  The other 2 small areas that were treated with stereotactic radiation appeared stable.  There were no new sites of disease identified.    She was seen in the emergency room 8/4/2023 for severe pain in the left arm. While she was in the ER she had a CT scan of the cervical spine to be sure there was no mechanical nerve root impingement.  There were no acute  findings in the cervical spine on the CT but slices through the lung apices did show what appeared to be new density in the right upper lobe which had not been seen on her previous CT scan from June.     Since her last visit she was again hospitalized from 8/26/2023 through 9/2/2023 with Campylobacter and E. coli colitis.  During the hospitalization she underwent a CT scan on 8/31/2023 showing increased size of her right upper lobe mass.  (See CT scan report below).  She also had a brain MRI performed on 8/27/2023 showing stable findings.    At the time of her discharge she had a motorcycle trip planned and we were planning to follow her up in the office with a PET scan when she returns from her trip.  She now returns having undergone the PET scan on 9/19/2023 which shows intensely hypermetabolic mass in the right upper lobe which had increased in size.  She had uptake in the right sixth and seventh ribs which may have been due to trauma.    She continues to have severe pain in the left arm although during the hospitalization she was switched to Lyrica which seems to be helping a little better than gabapentin.  She has requested an increase in the dose.  We also will be planning to refer her to pain management to see if there may be a role for the nerve injection.    We discussed the results of the PET scan and I recommended initiating palliative chemotherapy with single agent Gemzar days 1 and 8 of an every 21-day cycle.    Scans on 1/5/2024 including CT scan of the chest abdomen and pelvis as well as a follow-up MRI of the brain showed evidence of response in the known areas of disease in the chest.  There were no new sites of disease and her pleural effusion had resolved.    The MRI of the brain likewise did not show any evidence of disease progression.    She required hospitalization from 2/17/2024 through 2/19/2024 due to viral gastroenteritis.      INTERVAL HISTORY:  Pam had had been seen shortly after her  discharge from the hospital in late February 2024.  Unfortunately she was still very weak and we elected to hold off on further Gemzar treatment to allow some recovery.    Unfortunately, she required readmission to the hospital from 3/3/2024 to 3/7/2024 with acute pyelonephritis and acute kidney injury.    During her hospitalization she underwent a CT scan of the chest 3/3/2024 showing significant progression of disease in the right upper lobe with increase in size of the mass to 6.8 x 4.4 cm (4.7 x 3.3cm previously) with some central cavitation.  (See scanned report below).    On today's visit she remains extremely weak.  She presented to the office today in a wheelchair.  She reports that she is starting to feel a little stronger and she is not having any worsening of her chronic pain.  Certainly her overall performance status has declined significantly and we talked today about possibly discontinuing any further attempts at chemotherapy and focusing on comfort and quality of life.  History of Present Illness     Past Medical History:   Diagnosis Date    COPD (chronic obstructive pulmonary disease)     Coughing up blood     X2 MONTHS    History of COVID-19 02/2022    Hyperlipidemia     IBS (irritable bowel syndrome)     Primary lung adenocarcinoma, right     Rheumatoid arthritis         Past Surgical History:   Procedure Laterality Date    APPENDECTOMY      appendix removed    BRONCHOSCOPY N/A 8/23/2022    Procedure: BRONCHOSCOPY WITH BAL,  BIOPSIES, AND BRUSHINGS WITH ENDOBRONCHIAL ULTRASOUND WITH FNA;  Surgeon: Gregor Vee MD;  Location: [facility] ENDOSCOPY;  Service: Pulmonary;  Laterality: N/A;  PRE- HILAR MASS  POST- SAME    BRONCHOSCOPY N/A 1/4/2023    Procedure: BRONCHOSCOPY with biopsy, lavage, brushing;  Surgeon: Gregor Vee MD;  Location: [facility] ENDOSCOPY;  Service: Pulmonary;  Laterality: N/A;    CAROTID ENDARTERECTOMY Right     CAROTID ENDARTERECTOMY Left     CATARACT EXTRACTION      CERVICAL  FUSION      CHOLECYSTECTOMY      CRANIOTOMY FOR TUMOR Right 5/30/2023    Procedure: RIGHT CRANIOTOMY FOR TUMOR RESECTION STEREOTACTIC WITH STEALTH;  Surgeon: Clint Ricketts MD;  Location: [facility] MAIN OR;  Service: Neurosurgery;  Laterality: Right;    HYSTERECTOMY      SHOULDER ARTHROSCOPY Right 02/19/2019    right should scope/cuff repair     VENOUS ACCESS DEVICE (PORT) INSERTION Right 9/6/2022    Procedure: POWERPORT INSERTION;  Surgeon: Remedios Rice MD;  Location: [facility] MAIN OR;  Service: Thoracic;  Laterality: Right;        Current Outpatient Medications on File Prior to Visit   Medication Sig Dispense Refill    atorvastatin (LIPITOR) 20 MG tablet Take 1 tablet by mouth Every Night. 30 tablet 0    azelastine (ASTELIN) 0.1 % nasal spray 1 spray into the nostril(s) as directed by provider Daily As Needed for Allergies.      B COMPLEX VITAMINS ER PO Take  by mouth Daily.      Cholecalciferol (VITAMIN D3) 5000 units capsule capsule Take 1 capsule by mouth Daily.      clopidogrel (PLAVIX) 75 MG tablet Take 1 tablet by mouth Daily. 30 tablet 0    diphenoxylate-atropine (LOMOTIL) 2.5-0.025 MG per tablet Take 1 tablet by mouth 4 (Four) Times a Day As Needed for Diarrhea. 10 tablet 0    esomeprazole (nexIUM) 20 MG capsule Take 1 capsule by mouth Every Morning Before Breakfast.      estradiol (ESTRACE) 1 MG tablet Take 1 tablet by mouth Daily.      FeroSul 325 (65 Fe) MG tablet TAKE ONE TABLET BY MOUTH DAILY WITH BREAKFAST 30 tablet 5    Gabapentin 10 %, Baclofen 2 %, lidocaine 4 %, Ketamine HCl 4 % Apply 1-2 g topically to the appropriate area as directed 3 (Three) to 4 (Four) times daily. Lt arm/ lt shoulder      HYDROcodone-acetaminophen (NORCO) 7.5-325 MG per tablet Take 1 tablet by mouth Every 6 (Six) Hours As Needed for Moderate Pain. 60 tablet 0    levETIRAcetam (KEPPRA) 500 MG tablet Take 1 tablet by mouth 2 (Two) Times a Day. 120 tablet 0    Multiple Vitamins-Minerals (PRESERVISION AREDS 2+MULTI VIT PO) Take   by mouth.      ofloxacin (OCUFLOX) 0.3 % ophthalmic solution Administer 4 drops to both eyes Daily. For 4 days before injection and 4 days after injection      ondansetron (ZOFRAN) 8 MG tablet Take 1 tablet by mouth 3 (Three) Times a Day As Needed for Nausea or Vomiting. 30 tablet 5    predniSONE (DELTASONE) 10 MG tablet Take 1 tablet by mouth Daily. 1 tab (10mg) daily      pregabalin (LYRICA) 150 MG capsule Take 1 capsule by mouth Every 12 (Twelve) Hours for 30 days. 60 capsule 0     Current Facility-Administered Medications on File Prior to Visit   Medication Dose Route Frequency Provider Last Rate Last Admin    heparin injection 500 Units  500 Units Intravenous PRN Cruzito Lagunas MD        sodium chloride 0.9 % flush 10 mL  10 mL Intravenous PRN Cruzito Lagunas MD            ALLERGIES:    Allergies   Allergen Reactions    Del-Mycin [Erythromycin] Hives    Gentamicin Hives    Ibuprofen Nausea And Vomiting    Latex Dermatitis     GLOVES    Metronidazole Hives    Ofloxacin Hives and Nausea Only     Has tolerated Levaquin     Penicillins Hives     Tolerated rocephin and cefepime 2023    Tetracycline Hives    Adhesive Tape Dermatitis     BANDAIDS    Aspirin GI Intolerance     Vomiting can take EC    Codeine Nausea And Vomiting        Social History     Socioeconomic History    Marital status:    Tobacco Use    Smoking status: Former     Current packs/day: 0.00     Types: Cigarettes     Quit date: 2022     Years since quittin.9    Smokeless tobacco: Never    Tobacco comments:     Former some day smoker of 1-2 cigs   Vaping Use    Vaping status: Never Used   Substance and Sexual Activity    Alcohol use: Yes     Alcohol/week: 2.0 standard drinks of alcohol     Types: 2 Glasses of wine per week     Comment: occasionally    Drug use: Yes     Frequency: 2.0 times per week     Types: Marijuana    Sexual activity: Defer        Family History   Problem Relation Age of Onset    Cancer Mother     Cancer  "Father     Malig Hyperthermia Neg Hx         Review of Systems   Constitutional:  Negative for activity change, chills, fatigue and fever.   HENT:  Negative for mouth sores, trouble swallowing and voice change.    Eyes:  Negative for pain and visual disturbance.   Respiratory:  Positive for cough. Negative for wheezing.    Cardiovascular:  Negative for chest pain and palpitations.   Gastrointestinal:  Negative for abdominal pain, constipation, diarrhea, nausea and vomiting.   Genitourinary:  Negative for difficulty urinating, frequency and urgency.   Musculoskeletal:  Negative for arthralgias and joint swelling.   Skin:  Negative for rash.   Neurological:  Positive for weakness. Negative for dizziness, seizures and headaches.        Severe pain in the left upper arm due to postherpetic neuralgia.  She also had some weakness in the arm which she feels is improving.   Hematological:  Negative for adenopathy. Bruises/bleeds easily.   Psychiatric/Behavioral:  Negative for behavioral problems and confusion. The patient is not nervous/anxious.     03/28/2024 ROS updated     Objective     Vitals:    03/28/24 0849   BP: 107/63   Pulse: (!) 41   SpO2: (!) 80%   Weight: 55.4 kg (122 lb 1.6 oz)   Height: 152.4 cm (60\")   PainSc: 0-No pain           3/28/2024     8:54 AM   Current Status   ECOG score 1     Physical Exam  Vitals reviewed.   Constitutional:       General: She is not in acute distress.     Appearance: She is well-developed. She is ill-appearing.      Comments: Seated in a wheelchair.   HENT:      Head: Normocephalic and atraumatic.      Mouth/Throat:      Pharynx: No oropharyngeal exudate.   Eyes:      Pupils: Pupils are equal, round, and reactive to light.   Neck:      Comments: Bilateral scars from carotid endarterectomies  Cardiovascular:      Rate and Rhythm: Normal rate and regular rhythm.      Heart sounds: Normal heart sounds. No murmur heard.  Pulmonary:      Effort: Pulmonary effort is normal. No " respiratory distress.      Breath sounds: Decreased breath sounds present. No wheezing, rhonchi or rales.   Abdominal:      General: Bowel sounds are normal. There is no distension.      Palpations: Abdomen is soft.   Musculoskeletal:         General: Normal range of motion.      Cervical back: Normal range of motion.   Skin:     General: Skin is warm and dry.      Findings: No rash.   Neurological:      Mental Status: She is alert and oriented to person, place, and time.      I have reexamined the patient and the results are consistent with the previously documented exam. Cruzito Lagunas MD        RECENT LABS:  Results from last 7 days   Lab Units 03/28/24  0842   WBC 10*3/mm3 15.30*   NEUTROS ABS 10*3/mm3 12.11*   HEMOGLOBIN g/dL 9.5*   HEMATOCRIT % 32.0*   PLATELETS 10*3/mm3 339     Results from last 7 days   Lab Units 03/28/24  0842   SODIUM mmol/L 137   POTASSIUM mmol/L 3.9   CHLORIDE mmol/L 101   CO2 mmol/L 22.7   BUN mg/dL 20   CREATININE mg/dL 1.04*   CALCIUM mg/dL 9.1   ALBUMIN g/dL 3.6   BILIRUBIN mg/dL 0.4   ALK PHOS U/L 88   ALT (SGPT) U/L <5   AST (SGOT) U/L 14   GLUCOSE mg/dL 99             BRONCHOSCOPY PATHOLOGY 1/4/2023  Final Diagnosis   Fluid, Lung, Right Upper Lobe, Bronchoalveolar Lavage (BAL):  A. Negative for malignant cells- reactive atypia.  B. Reactive bronchial cells, macrophages, inflammation (mostly acute) and mucin.  C. Negative for definitive fungal organisms by GMS staining. See comment.     2. Fluid, Lung, Right Upper Lobe, Brushing:               A. Negative for malignant cells.               B. Bronchial cells, scattered inflammation and mucinous debris.      Final Diagnosis   1. Lung, Right Upper Lobe, Biopsy:  Endobronchial mucosa and submucosa with                A. Squamous metaplasia, mild to moderate chronic active inflammation, fibrinous and necrotic debris and reactive      epithelial changes.               B. No definitive dysplasia nor malignancy identified.                C. No granulomata, diagnostic viral inclusions nor fungal organisms identified.         BRONCHOSCOPY PATHOLOGY 8/23/2022  Final Diagnosis   1. Lung, Right Upper Lobe, Endobronchial Biopsies: INVASIVE POORLY DIFFERENTIATED ADENOCARCINOMA OF PULMONARY ORIGIN.   PDL-1 POSITIVE >95%    IMAGING:  CT ANGIOGRAM OF THE CHEST. MULTIPLE CORONAL, SAGITTAL, AND 3-D  RECONSTRUCTIONS. CT ABDOMEN AND PELVIS WITH IV CONTRAST 3/3/2024  FINDINGS:     CHEST CTA:  1. There is no convincing evidence for pulmonary thromboemboli.     2. There has been significant increase in size of the centrally cavitary  masslike consolidation pleural-based at the posterior medial aspect of  the right upper lobe measuring 6.8 x 4.4 cm, previously approximately  4.7 x 3.3 cm. The centrally liquefied/necrotic region containing air  measures 4.0 x 3.3 cm, previously smaller. There is more prominent  abnormal soft tissue encasing the right hilum, but there is no  mediastinal or left hilar lymphadenopathy. No significant change in the  very mild pleural thickening at the posterior aspect of the right upper  lobe.     3. There are no pleural or pericardial effusions. There is less  atelectasis at the left lung base, currently mild.     ABDOMEN/PELVIS:  1. There is a subtle striated enhancement pattern of the right kidney  and there is more prominent right perinephric stranding than on the  left. Urinary bladder wall appears thickened, but the bladder is nearly  collapsed. Acute right pyelonephritis is suspected and pyelonephritis on  the left cannot be excluded.     2. No significant change is seen in the right renal cysts. The largest  measures 5.5 cm and has internal density measurements of 36 Hounsfield  units. Likely proteinaceous cysts and conservative surveillance is  recommended.     3. There is no acute abnormality at the liver, spleen, pancreas, or  adrenals. There is no acute bowel abnormality. There is moderate sigmoid  diverticulosis without  evidence for diverticulitis. The appendix is  surgically absent. Uterus is surgically absent. No lymphadenopathy.     CT CHEST, ABDOMEN, AND PELVIS WITH CONTRAST 1/5/2024  IMPRESSION:  1. Interval treatment response with significant decrease in the bulk of  the medial right upper lobe lung mass which is now significantly smaller  and centrally necrotic. There has also been improvement in the  appearance of the right upper lobe and superior segment right lower lobe  compared with chest CT 08/31/2023. No evidence of metastatic disease to  the chest, abdomen or pelvis.  2. There is new patchy parenchymal volume loss and opacification in the  posterior left lung base favored to represent aspiration pneumonitis,  atelectasis, or pneumonia.  3. Right renal lesions appear similar as on recent and remote previous  exams. No new renal abnormality.      MRI OF THE BRAIN WITH AND WITHOUT CONTRAST ON 01/05/2024   IMPRESSION:  1. There has been no significant change when compared to prior MRI of  the brain on 08/27/2023 with no convincing recurrent enhancing  metastatic disease.     2. On 05/30/2023, this patient underwent a 4.5 cm right parietal  occipital craniotomy to resect a 3.7 x 2.9 x 2.7 cm enhancing lesion  from the posterior superior lateral right occipital lobe found at  surgical pathology to be poorly differentiated adenocarcinoma metastasis  of lung primary. There is a collapsed 16 x 10 x 10 mm resection cavity  with a curvilinear 6 x 2 x 6 mm focus of enhancement along the posterior  superior margin of the resection cavity that I favor is an enhancing  scar and granulation tissue over a small focus of recurrent tumor with  no convincing recurrent metastatic lesion at this site but this will  need to be followed by follow-up MRIs of the brain with contrast. There  is 3.7 x 2 cm surrounding T2 hyperintensity likely surrounding gliosis  and posttreatment change. The treated lesion in the superior vermis of  the  cerebellum has a 2 to 3 mm focus of hemosiderin deposition in it and  some minimal linear enhancement along its peripheral margins that is  likely scar tissue with no definable residual enhancing tumor at this  site and there has been resolution of the enhancement at the site of the  posterior lateral left occipital lobe lesion with no new evidence of  enhancing metastatic disease to the brain.  2. There is moderate small vessel disease in the cerebral white matter.  The remainder of the MRI of the brain is normal. Continued followup is  suggested.       F-18 FDG PET FROM SKULL BASE TO MID THIGH WITH PET/CT FUSION 9/19/2023  IMPRESSION:  1.  Intensely FDG mass centered within the perihilar aspect of the right  upper lobe appears to have increased in size since 5/25/2023 with new  obstruction and stenosis of the right upper lobe bronchus and likely  represents recurrent malignancy. Surrounding pulmonary opacification and  consolidation throughout the right lung has increased since 5/25/2023  and there is new opacification within the left lung with differential  considerations including evolving postradiation changes, postobstructive  pneumonia and lymphangitic spread of malignancy.  2.  Segmental uptake within the right sixth and seventh ribs without  identifiable abnormality on noncontrast CT. While findings may represent  nondisplaced fractures, continued close attention on follow-up is  recommended to exclude metastatic disease.  3.  Indeterminate 5.3 cm right renal lesion, as before. At least  continued attention on follow-up is recommended. Finding can all be  further evaluated multiphase CT or MRI of the abdomen with and without  contrast to exclude neoplasm.  4.  Other findings as above.    MRI OF THE BRAIN WITH AND WITHOUT CONTRAST  8/27/2023  IMPRESSION:     1.  Status post right parieto-occipital craniotomy with subjacent   resection cavity.  Expected postoperative changes as detailed above.  No   evidence  of tumor recurrence.  2.  Moderate chronic small vessel ischemic changes.  3.  No specific pontine abnormality aside from chronic small vessel   ischemic disease.  Finding on reference CT likely reflected artifact.  4.  No acute infarct.  No acute hemorrhage.    CT CHEST W CONTRAST DIAGNOSTIC-, CT ABDOMEN PELVIS W CONTRAST-, NM BONE SCAN WHOLE BODY- 5/29/2023  1. Right upper lobe suprahilar pulmonary mass appears stable from the  recent prior exam, again with groundglass and nodular opacities in the  right upper lobe.  2. Sclerotic change at the right anterior eighth rib is new from  03/01/2023, with corresponding increased uptake on bone scan, could be  healing fracture or sclerotic metastatic disease, correlate clinically.  3. Stable appearance of the abdomen and pelvis.    CT CHEST, ABDOMEN, AND PELVIS WITH IV CONTRAST 3/1/2023  IMPRESSION:  Decreased size of the right upper lobe mass with stable  surrounding groundglass opacity and decreased size of the right lower  lobe opacity. No adenopathy seen on today's exam. Incidental findings as  Above.    F-18 FDG PET FROM SKULL BASE TO MID THIGH WITH PET/CT FUSION 8/12/2022  IMPRESSION:  1.  Large mass centered within the right upper lobe representing  patient's known malignancy. Interlobular septal thickening and ground  glass opacification projecting from the periphery of the mass throughout  the right upper lobe is highly concerning for lymphangitic spread. Of  note, the mass abuts the pleura and mediastinum over a long segment and  underlying invasion cannot be excluded.  2.  FDG avid hilar and mediastinal adenopathy concerning for metastatic  disease.  3.  A few irregular subcentimeter pulmonary nodules are present within  the right lower and left upper lobes measuring up to 0.9 cm which while  nonspecific raises concern for metastatic disease. At least close  attention on followup is recommended.   4.  Minimally hypermetabolic arnoldo hepatic node and bilateral  sub-6 mm  pulmonary nodules are indeterminate. Continued followup with chest and  abdominal CT in 3 months is recommended.  5.  Indeterminate density 4.4 cm mass arises off the right kidney.  Further evaluation with multiphase CT or MRI of the abdomen with and  without contrast is recommended to exclude neoplasm.  6.  Focal uptake over the right shoulder in the area of prior rotator  cuff repair is favored be postsurgical with metastatic disease less  likely.  7.  Other findings as above.    Assessment & Plan   1.  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  Patient is not felt to be a surgical candidate and combined chemotherapy and radiation.   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  9/20/2022 1st dose of weekly carboplatin/taxol.   She completed 6 cycles of weekly CarboTaxol 10/25/2022.  Radiation therapy completed 10/27/2022  First dose durvalumab delivered 11/28/2022.   Durvalumab on hold since May 2023  Progression of disease with enlarging hypermetabolic mass in the right upper lobe in September 2023  Plans to start single agent Gemzar palliative chemotherapy 1000 mg/m² on days 1 and 8 of a 21-day cycle.  9/27/2023 C1D1 single agent palliative Gemzar.  CT scans and brain MRI 1/5/2024 show no progression of brain mets and significant improvement in her thoracic disease.  Progression of disease in the lung on CT scans from 3/3/2024.  Further decline in her overall performance status.  On the visit of 3/28/2024 we discussed focusing primarily on comfort and quality of life rather than pursuing additional chemotherapy treatment.      2.  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    3.  Other comorbidities including vascular disease with previous carotid   endarterectomy surgeries.  She has no known history of stroke or myocardial infarction.     5.   Rheumatoid arthritis: Patient previously on Celebrex, but discontinued due to hemoptysis.  Patient started on prednisone 20 mg daily prescribed to manage her arthritis pain.  This was later decreased to 10 mg daily.    6.  Development of brain metastases in May 2023.  Patient underwent resection of the dominant mass in the right occipital area by Dr. Cho of neurosurgery.  She received post op radiation therapy to the resection bed and to 2 smaller lesions with SRS under the direction of Dr. Mckenna Moreira at [facility].  Her most recent MRI of the brain from 8/27/2023 as noted above shows no new sites of disease, improved edema, and no definite recurrence in the resection bed.    7.  Severe shingles outbreak of left upper extremity with severe pain from postherpetic neuralgia.   She has been seen by pain management and is receiving topical therapy with a compound of baclofen gabapentin and ketamine with good relief.    8.  Hospitalization for bacterial colitis with stool culture growing E. coli and Campylobacter.  Her symptoms have resolved at this point.    9.  Hospitalization 2/17/2024 to 2/19/2024 for Sapovirus viral gastroenteritis.    10.  Hospitalization with pyelonephritis and septicemia 3/3/2024 through 3/7/2024.      PLAN:  We reviewed the records from the hospitalization.  Although she is feeling better she is still extremely weak.   We had a lengthy discussion with the patient in the office today regarding the overall situation.  We explained that her scan 3/3/2024 showed worsening of her cancer and with her declining performance status and several intervening hospitalizations we feel that she would be better served at this time by focusing on comfort and quality of life rather than continuing aggressive chemotherapy treatment.  Continue prednisone at 10 mg daily.  Continue Lyrica 225 mg (75+150) twice daily.  Continue Norco 7.5/325 if needed for pain control.  She will not receive any  additional chemotherapy in the office today and her port will be deaccessed.  We will meet with the patient again in 2 weeks to assess her level of comfort and again discussed overall goals of treatment at that time.  Certainly it appears that she is no longer a candidate for aggressive chemotherapy and we should be focusing on comfort and quality of life.      Patient is on a high risk medication requiring close monitoring for toxicity.      Cruzito Lagunas MD   03/28/2024

## 2024-03-29 ENCOUNTER — TELEPHONE (OUTPATIENT)
Dept: NEUROLOGY | Facility: CLINIC | Age: 79
End: 2024-03-29
Payer: MEDICARE

## 2024-03-29 ENCOUNTER — PATIENT OUTREACH (OUTPATIENT)
Dept: OTHER | Facility: HOSPITAL | Age: 79
End: 2024-03-29
Payer: MEDICARE

## 2024-03-29 NOTE — PROGRESS NOTES
Reviewed chart. IP 3/3-3/7/24 for right-sided pyelonephritis with E. coli bacteremia and was admitted to the hospital for further evaluation and treatment.  Discharged to [facility]. Saw Dr. Lagunas yesterday. He discussed the patient's most recent scan 3/3/2024 which showed worsening of her cancer. She is no longer pursuing aggressive chemotherapy treatment. Sees Dr. Lagunas again 4/11     Called patient. Left message that I was just touching base to see how she was doing after her hospitalization. Wanted to know if she had any questions/concerns after her appt with Dr. Lagunas yesterday or resource/supportive care needs; requested cb. If we don't connect, I will try back in a few weeks.

## 2024-03-29 NOTE — TELEPHONE ENCOUNTER
Left VM, [employer] (if applicable) & mailed reminder regarding appt reschedule due to provider being out of office.  Had scheduled pt for July 16th at 10:00 AM

## 2024-04-11 ENCOUNTER — LAB (OUTPATIENT)
Dept: LAB | Facility: HOSPITAL | Age: 79
End: 2024-04-11
Payer: MEDICARE

## 2024-04-11 ENCOUNTER — OFFICE VISIT (OUTPATIENT)
Dept: ONCOLOGY | Facility: CLINIC | Age: 79
End: 2024-04-11
Payer: MEDICARE

## 2024-04-11 VITALS
TEMPERATURE: 97.8 F | RESPIRATION RATE: 16 BRPM | SYSTOLIC BLOOD PRESSURE: 118 MMHG | BODY MASS INDEX: 22.38 KG/M2 | WEIGHT: 114 LBS | HEART RATE: 83 BPM | OXYGEN SATURATION: 93 % | HEIGHT: 60 IN | DIASTOLIC BLOOD PRESSURE: 71 MMHG

## 2024-04-11 DIAGNOSIS — C34.91 PRIMARY LUNG ADENOCARCINOMA, RIGHT: ICD-10-CM

## 2024-04-11 DIAGNOSIS — C79.31 LUNG CANCER METASTATIC TO BRAIN: Primary | ICD-10-CM

## 2024-04-11 DIAGNOSIS — M06.9 RHEUMATOID ARTHRITIS, INVOLVING UNSPECIFIED SITE, UNSPECIFIED WHETHER RHEUMATOID FACTOR PRESENT: ICD-10-CM

## 2024-04-11 DIAGNOSIS — C34.90 LUNG CANCER METASTATIC TO BRAIN: Primary | ICD-10-CM

## 2024-04-11 DIAGNOSIS — C79.31 LUNG CANCER METASTATIC TO BRAIN: ICD-10-CM

## 2024-04-11 DIAGNOSIS — B02.29 POST HERPETIC NEURALGIA: ICD-10-CM

## 2024-04-11 DIAGNOSIS — C34.90 LUNG CANCER METASTATIC TO BRAIN: ICD-10-CM

## 2024-04-11 LAB
ALBUMIN SERPL-MCNC: 3.6 G/DL (ref 3.5–5.2)
ALBUMIN/GLOB SERPL: 1.1 G/DL
ALP SERPL-CCNC: 84 U/L (ref 39–117)
ALT SERPL W P-5'-P-CCNC: 8 U/L (ref 1–33)
ANION GAP SERPL CALCULATED.3IONS-SCNC: 10.2 MMOL/L (ref 5–15)
AST SERPL-CCNC: 19 U/L (ref 1–32)
BASOPHILS # BLD AUTO: 0.06 10*3/MM3 (ref 0–0.2)
BASOPHILS NFR BLD AUTO: 0.4 % (ref 0–1.5)
BILIRUB SERPL-MCNC: 0.2 MG/DL (ref 0–1.2)
BUN SERPL-MCNC: 20 MG/DL (ref 8–23)
BUN/CREAT SERPL: 19.6 (ref 7–25)
CALCIUM SPEC-SCNC: 9.4 MG/DL (ref 8.6–10.5)
CHLORIDE SERPL-SCNC: 103 MMOL/L (ref 98–107)
CO2 SERPL-SCNC: 23.8 MMOL/L (ref 22–29)
CREAT SERPL-MCNC: 1.02 MG/DL (ref 0.57–1)
DEPRECATED RDW RBC AUTO: 58.6 FL (ref 37–54)
EGFRCR SERPLBLD CKD-EPI 2021: 56.8 ML/MIN/1.73
EOSINOPHIL # BLD AUTO: 0.21 10*3/MM3 (ref 0–0.4)
EOSINOPHIL NFR BLD AUTO: 1.5 % (ref 0.3–6.2)
ERYTHROCYTE [DISTWIDTH] IN BLOOD BY AUTOMATED COUNT: 18.1 % (ref 12.3–15.4)
GLOBULIN UR ELPH-MCNC: 3.2 GM/DL
GLUCOSE SERPL-MCNC: 125 MG/DL (ref 65–99)
HCT VFR BLD AUTO: 33.4 % (ref 34–46.6)
HGB BLD-MCNC: 10.1 G/DL (ref 12–15.9)
IMM GRANULOCYTES # BLD AUTO: 0.11 10*3/MM3 (ref 0–0.05)
IMM GRANULOCYTES NFR BLD AUTO: 0.8 % (ref 0–0.5)
LYMPHOCYTES # BLD AUTO: 1.43 10*3/MM3 (ref 0.7–3.1)
LYMPHOCYTES NFR BLD AUTO: 10.6 % (ref 19.6–45.3)
MCH RBC QN AUTO: 26.9 PG (ref 26.6–33)
MCHC RBC AUTO-ENTMCNC: 30.2 G/DL (ref 31.5–35.7)
MCV RBC AUTO: 88.8 FL (ref 79–97)
MONOCYTES # BLD AUTO: 0.98 10*3/MM3 (ref 0.1–0.9)
MONOCYTES NFR BLD AUTO: 7.2 % (ref 5–12)
NEUTROPHILS NFR BLD AUTO: 10.76 10*3/MM3 (ref 1.7–7)
NEUTROPHILS NFR BLD AUTO: 79.5 % (ref 42.7–76)
NRBC BLD AUTO-RTO: 0 /100 WBC (ref 0–0.2)
PLATELET # BLD AUTO: 309 10*3/MM3 (ref 140–450)
PMV BLD AUTO: 9.6 FL (ref 6–12)
POTASSIUM SERPL-SCNC: 3.9 MMOL/L (ref 3.5–5.2)
PROT SERPL-MCNC: 6.8 G/DL (ref 6–8.5)
RBC # BLD AUTO: 3.76 10*6/MM3 (ref 3.77–5.28)
SODIUM SERPL-SCNC: 137 MMOL/L (ref 136–145)
WBC NRBC COR # BLD AUTO: 13.55 10*3/MM3 (ref 3.4–10.8)

## 2024-04-11 PROCEDURE — 85025 COMPLETE CBC W/AUTO DIFF WBC: CPT

## 2024-04-11 PROCEDURE — 80053 COMPREHEN METABOLIC PANEL: CPT

## 2024-04-11 PROCEDURE — 36415 COLL VENOUS BLD VENIPUNCTURE: CPT

## 2024-04-11 NOTE — PROGRESS NOTES
Subjective     REASON FOR FOLLOW UP:   1.  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  PD-L1 positive greater than 95%  3.  Primary chemoradiation with weekly carboplatin and Taxol completed 10/27/2022  4.  Imfinzi immunotherapy every 2 weeks for 12 months total.  First treatment 11/28/2022  5.  Repeat bronchoscopy 1/4/2023 due to persistent hemoptysis.  Pathology revealed no malignancy.  6.  Metastatic lesion to the brain resected 5/30/2023.  7.  Progression of disease within the chest noted on scans 9/19/2023.  Patient was started on palliative Gemzar day 1 and day 8 of an every 21-day cycle with first dose 9/27/2023.  8.  Progression of disease on CT scans dated 3/3/2024    HISTORY OF PRESENT ILLNESS:  The patient is a 79 y.o. year old female who is a former smoker referred to us from thoracic surgery (Dr. Remedios Rice) for evaluation and treatment of clinical stage III adenocarcinoma of the lung.  She was having persistent cough and underwent chest x-ray initially in late June which showed possible right upper lobe mass.  This was followed by CT scan of the chest confirming the presence of a right upper lobe mass.  She initially underwent a CT-guided needle biopsy on 7/22/2022 which was nondiagnostic.    She was referred to Dr. Glass for bronchoscopy and biopsy which was performed on 8/23/2022 with pathology returning as poorly differentiated adenocarcinoma.  PD-L1 stain on the tissue was positive at greater than 95%.    We recommended weekly carboplatin and Taxol concurrent with radiation therapy.  Following completion of treatment she will receive immunotherapy for maintenance   (She does have a diagnosis of rheumatoid arthritis and is currently taking only Celebrex.  This could become an issue regarding her ability to tolerate immunotherapy in the future).    She received her final fraction of radiation 10/27/2022.  She also completed 6 weeks of carboplatin and Taxol and tolerated it well. CT scans  performed 11/21/2022 showed right upper lobe mass appeared stable in size.  Her mediastinal lymphadenopathy had decreased.  There were no new sites of disease identified.    She was started on immunotherapy with Imfinzi with plans to continue immunotherapy for 1 year until December 2023.      She required several hospitalizations including hospitalization due to development of brain metastases in late May.  She had a dominant metastatic lesion in the right occipital area and was able to undergo neurosurgical resection of the dominant mass on 5/30/2023 with Dr. Ricketts.  She had 2 additional small lesions that were treated with stereotactic radiosurgery by Dr. Mckenna Moreira.  During this time her immuno therapy with Imfinzi was held (her last Imfinzi treatment in our office was 5/15/2023).    She again required repeat hospitalization from 6/11/2023 to 6/14/2023 due to development of extensive shingles outbreak on the left upper extremity.    We discussed the results of the PET scan from 9/19/2023 which showed disease progression and recommended initiating palliative chemotherapy with single agent Gemzar days 1 and 8 of an every 21-day cycle.    She required hospitalization from 2/17/2024 through 2/19/2024 due to viral gastroenteritis.      Pam had had been seen shortly after her discharge from the hospital in late February 2024.  Unfortunately she was still very weak and we elected to hold off on further Gemzar treatment to allow some recovery.    Unfortunately, she required readmission to the hospital from 3/3/2024 to 3/7/2024 with acute pyelonephritis and acute kidney injury.    During her hospitalization she underwent a CT scan of the chest 3/3/2024 showing significant progression of disease in the right upper lobe with increase in size of the mass to 6.8 x 4.4 cm (4.7 x 3.3cm previously) with some central cavitation.  (See scanned report below).    On her last visit she remained extremely weak.  She presented  to the office in a wheelchair.  Certainly her overall performance status has declined significantly and we talked today about possibly discontinuing any further attempts at chemotherapy and focusing on comfort and quality of life.    INTERVAL HISTORY:  Pam returns today and seems to be feeling slightly stronger.  She is now walking with a walker instead of being in a wheelchair.  She seems to be at peace with the idea of focusing mainly on comfort and quality of life rather than pursuing additional aggressive treatments.  We briefly discussed hospice but she currently is active with [facility] and would like to continue with them for now although I explained that hospice could likely provide most of the things that [facility] is able to do.    She is looking forward to taking a trip to [place] for a week or 2.    We will schedule her return to our office in a month for port flush and lab and again can discuss the option of hospice at that point if she is ready.    She has decided to discontinue Plavix.      History of Present Illness     Past Medical History:   Diagnosis Date    COPD (chronic obstructive pulmonary disease)     Coughing up blood     X2 MONTHS    History of COVID-19 02/2022    Hyperlipidemia     IBS (irritable bowel syndrome)     Primary lung adenocarcinoma, right     Rheumatoid arthritis         Past Surgical History:   Procedure Laterality Date    APPENDECTOMY      appendix removed    BRONCHOSCOPY N/A 8/23/2022    Procedure: BRONCHOSCOPY WITH BAL,  BIOPSIES, AND BRUSHINGS WITH ENDOBRONCHIAL ULTRASOUND WITH FNA;  Surgeon: Gregor Vee MD;  Location: [facility] ENDOSCOPY;  Service: Pulmonary;  Laterality: N/A;  PRE- HILAR MASS  POST- SAME    BRONCHOSCOPY N/A 1/4/2023    Procedure: BRONCHOSCOPY with biopsy, lavage, brushing;  Surgeon: Gregor Vee MD;  Location: [facility] ENDOSCOPY;  Service: Pulmonary;  Laterality: N/A;    CAROTID ENDARTERECTOMY Right     CAROTID ENDARTERECTOMY Left      CATARACT EXTRACTION      CERVICAL FUSION      CHOLECYSTECTOMY      CRANIOTOMY FOR TUMOR Right 5/30/2023    Procedure: RIGHT CRANIOTOMY FOR TUMOR RESECTION STEREOTACTIC WITH STEALTH;  Surgeon: Clint Ricketts MD;  Location: [facility];  Service: Neurosurgery;  Laterality: Right;    HYSTERECTOMY      SHOULDER ARTHROSCOPY Right 02/19/2019    right should scope/cuff repair     VENOUS ACCESS DEVICE (PORT) INSERTION Right 9/6/2022    Procedure: POWERPORT INSERTION;  Surgeon: Remedios Rice MD;  Location: [facility] MAIN OR;  Service: Thoracic;  Laterality: Right;        Current Outpatient Medications on File Prior to Visit   Medication Sig Dispense Refill    atorvastatin (LIPITOR) 20 MG tablet Take 1 tablet by mouth Every Night. 30 tablet 0    azelastine (ASTELIN) 0.1 % nasal spray 1 spray into the nostril(s) as directed by provider Daily As Needed for Allergies.      B COMPLEX VITAMINS ER PO Take  by mouth Daily.      Cholecalciferol (VITAMIN D3) 5000 units capsule capsule Take 1 capsule by mouth Daily.      clopidogrel (PLAVIX) 75 MG tablet Take 1 tablet by mouth Daily. 30 tablet 0    diphenoxylate-atropine (LOMOTIL) 2.5-0.025 MG per tablet Take 1 tablet by mouth 4 (Four) Times a Day As Needed for Diarrhea. 10 tablet 0    esomeprazole (nexIUM) 20 MG capsule Take 1 capsule by mouth Every Morning Before Breakfast.      estradiol (ESTRACE) 1 MG tablet Take 1 tablet by mouth Daily.      FeroSul 325 (65 Fe) MG tablet TAKE ONE TABLET BY MOUTH DAILY WITH BREAKFAST 30 tablet 5    Gabapentin 10 %, Baclofen 2 %, lidocaine 4 %, Ketamine HCl 4 % Apply 1-2 g topically to the appropriate area as directed 3 (Three) to 4 (Four) times daily. Lt arm/ lt shoulder      HYDROcodone-acetaminophen (NORCO) 7.5-325 MG per tablet Take 1 tablet by mouth Every 6 (Six) Hours As Needed for Moderate Pain. 60 tablet 0    levETIRAcetam (KEPPRA) 500 MG tablet Take 1 tablet by mouth 2 (Two) Times a Day. 120 tablet 0    Multiple Vitamins-Minerals  (PRESERVISION AREDS 2+MULTI VIT PO) Take  by mouth.      ofloxacin (OCUFLOX) 0.3 % ophthalmic solution Administer 4 drops to both eyes Daily. For 4 days before injection and 4 days after injection      ondansetron (ZOFRAN) 8 MG tablet Take 1 tablet by mouth 3 (Three) Times a Day As Needed for Nausea or Vomiting. 30 tablet 5    predniSONE (DELTASONE) 10 MG tablet Take 1 tablet by mouth Daily. 1 tab (10mg) daily      pregabalin (LYRICA) 150 MG capsule Take 1 capsule by mouth Every 12 (Twelve) Hours for 30 days. 60 capsule 0     No current facility-administered medications on file prior to visit.        ALLERGIES:    Allergies   Allergen Reactions    Del-Mycin [Erythromycin] Hives    Gentamicin Hives    Ibuprofen Nausea And Vomiting    Latex Dermatitis     GLOVES    Metronidazole Hives    Ofloxacin Hives and Nausea Only     Has tolerated Levaquin     Penicillins Hives     Tolerated rocephin and cefepime 2023    Tetracycline Hives    Adhesive Tape Dermatitis     BANDAIDS    Aspirin GI Intolerance     Vomiting can take EC    Codeine Nausea And Vomiting        Social History     Socioeconomic History    Marital status:    Tobacco Use    Smoking status: Former     Current packs/day: 0.00     Types: Cigarettes     Quit date: 2022     Years since quittin.9    Smokeless tobacco: Never    Tobacco comments:     Former some day smoker of 1-2 cigs   Vaping Use    Vaping status: Never Used   Substance and Sexual Activity    Alcohol use: Yes     Alcohol/week: 2.0 standard drinks of alcohol     Types: 2 Glasses of wine per week     Comment: occasionally    Drug use: Yes     Frequency: 2.0 times per week     Types: Marijuana    Sexual activity: Defer        Family History   Problem Relation Age of Onset    Cancer Mother     Cancer Father     Malig Hyperthermia Neg Hx         Review of Systems   Constitutional:  Negative for activity change, chills, fatigue and fever.   HENT:  Negative for mouth sores, trouble  "swallowing and voice change.    Eyes:  Negative for pain and visual disturbance.   Respiratory:  Positive for cough. Negative for wheezing.    Cardiovascular:  Negative for chest pain and palpitations.   Gastrointestinal:  Negative for abdominal pain, constipation, diarrhea, nausea and vomiting.   Genitourinary:  Negative for difficulty urinating, frequency and urgency.   Musculoskeletal:  Negative for arthralgias and joint swelling.   Skin:  Negative for rash.   Neurological:  Positive for weakness. Negative for dizziness, seizures and headaches.        Severe pain in the left upper arm due to postherpetic neuralgia.  She also had some weakness in the arm which she feels is improving.   Hematological:  Negative for adenopathy. Bruises/bleeds easily.   Psychiatric/Behavioral:  Negative for behavioral problems and confusion. The patient is not nervous/anxious.     04/11/2024 ROS updated     Objective     Vitals:    04/11/24 1024   BP: 118/71   Pulse: 83   Resp: 16   Temp: 97.8 °F (36.6 °C)   TempSrc: Temporal   SpO2: 93%   Weight: 51.7 kg (114 lb)   Height: 152.4 cm (60\")   PainSc:   4   PainLoc: Arm             4/11/2024    10:25 AM   Current Status   ECOG score 1     Physical Exam  Vitals reviewed.   Constitutional:       General: She is not in acute distress.     Appearance: She is well-developed. She is ill-appearing.      Comments: Using a walker for ambulation   HENT:      Head: Normocephalic and atraumatic.      Mouth/Throat:      Pharynx: No oropharyngeal exudate.   Eyes:      Pupils: Pupils are equal, round, and reactive to light.   Neck:      Comments: Bilateral scars from carotid endarterectomies  Cardiovascular:      Rate and Rhythm: Normal rate and regular rhythm.      Heart sounds: Normal heart sounds. No murmur heard.  Pulmonary:      Effort: Pulmonary effort is normal. No respiratory distress.      Breath sounds: Decreased breath sounds present. No wheezing, rhonchi or rales.   Abdominal:      " General: Bowel sounds are normal. There is no distension.      Palpations: Abdomen is soft.   Musculoskeletal:         General: Normal range of motion.      Cervical back: Normal range of motion.   Skin:     General: Skin is warm and dry.      Findings: Bruising present. No rash.   Neurological:      Mental Status: She is alert and oriented to person, place, and time.      I have reexamined the patient and the results are consistent with the previously documented exam. Cruzito Lagunas MD        RECENT LABS:  Results from last 7 days   Lab Units 04/11/24  1013   WBC 10*3/mm3 13.55*   NEUTROS ABS 10*3/mm3 10.76*   HEMOGLOBIN g/dL 10.1*   HEMATOCRIT % 33.4*   PLATELETS 10*3/mm3 309                     BRONCHOSCOPY PATHOLOGY 1/4/2023  Final Diagnosis   Fluid, Lung, Right Upper Lobe, Bronchoalveolar Lavage (BAL):  A. Negative for malignant cells- reactive atypia.  B. Reactive bronchial cells, macrophages, inflammation (mostly acute) and mucin.  C. Negative for definitive fungal organisms by GMS staining. See comment.     2. Fluid, Lung, Right Upper Lobe, Brushing:               A. Negative for malignant cells.               B. Bronchial cells, scattered inflammation and mucinous debris.      Final Diagnosis   1. Lung, Right Upper Lobe, Biopsy:  Endobronchial mucosa and submucosa with                A. Squamous metaplasia, mild to moderate chronic active inflammation, fibrinous and necrotic debris and reactive      epithelial changes.               B. No definitive dysplasia nor malignancy identified.               C. No granulomata, diagnostic viral inclusions nor fungal organisms identified.         BRONCHOSCOPY PATHOLOGY 8/23/2022  Final Diagnosis   1. Lung, Right Upper Lobe, Endobronchial Biopsies: INVASIVE POORLY DIFFERENTIATED ADENOCARCINOMA OF PULMONARY ORIGIN.   PDL-1 POSITIVE >95%    IMAGING:  CT ANGIOGRAM OF THE CHEST. MULTIPLE CORONAL, SAGITTAL, AND 3-D  RECONSTRUCTIONS. CT ABDOMEN AND PELVIS WITH IV CONTRAST  3/3/2024  FINDINGS:     CHEST CTA:  1. There is no convincing evidence for pulmonary thromboemboli.     2. There has been significant increase in size of the centrally cavitary  masslike consolidation pleural-based at the posterior medial aspect of  the right upper lobe measuring 6.8 x 4.4 cm, previously approximately  4.7 x 3.3 cm. The centrally liquefied/necrotic region containing air  measures 4.0 x 3.3 cm, previously smaller. There is more prominent  abnormal soft tissue encasing the right hilum, but there is no  mediastinal or left hilar lymphadenopathy. No significant change in the  very mild pleural thickening at the posterior aspect of the right upper  lobe.     3. There are no pleural or pericardial effusions. There is less  atelectasis at the left lung base, currently mild.     ABDOMEN/PELVIS:  1. There is a subtle striated enhancement pattern of the right kidney  and there is more prominent right perinephric stranding than on the  left. Urinary bladder wall appears thickened, but the bladder is nearly  collapsed. Acute right pyelonephritis is suspected and pyelonephritis on  the left cannot be excluded.     2. No significant change is seen in the right renal cysts. The largest  measures 5.5 cm and has internal density measurements of 36 Hounsfield  units. Likely proteinaceous cysts and conservative surveillance is  recommended.     3. There is no acute abnormality at the liver, spleen, pancreas, or  adrenals. There is no acute bowel abnormality. There is moderate sigmoid  diverticulosis without evidence for diverticulitis. The appendix is  surgically absent. Uterus is surgically absent. No lymphadenopathy.     CT CHEST, ABDOMEN, AND PELVIS WITH CONTRAST 1/5/2024  IMPRESSION:  1. Interval treatment response with significant decrease in the bulk of  the medial right upper lobe lung mass which is now significantly smaller  and centrally necrotic. There has also been improvement in the  appearance of the  right upper lobe and superior segment right lower lobe  compared with chest CT 08/31/2023. No evidence of metastatic disease to  the chest, abdomen or pelvis.  2. There is new patchy parenchymal volume loss and opacification in the  posterior left lung base favored to represent aspiration pneumonitis,  atelectasis, or pneumonia.  3. Right renal lesions appear similar as on recent and remote previous  exams. No new renal abnormality.      MRI OF THE BRAIN WITH AND WITHOUT CONTRAST ON 01/05/2024   IMPRESSION:  1. There has been no significant change when compared to prior MRI of  the brain on 08/27/2023 with no convincing recurrent enhancing  metastatic disease.     2. On 05/30/2023, this patient underwent a 4.5 cm right parietal  occipital craniotomy to resect a 3.7 x 2.9 x 2.7 cm enhancing lesion  from the posterior superior lateral right occipital lobe found at  surgical pathology to be poorly differentiated adenocarcinoma metastasis  of lung primary. There is a collapsed 16 x 10 x 10 mm resection cavity  with a curvilinear 6 x 2 x 6 mm focus of enhancement along the posterior  superior margin of the resection cavity that I favor is an enhancing  scar and granulation tissue over a small focus of recurrent tumor with  no convincing recurrent metastatic lesion at this site but this will  need to be followed by follow-up MRIs of the brain with contrast. There  is 3.7 x 2 cm surrounding T2 hyperintensity likely surrounding gliosis  and posttreatment change. The treated lesion in the superior vermis of  the cerebellum has a 2 to 3 mm focus of hemosiderin deposition in it and  some minimal linear enhancement along its peripheral margins that is  likely scar tissue with no definable residual enhancing tumor at this  site and there has been resolution of the enhancement at the site of the  posterior lateral left occipital lobe lesion with no new evidence of  enhancing metastatic disease to the brain.  2. There is moderate  small vessel disease in the cerebral white matter.  The remainder of the MRI of the brain is normal. Continued followup is  suggested.       F-18 FDG PET FROM SKULL BASE TO MID THIGH WITH PET/CT FUSION 9/19/2023  IMPRESSION:  1.  Intensely FDG mass centered within the perihilar aspect of the right  upper lobe appears to have increased in size since 5/25/2023 with new  obstruction and stenosis of the right upper lobe bronchus and likely  represents recurrent malignancy. Surrounding pulmonary opacification and  consolidation throughout the right lung has increased since 5/25/2023  and there is new opacification within the left lung with differential  considerations including evolving postradiation changes, postobstructive  pneumonia and lymphangitic spread of malignancy.  2.  Segmental uptake within the right sixth and seventh ribs without  identifiable abnormality on noncontrast CT. While findings may represent  nondisplaced fractures, continued close attention on follow-up is  recommended to exclude metastatic disease.  3.  Indeterminate 5.3 cm right renal lesion, as before. At least  continued attention on follow-up is recommended. Finding can all be  further evaluated multiphase CT or MRI of the abdomen with and without  contrast to exclude neoplasm.  4.  Other findings as above.    MRI OF THE BRAIN WITH AND WITHOUT CONTRAST  8/27/2023  IMPRESSION:     1.  Status post right parieto-occipital craniotomy with subjacent   resection cavity.  Expected postoperative changes as detailed above.  No   evidence of tumor recurrence.  2.  Moderate chronic small vessel ischemic changes.  3.  No specific pontine abnormality aside from chronic small vessel   ischemic disease.  Finding on reference CT likely reflected artifact.  4.  No acute infarct.  No acute hemorrhage.    CT CHEST W CONTRAST DIAGNOSTIC-, CT ABDOMEN PELVIS W CONTRAST-, NM BONE SCAN WHOLE BODY- 5/29/2023  1. Right upper lobe suprahilar pulmonary mass appears  stable from the  recent prior exam, again with groundglass and nodular opacities in the  right upper lobe.  2. Sclerotic change at the right anterior eighth rib is new from  03/01/2023, with corresponding increased uptake on bone scan, could be  healing fracture or sclerotic metastatic disease, correlate clinically.  3. Stable appearance of the abdomen and pelvis.    CT CHEST, ABDOMEN, AND PELVIS WITH IV CONTRAST 3/1/2023  IMPRESSION:  Decreased size of the right upper lobe mass with stable  surrounding groundglass opacity and decreased size of the right lower  lobe opacity. No adenopathy seen on today's exam. Incidental findings as  Above.    F-18 FDG PET FROM SKULL BASE TO MID THIGH WITH PET/CT FUSION 8/12/2022  IMPRESSION:  1.  Large mass centered within the right upper lobe representing  patient's known malignancy. Interlobular septal thickening and ground  glass opacification projecting from the periphery of the mass throughout  the right upper lobe is highly concerning for lymphangitic spread. Of  note, the mass abuts the pleura and mediastinum over a long segment and  underlying invasion cannot be excluded.  2.  FDG avid hilar and mediastinal adenopathy concerning for metastatic  disease.  3.  A few irregular subcentimeter pulmonary nodules are present within  the right lower and left upper lobes measuring up to 0.9 cm which while  nonspecific raises concern for metastatic disease. At least close  attention on followup is recommended.   4.  Minimally hypermetabolic arnoldo hepatic node and bilateral sub-6 mm  pulmonary nodules are indeterminate. Continued followup with chest and  abdominal CT in 3 months is recommended.  5.  Indeterminate density 4.4 cm mass arises off the right kidney.  Further evaluation with multiphase CT or MRI of the abdomen with and  without contrast is recommended to exclude neoplasm.  6.  Focal uptake over the right shoulder in the area of prior rotator  cuff repair is favored be  postsurgical with metastatic disease less  likely.  7.  Other findings as above.    Assessment & Plan   1.  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  Patient is not felt to be a surgical candidate and combined chemotherapy and radiation.   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  9/20/2022 1st dose of weekly carboplatin/taxol.   She completed 6 cycles of weekly CarboTaxol 10/25/2022.  Radiation therapy completed 10/27/2022  First dose durvalumab delivered 11/28/2022.   Durvalumab on hold since May 2023  Progression of disease with enlarging hypermetabolic mass in the right upper lobe in September 2023  Plans to start single agent Gemzar palliative chemotherapy 1000 mg/m² on days 1 and 8 of a 21-day cycle.  9/27/2023 C1D1 single agent palliative Gemzar.  CT scans and brain MRI 1/5/2024 show no progression of brain mets and significant improvement in her thoracic disease.  Progression of disease in the lung on CT scans from 3/3/2024.  Further decline in her overall performance status.  On the visit of 3/28/2024 we discussed focusing primarily on comfort and quality of life rather than pursuing additional chemotherapy treatment.      2.  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    3.  Other comorbidities including vascular disease with previous carotid   endarterectomy surgeries.  She has no known history of stroke or myocardial infarction.     5.  Rheumatoid arthritis: Patient previously on Celebrex, but discontinued due to hemoptysis.  Patient started on prednisone 20 mg daily prescribed to manage her arthritis pain.  This was later decreased to 10 mg daily.    6.  Development of brain metastases in May 2023.  Patient underwent resection of the dominant mass in the right occipital area by Dr. Cho of neurosurgery.  She received post op radiation therapy to the  resection bed and to 2 smaller lesions with SRS under the direction of Dr. Mckenna Moreira at [facility].  Her most recent MRI of the brain from 8/27/2023 as noted above shows no new sites of disease, improved edema, and no definite recurrence in the resection bed.    7.  Severe shingles outbreak of left upper extremity with severe pain from postherpetic neuralgia.   She has been seen by pain management and is receiving topical therapy with a compound of baclofen gabapentin and ketamine with good relief.    8.  Hospitalization for bacterial colitis with stool culture growing E. coli and Campylobacter.  Her symptoms have resolved at this point.    9.  Hospitalization 2/17/2024 to 2/19/2024 for Sapovirus viral gastroenteritis.    10.  Hospitalization with pyelonephritis and septicemia 3/3/2024 through 3/7/2024.      PLAN:  We again reviewed the overall situation with the family and.  Although she is feeling better she is still extremely weak.  I honestly believe her performance status is beyond the point of making chemotherapy treatment reasonable and again have recommended focusing on quality of life.  She seems to be okay with this approach but is not quite yet ready to discuss hospice.  Continue prednisone at 10 mg daily.  Continue Lyrica 225 mg (75+150) twice daily.  Continue Norco 7.5/325 if needed for pain control.  We will schedule MD follow-up with port flush in 4 weeks to assess her level of comfort and again discussed overall goals of treatment at that time.  Certainly it appears that she is no longer a candidate for aggressive chemotherapy and we should be focusing on comfort and quality of life.      Patient is on a high risk medication requiring close monitoring for toxicity.      Cruzito Lagunas MD   04/11/2024

## 2024-04-12 ENCOUNTER — PATIENT OUTREACH (OUTPATIENT)
Dept: OTHER | Facility: HOSPITAL | Age: 79
End: 2024-04-12
Payer: MEDICARE

## 2024-04-12 NOTE — PROGRESS NOTES
Reviewed chart. Met with Dr. Lagunas yesterday.  No longer pursuing aggressive treatment. Planning port flush in 4 weeks.    Called patient. We discussed her appt with Dr. Lagunas yesterday. She states she is feeling better following her admissions although remains weak.    The patient denies any questions/concerns or ongoing resource needs. Since she is no longer receiving active cancer treatment and has no ongoing resource needs, I will close her case to navigation. Encouraged patient to call in the future if the need arises. Patient verbalized understanding.

## 2024-05-13 ENCOUNTER — TELEPHONE (OUTPATIENT)
Dept: ONCOLOGY | Facility: CLINIC | Age: 79
End: 2024-05-13
Payer: MEDICARE

## 2024-05-13 NOTE — TELEPHONE ENCOUNTER
Caller: Pam Vidal    Relationship to patient: Self    Best call back number: [phone]     Chief complaint: PATIENT TO RESCHEDULE FROM 5/10/24    Type of visit: LAB FU AND PORT FLUSH    Requested date: PATIENT PREFERS [facility] LOCATION AND CANNOT SCHEDULE FOR ANY TUESDAY

## 2024-05-14 NOTE — PROGRESS NOTES
Chief Complaint   Patient presents with    Follow-up     Previous Dr. Lagunas PT       REASON FOR FOLLOW UP:   1.  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  PD-L1 > 95%  3.  S/p definitive chemoradiation with weekly carboplatin and Taxol completed 10/27/2022  4.  Durvalumab maintenance every 2 weeks for 12 months total.  First treatment 11/28/2022, last treatment was 5/15/2023  5.  Repeat bronchoscopy 1/4/2023 due to persistent hemoptysis.  Pathology revealed no malignancy.  6.  Metastatic lesion to the brain resected 5/30/2023.  7.  Progression of disease within the chest noted on scans 9/19/2023.  Patient was started on palliative Gemzar day 1 and day 8 of an every 21-day cycle with first dose 9/27/2023.  8.  Progression of disease on CT scans dated 3/3/2024    HISTORY OF PRESENT ILLNESS:    Ms. Vidal is a 79-year-old female, former patient of Dr. Cruzito Lagunas, transferring care to me as Dr. Lagunas recently retired.    To review her history, she was initially referred by thoracic surgeon to Dr. Lagunas for evaluation and treatment of clinical stage III adenocarcinoma of lung.  CT chest done in July 2022 confirmed the presence of a right upper lobe mass.  She subsequently underwent a CT-guided biopsy on 7/22/2022 which was nondiagnostic.  She was then referred to Dr. Glass for a [facility] biopsy which was done on 8/23/2022.  Path confirmed poorly differentiated adenocarcinoma, PD-L1 greater than 95%.  She was then started on definitive chemoradiation with weekly CarboTaxol followed by durvalumab maintenance (cycle 1 day 1 11/20/2022).She received her final fraction of radiation 10/27/2022.  She also completed 6 weeks of carboplatin and Taxol and tolerated it well. CT scans performed 11/21/2022 showed right upper lobe mass appeared stable in size.  Her mediastinal lymphadenopathy had decreased.  There were no new sites of disease identified.    She has had several hospitalizations in 2023 due to  development of brain metastases, and later extensive shingles outbreak.  She underwent resection of the dominant mass in the right occipital area on 5/30/2023 with Dr. Ricketts.  She received SRS with Dr. Mckenna Moreira 2 additional small lesions.  Durvalumab was held during SRS    PET scan from 9/19/2023 showed disease progression and was treated with single agent Gemzar days 1 and 8 of an every 21-day cycle for palliation.  Cycle 1 day 1 was 9/27/2023.  She unfortunately had several hospitalizations in early 2024 due to viral gastroenteritis, and later acute pyelonephritis and acute kidney injury.    During her hospitalization she underwent a CT scan of the chest 3/3/2024 showing significant progression of disease in the right upper lobe with increase in size of the mass to 6.8 x 4.4 cm (4.7 x 3.3cm previously) with some central cavitation.      On her last visit with Dr. Lagunas in April 2024, she remained extremely weak.  She presented to the office in a wheelchair.  Hospice was discussed at that visit to focus on her quality of life.    INTERVAL HISTORY:  This is the first time that I am meeting the patient and her niece.  She was previously followed by Dr. Lagunas.  Per notes, she was on wheelchair at the time of her last visit in April 2024.  Today, she is here ambulating via walker.  She reports some improvement in her performance status since the chemotherapy has been on hold.  She reports she is independent in all ADLs.  She can move around the house using her walker.  The only thing she needs help with is cooking her meals.  She is not interested in hospice.      Past Medical History:   Diagnosis Date    COPD (chronic obstructive pulmonary disease)     Coughing up blood     X2 MONTHS    History of COVID-19 02/2022    Hyperlipidemia     IBS (irritable bowel syndrome)     Primary lung adenocarcinoma, right     Rheumatoid arthritis         Past Surgical History:   Procedure Laterality Date    APPENDECTOMY       appendix removed    BRONCHOSCOPY N/A 8/23/2022    Procedure: BRONCHOSCOPY WITH BAL,  BIOPSIES, AND BRUSHINGS WITH ENDOBRONCHIAL ULTRASOUND WITH FNA;  Surgeon: Gregor Vee MD;  Location: [facility] ENDOSCOPY;  Service: Pulmonary;  Laterality: N/A;  PRE- HILAR MASS  POST- SAME    BRONCHOSCOPY N/A 1/4/2023    Procedure: BRONCHOSCOPY with biopsy, lavage, brushing;  Surgeon: Gregor Vee MD;  Location: [facility] ENDOSCOPY;  Service: Pulmonary;  Laterality: N/A;    CAROTID ENDARTERECTOMY Right     CAROTID ENDARTERECTOMY Left     CATARACT EXTRACTION      CERVICAL FUSION      CHOLECYSTECTOMY      CRANIOTOMY FOR TUMOR Right 5/30/2023    Procedure: RIGHT CRANIOTOMY FOR TUMOR RESECTION STEREOTACTIC WITH STEALTH;  Surgeon: Clint Ricketts MD;  Location: [facility] MAIN OR;  Service: Neurosurgery;  Laterality: Right;    HYSTERECTOMY      SHOULDER ARTHROSCOPY Right 02/19/2019    right should scope/cuff repair     VENOUS ACCESS DEVICE (PORT) INSERTION Right 9/6/2022    Procedure: POWERPORT INSERTION;  Surgeon: Remedios Rice MD;  Location: [facility] MAIN OR;  Service: Thoracic;  Laterality: Right;        Current Outpatient Medications on File Prior to Visit   Medication Sig Dispense Refill    atorvastatin (LIPITOR) 20 MG tablet Take 1 tablet by mouth Every Night. 30 tablet 0    azelastine (ASTELIN) 0.1 % nasal spray 1 spray into the nostril(s) as directed by provider Daily As Needed for Allergies.      B COMPLEX VITAMINS ER PO Take  by mouth Daily.      Cholecalciferol (VITAMIN D3) 5000 units capsule capsule Take 1 capsule by mouth Daily.      esomeprazole (nexIUM) 20 MG capsule Take 1 capsule by mouth Every Morning Before Breakfast.      estradiol (ESTRACE) 1 MG tablet Take 1 tablet by mouth Daily.      FeroSul 325 (65 Fe) MG tablet TAKE ONE TABLET BY MOUTH DAILY WITH BREAKFAST 30 tablet 5    Gabapentin 10 %, Baclofen 2 %, lidocaine 4 %, Ketamine HCl 4 % Apply 1-2 g topically to the appropriate area as directed 3 (Three) to 4 (Four)  times daily. Lt arm/ lt shoulder      HYDROcodone-acetaminophen (NORCO) 7.5-325 MG per tablet Take 1 tablet by mouth Every 6 (Six) Hours As Needed for Moderate Pain. 60 tablet 0    Multiple Vitamins-Minerals (PRESERVISION AREDS 2+MULTI VIT PO) Take  by mouth.      ofloxacin (OCUFLOX) 0.3 % ophthalmic solution Administer 4 drops to both eyes Daily. For 4 days before injection and 4 days after injection      ondansetron (ZOFRAN) 8 MG tablet Take 1 tablet by mouth 3 (Three) Times a Day As Needed for Nausea or Vomiting. 30 tablet 5    predniSONE (DELTASONE) 10 MG tablet Take 1 tablet by mouth Daily. 1 tab (10mg) daily      [DISCONTINUED] diphenoxylate-atropine (LOMOTIL) 2.5-0.025 MG per tablet Take 1 tablet by mouth 4 (Four) Times a Day As Needed for Diarrhea. (Patient not taking: Reported on 2024) 10 tablet 0    [DISCONTINUED] levETIRAcetam (KEPPRA) 500 MG tablet Take 1 tablet by mouth 2 (Two) Times a Day. (Patient not taking: Reported on 2024) 120 tablet 0    [DISCONTINUED] pregabalin (LYRICA) 150 MG capsule Take 1 capsule by mouth Every 12 (Twelve) Hours for 30 days. 60 capsule 0     No current facility-administered medications on file prior to visit.        ALLERGIES:    Allergies   Allergen Reactions    Del-Mycin [Erythromycin] Hives    Gentamicin Hives    Ibuprofen Nausea And Vomiting    Latex Dermatitis     GLOVES    Metronidazole Hives    Ofloxacin Hives and Nausea Only     Has tolerated Levaquin     Penicillins Hives     Tolerated rocephin and cefepime 2023    Tetracycline Hives    Adhesive Tape Dermatitis     BANDAIDS    Aspirin GI Intolerance     Vomiting can take EC    Codeine Nausea And Vomiting        Social History     Socioeconomic History    Marital status:    Tobacco Use    Smoking status: Former     Current packs/day: 0.00     Types: Cigarettes     Quit date: 2022     Years since quittin.0    Smokeless tobacco: Never    Tobacco comments:     Former some day smoker of 1-2 cigs  "  Vaping Use    Vaping status: Never Used   Substance and Sexual Activity    Alcohol use: Yes     Alcohol/week: 2.0 standard drinks of alcohol     Types: 2 Glasses of wine per week     Comment: occasionally    Drug use: Yes     Frequency: 2.0 times per week     Types: Marijuana    Sexual activity: Defer        Family History   Problem Relation Age of Onset    Cancer Mother     Cancer Father     Malig Hyperthermia Neg Hx         Review of systems  All systems reviewed and found to be negative except for that noted in HPI    Objective     Vitals:    24 1336   BP: 151/69   Pulse: 72   Resp: 16   Temp: 97.8 °F (36.6 °C)   TempSrc: Oral   SpO2: 96%   Weight: 57.9 kg (127 lb 9.6 oz)   Height: 152.4 cm (60\")         ECO    Physical Exam  Vitals reviewed.   Constitutional:       General: She is not in acute distress.     Appearance: She is well-developed.      Comments: Using a walker for ambulation   HENT:      Head: Normocephalic and atraumatic.      Mouth/Throat:      Pharynx: No oropharyngeal exudate.   Eyes:      Pupils: Pupils are equal, round, and reactive to light.   Neck:      Comments: Bilateral scars from carotid endarterectomies  Cardiovascular:      Rate and Rhythm: Normal rate and regular rhythm.      Heart sounds: Normal heart sounds. No murmur heard.  Pulmonary:      Effort: Pulmonary effort is normal. No respiratory distress.      Breath sounds: No wheezing, rhonchi or rales.   Abdominal:      General: Bowel sounds are normal. There is no distension.      Palpations: Abdomen is soft.   Musculoskeletal:         General: Normal range of motion.      Cervical back: Normal range of motion.   Skin:     General: Skin is warm and dry.      Findings: Bruising present. No rash.   Neurological:      Mental Status: She is alert and oriented to person, place, and time.      I have reexamined the patient and the results are consistent with the previously documented exam. Isa Abrams MD        RECENT " LABS:  Results from last 7 days   Lab Units 05/20/24  1320   WBC 10*3/mm3 13.99*   NEUTROS ABS 10*3/mm3 12.00*   HEMOGLOBIN g/dL 10.2*   HEMATOCRIT % 33.0*   PLATELETS 10*3/mm3 260                       BRONCHOSCOPY PATHOLOGY 1/4/2023  Final Diagnosis   Fluid, Lung, Right Upper Lobe, Bronchoalveolar Lavage (BAL):  A. Negative for malignant cells- reactive atypia.  B. Reactive bronchial cells, macrophages, inflammation (mostly acute) and mucin.  C. Negative for definitive fungal organisms by GMS staining. See comment.     2. Fluid, Lung, Right Upper Lobe, Brushing:               A. Negative for malignant cells.               B. Bronchial cells, scattered inflammation and mucinous debris.      Final Diagnosis   1. Lung, Right Upper Lobe, Biopsy:  Endobronchial mucosa and submucosa with                A. Squamous metaplasia, mild to moderate chronic active inflammation, fibrinous and necrotic debris and reactive      epithelial changes.               B. No definitive dysplasia nor malignancy identified.               C. No granulomata, diagnostic viral inclusions nor fungal organisms identified.         BRONCHOSCOPY PATHOLOGY 8/23/2022  Final Diagnosis   1. Lung, Right Upper Lobe, Endobronchial Biopsies: INVASIVE POORLY DIFFERENTIATED ADENOCARCINOMA OF PULMONARY ORIGIN.   PDL-1 POSITIVE >95%    IMAGING:  CT ANGIOGRAM OF THE CHEST. MULTIPLE CORONAL, SAGITTAL, AND 3-D  RECONSTRUCTIONS. CT ABDOMEN AND PELVIS WITH IV CONTRAST 3/3/2024  FINDINGS:     CHEST CTA:  1. There is no convincing evidence for pulmonary thromboemboli.     2. There has been significant increase in size of the centrally cavitary  masslike consolidation pleural-based at the posterior medial aspect of  the right upper lobe measuring 6.8 x 4.4 cm, previously approximately  4.7 x 3.3 cm. The centrally liquefied/necrotic region containing air  measures 4.0 x 3.3 cm, previously smaller. There is more prominent  abnormal soft tissue encasing the right hilum,  but there is no  mediastinal or left hilar lymphadenopathy. No significant change in the  very mild pleural thickening at the posterior aspect of the right upper  lobe.     3. There are no pleural or pericardial effusions. There is less  atelectasis at the left lung base, currently mild.     ABDOMEN/PELVIS:  1. There is a subtle striated enhancement pattern of the right kidney  and there is more prominent right perinephric stranding than on the  left. Urinary bladder wall appears thickened, but the bladder is nearly  collapsed. Acute right pyelonephritis is suspected and pyelonephritis on  the left cannot be excluded.     2. No significant change is seen in the right renal cysts. The largest  measures 5.5 cm and has internal density measurements of 36 Hounsfield  units. Likely proteinaceous cysts and conservative surveillance is  recommended.     3. There is no acute abnormality at the liver, spleen, pancreas, or  adrenals. There is no acute bowel abnormality. There is moderate sigmoid  diverticulosis without evidence for diverticulitis. The appendix is  surgically absent. Uterus is surgically absent. No lymphadenopathy.     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   She completed 6 cycles of weekly CarboTaxol 10/25/2022.  Radiation therapy completed 10/27/2022  First dose durvalumab delivered 11/28/2022.   Progression of disease with enlarging hypermetabolic mass in the right upper lobe in September 2023 09/27/2023 - 02/07/2024: s/p single agent Gemzar palliative chemotherapy 1000 mg/m² on days 1 and 8 of a 21-day cycle.  Progression of disease in the lung on CT scans from 3/3/2024.  Hospice was discussed by Dr. Lagunas at her last visit in April 2024.  However, patient not interested in hospice.  Reports some improvement in performance status since discontinuing chemotherapy.    *Brain mets  -May 2023: s/p  resection of the dominant mass in the right occipital area   -S/p SRS to 2 smaller lesions with SRS   -Her most recent MRI of the brain from 01/2024 shows no new sites of disease      *Elevated creatinine  *Hyperkalemia  - Recommended avoiding high potassium containing foods such as bananas.  Recommended p.o. hydration and avoiding NSAIDs.  Recommended following up with primary care in a week to follow-up hypokalemia.  Patient voiced understanding and was in agreement.    *Rheumatoid arthritis  Patient previously on Celebrex, but discontinued due to hemoptysis.  Patient started on prednisone 20 mg daily prescribed to manage her arthritis pain.  This was later decreased to 10 mg daily.      *h/o severe shingles outbreak of left upper extremity with severe pain from postherpetic neuralgia.   She has been seen by pain management and is receiving topical therapy with a compound of baclofen gabapentin and ketamine with good relief.      PLAN:  Patient not interested in hospice at current time.  As such, we discussed updating the scans (MRI brain, CT chest abdomen pelvis) prior to finalizing management.    2. refill on Keppra as well as Lyrica sent to pharmacy

## 2024-05-20 ENCOUNTER — LAB (OUTPATIENT)
Dept: ONCOLOGY | Facility: HOSPITAL | Age: 79
End: 2024-05-20
Payer: MEDICARE

## 2024-05-20 ENCOUNTER — OFFICE VISIT (OUTPATIENT)
Dept: ONCOLOGY | Facility: CLINIC | Age: 79
End: 2024-05-20
Payer: MEDICARE

## 2024-05-20 VITALS
HEIGHT: 60 IN | RESPIRATION RATE: 16 BRPM | BODY MASS INDEX: 25.05 KG/M2 | OXYGEN SATURATION: 96 % | WEIGHT: 127.6 LBS | SYSTOLIC BLOOD PRESSURE: 151 MMHG | HEART RATE: 72 BPM | DIASTOLIC BLOOD PRESSURE: 69 MMHG | TEMPERATURE: 97.8 F

## 2024-05-20 DIAGNOSIS — C34.90 LUNG CANCER METASTATIC TO BRAIN: ICD-10-CM

## 2024-05-20 DIAGNOSIS — C34.91 PRIMARY LUNG ADENOCARCINOMA, RIGHT: Primary | ICD-10-CM

## 2024-05-20 DIAGNOSIS — C34.90 LUNG CANCER METASTATIC TO BRAIN: Primary | ICD-10-CM

## 2024-05-20 DIAGNOSIS — E87.5 HYPERKALEMIA: ICD-10-CM

## 2024-05-20 DIAGNOSIS — C79.31 LUNG CANCER METASTATIC TO BRAIN: ICD-10-CM

## 2024-05-20 DIAGNOSIS — M79.2 NEUROPATHIC PAIN: ICD-10-CM

## 2024-05-20 DIAGNOSIS — A08.31 GASTROENTERITIS DUE TO SAPOVIRUS: ICD-10-CM

## 2024-05-20 DIAGNOSIS — R79.89 ELEVATED SERUM CREATININE: ICD-10-CM

## 2024-05-20 DIAGNOSIS — C79.31 LUNG CANCER METASTATIC TO BRAIN: Primary | ICD-10-CM

## 2024-05-20 LAB
ALBUMIN SERPL-MCNC: 3.6 G/DL (ref 3.5–5.2)
ALBUMIN/GLOB SERPL: 1.2 G/DL
ALP SERPL-CCNC: 91 U/L (ref 39–117)
ALT SERPL W P-5'-P-CCNC: 12 U/L (ref 1–33)
ANION GAP SERPL CALCULATED.3IONS-SCNC: 10.3 MMOL/L (ref 5–15)
AST SERPL-CCNC: 22 U/L (ref 1–32)
BASOPHILS # BLD AUTO: 0.03 10*3/MM3 (ref 0–0.2)
BASOPHILS NFR BLD AUTO: 0.2 % (ref 0–1.5)
BILIRUB SERPL-MCNC: 0.2 MG/DL (ref 0–1.2)
BUN SERPL-MCNC: 38 MG/DL (ref 8–23)
BUN/CREAT SERPL: 33.9 (ref 7–25)
CALCIUM SPEC-SCNC: 9.5 MG/DL (ref 8.6–10.5)
CHLORIDE SERPL-SCNC: 100 MMOL/L (ref 98–107)
CO2 SERPL-SCNC: 23.7 MMOL/L (ref 22–29)
CREAT SERPL-MCNC: 1.12 MG/DL (ref 0.57–1)
DEPRECATED RDW RBC AUTO: 61.4 FL (ref 37–54)
EGFRCR SERPLBLD CKD-EPI 2021: 50.8 ML/MIN/1.73
EOSINOPHIL # BLD AUTO: 0.18 10*3/MM3 (ref 0–0.4)
EOSINOPHIL NFR BLD AUTO: 1.3 % (ref 0.3–6.2)
ERYTHROCYTE [DISTWIDTH] IN BLOOD BY AUTOMATED COUNT: 18.8 % (ref 12.3–15.4)
GLOBULIN UR ELPH-MCNC: 3 GM/DL
GLUCOSE SERPL-MCNC: 108 MG/DL (ref 65–99)
HCT VFR BLD AUTO: 33 % (ref 34–46.6)
HGB BLD-MCNC: 10.2 G/DL (ref 12–15.9)
IMM GRANULOCYTES # BLD AUTO: 0.08 10*3/MM3 (ref 0–0.05)
IMM GRANULOCYTES NFR BLD AUTO: 0.6 % (ref 0–0.5)
LYMPHOCYTES # BLD AUTO: 1.18 10*3/MM3 (ref 0.7–3.1)
LYMPHOCYTES NFR BLD AUTO: 8.4 % (ref 19.6–45.3)
MCH RBC QN AUTO: 27.6 PG (ref 26.6–33)
MCHC RBC AUTO-ENTMCNC: 30.9 G/DL (ref 31.5–35.7)
MCV RBC AUTO: 89.4 FL (ref 79–97)
MONOCYTES # BLD AUTO: 0.52 10*3/MM3 (ref 0.1–0.9)
MONOCYTES NFR BLD AUTO: 3.7 % (ref 5–12)
NEUTROPHILS NFR BLD AUTO: 12 10*3/MM3 (ref 1.7–7)
NEUTROPHILS NFR BLD AUTO: 85.8 % (ref 42.7–76)
NRBC BLD AUTO-RTO: 0 /100 WBC (ref 0–0.2)
PLATELET # BLD AUTO: 260 10*3/MM3 (ref 140–450)
PMV BLD AUTO: 9.7 FL (ref 6–12)
POTASSIUM SERPL-SCNC: 5.5 MMOL/L (ref 3.5–5.2)
PROT SERPL-MCNC: 6.6 G/DL (ref 6–8.5)
RBC # BLD AUTO: 3.69 10*6/MM3 (ref 3.77–5.28)
SODIUM SERPL-SCNC: 134 MMOL/L (ref 136–145)
WBC NRBC COR # BLD AUTO: 13.99 10*3/MM3 (ref 3.4–10.8)

## 2024-05-20 PROCEDURE — 36415 COLL VENOUS BLD VENIPUNCTURE: CPT

## 2024-05-20 PROCEDURE — 3077F SYST BP >= 140 MM HG: CPT | Performed by: STUDENT IN AN ORGANIZED HEALTH CARE EDUCATION/TRAINING PROGRAM

## 2024-05-20 PROCEDURE — 80053 COMPREHEN METABOLIC PANEL: CPT

## 2024-05-20 PROCEDURE — 1125F AMNT PAIN NOTED PAIN PRSNT: CPT | Performed by: STUDENT IN AN ORGANIZED HEALTH CARE EDUCATION/TRAINING PROGRAM

## 2024-05-20 PROCEDURE — 3078F DIAST BP <80 MM HG: CPT | Performed by: STUDENT IN AN ORGANIZED HEALTH CARE EDUCATION/TRAINING PROGRAM

## 2024-05-20 PROCEDURE — 36591 DRAW BLOOD OFF VENOUS DEVICE: CPT

## 2024-05-20 PROCEDURE — 85025 COMPLETE CBC W/AUTO DIFF WBC: CPT

## 2024-05-20 PROCEDURE — 99214 OFFICE O/P EST MOD 30 MIN: CPT | Performed by: STUDENT IN AN ORGANIZED HEALTH CARE EDUCATION/TRAINING PROGRAM

## 2024-05-20 PROCEDURE — 25010000002 HEPARIN LOCK FLUSH PER 10 UNITS: Performed by: INTERNAL MEDICINE

## 2024-05-20 RX ORDER — LEVETIRACETAM 500 MG/1
500 TABLET ORAL 2 TIMES DAILY
Qty: 120 TABLET | Refills: 0 | Status: SHIPPED | OUTPATIENT
Start: 2024-05-20

## 2024-05-20 RX ORDER — PREGABALIN 150 MG/1
150 CAPSULE ORAL EVERY 12 HOURS SCHEDULED
Qty: 60 CAPSULE | Refills: 0 | Status: SHIPPED | OUTPATIENT
Start: 2024-05-20 | End: 2024-06-19

## 2024-05-20 RX ORDER — SODIUM CHLORIDE 0.9 % (FLUSH) 0.9 %
10 SYRINGE (ML) INJECTION AS NEEDED
Status: DISCONTINUED | OUTPATIENT
Start: 2024-05-20 | End: 2024-05-20 | Stop reason: HOSPADM

## 2024-05-20 RX ORDER — SODIUM CHLORIDE 0.9 % (FLUSH) 0.9 %
10 SYRINGE (ML) INJECTION AS NEEDED
OUTPATIENT
Start: 2024-05-20

## 2024-05-20 RX ORDER — HEPARIN SODIUM (PORCINE) LOCK FLUSH IV SOLN 100 UNIT/ML 100 UNIT/ML
500 SOLUTION INTRAVENOUS AS NEEDED
OUTPATIENT
Start: 2024-05-20

## 2024-05-20 RX ORDER — HEPARIN SODIUM (PORCINE) LOCK FLUSH IV SOLN 100 UNIT/ML 100 UNIT/ML
500 SOLUTION INTRAVENOUS AS NEEDED
Status: DISCONTINUED | OUTPATIENT
Start: 2024-05-20 | End: 2024-05-20 | Stop reason: HOSPADM

## 2024-05-20 RX ADMIN — Medication 500 UNITS: at 13:19

## 2024-05-20 RX ADMIN — Medication 10 ML: at 13:19

## 2024-06-05 ENCOUNTER — APPOINTMENT (OUTPATIENT)
Dept: CT IMAGING | Facility: HOSPITAL | Age: 79
DRG: 181 | End: 2024-06-05
Payer: MEDICARE

## 2024-06-05 ENCOUNTER — APPOINTMENT (OUTPATIENT)
Dept: GENERAL RADIOLOGY | Facility: HOSPITAL | Age: 79
DRG: 181 | End: 2024-06-05
Payer: MEDICARE

## 2024-06-05 ENCOUNTER — HOSPITAL ENCOUNTER (INPATIENT)
Facility: HOSPITAL | Age: 79
LOS: 2 days | Discharge: HOME | DRG: 181 | End: 2024-06-07
Attending: INTERNAL MEDICINE | Admitting: INTERNAL MEDICINE
Payer: MEDICARE

## 2024-06-05 DIAGNOSIS — R04.2 HEMOPTYSIS: Primary | ICD-10-CM

## 2024-06-05 LAB
ALBUMIN SERPL-MCNC: 3.7 G/DL (ref 3.5–5.2)
ALBUMIN/GLOB SERPL: 1.2 G/DL
ALP SERPL-CCNC: 80 U/L (ref 39–117)
ALT SERPL W P-5'-P-CCNC: 14 U/L (ref 1–33)
ANION GAP SERPL CALCULATED.3IONS-SCNC: 9 MMOL/L (ref 5–15)
AST SERPL-CCNC: 14 U/L (ref 1–32)
B PARAPERT DNA SPEC QL NAA+PROBE: NOT DETECTED
B PERT DNA SPEC QL NAA+PROBE: NOT DETECTED
BASOPHILS # BLD AUTO: 0.01 10*3/MM3 (ref 0–0.2)
BASOPHILS NFR BLD AUTO: 0.1 % (ref 0–1.5)
BILIRUB SERPL-MCNC: <0.2 MG/DL (ref 0–1.2)
BUN SERPL-MCNC: 40 MG/DL (ref 8–23)
BUN/CREAT SERPL: 33.1 (ref 7–25)
C PNEUM DNA NPH QL NAA+NON-PROBE: NOT DETECTED
CALCIUM SPEC-SCNC: 9.3 MG/DL (ref 8.6–10.5)
CHLORIDE SERPL-SCNC: 106 MMOL/L (ref 98–107)
CO2 SERPL-SCNC: 23 MMOL/L (ref 22–29)
CREAT SERPL-MCNC: 1.21 MG/DL (ref 0.57–1)
D-LACTATE SERPL-SCNC: 1.6 MMOL/L (ref 0.5–2)
DEPRECATED RDW RBC AUTO: 48.1 FL (ref 37–54)
EGFRCR SERPLBLD CKD-EPI 2021: 46.3 ML/MIN/1.73
EOSINOPHIL # BLD AUTO: 0.09 10*3/MM3 (ref 0–0.4)
EOSINOPHIL NFR BLD AUTO: 1 % (ref 0.3–6.2)
ERYTHROCYTE [DISTWIDTH] IN BLOOD BY AUTOMATED COUNT: 15.8 % (ref 12.3–15.4)
FLUAV SUBTYP SPEC NAA+PROBE: NOT DETECTED
FLUBV RNA ISLT QL NAA+PROBE: NOT DETECTED
GLOBULIN UR ELPH-MCNC: 3 GM/DL
GLUCOSE SERPL-MCNC: 159 MG/DL (ref 65–99)
HADV DNA SPEC NAA+PROBE: NOT DETECTED
HCOV 229E RNA SPEC QL NAA+PROBE: NOT DETECTED
HCOV HKU1 RNA SPEC QL NAA+PROBE: NOT DETECTED
HCOV NL63 RNA SPEC QL NAA+PROBE: NOT DETECTED
HCOV OC43 RNA SPEC QL NAA+PROBE: NOT DETECTED
HCT VFR BLD AUTO: 31.2 % (ref 34–46.6)
HGB BLD-MCNC: 10.1 G/DL (ref 12–15.9)
HMPV RNA NPH QL NAA+NON-PROBE: NOT DETECTED
HPIV1 RNA ISLT QL NAA+PROBE: NOT DETECTED
HPIV2 RNA SPEC QL NAA+PROBE: NOT DETECTED
HPIV3 RNA NPH QL NAA+PROBE: NOT DETECTED
HPIV4 P GENE NPH QL NAA+PROBE: NOT DETECTED
IMM GRANULOCYTES # BLD AUTO: 0.04 10*3/MM3 (ref 0–0.05)
IMM GRANULOCYTES NFR BLD AUTO: 0.5 % (ref 0–0.5)
INR PPP: 1.02 (ref 0.9–1.1)
L PNEUMO1 AG UR QL IA: NEGATIVE
LYMPHOCYTES # BLD AUTO: 1.08 10*3/MM3 (ref 0.7–3.1)
LYMPHOCYTES NFR BLD AUTO: 12.4 % (ref 19.6–45.3)
M PNEUMO IGG SER IA-ACNC: NOT DETECTED
MAGNESIUM SERPL-MCNC: 2.3 MG/DL (ref 1.6–2.4)
MCH RBC QN AUTO: 27.1 PG (ref 26.6–33)
MCHC RBC AUTO-ENTMCNC: 32.4 G/DL (ref 31.5–35.7)
MCV RBC AUTO: 83.6 FL (ref 79–97)
MONOCYTES # BLD AUTO: 0.46 10*3/MM3 (ref 0.1–0.9)
MONOCYTES NFR BLD AUTO: 5.3 % (ref 5–12)
NEUTROPHILS NFR BLD AUTO: 7.02 10*3/MM3 (ref 1.7–7)
NEUTROPHILS NFR BLD AUTO: 80.7 % (ref 42.7–76)
NRBC BLD AUTO-RTO: 0 /100 WBC (ref 0–0.2)
PLATELET # BLD AUTO: 293 10*3/MM3 (ref 140–450)
PMV BLD AUTO: 9.8 FL (ref 6–12)
POTASSIUM SERPL-SCNC: 5.3 MMOL/L (ref 3.5–5.2)
PROCALCITONIN SERPL-MCNC: 0.05 NG/ML (ref 0–0.25)
PROT SERPL-MCNC: 6.7 G/DL (ref 6–8.5)
PROTHROMBIN TIME: 13.6 SECONDS (ref 11.7–14.2)
RBC # BLD AUTO: 3.73 10*6/MM3 (ref 3.77–5.28)
RHINOVIRUS RNA SPEC NAA+PROBE: NOT DETECTED
RSV RNA NPH QL NAA+NON-PROBE: NOT DETECTED
S PNEUM AG SPEC QL LA: NEGATIVE
SARS-COV-2 RNA NPH QL NAA+NON-PROBE: NOT DETECTED
SODIUM SERPL-SCNC: 138 MMOL/L (ref 136–145)
WBC NRBC COR # BLD AUTO: 8.7 10*3/MM3 (ref 3.4–10.8)

## 2024-06-05 PROCEDURE — 87205 SMEAR GRAM STAIN: CPT | Performed by: INTERNAL MEDICINE

## 2024-06-05 PROCEDURE — 85025 COMPLETE CBC W/AUTO DIFF WBC: CPT | Performed by: INTERNAL MEDICINE

## 2024-06-05 PROCEDURE — 87040 BLOOD CULTURE FOR BACTERIA: CPT | Performed by: INTERNAL MEDICINE

## 2024-06-05 PROCEDURE — 85610 PROTHROMBIN TIME: CPT | Performed by: INTERNAL MEDICINE

## 2024-06-05 PROCEDURE — 25510000001 IOPAMIDOL PER 1 ML: Performed by: INTERNAL MEDICINE

## 2024-06-05 PROCEDURE — 80053 COMPREHEN METABOLIC PANEL: CPT | Performed by: INTERNAL MEDICINE

## 2024-06-05 PROCEDURE — 87070 CULTURE OTHR SPECIMN AEROBIC: CPT | Performed by: INTERNAL MEDICINE

## 2024-06-05 PROCEDURE — 0202U NFCT DS 22 TRGT SARS-COV-2: CPT | Performed by: INTERNAL MEDICINE

## 2024-06-05 PROCEDURE — 71275 CT ANGIOGRAPHY CHEST: CPT

## 2024-06-05 PROCEDURE — 84145 PROCALCITONIN (PCT): CPT | Performed by: INTERNAL MEDICINE

## 2024-06-05 PROCEDURE — 87449 NOS EACH ORGANISM AG IA: CPT | Performed by: INTERNAL MEDICINE

## 2024-06-05 PROCEDURE — 83735 ASSAY OF MAGNESIUM: CPT | Performed by: INTERNAL MEDICINE

## 2024-06-05 PROCEDURE — 63710000001 PREDNISONE PER 5 MG: Performed by: INTERNAL MEDICINE

## 2024-06-05 PROCEDURE — 83605 ASSAY OF LACTIC ACID: CPT | Performed by: INTERNAL MEDICINE

## 2024-06-05 PROCEDURE — 25010000002 CEFEPIME PER 500 MG: Performed by: INTERNAL MEDICINE

## 2024-06-05 PROCEDURE — 71045 X-RAY EXAM CHEST 1 VIEW: CPT

## 2024-06-05 RX ORDER — OFLOXACIN 3 MG/ML
4 SOLUTION/ DROPS OPHTHALMIC DAILY
Status: DISCONTINUED | OUTPATIENT
Start: 2024-06-05 | End: 2024-06-07 | Stop reason: HOSPADM

## 2024-06-05 RX ORDER — HYDROCODONE BITARTRATE AND ACETAMINOPHEN 5; 325 MG/1; MG/1
1 TABLET ORAL EVERY 4 HOURS PRN
Status: DISCONTINUED | OUTPATIENT
Start: 2024-06-05 | End: 2024-06-07 | Stop reason: HOSPADM

## 2024-06-05 RX ORDER — FERROUS SULFATE 325(65) MG
325 TABLET ORAL
Status: DISCONTINUED | OUTPATIENT
Start: 2024-06-05 | End: 2024-06-07 | Stop reason: HOSPADM

## 2024-06-05 RX ORDER — ACETAMINOPHEN 650 MG/1
650 SUPPOSITORY RECTAL EVERY 4 HOURS PRN
Status: DISCONTINUED | OUTPATIENT
Start: 2024-06-05 | End: 2024-06-07 | Stop reason: HOSPADM

## 2024-06-05 RX ORDER — PREDNISONE 10 MG/1
10 TABLET ORAL DAILY
Status: DISCONTINUED | OUTPATIENT
Start: 2024-06-05 | End: 2024-06-07 | Stop reason: HOSPADM

## 2024-06-05 RX ORDER — SODIUM CHLORIDE 9 MG/ML
40 INJECTION, SOLUTION INTRAVENOUS AS NEEDED
Status: DISCONTINUED | OUTPATIENT
Start: 2024-06-05 | End: 2024-06-07 | Stop reason: HOSPADM

## 2024-06-05 RX ORDER — PANTOPRAZOLE SODIUM 40 MG/1
40 TABLET, DELAYED RELEASE ORAL
Status: DISCONTINUED | OUTPATIENT
Start: 2024-06-06 | End: 2024-06-07 | Stop reason: HOSPADM

## 2024-06-05 RX ORDER — POLYETHYLENE GLYCOL 3350 17 G/17G
17 POWDER, FOR SOLUTION ORAL DAILY PRN
Status: DISCONTINUED | OUTPATIENT
Start: 2024-06-05 | End: 2024-06-07 | Stop reason: HOSPADM

## 2024-06-05 RX ORDER — BISACODYL 5 MG/1
5 TABLET, DELAYED RELEASE ORAL DAILY PRN
Status: DISCONTINUED | OUTPATIENT
Start: 2024-06-05 | End: 2024-06-07 | Stop reason: HOSPADM

## 2024-06-05 RX ORDER — ATORVASTATIN CALCIUM 20 MG/1
20 TABLET, FILM COATED ORAL NIGHTLY
Status: DISCONTINUED | OUTPATIENT
Start: 2024-06-05 | End: 2024-06-07 | Stop reason: HOSPADM

## 2024-06-05 RX ORDER — ONDANSETRON 2 MG/ML
4 INJECTION INTRAMUSCULAR; INTRAVENOUS EVERY 6 HOURS PRN
Status: DISCONTINUED | OUTPATIENT
Start: 2024-06-05 | End: 2024-06-07 | Stop reason: HOSPADM

## 2024-06-05 RX ORDER — AMOXICILLIN 250 MG
2 CAPSULE ORAL 2 TIMES DAILY PRN
Status: DISCONTINUED | OUTPATIENT
Start: 2024-06-05 | End: 2024-06-07 | Stop reason: HOSPADM

## 2024-06-05 RX ORDER — ACETAMINOPHEN 325 MG/1
650 TABLET ORAL EVERY 4 HOURS PRN
Status: DISCONTINUED | OUTPATIENT
Start: 2024-06-05 | End: 2024-06-07 | Stop reason: HOSPADM

## 2024-06-05 RX ORDER — SODIUM CHLORIDE 0.9 % (FLUSH) 0.9 %
10 SYRINGE (ML) INJECTION EVERY 12 HOURS SCHEDULED
Status: DISCONTINUED | OUTPATIENT
Start: 2024-06-05 | End: 2024-06-07 | Stop reason: HOSPADM

## 2024-06-05 RX ORDER — SODIUM CHLORIDE 0.9 % (FLUSH) 0.9 %
10 SYRINGE (ML) INJECTION AS NEEDED
Status: DISCONTINUED | OUTPATIENT
Start: 2024-06-05 | End: 2024-06-07 | Stop reason: HOSPADM

## 2024-06-05 RX ORDER — CALCIUM CARBONATE 500 MG/1
2 TABLET, CHEWABLE ORAL 2 TIMES DAILY PRN
Status: DISCONTINUED | OUTPATIENT
Start: 2024-06-05 | End: 2024-06-07 | Stop reason: HOSPADM

## 2024-06-05 RX ORDER — NITROGLYCERIN 0.4 MG/1
0.4 TABLET SUBLINGUAL
Status: DISCONTINUED | OUTPATIENT
Start: 2024-06-05 | End: 2024-06-07 | Stop reason: HOSPADM

## 2024-06-05 RX ORDER — SODIUM CHLORIDE 0.9 % (FLUSH) 0.9 %
20 SYRINGE (ML) INJECTION AS NEEDED
Status: DISCONTINUED | OUTPATIENT
Start: 2024-06-05 | End: 2024-06-07 | Stop reason: HOSPADM

## 2024-06-05 RX ORDER — ACETAMINOPHEN 160 MG/5ML
650 SOLUTION ORAL EVERY 4 HOURS PRN
Status: DISCONTINUED | OUTPATIENT
Start: 2024-06-05 | End: 2024-06-07 | Stop reason: HOSPADM

## 2024-06-05 RX ORDER — UREA 10 %
2.5 LOTION (ML) TOPICAL NIGHTLY PRN
Status: DISCONTINUED | OUTPATIENT
Start: 2024-06-05 | End: 2024-06-07 | Stop reason: HOSPADM

## 2024-06-05 RX ORDER — HYDROCODONE BITARTRATE AND ACETAMINOPHEN 7.5; 325 MG/1; MG/1
2 TABLET ORAL EVERY 4 HOURS PRN
Status: DISCONTINUED | OUTPATIENT
Start: 2024-06-05 | End: 2024-06-07 | Stop reason: HOSPADM

## 2024-06-05 RX ORDER — LORAZEPAM 0.5 MG/1
0.5 TABLET ORAL EVERY 8 HOURS PRN
Status: DISCONTINUED | OUTPATIENT
Start: 2024-06-05 | End: 2024-06-07 | Stop reason: HOSPADM

## 2024-06-05 RX ORDER — HEPARIN SODIUM (PORCINE) LOCK FLUSH IV SOLN 100 UNIT/ML 100 UNIT/ML
5 SOLUTION INTRAVENOUS AS NEEDED
Status: DISCONTINUED | OUTPATIENT
Start: 2024-06-05 | End: 2024-06-07 | Stop reason: HOSPADM

## 2024-06-05 RX ORDER — PREGABALIN 75 MG/1
150 CAPSULE ORAL EVERY 12 HOURS SCHEDULED
Status: DISCONTINUED | OUTPATIENT
Start: 2024-06-05 | End: 2024-06-07 | Stop reason: HOSPADM

## 2024-06-05 RX ORDER — LEVETIRACETAM 500 MG/1
500 TABLET ORAL 2 TIMES DAILY
Status: DISCONTINUED | OUTPATIENT
Start: 2024-06-05 | End: 2024-06-07 | Stop reason: HOSPADM

## 2024-06-05 RX ORDER — ONDANSETRON 4 MG/1
4 TABLET, ORALLY DISINTEGRATING ORAL EVERY 6 HOURS PRN
Status: DISCONTINUED | OUTPATIENT
Start: 2024-06-05 | End: 2024-06-07 | Stop reason: HOSPADM

## 2024-06-05 RX ORDER — BISACODYL 10 MG
10 SUPPOSITORY, RECTAL RECTAL DAILY PRN
Status: DISCONTINUED | OUTPATIENT
Start: 2024-06-05 | End: 2024-06-07 | Stop reason: HOSPADM

## 2024-06-05 RX ORDER — ESTRADIOL 1 MG/1
1 TABLET ORAL DAILY
Status: DISCONTINUED | OUTPATIENT
Start: 2024-06-05 | End: 2024-06-07 | Stop reason: HOSPADM

## 2024-06-05 RX ADMIN — PREGABALIN 150 MG: 75 CAPSULE ORAL at 20:09

## 2024-06-05 RX ADMIN — PREDNISONE 10 MG: 10 TABLET ORAL at 15:27

## 2024-06-05 RX ADMIN — FERROUS SULFATE TAB 325 MG (65 MG ELEMENTAL FE) 325 MG: 325 (65 FE) TAB at 15:27

## 2024-06-05 RX ADMIN — LEVETIRACETAM 500 MG: 500 TABLET, FILM COATED ORAL at 15:27

## 2024-06-05 RX ADMIN — HYDROCODONE BITARTRATE AND ACETAMINOPHEN 1 TABLET: 5; 325 TABLET ORAL at 20:10

## 2024-06-05 RX ADMIN — Medication 10 ML: at 15:28

## 2024-06-05 RX ADMIN — IOPAMIDOL 95 ML: 755 INJECTION, SOLUTION INTRAVENOUS at 18:08

## 2024-06-05 RX ADMIN — OFLOXACIN 4 DROP: 3 SOLUTION OPHTHALMIC at 15:27

## 2024-06-05 RX ADMIN — ESTRADIOL 1 MG: 1 TABLET ORAL at 15:27

## 2024-06-05 RX ADMIN — ATORVASTATIN CALCIUM 20 MG: 20 TABLET, FILM COATED ORAL at 20:09

## 2024-06-05 RX ADMIN — PREGABALIN 150 MG: 75 CAPSULE ORAL at 15:27

## 2024-06-05 RX ADMIN — CEFEPIME 2000 MG: 2 INJECTION, POWDER, FOR SOLUTION INTRAVENOUS at 15:15

## 2024-06-05 RX ADMIN — Medication 10 ML: at 20:12

## 2024-06-05 RX ADMIN — LEVETIRACETAM 500 MG: 500 TABLET, FILM COATED ORAL at 20:09

## 2024-06-05 NOTE — H&P
"[facility]    CC: hemoptysis    HPI:  Mrs. Vidal is a 80yo female with known lung cancer.  She presented to the office with sudden worsening of hemoptysis about two days ago,  she noted \"tissue\" full of blood and signficantly more than in the past, consisting of bright red blood and clots.  Not much mucous. She feels generally unwell in addition to this with sweating, subjective fever and fatigue.  No specific alleviating or exacerbating factors.     Past Medical History:   Diagnosis Date    COPD (chronic obstructive pulmonary disease)     Coughing up blood     X2 MONTHS    History of COVID-19 2022    Hyperlipidemia     IBS (irritable bowel syndrome)     Primary lung adenocarcinoma, right     Rheumatoid arthritis      Social History     Socioeconomic History    Marital status:    Tobacco Use    Smoking status: Former     Current packs/day: 0.00     Types: Cigarettes     Quit date: 2022     Years since quittin.0    Smokeless tobacco: Never    Tobacco comments:     Former some day smoker of 1-2 cigs   Vaping Use    Vaping status: Never Used   Substance and Sexual Activity    Alcohol use: Yes     Alcohol/week: 2.0 standard drinks of alcohol     Types: 2 Glasses of wine per week     Comment: occasionally    Drug use: Yes     Types: Marijuana     Comment: GUMMIES    Sexual activity: Defer     Family History   Problem Relation Age of Onset    Cancer Mother     Cancer Father     Malig Hyperthermia Neg Hx      MEDS: reviewed as per chart  ALL: list of 11 as per chart  ROS: 12 point negative except as in HPI    Vital signs: please see my office note from this date and hospital chart, reviewed by me    GEN:  NAD, appears ill,  A&O x3  HEENT: PERRL, EOMI, no icterus, mmm, no JVD, trachea midline, neck supple  CHEST: decreased ae and coarse bilat, no wheezes, no crackles, no use of accessory muscles  CV: RRR, no m/g/r  ABD: soft, nt, nd +bs, no hepatosplenomegaly  EXT: no c/c/e  SKIN: no rashes, " no xanthomas, nl turgor, warm, dry  LYMPH: no palpable cervical or supraclavicular lymphadenopathy  NEURO: CN 2-12 intact and symmetric bilaterally  PSYCH: nl affect, nl orientation, nl judgment, nl mood  MSK: some kyphosis, 5/5 strength ue and le bilaterally    Labs: pending    Ct imaging: pending    A/P:  Hemoptysis - will get stat ct angio, may need bronchoscopy, I went ahead and put in orders in case she needs bronchoscopy tomorrow.  Lung cancer -- will eval on ct chest.  She may need further evaluation with oncology.  Sounds like there were plans for brain imaging as well?  Restrictive/obstructive lung disease - bronchodilators as needed  4.   Chronic prednisone -- continue

## 2024-06-06 LAB
CALCIUM SPEC-SCNC: 9 MG/DL (ref 8.6–10.5)
MAGNESIUM SERPL-MCNC: 2.3 MG/DL (ref 1.6–2.4)
PHOSPHATE SERPL-MCNC: 2.9 MG/DL (ref 2.5–4.5)
POTASSIUM SERPL-SCNC: 4.8 MMOL/L (ref 3.5–5.2)

## 2024-06-06 PROCEDURE — 63710000001 PREDNISONE PER 5 MG: Performed by: INTERNAL MEDICINE

## 2024-06-06 PROCEDURE — 82310 ASSAY OF CALCIUM: CPT | Performed by: INTERNAL MEDICINE

## 2024-06-06 PROCEDURE — 84100 ASSAY OF PHOSPHORUS: CPT | Performed by: INTERNAL MEDICINE

## 2024-06-06 PROCEDURE — 84132 ASSAY OF SERUM POTASSIUM: CPT | Performed by: INTERNAL MEDICINE

## 2024-06-06 PROCEDURE — 83735 ASSAY OF MAGNESIUM: CPT | Performed by: INTERNAL MEDICINE

## 2024-06-06 PROCEDURE — 25010000002 CEFEPIME PER 500 MG: Performed by: INTERNAL MEDICINE

## 2024-06-06 RX ORDER — LISINOPRIL 20 MG/1
20 TABLET ORAL DAILY
COMMUNITY

## 2024-06-06 RX ORDER — MONTELUKAST SODIUM 4 MG/1
1 TABLET, CHEWABLE ORAL
COMMUNITY

## 2024-06-06 RX ORDER — DULOXETIN HYDROCHLORIDE 30 MG/1
30 CAPSULE, DELAYED RELEASE ORAL DAILY
Status: DISCONTINUED | OUTPATIENT
Start: 2024-06-06 | End: 2024-06-07 | Stop reason: HOSPADM

## 2024-06-06 RX ORDER — CHOLESTYRAMINE LIGHT 4 G/5.7G
1 POWDER, FOR SUSPENSION ORAL
Status: DISCONTINUED | OUTPATIENT
Start: 2024-06-06 | End: 2024-06-06

## 2024-06-06 RX ORDER — DULOXETIN HYDROCHLORIDE 30 MG/1
30 CAPSULE, DELAYED RELEASE ORAL DAILY
COMMUNITY

## 2024-06-06 RX ORDER — MONTELUKAST SODIUM 4 MG/1
1 TABLET, CHEWABLE ORAL
Status: DISCONTINUED | OUTPATIENT
Start: 2024-06-07 | End: 2024-06-07 | Stop reason: HOSPADM

## 2024-06-06 RX ORDER — OFLOXACIN 3 MG/ML
1 SOLUTION/ DROPS OPHTHALMIC 2 TIMES DAILY
COMMUNITY

## 2024-06-06 RX ADMIN — PREGABALIN 150 MG: 75 CAPSULE ORAL at 11:21

## 2024-06-06 RX ADMIN — PANTOPRAZOLE SODIUM 40 MG: 40 TABLET, DELAYED RELEASE ORAL at 08:27

## 2024-06-06 RX ADMIN — CEFEPIME 2000 MG: 2 INJECTION, POWDER, FOR SOLUTION INTRAVENOUS at 02:08

## 2024-06-06 RX ADMIN — CEFEPIME 2000 MG: 2 INJECTION, POWDER, FOR SOLUTION INTRAVENOUS at 14:42

## 2024-06-06 RX ADMIN — PREGABALIN 150 MG: 75 CAPSULE ORAL at 21:06

## 2024-06-06 RX ADMIN — Medication 10 ML: at 08:28

## 2024-06-06 RX ADMIN — CHOLESTYRAMINE 4 G: 4 POWDER, FOR SUSPENSION ORAL at 11:22

## 2024-06-06 RX ADMIN — FERROUS SULFATE TAB 325 MG (65 MG ELEMENTAL FE) 325 MG: 325 (65 FE) TAB at 08:27

## 2024-06-06 RX ADMIN — PREDNISONE 10 MG: 10 TABLET ORAL at 08:27

## 2024-06-06 RX ADMIN — HYDROCODONE BITARTRATE AND ACETAMINOPHEN 1 TABLET: 5; 325 TABLET ORAL at 14:42

## 2024-06-06 RX ADMIN — LEVETIRACETAM 500 MG: 500 TABLET, FILM COATED ORAL at 21:06

## 2024-06-06 RX ADMIN — LEVETIRACETAM 500 MG: 500 TABLET, FILM COATED ORAL at 08:27

## 2024-06-06 RX ADMIN — OFLOXACIN 4 DROP: 3 SOLUTION OPHTHALMIC at 08:28

## 2024-06-06 RX ADMIN — ESTRADIOL 1 MG: 1 TABLET ORAL at 08:27

## 2024-06-06 RX ADMIN — ATORVASTATIN CALCIUM 20 MG: 20 TABLET, FILM COATED ORAL at 21:06

## 2024-06-06 RX ADMIN — DULOXETINE HYDROCHLORIDE 30 MG: 30 CAPSULE, DELAYED RELEASE ORAL at 11:21

## 2024-06-06 RX ADMIN — Medication 10 ML: at 21:07

## 2024-06-06 RX ADMIN — HYDROCODONE BITARTRATE AND ACETAMINOPHEN 2 TABLET: 7.5; 325 TABLET ORAL at 21:06

## 2024-06-06 NOTE — PROGRESS NOTES
Clinical Pharmacy Services: Medication History    Pam Vidal is a 79 y.o. female presenting to [facility] for Hemoptysis [R04.2]    She  has a past medical history of COPD (chronic obstructive pulmonary disease), Coughing up blood, History of COVID-19 (02/2022), Hyperlipidemia, IBS (irritable bowel syndrome), Primary lung adenocarcinoma, right, and Rheumatoid arthritis.    Allergies as of 06/05/2024 - Reviewed 06/05/2024   Allergen Reaction Noted    Del-mycin [erythromycin] Hives 07/07/2022    Gentamicin Hives 01/12/2016    Ibuprofen Nausea And Vomiting 01/12/2016    Latex Dermatitis 01/12/2016    Metronidazole Hives 11/10/2016    Ofloxacin Hives and Nausea Only 01/12/2016    Penicillins Hives 01/12/2016    Tetracycline Hives 01/12/2016    Adhesive tape Dermatitis 01/12/2016    Aspirin GI Intolerance 07/07/2022    Codeine Nausea And Vomiting 01/12/2016       Medication information was obtained from: pt   Pharmacy and Phone Number: [facility]    Prior to Admission Medications       Prescriptions Last Dose Informant Patient Reported? Taking?    atorvastatin (LIPITOR) 20 MG tablet  Self No Yes    Take 1 tablet by mouth Every Night.    azelastine (ASTELIN) 0.1 % nasal spray  Self Yes Yes    1 spray into the nostril(s) as directed by provider Daily As Needed for Allergies.    B COMPLEX VITAMINS ER PO  Self Yes Yes    Take  by mouth Daily.    Cholecalciferol (VITAMIN D3) 5000 units capsule capsule  Self Yes Yes    Take 1 capsule by mouth Daily.    colestipol (COLESTID) 1 g tablet  Self Yes Yes    Take 1 tablet by mouth Before Breakfast.    DULoxetine (CYMBALTA) 30 MG capsule  Self Yes Yes    Take 1 capsule by mouth Daily.    esomeprazole (nexIUM) 20 MG capsule  Self Yes Yes    Take 1 capsule by mouth Every Morning Before Breakfast.    estradiol (ESTRACE) 1 MG tablet  Self Yes Yes    Take 1 tablet by mouth 2 (Two) Times a Day.    FeroSul 325 (65 Fe) MG tablet  Self No Yes    TAKE ONE TABLET BY  MOUTH DAILY WITH BREAKFAST    Gabapentin 10 %, Baclofen 2 %, lidocaine 4 %, Ketamine HCl 4 %  Self No Yes    Apply 1-2 g topically to the appropriate area as directed 3 (Three) to 4 (Four) times daily. Lt arm/ lt shoulder    Patient taking differently:  Apply 1-2 g topically to the appropriate area as directed 3 (Three) Times a Day As Needed (pain). Lt arm/ lt shoulder    HYDROcodone-acetaminophen (NORCO) 7.5-325 MG per tablet  Self No Yes    Take 1 tablet by mouth Every 6 (Six) Hours As Needed for Moderate Pain.    levETIRAcetam (KEPPRA) 500 MG tablet  Self No Yes    Take 1 tablet by mouth 2 (Two) Times a Day.    lisinopril (PRINIVIL,ZESTRIL) 20 MG tablet  Self Yes Yes    Take 1 tablet by mouth Daily.    Multiple Vitamins-Minerals (PRESERVISION AREDS 2+MULTI VIT PO)  Self Yes Yes    Take 1 Capful by mouth 2 (Two) Times a Day.    ofloxacin (OCUFLOX) 0.3 % ophthalmic solution  Self Yes Yes    Administer 1 drop to both eyes 2 (Two) Times a Day.    ondansetron (ZOFRAN) 8 MG tablet  Self No Yes    Take 1 tablet by mouth 3 (Three) Times a Day As Needed for Nausea or Vomiting.    predniSONE (DELTASONE) 10 MG tablet  Self No Yes    Take 1 tablet by mouth Daily. 1 tab (10mg) daily    pregabalin (LYRICA) 150 MG capsule  Self No Yes    Take 1 capsule by mouth Every 12 (Twelve) Hours for 30 days.              Medication notes:     This medication list is complete to the best of my knowledge as of 6/6/2024    Please call if questions.    Estela Meraz, PharmD, BCPS  6/6/2024 08:12 EDT

## 2024-06-06 NOTE — PROGRESS NOTES
[facility] INPATIENT PROGRESS NOTE         [address]    2024      PATIENT IDENTIFICATION:  Name: Pam Vidal ADMIT: 2024   : 1945  PCP: Nik Mckay MD    MRN: [id number] LOS: 1 days   AGE/SEX: 79 y.o. female  ROOM: [facility]                     LOS 1    Reason for visit: Hemoptysis with lung cancer      SUBJECTIVE:      Resting comfortably.  On room air.  No distress.  No further hemoptysis.  She says she is coughing up secretions but this is no different than her baseline.  Discussed CT results with her.  Cavitary mass actually is read as smaller.  If she starts to have further hemoptysis will consider bronchoscopy for isolation but suspect it is coming from her cavitary malignancy.  If significantly worse embolization can be considered but think we can hold off on that for now.  Discussed that with her in detail and she is agreeable.  Will let her eat this morning. I am seeing the patient for the first time today.  All patient problems are new to me.      Objective   OBJECTIVE:    Vital Sign Min/Max for last 24 hours  Temp  Min: 97.5 °F (36.4 °C)  Max: 98.4 °F (36.9 °C)   BP  Min: 112/47  Max: 149/59   Pulse  Min: 68  Max: 78   Resp  Min: 18  Max: 22   SpO2  Min: 93 %  Max: 99 %   No data recorded   No data recorded    Vitals:    24 1918 24 0026 24 0356   BP: 112/47  128/59 135/61   BP Location: Right arm  Left arm Left arm   Patient Position: Lying  Lying Lying   Pulse: 78  70 68   Resp: 18 20 20 20   Temp: 98.2 °F (36.8 °C)  97.9 °F (36.6 °C) 98.4 °F (36.9 °C)   TempSrc: Oral  Oral Oral   SpO2: 99%  96% 96%        There were no vitals filed for this visit.    There is no height or weight on file to calculate BMI.                          There is no height or weight on file to calculate BMI.    Intake/Output Summary (Last 24 hours) at 2024 0750  Last data filed at 2024 0608  Gross per 24 hour   Intake 460 ml   Output 700 ml   Net  "-240 ml         Exam:  GEN:  No distress, appears stated age  EYES:   PERRL, anicteric sclerae  ENT:    External ears/nose normal, OP clear  NECK:  No adenopathy, midline trachea  LUNGS: Normal chest on inspection, palpation and scattered coarse bilateral breath sounds on auscultation  CV:  Normal S1S2, without murmur  ABD:  Nontender, nondistended, no hepatosplenomegaly, +BS  EXT:  No edema.  No cyanosis or clubbing.  No mottling and normal cap refill.    Assessment     Scheduled meds:  atorvastatin, 20 mg, Oral, Nightly  cefepime, 2,000 mg, Intravenous, Q12H  estradiol, 1 mg, Oral, Daily  ferrous sulfate, 325 mg, Oral, Daily With Breakfast  levETIRAcetam, 500 mg, Oral, BID  ofloxacin, 4 drop, Both Eyes, Daily  pantoprazole, 40 mg, Oral, Q AM  predniSONE, 10 mg, Oral, Daily  pregabalin, 150 mg, Oral, Q12H  sodium chloride, 10 mL, Intravenous, Q12H  sodium chloride, 10 mL, Intravenous, Q12H      IV meds:                         Data Review:  Results from last 7 days   Lab Units 06/06/24  0617 06/05/24  1537   SODIUM mmol/L  --  138   POTASSIUM mmol/L 4.8 5.3*   CHLORIDE mmol/L  --  106   CO2 mmol/L  --  23.0   BUN mg/dL  --  40*   CREATININE mg/dL  --  1.21*   GLUCOSE mg/dL  --  159*   CALCIUM mg/dL 9.0 9.3         Estimated Creatinine Clearance: 31 mL/min (A) (by C-G formula based on SCr of 1.21 mg/dL (H)).  Results from last 7 days   Lab Units 06/05/24  1537   WBC 10*3/mm3 8.70   HEMOGLOBIN g/dL 10.1*   PLATELETS 10*3/mm3 293     Results from last 7 days   Lab Units 06/05/24  1537   INR  1.02     Results from last 7 days   Lab Units 06/05/24  1537   ALT (SGPT) U/L 14   AST (SGOT) U/L 14         Results from last 7 days   Lab Units 06/05/24  1537   PROCALCITONIN ng/mL 0.05   LACTATE mmol/L 1.6         No results found for: \"HGBA1C\", \"POCGLU\"      Imaging reviewed  Chest x-ray and CT chest viewed: 5.7 cm cavitary mass right upper lobe has mildly decreased in size since March.  Similar mediastinal invasion noted.  No " contrast extravasation within the mass.  Pulmonary arteries attenuated in the right upper lobe.  No evidence of acute thromboembolism.          Microbiology reviewed            Active Hospital Problems    Diagnosis  POA    **Hemoptysis [R04.2]  Yes      Resolved Hospital Problems   No resolved problems to display.         ASSESSMENT:  Hemoptysis: Improved  Lung cancer  Restrictive/obstructive lung disease  Chronic prednisone use      PLAN:  Hemoptysis has improved.  Productive cough back to her normal baseline.  Discussed with her CT results.  Continue to monitor.  Will discuss with her main pulmonologist, Dr Gregor Vee.  Continue bronchodilators for obstructive lung disease symptoms.  Continue antibiotics for potential infectious component.    Mac Ramirez MD  Pulmonary and Critical Care Medicine  [facility], [facility]  6/6/2024    07:50 EDT

## 2024-06-06 NOTE — SIGNIFICANT NOTE
06/06/24 1229   OTHER   Discipline occupational therapist   Rehab Time/Intention   Session Not Performed other (see comments)  (per pt report at baseline and up independently. Pt. defers need for skilled OT intervention, educated to alert staff if changes arise. OT to sign off)

## 2024-06-06 NOTE — PLAN OF CARE
Goal Outcome Evaluation:  Plan of Care Reviewed With: patient        Progress: no change  Outcome Evaluation: vitals stable.  pt expressed pain.  meds given per orders.  port is accessed.  the pt has been NPO since midnight for a bronchoscopy today.  will continue to monitor.

## 2024-06-06 NOTE — CASE MANAGEMENT/SOCIAL WORK
Discharge Planning Assessment  [facility]     Patient Name: Pam Vidal  MRN: [id number]  Today's Date: 6/6/2024    Admit Date: 6/5/2024    Plan: Home, family to transport.   Discharge Needs Assessment       Row Name 06/06/24 1524       Living Environment    People in Home alone    Current Living Arrangements home    Family Caregiver if Needed other relative(s);child(kathleen), adult    Quality of Family Relationships helpful;involved;supportive    Able to Return to Prior Arrangements yes       Transition Planning    Patient/Family Anticipates Transition to home    Patient/Family Anticipated Services at Transition     Transportation Anticipated family or friend will provide       Discharge Needs Assessment    Equipment Currently Used at Home none    Concerns to be Addressed no discharge needs identified;denies needs/concerns at this time                   Discharge Plan       Row Name 06/06/24 1524       Plan    Plan Home, family to transport.    Patient/Family in Agreement with Plan yes    Plan Comments CCP met with pt at bedside, introduced self and role of CCP. Face sheet information and pharmacy verified. Pt lives alone in a single-story home with 1STE. Pt does not drive, IADL's and denies DME at home. Pt's family assists with transport. Pt has a living will. Pt declined meds to beds and denies trouble affording or managing her medications. Pt has used [facility] and has been to a SNF in [place]. DC plan is to return home, family to transport. Ledy RN/CCP                  Continued Care and Services - Admitted Since 6/5/2024    No active coordination exists for this encounter.       Selected Continued Care - Prior Encounters Includes continued care and service providers with selected services from prior encounters from 3/7/2024 to 6/6/2024      Discharged on 3/7/2024 Admission date: 3/3/2024 - Discharge disposition: Skilled Nursing Facility (DC - External)      Destination       Service  Provider Selected Services Address Phone Fax Patient Preferred    [facility] [address] [facility] [address] [phone] --                          Expected Discharge Date and Time       Expected Discharge Date Expected Discharge Time    Jun 7, 2024            Demographic Summary    No documentation.                  Functional Status       Row Name 06/06/24 1524       Functional Status    Usual Activity Tolerance good    Current Activity Tolerance moderate       Assessment of Health Literacy    How often do you have someone help you read hospital materials? Never    How often do you have problems learning about your medical condition because of difficulty understanding written information? Never    How often do you have a problem understanding what is told to you about your medical condition? Never    How confident are you filling out medical forms by yourself? Extremely    Health Literacy Excellent       Functional Status, IADL    Medications independent    Meal Preparation independent    Housekeeping independent    Laundry independent    Shopping independent                               Sepideh Brady, RN

## 2024-06-07 ENCOUNTER — READMISSION MANAGEMENT (OUTPATIENT)
Dept: CALL CENTER | Facility: HOSPITAL | Age: 79
End: 2024-06-07
Payer: MEDICARE

## 2024-06-07 VITALS
SYSTOLIC BLOOD PRESSURE: 103 MMHG | RESPIRATION RATE: 16 BRPM | DIASTOLIC BLOOD PRESSURE: 44 MMHG | HEART RATE: 67 BPM | OXYGEN SATURATION: 96 % | TEMPERATURE: 97.5 F

## 2024-06-07 LAB
ANION GAP SERPL CALCULATED.3IONS-SCNC: 10.5 MMOL/L (ref 5–15)
BACTERIA SPEC RESP CULT: NORMAL
BUN SERPL-MCNC: 35 MG/DL (ref 8–23)
BUN/CREAT SERPL: 25.7 (ref 7–25)
CALCIUM SPEC-SCNC: 8.6 MG/DL (ref 8.6–10.5)
CHLORIDE SERPL-SCNC: 109 MMOL/L (ref 98–107)
CO2 SERPL-SCNC: 21.5 MMOL/L (ref 22–29)
CREAT SERPL-MCNC: 1.36 MG/DL (ref 0.57–1)
DEPRECATED RDW RBC AUTO: 48.4 FL (ref 37–54)
EGFRCR SERPLBLD CKD-EPI 2021: 40.2 ML/MIN/1.73
ERYTHROCYTE [DISTWIDTH] IN BLOOD BY AUTOMATED COUNT: 15.7 % (ref 12.3–15.4)
GLUCOSE SERPL-MCNC: 108 MG/DL (ref 65–99)
GRAM STN SPEC: NORMAL
GRAM STN SPEC: NORMAL
HCT VFR BLD AUTO: 30.2 % (ref 34–46.6)
HGB BLD-MCNC: 9.4 G/DL (ref 12–15.9)
MCH RBC QN AUTO: 26.2 PG (ref 26.6–33)
MCHC RBC AUTO-ENTMCNC: 31.1 G/DL (ref 31.5–35.7)
MCV RBC AUTO: 84.1 FL (ref 79–97)
PLATELET # BLD AUTO: 289 10*3/MM3 (ref 140–450)
PMV BLD AUTO: 10.4 FL (ref 6–12)
POTASSIUM SERPL-SCNC: 4.5 MMOL/L (ref 3.5–5.2)
RBC # BLD AUTO: 3.59 10*6/MM3 (ref 3.77–5.28)
SODIUM SERPL-SCNC: 141 MMOL/L (ref 136–145)
WBC NRBC COR # BLD AUTO: 8.47 10*3/MM3 (ref 3.4–10.8)

## 2024-06-07 PROCEDURE — 63710000001 PREDNISONE PER 5 MG: Performed by: INTERNAL MEDICINE

## 2024-06-07 PROCEDURE — 25010000002 CEFEPIME PER 500 MG: Performed by: INTERNAL MEDICINE

## 2024-06-07 PROCEDURE — 25010000002 HEPARIN LOCK FLUSH PER 10 UNITS: Performed by: INTERNAL MEDICINE

## 2024-06-07 PROCEDURE — 85027 COMPLETE CBC AUTOMATED: CPT | Performed by: INTERNAL MEDICINE

## 2024-06-07 PROCEDURE — 80048 BASIC METABOLIC PNL TOTAL CA: CPT | Performed by: INTERNAL MEDICINE

## 2024-06-07 RX ORDER — LEVOFLOXACIN 750 MG/1
750 TABLET, FILM COATED ORAL DAILY
Qty: 4 TABLET | Refills: 0 | Status: SHIPPED | OUTPATIENT
Start: 2024-06-07 | End: 2024-06-11

## 2024-06-07 RX ADMIN — OFLOXACIN 4 DROP: 3 SOLUTION OPHTHALMIC at 08:30

## 2024-06-07 RX ADMIN — PREGABALIN 150 MG: 75 CAPSULE ORAL at 08:30

## 2024-06-07 RX ADMIN — LEVETIRACETAM 500 MG: 500 TABLET, FILM COATED ORAL at 08:30

## 2024-06-07 RX ADMIN — ESTRADIOL 1 MG: 1 TABLET ORAL at 08:30

## 2024-06-07 RX ADMIN — PREDNISONE 10 MG: 10 TABLET ORAL at 08:30

## 2024-06-07 RX ADMIN — HYDROCODONE BITARTRATE AND ACETAMINOPHEN 1 TABLET: 5; 325 TABLET ORAL at 04:01

## 2024-06-07 RX ADMIN — PANTOPRAZOLE SODIUM 40 MG: 40 TABLET, DELAYED RELEASE ORAL at 08:33

## 2024-06-07 RX ADMIN — Medication 10 ML: at 08:31

## 2024-06-07 RX ADMIN — DULOXETINE HYDROCHLORIDE 30 MG: 30 CAPSULE, DELAYED RELEASE ORAL at 08:30

## 2024-06-07 RX ADMIN — HYDROCODONE BITARTRATE AND ACETAMINOPHEN 1 TABLET: 5; 325 TABLET ORAL at 11:08

## 2024-06-07 RX ADMIN — MONTELUKAST SODIUM 1 G: 4 TABLET, CHEWABLE ORAL at 08:30

## 2024-06-07 RX ADMIN — FERROUS SULFATE TAB 325 MG (65 MG ELEMENTAL FE) 325 MG: 325 (65 FE) TAB at 08:29

## 2024-06-07 RX ADMIN — HEPARIN 500 UNITS: 100 SYRINGE at 10:45

## 2024-06-07 RX ADMIN — CEFEPIME 2000 MG: 2 INJECTION, POWDER, FOR SOLUTION INTRAVENOUS at 03:57

## 2024-06-07 NOTE — DISCHARGE SUMMARY
"                                                                   PHYSICIAN DISCHARGE SUMMARY                                                                        [facility]    Date of admit: 6/5/2024  Date of Discharge:  6/7/2024    PCP: Nik Mckay MD    Discharge Diagnosis:     Hemoptysis  Hemoptysis: Improved  Lung cancer  Restrictive/obstructive lung disease  Chronic prednisone use    Secondary Diagnoses:  Past Medical History:   Diagnosis Date    COPD (chronic obstructive pulmonary disease)     Coughing up blood     X2 MONTHS    History of COVID-19 02/2022    Hyperlipidemia     IBS (irritable bowel syndrome)     Primary lung adenocarcinoma, right     Rheumatoid arthritis        Procedures Performed           Consults:       Hospital Course  Patient is a 79 y.o. female presented with   CC: hemoptysis     HPI:  Mrs. Vidal is a 80yo female with known lung cancer.  She presented to the office with sudden worsening of hemoptysis about two days ago,  she noted \"tissue\" full of blood and signficantly more than in the past, consisting of bright red blood and clots.  Not much mucous. She feels generally unwell in addition to this with sweating, subjective fever and fatigue.  No specific alleviating or exacerbating factors.        Resting comfortably.  On room air.  No worsening cough beyond baseline.  She is interested in going home today.  Plan for follow-up with Dr Gregor Vee in our office.    Condition on Discharge:  Stable.      Vital Signs  Temp:  [97.5 °F (36.4 °C)-98.4 °F (36.9 °C)] 97.5 °F (36.4 °C)  Heart Rate:  [67-87] 67  Resp:  [16-20] 16  BP: (103-114)/(44-56) 103/44    Physical Exam:  General Appearance: no acute distress, appears comfortable  Heart: regular rate and rhythm  Lungs: clear to auscultation bilaterally, respirations unlabored  Abdomen: soft, nontender, nondistended, no guarding/rebound, nl BS  Extremeties: no edema, no cyanosis  Neurology: speech normal, mental status " "normal, moving all four extremities  Mental Status: alert, oriented        --  Consults:   IP CONSULT TO CASE MANAGEMENT     Significant Discharge Diagnostics   Procedures Performed:         Pertinent Lab Results:  Results from last 7 days   Lab Units 06/06/24  0617 06/05/24  1537   SODIUM mmol/L  --  138   POTASSIUM mmol/L 4.8 5.3*   CHLORIDE mmol/L  --  106   CO2 mmol/L  --  23.0   BUN mg/dL  --  40*   CREATININE mg/dL  --  1.21*   GLUCOSE mg/dL  --  159*   CALCIUM mg/dL 9.0 9.3   AST (SGOT) U/L  --  14   ALT (SGPT) U/L  --  14         Results from last 7 days   Lab Units 06/05/24  1537   WBC 10*3/mm3 8.70   HEMOGLOBIN g/dL 10.1*   HEMATOCRIT % 31.2*   PLATELETS 10*3/mm3 293   MCV fL 83.6   MCH pg 27.1   MCHC g/dL 32.4   RDW % 15.8*   RDW-SD fl 48.1   MPV fL 9.8   NEUTROPHIL % % 80.7*   LYMPHOCYTE % % 12.4*   MONOCYTES % % 5.3   EOSINOPHIL % % 1.0   BASOPHIL % % 0.1   IMM GRAN % % 0.5   NEUTROS ABS 10*3/mm3 7.02*   LYMPHS ABS 10*3/mm3 1.08   MONOS ABS 10*3/mm3 0.46   EOS ABS 10*3/mm3 0.09   BASOS ABS 10*3/mm3 0.01   IMMATURE GRANS (ABS) 10*3/mm3 0.04   NRBC /100 WBC 0.0     Results from last 7 days   Lab Units 06/05/24  1537   INR  1.02     Results from last 7 days   Lab Units 06/06/24  0617 06/05/24  1537   MAGNESIUM mg/dL 2.3 2.3           Invalid input(s): \"LDLCALC\"              Results from last 7 days   Lab Units 06/05/24  1537   PROCALCITONIN ng/mL 0.05   LACTATE mmol/L 1.6             Results from last 7 days   Lab Units 06/05/24  1623 06/05/24  1507 06/05/24  1458   BLOODCX  No growth at 24 hours No growth at 24 hours  --    RESPCX   --   --  Light growth (2+) The culture consists of normal respiratory nani. This is a preliminary report; final report to follow.         Results from last 7 days   Lab Units 06/05/24  1432   ADENOVIRUS DETECTION BY PCR  Not Detected   CORONAVIRUS 229E  Not Detected   CORONAVIRUS HKU1  Not Detected   CORONAVIRUS NL63  Not Detected   CORONAVIRUS OC43  Not " Detected   HUMAN METAPNEUMOVIRUS  Not Detected   HUMAN RHINOVIRUS/ENTEROVIRUS  Not Detected   INFLUENZA B PCR  Not Detected   PARAINFLUENZA 1  Not Detected   PARAINFLUENZA VIRUS 2  Not Detected   PARAINFLUENZA VIRUS 3  Not Detected   PARAINFLUENZA VIRUS 4  Not Detected   BORDETELLA PERTUSSIS PCR  Not Detected   CHLAMYDOPHILA PNEUMONIAE PCR  Not Detected   MYCOPLAMA PNEUMO PCR  Not Detected   INFLUENZA A PCR  Not Detected   RSV, PCR  Not Detected           Imaging Results:  Imaging Results (All)       Procedure Component Value Units Date/Time    CT Angiogram Chest [[id number]] Collected: 06/05/24 1932     Updated: 06/05/24 1954    Narrative:      CT ANGIOGRAM CHEST-     HISTORY: Hemoptysis. Lung cancer.     TECHNIQUE: Radiation dose reduction techniques were utilized, including  automated exposure control and exposure modulation based on body size.   3 mm images were obtained through the chest before and after the  administration of IV contrast.     COMPARISON: CT chest angiogram 3/3/2024 and 6/11/2023        FINDINGS: Nondilated main pulmonary artery. Pulmonary arteries are  attenuated in the right upper lobe but otherwise patent without evidence  of acute thromboembolism. Unchanged 0.7 cm filling defect at the  pulmonary valve (series 4/image 70).     Right upper lobe 5.7 x 3.2 cm cavitary mass (series 4/image 38)  previously 6.2 x 4.1 cm with remeasurement. Associated right upper lobe  volume loss. Similar mediastinal invasion with abutment of the trachea  and esophagus (series 4/image 40). No contrast extravasation within the  mass on limited single phase examination.     Heart is normal in size. Severe coronary calcifications. Trace  pericardial fluid. Nondilated thoracic aorta without evidence of  dissection. No mediastinal/hilar adenopathy. Decompressed esophagus.  Right IJ port with tip likely in the right atrium.     Outside of the right upper lobe, trachea and main bronchi remain patent.  No new focal  opacity or consolidation. No pleural effusion.     Few right-sided renal cysts. Pseudolesion of Benzonia's capsule.          Impression:      1. Right upper lobe 5.7 cm cavitary mass has mildly decreased in size  since March 2024. Similar mediastinal invasion. No contrast  extravasation within the mass on limited single phase examination.  Trachea and main bronchi outside the right upper lobe remain patent.  2. Pulmonary arteries are attenuated in the right upper lobe but  otherwise patent without evidence of acute thromboembolism.  3. A 0.7 cm filling defect along the pulmonary valve is unchanged dating  back to June 2023.     This report was finalized on 6/5/2024 7:51 PM by Dr. Petey Mcknight M.D on Workstation: [id number]       XR Chest 1 View [[id number]] Collected: 06/05/24 1604     Updated: 06/05/24 1621    Narrative:      XR CHEST 1 VW-     HISTORY: Female who is 79 years-old, hemoptysis     TECHNIQUE: Frontal view of the chest     COMPARISON: 3/3/2024     FINDINGS: Right chest port appears stable. The heart size is normal.  Pulmonary vasculature is unremarkable. Aorta is calcified. Persistent  right apical opacity appears slightly decreased, CT could be obtained  for direct comparison with prior CT exam as indicated. The lungs  otherwise appear clear. No pleural effusion, or pneumothorax. No acute  osseous process.       Impression:      As described.           This report was finalized on 6/5/2024 4:18 PM by Dr. Prince Nicole M.D on Workstation: [id number]             --    Discharge Disposition  Home or Self Care    Discharge Medications     Discharge Medications        New Medications        Instructions Start Date   levoFLOXacin 750 MG tablet  Commonly known as: Levaquin   750 mg, Oral, Daily             Continue These Medications        Instructions Start Date   atorvastatin 20 MG tablet  Commonly known as: LIPITOR   20 mg, Oral, Nightly      azelastine 0.1 % nasal spray  Commonly known as: ASTELIN    1 spray, Nasal, Daily PRN      B COMPLEX VITAMINS ER PO   Oral, Daily      colestipol 1 g tablet  Commonly known as: COLESTID   1 g, Oral, Before Breakfast      DULoxetine 30 MG capsule  Commonly known as: CYMBALTA   30 mg, Oral, Daily      esomeprazole 20 MG capsule  Commonly known as: nexIUM   20 mg, Oral, Every Morning Before Breakfast      estradiol 1 MG tablet  Commonly known as: ESTRACE   1 mg, Oral, 2 Times Daily      FeroSul 325 (65 Fe) MG tablet  Generic drug: ferrous sulfate   TAKE ONE TABLET BY MOUTH DAILY WITH BREAKFAST      Gabapentin 10 %, Baclofen 2 %, lidocaine 4 %, Ketamine HCl 4 %   1-2 g, Topical, 3 to 4 Times Daily, Lt arm/ lt shoulder      HYDROcodone-acetaminophen 7.5-325 MG per tablet  Commonly known as: NORCO   1 tablet, Oral, Every 6 Hours PRN      levETIRAcetam 500 MG tablet  Commonly known as: KEPPRA   500 mg, Oral, 2 Times Daily      lisinopril 20 MG tablet  Commonly known as: PRINIVIL,ZESTRIL   20 mg, Oral, Daily      ofloxacin 0.3 % ophthalmic solution  Commonly known as: OCUFLOX   1 drop, Both Eyes, 2 Times Daily      ondansetron 8 MG tablet  Commonly known as: ZOFRAN   8 mg, Oral, 3 Times Daily PRN      predniSONE 10 MG tablet  Commonly known as: DELTASONE   10 mg, Oral, Daily, 1 tab (10mg) daily      pregabalin 150 MG capsule  Commonly known as: LYRICA   150 mg, Oral, Every 12 Hours Scheduled      PRESERVISION AREDS 2+MULTI VIT PO   1 Capful, Oral, 2 Times Daily      vitamin D3 125 MCG (5000 UT) capsule capsule   5,000 Units, Oral, Daily               Discharge Diet: regular diet    Activity at Discharge:      Follow-up Information       Nik Mckay MD .    Specialty: Family Medicine  Contact information:  [address] [facility] [place] [address] [phone]               Nik Mckay MD .    Specialty: Family Medicine  Contact information:  [address] [place] [address] [phone]               Gregor Vee MD Follow up in 1 week(s).     Specialties: Pulmonary Disease, Intensive Care  Contact information:  [address]  [place] [id number]  [phone]                           See primary care provider, Nik Mckay MD, in 1-2 weeks        Test Results Pending at Discharge  Pending Labs       Order Current Status    Blood Culture - Blood, Arm, Left Preliminary result    Blood Culture - Blood, Arm, Left Preliminary result    Respiratory Culture - Sputum, Cough Preliminary result             Mac Ramirez MD  [facility], [facility]  Pulmonary and Critical Care Medicine  06/07/24  09:05 EDT    Time: greater than 30 minutes.        Total time spent discharging patient including evaluation, post hospitalization follow up, medication and post hospitalization instructions and education total time exceeds 30 minutes.

## 2024-06-07 NOTE — PLAN OF CARE
Goal Outcome Evaluation:         AVS completed and discharge instructions provided to patient. Port de-accessed. DC pending family transportation at 1430.

## 2024-06-07 NOTE — CASE MANAGEMENT/SOCIAL WORK
Case Management Discharge Note      Final Note: Home, no additional CCP needs.         Selected Continued Care - Admitted Since 6/5/2024       Destination    No services have been selected for the patient.                Durable Medical Equipment    No services have been selected for the patient.                Dialysis/Infusion    No services have been selected for the patient.                Home Medical Care    No services have been selected for the patient.                Therapy    No services have been selected for the patient.                Community Resources    No services have been selected for the patient.                Community & DME    No services have been selected for the patient.                    Selected Continued Care - Prior Encounters Includes continued care and service providers with selected services from prior encounters from 3/7/2024 to 6/7/2024      Discharged on 3/7/2024 Admission date: 3/3/2024 - Discharge disposition: Skilled Nursing Facility (DC - External)      Destination       Service Provider Selected Services Address Phone Fax Patient Preferred    [facility] [address] [place] [address] [place] [address] [phone] --                               Final Discharge Disposition Code: 01 - home or self-care

## 2024-06-08 NOTE — OUTREACH NOTE
Prep Survey      Flowsheet Row Responses   [facility] discharged [facility]? [place]   Is LACE score < 7 ? No   Eligibility Readm Mgmt   Discharge diagnosis Hemoptysis   Does the patient have one of the following disease processes/diagnoses(primary or secondary)? Other   Does the patient have Home health ordered? No   Is there a DME ordered? No   Prep survey completed? Yes            PRINCESS PORTILLO - Registered Nurse

## 2024-06-10 LAB
BACTERIA SPEC AEROBE CULT: NORMAL
BACTERIA SPEC AEROBE CULT: NORMAL

## 2024-06-11 ENCOUNTER — HOSPITAL ENCOUNTER (OUTPATIENT)
Dept: MRI IMAGING | Facility: HOSPITAL | Age: 79
Discharge: HOME | End: 2024-06-11
Payer: MEDICARE

## 2024-06-11 ENCOUNTER — HOSPITAL ENCOUNTER (OUTPATIENT)
Dept: CT IMAGING | Facility: HOSPITAL | Age: 79
Discharge: HOME | End: 2024-06-11
Payer: MEDICARE

## 2024-06-11 DIAGNOSIS — C79.31 LUNG CANCER METASTATIC TO BRAIN: ICD-10-CM

## 2024-06-11 DIAGNOSIS — C34.90 LUNG CANCER METASTATIC TO BRAIN: ICD-10-CM

## 2024-06-11 LAB
CREAT BLDA-MCNC: 1.3 MG/DL (ref 0.6–1.3)
EGFRCR SERPLBLD CKD-EPI 2021: 42.4 ML/MIN/1.73

## 2024-06-11 PROCEDURE — 71260 CT THORAX DX C+: CPT

## 2024-06-11 PROCEDURE — 74177 CT ABD & PELVIS W/CONTRAST: CPT

## 2024-06-11 PROCEDURE — 25010000002 GADOTERIDOL PER 1 ML: Performed by: STUDENT IN AN ORGANIZED HEALTH CARE EDUCATION/TRAINING PROGRAM

## 2024-06-11 PROCEDURE — 25510000001 IOPAMIDOL PER 1 ML: Performed by: STUDENT IN AN ORGANIZED HEALTH CARE EDUCATION/TRAINING PROGRAM

## 2024-06-11 PROCEDURE — 82565 ASSAY OF CREATININE: CPT

## 2024-06-11 PROCEDURE — 70553 MRI BRAIN STEM W/O & W/DYE: CPT

## 2024-06-11 PROCEDURE — A9579 GAD-BASE MR CONTRAST NOS,1ML: HCPCS | Performed by: STUDENT IN AN ORGANIZED HEALTH CARE EDUCATION/TRAINING PROGRAM

## 2024-06-11 RX ADMIN — GADOTERIDOL 13 ML: 279.3 INJECTION, SOLUTION INTRAVENOUS at 16:14

## 2024-06-11 RX ADMIN — IOPAMIDOL 100 ML: 755 INJECTION, SOLUTION INTRAVENOUS at 15:20

## 2024-06-13 ENCOUNTER — TELEPHONE (OUTPATIENT)
Dept: ONCOLOGY | Facility: CLINIC | Age: 79
End: 2024-06-13
Payer: MEDICARE

## 2024-06-13 ENCOUNTER — READMISSION MANAGEMENT (OUTPATIENT)
Dept: CALL CENTER | Facility: HOSPITAL | Age: 79
End: 2024-06-13
Payer: MEDICARE

## 2024-06-13 DIAGNOSIS — R93.89 ABNORMAL MRI: ICD-10-CM

## 2024-06-13 DIAGNOSIS — C34.90 LUNG CANCER METASTATIC TO BRAIN: Primary | ICD-10-CM

## 2024-06-13 DIAGNOSIS — C79.31 LUNG CANCER METASTATIC TO BRAIN: Primary | ICD-10-CM

## 2024-06-13 DIAGNOSIS — G93.9 BRAIN LESION: ICD-10-CM

## 2024-06-13 NOTE — TELEPHONE ENCOUNTER
Left message requesting patient call back to RN direct line in order to relay message from Dr. Abrams.

## 2024-06-13 NOTE — TELEPHONE ENCOUNTER
Call to patient's son, Espinoza, since RN has not been able to reach [place].  Let him know the MRI was abnormal showing a new lesion on the brain. Let him know that Dr. Abrams is referring patient urgently to neurosurgery.  Espinoza verbalized understanding and states he and his siblings are very close and he will let them know as well.

## 2024-06-13 NOTE — TELEPHONE ENCOUNTER
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  Please place an urgent referral to neurosurgery for evaluation and management

## 2024-06-13 NOTE — OUTREACH NOTE
Medical Week 1 Survey      Flowsheet Row Responses   [facility] patient discharged from? [place]   Does the patient have one of the following disease processes/diagnoses(primary or secondary)? Other   Week 1 attempt successful? Yes   Call start time 0816   Call end time 0819   Discharge diagnosis Hemoptysis   Meds reviewed with patient/caregiver? Yes   Is the patient having any side effects they believe may be caused by any medication additions or changes? No   Does the patient have all medications ordered at discharge? Yes   Is the patient taking all medications as directed (includes completed medication regime)? Yes   Does the patient have a primary care provider?  Yes   Does the patient have an appointment with their PCP within 7 days of discharge? Yes   Comments regarding PCP PCP follow up today   Has the patient kept scheduled appointments due by today? Yes   Has home health visited the patient within 72 hours of discharge? N/A   Psychosocial issues? No   Did the patient receive a copy of their discharge instructions? Yes   Nursing interventions Reviewed instructions with patient   What is the patient's perception of their health status since discharge? Improving   Is the patient/caregiver able to teach back signs and symptoms related to disease process for when to call PCP? Yes   Is the patient/caregiver able to teach back signs and symptoms related to disease process for when to call 911? Yes   Is the patient/caregiver able to teach back the hierarchy of who to call/visit for symptoms/problems? PCP, Specialist, Home health nurse, Urgent Care, ED, 911 Yes   Week 1 call completed? Yes   Would this patient benefit from a Referral to Amb Social Work? No   Is the patient interested in additional calls from an ambulatory ? No   Call end time 0819            Jackie WAY - Registered Nurse

## 2024-06-14 NOTE — TELEPHONE ENCOUNTER
Caller: ZIA DURANT    Relationship to patient: Emergency Contact    Best call back number: [phone]    Chief complaint: NEW RESULTS ON BRAIN SCAN    Type of visit: FOLLOW UP    Requested date: ASAP     If rescheduling, when is the original appointment: 7/16/2024     Additional notes:NEW MASS FOUND ON BRAIN AND THEY WERE ADVISED TO GET A SOONER APPT WITH HARINI ASHRAF

## 2024-06-17 NOTE — TELEPHONE ENCOUNTER
LM, provider is out of office this week and the soonest appt available is the date that you are already scheduled.  When provider returns to office she will receive this message.  I will add you to a wait list for a sooner appt.  Please call our office if you have any further questions or concerns

## 2024-07-05 NOTE — TELEPHONE ENCOUNTER
Provider: Jose Alberto  Caller: Rosio  Relationship to Patient: Danyel  Call Back Phone Number: [phone]  Reason for Call: she is calling because she wants to get hospice for her.

## 2024-07-05 NOTE — TELEPHONE ENCOUNTER
Call back to Rosio and let her know we had received her message.  Rosio proceeds to say that she is living with Ms. Vidal and doing all of her care.  She states the patient is continuing to get  worse and is not doing well at all, and they are ready for Hospice.  Rosio was able to place Ms. Vidal on the phone, and RN discussed with her the possibility of having a Hospice Referral.  Patient states she is ready to have Hospice.  Let them know the referral will be sent. Understanding verbalized.       RN attempted to call patient's POARommel, her son, at [phone] to let him know patient requesting Hospice.  No answer and no way to leave voice mail.

## 2024-07-08 ENCOUNTER — TELEPHONE (OUTPATIENT)
Dept: NEUROSURGERY | Facility: CLINIC | Age: 79
End: 2024-07-08

## 2024-07-08 NOTE — TELEPHONE ENCOUNTER
“Please be informed that patient has passed. Patient has been marked  in the system. The date of death is: 2024    Caller: DAUGHTER IN LAW

## 2024-07-11 ENCOUNTER — TELEPHONE (OUTPATIENT)
Dept: ONCOLOGY | Facility: CLINIC | Age: 79
End: 2024-07-11

## 2024-07-11 NOTE — TELEPHONE ENCOUNTER
“Please be informed that patient has passed. Patient has been marked  in the system. The date of death is: 2024    Caller: ISABELLE HURST    Relationship: Other Relative    Best call back number: [phone]    Did the patient have surgery within 30 days of their passing (Y/N): NOT SURE

## 2024-08-05 NOTE — PROGRESS NOTES
Number Of Layers: 1 [facility] MULTIDISCIPLINARY CLINIC  SURVIVORSHIP VISIT: IN CLINIC  Survivorship Treatment Summary Follow-Up Visit        Pam Vidal is a pleasant 78 y.o. female reviewed today in Multidisciplinary Clinic, for follow-up survivorship treatment summary and older adult functional assessment visit.     HPI  Very nice 78 year old patient who has now completed chemoradiation with carboplatin/taxol.    She is due for imfinzi cycle 6 today, has been tolerating well. Feels tired day of and entire next day but back to herself at the 2nd day. Appetite fair, having a hard time gaining weight back. No neuropathy. No shortness of breath. Bowels and bladder without changes. Will see urology for follow up in the next few weeks with bladder suspension with mesh tentatively planned. Generally feeling deconditioned. Looking forward to travel out of town for [employer] with her partner with weekend.     TREATMENT HISTORY:     Oncology/Hematology History   Primary lung adenocarcinoma, right   7/22/2022 Biopsy    Final Diagnosis   1. Lung, Right Upper Lobe, CT-Guided Biopsy for a Mass:               A. Predominantly necrosis with surrounding fibrosis and atypical pneumocyte hyperplasia.               B. No viable tumor present.        8/12/2022 Imaging    F-18 FDG PET FROM SKULL BASE TO MID THIGH WITH PET/CT FUSION     HISTORY: Lung mass, staging.     TECHNIQUE: Radiation dose reduction techniques were utilized, including  automated exposure control and exposure modulation based on body size.   Blood glucose level at time of injection was 95 mg/dL.  6.8 mCi of F-18  FDG were injected and PET was performed from skull base to mid thigh. CT  was obtained for localization and attenuation correction. Time at  injection 0937. PET start time 1104.      Compared with outside CT report from 06/27/2022; no images are available  for comparison at the time of this dictation.     FINDINGS:      No cervical adenopathy  Price (Use Numbers Only, No Special Characters Or $): 130 demonstrating uptake significantly above that of  mediastinal blood pool is present. The thyroid, submandibular and  parotid glands demonstrate roughly symmetric FDG uptake.     Hypermetabolic right hilar and mediastinal adenopathy is present with  index nodes as below:  *  Right hilar node measuring 1.1 cm in short axis dimension with a max  SUV of 4.4.  *  Precarinal node measuring 1.3 cm in short axis dimension with max SUV  of 3.6.     No axillary adenopathy demonstrating uptake significantly above that of  mediastinal blood pool is present. There is no significant pericardial  effusion.     There is a large intensely FDG avid mass within the right upper lobe  with surrounding pulmonary opacification. The mass measures  approximately 4.9 x 3.8 cm on pet imaging with a max SUV of 21.9.  Interlobular septal thickening and groundglass opacification extends  from the periphery of this mass throughout the right upper lobe, many  areas of which demonstrate mild to moderate hypermetabolism with a max  SUV of 3.7. The mass abuts the adjacent right hilum over a long segment.  0.9 cm irregular nodule within the right lower lobe is present. There  are a few additional sub-6 mm pulmonary nodules. Please refer to saved  images in PACS for more precise locations.     For the purposes of this dictation, the last well-formed disc space be  referred to as L5-S1. There is mild anterolisthesis of L5 on S1.  Moderate to severe degenerative changes are present at C4-5 without  significant FDG uptake in this location. Postsurgical changes from right  rotator cuff repair are present with well-corticated ghost tracks seen  throughout the right proximal humerus, a few of which demonstrate  moderate FDG uptake with a max SUV of 4.9. A arnoldo hepatic node  demonstrating FDG uptake mildly above that of mediastinal blood pool  with a max SUV of 2.7 measures 1.1 cm in short axis dimension.     Colonic diverticulosis is present.     The  Chemical Peel: Gel Peel GL gallbladder is surgically absent.     No focal FDG avid abnormality is present within the liver, pancreas,  spleen, adrenal glands or kidneys. Indeterminate density mass arises off  the inferior aspect of the right kidney measuring up to 4.4 cm. There is  no hydronephrosis.     No free intraperitoneal air or fluid is seen.     IMPRESSION:  1.  Large mass centered within the right upper lobe representing  patient's known malignancy. Interlobular septal thickening and ground  glass opacification projecting from the periphery of the mass throughout  the right upper lobe is highly concerning for lymphangitic spread. Of  note, the mass abuts the pleura and mediastinum over a long segment and  underlying invasion cannot be excluded.  2.  FDG avid hilar and mediastinal adenopathy concerning for metastatic  disease.  3.  A few irregular subcentimeter pulmonary nodules are present within  the right lower and left upper lobes measuring up to 0.9 cm which while  nonspecific raises concern for metastatic disease. At least close  attention on followup is recommended.   4.  Minimally hypermetabolic arnoldo hepatic node and bilateral sub-6 mm  pulmonary nodules are indeterminate. Continued followup with chest and  abdominal CT in 3 months is recommended.  5.  Indeterminate density 4.4 cm mass arises off the right kidney.  Further evaluation with multiphase CT or MRI of the abdomen with and  without contrast is recommended to exclude neoplasm.  6.  Focal uptake over the right shoulder in the area of prior rotator  cuff repair is favored be postsurgical with metastatic disease less  likely.  7.  Other findings as above.     8/15/2022 Imaging    MRI OF THE BRAIN WITH AND WITHOUT CONTRAST 08/15/2022     CLINICAL HISTORY: New diagnosis of lung cancer in 06/2022 evaluate for  brain metastases.     TECHNIQUE: Axial T1, FLAIR, fat-suppressed T2, axial diffusion sagittal  T1 and postcontrast axial fat-suppressed T1 sagittal and coronal T1  Prep: The treated area was degreased with pre-peel cleanser, and vaseline was applied for protection of mucous membranes. thin  cut 1.5 mm thick axial postcontrast spoiled gradient echo T1-weighted  images were obtained of the entire head.     FINDINGS: There is minimal T2 high signal in the periventricular white  matter scattered tiny patchy nodular foci T2 high signal subcortical  white matter of cerebral hemispheres as well as in the central pontine  white matter consistent with mild-to-moderate small vessel disease. The  remainder of the brain parenchyma is normal in signal intensity. The  ventricles are normal in size. I see no focal mass effect. There is no  midline shift. No extra-axial fluid collections are identified. No  abnormal areas of enhancement are seen in the head. The paranasal  sinuses and the mastoid air cells and middle ear cavities are clear. The  calvarium and skull base are normal in appearance. Good flow voids are  demonstrated in the cerebral vessels and dural venous sinuses. On the  sagittal T1-weighted images there is evidence of T1 low signal  throughout the visualized portion of the C4 cervical vertebra. Patient  has had prior cervical spine surgery. Abnormality in the C4 cervical  vertebrae is nonspecific, could be degenerative in origin but along the  basis of this exam, I cannot exclude metastatic lesion of the C4  vertebra.     IMPRESSION:  1. There is mild-to-moderate small vessel disease in cerebral and  central pontine white matter. Otherwise, the MRI of the brain is normal  with no evidence of metastatic disease to the brain.   2. There are some nonspecific signal abnormality in the C4 cervical  vertebra with T1 low signal throughout the visualized portions C4  cervical vertebrae on the sagittal images. The patient has had prior  cervical spine surgery as demonstrated on prior PET scan and this argues  in favor that the T1 low signal and C4 vertebrae is related to sclerosis  from degenerative disc changes at C4-5 although an osseous metastatic  lesion in the C4 vertebra cannot be excluded on the  Post Peel Care: After the procedure, a post-peel cream was applied to the treated areas. Sun protection and post-care instructions were reviewed with the patient. basis of this exam.  Apparently the C4 vertebra was not hot on the recent PET scan which is  further evidence that this is degenerative in origin and correlation  with recent PET scan is suggested     8/23/2022 Biopsy    Final Diagnosis   1. Mediastinal Lymph Node, Station 11R, Fine Needle Aspiration (FNA): METASTATIC POORLY DIFFERENTIATED NONSMALL CELL CARCINOMA, FAVOR ADENOCARCINOMA.        8/23/2022 Biopsy    Final Diagnosis   1. Lung, Right Upper Lobe, Endobronchial Biopsies: INVASIVE POORLY DIFFERENTIATED ADENOCARCINOMA OF PULMONARY ORIGIN.     PD-L1: Positive, greater than 95%     8/23/2022 Biopsy    Final Diagnosis   1. Lung, Right Upper Lobe, Brushing:               A. Positive for malignant cells, consistent with NONSMALL CELL CARCINOMA.               B. Please correlate with mediastinal FNA case [id number].     2. Lung, Right Upper Lobe, Bronchoalveolar Lavage (BAL):               A. Negative for malignant cells.  B. Macrophages, benign bronchial cells, metaplastic squamous cells, coccoid bacteria (on cellblock), and acute inflammation.               C. GMS stain is negative for fungus and pneumocystis.        8/30/2022 Initial Diagnosis    Primary lung adenocarcinoma, right (HCC)     8/30/2022 Cancer Staged    Staging form: Lung, AJCC 8th Edition  - Clinical stage from 8/30/2022: Stage IIIA (cT2b, cN2, cM0) - Signed by Cruzito Lagunas MD on 8/30/2022 8/30/2022 Biopsy    [id number]  Detected alterations/biomarkers: KRAS G12V; TP53 I195S; TP53 K132R; TP53 R158L    TMB: 7.45 mut/Mb  MSI-High: Not detected       9/20/2022 - 10/25/2022 Chemotherapy    OP LUNG PACLitaxel / CARBOplatin AUC=2 (Weekly) + XRT      9/20/2022 - 10/31/2022 Radiation    Radiation OncologyTreatment Course:  Pam Vidal received 6000 cGy in 30 fractions to right lung.  Given concurrent with chemotherapy     10/4/2022 -  Chemotherapy    OP CENTRAL VENOUS ACCESS DEVICE ACCESS, CARE, AND MAINTENANCE (CVAD)     11/21/2022 Imaging     CT CHEST, ABDOMEN, AND PELVIS WITH IV CONTRAST     HISTORY: Primary lung adenocarcinoma, follow-up     TECHNIQUE: Radiation dose reduction techniques were utilized, including  automated exposure control and exposure modulation based on body size.   3 mm images were obtained through the chest, abdomen, and pelvis with IV  contrast.      COMPARISON: PET/CT 08/12/2022     FINDINGS:      Chest:      Previously seen mediastinal and hilar adenopathy which was FDG avid on  prior PET has decreased in size, with index nodes as below:  *  Right hilar node measuring 0.5 cm in short axis dimension (previously  1.1 cm on 08/12/2022).  *  Precarinal node measuring 1 cm short axis dimension (previously 1.3  cm on 08/12/2022).     Right Port-A-Cath tip terminates in superior cavoatrial junction. No  significant pericardial effusion is present.     Large right upper lobe pulmonary mass measures 5 x 4.2 cm (previously  4.9 x 3.8 cm on 08/12/2022). There is extensive groundglass and  interlobular septal thickening extending from the periphery of of this  mass, as before. Discrete extension of the mass into the superomedial  aspect of the right lower lobe is present, as before.     Scattered bilateral subcentimeter pulmonary nodules are present, as  before, with index lesions as below:  *  Right lower lobe measuring 0.7 cm, grossly unchanged since  06/27/2022.  *  Sub-6 mm nodule within the left upper lobe (image 21)     Abdomen and pelvis:      There is colonic diverticulosis. Bladder has an unremarkable  postcontrast CT appearance for its degree of distention. The uterus is  surgically absent.     There are no findings of small bowel obstruction. The appendix and  gallbladder are surgically absent. The liver, pancreas, spleen and  adrenal glands have an unremarkable postcontrast CT appearance.  Subcentimeter renal lesions are too small to characterize. Larger  hypodense lesions demonstrating density less than 15 Hounsfield  Detail Level: Simple Consent: Prior to the procedure, written consent was obtained and risks were reviewed, including but not limited to: redness, peeling, blistering, pigmentary change, scarring, infection, and pain. units  are likely benign per Bosniak 2019 criteria. Indeterminate exophytic  lesion arising off the inferior aspect of the right kidney measuring 5.1  cm is present. There is no hydronephrosis. No abdominopelvic adenopathy  by size criteria. No free intraperitoneal air or fluid is seen.     Bone windows: For the purposes of this dictation, last well-formed disc  space be referred to as L5-S1. There is mild to moderate anterolisthesis  of L5-S1.     IMPRESSION:  Impression:  1.  Pulmonary mass centered within the right upper lobe with discrete  extension into the superior aspect of the right lower lobe is present  and grossly unchanged in overall size since 08/12/2022. Findings  concerning for lymphangitic spread of malignancy ascending from the  periphery of the mass are present, as before.  2.  Previously hypermetabolic mediastinal and hilar adenopathy have  decreased in size.  3.  Scattered bilateral subcentimeter pulmonary nodules are grossly  unchanged. Continued close attention on follow-up is recommended to  exclude metastatic disease.  4.  5.1 cm exophytic lesion arising off the inferior aspect of the right  kidney is indeterminate. Further evaluation multiphase CT or MRI of the  abdomen with and without contrast is recommended to exclude neoplasm.  5.  Other findings as above.     11/28/2022 -  Chemotherapy    OP LUNG Durvalumab     1/4/2023 Biopsy    Final Diagnosis   1. Lung, Right Upper Lobe, Biopsy:  Endobronchial mucosa and submucosa with                A. Squamous metaplasia, mild to moderate chronic active inflammation, fibrinous and necrotic debris and reactive      epithelial changes.               B. No definitive dysplasia nor malignancy identified.               C. No granulomata, diagnostic viral inclusions nor fungal organisms identified.          1/4/2023 Biopsy    Final Diagnosis 1.   Fluid, Lung, Right Upper Lobe, Bronchoalveolar Lavage (BAL):  A. Negative for malignant cells- reactive  Post-Care Instructions: I reviewed with the patient in detail post-care instructions. Patient should avoid sun exposure and wear sun protection. atypia.  B. Reactive bronchial cells, macrophages, inflammation (mostly acute) and mucin.  C. Negative for definitive fungal organisms by GMS staining. See comment.     2. Fluid, Lung, Right Upper Lobe, Brushing:               A. Negative for malignant cells.               B. Bronchial cells, scattered inflammation and mucinous debris.         3/1/2023 Imaging    Examination: CT of the chest, abdomen, and pelvis with contrast     TECHNIQUE: CT of the chest, abdomen, and pelvis after the uneventful  administration of nonionic intravenous contrast per protocol. Radiation  dose reduction techniques were utilized, including automated exposure  control and exposure modulation based on body size.     HISTORY:Lung cancer     COMPARISON:11/21/2022     FINDINGS:Chest:     Groundglass opacities in the right upper lobe are stable. The right  upper lobe mass is decreased in size, measuring 4.3 x 3 cm on series 3,  image  72. Areas of atelectasis and/or scarring are seen. No pleural effusion  or pneumothorax are demonstrated. There is a left-sided fat-containing  Bochdalek type hernia. Right-sided paramediastinal opacity is decreased  around series 3, image 36. The central airways are patent.     No enlarged supraclavicular, axillary, mediastinal, or hilar lymph nodes  are seen. The mediastinal vasculature is normal in caliber. A right  internal jugular venous Port-A-Cath terminates in the caudal superior  vena cava. Coronary calcifications are seen. The heart is normal in size  and configuration, without pericardial effusion.     Abdomen and pelvis:     Cholecystectomy changes are seen. Stable cysts and lesions that are  technically indeterminate, likely cysts, are seen in the right kidney.  Colonic diverticula are demonstrated, without evidence of acute  diverticulitis. The uterus is absent.     The spleen, adrenal glands, left kidney, pancreas, stomach, small bowel,  urinary bladder, and abdominal vasculature are normal.  No  intraperitoneal fluid collection or free gas are seen. No enlarged lymph  nodes are demonstrated.     Bone windows demonstrate degenerative changes, without suspicious  osseous lesion seen. There is a cervical cerclage wire.     IMPRESSION:  Decreased size of the right upper lobe mass with stable  surrounding groundglass opacity and decreased size of the right lower  lobe opacity. No adenopathy seen on today's exam. Incidental findings as  above.         Past Medical History:   Diagnosis Date   • COPD (chronic obstructive pulmonary disease)    • Coughing up blood     X2 MONTHS   • History of COVID-19 2022   • Hyperlipidemia    • IBS (irritable bowel syndrome)    • Primary lung adenocarcinoma, right    • Rheumatoid arthritis        Past Surgical History:   Procedure Laterality Date   • APPENDECTOMY      appendix removed   • BRONCHOSCOPY N/A 2022    Procedure: BRONCHOSCOPY WITH BAL,  BIOPSIES, AND BRUSHINGS WITH ENDOBRONCHIAL ULTRASOUND WITH FNA;  Surgeon: Gregor Vee MD;  Location: [facility] ENDOSCOPY;  Service: Pulmonary;  Laterality: N/A;  PRE- HILAR MASS  POST- SAME   • BRONCHOSCOPY N/A 2023    Procedure: BRONCHOSCOPY with biopsy, lavage, brushing;  Surgeon: Gregor Vee MD;  Location: [facility] ENDOSCOPY;  Service: Pulmonary;  Laterality: N/A;   • CAROTID ENDARTERECTOMY Right    • CAROTID ENDARTERECTOMY Left    • CATARACT EXTRACTION     • CERVICAL FUSION     • CHOLECYSTECTOMY     • HYSTERECTOMY     • SHOULDER ARTHROSCOPY Right 2019    right should scope/cuff repair    • VENOUS ACCESS DEVICE (PORT) INSERTION Right 2022    Procedure: POWERPORT INSERTION;  Surgeon: Remedios Rice MD;  Location: [facility] OR;  Service: Thoracic;  Laterality: Right;       Social History     Socioeconomic History   • Marital status:    Tobacco Use   • Smoking status: Former     Packs/day: 0.25     Types: Cigarettes     Quit date: 2022     Years since quittin.9   • Smokeless tobacco: Never  Treatment Time (Optional): 5 minutes   • Tobacco comments:     Former some day smoker of 1-2 cigs   Vaping Use   • Vaping Use: Never used   Substance and Sexual Activity   • Alcohol use: Yes     Alcohol/week: 1.0 standard drink     Types: 1 Glasses of wine per week     Comment: occasionally   • Drug use: Never   • Sexual activity: Defer         No results found for: LDH, URICACID    Lab Results   Component Value Date    GLUCOSE 120 04/03/2023    BUN 25 (H) 04/03/2023    CREATININE 1.05 04/03/2023    BCR 23.8 04/03/2023    K 4.1 04/03/2023    CO2 25.4 04/03/2023    CALCIUM 10.3 (H) 04/03/2023    ALBUMIN 4.1 04/03/2023    AST 23 04/03/2023    ALT 19 04/03/2023       CBC w/diff    CBC w/Diff 2/20/23 3/6/23 3/20/23   WBC 14.95 (A) 10.32 10.29   RBC 4.93 5.14 4.73   Hemoglobin 13.4 13.5 12.5   Hematocrit 41.6 43.6 40.5   MCV 84.4 84.8 85.6   MCH 27.2 26.3 (A) 26.4 (A)   MCHC 32.2 31.0 (A) 30.9 (A)   RDW 16.1 (A) 17.3 (A) 18.6 (A)   Platelets 383 310 244   Neutrophil Rel % 79.0 (A) 79.4 (A) 84.2 (A)   Immature Granulocyte Rel % 0.9 (A) 0.4 0.7 (A)   Lymphocyte Rel % 11.4 (A) 9.9 (A) 6.2 (A)   Monocyte Rel % 7.3 8.7 7.5   Eosinophil Rel % 1.0 1.2 1.1   Basophil Rel % 0.4 0.4 0.3   (A) Abnormal value              Allergies as of 04/03/2023 - Reviewed 04/03/2023   Allergen Reaction Noted   • Del-mycin [erythromycin] Hives 07/07/2022   • Gentamicin Hives 01/12/2016   • Ibuprofen Nausea And Vomiting 01/12/2016   • Latex Dermatitis 01/12/2016   • Metronidazole Hives 11/10/2016   • Ofloxacin Hives and Nausea Only 01/12/2016   • Penicillins Hives 01/12/2016   • Tetracycline Hives 01/12/2016   • Adhesive tape Dermatitis 01/12/2016   • Aspirin GI Intolerance 07/07/2022   • Codeine Nausea And Vomiting 01/12/2016       MEDICATIONS:  Medication list reviewed today    Review of Systems   Constitutional: Positive for fatigue (very mild 24 hours after durvalumab infusion). Negative for activity change, appetite change and unexpected weight change.   Respiratory: Negative  for chest tightness and shortness of breath.    Cardiovascular: Negative for chest pain and leg swelling.   Gastrointestinal: Negative for blood in stool, constipation and diarrhea.   Genitourinary: Negative for dysuria and hematuria.   Musculoskeletal: Negative for arthralgias and myalgias.   Neurological: Negative for weakness and numbness.   Psychiatric/Behavioral: Negative for dysphoric mood and sleep disturbance. The patient is not nervous/anxious.        /80   Pulse 75   Temp 96.5 °F (35.8 °C) (Temporal)   Resp 20   Wt 54.2 kg (119 lb 8 oz)   SpO2 97% Comment: Room air  BMI 24.12 kg/m²     Wt Readings from Last 3 Encounters:   04/03/23 54.7 kg (120 lb 9.6 oz)   04/03/23 54.2 kg (119 lb 8 oz)   03/20/23 55.2 kg (121 lb 12.8 oz)        Pain Score    04/03/23 1240   PainSc: 0-No pain       PHQ-9 Total Score: 0   GAD7 Total Score: 0   Distress Score: 0   Fatigue Severity Scale: 1; 0/10      Physical Exam  Constitutional:       Appearance: Normal appearance. She is well-groomed.   HENT:      Head: Normocephalic and atraumatic.   Cardiovascular:      Rate and Rhythm: Normal rate and regular rhythm.   Pulmonary:      Effort: No tachypnea or respiratory distress.   Abdominal:      General: Abdomen is flat. There is no distension.   Musculoskeletal:      Right ankle: No swelling. No tenderness. Normal pulse.      Left ankle: No swelling. No tenderness. Normal pulse.   Skin:     General: Skin is warm and dry.   Neurological:      Mental Status: She is alert and oriented to person, place, and time.   Psychiatric:         Attention and Perception: Attention and perception normal.         Mood and Affect: Mood and affect normal.         Speech: Speech normal.         Behavior: Behavior normal. Behavior is cooperative.         Thought Content: Thought content normal.           Advance Care Planning     Patient does not have advance care planning complete, we do not have a copy on file    Patient has not  designated a healthcare surrogate: but she does feel the person she would choose would be her niece Rosio Garrett.     Brief discussion and written information provided regarding advance care planning and appropriateness for all healthy adults, choosing a healthcare surrogate.    Information provided on upcoming Advance Care Planning classes offered virtually through [facility]. Advised arrangements can be made to schedule an appointment with myself or another advance care planning facilitator at their convenience.          DISCUSSION HELD TODAY:   Discussed NCCN recommendations for all cancer survivors of 150 minutes/week moderate intensity exercise, achieve and maintain a healthy weight, plants-based whole-foods diet, avoid tobacco and second hand smoke, avoid alcohol or minimize alcohol intake - no more than 1 drink in a day for adults.     A copy of the Survivorship Treatment Summary & Care Plan for Ms. Vidal was provided to and forwarded to the providers identified on the care team.    Problems identified:  1. Imfinzi - will have cycle 6 today, tolerating well with some generalized fatigue that peaks 2nd day after treatment.   2. Bladder incontinence: Sees urology, tentatively planning for bladder suspension with mesh. She has tried PT in the past without much help. Declines further referral to pelvic floor for now but discussed we can always revisit as needed  3. Physical deconditioning, has lost a total of 10 pounds since our first meeting in October. Generally reporting worsening muscle mass, has noted worsening pelvic floor muscle control as well. Discussed cancer rehab at [facility]. Will defer pelvic floor referral for. Declines nutrition referral for now  4. Psychosocial: continues to do extremely well without symptoms of depression or anxiety. Great support. Recently developed a romantic relationship with long time friend, looking forward to travel for [place] this weekend. Reviewed strategies for pacing  over the weekend and allowing for time to recover      Plan and recommendations:  1. She will continue maintenance durvalumab for a total of one year of therapy  2. Referral to [facility] program for assistance with fatigue, muscle deconditioning and help with home exercise.  3. Declines pelvic floor and nutrition referrals for now  4. I am Available for assistance with advance care planning as needed  5. No further follow up for now but she can call as needed   6. Call my office as needed at [phone] for additional information, resources or support.      Diagnoses and all orders for this visit:    1. Primary lung adenocarcinoma, right (Primary)  -     Ambulatory Referral to Physical Therapy Evaluate and treat (PT/OT)    2. Muscular deconditioning  -     Ambulatory Referral to Physical Therapy Evaluate and treat (PT/OT)    3. Neoplastic malignant related fatigue  -     Ambulatory Referral to Physical Therapy Evaluate and treat (PT/OT)            I spent 40 minutes caring for this patient on this date of service by face-to-face counseling. This time includes time spent by me in the following activities: preparing for the visit, reviewing tests, obtaining and/or reviewing a separately obtained history, performing a medically appropriate examination and/or evaluation, counseling and educating the patient/family/caregiver, referring and communicating with other health care professionals, documenting information in the medical record and care coordination

## (undated) DEVICE — INTENDED FOR TISSUE SEPARATION, AND OTHER PROCEDURES THAT REQUIRE A SHARP SURGICAL BLADE TO PUNCTURE OR CUT.: Brand: BARD-PARKER ® CARBON RIB-BACK BLADES

## (undated) DEVICE — RETR STAY HK ELAS BLNT 12MM 50PK

## (undated) DEVICE — SUT PROLN 2/0 SH 36IN 8523H

## (undated) DEVICE — SPHR MARKR STEALTH STATION

## (undated) DEVICE — Device

## (undated) DEVICE — PERFORATOR CDM WO/ DURAGUARD 14/11

## (undated) DEVICE — SINGLE USE SUCTION VALVE MAJ-209: Brand: SINGLE USE SUCTION VALVE (STERILE)

## (undated) DEVICE — CONN TBG Y 5 IN 1 LF STRL

## (undated) DEVICE — DRP C/ARM 41X74IN

## (undated) DEVICE — SINGLE USE ASPIRATION NEEDLE: Brand: SINGLE USE ASPIRATION NEEDLE

## (undated) DEVICE — VITAL SIGNS™ JACKSON-REES CIRCUITS: Brand: VITAL SIGNS™

## (undated) DEVICE — DRP SLUSH WARMR MACH CIR 44X44IN

## (undated) DEVICE — SUT MNCRYL PLS ANTIB UD 4/0 PS2 18IN

## (undated) DEVICE — Device: Brand: BALLOON

## (undated) DEVICE — TUBING, SUCTION, 1/4" X 10', STRAIGHT: Brand: MEDLINE

## (undated) DEVICE — ADHS SKIN SURG TISS VISC PREMIERPRO EXOFIN HI/VISC FAST/DRY

## (undated) DEVICE — TUBING, SUCTION, 1/4" X 20', STRAIGHT: Brand: MEDLINE INDUSTRIES, INC.

## (undated) DEVICE — ADAPT SWVL FIBROPTIC BRONCH

## (undated) DEVICE — CUSA® CLARITY 36KHZ CURVED EXTENDED STANDARD TIP: Brand: CUSA® CLARITY

## (undated) DEVICE — SMOKE EVACUATION TUBING WITH 7/8 IN TO 1/4 IN REDUCER: Brand: BUFFALO FILTER

## (undated) DEVICE — ELECTRD BLD EZ CLN MOD XLNG 2.75IN

## (undated) DEVICE — DECANTER BAG 9": Brand: MEDLINE INDUSTRIES, INC.

## (undated) DEVICE — SEALANT WND FIBRIN TISSEEL PREFIL/SYR/PRIMAFZ 4ML

## (undated) DEVICE — NDL HYPO PRECISIONGLIDE REG 25G 1 1/2

## (undated) DEVICE — MSK AIRWY LARYNG LMA PILOT SZ4

## (undated) DEVICE — STPLR SKIN VISISTAT WD 35CT

## (undated) DEVICE — PATIENT RETURN ELECTRODE, SINGLE-USE, CONTACT QUALITY MONITORING, ADULT, WITH 9FT CORD, FOR PATIENTS WEIGING OVER 33LBS. (15KG): Brand: MEGADYNE

## (undated) DEVICE — SINGLE USE BIOPSY VALVE MAJ-210: Brand: SINGLE USE BIOPSY VALVE (STERILE)

## (undated) DEVICE — MARKR SKIN W/RULR 2TP

## (undated) DEVICE — TRAP,MUCUS SPECIMEN, 80CC: Brand: MEDLINE

## (undated) DEVICE — LABEL SHEET CUSTOM 2X2 YELLOW: Brand: MEDLINE INDUSTRIES, INC.

## (undated) DEVICE — SYR LUERLOK 5CC

## (undated) DEVICE — ANTIBACTERIAL UNDYED BRAIDED (POLYGLACTIN 910), SYNTHETIC ABSORBABLE SUTURE: Brand: COATED VICRYL

## (undated) DEVICE — FRCP BX RADJAW4 PULM WO NDL STD1.8X2 100

## (undated) DEVICE — DISPOSABLE IRRIGATION BIPOLAR CORD, M1000 TYPE: Brand: KIRWAN

## (undated) DEVICE — SUT SILK 2/0 SH CR5 18IN C0125

## (undated) DEVICE — CVR PROB 96IN LF STRL

## (undated) DEVICE — DRSNG TELFA PAD NONADH STR 1S 3X8IN

## (undated) DEVICE — PK CRANI 40

## (undated) DEVICE — SENSR O2 OXIMAX FNGR A/ 18IN NONSTR

## (undated) DEVICE — LOU MINOR PROCEDURE: Brand: MEDLINE INDUSTRIES, INC.

## (undated) DEVICE — SYR LUERLOK 20CC BX/50

## (undated) DEVICE — CONMED DISPOSABLE BRONCHIAL CYTOLOGY BRUSH, STRAIGHT HANDLE, 3 MM X 120 CM: Brand: CONMED

## (undated) DEVICE — DISPOSABLE DUAL IRRIGATING BIPOLAR FORCEPS, NON-STICK,: Brand: SPETZLER-MALIS

## (undated) DEVICE — MAYFIELD® DISPOSABLE ADULT SKULL PIN (PLASTIC BASE): Brand: MAYFIELD®

## (undated) DEVICE — TOOL MR8-9MH30 MR8 9CM MATCH 3MM: Brand: MIDAS REX MR8

## (undated) DEVICE — GLV SURG SENSICARE PI PF LF 7 GRN STRL

## (undated) DEVICE — TOOL MR8-F2/7TA23 MR8 F2/7CM TAPER 2.3MM: Brand: MIDAS REX MR8

## (undated) DEVICE — APPL CHLORAPREP HI/LITE 26ML ORNG

## (undated) DEVICE — GLV SURG SENSICARE W/ALOE PF LF 7 STRL

## (undated) DEVICE — ADAPT CLN BIOGUARD AIR/H2O DISP

## (undated) DEVICE — LARGE BORE STOPCOCK WITH EXTENSION SET, MALE LUER LOCK ADAPTER WITH RETRACTABLE COLLAR

## (undated) DEVICE — SPNG GZ WOVN 4X4IN 12PLY 10/BX STRL

## (undated) DEVICE — SPONGE,NEURO,1"X1",XR,STRL,LF,10/PK: Brand: MEDLINE

## (undated) DEVICE — PENCL ES MEGADINE EZ/CLEAN BUTN W/HOLSTR 10FT

## (undated) DEVICE — 3M™ IOBAN™ 2 ANTIMICROBIAL INCISE DRAPE 6650EZ: Brand: IOBAN™ 2

## (undated) DEVICE — SOL ISO/ALC 70PCT 4OZ

## (undated) DEVICE — GLV SURG BIOGEL LTX PF 6

## (undated) DEVICE — COVER,MAYO STAND,STERILE: Brand: MEDLINE

## (undated) DEVICE — BITEBLOCK OMNI BLOC

## (undated) DEVICE — LN SMPL O2 NASL/ORL SMART/CAPNOLINE PLS A/

## (undated) DEVICE — SPONGE,NEURO,.75"X.75",XR,STRL,LF,10/PK: Brand: MEDLINE

## (undated) DEVICE — FRCP BX RADJAW4 GASTRO PED 1.8X160X2

## (undated) DEVICE — SUT NUROLON 4/0 TF18 CR8 I8IN C584D

## (undated) DEVICE — VIOLET BRAIDED (POLYGLACTIN 910), SYNTHETIC ABSORBABLE SUTURE: Brand: COATED VICRYL